# Patient Record
Sex: FEMALE | Race: WHITE | NOT HISPANIC OR LATINO
[De-identification: names, ages, dates, MRNs, and addresses within clinical notes are randomized per-mention and may not be internally consistent; named-entity substitution may affect disease eponyms.]

---

## 2017-07-26 ENCOUNTER — APPOINTMENT (OUTPATIENT)
Dept: FAMILY MEDICINE | Facility: CLINIC | Age: 57
End: 2017-07-26

## 2017-07-26 VITALS
OXYGEN SATURATION: 98 % | RESPIRATION RATE: 15 BRPM | HEART RATE: 100 BPM | TEMPERATURE: 98.4 F | WEIGHT: 139 LBS | HEIGHT: 61 IN | SYSTOLIC BLOOD PRESSURE: 189 MMHG | BODY MASS INDEX: 26.24 KG/M2 | DIASTOLIC BLOOD PRESSURE: 89 MMHG

## 2017-08-02 ENCOUNTER — APPOINTMENT (OUTPATIENT)
Dept: FAMILY MEDICINE | Facility: CLINIC | Age: 57
End: 2017-08-02
Payer: COMMERCIAL

## 2017-08-02 VITALS
SYSTOLIC BLOOD PRESSURE: 157 MMHG | TEMPERATURE: 98.4 F | HEIGHT: 61 IN | RESPIRATION RATE: 15 BRPM | BODY MASS INDEX: 26.24 KG/M2 | DIASTOLIC BLOOD PRESSURE: 98 MMHG | OXYGEN SATURATION: 99 % | WEIGHT: 139 LBS | HEART RATE: 85 BPM

## 2017-08-02 PROCEDURE — 99214 OFFICE O/P EST MOD 30 MIN: CPT

## 2017-08-29 ENCOUNTER — RX RENEWAL (OUTPATIENT)
Age: 57
End: 2017-08-29

## 2017-09-13 ENCOUNTER — RX RENEWAL (OUTPATIENT)
Age: 57
End: 2017-09-13

## 2017-09-18 ENCOUNTER — APPOINTMENT (OUTPATIENT)
Dept: FAMILY MEDICINE | Facility: CLINIC | Age: 57
End: 2017-09-18
Payer: COMMERCIAL

## 2017-09-18 VITALS
RESPIRATION RATE: 15 BRPM | HEART RATE: 90 BPM | OXYGEN SATURATION: 98 % | DIASTOLIC BLOOD PRESSURE: 95 MMHG | TEMPERATURE: 98.4 F | WEIGHT: 139 LBS | SYSTOLIC BLOOD PRESSURE: 155 MMHG | HEIGHT: 61 IN | BODY MASS INDEX: 26.24 KG/M2

## 2017-09-18 PROCEDURE — 99214 OFFICE O/P EST MOD 30 MIN: CPT

## 2017-09-22 ENCOUNTER — RX RENEWAL (OUTPATIENT)
Age: 57
End: 2017-09-22

## 2017-09-23 ENCOUNTER — LABORATORY RESULT (OUTPATIENT)
Age: 57
End: 2017-09-23

## 2017-09-25 LAB
25(OH)D3 SERPL-MCNC: 15.9 NG/ML
ALBUMIN MFR SERPL ELPH: 55.4 %
ALBUMIN SERPL ELPH-MCNC: 4.6 G/DL
ALBUMIN SERPL-MCNC: 4.3 G/DL
ALBUMIN/GLOB SERPL: 1.2 RATIO
ALP BLD-CCNC: 116 U/L
ALPHA1 GLOB MFR SERPL ELPH: 3.4 %
ALPHA1 GLOB SERPL ELPH-MCNC: 0.3 G/DL
ALPHA2 GLOB MFR SERPL ELPH: 10 %
ALPHA2 GLOB SERPL ELPH-MCNC: 0.8 G/DL
ALT SERPL-CCNC: 15 U/L
ANION GAP SERPL CALC-SCNC: 14 MMOL/L
APPEARANCE: CLEAR
AST SERPL-CCNC: 20 U/L
B BURGDOR AB SER-IMP: NEGATIVE
B BURGDOR IGG+IGM SER QL IB: NORMAL
B BURGDOR IGM PATRN SER IB-IMP: NEGATIVE
B BURGDOR18/20KD IGM SER QL IB: NORMAL
B BURGDOR18KD IGG SER QL IB: NORMAL
B BURGDOR23KD IGG SER QL IB: NORMAL
B BURGDOR23KD IGM SER QL IB: NORMAL
B BURGDOR28KD AB SER QL IB: NORMAL
B BURGDOR28KD IGG SER QL IB: NORMAL
B BURGDOR30KD AB SER QL IB: NORMAL
B BURGDOR30KD IGG SER QL IB: NORMAL
B BURGDOR31KD IGG SER QL IB: NORMAL
B BURGDOR31KD IGM SER QL IB: NORMAL
B BURGDOR39KD IGG SER QL IB: NORMAL
B BURGDOR39KD IGM SER QL IB: NORMAL
B BURGDOR41KD IGG SER QL IB: PRESENT
B BURGDOR41KD IGM SER QL IB: NORMAL
B BURGDOR45KD AB SER QL IB: NORMAL
B BURGDOR45KD IGG SER QL IB: NORMAL
B BURGDOR58KD AB SER QL IB: NORMAL
B BURGDOR58KD IGG SER QL IB: NORMAL
B BURGDOR66KD IGG SER QL IB: NORMAL
B BURGDOR66KD IGM SER QL IB: NORMAL
B BURGDOR93KD IGG SER QL IB: NORMAL
B BURGDOR93KD IGM SER QL IB: NORMAL
B-GLOBULIN MFR SERPL ELPH: 9.3 %
B-GLOBULIN SERPL ELPH-MCNC: 0.7 G/DL
BASOPHILS # BLD AUTO: 0.02 K/UL
BASOPHILS NFR BLD AUTO: 0.2 %
BILIRUB SERPL-MCNC: 1 MG/DL
BILIRUBIN URINE: NEGATIVE
BLOOD URINE: NEGATIVE
BUN SERPL-MCNC: 28 MG/DL
CALCIUM SERPL-MCNC: 10.3 MG/DL
CHLORIDE SERPL-SCNC: 103 MMOL/L
CHOLEST SERPL-MCNC: 227 MG/DL
CHOLEST/HDLC SERPL: 4 RATIO
CO2 SERPL-SCNC: 23 MMOL/L
COLOR: YELLOW
CREAT SERPL-MCNC: 0.79 MG/DL
CREAT SPEC-SCNC: 190 MG/DL
EOSINOPHIL # BLD AUTO: 0.31 K/UL
EOSINOPHIL NFR BLD AUTO: 3.4 %
FOLATE SERPL-MCNC: 19.3 NG/ML
GAMMA GLOB FLD ELPH-MCNC: 1.7 G/DL
GAMMA GLOB MFR SERPL ELPH: 21.9 %
GLUCOSE QUALITATIVE U: NORMAL MG/DL
GLUCOSE SERPL-MCNC: 108 MG/DL
HBA1C MFR BLD HPLC: 6.1 %
HCT VFR BLD CALC: 43.6 %
HDLC SERPL-MCNC: 57 MG/DL
HGB BLD-MCNC: 14.5 G/DL
IMM GRANULOCYTES NFR BLD AUTO: 0.1 %
INTERPRETATION SERPL IEP-IMP: NORMAL
KETONES URINE: NEGATIVE
LDLC SERPL CALC-MCNC: 116 MG/DL
LEUKOCYTE ESTERASE URINE: NEGATIVE
LYMPHOCYTES # BLD AUTO: 4.38 K/UL
LYMPHOCYTES NFR BLD AUTO: 47.6 %
M PROTEIN MFR SERPL ELPH: 16.7 %
MAGNESIUM SERPL-MCNC: 2.2 MG/DL
MAN DIFF?: NORMAL
MCHC RBC-ENTMCNC: 29.8 PG
MCHC RBC-ENTMCNC: 33.3 GM/DL
MCV RBC AUTO: 89.5 FL
MICROALBUMIN 24H UR DL<=1MG/L-MCNC: 3.4 MG/DL
MICROALBUMIN/CREAT 24H UR-RTO: 18 MG/G
MONOCLON BAND OBS SERPL: 1.3 G/DL
MONOCYTES # BLD AUTO: 0.67 K/UL
MONOCYTES NFR BLD AUTO: 7.3 %
NEUTROPHILS # BLD AUTO: 3.82 K/UL
NEUTROPHILS NFR BLD AUTO: 41.4 %
NITRITE URINE: NEGATIVE
PH URINE: 5.5
PLATELET # BLD AUTO: 318 K/UL
POTASSIUM SERPL-SCNC: 5 MMOL/L
PROT SERPL-MCNC: 7.8 G/DL
PROTEIN URINE: NEGATIVE MG/DL
RBC # BLD: 4.87 M/UL
RBC # FLD: 14.4 %
SODIUM SERPL-SCNC: 140 MMOL/L
SPECIFIC GRAVITY URINE: 1.02
TRIGL SERPL-MCNC: 272 MG/DL
TSH SERPL-ACNC: 3.78 UIU/ML
UROBILINOGEN URINE: NORMAL MG/DL
VIT B12 SERPL-MCNC: 625 PG/ML
WBC # FLD AUTO: 9.21 K/UL

## 2017-10-03 ENCOUNTER — FORM ENCOUNTER (OUTPATIENT)
Age: 57
End: 2017-10-03

## 2017-10-04 ENCOUNTER — OUTPATIENT (OUTPATIENT)
Dept: OUTPATIENT SERVICES | Facility: HOSPITAL | Age: 57
LOS: 1 days | End: 2017-10-04
Payer: COMMERCIAL

## 2017-10-04 ENCOUNTER — FORM ENCOUNTER (OUTPATIENT)
Age: 57
End: 2017-10-04

## 2017-10-04 ENCOUNTER — APPOINTMENT (OUTPATIENT)
Dept: FAMILY MEDICINE | Facility: CLINIC | Age: 57
End: 2017-10-04
Payer: COMMERCIAL

## 2017-10-04 VITALS
DIASTOLIC BLOOD PRESSURE: 100 MMHG | HEART RATE: 115 BPM | BODY MASS INDEX: 26.43 KG/M2 | WEIGHT: 140 LBS | SYSTOLIC BLOOD PRESSURE: 180 MMHG | HEIGHT: 61 IN

## 2017-10-04 PROCEDURE — 93971 EXTREMITY STUDY: CPT | Mod: 26,LT

## 2017-10-04 PROCEDURE — 99215 OFFICE O/P EST HI 40 MIN: CPT | Mod: 25

## 2017-10-04 PROCEDURE — 93971 EXTREMITY STUDY: CPT

## 2017-10-04 PROCEDURE — 93000 ELECTROCARDIOGRAM COMPLETE: CPT | Mod: 59

## 2017-10-04 RX ORDER — METOPROLOL SUCCINATE 50 MG/1
50 TABLET, EXTENDED RELEASE ORAL
Qty: 30 | Refills: 3 | Status: DISCONTINUED | COMMUNITY
Start: 2017-08-02 | End: 2017-10-04

## 2017-10-04 RX ORDER — ATENOLOL 25 MG/1
25 TABLET ORAL TWICE DAILY
Qty: 180 | Refills: 1 | Status: DISCONTINUED | COMMUNITY
Start: 2017-07-26 | End: 2017-10-04

## 2017-10-05 ENCOUNTER — APPOINTMENT (OUTPATIENT)
Dept: RADIOLOGY | Facility: HOSPITAL | Age: 57
End: 2017-10-05
Payer: COMMERCIAL

## 2017-10-05 ENCOUNTER — OUTPATIENT (OUTPATIENT)
Dept: OUTPATIENT SERVICES | Facility: HOSPITAL | Age: 57
LOS: 1 days | End: 2017-10-05
Payer: COMMERCIAL

## 2017-10-05 PROCEDURE — 73562 X-RAY EXAM OF KNEE 3: CPT | Mod: 26,LT

## 2017-10-05 PROCEDURE — 73552 X-RAY EXAM OF FEMUR 2/>: CPT | Mod: 26,LT

## 2017-10-05 PROCEDURE — 77080 DXA BONE DENSITY AXIAL: CPT | Mod: 26

## 2017-10-05 PROCEDURE — 73590 X-RAY EXAM OF LOWER LEG: CPT | Mod: 26,LT

## 2017-10-05 PROCEDURE — 73590 X-RAY EXAM OF LOWER LEG: CPT

## 2017-10-05 PROCEDURE — 73552 X-RAY EXAM OF FEMUR 2/>: CPT

## 2017-10-05 PROCEDURE — 73562 X-RAY EXAM OF KNEE 3: CPT

## 2017-10-05 PROCEDURE — 77080 DXA BONE DENSITY AXIAL: CPT

## 2017-10-11 ENCOUNTER — APPOINTMENT (OUTPATIENT)
Dept: FAMILY MEDICINE | Facility: CLINIC | Age: 57
End: 2017-10-11

## 2017-10-12 ENCOUNTER — APPOINTMENT (OUTPATIENT)
Dept: FAMILY MEDICINE | Facility: CLINIC | Age: 57
End: 2017-10-12
Payer: COMMERCIAL

## 2017-10-12 VITALS
HEART RATE: 83 BPM | BODY MASS INDEX: 26.43 KG/M2 | SYSTOLIC BLOOD PRESSURE: 131 MMHG | DIASTOLIC BLOOD PRESSURE: 81 MMHG | OXYGEN SATURATION: 93 % | HEIGHT: 61 IN | WEIGHT: 140 LBS

## 2017-10-12 PROCEDURE — 99214 OFFICE O/P EST MOD 30 MIN: CPT

## 2017-10-12 RX ORDER — VALSARTAN 40 MG/1
40 TABLET, COATED ORAL DAILY
Qty: 30 | Refills: 5 | Status: DISCONTINUED | COMMUNITY
Start: 2017-09-18 | End: 2017-10-12

## 2017-10-31 ENCOUNTER — RX RENEWAL (OUTPATIENT)
Age: 57
End: 2017-10-31

## 2017-11-15 ENCOUNTER — APPOINTMENT (OUTPATIENT)
Dept: FAMILY MEDICINE | Facility: CLINIC | Age: 57
End: 2017-11-15
Payer: COMMERCIAL

## 2017-11-15 VITALS
OXYGEN SATURATION: 100 % | DIASTOLIC BLOOD PRESSURE: 80 MMHG | SYSTOLIC BLOOD PRESSURE: 122 MMHG | HEART RATE: 96 BPM | BODY MASS INDEX: 27 KG/M2 | WEIGHT: 143 LBS | HEIGHT: 61 IN

## 2017-11-15 DIAGNOSIS — F41.9 ANXIETY DISORDER, UNSPECIFIED: ICD-10-CM

## 2017-11-15 PROCEDURE — G0008: CPT

## 2017-11-15 PROCEDURE — 99215 OFFICE O/P EST HI 40 MIN: CPT | Mod: 25

## 2017-11-15 PROCEDURE — 90688 IIV4 VACCINE SPLT 0.5 ML IM: CPT

## 2017-11-22 ENCOUNTER — RX RENEWAL (OUTPATIENT)
Age: 57
End: 2017-11-22

## 2017-11-28 ENCOUNTER — RX RENEWAL (OUTPATIENT)
Age: 57
End: 2017-11-28

## 2017-12-12 ENCOUNTER — RX RENEWAL (OUTPATIENT)
Age: 57
End: 2017-12-12

## 2018-05-22 ENCOUNTER — RX RENEWAL (OUTPATIENT)
Age: 58
End: 2018-05-22

## 2018-05-25 ENCOUNTER — RX RENEWAL (OUTPATIENT)
Age: 58
End: 2018-05-25

## 2018-08-21 ENCOUNTER — APPOINTMENT (OUTPATIENT)
Dept: FAMILY MEDICINE | Facility: CLINIC | Age: 58
End: 2018-08-21
Payer: COMMERCIAL

## 2018-08-21 VITALS
HEART RATE: 85 BPM | HEIGHT: 60 IN | OXYGEN SATURATION: 99 % | BODY MASS INDEX: 27.88 KG/M2 | DIASTOLIC BLOOD PRESSURE: 90 MMHG | WEIGHT: 142 LBS | SYSTOLIC BLOOD PRESSURE: 144 MMHG | TEMPERATURE: 98.4 F

## 2018-08-21 DIAGNOSIS — G47.00 INSOMNIA, UNSPECIFIED: ICD-10-CM

## 2018-08-21 PROCEDURE — G0444 DEPRESSION SCREEN ANNUAL: CPT

## 2018-08-21 PROCEDURE — 99396 PREV VISIT EST AGE 40-64: CPT | Mod: 25

## 2018-08-21 PROCEDURE — 93000 ELECTROCARDIOGRAM COMPLETE: CPT | Mod: 59

## 2018-08-21 RX ORDER — AMOXICILLIN AND CLAVULANATE POTASSIUM 875; 125 MG/1; MG/1
875-125 TABLET, COATED ORAL
Qty: 20 | Refills: 0 | Status: DISCONTINUED | COMMUNITY
Start: 2017-11-15 | End: 2018-08-21

## 2018-08-21 NOTE — HISTORY OF PRESENT ILLNESS
[FreeTextEntry1] : Physical. [de-identified] : Here for CPE. c/o gum pain and ears pain for 3 days. no fever, chills, cough, chest pain . no relief with OTC meds. She used oragel. no sick contacts. 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 c/o sleep disturbance .

## 2018-08-21 NOTE — COUNSELING
[Weight management counseling provided] : Weight management [Healthy eating counseling provided] : healthy eating [Activity counseling provided] : activity [Behavioral health counseling provided] : behavioral health  [Keep Food Diary] : Keep food diary [Low Fat Diet] : Low fat diet [Decrease Portions] : Decrease food portions [___ min/wk activity recommended] : [unfilled] min/wk activity recommended [Walking] : Walking [Engage in a relaxing activity] : Engage in a relaxing activity [None] : None [Needs reinforcement, provided] : Patient needs reinforcement on understanding lifestyle changes and  the steps needed to achieve self management goals and reinforcement was provided [ - Annual Depression Screening] : Annual Depression Screening

## 2018-08-21 NOTE — HEALTH RISK ASSESSMENT
[Fair] :  ~his/her~ mood as fair [No falls in past year] : Patient reported no falls in the past year [0] : 2) Feeling down, depressed, or hopeless: Not at all (0) [Discussed at today's visit] : Advance Directives Discussed at today's visit [No changes since last discussed ___] : No changes since last discussed  [unfilled] [HIV test declined] : HIV test declined [Hepatitis C test declined] : Hepatitis C test declined [None] : None [With Family] : lives with family [Unemployed] : unemployed [] :  [Sexually Active] : sexually active [Feels Safe at Home] : Feels safe at home [Fully functional (bathing, dressing, toileting, transferring, walking, feeding)] : Fully functional (bathing, dressing, toileting, transferring, walking, feeding) [Fully functional (using the telephone, shopping, preparing meals, housekeeping, doing laundry, using] : Fully functional and needs no help or supervision to perform IADLs (using the telephone, shopping, preparing meals, housekeeping, doing laundry, using transportation, managing medications and managing finances) [Independent] : managing finances [Smoke Detector] : smoke detector [Carbon Monoxide Detector] : carbon monoxide detector [Seat Belt] :  uses seat belt [Sunscreen] : uses sunscreen [Name: ___] : Health Care Proxy's Name: [unfilled]  [Relationship: ___] : Relationship: [unfilled] [Aggressive treatment] : aggressive treatment [] : No [de-identified] : gyn ( dr. Mina) [[id number]] : 0 [Change in mental status noted] : No change in mental status noted [Language] : denies difficulty with language [Reports changes in hearing] : Reports no changes in hearing [Reports changes in vision] : Reports no changes in vision [Reports changes in dental health] : Reports no changes in dental health [de-identified] : overdue for dental exam

## 2018-08-21 NOTE — REVIEW OF SYSTEMS
[Earache] : earache [Negative] : Heme/Lymph [Hearing Loss] : no hearing loss [Nasal Discharge] : no nasal discharge [Sore Throat] : no sore throat

## 2018-08-21 NOTE — PLAN
[FreeTextEntry1] : preventive screening. lab work. \par f/u Gyn. \par f/u hematology, skin doctor. \par  EKG: NSR 86 bpm .  poor artefact.\par Pt. did not take BP medicine yet. \par melatonin for sleep.

## 2018-08-28 ENCOUNTER — LABORATORY RESULT (OUTPATIENT)
Age: 58
End: 2018-08-28

## 2018-08-28 ENCOUNTER — RX RENEWAL (OUTPATIENT)
Age: 58
End: 2018-08-28

## 2018-08-30 LAB
25(OH)D3 SERPL-MCNC: 25.6 NG/ML
ALBUMIN MFR SERPL ELPH: 58.2 %
ALBUMIN SERPL ELPH-MCNC: 4.7 G/DL
ALBUMIN SERPL-MCNC: 4.5 G/DL
ALBUMIN/GLOB SERPL: 1.4 RATIO
ALP BLD-CCNC: 72 U/L
ALPHA1 GLOB MFR SERPL ELPH: 3.4 %
ALPHA1 GLOB SERPL ELPH-MCNC: 0.3 G/DL
ALPHA2 GLOB MFR SERPL ELPH: 9.1 %
ALPHA2 GLOB SERPL ELPH-MCNC: 0.7 G/DL
ALT SERPL-CCNC: 22 U/L
ANION GAP SERPL CALC-SCNC: 14 MMOL/L
APPEARANCE: CLEAR
AST SERPL-CCNC: 26 U/L
B-GLOBULIN MFR SERPL ELPH: 9.5 %
B-GLOBULIN SERPL ELPH-MCNC: 0.7 G/DL
BACTERIA: ABNORMAL
BASOPHILS # BLD AUTO: 0 K/UL
BASOPHILS NFR BLD AUTO: 0 %
BILIRUB SERPL-MCNC: 0.5 MG/DL
BILIRUBIN URINE: NEGATIVE
BLOOD URINE: NEGATIVE
BUN SERPL-MCNC: 29 MG/DL
CALCIUM SERPL-MCNC: 10 MG/DL
CHLORIDE SERPL-SCNC: 105 MMOL/L
CHOLEST SERPL-MCNC: 206 MG/DL
CHOLEST/HDLC SERPL: 3.7 RATIO
CO2 SERPL-SCNC: 24 MMOL/L
COLOR: YELLOW
CREAT SERPL-MCNC: 0.79 MG/DL
CREAT SPEC-SCNC: 221 MG/DL
CRP SERPL-MCNC: 0.12 MG/DL
EOSINOPHIL # BLD AUTO: 0 K/UL
EOSINOPHIL NFR BLD AUTO: 0 %
ERYTHROCYTE [SEDIMENTATION RATE] IN BLOOD BY WESTERGREN METHOD: 29 MM/HR
GAMMA GLOB FLD ELPH-MCNC: 1.5 G/DL
GAMMA GLOB MFR SERPL ELPH: 19.8 %
GLUCOSE QUALITATIVE U: NEGATIVE MG/DL
GLUCOSE SERPL-MCNC: 112 MG/DL
HBA1C MFR BLD HPLC: 6.4 %
HCT VFR BLD CALC: 42.1 %
HDLC SERPL-MCNC: 55 MG/DL
HGB BLD-MCNC: 13.9 G/DL
HYALINE CASTS: 6 /LPF
INTERPRETATION SERPL IEP-IMP: NORMAL
KETONES URINE: NEGATIVE
LDLC SERPL CALC-MCNC: 120 MG/DL
LEUKOCYTE ESTERASE URINE: NEGATIVE
LYMPHOCYTES # BLD AUTO: 2.38 K/UL
LYMPHOCYTES NFR BLD AUTO: 31.9 %
M PROTEIN MFR SERPL ELPH: 14.2 %
MAN DIFF?: NORMAL
MCHC RBC-ENTMCNC: 30 PG
MCHC RBC-ENTMCNC: 33 GM/DL
MCV RBC AUTO: 90.7 FL
MICROALBUMIN 24H UR DL<=1MG/L-MCNC: <1.2 MG/DL
MICROALBUMIN/CREAT 24H UR-RTO: NORMAL
MICROSCOPIC-UA: NORMAL
MONOCLON BAND OBS SERPL: 1.1 G/DL
MONOCYTES # BLD AUTO: 0.46 K/UL
MONOCYTES NFR BLD AUTO: 6.2 %
NEUTROPHILS # BLD AUTO: 4.61 K/UL
NEUTROPHILS NFR BLD AUTO: 61.9 %
NITRITE URINE: NEGATIVE
PH URINE: 5.5
PLATELET # BLD AUTO: 345 K/UL
POTASSIUM SERPL-SCNC: 4.2 MMOL/L
PROT SERPL-MCNC: 7.7 G/DL
PROTEIN URINE: NEGATIVE MG/DL
RBC # BLD: 4.64 M/UL
RBC # FLD: 13.7 %
RED BLOOD CELLS URINE: 2 /HPF
SODIUM SERPL-SCNC: 143 MMOL/L
SPECIFIC GRAVITY URINE: 1.03
SQUAMOUS EPITHELIAL CELLS: 3 /HPF
TRIGL SERPL-MCNC: 156 MG/DL
TSH SERPL-ACNC: 1.18 UIU/ML
UROBILINOGEN URINE: NEGATIVE MG/DL
WBC # FLD AUTO: 7.45 K/UL
WHITE BLOOD CELLS URINE: 4 /HPF

## 2018-09-18 ENCOUNTER — FORM ENCOUNTER (OUTPATIENT)
Age: 58
End: 2018-09-18

## 2018-09-19 ENCOUNTER — APPOINTMENT (OUTPATIENT)
Dept: MAMMOGRAPHY | Facility: HOSPITAL | Age: 58
End: 2018-09-19
Payer: COMMERCIAL

## 2018-09-19 ENCOUNTER — OUTPATIENT (OUTPATIENT)
Dept: OUTPATIENT SERVICES | Facility: HOSPITAL | Age: 58
LOS: 1 days | End: 2018-09-19
Payer: COMMERCIAL

## 2018-09-19 DIAGNOSIS — Z00.8 ENCOUNTER FOR OTHER GENERAL EXAMINATION: ICD-10-CM

## 2018-09-19 PROCEDURE — 77063 BREAST TOMOSYNTHESIS BI: CPT | Mod: 26

## 2018-09-19 PROCEDURE — 77067 SCR MAMMO BI INCL CAD: CPT

## 2018-09-19 PROCEDURE — 77063 BREAST TOMOSYNTHESIS BI: CPT

## 2018-09-19 PROCEDURE — 77067 SCR MAMMO BI INCL CAD: CPT | Mod: 26

## 2018-11-20 ENCOUNTER — APPOINTMENT (OUTPATIENT)
Dept: FAMILY MEDICINE | Facility: CLINIC | Age: 58
End: 2018-11-20

## 2018-11-26 ENCOUNTER — RX RENEWAL (OUTPATIENT)
Age: 58
End: 2018-11-26

## 2019-05-02 ENCOUNTER — RX CHANGE (OUTPATIENT)
Age: 59
End: 2019-05-02

## 2019-05-13 ENCOUNTER — APPOINTMENT (OUTPATIENT)
Age: 59
End: 2019-05-13
Payer: COMMERCIAL

## 2019-05-13 VITALS
HEIGHT: 60 IN | BODY MASS INDEX: 25.91 KG/M2 | WEIGHT: 132 LBS | HEART RATE: 92 BPM | DIASTOLIC BLOOD PRESSURE: 80 MMHG | OXYGEN SATURATION: 98 % | SYSTOLIC BLOOD PRESSURE: 120 MMHG | TEMPERATURE: 98.2 F

## 2019-05-13 PROCEDURE — 99214 OFFICE O/P EST MOD 30 MIN: CPT

## 2019-05-13 RX ORDER — CHLORHEXIDINE GLUCONATE, 0.12% ORAL RINSE 1.2 MG/ML
0.12 SOLUTION DENTAL
Qty: 473 | Refills: 0 | Status: DISCONTINUED | COMMUNITY
Start: 2017-07-25 | End: 2019-05-13

## 2019-05-13 RX ORDER — IBUPROFEN 600 MG/1
600 TABLET, FILM COATED ORAL
Qty: 30 | Refills: 0 | Status: DISCONTINUED | COMMUNITY
Start: 2017-07-25 | End: 2019-05-13

## 2019-05-13 RX ORDER — AMOXICILLIN AND CLAVULANATE POTASSIUM 500; 125 MG/1; MG/1
500-125 TABLET, FILM COATED ORAL 3 TIMES DAILY
Qty: 21 | Refills: 0 | Status: DISCONTINUED | COMMUNITY
Start: 2018-08-21 | End: 2019-05-13

## 2019-05-13 NOTE — HEALTH RISK ASSESSMENT
[No falls in past year] : Patient reported no falls in the past year [0] : 2) Feeling down, depressed, or hopeless: Not at all (0) [] : No [[id number]] : 0

## 2019-05-13 NOTE — PHYSICAL EXAM
[No Acute Distress] : no acute distress [Well Nourished] : well nourished [Well Developed] : well developed [Well-Appearing] : well-appearing [Normal Sclera/Conjunctiva] : normal sclera/conjunctiva [PERRL] : pupils equal round and reactive to light [EOMI] : extraocular movements intact [Normal Outer Ear/Nose] : the outer ears and nose were normal in appearance [Normal Oropharynx] : the oropharynx was normal [No JVD] : no jugular venous distention [Supple] : supple [No Lymphadenopathy] : no lymphadenopathy [Thyroid Normal, No Nodules] : the thyroid was normal and there were no nodules present [No Respiratory Distress] : no respiratory distress  [Clear to Auscultation] : lungs were clear to auscultation bilaterally [No Accessory Muscle Use] : no accessory muscle use [Normal Rate] : normal rate  [Regular Rhythm] : with a regular rhythm [Normal S1, S2] : normal S1 and S2 [No Murmur] : no murmur heard [No Carotid Bruits] : no carotid bruits [No Abdominal Bruit] : a ~M bruit was not heard ~T in the abdomen [No Varicosities] : no varicosities [Pedal Pulses Present] : the pedal pulses are present [No Edema] : there was no peripheral edema [No Extremity Clubbing/Cyanosis] : no extremity clubbing/cyanosis [No Palpable Aorta] : no palpable aorta [Soft] : abdomen soft [Non Tender] : non-tender [Non-distended] : non-distended [No Masses] : no abdominal mass palpated [No HSM] : no HSM [Normal Bowel Sounds] : normal bowel sounds [Normal Posterior Cervical Nodes] : no posterior cervical lymphadenopathy [No CVA Tenderness] : no CVA  tenderness [Normal Anterior Cervical Nodes] : no anterior cervical lymphadenopathy [No Joint Swelling] : no joint swelling [No Spinal Tenderness] : no spinal tenderness [Grossly Normal Strength/Tone] : grossly normal strength/tone [No Rash] : no rash [Normal Gait] : normal gait [Coordination Grossly Intact] : coordination grossly intact [Deep Tendon Reflexes (DTR)] : deep tendon reflexes were 2+ and symmetric [No Focal Deficits] : no focal deficits [Normal Affect] : the affect was normal [Normal Insight/Judgement] : insight and judgment were intact

## 2019-05-13 NOTE — COUNSELING
[Healthy eating counseling provided] : healthy eating [Activity counseling provided] : activity [Low Salt Diet] : Low salt diet [___ min/wk activity recommended] : [unfilled] min/wk activity recommended [Walking] : Walking [None] : None [Needs reinforcement, provided] : Patient needs reinforcement on understanding lifestyle changes and  the steps needed to achieve self management goals and reinforcement was provided

## 2019-05-13 NOTE — REVIEW OF SYSTEMS
[Headache] : headache [Dizziness] : dizziness [Negative] : Heme/Lymph [Fainting] : no fainting [Unsteady Walking] : no ataxia [Memory Loss] : no memory loss [Confusion] : no confusion

## 2019-05-13 NOTE — HISTORY OF PRESENT ILLNESS
[FreeTextEntry1] : Hypertension and hyperlipidemia.  [de-identified] : HerE FOR F/U hypertension and hyperlipidemia. She is taking her meds regularly and needs refills;. No chest pain ,. SOB, fever, chills.C/o feeling of pressure on top of head, for 3 months. c/o pain in back of head and to the left. She feels pain on turning neck sideways.Occasional dizziness. No hearing loss or ear pain, blurred vision, gait problem, weakness, numbness.

## 2019-05-13 NOTE — PLAN
[FreeTextEntry1] : fasting labs.\par  f/u next week.\par  hematology  referral to Monoclonal gammapathy.\par  CT head.

## 2019-05-14 ENCOUNTER — FORM ENCOUNTER (OUTPATIENT)
Age: 59
End: 2019-05-14

## 2019-05-15 ENCOUNTER — OUTPATIENT (OUTPATIENT)
Dept: OUTPATIENT SERVICES | Facility: HOSPITAL | Age: 59
LOS: 1 days | End: 2019-05-15
Payer: COMMERCIAL

## 2019-05-15 DIAGNOSIS — R51 HEADACHE: ICD-10-CM

## 2019-05-15 PROCEDURE — 70450 CT HEAD/BRAIN W/O DYE: CPT

## 2019-05-15 PROCEDURE — 70450 CT HEAD/BRAIN W/O DYE: CPT | Mod: 26

## 2019-05-23 ENCOUNTER — FORM ENCOUNTER (OUTPATIENT)
Age: 59
End: 2019-05-23

## 2019-05-23 ENCOUNTER — APPOINTMENT (OUTPATIENT)
Dept: FAMILY MEDICINE | Facility: CLINIC | Age: 59
End: 2019-05-23
Payer: COMMERCIAL

## 2019-05-23 VITALS
BODY MASS INDEX: 25.91 KG/M2 | HEART RATE: 84 BPM | SYSTOLIC BLOOD PRESSURE: 155 MMHG | DIASTOLIC BLOOD PRESSURE: 80 MMHG | WEIGHT: 132 LBS | RESPIRATION RATE: 15 BRPM | HEIGHT: 60 IN | TEMPERATURE: 98.5 F

## 2019-05-23 DIAGNOSIS — Z87.898 PERSONAL HISTORY OF OTHER SPECIFIED CONDITIONS: ICD-10-CM

## 2019-05-23 DIAGNOSIS — Z87.42 PERSONAL HISTORY OF OTHER DISEASES OF THE FEMALE GENITAL TRACT: ICD-10-CM

## 2019-05-23 DIAGNOSIS — Z87.39 PERSONAL HISTORY OF OTHER DISEASES OF THE MUSCULOSKELETAL SYSTEM AND CONNECTIVE TISSUE: ICD-10-CM

## 2019-05-23 DIAGNOSIS — K06.8 OTHER SPECIFIED DISORDERS OF GINGIVA AND EDENTULOUS ALVEOLAR RIDGE: ICD-10-CM

## 2019-05-23 DIAGNOSIS — M79.606 PAIN IN LEG, UNSPECIFIED: ICD-10-CM

## 2019-05-23 DIAGNOSIS — H66.92 OTITIS MEDIA, UNSPECIFIED, LEFT EAR: ICD-10-CM

## 2019-05-23 DIAGNOSIS — Z86.19 PERSONAL HISTORY OF OTHER INFECTIOUS AND PARASITIC DISEASES: ICD-10-CM

## 2019-05-23 DIAGNOSIS — Z86.39 PERSONAL HISTORY OF OTHER ENDOCRINE, NUTRITIONAL AND METABOLIC DISEASE: ICD-10-CM

## 2019-05-23 DIAGNOSIS — K06.9 DISORDER OF GINGIVA AND EDENTULOUS ALVEOLAR RIDGE, UNSPECIFIED: ICD-10-CM

## 2019-05-23 DIAGNOSIS — Z86.69 PERSONAL HISTORY OF OTHER DISEASES OF THE NERVOUS SYSTEM AND SENSE ORGANS: ICD-10-CM

## 2019-05-23 PROCEDURE — 99214 OFFICE O/P EST MOD 30 MIN: CPT

## 2019-05-23 NOTE — REVIEW OF SYSTEMS
[Fever] : no fever [Chills] : no chills [Fatigue] : fatigue [Night Sweats] : no night sweats [Easy Bleeding] : no easy bleeding [Easy Bruising] : no easy bruising [Swollen Glands] : swollen glands [Negative] : Heme/Lymph

## 2019-05-23 NOTE — PHYSICAL EXAM

## 2019-05-23 NOTE — HEALTH RISK ASSESSMENT
[No falls in past year] : Patient reported no falls in the past year [0] : 2) Feeling down, depressed, or hopeless: Not at all (0) [] : No [[id number]] : 0

## 2019-05-23 NOTE — HISTORY OF PRESENT ILLNESS
[___ Days ago] : [unfilled] days ago [Episodic] : episodic [Moderate] : moderate [In the Morning] : in the morning [Worsening] : worsening [FreeTextEntry7] : neck pain , left sided [FreeTextEntry2] : left post. cervical tender lump  [FreeTextEntry5] : when laying on same side  [FreeTextEntry8] : Sometimes it radiates behind left ear in temporal region. No fever, cough, chills, dizziness. Denies injury. Patient did not take BP medicine yet.

## 2019-05-23 NOTE — PLAN
[FreeTextEntry1] : NSAIDs prn ,. pt. decline muscle relaxant.\par If require further eval. after US, will get CT neck and refer to  ENT . \par Stay hydrated, Vit. D supplement, avoid sweets.\par

## 2019-05-24 ENCOUNTER — APPOINTMENT (OUTPATIENT)
Dept: ULTRASOUND IMAGING | Facility: HOSPITAL | Age: 59
End: 2019-05-24
Payer: COMMERCIAL

## 2019-05-24 ENCOUNTER — OUTPATIENT (OUTPATIENT)
Dept: OUTPATIENT SERVICES | Facility: HOSPITAL | Age: 59
LOS: 1 days | End: 2019-05-24
Payer: COMMERCIAL

## 2019-05-24 DIAGNOSIS — R59.0 LOCALIZED ENLARGED LYMPH NODES: ICD-10-CM

## 2019-05-24 LAB
25(OH)D3 SERPL-MCNC: 21.4 NG/ML
ALBUMIN MFR SERPL ELPH: 56.2 %
ALBUMIN SERPL ELPH-MCNC: 4.5 G/DL
ALBUMIN SERPL-MCNC: 4.1 G/DL
ALBUMIN/GLOB SERPL: 1.3 RATIO
ALP BLD-CCNC: 69 U/L
ALPHA1 GLOB MFR SERPL ELPH: 3.8 %
ALPHA1 GLOB SERPL ELPH-MCNC: 0.3 G/DL
ALPHA2 GLOB MFR SERPL ELPH: 9.3 %
ALPHA2 GLOB SERPL ELPH-MCNC: 0.7 G/DL
ALT SERPL-CCNC: 21 U/L
ANION GAP SERPL CALC-SCNC: 12 MMOL/L
APPEARANCE: CLEAR
AST SERPL-CCNC: 23 U/L
B-GLOBULIN MFR SERPL ELPH: 10.6 %
B-GLOBULIN SERPL ELPH-MCNC: 0.8 G/DL
BACTERIA: NEGATIVE
BASOPHILS # BLD AUTO: 0.02 K/UL
BASOPHILS NFR BLD AUTO: 0.3 %
BILIRUB SERPL-MCNC: 0.7 MG/DL
BILIRUBIN URINE: NEGATIVE
BLOOD URINE: NEGATIVE
BUN SERPL-MCNC: 32 MG/DL
CALCIUM SERPL-MCNC: 10 MG/DL
CHLORIDE SERPL-SCNC: 104 MMOL/L
CHOLEST SERPL-MCNC: 185 MG/DL
CHOLEST/HDLC SERPL: 3.4 RATIO
CO2 SERPL-SCNC: 25 MMOL/L
COLOR: YELLOW
CREAT SERPL-MCNC: 0.83 MG/DL
CREAT SPEC-SCNC: 169 MG/DL
EOSINOPHIL # BLD AUTO: 0.15 K/UL
EOSINOPHIL NFR BLD AUTO: 2.2 %
ESTIMATED AVERAGE GLUCOSE: 134 MG/DL
GAMMA GLOB FLD ELPH-MCNC: 1.5 G/DL
GAMMA GLOB MFR SERPL ELPH: 20.1 %
GLUCOSE QUALITATIVE U: NEGATIVE
GLUCOSE SERPL-MCNC: 110 MG/DL
HBA1C MFR BLD HPLC: 6.3 %
HCT VFR BLD CALC: 41.2 %
HDLC SERPL-MCNC: 54 MG/DL
HGB BLD-MCNC: 13.6 G/DL
HYALINE CASTS: 1 /LPF
IMM GRANULOCYTES NFR BLD AUTO: 0.3 %
INTERPRETATION SERPL IEP-IMP: NORMAL
KETONES URINE: NEGATIVE
LDLC SERPL CALC-MCNC: 102 MG/DL
LEUKOCYTE ESTERASE URINE: ABNORMAL
LYMPHOCYTES # BLD AUTO: 2.9 K/UL
LYMPHOCYTES NFR BLD AUTO: 42 %
M PROTEIN MFR SERPL ELPH: 13.6 %
MAN DIFF?: NORMAL
MCHC RBC-ENTMCNC: 29.3 PG
MCHC RBC-ENTMCNC: 33 GM/DL
MCV RBC AUTO: 88.8 FL
MICROALBUMIN 24H UR DL<=1MG/L-MCNC: <1.2 MG/DL
MICROALBUMIN/CREAT 24H UR-RTO: NORMAL MG/G
MICROSCOPIC-UA: NORMAL
MONOCLON BAND OBS SERPL: 1 G/DL
MONOCYTES # BLD AUTO: 0.63 K/UL
MONOCYTES NFR BLD AUTO: 9.1 %
NEUTROPHILS # BLD AUTO: 3.18 K/UL
NEUTROPHILS NFR BLD AUTO: 46.1 %
NITRITE URINE: NEGATIVE
PH URINE: 6
PLATELET # BLD AUTO: 331 K/UL
POTASSIUM SERPL-SCNC: 4.2 MMOL/L
PROT SERPL-MCNC: 7.3 G/DL
PROTEIN URINE: NORMAL
RBC # BLD: 4.64 M/UL
RBC # FLD: 13.8 %
RED BLOOD CELLS URINE: 4 /HPF
SODIUM SERPL-SCNC: 141 MMOL/L
SPECIFIC GRAVITY URINE: 1.02
SQUAMOUS EPITHELIAL CELLS: 4 /HPF
TRIGL SERPL-MCNC: 146 MG/DL
TSH SERPL-ACNC: 1.72 UIU/ML
UROBILINOGEN URINE: NORMAL
WBC # FLD AUTO: 6.9 K/UL
WHITE BLOOD CELLS URINE: 5 /HPF

## 2019-05-24 PROCEDURE — 76536 US EXAM OF HEAD AND NECK: CPT | Mod: 26

## 2019-05-24 PROCEDURE — 76536 US EXAM OF HEAD AND NECK: CPT

## 2019-07-18 ENCOUNTER — OUTPATIENT (OUTPATIENT)
Dept: OUTPATIENT SERVICES | Facility: HOSPITAL | Age: 59
LOS: 1 days | Discharge: HOME | End: 2019-07-18

## 2019-07-18 DIAGNOSIS — E88.09 OTHER DISORDERS OF PLASMA-PROTEIN METABOLISM, NOT ELSEWHERE CLASSIFIED: ICD-10-CM

## 2019-08-15 ENCOUNTER — OUTPATIENT (OUTPATIENT)
Dept: OUTPATIENT SERVICES | Facility: HOSPITAL | Age: 59
LOS: 1 days | Discharge: HOME | End: 2019-08-15

## 2019-08-15 DIAGNOSIS — E88.09 OTHER DISORDERS OF PLASMA-PROTEIN METABOLISM, NOT ELSEWHERE CLASSIFIED: ICD-10-CM

## 2019-08-19 ENCOUNTER — APPOINTMENT (OUTPATIENT)
Dept: HEMATOLOGY ONCOLOGY | Facility: CLINIC | Age: 59
End: 2019-08-19
Payer: COMMERCIAL

## 2019-08-19 VITALS
BODY MASS INDEX: 26.53 KG/M2 | WEIGHT: 136.91 LBS | HEIGHT: 60.24 IN | RESPIRATION RATE: 16 BRPM | HEART RATE: 87 BPM | TEMPERATURE: 97.6 F | DIASTOLIC BLOOD PRESSURE: 81 MMHG | OXYGEN SATURATION: 100 % | SYSTOLIC BLOOD PRESSURE: 150 MMHG

## 2019-08-19 PROCEDURE — 99215 OFFICE O/P EST HI 40 MIN: CPT

## 2019-08-19 NOTE — HISTORY OF PRESENT ILLNESS
[de-identified] : 2014-Was under a lot of stress due to an ill father, and stated she had UE/LE M-S aches. Saw rheumatologist who found monoclonal gammopathy on lab work. Referred to hematologist, and saw Dr. 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 Patient then sought second opinion at [facility] [place] and BMB was recommended, again which patient declined. Patient did not pursue heme f/u.\par 2019-Routine PCP f/u showed persistent monoclonal gammopathy on lab work and patient presenting for heme f/u at the [facility] [place]. [de-identified] : This is my initial office visit with patient with h/o monoclonal gammopathy. She states she gets fatigued, but otherwise is generally doing well. No fevers, sweats, abnormal bruising/bleeding, weight loss reported.

## 2019-08-19 NOTE — ASSESSMENT
[FreeTextEntry1] : MGUS-discussed diagnosis, prognosis with potential complications if a plasma cell dyscrasia progresses, and explained there is treatment available. Discussed indication for BM aspirate /biopsy with its potential benefits/side effects in this situation. Patient is agreeable to hematologic surveillance. Should her heme status change/worsen, she may reconsider having the BM procedure done.\par Patient and her  were given the opportunity to ask questions. Their questions have been answered to their satisfaction at this time. Patient expressed her understanding and willingness to comply with recommended f/u.

## 2019-08-19 NOTE — REASON FOR VISIT
[Follow-Up Visit] : a follow-up visit for [Spouse] : spouse [FreeTextEntry2] : monoclonal gammopathy

## 2019-08-19 NOTE — CONSULT LETTER
[Dear  ___] : Dear  [unfilled], [Consult Letter:] : I had the pleasure of evaluating your patient, [unfilled]. [Please see my note below.] : Please see my note below. [Consult Closing:] : Thank you very much for allowing me to participate in the care of this patient.  If you have any questions, please do not hesitate to contact me. [Sincerely,] : Sincerely, [FreeTextEntry3] : Gloria Lee M.D.

## 2019-10-29 ENCOUNTER — EMERGENCY (EMERGENCY)
Facility: HOSPITAL | Age: 59
LOS: 1 days | Discharge: HOME | End: 2019-10-29
Attending: EMERGENCY MEDICINE | Admitting: EMERGENCY MEDICINE
Payer: COMMERCIAL

## 2019-10-29 VITALS
HEART RATE: 113 BPM | RESPIRATION RATE: 18 BRPM | WEIGHT: 139.99 LBS | DIASTOLIC BLOOD PRESSURE: 92 MMHG | OXYGEN SATURATION: 99 % | TEMPERATURE: 98 F | SYSTOLIC BLOOD PRESSURE: 181 MMHG | HEIGHT: 61 IN

## 2019-10-29 VITALS
RESPIRATION RATE: 17 BRPM | TEMPERATURE: 99 F | OXYGEN SATURATION: 95 % | DIASTOLIC BLOOD PRESSURE: 89 MMHG | SYSTOLIC BLOOD PRESSURE: 155 MMHG | HEART RATE: 95 BPM

## 2019-10-29 PROCEDURE — 99283 EMERGENCY DEPT VISIT LOW MDM: CPT

## 2019-10-29 RX ORDER — OXYCODONE AND ACETAMINOPHEN 5; 325 MG/1; MG/1
1 TABLET ORAL
Qty: 8 | Refills: 0
Start: 2019-10-29 | End: 2019-10-30

## 2019-10-29 RX ORDER — IBUPROFEN 200 MG
1 TABLET ORAL
Qty: 20 | Refills: 0
Start: 2019-10-29 | End: 2019-11-02

## 2019-10-29 RX ORDER — CEPHALEXIN 500 MG
500 CAPSULE ORAL ONCE
Refills: 0 | Status: COMPLETED | OUTPATIENT
Start: 2019-10-29 | End: 2019-10-29

## 2019-10-29 RX ORDER — IBUPROFEN 200 MG
600 TABLET ORAL ONCE
Refills: 0 | Status: COMPLETED | OUTPATIENT
Start: 2019-10-29 | End: 2019-10-29

## 2019-10-29 RX ORDER — CEPHALEXIN 500 MG
1 CAPSULE ORAL
Qty: 30 | Refills: 0
Start: 2019-10-29 | End: 2019-11-07

## 2019-10-29 RX ADMIN — Medication 500 MILLIGRAM(S): at 22:27

## 2019-10-29 RX ADMIN — Medication 600 MILLIGRAM(S): at 22:28

## 2019-10-29 NOTE — ED PROVIDER NOTE - ENMT, MLM
Airway patent, Nasal mucosa clear. Mouth with normal mucosa. Throat has no vesicles, no oropharyngeal exudates and uvula is midline. gingivitis of of lower teeth.

## 2019-10-29 NOTE — ED ADULT NURSE NOTE - NSIMPLEMENTINTERV_GEN_ALL_ED
Implemented All Universal Safety Interventions:  [place] to call system. Call bell, personal items and telephone within reach. Instruct patient to call for assistance. Room bathroom lighting operational. Non-slip footwear when patient is off stretcher. Physically safe environment: no spills, clutter or unnecessary equipment. Stretcher in lowest position, wheels locked, appropriate side rails in place.

## 2019-10-29 NOTE — ED PROVIDER NOTE - OBJECTIVE STATEMENT
60 y/o F presents to ED c/o pain in front lower teeth for 2 days, dull, constant , mod to severe. no fever.

## 2019-10-29 NOTE — ED ADULT NURSE NOTE - OBJECTIVE STATEMENT
Pt presents to ED w/ c/o lower front teeth pain for 2 days. Pt took Tylenol PTA, with no relief. Pt gums are inflamed & red. Pt denies nausea, vomiting, bleeding, fevers.

## 2019-10-29 NOTE — ED PROVIDER NOTE - PMH
Cholelithiasis  H/O  History of Tonsillectomy    Migraine    S/P Biopsy Endometrial    Uterine Leiomyoma

## 2019-10-29 NOTE — ED PROVIDER NOTE - PATIENT PORTAL LINK FT
You can access the FollowMyHealth Patient Portal offered by [facility] by registering at the following website: [email or web address]. By joining [facility]’s FollowMyHealth portal, you will also be able to view your health information using other applications (apps) compatible with our system.

## 2019-11-03 DIAGNOSIS — K13.79 OTHER LESIONS OF ORAL MUCOSA: ICD-10-CM

## 2019-11-13 ENCOUNTER — OUTPATIENT (OUTPATIENT)
Dept: OUTPATIENT SERVICES | Facility: HOSPITAL | Age: 59
LOS: 1 days | Discharge: HOME | End: 2019-11-13

## 2019-11-13 DIAGNOSIS — E88.09 OTHER DISORDERS OF PLASMA-PROTEIN METABOLISM, NOT ELSEWHERE CLASSIFIED: ICD-10-CM

## 2019-11-20 ENCOUNTER — APPOINTMENT (OUTPATIENT)
Dept: HEMATOLOGY ONCOLOGY | Facility: CLINIC | Age: 59
End: 2019-11-20
Payer: COMMERCIAL

## 2019-11-20 ENCOUNTER — LABORATORY RESULT (OUTPATIENT)
Age: 59
End: 2019-11-20

## 2019-11-20 ENCOUNTER — RESULT REVIEW (OUTPATIENT)
Age: 59
End: 2019-11-20

## 2019-11-20 VITALS
HEART RATE: 98 BPM | BODY MASS INDEX: 27.6 KG/M2 | RESPIRATION RATE: 15 BRPM | TEMPERATURE: 97.9 F | WEIGHT: 142.42 LBS | DIASTOLIC BLOOD PRESSURE: 86 MMHG | SYSTOLIC BLOOD PRESSURE: 147 MMHG | OXYGEN SATURATION: 96 %

## 2019-11-20 LAB
BASOPHILS # BLD AUTO: 0 K/UL — SIGNIFICANT CHANGE UP (ref 0–0.2)
BASOPHILS NFR BLD AUTO: 0.6 % — SIGNIFICANT CHANGE UP (ref 0–2)
EOSINOPHIL # BLD AUTO: 0.2 K/UL — SIGNIFICANT CHANGE UP (ref 0–0.5)
EOSINOPHIL NFR BLD AUTO: 3.1 % — SIGNIFICANT CHANGE UP (ref 0–6)
HCT VFR BLD CALC: 40.8 % — SIGNIFICANT CHANGE UP (ref 34.5–45)
HGB BLD-MCNC: 13.3 G/DL — SIGNIFICANT CHANGE UP (ref 11.5–15.5)
LYMPHOCYTES # BLD AUTO: 2.8 K/UL — SIGNIFICANT CHANGE UP (ref 1–3.3)
LYMPHOCYTES # BLD AUTO: 38.9 % — SIGNIFICANT CHANGE UP (ref 13–44)
MCHC RBC-ENTMCNC: 28.9 PG — SIGNIFICANT CHANGE UP (ref 27–34)
MCHC RBC-ENTMCNC: 32.5 G/DL — SIGNIFICANT CHANGE UP (ref 32–36)
MCV RBC AUTO: 88.8 FL — SIGNIFICANT CHANGE UP (ref 80–100)
MONOCYTES # BLD AUTO: 0.6 K/UL — SIGNIFICANT CHANGE UP (ref 0–0.9)
MONOCYTES NFR BLD AUTO: 8.5 % — SIGNIFICANT CHANGE UP (ref 2–14)
NEUTROPHILS # BLD AUTO: 3.6 K/UL — SIGNIFICANT CHANGE UP (ref 1.8–7.4)
NEUTROPHILS NFR BLD AUTO: 48.8 % — SIGNIFICANT CHANGE UP (ref 43–77)
PLATELET # BLD AUTO: 299 K/UL — SIGNIFICANT CHANGE UP (ref 150–400)
RBC # BLD: 4.59 M/UL — SIGNIFICANT CHANGE UP (ref 3.8–5.2)
RBC # FLD: 11.8 % — SIGNIFICANT CHANGE UP (ref 10.3–14.5)
WBC # BLD: 7.3 K/UL — SIGNIFICANT CHANGE UP (ref 3.8–10.5)
WBC # FLD AUTO: 7.3 K/UL — SIGNIFICANT CHANGE UP (ref 3.8–10.5)

## 2019-11-20 PROCEDURE — 99213 OFFICE O/P EST LOW 20 MIN: CPT

## 2019-11-20 NOTE — HISTORY OF PRESENT ILLNESS
[de-identified] : 2014-Was under a lot of stress due to an ill father, and stated she had UE/LE M-S aches. Saw rheumatologist who found monoclonal gammopathy on lab work. Referred to hematologist, and saw Dr. 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 Patient then sought second opinion at [facility] [place] [place] and BMB was recommended, again which patient declined. Patient did not pursue heme f/u.\par 2019-Routine PCP f/u showed persistent monoclonal gammopathy on lab work and patient presented for heme f/u at the [facility]. She declines BMB. [de-identified] : Just had lab work today.\par She states she gets fatigued, but otherwise is generally doing well. No fevers, sweats, abnormal bruising/bleeding, weight loss reported.

## 2019-11-20 NOTE — ASSESSMENT
[FreeTextEntry1] : MGUS-explained that some bone marrow processes may evolve over time. Potential complications if plasma cell dyscrasia progresses reviewed. Patient continues to decline bone marrow biopsy though is agreeable to continued hematologic surveillance with lab work. \par Patient was given a prescription to update her breast imaging (not yet done this year).\par Patient and her  were given the opportunity to ask questions. Their questions have been answered to their apparent satisfaction at this time. Patient expressed her understanding and willingness to comply with recommended f/u.

## 2019-12-09 ENCOUNTER — FORM ENCOUNTER (OUTPATIENT)
Age: 59
End: 2019-12-09

## 2019-12-10 ENCOUNTER — APPOINTMENT (OUTPATIENT)
Dept: MAMMOGRAPHY | Facility: HOSPITAL | Age: 59
End: 2019-12-10
Payer: COMMERCIAL

## 2019-12-10 ENCOUNTER — APPOINTMENT (OUTPATIENT)
Dept: ULTRASOUND IMAGING | Facility: HOSPITAL | Age: 59
End: 2019-12-10

## 2019-12-10 ENCOUNTER — OUTPATIENT (OUTPATIENT)
Dept: OUTPATIENT SERVICES | Facility: HOSPITAL | Age: 59
LOS: 1 days | End: 2019-12-10
Payer: COMMERCIAL

## 2019-12-10 DIAGNOSIS — Z12.39 ENCOUNTER FOR OTHER SCREENING FOR MALIGNANT NEOPLASM OF BREAST: ICD-10-CM

## 2019-12-10 PROCEDURE — 77067 SCR MAMMO BI INCL CAD: CPT | Mod: 26

## 2019-12-10 PROCEDURE — 77063 BREAST TOMOSYNTHESIS BI: CPT

## 2019-12-10 PROCEDURE — 76641 ULTRASOUND BREAST COMPLETE: CPT

## 2019-12-10 PROCEDURE — 76641 ULTRASOUND BREAST COMPLETE: CPT | Mod: 26,50

## 2019-12-10 PROCEDURE — 77063 BREAST TOMOSYNTHESIS BI: CPT | Mod: 26

## 2019-12-10 PROCEDURE — 77067 SCR MAMMO BI INCL CAD: CPT

## 2019-12-11 ENCOUNTER — RX RENEWAL (OUTPATIENT)
Age: 59
End: 2019-12-11

## 2020-02-07 ENCOUNTER — RX RENEWAL (OUTPATIENT)
Age: 60
End: 2020-02-07

## 2020-03-23 ENCOUNTER — APPOINTMENT (OUTPATIENT)
Dept: ULTRASOUND IMAGING | Facility: HOSPITAL | Age: 60
End: 2020-03-23

## 2020-05-08 ENCOUNTER — RX RENEWAL (OUTPATIENT)
Age: 60
End: 2020-05-08

## 2020-05-10 ENCOUNTER — TRANSCRIPTION ENCOUNTER (OUTPATIENT)
Age: 60
End: 2020-05-10

## 2020-05-12 ENCOUNTER — LABORATORY RESULT (OUTPATIENT)
Age: 60
End: 2020-05-12

## 2020-05-20 ENCOUNTER — OUTPATIENT (OUTPATIENT)
Dept: OUTPATIENT SERVICES | Facility: HOSPITAL | Age: 60
LOS: 1 days | Discharge: HOME | End: 2020-05-20

## 2020-05-20 ENCOUNTER — APPOINTMENT (OUTPATIENT)
Dept: HEMATOLOGY ONCOLOGY | Facility: CLINIC | Age: 60
End: 2020-05-20
Payer: COMMERCIAL

## 2020-05-20 DIAGNOSIS — E88.09 OTHER DISORDERS OF PLASMA-PROTEIN METABOLISM, NOT ELSEWHERE CLASSIFIED: ICD-10-CM

## 2020-05-20 PROCEDURE — 99213 OFFICE O/P EST LOW 20 MIN: CPT | Mod: 95

## 2020-05-20 NOTE — RESULTS/DATA
[FreeTextEntry1] : 12/2019-no mammographic evidence of malignancy; no discrete solid or cystic lesion on sonographic evaluation of right and left breast

## 2020-05-20 NOTE — ASSESSMENT
[FreeTextEntry1] : MGUS-clinically stable; potential complications if plasma cell dyscrasia progresses reviewed. Patient continues to decline bone marrow biopsy though is agreeable to continued hematologic surveillance with lab work. \par Patient was given the opportunity to ask questions. Her questions have been answered to her apparent satisfaction at this time. Patient expressed her understanding and willingness to comply with recommended f/u.

## 2020-05-20 NOTE — CONSULT LETTER
[Dear  ___] : Dear  [unfilled], [Courtesy Letter:] : I had the pleasure of seeing your patient, [unfilled], in my office today. [Please see my note below.] : Please see my note below. [Consult Closing:] : Thank you very much for allowing me to participate in the care of this patient.  If you have any questions, please do not hesitate to contact me. [Sincerely,] : Sincerely, [FreeTextEntry3] : Gloria Lee M.D.

## 2020-05-20 NOTE — HISTORY OF PRESENT ILLNESS
[Home] : at home, [unfilled] , at the time of the visit. [Verbal consent obtained from patient] : the patient, [unfilled] [Medical Office: ([facility])___] : at the medical office located in  [de-identified] : 2014-Was under a lot of stress due to an ill father, and stated she had UE/LE M-S aches. Saw rheumatologist who found monoclonal gammopathy on lab work. Referred to hematologist, and saw Dr. 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 [place] [facility] [place]MB was recommended, again which patient declined. Patient did not pursue heme f/u.\par 2019-Routine PCP f/u showed persistent monoclonal gammopathy on lab work and patient presented for heme f/u at the [facility] [place]. She declines BMB. [de-identified] : Telehealth visit due to COVID-19 pandemic.\par +Fatigue but gets out to walk, and trying to remain active through COVID situation.\par  No c/o fevers, sweats, abnormal bruising/bleeding. No c/o cough or SOB.

## 2020-05-20 NOTE — PHYSICAL EXAM
[Normal] : affect appropriate [de-identified] : breathing appeared unlabored [de-identified] : coloration appeared normal

## 2020-05-22 ENCOUNTER — APPOINTMENT (OUTPATIENT)
Age: 60
End: 2020-05-22
Payer: COMMERCIAL

## 2020-05-22 VITALS
SYSTOLIC BLOOD PRESSURE: 137 MMHG | HEART RATE: 105 BPM | TEMPERATURE: 98.4 F | RESPIRATION RATE: 17 BRPM | WEIGHT: 144.5 LBS | DIASTOLIC BLOOD PRESSURE: 81 MMHG | BODY MASS INDEX: 28 KG/M2 | OXYGEN SATURATION: 99 %

## 2020-05-22 PROCEDURE — 99214 OFFICE O/P EST MOD 30 MIN: CPT

## 2020-05-22 NOTE — PHYSICAL EXAM
[No Acute Distress] : no acute distress [Well Nourished] : well nourished [Well Developed] : well developed [Well-Appearing] : well-appearing [Normal Sclera/Conjunctiva] : normal sclera/conjunctiva [PERRL] : pupils equal round and reactive to light [EOMI] : extraocular movements intact [Normal Outer Ear/Nose] : the outer ears and nose were normal in appearance [Normal Oropharynx] : the oropharynx was normal [No JVD] : no jugular venous distention [No Lymphadenopathy] : no lymphadenopathy [Thyroid Normal, No Nodules] : the thyroid was normal and there were no nodules present [Supple] : supple [No Accessory Muscle Use] : no accessory muscle use [No Respiratory Distress] : no respiratory distress  [Clear to Auscultation] : lungs were clear to auscultation bilaterally [Regular Rhythm] : with a regular rhythm [Normal Rate] : normal rate  [Normal S1, S2] : normal S1 and S2 [No Murmur] : no murmur heard [No Carotid Bruits] : no carotid bruits [No Abdominal Bruit] : a ~M bruit was not heard ~T in the abdomen [No Edema] : there was no peripheral edema [No Varicosities] : no varicosities [Pedal Pulses Present] : the pedal pulses are present [No Palpable Aorta] : no palpable aorta [No Extremity Clubbing/Cyanosis] : no extremity clubbing/cyanosis [Soft] : abdomen soft [Non-distended] : non-distended [Non Tender] : non-tender [No Masses] : no abdominal mass palpated [No HSM] : no HSM [Normal Anterior Cervical Nodes] : no anterior cervical lymphadenopathy [Normal Posterior Cervical Nodes] : no posterior cervical lymphadenopathy [Normal Bowel Sounds] : normal bowel sounds [No CVA Tenderness] : no CVA  tenderness [No Spinal Tenderness] : no spinal tenderness [No Joint Swelling] : no joint swelling [Grossly Normal Strength/Tone] : grossly normal strength/tone [Coordination Grossly Intact] : coordination grossly intact [No Rash] : no rash [No Focal Deficits] : no focal deficits [Normal Gait] : normal gait [Normal Affect] : the affect was normal [Deep Tendon Reflexes (DTR)] : deep tendon reflexes were 2+ and symmetric [Normal Insight/Judgement] : insight and judgment were intact

## 2020-05-22 NOTE — HISTORY OF PRESENT ILLNESS
[FreeTextEntry1] : f/u HTN,HLD  [de-identified] : Here for f/u HTN, HLD. Patient is taking her meds regularly. denies SOB, cough, fever, chills, dizziness. She tries to walk daily. She is requesting medicine refills. denies side effects. She does not check BP. She has postmenopausal bleeding once in Feb, 2020.She has postponed US vaginal recommended by gyn. because radiology was closed down at start of pandemic. .

## 2020-05-22 NOTE — HEALTH RISK ASSESSMENT
[No] : In the past 12 months have you used drugs other than those required for medical reasons? No [No falls in past year] : Patient reported no falls in the past year [0] : 2) Feeling down, depressed, or hopeless: Not at all (0) [[id number]] : 0

## 2020-06-05 ENCOUNTER — APPOINTMENT (OUTPATIENT)
Age: 60
End: 2020-06-05

## 2020-06-09 ENCOUNTER — APPOINTMENT (OUTPATIENT)
Age: 60
End: 2020-06-09

## 2020-08-14 ENCOUNTER — APPOINTMENT (OUTPATIENT)
Dept: FAMILY MEDICINE | Facility: CLINIC | Age: 60
End: 2020-08-14
Payer: COMMERCIAL

## 2020-08-14 ENCOUNTER — NON-APPOINTMENT (OUTPATIENT)
Age: 60
End: 2020-08-14

## 2020-08-14 VITALS
OXYGEN SATURATION: 98 % | TEMPERATURE: 97.7 F | SYSTOLIC BLOOD PRESSURE: 136 MMHG | BODY MASS INDEX: 26.59 KG/M2 | WEIGHT: 135.44 LBS | DIASTOLIC BLOOD PRESSURE: 84 MMHG | HEART RATE: 99 BPM | RESPIRATION RATE: 14 BRPM | HEIGHT: 60 IN

## 2020-08-14 DIAGNOSIS — E55.9 VITAMIN D DEFICIENCY, UNSPECIFIED: ICD-10-CM

## 2020-08-14 PROCEDURE — 99214 OFFICE O/P EST MOD 30 MIN: CPT | Mod: 25

## 2020-08-14 PROCEDURE — 93000 ELECTROCARDIOGRAM COMPLETE: CPT

## 2020-08-14 NOTE — HISTORY OF PRESENT ILLNESS
[___ Days ago] : [unfilled] days ago [Moderate] : moderate [Episodic] : episodic [At Night] : at night [Stable] : stable [FreeTextEntry3] : 6/10 [FreeTextEntry7] : left arm pain  [FreeTextEntry5] : none [FreeTextEntry2] : pain in left upper back on turning neck posteriorly  [FreeTextEntry8] : C/O LEFT ARM PAIN STARTED FROM  posterior middle back radiating down the left arm since yesterday and now to neck. She woke up this morning with right hand numbness up to middle forearm that lasted 5 minutes. She skipped her meds on Wednesday and this problem started on Thursday. no injury or prior episode. she is requesting mammogram referral.

## 2020-08-14 NOTE — HEALTH RISK ASSESSMENT
[No] : In the past 12 months have you used drugs other than those required for medical reasons? No [No falls in past year] : Patient reported no falls in the past year [0] : 2) Feeling down, depressed, or hopeless: Not at all (0) [] : No [[id number]] : 0 [de-identified] : regular

## 2020-08-14 NOTE — PLAN
[FreeTextEntry1] : check labs. EKG done.\par  if symptoms worsen , go to ER.\par  healthy diet. pain medicine. \par goal BP below 130/80.\par  low sodium limit caffeine, low chol. diet, increase exercise. \par

## 2020-08-14 NOTE — PHYSICAL EXAM
[Well Nourished] : well nourished [Well Developed] : well developed [No Acute Distress] : no acute distress [Normal Sclera/Conjunctiva] : normal sclera/conjunctiva [PERRL] : pupils equal round and reactive to light [Well-Appearing] : well-appearing [Normal Outer Ear/Nose] : the outer ears and nose were normal in appearance [EOMI] : extraocular movements intact [No JVD] : no jugular venous distention [Normal Oropharynx] : the oropharynx was normal [No Lymphadenopathy] : no lymphadenopathy [Supple] : supple [No Respiratory Distress] : no respiratory distress  [Thyroid Normal, No Nodules] : the thyroid was normal and there were no nodules present [No Accessory Muscle Use] : no accessory muscle use [Clear to Auscultation] : lungs were clear to auscultation bilaterally [Regular Rhythm] : with a regular rhythm [No Murmur] : no murmur heard [Normal S1, S2] : normal S1 and S2 [Normal Rate] : normal rate  [No Abdominal Bruit] : a ~M bruit was not heard ~T in the abdomen [No Carotid Bruits] : no carotid bruits [No Varicosities] : no varicosities [Pedal Pulses Present] : the pedal pulses are present [No Edema] : there was no peripheral edema [Soft] : abdomen soft [No Extremity Clubbing/Cyanosis] : no extremity clubbing/cyanosis [Non Tender] : non-tender [No Palpable Aorta] : no palpable aorta [Non-distended] : non-distended [Normal Bowel Sounds] : normal bowel sounds [No HSM] : no HSM [No Masses] : no abdominal mass palpated [No CVA Tenderness] : no CVA  tenderness [Normal Anterior Cervical Nodes] : no anterior cervical lymphadenopathy [Normal Posterior Cervical Nodes] : no posterior cervical lymphadenopathy [No Joint Swelling] : no joint swelling [No Spinal Tenderness] : no spinal tenderness [Grossly Normal Strength/Tone] : grossly normal strength/tone [No Rash] : no rash [No Focal Deficits] : no focal deficits [Coordination Grossly Intact] : coordination grossly intact [Normal Gait] : normal gait [Normal Affect] : the affect was normal [Normal Insight/Judgement] : insight and judgment were intact

## 2020-08-20 LAB
25(OH)D3 SERPL-MCNC: 44 NG/ML
ALBUMIN SERPL ELPH-MCNC: 5 G/DL
ALP BLD-CCNC: 69 U/L
ALT SERPL-CCNC: 19 U/L
ANION GAP SERPL CALC-SCNC: 15 MMOL/L
APPEARANCE: CLEAR
AST SERPL-CCNC: 24 U/L
BACTERIA: NEGATIVE
BASOPHILS # BLD AUTO: 0.02 K/UL
BASOPHILS NFR BLD AUTO: 0.2 %
BILIRUB SERPL-MCNC: 0.4 MG/DL
BILIRUBIN URINE: NEGATIVE
BLOOD URINE: NORMAL
BUN SERPL-MCNC: 44 MG/DL
CALCIUM SERPL-MCNC: 10.2 MG/DL
CHLORIDE SERPL-SCNC: 97 MMOL/L
CHOLEST SERPL-MCNC: 184 MG/DL
CHOLEST/HDLC SERPL: 3.3 RATIO
CO2 SERPL-SCNC: 26 MMOL/L
COLOR: YELLOW
CREAT SERPL-MCNC: 1.01 MG/DL
CREAT SPEC-SCNC: 290 MG/DL
EOSINOPHIL # BLD AUTO: 0.03 K/UL
EOSINOPHIL NFR BLD AUTO: 0.3 %
ESTIMATED AVERAGE GLUCOSE: 131 MG/DL
GLUCOSE QUALITATIVE U: NEGATIVE
GLUCOSE SERPL-MCNC: 99 MG/DL
HBA1C MFR BLD HPLC: 6.2 %
HCT VFR BLD CALC: 44.2 %
HDLC SERPL-MCNC: 56 MG/DL
HGB BLD-MCNC: 14.3 G/DL
HYALINE CASTS: 0 /LPF
IMM GRANULOCYTES NFR BLD AUTO: 0.2 %
KETONES URINE: NORMAL
LDLC SERPL CALC-MCNC: 102 MG/DL
LEUKOCYTE ESTERASE URINE: NEGATIVE
LYMPHOCYTES # BLD AUTO: 2.92 K/UL
LYMPHOCYTES NFR BLD AUTO: 32.2 %
MAN DIFF?: NORMAL
MCHC RBC-ENTMCNC: 29.3 PG
MCHC RBC-ENTMCNC: 32.4 GM/DL
MCV RBC AUTO: 90.6 FL
MICROALBUMIN 24H UR DL<=1MG/L-MCNC: 2.6 MG/DL
MICROALBUMIN/CREAT 24H UR-RTO: 9 MG/G
MICROSCOPIC-UA: NORMAL
MONOCYTES # BLD AUTO: 0.76 K/UL
MONOCYTES NFR BLD AUTO: 8.4 %
NEUTROPHILS # BLD AUTO: 5.33 K/UL
NEUTROPHILS NFR BLD AUTO: 58.7 %
NITRITE URINE: NEGATIVE
PH URINE: 6.5
PLATELET # BLD AUTO: 363 K/UL
POTASSIUM SERPL-SCNC: 4.5 MMOL/L
PROT SERPL-MCNC: 7.9 G/DL
PROTEIN URINE: NORMAL
RBC # BLD: 4.88 M/UL
RBC # FLD: 13.1 %
RED BLOOD CELLS URINE: 4 /HPF
SODIUM SERPL-SCNC: 138 MMOL/L
SPECIFIC GRAVITY URINE: 1.03
SQUAMOUS EPITHELIAL CELLS: 4 /HPF
TRIGL SERPL-MCNC: 133 MG/DL
TSH SERPL-ACNC: 1.92 UIU/ML
URATE SERPL-MCNC: 5.7 MG/DL
UROBILINOGEN URINE: NORMAL
WBC # FLD AUTO: 9.08 K/UL
WHITE BLOOD CELLS URINE: 6 /HPF

## 2020-11-12 ENCOUNTER — OUTPATIENT (OUTPATIENT)
Dept: OUTPATIENT SERVICES | Facility: HOSPITAL | Age: 60
LOS: 1 days | Discharge: HOME | End: 2020-11-12

## 2020-11-12 DIAGNOSIS — E88.09 OTHER DISORDERS OF PLASMA-PROTEIN METABOLISM, NOT ELSEWHERE CLASSIFIED: ICD-10-CM

## 2020-11-18 ENCOUNTER — APPOINTMENT (OUTPATIENT)
Dept: HEMATOLOGY ONCOLOGY | Facility: CLINIC | Age: 60
End: 2020-11-18
Payer: COMMERCIAL

## 2020-11-18 ENCOUNTER — APPOINTMENT (OUTPATIENT)
Dept: RADIOLOGY | Facility: HOSPITAL | Age: 60
End: 2020-11-18
Payer: COMMERCIAL

## 2020-11-18 ENCOUNTER — RESULT REVIEW (OUTPATIENT)
Age: 60
End: 2020-11-18

## 2020-11-18 ENCOUNTER — OUTPATIENT (OUTPATIENT)
Dept: OUTPATIENT SERVICES | Facility: HOSPITAL | Age: 60
LOS: 1 days | End: 2020-11-18
Payer: COMMERCIAL

## 2020-11-18 VITALS
RESPIRATION RATE: 16 BRPM | TEMPERATURE: 97.1 F | DIASTOLIC BLOOD PRESSURE: 85 MMHG | HEART RATE: 89 BPM | SYSTOLIC BLOOD PRESSURE: 138 MMHG | WEIGHT: 140.65 LBS | BODY MASS INDEX: 27.47 KG/M2 | OXYGEN SATURATION: 98 %

## 2020-11-18 DIAGNOSIS — D47.2 MONOCLONAL GAMMOPATHY: ICD-10-CM

## 2020-11-18 PROCEDURE — 71046 X-RAY EXAM CHEST 2 VIEWS: CPT

## 2020-11-18 PROCEDURE — 71046 X-RAY EXAM CHEST 2 VIEWS: CPT | Mod: 26

## 2020-11-18 PROCEDURE — 77074 RADEX OSSEOUS SURVEY LMTD: CPT

## 2020-11-18 PROCEDURE — 77074 RADEX OSSEOUS SURVEY LMTD: CPT | Mod: 26

## 2020-11-18 PROCEDURE — 99214 OFFICE O/P EST MOD 30 MIN: CPT

## 2020-11-18 NOTE — HISTORY OF PRESENT ILLNESS
[de-identified] : 2014-Was under a lot of stress due to an ill father, and stated she had UE/LE M-S aches. Saw rheumatologist who found monoclonal gammopathy on lab work. Referred to hematologist, and saw Dr. 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 Patient then sought second opinion at [facility] [place] and BMB was recommended, again which patient declined. Patient did not pursue heme f/u.\par 2019-Routine PCP f/u showed persistent monoclonal gammopathy on lab work and patient presented for heme f/u at the [facility] [place]. She declines BMB. [de-identified] : Has had LUE pain and left upper back pain-started in August-saw PCP who ordered steroid and pain medication which helped with the pain. However pain has now recurred-up to 10/10 intensity at its worst, increased with palpation. No known antecedent trauma. Has not seen PCP in f/u yet, for this.\par  No c/o fevers, sweats, abnormal bruising/bleeding. No c/o cough or SOB.

## 2020-11-18 NOTE — CONSULT LETTER
[Dear  ___] : Dear  [unfilled], [Courtesy Letter:] : I had the pleasure of seeing your patient, [unfilled], in my office today. [Please see my note below.] : Please see my note below. [Consult Closing:] : Thank you very much for allowing me to participate in the care of this patient.  If you have any questions, please do not hesitate to contact me. [Sincerely,] : Sincerely, [FreeTextEntry3] : Gloria Lee MD

## 2020-11-18 NOTE — ASSESSMENT
[FreeTextEntry1] : MGUS-obtain f/u immunoglobulin studies.  In light of history of gammopathy, and current musculoskeletal pain, have ordered skeletal survey.  Patient advised to follow-up with PCP regarding pain.  If persistent pain may need further evaluation including, for example MRI imaging, and orthopedic consultation.\par Patient was given the opportunity to ask questions.  Her questions have been answered to her apparent satisfaction at this time.

## 2020-11-19 ENCOUNTER — LABORATORY RESULT (OUTPATIENT)
Age: 60
End: 2020-11-19

## 2020-11-19 LAB
BASOPHILS # BLD AUTO: 0.03 K/UL
BASOPHILS NFR BLD AUTO: 0.4 %
CALCIUM SERPL-MCNC: 10.3 MG/DL
CREAT SERPL-MCNC: 0.86 MG/DL
EOSINOPHIL # BLD AUTO: 0.23 K/UL
EOSINOPHIL NFR BLD AUTO: 3.4 %
HCT VFR BLD CALC: 39.6 %
HGB BLD-MCNC: 13.2 G/DL
IMM GRANULOCYTES NFR BLD AUTO: 0.1 %
LDH SERPL-CCNC: 144 U/L
LYMPHOCYTES # BLD AUTO: 3.44 K/UL
LYMPHOCYTES NFR BLD AUTO: 50.4 %
MAN DIFF?: NORMAL
MCHC RBC-ENTMCNC: 29.8 PG
MCHC RBC-ENTMCNC: 33.3 GM/DL
MCV RBC AUTO: 89.4 FL
MONOCYTES # BLD AUTO: 0.58 K/UL
MONOCYTES NFR BLD AUTO: 8.5 %
NEUTROPHILS # BLD AUTO: 2.53 K/UL
NEUTROPHILS NFR BLD AUTO: 37.2 %
PLATELET # BLD AUTO: 342 K/UL
RBC # BLD: 4.43 M/UL
RBC # FLD: 13 %
WBC # FLD AUTO: 6.82 K/UL

## 2020-11-20 LAB
ALBUMIN MFR SERPL ELPH: 55.8 %
ALBUMIN SERPL-MCNC: 4.1 G/DL
ALBUMIN/GLOB SERPL: 1.2 RATIO
ALPHA1 GLOB MFR SERPL ELPH: 3.3 %
ALPHA1 GLOB SERPL ELPH-MCNC: 0.2 G/DL
ALPHA2 GLOB MFR SERPL ELPH: 9.3 %
ALPHA2 GLOB SERPL ELPH-MCNC: 0.7 G/DL
B-GLOBULIN MFR SERPL ELPH: 9.9 %
B-GLOBULIN SERPL ELPH-MCNC: 0.7 G/DL
DEPRECATED KAPPA LC FREE/LAMBDA SER: 0.84 RATIO
DEPRECATED KAPPA LC FREE/LAMBDA SER: 0.84 RATIO
GAMMA GLOB FLD ELPH-MCNC: 1.6 G/DL
GAMMA GLOB MFR SERPL ELPH: 21.7 %
IGA SER QL IEP: 84 MG/DL
IGG SER QL IEP: 1863 MG/DL
IGM SER QL IEP: 42 MG/DL
INTERPRETATION SERPL IEP-IMP: NORMAL
KAPPA LC CSF-MCNC: 1.75 MG/DL
KAPPA LC CSF-MCNC: 1.75 MG/DL
KAPPA LC SERPL-MCNC: 1.47 MG/DL
KAPPA LC SERPL-MCNC: 1.47 MG/DL
M PROTEIN MFR SERPL ELPH: 15.2 %
MONOCLON BAND OBS SERPL: 1.1 G/DL
PROT SERPL-MCNC: 7.4 G/DL
PROT SERPL-MCNC: 7.4 G/DL

## 2020-11-23 LAB — IGA 24H UR QL IFE: NORMAL

## 2020-12-09 ENCOUNTER — APPOINTMENT (OUTPATIENT)
Dept: HEMATOLOGY ONCOLOGY | Facility: CLINIC | Age: 60
End: 2020-12-09
Payer: COMMERCIAL

## 2020-12-09 PROCEDURE — 99442: CPT

## 2020-12-09 NOTE — ASSESSMENT
[FreeTextEntry1] : MGUS-lab and x-ray reports reviewed with patient. Clinically stable at present with regard to MGUS. Continue hematologic surveillance.\par Relative lymphocytosis-intermittent, chronic. ANC currently WNL. Continue to monitor CBC with diff. If persists on next CBC, t/c PB flow cytometry.\par Patient was given the opportunity to ask questions.  Her questions have been answered to her apparent satisfaction at this time.

## 2020-12-09 NOTE — REASON FOR VISIT
[Home] : at home, [unfilled] , at the time of the visit. [Medical Office: ([facility])___] : at the medical office located in  [Verbal consent obtained from patient] : the patient, [unfilled] [Follow-Up Visit] : a follow-up visit for [FreeTextEntry2] : MGUS

## 2020-12-09 NOTE — HISTORY OF PRESENT ILLNESS
[de-identified] : 2014-Was under a lot of stress due to an ill father, and stated she had UE/LE M-S aches. Saw rheumatologist who found monoclonal gammopathy on lab work. Referred to hematologist, and saw Dr. 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 Patient then sought second opinion at [facility] [place] and BMB was recommended, again which patient declined. Patient did not pursue heme f/u.\par 2019-Routine PCP f/u showed persistent monoclonal gammopathy on lab work and patient presented for heme f/u at the [facility] [place]. She declines BMB. [de-identified] : Telephone call visit due to COVID concerns.\par Steroids helped with arm pain-now gone. \par +Fatigue. No c/o fevers, sweats, abnormal bruising/bleeding. No c/o cough or SOB. No LN complaints.

## 2020-12-15 ENCOUNTER — RX RENEWAL (OUTPATIENT)
Age: 60
End: 2020-12-15

## 2021-01-24 ENCOUNTER — TRANSCRIPTION ENCOUNTER (OUTPATIENT)
Age: 61
End: 2021-01-24

## 2021-03-29 LAB
BASOPHILS # BLD AUTO: 0.01 K/UL
BASOPHILS NFR BLD AUTO: 0.2 %
CALCIUM SERPL-MCNC: 10.9 MG/DL
CREAT SERPL-MCNC: 0.89 MG/DL
EOSINOPHIL # BLD AUTO: 0.02 K/UL
EOSINOPHIL NFR BLD AUTO: 0.3 %
HCT VFR BLD CALC: 44.3 %
HGB BLD-MCNC: 14.7 G/DL
IMM GRANULOCYTES NFR BLD AUTO: 0.2 %
LDH SERPL-CCNC: 157 U/L
LYMPHOCYTES # BLD AUTO: 2.64 K/UL
LYMPHOCYTES NFR BLD AUTO: 40.2 %
MAN DIFF?: NORMAL
MCHC RBC-ENTMCNC: 29.9 PG
MCHC RBC-ENTMCNC: 33.2 GM/DL
MCV RBC AUTO: 90 FL
MONOCYTES # BLD AUTO: 0.77 K/UL
MONOCYTES NFR BLD AUTO: 11.7 %
NEUTROPHILS # BLD AUTO: 3.11 K/UL
NEUTROPHILS NFR BLD AUTO: 47.4 %
PLATELET # BLD AUTO: 381 K/UL
RBC # BLD: 4.92 M/UL
RBC # FLD: 13.2 %
WBC # FLD AUTO: 6.56 K/UL

## 2021-03-30 ENCOUNTER — NON-APPOINTMENT (OUTPATIENT)
Age: 61
End: 2021-03-30

## 2021-03-30 LAB
ALBUMIN MFR SERPL ELPH: 57.8 %
ALBUMIN SERPL-MCNC: 4.6 G/DL
ALBUMIN/GLOB SERPL: 1.4 RATIO
ALPHA1 GLOB MFR SERPL ELPH: 3.7 %
ALPHA1 GLOB SERPL ELPH-MCNC: 0.3 G/DL
ALPHA2 GLOB MFR SERPL ELPH: 8.9 %
ALPHA2 GLOB SERPL ELPH-MCNC: 0.7 G/DL
B-GLOBULIN MFR SERPL ELPH: 9.9 %
B-GLOBULIN SERPL ELPH-MCNC: 0.8 G/DL
DEPRECATED KAPPA LC FREE/LAMBDA SER: 0.8 RATIO
DEPRECATED KAPPA LC FREE/LAMBDA SER: 0.8 RATIO
GAMMA GLOB FLD ELPH-MCNC: 1.6 G/DL
GAMMA GLOB MFR SERPL ELPH: 19.7 %
IGA SER QL IEP: 70 MG/DL
IGG SER QL IEP: 1740 MG/DL
IGM SER QL IEP: 47 MG/DL
INTERPRETATION SERPL IEP-IMP: NORMAL
KAPPA LC CSF-MCNC: 1.39 MG/DL
KAPPA LC CSF-MCNC: 1.39 MG/DL
KAPPA LC SERPL-MCNC: 1.11 MG/DL
KAPPA LC SERPL-MCNC: 1.11 MG/DL
M PROTEIN MFR SERPL ELPH: 13.8 %
MONOCLON BAND OBS SERPL: 1.1 G/DL
PROT SERPL-MCNC: 8 G/DL
PROT SERPL-MCNC: 8 G/DL

## 2021-04-03 ENCOUNTER — OUTPATIENT (OUTPATIENT)
Dept: OUTPATIENT SERVICES | Facility: HOSPITAL | Age: 61
LOS: 1 days | Discharge: HOME | End: 2021-04-03

## 2021-04-03 DIAGNOSIS — E88.09 OTHER DISORDERS OF PLASMA-PROTEIN METABOLISM, NOT ELSEWHERE CLASSIFIED: ICD-10-CM

## 2021-04-08 ENCOUNTER — APPOINTMENT (OUTPATIENT)
Dept: HEMATOLOGY ONCOLOGY | Facility: CLINIC | Age: 61
End: 2021-04-08
Payer: COMMERCIAL

## 2021-04-08 VITALS
HEART RATE: 92 BPM | HEIGHT: 60 IN | DIASTOLIC BLOOD PRESSURE: 83 MMHG | OXYGEN SATURATION: 99 % | WEIGHT: 136.66 LBS | BODY MASS INDEX: 26.83 KG/M2 | SYSTOLIC BLOOD PRESSURE: 130 MMHG | TEMPERATURE: 97.2 F | RESPIRATION RATE: 17 BRPM

## 2021-04-08 DIAGNOSIS — Z00.00 ENCOUNTER FOR GENERAL ADULT MEDICAL EXAMINATION W/OUT ABNORMAL FINDINGS: ICD-10-CM

## 2021-04-08 PROCEDURE — 99213 OFFICE O/P EST LOW 20 MIN: CPT

## 2021-04-08 PROCEDURE — 99072 ADDL SUPL MATRL&STAF TM PHE: CPT

## 2021-04-08 NOTE — HISTORY OF PRESENT ILLNESS
[de-identified] : 2014-Was under a lot of stress due to an ill father, and stated she had UE/LE M-S aches. Saw rheumatologist who found monoclonal gammopathy on lab work. Referred to hematologist, and saw Dr. 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 Patient then sought second opinion at [facility] [place] and BMB was recommended, again which patient declined. Patient did not pursue heme f/u.\par 2019-Routine PCP f/u showed persistent monoclonal gammopathy on lab work and patient presented for heme f/u at the [facility] [place]. She has declined BMB. [de-identified] : Stopped calcium and vitamin D supplements 3/30/21.\par Got first Moderna COVID vaccine, with second one scheduled.\par +Fatigue.\par No c/o fevers, sweats, abnormal bruising/bleeding. No c/o cough or SOB. No LN complaints.

## 2021-04-08 NOTE — CONSULT LETTER
[Dear  ___] : Dear  [unfilled], [Courtesy Letter:] : I had the pleasure of seeing your patient, [unfilled], in my office today. [Consult Closing:] : Thank you very much for allowing me to participate in the care of this patient.  If you have any questions, please do not hesitate to contact me. [Sincerely,] : Sincerely, [FreeTextEntry3] : Gloria Lee MD

## 2021-04-08 NOTE — ASSESSMENT
[FreeTextEntry1] : MGUS-lab reports reviewed with patient. Clinically stable at present with regard to MGUS, though need repeat calcium level now that supplements have been held-if persistent hypercalcemia, further eval. warranted. Patient may reconsider BMB pending course. \par \par Relative lymphocytosis-intermittent, chronic. ANC currently WNL. Continue to monitor CBC with diff.\par \par Patient was given the opportunity to ask questions.  Her questions have been answered to her apparent satisfaction at this time.

## 2021-04-17 LAB — CALCIUM SERPL-MCNC: 10.2 MG/DL

## 2021-04-19 ENCOUNTER — NON-APPOINTMENT (OUTPATIENT)
Age: 61
End: 2021-04-19

## 2021-04-20 ENCOUNTER — APPOINTMENT (OUTPATIENT)
Dept: ULTRASOUND IMAGING | Facility: HOSPITAL | Age: 61
End: 2021-04-20
Payer: COMMERCIAL

## 2021-04-20 ENCOUNTER — OUTPATIENT (OUTPATIENT)
Dept: OUTPATIENT SERVICES | Facility: HOSPITAL | Age: 61
LOS: 1 days | End: 2021-04-20
Payer: COMMERCIAL

## 2021-04-20 ENCOUNTER — APPOINTMENT (OUTPATIENT)
Dept: FAMILY MEDICINE | Facility: CLINIC | Age: 61
End: 2021-04-20
Payer: COMMERCIAL

## 2021-04-20 VITALS
HEART RATE: 95 BPM | OXYGEN SATURATION: 98 % | WEIGHT: 135 LBS | HEIGHT: 60 IN | RESPIRATION RATE: 18 BRPM | SYSTOLIC BLOOD PRESSURE: 128 MMHG | DIASTOLIC BLOOD PRESSURE: 84 MMHG | TEMPERATURE: 98 F | BODY MASS INDEX: 26.5 KG/M2

## 2021-04-20 DIAGNOSIS — R59.0 LOCALIZED ENLARGED LYMPH NODES: ICD-10-CM

## 2021-04-20 DIAGNOSIS — R25.2 CRAMP AND SPASM: ICD-10-CM

## 2021-04-20 PROCEDURE — 93971 EXTREMITY STUDY: CPT | Mod: 26,LT

## 2021-04-20 PROCEDURE — 99215 OFFICE O/P EST HI 40 MIN: CPT

## 2021-04-20 PROCEDURE — 99072 ADDL SUPL MATRL&STAF TM PHE: CPT

## 2021-04-20 PROCEDURE — 93971 EXTREMITY STUDY: CPT

## 2021-04-20 RX ORDER — AMOXICILLIN AND CLAVULANATE POTASSIUM 500; 125 MG/1; MG/1
500-125 TABLET, FILM COATED ORAL 3 TIMES DAILY
Qty: 30 | Refills: 0 | Status: DISCONTINUED | COMMUNITY
Start: 2017-08-02 | End: 2021-04-20

## 2021-04-20 NOTE — HISTORY OF PRESENT ILLNESS
[Musculoskeletal Symptoms Legs] : leg [___ Weeks ago] : [unfilled] weeks ago [Episodic] : episodic [Mild] : mild [Stable] : stable [FreeTextEntry5] : none [FreeTextEntry4] : none [FreeTextEntry8] : here for acute visit. She has left leg cramps for a week. No prior episode. Pain is in calf, front and back of thigh and groin.nO RECENT TRAVEL With PROLONGED SITTING .no FH of blood clots. No hernia, urinary symptoms, weakness, numbness, back pain ,abd. pain . She has right hand weakness spontaneously twice last week and she dropped milk. She is right handed. No hand tremors or rigidity. No headache, dizziness, chest pain, SOB, fever. She is  AAOX3.She has constipation and she feels dizzy when she strains. SHE IS DUE FOR COLONOSCOPY. c/o leg tightness and its tender. no change in temp. or color of skin of extremities.\par She saw dr. Leiva recently .Her Calcium level was elevated in 03/2021, patient was told to stop Calcium with D supplement. and repeat Ca level in 04/2021 is wnl.

## 2021-04-20 NOTE — HEALTH RISK ASSESSMENT
[No] : In the past 12 months have you used drugs other than those required for medical reasons? No [No falls in past year] : Patient reported no falls in the past year [0] : 2) Feeling down, depressed, or hopeless: Not at all (0) [] : No [de-identified] : regular [[id number]] : 0

## 2021-04-20 NOTE — REVIEW OF SYSTEMS
[Muscle Pain] : muscle pain [Negative] : Heme/Lymph [Joint Pain] : no joint pain [Joint Stiffness] : no joint stiffness [Joint Swelling] : no joint swelling [Muscle Weakness] : no muscle weakness [Back Pain] : no back pain [FreeTextEntry9] : left leg pain

## 2021-04-20 NOTE — PLAN
[FreeTextEntry1] : HTN STABLE.\par  HLD: check labs,\par  stay hydrated.\par  US Doppler LE.\par  f/u neurology.\par  stop Calcium supplement.\par  weight bearing exercises.

## 2021-04-20 NOTE — PHYSICAL EXAM
[Well Nourished] : well nourished [Well Developed] : well developed [Well-Appearing] : well-appearing [Normal Sclera/Conjunctiva] : normal sclera/conjunctiva [PERRL] : pupils equal round and reactive to light [EOMI] : extraocular movements intact [No JVD] : no jugular venous distention [No Lymphadenopathy] : no lymphadenopathy [Supple] : supple [Thyroid Normal, No Nodules] : the thyroid was normal and there were no nodules present [No Respiratory Distress] : no respiratory distress  [No Accessory Muscle Use] : no accessory muscle use [Clear to Auscultation] : lungs were clear to auscultation bilaterally [Normal Rate] : normal rate  [Regular Rhythm] : with a regular rhythm [Normal S1, S2] : normal S1 and S2 [No Murmur] : no murmur heard [No Edema] : there was no peripheral edema [No Joint Swelling] : no joint swelling [Grossly Normal Strength/Tone] : grossly normal strength/tone [No Rash] : no rash [Coordination Grossly Intact] : coordination grossly intact [No Focal Deficits] : no focal deficits [Normal Gait] : normal gait [Normal Affect] : the affect was normal [Normal Insight/Judgement] : insight and judgment were intact [de-identified] : left thigh tenderness

## 2021-04-22 LAB
CHOLEST SERPL-MCNC: 192 MG/DL
ESTIMATED AVERAGE GLUCOSE: 137 MG/DL
FERRITIN SERPL-MCNC: 181 NG/ML
FOLATE SERPL-MCNC: 13.1 NG/ML
HBA1C MFR BLD HPLC: 6.4 %
HDLC SERPL-MCNC: 59 MG/DL
IRON SATN MFR SERPL: 34 %
IRON SERPL-MCNC: 123 UG/DL
LDLC SERPL CALC-MCNC: 107 MG/DL
MAGNESIUM SERPL-MCNC: 2.1 MG/DL
NONHDLC SERPL-MCNC: 133 MG/DL
T3FREE SERPL-MCNC: 2.26 PG/ML
T4 FREE SERPL-MCNC: 1.4 NG/DL
TIBC SERPL-MCNC: 359 UG/DL
TRIGL SERPL-MCNC: 132 MG/DL
TSH SERPL-ACNC: 1.79 UIU/ML
UIBC SERPL-MCNC: 236 UG/DL
VIT B12 SERPL-MCNC: 565 PG/ML

## 2021-04-29 ENCOUNTER — APPOINTMENT (OUTPATIENT)
Dept: MAMMOGRAPHY | Facility: HOSPITAL | Age: 61
End: 2021-04-29
Payer: COMMERCIAL

## 2021-04-29 ENCOUNTER — RESULT REVIEW (OUTPATIENT)
Age: 61
End: 2021-04-29

## 2021-04-29 ENCOUNTER — OUTPATIENT (OUTPATIENT)
Dept: OUTPATIENT SERVICES | Facility: HOSPITAL | Age: 61
LOS: 1 days | End: 2021-04-29
Payer: COMMERCIAL

## 2021-04-29 ENCOUNTER — APPOINTMENT (OUTPATIENT)
Dept: ULTRASOUND IMAGING | Facility: HOSPITAL | Age: 61
End: 2021-04-29
Payer: COMMERCIAL

## 2021-04-29 DIAGNOSIS — Z00.8 ENCOUNTER FOR OTHER GENERAL EXAMINATION: ICD-10-CM

## 2021-04-29 PROCEDURE — 77067 SCR MAMMO BI INCL CAD: CPT

## 2021-04-29 PROCEDURE — 76641 ULTRASOUND BREAST COMPLETE: CPT

## 2021-04-29 PROCEDURE — 76641 ULTRASOUND BREAST COMPLETE: CPT | Mod: 26,50

## 2021-04-29 PROCEDURE — 77063 BREAST TOMOSYNTHESIS BI: CPT

## 2021-04-29 PROCEDURE — 77067 SCR MAMMO BI INCL CAD: CPT | Mod: 26

## 2021-04-29 PROCEDURE — 77063 BREAST TOMOSYNTHESIS BI: CPT | Mod: 26

## 2021-06-02 ENCOUNTER — RX RENEWAL (OUTPATIENT)
Age: 61
End: 2021-06-02

## 2021-06-02 ENCOUNTER — APPOINTMENT (OUTPATIENT)
Dept: NEUROLOGY | Facility: CLINIC | Age: 61
End: 2021-06-02

## 2021-07-02 ENCOUNTER — LABORATORY RESULT (OUTPATIENT)
Age: 61
End: 2021-07-02

## 2021-07-02 LAB
BASOPHILS # BLD AUTO: 0.03 K/UL
BASOPHILS NFR BLD AUTO: 0.4 %
CALCIUM SERPL-MCNC: 10.4 MG/DL
CREAT SERPL-MCNC: 0.94 MG/DL
DEPRECATED KAPPA LC FREE/LAMBDA SER: 0.84 RATIO
DEPRECATED KAPPA LC FREE/LAMBDA SER: 0.84 RATIO
EOSINOPHIL # BLD AUTO: 0.29 K/UL
EOSINOPHIL NFR BLD AUTO: 3.4 %
HCT VFR BLD CALC: 43.2 %
HGB BLD-MCNC: 14 G/DL
IGA SER QL IEP: 56 MG/DL
IGG SER QL IEP: 1685 MG/DL
IGM SER QL IEP: 43 MG/DL
IMM GRANULOCYTES NFR BLD AUTO: 0.4 %
KAPPA LC CSF-MCNC: 1.63 MG/DL
KAPPA LC CSF-MCNC: 1.63 MG/DL
KAPPA LC SERPL-MCNC: 1.37 MG/DL
KAPPA LC SERPL-MCNC: 1.37 MG/DL
LDH SERPL-CCNC: 163 U/L
LYMPHOCYTES # BLD AUTO: 3.83 K/UL
LYMPHOCYTES NFR BLD AUTO: 45.2 %
MAN DIFF?: NORMAL
MCHC RBC-ENTMCNC: 29.3 PG
MCHC RBC-ENTMCNC: 32.4 GM/DL
MCV RBC AUTO: 90.4 FL
MONOCYTES # BLD AUTO: 0.8 K/UL
MONOCYTES NFR BLD AUTO: 9.4 %
NEUTROPHILS # BLD AUTO: 3.49 K/UL
NEUTROPHILS NFR BLD AUTO: 41.2 %
PLATELET # BLD AUTO: 363 K/UL
RBC # BLD: 4.78 M/UL
RBC # FLD: 13.4 %
WBC # FLD AUTO: 8.47 K/UL

## 2021-07-06 ENCOUNTER — OUTPATIENT (OUTPATIENT)
Dept: OUTPATIENT SERVICES | Facility: HOSPITAL | Age: 61
LOS: 1 days | Discharge: HOME | End: 2021-07-06

## 2021-07-06 DIAGNOSIS — E88.09 OTHER DISORDERS OF PLASMA-PROTEIN METABOLISM, NOT ELSEWHERE CLASSIFIED: ICD-10-CM

## 2021-07-08 ENCOUNTER — APPOINTMENT (OUTPATIENT)
Dept: HEMATOLOGY ONCOLOGY | Facility: CLINIC | Age: 61
End: 2021-07-08
Payer: COMMERCIAL

## 2021-07-08 VITALS
SYSTOLIC BLOOD PRESSURE: 128 MMHG | TEMPERATURE: 97.7 F | WEIGHT: 135.58 LBS | HEART RATE: 101 BPM | OXYGEN SATURATION: 98 % | DIASTOLIC BLOOD PRESSURE: 82 MMHG | RESPIRATION RATE: 17 BRPM | BODY MASS INDEX: 26.27 KG/M2 | HEIGHT: 60.04 IN

## 2021-07-08 PROCEDURE — 99072 ADDL SUPL MATRL&STAF TM PHE: CPT

## 2021-07-08 PROCEDURE — 99213 OFFICE O/P EST LOW 20 MIN: CPT

## 2021-07-08 NOTE — HISTORY OF PRESENT ILLNESS
[de-identified] : 2014-Was under a lot of stress due to an ill father, and stated she had UE/LE M-S aches. Saw rheumatologist who found monoclonal gammopathy on lab work. Referred to hematologist, and saw Dr. 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 Patient then sought second opinion at [facility] [place] and BMB was recommended, again which patient declined. Patient did not pursue heme f/u.\par 2019-Routine PCP f/u showed persistent monoclonal gammopathy on lab work and [place] [facility] [place] has declined B[place]. [de-identified] : Got Moderna COVID vaccines.\par +Fatigue. Watches grandchildren 3 days a week.\par No c/o fevers, sweats, abnormal bruising/bleeding. No c/o cough or SOB. No LN complaints.

## 2021-07-08 NOTE — ASSESSMENT
[FreeTextEntry1] : Lab work, mammogram/breast US results reviewed.\par MGUS-clinically stable at present. Calcium repeated WNL. Continue hematologic surveillance.\par \par Relative lymphocytosis-intermittent, chronic. ANC currently WNL. Continue to monitor CBC with diff., and PB flow cytometry added to next lab work.\par \par Should hematologic scenario change/worsen, patient may reconsider BMB.\par \par Patient was given the opportunity to ask questions.  Her questions have been answered to her apparent satisfaction at this time.

## 2021-07-12 LAB
ALBUMIN MFR SERPL ELPH: 57.9 %
ALBUMIN SERPL-MCNC: 4.6 G/DL
ALBUMIN/GLOB SERPL: 1.4 RATIO
ALPHA1 GLOB MFR SERPL ELPH: 3.7 %
ALPHA1 GLOB SERPL ELPH-MCNC: 0.3 G/DL
ALPHA2 GLOB MFR SERPL ELPH: 9.9 %
ALPHA2 GLOB SERPL ELPH-MCNC: 0.8 G/DL
B-GLOBULIN MFR SERPL ELPH: 9.3 %
B-GLOBULIN SERPL ELPH-MCNC: 0.7 G/DL
GAMMA GLOB FLD ELPH-MCNC: 1.5 G/DL
GAMMA GLOB MFR SERPL ELPH: 19.2 %
INTERPRETATION SERPL IEP-IMP: NORMAL
M PROTEIN MFR SERPL ELPH: 14.9 %
MONOCLON BAND OBS SERPL: 1.2 G/DL
PROT SERPL-MCNC: 7.9 G/DL
PROT SERPL-MCNC: 7.9 G/DL

## 2021-09-30 ENCOUNTER — OUTPATIENT (OUTPATIENT)
Dept: OUTPATIENT SERVICES | Facility: HOSPITAL | Age: 61
LOS: 1 days | Discharge: HOME | End: 2021-09-30

## 2021-09-30 DIAGNOSIS — E88.09 OTHER DISORDERS OF PLASMA-PROTEIN METABOLISM, NOT ELSEWHERE CLASSIFIED: ICD-10-CM

## 2021-10-03 LAB
BASOPHILS # BLD AUTO: 0.02 K/UL
BASOPHILS NFR BLD AUTO: 0.3 %
CALCIUM SERPL-MCNC: 10.1 MG/DL
CREAT SERPL-MCNC: 0.91 MG/DL
DEPRECATED KAPPA LC FREE/LAMBDA SER: 0.59 RATIO
DEPRECATED KAPPA LC FREE/LAMBDA SER: 0.59 RATIO
EOSINOPHIL # BLD AUTO: 0.21 K/UL
EOSINOPHIL NFR BLD AUTO: 2.9 %
HCT VFR BLD CALC: 41.4 %
HGB BLD-MCNC: 13.8 G/DL
IGA SER QL IEP: 55 MG/DL
IGG SER QL IEP: 1748 MG/DL
IGM SER QL IEP: 41 MG/DL
IMM GRANULOCYTES NFR BLD AUTO: 0.1 %
KAPPA LC CSF-MCNC: 1.77 MG/DL
KAPPA LC CSF-MCNC: 1.77 MG/DL
KAPPA LC SERPL-MCNC: 1.04 MG/DL
KAPPA LC SERPL-MCNC: 1.04 MG/DL
LDH SERPL-CCNC: 167 U/L
LYMPHOCYTES # BLD AUTO: 3.33 K/UL
LYMPHOCYTES NFR BLD AUTO: 46.1 %
MAN DIFF?: NORMAL
MCHC RBC-ENTMCNC: 29.6 PG
MCHC RBC-ENTMCNC: 33.3 GM/DL
MCV RBC AUTO: 88.8 FL
MONOCYTES # BLD AUTO: 0.7 K/UL
MONOCYTES NFR BLD AUTO: 9.7 %
NEUTROPHILS # BLD AUTO: 2.96 K/UL
NEUTROPHILS NFR BLD AUTO: 40.9 %
PLATELET # BLD AUTO: 323 K/UL
RBC # BLD: 4.66 M/UL
RBC # FLD: 13.2 %
WBC # FLD AUTO: 7.23 K/UL

## 2021-10-04 LAB
ALBUMIN MFR SERPL ELPH: 57.1 %
ALBUMIN SERPL-MCNC: 4.3 G/DL
ALBUMIN/GLOB SERPL: 1.3 RATIO
ALPHA1 GLOB MFR SERPL ELPH: 3.6 %
ALPHA1 GLOB SERPL ELPH-MCNC: 0.3 G/DL
ALPHA2 GLOB MFR SERPL ELPH: 9 %
ALPHA2 GLOB SERPL ELPH-MCNC: 0.7 G/DL
B-GLOBULIN MFR SERPL ELPH: 10 %
B-GLOBULIN SERPL ELPH-MCNC: 0.8 G/DL
GAMMA GLOB FLD ELPH-MCNC: 1.5 G/DL
GAMMA GLOB MFR SERPL ELPH: 20.3 %
INTERPRETATION SERPL IEP-IMP: NORMAL
M PROTEIN MFR SERPL ELPH: 15.9 %
MONOCLON BAND OBS SERPL: 1.2 G/DL
PROT SERPL-MCNC: 7.6 G/DL
PROT SERPL-MCNC: 7.6 G/DL

## 2021-10-05 ENCOUNTER — APPOINTMENT (OUTPATIENT)
Dept: HEMATOLOGY ONCOLOGY | Facility: CLINIC | Age: 61
End: 2021-10-05
Payer: COMMERCIAL

## 2021-10-05 ENCOUNTER — APPOINTMENT (OUTPATIENT)
Dept: HEMATOLOGY ONCOLOGY | Facility: CLINIC | Age: 61
End: 2021-10-05

## 2021-10-05 VITALS
TEMPERATURE: 97.1 F | WEIGHT: 141.54 LBS | BODY MASS INDEX: 27.43 KG/M2 | HEIGHT: 60.04 IN | RESPIRATION RATE: 16 BRPM | SYSTOLIC BLOOD PRESSURE: 139 MMHG | OXYGEN SATURATION: 99 % | DIASTOLIC BLOOD PRESSURE: 84 MMHG | HEART RATE: 100 BPM

## 2021-10-05 PROCEDURE — 99214 OFFICE O/P EST MOD 30 MIN: CPT

## 2021-10-05 NOTE — HISTORY OF PRESENT ILLNESS
[de-identified] : 2014-Was under a lot of stress due to an ill father, and stated she had UE/LE M-S aches. Saw rheumatologist who found monoclonal gammopathy on lab work. Referred to hematologist, and saw Dr. 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 Patient then sought [place] opinion [place] [facility] [place] and BMB was recommended, again which patient declined. Patient did not pursue heme f/u.\par 2019-Routine PCP f/u showed persistent monoclonal gammopathy on lab work and [place] [facility] [place] has declined BMB. [de-identified] : +Chronic fatigue. Watches grandchildren 3 days a week.\par No c/o fevers, sweats, abnormal bruising/bleeding. No c/o cough or SOB. No LN complaints.\par Plans to update colonoscopy due to constipation-having small BM's at a time.\par \par Got Moderna COVID vaccines.

## 2021-10-05 NOTE — ASSESSMENT
[FreeTextEntry1] : Lab work reviewed with patient.\par MGUS-clinically stable at present. Reviewed potential complications if plasma cell dyscrasia progresses. Continue hematologic surveillance. Note-patient has declined BMB (indication with potential benefits/side effects reviewed with patient again today).\par \par Relative lymphocytosis-intermittent, chronic. ANC currently WNL. Continue to monitor CBC with diff. PB flow cytometry pending.\par \par Should hematologic scenario change/worsen, patient may reconsider BMB (with IR and sedation).\par \par Patient was given the opportunity to ask questions.  Her questions have been answered to her apparent satisfaction at this time.

## 2021-10-08 ENCOUNTER — NON-APPOINTMENT (OUTPATIENT)
Age: 61
End: 2021-10-08

## 2021-10-19 ENCOUNTER — APPOINTMENT (OUTPATIENT)
Dept: OBGYN | Facility: CLINIC | Age: 61
End: 2021-10-19

## 2021-11-23 ENCOUNTER — RX RENEWAL (OUTPATIENT)
Age: 61
End: 2021-11-23

## 2021-12-06 ENCOUNTER — RX RENEWAL (OUTPATIENT)
Age: 61
End: 2021-12-06

## 2022-01-14 ENCOUNTER — APPOINTMENT (OUTPATIENT)
Dept: FAMILY MEDICINE | Facility: CLINIC | Age: 62
End: 2022-01-14

## 2022-03-30 LAB
BASOPHILS # BLD AUTO: 0.02 K/UL
BASOPHILS NFR BLD AUTO: 0.3 %
CALCIUM SERPL-MCNC: 10 MG/DL
CREAT SERPL-MCNC: 0.99 MG/DL
DEPRECATED KAPPA LC FREE/LAMBDA SER: 0.69 RATIO
DEPRECATED KAPPA LC FREE/LAMBDA SER: 0.69 RATIO
EGFR: 64 ML/MIN/1.73M2
EOSINOPHIL # BLD AUTO: 0.14 K/UL
EOSINOPHIL NFR BLD AUTO: 2.2 %
HCT VFR BLD CALC: 39.2 %
HGB BLD-MCNC: 13 G/DL
IGA SER QL IEP: 47 MG/DL
IGG SER QL IEP: 1853 MG/DL
IGM SER QL IEP: 39 MG/DL
IMM GRANULOCYTES NFR BLD AUTO: 0.3 %
KAPPA LC CSF-MCNC: 2.08 MG/DL
KAPPA LC CSF-MCNC: 2.08 MG/DL
KAPPA LC SERPL-MCNC: 1.43 MG/DL
KAPPA LC SERPL-MCNC: 1.43 MG/DL
LYMPHOCYTES # BLD AUTO: 3.15 K/UL
LYMPHOCYTES NFR BLD AUTO: 48.5 %
MAN DIFF?: NORMAL
MCHC RBC-ENTMCNC: 29.4 PG
MCHC RBC-ENTMCNC: 33.2 GM/DL
MCV RBC AUTO: 88.7 FL
MONOCYTES # BLD AUTO: 1.07 K/UL
MONOCYTES NFR BLD AUTO: 16.5 %
NEUTROPHILS # BLD AUTO: 2.09 K/UL
NEUTROPHILS NFR BLD AUTO: 32.2 %
PLATELET # BLD AUTO: 320 K/UL
RBC # BLD: 4.42 M/UL
RBC # FLD: 13.3 %
WBC # FLD AUTO: 6.49 K/UL

## 2022-03-31 ENCOUNTER — OUTPATIENT (OUTPATIENT)
Dept: OUTPATIENT SERVICES | Facility: HOSPITAL | Age: 62
LOS: 1 days | Discharge: HOME | End: 2022-03-31

## 2022-03-31 DIAGNOSIS — E88.09 OTHER DISORDERS OF PLASMA-PROTEIN METABOLISM, NOT ELSEWHERE CLASSIFIED: ICD-10-CM

## 2022-04-04 LAB
ALBUMIN MFR SERPL ELPH: 57.2 %
ALBUMIN SERPL-MCNC: 4.5 G/DL
ALBUMIN/GLOB SERPL: 1.4 RATIO
ALPHA1 GLOB MFR SERPL ELPH: 3.6 %
ALPHA1 GLOB SERPL ELPH-MCNC: 0.3 G/DL
ALPHA2 GLOB MFR SERPL ELPH: 9.1 %
ALPHA2 GLOB SERPL ELPH-MCNC: 0.7 G/DL
B-GLOBULIN MFR SERPL ELPH: 9.5 %
B-GLOBULIN SERPL ELPH-MCNC: 0.7 G/DL
GAMMA GLOB FLD ELPH-MCNC: 1.6 G/DL
GAMMA GLOB MFR SERPL ELPH: 20.6 %
INTERPRETATION SERPL IEP-IMP: NORMAL
M PROTEIN MFR SERPL ELPH: 16.4 %
MONOCLON BAND OBS SERPL: 1.3 G/DL
PROT SERPL-MCNC: 7.8 G/DL
PROT SERPL-MCNC: 7.8 G/DL

## 2022-04-05 ENCOUNTER — APPOINTMENT (OUTPATIENT)
Dept: HEMATOLOGY ONCOLOGY | Facility: CLINIC | Age: 62
End: 2022-04-05
Payer: COMMERCIAL

## 2022-04-05 VITALS
SYSTOLIC BLOOD PRESSURE: 137 MMHG | BODY MASS INDEX: 26.44 KG/M2 | WEIGHT: 135.56 LBS | HEART RATE: 113 BPM | RESPIRATION RATE: 16 BRPM | DIASTOLIC BLOOD PRESSURE: 80 MMHG | OXYGEN SATURATION: 97 % | TEMPERATURE: 97 F

## 2022-04-05 PROCEDURE — 99213 OFFICE O/P EST LOW 20 MIN: CPT

## 2022-04-05 NOTE — ASSESSMENT
[FreeTextEntry1] : Lab work reviewed with patient.\par MGUS-clinically stable at present. Reviewed potential complications if plasma cell dyscrasia progresses. Continue hematologic surveillance. Note-patient has declined BMB, though agreeable to interval f/u lab work.\par \par Relative lymphocytosis-intermittent, chronic. ANC currently WNL. Continue to monitor CBC with diff. \par \par Should hematologic scenario change/worsen, patient may reconsider BMB (with IR and sedation).\par \par Patient was given the opportunity to ask questions.  Her questions have been answered to her apparent satisfaction at this time.

## 2022-04-05 NOTE — HISTORY OF PRESENT ILLNESS
[de-identified] : 2014-Was under a lot of stress due to an ill father, and stated she had UE/LE M-S aches. Saw rheumatologist who found monoclonal gammopathy on lab work. Referred to hematologist, and saw Dr. 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 Patient then sought second opinion [place] [facility] [place] and BMB was recommended, again which patient declined. Patient did not pursue heme f/u.\par 2019-Routine PCP f/u showed persistent monoclonal gammopathy on lab work and patient presented for heme f/u at the [facility] [place]. She has declined BMB.\par 11/2020-Skeletal survey-no suspicious lytic lesions.\par \par 10/2021-PB flow cytometry-Peripheral blood:\par      - The lymphocyte immunophenotypic findings show no diagnostic abnormalities.\par      - The myeloid immunophenotypic findings show normal myeloid granularity, no increase in myeloid immaturity, and normal myeloid antigen maturation patte [de-identified] : +Chronic fatigue. Still watches grandchildren 2 days a week. Expecting another grandchild soon.\par No c/o fevers, sweats, abnormal bruising/bleeding. No c/o cough or SOB. No LN complaints.\par Plans to update colonoscopy-not done yet.\par \par Got Moderna COVID vaccines x 2.

## 2022-04-26 ENCOUNTER — APPOINTMENT (OUTPATIENT)
Dept: FAMILY MEDICINE | Facility: CLINIC | Age: 62
End: 2022-04-26
Payer: COMMERCIAL

## 2022-04-26 PROCEDURE — 99442: CPT

## 2022-04-26 RX ORDER — METHYLPREDNISOLONE 4 MG/1
4 TABLET ORAL
Qty: 1 | Refills: 0 | Status: DISCONTINUED | COMMUNITY
Start: 2020-08-14 | End: 2022-04-26

## 2022-04-26 RX ORDER — TRAMADOL HYDROCHLORIDE 50 MG/1
50 TABLET, COATED ORAL
Qty: 30 | Refills: 0 | Status: DISCONTINUED | COMMUNITY
Start: 2020-08-14 | End: 2022-04-26

## 2022-05-20 ENCOUNTER — RX RENEWAL (OUTPATIENT)
Age: 62
End: 2022-05-20

## 2022-05-25 ENCOUNTER — APPOINTMENT (OUTPATIENT)
Dept: ULTRASOUND IMAGING | Facility: HOSPITAL | Age: 62
End: 2022-05-25
Payer: COMMERCIAL

## 2022-05-25 ENCOUNTER — NON-APPOINTMENT (OUTPATIENT)
Age: 62
End: 2022-05-25

## 2022-05-25 ENCOUNTER — RESULT REVIEW (OUTPATIENT)
Age: 62
End: 2022-05-25

## 2022-05-25 ENCOUNTER — APPOINTMENT (OUTPATIENT)
Dept: MAMMOGRAPHY | Facility: HOSPITAL | Age: 62
End: 2022-05-25
Payer: COMMERCIAL

## 2022-05-25 ENCOUNTER — OUTPATIENT (OUTPATIENT)
Dept: OUTPATIENT SERVICES | Facility: HOSPITAL | Age: 62
LOS: 1 days | End: 2022-05-25
Payer: COMMERCIAL

## 2022-05-25 DIAGNOSIS — Z00.8 ENCOUNTER FOR OTHER GENERAL EXAMINATION: ICD-10-CM

## 2022-05-25 PROCEDURE — 77067 SCR MAMMO BI INCL CAD: CPT | Mod: 26

## 2022-05-25 PROCEDURE — 77063 BREAST TOMOSYNTHESIS BI: CPT

## 2022-05-25 PROCEDURE — 77063 BREAST TOMOSYNTHESIS BI: CPT | Mod: 26

## 2022-05-25 PROCEDURE — 76641 ULTRASOUND BREAST COMPLETE: CPT | Mod: 26,50

## 2022-05-25 PROCEDURE — 77067 SCR MAMMO BI INCL CAD: CPT

## 2022-05-25 PROCEDURE — 76641 ULTRASOUND BREAST COMPLETE: CPT

## 2022-06-01 ENCOUNTER — RX RENEWAL (OUTPATIENT)
Age: 62
End: 2022-06-01

## 2022-06-29 ENCOUNTER — RX RENEWAL (OUTPATIENT)
Age: 62
End: 2022-06-29

## 2022-07-08 ENCOUNTER — APPOINTMENT (OUTPATIENT)
Dept: FAMILY MEDICINE | Facility: CLINIC | Age: 62
End: 2022-07-08

## 2022-07-08 VITALS
OXYGEN SATURATION: 97 % | RESPIRATION RATE: 18 BRPM | DIASTOLIC BLOOD PRESSURE: 86 MMHG | TEMPERATURE: 97.2 F | SYSTOLIC BLOOD PRESSURE: 134 MMHG | BODY MASS INDEX: 26.7 KG/M2 | HEART RATE: 105 BPM | HEIGHT: 60 IN | WEIGHT: 136 LBS

## 2022-07-08 DIAGNOSIS — R73.03 PREDIABETES.: ICD-10-CM

## 2022-07-08 PROCEDURE — 99214 OFFICE O/P EST MOD 30 MIN: CPT

## 2022-07-08 RX ORDER — VALSARTAN AND HYDROCHLOROTHIAZIDE 160; 25 MG/1; MG/1
160-25 TABLET, FILM COATED ORAL
Qty: 90 | Refills: 1 | Status: DISCONTINUED | COMMUNITY
Start: 2017-10-04 | End: 2022-07-08

## 2022-07-08 RX ORDER — ERGOCALCIFEROL 1.25 MG/1
1.25 MG CAPSULE, LIQUID FILLED ORAL
Qty: 4 | Refills: 2 | Status: DISCONTINUED | COMMUNITY
Start: 2017-10-04 | End: 2022-07-08

## 2022-07-08 RX ORDER — BENZONATATE 100 MG/1
100 CAPSULE ORAL 3 TIMES DAILY
Qty: 21 | Refills: 0 | Status: DISCONTINUED | COMMUNITY
Start: 2022-04-26 | End: 2022-07-08

## 2022-07-08 NOTE — HISTORY OF PRESENT ILLNESS
[FreeTextEntry1] : f/u HTN,HLD  [de-identified] : Here for f/u HTN, HLD. Patient is taking her meds regularly. denies SOB, cough, fever, chills, dizziness. She tries to walk daily. She is requesting medicine refills. denies side effects. She had dental infection and needs 2 rounds of antibiotic before and after root canal. At that time, she was very anxious and was having central chest pressure which resolves. She has not gone for screening tests.

## 2022-07-08 NOTE — HEALTH RISK ASSESSMENT
[Never] : Never [No] : In the past 12 months have you used drugs other than those required for medical reasons? No [No falls in past year] : Patient reported no falls in the past year [0] : 2) Feeling down, depressed, or hopeless: Not at all (0) [PHQ-2 Negative - No further assessment needed] : PHQ-2 Negative - No further assessment needed [With Patient/Caregiver] : , with patient/caregiver [Designated Healthcare Proxy] : Designated healthcare proxy [Name: ___] : Health Care Proxy's Name: [unfilled]  [Relationship: ___] : Relationship: [unfilled] [de-identified] : walking [de-identified] : regular [[id number]] : 0 [AdvancecareDate] : 07/22

## 2022-07-08 NOTE — PLAN
[FreeTextEntry1] : DEXA, screening colonoscopy. \par HTN : Home BP monitoring, DASH diet. Continue current meds.\par  HLD: low chol. diet. continue medicine. \par  hematology note reviewed. continue hematological surveillance. labs reviewed. \par  f/u gyn. avoid conc. sweets.

## 2022-09-27 ENCOUNTER — OUTPATIENT (OUTPATIENT)
Dept: OUTPATIENT SERVICES | Facility: HOSPITAL | Age: 62
LOS: 1 days | Discharge: HOME | End: 2022-09-27

## 2022-09-27 DIAGNOSIS — E88.09 OTHER DISORDERS OF PLASMA-PROTEIN METABOLISM, NOT ELSEWHERE CLASSIFIED: ICD-10-CM

## 2022-09-27 LAB
BASOPHILS # BLD AUTO: 0.01 K/UL
BASOPHILS NFR BLD AUTO: 0.1 %
CALCIUM SERPL-MCNC: 10.4 MG/DL
CREAT SERPL-MCNC: 0.89 MG/DL
DEPRECATED KAPPA LC FREE/LAMBDA SER: 0.62 RATIO
DEPRECATED KAPPA LC FREE/LAMBDA SER: 0.62 RATIO
EGFR: 73 ML/MIN/1.73M2
EOSINOPHIL # BLD AUTO: 0.14 K/UL
EOSINOPHIL NFR BLD AUTO: 2 %
HCT VFR BLD CALC: 38.9 %
HGB BLD-MCNC: 13.1 G/DL
IGA SER QL IEP: 39 MG/DL
IGG SER QL IEP: 1778 MG/DL
IGM SER QL IEP: 29 MG/DL
IMM GRANULOCYTES NFR BLD AUTO: 0.1 %
KAPPA LC CSF-MCNC: 1.94 MG/DL
KAPPA LC CSF-MCNC: 1.94 MG/DL
KAPPA LC SERPL-MCNC: 1.2 MG/DL
KAPPA LC SERPL-MCNC: 1.2 MG/DL
LYMPHOCYTES # BLD AUTO: 3.21 K/UL
LYMPHOCYTES NFR BLD AUTO: 46.7 %
MAN DIFF?: NORMAL
MCHC RBC-ENTMCNC: 29.4 PG
MCHC RBC-ENTMCNC: 33.7 GM/DL
MCV RBC AUTO: 87.2 FL
MONOCYTES # BLD AUTO: 0.77 K/UL
MONOCYTES NFR BLD AUTO: 11.2 %
NEUTROPHILS # BLD AUTO: 2.73 K/UL
NEUTROPHILS NFR BLD AUTO: 39.9 %
PLATELET # BLD AUTO: 342 K/UL
RBC # BLD: 4.46 M/UL
RBC # FLD: 13.3 %
WBC # FLD AUTO: 6.87 K/UL

## 2022-10-04 ENCOUNTER — APPOINTMENT (OUTPATIENT)
Dept: HEMATOLOGY ONCOLOGY | Facility: CLINIC | Age: 62
End: 2022-10-04

## 2022-10-04 VITALS
WEIGHT: 134.92 LBS | OXYGEN SATURATION: 99 % | TEMPERATURE: 97 F | RESPIRATION RATE: 16 BRPM | HEIGHT: 60 IN | BODY MASS INDEX: 26.49 KG/M2 | SYSTOLIC BLOOD PRESSURE: 146 MMHG | HEART RATE: 98 BPM | DIASTOLIC BLOOD PRESSURE: 84 MMHG

## 2022-10-04 PROCEDURE — 99213 OFFICE O/P EST LOW 20 MIN: CPT

## 2022-10-04 NOTE — HISTORY OF PRESENT ILLNESS
[de-identified] : 2014-Was under a lot of stress due to an ill father, and stated she had UE/LE M-S aches. Saw rheumatologist who found monoclonal gammopathy on lab work. Referred to hematologist, and saw Dr. 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 Patient then sought second opinion at [facility] [place] and BMB was recommended, again which patient declined. Patient did not pursue heme f/u.\par 2019-Routine PCP f/u showed persistent monoclonal gammopathy on lab work and patient presented for heme f/u at the [facility] [place]. She has declined BMB.\par 11/2020-Skeletal survey-no suspicious lytic lesions.\par \par 10/2021-PB flow cytometry-Peripheral blood:\par      - The lymphocyte immunophenotypic findings show no diagnostic abnormalities.\par      - The myeloid immunophenotypic findings show normal myeloid granularity, no increase in myeloid immaturity, and normal myeloid antigen maturation patte [de-identified] : Watching 2 grandchildren 5 days a week for the time being-a 2 1/2 year old and 3 month old.       \par No c/o fevers, sweats, abnormal bruising/bleeding. No c/o cough or SOB. No LN complaints.\par Plans to update colonoscopy-not done yet.\par \par Has has Moderna COVID vaccines x 2.

## 2022-10-04 NOTE — ASSESSMENT
[FreeTextEntry1] : Lab work, mammogram/breast US results reviewed.\par MGUS-clinically stable at present. Reviewed potential complications if plasma cell dyscrasia progresses. Continue hematologic surveillance. Note-patient has declined BMB, remaining agreeable to interval f/u lab work.\par \par Relative lymphocytosis-intermittent, chronic. ANC currently WNL. Continue to monitor CBC with diff. \par \par Should hematologic scenario change/worsen, patient may reconsider BMB (with IR and sedation).\par \par Patient was given the opportunity to ask questions.  Her questions have been answered to her apparent satisfaction at this time.

## 2022-10-05 LAB
ALBUMIN MFR SERPL ELPH: 57.4 %
ALBUMIN SERPL-MCNC: 4.5 G/DL
ALBUMIN/GLOB SERPL: 1.4 RATIO
ALPHA1 GLOB MFR SERPL ELPH: 3.7 %
ALPHA1 GLOB SERPL ELPH-MCNC: 0.3 G/DL
ALPHA2 GLOB MFR SERPL ELPH: 8.7 %
ALPHA2 GLOB SERPL ELPH-MCNC: 0.7 G/DL
B-GLOBULIN MFR SERPL ELPH: 9.2 %
B-GLOBULIN SERPL ELPH-MCNC: 0.7 G/DL
GAMMA GLOB FLD ELPH-MCNC: 1.6 G/DL
GAMMA GLOB MFR SERPL ELPH: 21 %
INTERPRETATION SERPL IEP-IMP: NORMAL
M PROTEIN MFR SERPL ELPH: 17.4 %
MONOCLON BAND OBS SERPL: 1.4 G/DL
PROT SERPL-MCNC: 7.8 G/DL
PROT SERPL-MCNC: 7.8 G/DL

## 2022-11-22 ENCOUNTER — RX RENEWAL (OUTPATIENT)
Age: 62
End: 2022-11-22

## 2022-11-23 ENCOUNTER — APPOINTMENT (OUTPATIENT)
Dept: FAMILY MEDICINE | Facility: CLINIC | Age: 62
End: 2022-11-23

## 2022-11-23 ENCOUNTER — EMERGENCY (EMERGENCY)
Facility: HOSPITAL | Age: 62
LOS: 1 days | Discharge: HOME | End: 2022-11-23
Attending: INTERNAL MEDICINE | Admitting: INTERNAL MEDICINE
Payer: COMMERCIAL

## 2022-11-23 ENCOUNTER — NON-APPOINTMENT (OUTPATIENT)
Age: 62
End: 2022-11-23

## 2022-11-23 VITALS
SYSTOLIC BLOOD PRESSURE: 136 MMHG | HEIGHT: 61 IN | DIASTOLIC BLOOD PRESSURE: 85 MMHG | TEMPERATURE: 98 F | RESPIRATION RATE: 20 BRPM | WEIGHT: 134.92 LBS | HEART RATE: 129 BPM | OXYGEN SATURATION: 97 %

## 2022-11-23 VITALS
SYSTOLIC BLOOD PRESSURE: 98 MMHG | DIASTOLIC BLOOD PRESSURE: 78 MMHG | HEART RATE: 116 BPM | RESPIRATION RATE: 16 BRPM | OXYGEN SATURATION: 98 %

## 2022-11-23 VITALS
SYSTOLIC BLOOD PRESSURE: 134 MMHG | HEIGHT: 60 IN | RESPIRATION RATE: 16 BRPM | HEART RATE: 136 BPM | BODY MASS INDEX: 26.5 KG/M2 | DIASTOLIC BLOOD PRESSURE: 72 MMHG | OXYGEN SATURATION: 97 % | TEMPERATURE: 97.6 F | WEIGHT: 135 LBS

## 2022-11-23 DIAGNOSIS — J20.9 ACUTE BRONCHITIS, UNSPECIFIED: ICD-10-CM

## 2022-11-23 DIAGNOSIS — R00.0 TACHYCARDIA, UNSPECIFIED: ICD-10-CM

## 2022-11-23 DIAGNOSIS — R94.31 ABNORMAL ELECTROCARDIOGRAM [ECG] [EKG]: ICD-10-CM

## 2022-11-23 LAB
ALBUMIN SERPL ELPH-MCNC: 3.8 G/DL — SIGNIFICANT CHANGE UP (ref 3.3–5)
ALP SERPL-CCNC: 85 U/L — SIGNIFICANT CHANGE UP (ref 40–120)
ALT FLD-CCNC: 24 U/L — SIGNIFICANT CHANGE UP (ref 10–45)
ANION GAP SERPL CALC-SCNC: 11 MMOL/L — SIGNIFICANT CHANGE UP (ref 5–17)
AST SERPL-CCNC: 32 U/L — SIGNIFICANT CHANGE UP (ref 10–40)
BASOPHILS # BLD AUTO: 0.03 K/UL — SIGNIFICANT CHANGE UP (ref 0–0.2)
BASOPHILS NFR BLD AUTO: 0.3 % — SIGNIFICANT CHANGE UP (ref 0–2)
BILIRUB SERPL-MCNC: 0.7 MG/DL — SIGNIFICANT CHANGE UP (ref 0.2–1.2)
BUN SERPL-MCNC: 38 MG/DL — HIGH (ref 7–23)
CALCIUM SERPL-MCNC: 9.8 MG/DL — SIGNIFICANT CHANGE UP (ref 8.4–10.5)
CHLORIDE SERPL-SCNC: 100 MMOL/L — SIGNIFICANT CHANGE UP (ref 96–108)
CO2 SERPL-SCNC: 25 MMOL/L — SIGNIFICANT CHANGE UP (ref 22–31)
CREAT SERPL-MCNC: 1.17 MG/DL — SIGNIFICANT CHANGE UP (ref 0.5–1.3)
D DIMER BLD IA.RAPID-MCNC: 260 NG/ML DDU — HIGH
EGFR: 52 ML/MIN/1.73M2 — LOW
EOSINOPHIL # BLD AUTO: 0.02 K/UL — SIGNIFICANT CHANGE UP (ref 0–0.5)
EOSINOPHIL NFR BLD AUTO: 0.2 % — SIGNIFICANT CHANGE UP (ref 0–6)
GLUCOSE SERPL-MCNC: 126 MG/DL — HIGH (ref 70–99)
HCT VFR BLD CALC: 40.2 % — SIGNIFICANT CHANGE UP (ref 34.5–45)
HGB BLD-MCNC: 13.3 G/DL — SIGNIFICANT CHANGE UP (ref 11.5–15.5)
IMM GRANULOCYTES NFR BLD AUTO: 0.5 % — SIGNIFICANT CHANGE UP (ref 0–0.9)
INR BLD: 1.24 RATIO — HIGH (ref 0.88–1.16)
LYMPHOCYTES # BLD AUTO: 1.89 K/UL — SIGNIFICANT CHANGE UP (ref 1–3.3)
LYMPHOCYTES # BLD AUTO: 17.4 % — SIGNIFICANT CHANGE UP (ref 13–44)
MCHC RBC-ENTMCNC: 29.2 PG — SIGNIFICANT CHANGE UP (ref 27–34)
MCHC RBC-ENTMCNC: 33.1 GM/DL — SIGNIFICANT CHANGE UP (ref 32–36)
MCV RBC AUTO: 88.4 FL — SIGNIFICANT CHANGE UP (ref 80–100)
MONOCYTES # BLD AUTO: 1.82 K/UL — HIGH (ref 0–0.9)
MONOCYTES NFR BLD AUTO: 16.7 % — HIGH (ref 2–14)
NEUTROPHILS # BLD AUTO: 7.08 K/UL — SIGNIFICANT CHANGE UP (ref 1.8–7.4)
NEUTROPHILS NFR BLD AUTO: 64.9 % — SIGNIFICANT CHANGE UP (ref 43–77)
NRBC # BLD: 0 /100 WBCS — SIGNIFICANT CHANGE UP (ref 0–0)
NT-PROBNP SERPL-SCNC: 182 PG/ML — SIGNIFICANT CHANGE UP (ref 0–300)
PLATELET # BLD AUTO: 338 K/UL — SIGNIFICANT CHANGE UP (ref 150–400)
POTASSIUM SERPL-MCNC: 3.8 MMOL/L — SIGNIFICANT CHANGE UP (ref 3.5–5.3)
POTASSIUM SERPL-SCNC: 3.8 MMOL/L — SIGNIFICANT CHANGE UP (ref 3.5–5.3)
PROT SERPL-MCNC: 8.6 G/DL — HIGH (ref 6–8.3)
PROTHROM AB SERPL-ACNC: 14.4 SEC — HIGH (ref 10.5–13.4)
RAPID RVP RESULT: SIGNIFICANT CHANGE UP
RBC # BLD: 4.55 M/UL — SIGNIFICANT CHANGE UP (ref 3.8–5.2)
RBC # FLD: 13.8 % — SIGNIFICANT CHANGE UP (ref 10.3–14.5)
SARS-COV-2 RNA SPEC QL NAA+PROBE: SIGNIFICANT CHANGE UP
SODIUM SERPL-SCNC: 136 MMOL/L — SIGNIFICANT CHANGE UP (ref 135–145)
TROPONIN I, HIGH SENSITIVITY RESULT: 7.3 NG/L — SIGNIFICANT CHANGE UP
TROPONIN I, HIGH SENSITIVITY RESULT: 7.8 NG/L — SIGNIFICANT CHANGE UP
WBC # BLD: 10.89 K/UL — HIGH (ref 3.8–10.5)
WBC # FLD AUTO: 10.89 K/UL — HIGH (ref 3.8–10.5)

## 2022-11-23 PROCEDURE — 99285 EMERGENCY DEPT VISIT HI MDM: CPT

## 2022-11-23 PROCEDURE — 71045 X-RAY EXAM CHEST 1 VIEW: CPT | Mod: 26

## 2022-11-23 PROCEDURE — 93000 ELECTROCARDIOGRAM COMPLETE: CPT

## 2022-11-23 PROCEDURE — 71275 CT ANGIOGRAPHY CHEST: CPT | Mod: 26,MA

## 2022-11-23 PROCEDURE — 99214 OFFICE O/P EST MOD 30 MIN: CPT | Mod: 25

## 2022-11-23 PROCEDURE — 93010 ELECTROCARDIOGRAM REPORT: CPT

## 2022-11-23 RX ORDER — SODIUM CHLORIDE 9 MG/ML
1000 INJECTION INTRAMUSCULAR; INTRAVENOUS; SUBCUTANEOUS
Refills: 0 | Status: COMPLETED | OUTPATIENT
Start: 2022-11-23 | End: 2022-11-23

## 2022-11-23 RX ORDER — HYDROCODONE BITARTRATE AND HOMATROPINE METHYLBROMIDE 5; 1.5 MG/5ML; MG/5ML
5 SOLUTION ORAL
Qty: 100 | Refills: 0
Start: 2022-11-23

## 2022-11-23 RX ORDER — MORPHINE SULFATE 50 MG/1
2 CAPSULE, EXTENDED RELEASE ORAL ONCE
Refills: 0 | Status: DISCONTINUED | OUTPATIENT
Start: 2022-11-23 | End: 2022-11-23

## 2022-11-23 RX ORDER — ALBUTEROL 90 UG/1
2 AEROSOL, METERED ORAL
Qty: 1 | Refills: 0
Start: 2022-11-23 | End: 2022-12-22

## 2022-11-23 RX ORDER — IPRATROPIUM/ALBUTEROL SULFATE 18-103MCG
3 AEROSOL WITH ADAPTER (GRAM) INHALATION
Refills: 0 | Status: COMPLETED | OUTPATIENT
Start: 2022-11-23 | End: 2022-11-23

## 2022-11-23 RX ORDER — ONDANSETRON 8 MG/1
4 TABLET, FILM COATED ORAL ONCE
Refills: 0 | Status: COMPLETED | OUTPATIENT
Start: 2022-11-23 | End: 2022-11-23

## 2022-11-23 RX ORDER — CEFTRIAXONE 500 MG/1
1000 INJECTION, POWDER, FOR SOLUTION INTRAMUSCULAR; INTRAVENOUS ONCE
Refills: 0 | Status: COMPLETED | OUTPATIENT
Start: 2022-11-23 | End: 2022-11-23

## 2022-11-23 RX ORDER — CEFPODOXIME PROXETIL 100 MG
1 TABLET ORAL
Qty: 20 | Refills: 0
Start: 2022-11-23 | End: 2022-12-02

## 2022-11-23 RX ADMIN — CEFTRIAXONE 100 MILLIGRAM(S): 500 INJECTION, POWDER, FOR SOLUTION INTRAMUSCULAR; INTRAVENOUS at 17:03

## 2022-11-23 RX ADMIN — Medication 3 MILLILITER(S): at 13:53

## 2022-11-23 RX ADMIN — Medication 100 MILLIGRAM(S): at 17:03

## 2022-11-23 RX ADMIN — MORPHINE SULFATE 2 MILLIGRAM(S): 50 CAPSULE, EXTENDED RELEASE ORAL at 13:55

## 2022-11-23 RX ADMIN — SODIUM CHLORIDE 1000 MILLILITER(S): 9 INJECTION INTRAMUSCULAR; INTRAVENOUS; SUBCUTANEOUS at 13:54

## 2022-11-23 RX ADMIN — Medication 3 MILLILITER(S): at 14:12

## 2022-11-23 RX ADMIN — ONDANSETRON 4 MILLIGRAM(S): 8 TABLET, FILM COATED ORAL at 17:43

## 2022-11-23 RX ADMIN — MORPHINE SULFATE 2 MILLIGRAM(S): 50 CAPSULE, EXTENDED RELEASE ORAL at 14:13

## 2022-11-23 NOTE — ED ADULT NURSE NOTE - NSICDXPASTMEDICALHX_GEN_ALL_CORE_FT
PAST MEDICAL HISTORY:  Cholelithiasis H/O    History of Tonsillectomy     Migraine     S/P Biopsy Endometrial     Uterine Leiomyoma

## 2022-11-23 NOTE — ED ADULT NURSE NOTE - CADM POA PRESS ULCER
Ongoing SW/CM Assessment/Plan of Care Note     See SW/CM flowsheets for goals and other objective data.    Patient/Family discharge goal (s):  Goal #1: Psychosocial needs assessed  Goal #2: Communication facilitated       PT Recommendation:  Recommendation for Discharge: PT WI: Less intensive rehab       OT Recommendation:  Recommendations for Discharge: OT WI: Less intensive rehab       SLP Recommendation:       Disposition:  Planned Discharge Destination: Rehabilitation/Skilled Care    Progress note:   Per MD, pt ready for DC to [facility].  SW called and left a message for [facility] admissions requesting a call back to coordinate.    Received call back from [facility].  They can accept pt and can send their  to pick him up at 11:30.  Updated RN and MD     No

## 2022-11-23 NOTE — ED PROVIDER NOTE - PATIENT PORTAL LINK FT
You can access the FollowMyHealth Patient Portal offered by [facility] by registering at the following website: [email or web address]. By joining [email or web address] FollowMyHealth portal, you will also be able to view your health information using other applications (apps) compatible with our system.

## 2022-11-23 NOTE — REVIEW OF SYSTEMS
[Shortness Of Breath] : shortness of breath [Wheezing] : wheezing [Cough] : cough [Negative] : Heme/Lymph [Nasal Discharge] : no nasal discharge [Sore Throat] : no sore throat [Postnasal Drip] : no postnasal drip [Chest Pain] : chest pain [FreeTextEntry9] : muscle aches

## 2022-11-23 NOTE — ED ADULT TRIAGE NOTE - CHIEF COMPLAINT QUOTE
Pt sent in by MD Jones for abnormal EKG-new sinus tachycardia.  Pt was at MD for c/o cough.  Pt states she has chest pain with "only with my cough".

## 2022-11-23 NOTE — HISTORY OF PRESENT ILLNESS
[FreeTextEntry8] : 62-year-old female with past medical history of hyperlipidemia, hypertension, osteopenia, and MGUS presents with persistent cough that has been worsening over the past week associated with chest congestion, and at times shortness of breath with coughing spells.\par Denies any fever, chills, sore throat, or runny nose.\par Has taken DayQuil and NyQuil with minimal relief.\par Has attempted to increase fluids\par Granddaughter had RSV 3 weeks ago.\par \par Does complain of pressure-like chest pain with coughing.\par Last night with coughing spells did experience palpitations

## 2022-11-23 NOTE — ED PROVIDER NOTE - NSFOLLOWUPINSTRUCTIONS_ED_ALL_ED_FT
Follow up with your PMD within 1-2 days.  Rest, increase your fluids, advance your activity as tolerated.   Take all of your other medications as previously prescribed.   Worsening, continued or ANY new concerning symptoms return to the emergency department.  albuterol 2 puffs every six h for cough   hycodan for cough   cefopodoxamine twice daily and doxy twice daily

## 2022-11-23 NOTE — ASSESSMENT
[FreeTextEntry1] : Concerning EKG with new findings of right complex QRS, tachycardia, and lateral ST depressions\par \par Handoff given to ED. patient will report right there is with  who will drive her.

## 2022-11-23 NOTE — ED PROVIDER NOTE - OBJECTIVE STATEMENT
62 y f cc cough 1 week seen by pmd today found to have sinus tachycardia sent to ed for evaluation chest pain when she coughs

## 2022-11-23 NOTE — PHYSICAL EXAM
[No Acute Distress] : no acute distress [Well Nourished] : well nourished [Well Developed] : well developed [Well-Appearing] : well-appearing [Normal Sclera/Conjunctiva] : normal sclera/conjunctiva [PERRL] : pupils equal round and reactive to light [EOMI] : extraocular movements intact [Normal Outer Ear/Nose] : the outer ears and nose were normal in appearance [Normal Oropharynx] : the oropharynx was normal [No JVD] : no jugular venous distention [No Lymphadenopathy] : no lymphadenopathy [Supple] : supple [Thyroid Normal, No Nodules] : the thyroid was normal and there were no nodules present [No Respiratory Distress] : no respiratory distress  [No Accessory Muscle Use] : no accessory muscle use [Clear to Auscultation] : lungs were clear to auscultation bilaterally [Normal Rate] : normal rate  [Regular Rhythm] : with a regular rhythm [Normal S1, S2] : normal S1 and S2 [No Murmur] : no murmur heard [No Carotid Bruits] : no carotid bruits [No Abdominal Bruit] : a ~M bruit was not heard ~T in the abdomen [No Varicosities] : no varicosities [Pedal Pulses Present] : the pedal pulses are present [No Edema] : there was no peripheral edema [No Palpable Aorta] : no palpable aorta [No Extremity Clubbing/Cyanosis] : no extremity clubbing/cyanosis [Soft] : abdomen soft [Non Tender] : non-tender [Non-distended] : non-distended [No Masses] : no abdominal mass palpated [No HSM] : no HSM [Normal Bowel Sounds] : normal bowel sounds [Normal Posterior Cervical Nodes] : no posterior cervical lymphadenopathy [Normal Anterior Cervical Nodes] : no anterior cervical lymphadenopathy [No CVA Tenderness] : no CVA  tenderness [No Spinal Tenderness] : no spinal tenderness [No Joint Swelling] : no joint swelling [Grossly Normal Strength/Tone] : grossly normal strength/tone [No Rash] : no rash [Coordination Grossly Intact] : coordination grossly intact [No Focal Deficits] : no focal deficits [Normal Gait] : normal gait [Deep Tendon Reflexes (DTR)] : deep tendon reflexes were 2+ and symmetric [Normal Affect] : the affect was normal [Normal Insight/Judgement] : insight and judgment were intact [de-identified] : tachycardic

## 2022-11-23 NOTE — ED PROVIDER NOTE - CLINICAL SUMMARY MEDICAL DECISION MAKING FREE TEXT BOX
62 y f cc cough 1 week seen by pmd today found to have sinus tachycardia sent to ed for evaluation chest pain when she coughs  cxr david d dimer border line ekg sinus tach   cta chest acute pneumonia no PE  trops twice neg

## 2022-11-23 NOTE — ED PROVIDER NOTE - PHYSICAL EXAMINATION
General:     NAD, well-nourished, well-appearing  Head:     NC/AT, EOMI, oral mucosa moist  Neck:     trachea midline  Lungs:     CTA b/l, no w/r/r  CVS:     S1S2, RRR, tachycardic no m/g/r  Abd:     +BS, s/nt/nd, no organomegaly  Ext:    2+ radial and pedal pulses, no c/c/e  Neuro: AAOx3, no sensory/motor deficits

## 2022-11-23 NOTE — ED ADULT NURSE NOTE - OBJECTIVE STATEMENT
PT a&ox3 ambulatory to ED sent by Dr. Liu c/o cough, fatigue body aches x 5 days. The cough is getting worse and she has chest pain with the cough. Pt noted to be tachycardic to 130s in office which is why she was sent to ED for eval. Pt placed on bedside monitor, sinus tachycardia noted. Pt denies palpitations at this time. Speaking in full sentences, no respiratory distress. No lower extremity edema, no sob right now.

## 2022-11-24 RX ORDER — ONDANSETRON 8 MG/1
1 TABLET, FILM COATED ORAL
Qty: 9 | Refills: 0
Start: 2022-11-24 | End: 2022-11-26

## 2022-11-24 NOTE — ED POST DISCHARGE NOTE - REASON FOR FOLLOW-UP
Pts  Edwin called, states pt is having a lot of GI upset, vomiting and diarrhea with the doxy. I told him she can stop the doxy and only continue cefpodoxime. Will sent RX of zofran to pharmacy Other

## 2022-11-25 ENCOUNTER — INPATIENT (INPATIENT)
Facility: HOSPITAL | Age: 62
LOS: 5 days | Discharge: HOME | DRG: 871 | End: 2022-12-01
Attending: INTERNAL MEDICINE | Admitting: HOSPITALIST
Payer: COMMERCIAL

## 2022-11-25 VITALS
WEIGHT: 134.92 LBS | TEMPERATURE: 99 F | DIASTOLIC BLOOD PRESSURE: 86 MMHG | RESPIRATION RATE: 18 BRPM | OXYGEN SATURATION: 98 % | HEART RATE: 114 BPM | SYSTOLIC BLOOD PRESSURE: 154 MMHG | HEIGHT: 61 IN

## 2022-11-25 DIAGNOSIS — E87.1 HYPO-OSMOLALITY AND HYPONATREMIA: ICD-10-CM

## 2022-11-25 LAB
ALBUMIN SERPL ELPH-MCNC: 3.3 G/DL — SIGNIFICANT CHANGE UP (ref 3.3–5)
ALP SERPL-CCNC: 64 U/L — SIGNIFICANT CHANGE UP (ref 40–120)
ALT FLD-CCNC: 36 U/L — SIGNIFICANT CHANGE UP (ref 10–45)
ANION GAP SERPL CALC-SCNC: 6 MMOL/L — SIGNIFICANT CHANGE UP (ref 5–17)
APPEARANCE UR: CLEAR — SIGNIFICANT CHANGE UP
APTT BLD: 27.8 SEC — SIGNIFICANT CHANGE UP (ref 27.5–35.5)
AST SERPL-CCNC: 49 U/L — HIGH (ref 10–40)
BACTERIA # UR AUTO: NEGATIVE /HPF — SIGNIFICANT CHANGE UP
BASOPHILS # BLD AUTO: 0.01 K/UL — SIGNIFICANT CHANGE UP (ref 0–0.2)
BASOPHILS NFR BLD AUTO: 0.1 % — SIGNIFICANT CHANGE UP (ref 0–2)
BILIRUB SERPL-MCNC: 0.5 MG/DL — SIGNIFICANT CHANGE UP (ref 0.2–1.2)
BILIRUB UR-MCNC: NEGATIVE — SIGNIFICANT CHANGE UP
BUN SERPL-MCNC: 34 MG/DL — HIGH (ref 7–23)
CALCIUM SERPL-MCNC: 9.4 MG/DL — SIGNIFICANT CHANGE UP (ref 8.4–10.5)
CHLORIDE SERPL-SCNC: 91 MMOL/L — LOW (ref 96–108)
CO2 SERPL-SCNC: 28 MMOL/L — SIGNIFICANT CHANGE UP (ref 22–31)
COLOR SPEC: YELLOW — SIGNIFICANT CHANGE UP
CREAT SERPL-MCNC: 1.2 MG/DL — SIGNIFICANT CHANGE UP (ref 0.5–1.3)
DIFF PNL FLD: ABNORMAL
EGFR: 51 ML/MIN/1.73M2 — LOW
EOSINOPHIL # BLD AUTO: 0 K/UL — SIGNIFICANT CHANGE UP (ref 0–0.5)
EOSINOPHIL NFR BLD AUTO: 0 % — SIGNIFICANT CHANGE UP (ref 0–6)
EPI CELLS # UR: SIGNIFICANT CHANGE UP
GLUCOSE SERPL-MCNC: 126 MG/DL — HIGH (ref 70–99)
GLUCOSE UR QL: NEGATIVE — SIGNIFICANT CHANGE UP
HCT VFR BLD CALC: 35.8 % — SIGNIFICANT CHANGE UP (ref 34.5–45)
HGB BLD-MCNC: 12.4 G/DL — SIGNIFICANT CHANGE UP (ref 11.5–15.5)
IMM GRANULOCYTES NFR BLD AUTO: 0.6 % — SIGNIFICANT CHANGE UP (ref 0–0.9)
INR BLD: 1.29 RATIO — HIGH (ref 0.88–1.16)
KETONES UR-MCNC: NEGATIVE — SIGNIFICANT CHANGE UP
LACTATE SERPL-SCNC: 1 MMOL/L — SIGNIFICANT CHANGE UP (ref 0.7–2)
LEUKOCYTE ESTERASE UR-ACNC: NEGATIVE — SIGNIFICANT CHANGE UP
LYMPHOCYTES # BLD AUTO: 0.75 K/UL — LOW (ref 1–3.3)
LYMPHOCYTES # BLD AUTO: 10.4 % — LOW (ref 13–44)
MCHC RBC-ENTMCNC: 29.6 PG — SIGNIFICANT CHANGE UP (ref 27–34)
MCHC RBC-ENTMCNC: 34.6 GM/DL — SIGNIFICANT CHANGE UP (ref 32–36)
MCV RBC AUTO: 85.4 FL — SIGNIFICANT CHANGE UP (ref 80–100)
MONOCYTES # BLD AUTO: 0.77 K/UL — SIGNIFICANT CHANGE UP (ref 0–0.9)
MONOCYTES NFR BLD AUTO: 10.7 % — SIGNIFICANT CHANGE UP (ref 2–14)
NEUTROPHILS # BLD AUTO: 5.64 K/UL — SIGNIFICANT CHANGE UP (ref 1.8–7.4)
NEUTROPHILS NFR BLD AUTO: 78.2 % — HIGH (ref 43–77)
NITRITE UR-MCNC: NEGATIVE — SIGNIFICANT CHANGE UP
NRBC # BLD: 0 /100 WBCS — SIGNIFICANT CHANGE UP (ref 0–0)
PH UR: 6 — SIGNIFICANT CHANGE UP (ref 5–8)
PLATELET # BLD AUTO: 262 K/UL — SIGNIFICANT CHANGE UP (ref 150–400)
POTASSIUM SERPL-MCNC: 3.5 MMOL/L — SIGNIFICANT CHANGE UP (ref 3.5–5.3)
POTASSIUM SERPL-SCNC: 3.5 MMOL/L — SIGNIFICANT CHANGE UP (ref 3.5–5.3)
PROT SERPL-MCNC: 8.3 G/DL — SIGNIFICANT CHANGE UP (ref 6–8.3)
PROT UR-MCNC: NEGATIVE — SIGNIFICANT CHANGE UP
PROTHROM AB SERPL-ACNC: 15 SEC — HIGH (ref 10.5–13.4)
RAPID RVP RESULT: SIGNIFICANT CHANGE UP
RBC # BLD: 4.19 M/UL — SIGNIFICANT CHANGE UP (ref 3.8–5.2)
RBC # FLD: 13 % — SIGNIFICANT CHANGE UP (ref 10.3–14.5)
RBC CASTS # UR COMP ASSIST: SIGNIFICANT CHANGE UP /HPF (ref 0–4)
SARS-COV-2 RNA SPEC QL NAA+PROBE: SIGNIFICANT CHANGE UP
SODIUM SERPL-SCNC: 125 MMOL/L — LOW (ref 135–145)
SP GR SPEC: 1.01 — SIGNIFICANT CHANGE UP (ref 1.01–1.02)
TROPONIN I, HIGH SENSITIVITY RESULT: 7.2 NG/L — SIGNIFICANT CHANGE UP
UROBILINOGEN FLD QL: NEGATIVE — SIGNIFICANT CHANGE UP
WBC # BLD: 7.21 K/UL — SIGNIFICANT CHANGE UP (ref 3.8–10.5)
WBC # FLD AUTO: 7.21 K/UL — SIGNIFICANT CHANGE UP (ref 3.8–10.5)
WBC UR QL: SIGNIFICANT CHANGE UP /HPF (ref 0–5)

## 2022-11-25 PROCEDURE — 85379 FIBRIN DEGRADATION QUANT: CPT

## 2022-11-25 PROCEDURE — 94640 AIRWAY INHALATION TREATMENT: CPT

## 2022-11-25 PROCEDURE — 84484 ASSAY OF TROPONIN QUANT: CPT

## 2022-11-25 PROCEDURE — 99284 EMERGENCY DEPT VISIT MOD MDM: CPT

## 2022-11-25 PROCEDURE — 71045 X-RAY EXAM CHEST 1 VIEW: CPT

## 2022-11-25 PROCEDURE — 96375 TX/PRO/DX INJ NEW DRUG ADDON: CPT

## 2022-11-25 PROCEDURE — 0225U NFCT DS DNA&RNA 21 SARSCOV2: CPT

## 2022-11-25 PROCEDURE — 99223 1ST HOSP IP/OBS HIGH 75: CPT

## 2022-11-25 PROCEDURE — 80053 COMPREHEN METABOLIC PANEL: CPT

## 2022-11-25 PROCEDURE — 93306 TTE W/DOPPLER COMPLETE: CPT

## 2022-11-25 PROCEDURE — 71275 CT ANGIOGRAPHY CHEST: CPT | Mod: MA

## 2022-11-25 PROCEDURE — 99285 EMERGENCY DEPT VISIT HI MDM: CPT

## 2022-11-25 PROCEDURE — 93010 ELECTROCARDIOGRAM REPORT: CPT

## 2022-11-25 PROCEDURE — 83880 ASSAY OF NATRIURETIC PEPTIDE: CPT

## 2022-11-25 PROCEDURE — 96374 THER/PROPH/DIAG INJ IV PUSH: CPT

## 2022-11-25 PROCEDURE — 96361 HYDRATE IV INFUSION ADD-ON: CPT

## 2022-11-25 PROCEDURE — 85610 PROTHROMBIN TIME: CPT

## 2022-11-25 PROCEDURE — 36415 COLL VENOUS BLD VENIPUNCTURE: CPT

## 2022-11-25 PROCEDURE — 93005 ELECTROCARDIOGRAM TRACING: CPT

## 2022-11-25 PROCEDURE — 85025 COMPLETE CBC W/AUTO DIFF WBC: CPT

## 2022-11-25 PROCEDURE — 99285 EMERGENCY DEPT VISIT HI MDM: CPT | Mod: 25

## 2022-11-25 RX ORDER — ACETAMINOPHEN 500 MG
1000 TABLET ORAL ONCE
Refills: 0 | Status: COMPLETED | OUTPATIENT
Start: 2022-11-25 | End: 2022-11-25

## 2022-11-25 RX ORDER — FENOFIBRATE,MICRONIZED 130 MG
48 CAPSULE ORAL DAILY
Refills: 0 | Status: DISCONTINUED | OUTPATIENT
Start: 2022-11-26 | End: 2022-12-01

## 2022-11-25 RX ORDER — ONDANSETRON 8 MG/1
4 TABLET, FILM COATED ORAL EVERY 8 HOURS
Refills: 0 | Status: DISCONTINUED | OUTPATIENT
Start: 2022-11-25 | End: 2022-12-01

## 2022-11-25 RX ORDER — ACETAMINOPHEN 500 MG
650 TABLET ORAL EVERY 6 HOURS
Refills: 0 | Status: DISCONTINUED | OUTPATIENT
Start: 2022-11-25 | End: 2022-12-01

## 2022-11-25 RX ORDER — LANOLIN ALCOHOL/MO/W.PET/CERES
3 CREAM (GRAM) TOPICAL AT BEDTIME
Refills: 0 | Status: DISCONTINUED | OUTPATIENT
Start: 2022-11-25 | End: 2022-12-01

## 2022-11-25 RX ORDER — AZITHROMYCIN 500 MG/1
500 TABLET, FILM COATED ORAL ONCE
Refills: 0 | Status: COMPLETED | OUTPATIENT
Start: 2022-11-25 | End: 2022-11-25

## 2022-11-25 RX ORDER — VALSARTAN 80 MG/1
40 TABLET ORAL DAILY
Refills: 0 | Status: DISCONTINUED | OUTPATIENT
Start: 2022-11-26 | End: 2022-11-26

## 2022-11-25 RX ORDER — METOCLOPRAMIDE HCL 10 MG
10 TABLET ORAL ONCE
Refills: 0 | Status: COMPLETED | OUTPATIENT
Start: 2022-11-25 | End: 2022-11-25

## 2022-11-25 RX ORDER — SODIUM CHLORIDE 9 MG/ML
1900 INJECTION INTRAMUSCULAR; INTRAVENOUS; SUBCUTANEOUS ONCE
Refills: 0 | Status: COMPLETED | OUTPATIENT
Start: 2022-11-25 | End: 2022-11-25

## 2022-11-25 RX ORDER — HEPARIN SODIUM 5000 [USP'U]/ML
5000 INJECTION INTRAVENOUS; SUBCUTANEOUS EVERY 12 HOURS
Refills: 0 | Status: DISCONTINUED | OUTPATIENT
Start: 2022-11-25 | End: 2022-12-01

## 2022-11-25 RX ORDER — IPRATROPIUM/ALBUTEROL SULFATE 18-103MCG
3 AEROSOL WITH ADAPTER (GRAM) INHALATION EVERY 6 HOURS
Refills: 0 | Status: DISCONTINUED | OUTPATIENT
Start: 2022-11-25 | End: 2022-12-01

## 2022-11-25 RX ORDER — CEFTRIAXONE 500 MG/1
1000 INJECTION, POWDER, FOR SOLUTION INTRAMUSCULAR; INTRAVENOUS ONCE
Refills: 0 | Status: COMPLETED | OUTPATIENT
Start: 2022-11-25 | End: 2022-11-25

## 2022-11-25 RX ORDER — CEFTRIAXONE 500 MG/1
1000 INJECTION, POWDER, FOR SOLUTION INTRAMUSCULAR; INTRAVENOUS EVERY 24 HOURS
Refills: 0 | Status: DISCONTINUED | OUTPATIENT
Start: 2022-11-26 | End: 2022-11-26

## 2022-11-25 RX ADMIN — Medication 650 MILLIGRAM(S): at 23:48

## 2022-11-25 RX ADMIN — CEFTRIAXONE 1000 MILLIGRAM(S): 500 INJECTION, POWDER, FOR SOLUTION INTRAMUSCULAR; INTRAVENOUS at 16:41

## 2022-11-25 RX ADMIN — SODIUM CHLORIDE 1900 MILLILITER(S): 9 INJECTION INTRAMUSCULAR; INTRAVENOUS; SUBCUTANEOUS at 17:36

## 2022-11-25 RX ADMIN — AZITHROMYCIN 500 MILLIGRAM(S): 500 TABLET, FILM COATED ORAL at 17:40

## 2022-11-25 RX ADMIN — Medication 100 MILLIGRAM(S): at 22:48

## 2022-11-25 RX ADMIN — CEFTRIAXONE 100 MILLIGRAM(S): 500 INJECTION, POWDER, FOR SOLUTION INTRAMUSCULAR; INTRAVENOUS at 16:11

## 2022-11-25 RX ADMIN — Medication 1000 MILLIGRAM(S): at 16:26

## 2022-11-25 RX ADMIN — ONDANSETRON 4 MILLIGRAM(S): 8 TABLET, FILM COATED ORAL at 18:54

## 2022-11-25 RX ADMIN — SODIUM CHLORIDE 1900 MILLILITER(S): 9 INJECTION INTRAMUSCULAR; INTRAVENOUS; SUBCUTANEOUS at 16:06

## 2022-11-25 RX ADMIN — AZITHROMYCIN 255 MILLIGRAM(S): 500 TABLET, FILM COATED ORAL at 16:40

## 2022-11-25 RX ADMIN — Medication 400 MILLIGRAM(S): at 16:11

## 2022-11-25 RX ADMIN — Medication 650 MILLIGRAM(S): at 22:48

## 2022-11-25 RX ADMIN — Medication 10 MILLIGRAM(S): at 16:06

## 2022-11-25 NOTE — ED PROVIDER NOTE - PHYSICAL EXAMINATION
Gen: Well appearing in NAD.  AAOx3  ENT: oral mucosa dry   Head: atraumatic  Heart: s1/s2, RRR  Lung: CTA b/l,  No tachypnea or hypoxia   Abd: soft, NT/ND, no rebound or guarding  Msk: no pedal edema,  Neuro: patient moving all extremity equally  Skin: Normal for race.

## 2022-11-25 NOTE — CONSULT NOTE ADULT - SUBJECTIVE AND OBJECTIVE BOX
Chief Complaint:   62 year-old female with h/o HTN, HLD s/p recent visit to ER 2 days ago when she was diagnosed with pneumonia and sent home on oral abx.  Could not tolerate oral abx due to vomiting, so she came to ER today.Headache resolved.No current nausea or abd pain. at bedside.Fever earlier. cardio requested for a lbbb    HPI:    PMH:   Uterine Leiomyoma    Cholelithiasis    History of Tonsillectomy    S/P Biopsy Endometrial    Migraine      PSH:   H/O Cholecystectomy      Family History:  FAMILY HISTORY:      Social History:  Smoking:  Alcohol:  Drugs:    Allergies:  latex (Unknown)  salicylates (Anaphylaxis)      Medications:      REVIEW OF SYSTEMS:  CONSTITUTIONAL: No fever, weight loss, or fatigue  EYES: No eye pain, visual disturbances, or discharge  ENMT:  No difficulty hearing, tinnitus, vertigo; No sinus or throat pain  NECK: No pain or stiffness  BREASTS: No pain, masses, or nipple discharge  RESPIRATORY: No cough, wheezing, chills or hemoptysis; No shortness of breath  CARDIOVASCULAR: No chest pain, palpitations, dizziness, or leg swelling  GASTROINTESTINAL: No abdominal or epigastric pain. No nausea, vomiting, or hematemesis; No diarrhea or constipation. No melena or hematochezia.  GENITOURINARY: No dysuria, frequency, hematuria, or incontinence  NEUROLOGICAL: No headaches, memory loss, loss of strength, numbness, or tremors  SKIN: No itching, burning, rashes, or lesions   LYMPH NODES: No enlarged glands  ENDOCRINE: No heat or cold intolerance; No hair loss  MUSCULOSKELETAL: No joint pain or swelling; No muscle, back, or extremity pain  PSYCHIATRIC: No depression, anxiety, mood swings, or difficulty sleeping  HEME/LYMPH: No easy bruising, or bleeding gums  ALLERY AND IMMUNOLOGIC: No hives or eczema    Physical Exam:  T(C): 37 (11-25-22 @ 15:00), Max: 37 (11-25-22 @ 15:00)  HR: 95 (11-25-22 @ 18:37) (90 - 114)  BP: 120/63 (11-25-22 @ 18:37) (108/62 - 154/86)  RR: 17 (11-25-22 @ 18:37) (15 - 18)  SpO2: 95% (11-25-22 @ 18:37) (94% - 98%)  Wt(kg): --    GENERAL: NAD, well-groomed, well-developed  HEAD:  Atraumatic, Normocephalic  EYES: EOMI, conjunctiva and sclera clear  ENT: Moist mucous membranes,  NECK: Supple, No JVD, no bruits  CHEST/LUNG: Clear to percussion bilaterally; No rales, rhonchi, wheezing, or rubs  HEART: Regular rate and rhythm; No murmurs, rubs, or gallops PMI non displaced.  ABDOMEN: Soft, Nontender, Nondistended; Bowel sounds present  EXTREMITIES:  2+ Peripheral Pulses, No clubbing, cyanosis, or edema  SKIN: No rashes or lesions  NERVOUS SYSTEM:  Cranial Nerves II-XII intact     Cardiovascular Diagnostic Testing:  ECG:    < from: 12 Lead ECG (11.25.22 @ 15:48) >  Diagnosis Line Sinus tachycardia  Left bundle branch block  Abnormal ECG  When compared with ECG of 23-NOV-2022 12:32,  Although rate has decreased by 21 beats per minute    Confirmed by MICHAEL NOLAN MD ([id number]) on 11/25/2022 6:04:48 PM    < end of copied text >      ECHO:        Labs:                        12.4   7.21  )-----------( 262      ( 25 Nov 2022 16:00 )             35.8     11-25    125<L>  |  91<L>  |  34<H>  ----------------------------<  126<H>  3.5   |  28  |  1.20    Ca    9.4      25 Nov 2022 16:00    TPro  8.3  /  Alb  3.3  /  TBili  0.5  /  DBili  x   /  AST  49<H>  /  ALT  36  /  AlkPhos  64  11-25    PT/INR - ( 25 Nov 2022 16:00 )   PT: 15.0 sec;   INR: 1.29 ratio         PTT - ( 25 Nov 2022 16:00 )  PTT:27.8 sec      Serum Pro-Brain Natriuretic Peptide: 182 pg/mL (11-23 @ 13:00)    < from: Xray Chest 1 View- PORTABLE-Urgent (11.23.22 @ 14:00) >  IMPRESSION: Negative chest.    --- End of Report ---        SHRUTI GUTHRIE MD; Attending Radiologist  This document has been electronically signed. Nov 24 2022 10:14AM    < end of copied text >    < from: CT Angio Chest PE Protocol w/ IV Cont (11.23.22 @ 15:27) >    IMPRESSION:  Right lower lobe focal pneumonia.    No evidence of acute pulmonary embolism.        --- End of Report ---      DIMPLE JACOME MD; Attending Radiologist  This document has been electronically signed. Nov 23 2022  4:33PM    < end of copied text >        Imaging:    Impression   left bundle branch block is of unclear significance The bundle branch block seems to have evolved over the past several years.  She is without cardiac symptoms such as chest pains or shortness of breath. .invariably this is an abnormal finding. would proceed with echocardiograph in order to assess LV function. patient may require ischemic work-up once stabilized from a medical standpoint Primary risk reduction.       discussed with  at bedside. and dr espinal

## 2022-11-25 NOTE — ED PROVIDER NOTE - OBJECTIVE STATEMENT
62 year-old female with h/o HTN, HLD s/p recent visit to ER 2 days ago when she was diagnosed with pneumonia and sent home on doxycycline and cefpodoxime. Reports HA, n/v/d since taking the Abx.  Reports she is unable to keep anything down including her abx.  Reports fever of 101.9  today. She denies abd pain, CP, SOB or all other complaints.

## 2022-11-25 NOTE — ED ADULT TRIAGE NOTE - CHIEF COMPLAINT QUOTE
Pt seen here 2 days ago diagnosed with pneumonia and started on antibiotics, not feeling better c/o headache/vomiting diarrhea, was told to stop doxycycline and keep taking Cefpodoxime

## 2022-11-25 NOTE — ED ADULT NURSE NOTE - NSFALLRSKHARMRISK_ED_ALL_ED
10/11/2020    PCP: Jono Chi MD    Chief Complaint   Patient presents with   • Office Visit   • Follow-up Hypertension   Patient offered telephone and video visit, preferred in clinic visit, risks discussed    HPI:      Hypertension  Amlodipine 10 mg po qd  Irbesartan-HCTZ 300-12.5 mg   No side effects    obesity  Stable   Eating healthy and exercising    Dyslipidemia  Diet-controlled    Fasting Hyperglycemia  Diet-controlled     Allergic rhinitis   Controlled      Social hx  Works for [employer]     HEALTH MAINTENANCE  RECOMMENDATION TO PATIENT  COLONOSCOPY- 8/9/2016  MAMMOGRAM- 12/24/2019  PAP SMEAR- 4/17/2018  FLU VACCINE- advised to go to local pharmacy  TDAP- 5/3/2019  EYE EXAM YEARLY  DENTAL EXAM TWICE A YEAR    Review of Systems  Constitutional symptoms - No fever, no loss of appetite.   Cardiovascular - Negative for chest pain, palpitations, swelling of legs.   Respiratory - No shortness of breath, cough, wheezing.   Gastrointestinal - No nausea or vomiting or abdominal pain or melena or hematochezia or change in bowel habits.   All systems were reviewed and are negative except as in HPI.    Lab Results  Lab Results   Component Value Date    SODIUM 140 01/28/2020    POTASSIUM 4.0 01/28/2020    CHLORIDE 106 01/28/2020    CO2 28 01/28/2020    BUN 16 01/28/2020    CREATININE 0.72 01/28/2020    CALCIUM 9.7 01/28/2020    ALBUMIN 4.1 01/28/2020    BILIRUBIN 0.6 01/28/2020    ALKPT 125 (H) 01/28/2020    GPT 18 01/28/2020    AST 14 01/28/2020    GLUCOSE 91 01/28/2020     Lab Results   Component Value Date    CHOLESTEROL 222 (H) 01/28/2020    TRIGLYCERIDE 88 01/28/2020    HDL 72 01/28/2020    CALCLDL 132 (H) 01/28/2020     Lab Results   Component Value Date    WBC 6.0 01/28/2020    RBC 4.61 01/28/2020    HGB 13.5 01/28/2020    HCT 42.7 01/28/2020    MCV 92.6 01/28/2020    MCH 29.3 01/28/2020    MCHC 31.6 (L) 01/28/2020     01/28/2020    TLYMPH 56 01/28/2020    PMON 7 01/28/2020    PEOS 1  01/28/2020    PBASO 0 01/28/2020    ANEUT 2.2 01/28/2020    ALYMS 3.3 01/28/2020    STARLA 0.4 01/28/2020    AEOS 0.1 01/28/2020    ABASO 0.0 01/28/2020    NEUT NOT APPLICABLE 04/12/2019    TDIF AUTOMATED DIFFERENTIAL 01/28/2020    IANC NOT APPLICABLE 04/12/2019    NRBCRE 0 01/28/2020    PIMGR 0 01/28/2020    AIMGR 0.0 01/28/2020     Lab Results   Component Value Date    HGBA1C 5.7 (H) 01/28/2020     Lab Results   Component Value Date    TSH 0.786 01/28/2020     Lab Results   Component Value Date    VITD25 61.7 08/29/2017     Lab Results   Component Value Date    URMIC 1.40 01/28/2020    MALBCR 4.8 01/28/2020       Current Medications:   Current Medications    ASPIRIN 81 MG TABLET    Take 81 mg by mouth daily.    LEVOCETIRIZINE (XYZAL) 5 MG TABLET    TAKE 1 TABLET BY MOUTH EVERY EVENING    ZOSTER VACCINE RECOMB ADJUVANTED (SHINGRIX) 50 MCG/0.5ML INJECTION    Inject 0.5 mLs into the muscle 1 time. Repeat dose in 2 to 6 months (unless 1 dose already given), for a total of 2 doses.       Relevant Past Medical History:  Past Medical History:   Diagnosis Date   • Essential (primary) hypertension    • Hyperlipoproteinemia        Social History     Socioeconomic History   • Marital status: Single     Spouse name: Not on file   • Number of children: Not on file   • Years of education: Not on file   • Highest education level: Not on file   Occupational History   • Not on file   Social Needs   • Financial resource strain: Not on file   • Food insecurity     Worry: Not on file     Inability: Not on file   • Transportation needs     Medical: Not on file     Non-medical: Not on file   Tobacco Use   • Smoking status: Never Smoker   • Smokeless tobacco: Never Used   Substance and Sexual Activity   • Alcohol use: Not Currently   • Drug use: Not Currently   • Sexual activity: Not on file   Lifestyle   • Physical activity     Days per week: 0 days     Minutes per session: 0 min   • Stress: Not at all   Relationships   • Social  connections     Talks on phone: Not on file     Gets together: Not on file     Attends Mu-ism service: Not on file     Active member of club or organization: Not on file     Attends meetings of clubs or organizations: Not on file     Relationship status: Not on file   • Intimate partner violence     Fear of current or ex partner: Not on file     Emotionally abused: Not on file     Physically abused: Not on file     Forced sexual activity: Not on file   Other Topics Concern   • Not on file   Social History Narrative   • Not on file       History reviewed. No pertinent surgical history.    ALLERGIES:  No Known Allergies     History reviewed. No pertinent family history.    Examination:   Blood pressure 121/58, pulse 84, temperature 96.4 °F (35.8 °C), resp. rate 15, height 5' 6\" (1.676 m), weight 87.5 kg (192 lb 14.4 oz), SpO2 99 %.  Weight    09/30/20 1159   Weight: 87.5 kg (192 lb 14.4 oz)       Physical Exam  GENERAL APPEARANCE: Well developed, well nourished, alert and cooperative, and appears to be in no acute distress.  HEAD: normocephalic  EYES: PERRL, EOMI.  vision is grossly intact, no icterus, pallor or cyanosis   EARS: External auditory canals and tympanic membranes clear, no local tenderness, hearing grossly intact.   NOSE:  No nasal discharge.   THROAT:  Oral cavity and pharynx normal. No inflammation, swelling, exudate, or lesions.    CARDIAC: Normal S1 and S2. No S3, S4 or murmurs. pulse is regular and good volume.   LUNGS: Clear to auscultation without rales, rhonchi, wheezing or diminished breath sounds.  No accessory muscle usage, no local tenderness  GI/abdomen: soft, non tender, no hepatosplenomegaly, bowel sounds normal,  MUSKULOSKELETAL: no joint swelling or redness.  NECK: Neck supple, non-tender without cervical or supraclavicular lymphadenopathy, or masses  EXTREMITIES: No edema  NEUROLOGICAL  Normal gait.  Strength, sensory and reflexes symmetric and intact throughout bilaterally upper and  lower limbs   SKIN: Skin normal color and temperature, no abnormal moles or lesions.  PSYCHIATRIC: oriented to person, place, and time.     Assessment   Problem List Items Addressed This Visit     Allergic rhinitis    Dyslipidemia - Primary    Relevant Orders    LIPID PANEL WITH REFLEX    Essential (primary) hypertension    Relevant Medications    irbesartan-hydrochlorothiazide (AVALIDE) 300-12.5 MG per tablet    amLODIPine (NORVASC) 10 MG tablet    Obesity    Visit for screening mammogram    Relevant Orders    MAMMO SCREENING BILATERAL W NATY    Fasting hyperglycemia    Relevant Orders    GLYCOHEMOGLOBIN      Other Visit Diagnoses     Colon cancer screening        Relevant Orders    OCCULT BLOOD TEST TUBE          Plan    Essential (primary) Hypertension  Refilled Amlodipine 10 mg po qd and  Irbesartan-HCTZ 300-12.5 mg   No side effects    Obesity, unspecified classification, unspecified obesity type, unspecified whether serious comorbidity present   Stable   Diet and exercise plan discussed     Dyslipidemia  Diet-controlled  Lipid panel withnreflex ordered    Fasting Hyperglycemia  Diet-controlled   Glycohemoglobin ordered    Allergic rhinitis, unspecified seasonality, unspecified trigger  Controlled      Visit for screening mammogram  Mammogram ordered    Colon cancer screening  Occult blood test tube ordered     Return in about 3 months (around 12/30/2020).      Side effects of above medications discussed,   Any referrals issued, advised patient to call his/her insurance to get the list of providers who are in network and covered  all questions answered,   patient agrees with plan,   Advised to call back to get test results.  Risks of delay discussed  Will go to ER if any urgent symptoms    Scribe Attestation: On 9/30/2020, Claudia MOTA scribed the services personally performed by Jono Chi MD.   Provider Attestation: The documentation recorded by the scribe accurately reflects the service NAKUL  personally performed and the decisions made by me, Jono Chi MD.      no

## 2022-11-25 NOTE — H&P ADULT - NSHPPHYSICALEXAM_GEN_ALL_CORE
PHYSICAL EXAM:  Vital Signs Last 24 Hrs  T(C): 37 (11-25-22 @ 15:00), Max: 37 (11-25-22 @ 15:00)  T(F): 98.6 (11-25-22 @ 15:00), Max: 98.6 (11-25-22 @ 15:00)  HR: 92 (11-25-22 @ 17:15) (92 - 114)  BP: 117/65 (11-25-22 @ 17:15) (117/65 - 154/86)  BP(mean): 82 (11-25-22 @ 17:15) (82 - 82)  RR: 15 (11-25-22 @ 17:15) (15 - 18)  SpO2: 96% (11-25-22 @ 17:15) (96% - 98%)  GENERAL: No apparent distress, appears stated age  EYES: Conjunctiva and sclera clear, no discharge  ENMT: Moist mucous membranes, no nasal discharge  CHEST/LUNG: Right base rales  HEART: Tachycardia  ABDOMEN: Soft, Nontender, Nondistended  EXTREMITIES:  No clubbing, cyanosis or edema  SKIN: No rash, no new discoloration

## 2022-11-25 NOTE — ED ADULT NURSE NOTE - NSIMPLEMENTINTERV_GEN_ALL_ED
Implemented All Universal Safety Interventions:  [place] to call system. Call bell, personal items and telephone within reach. Instruct patient to call for assistance. Room bathroom lighting operational. Non-slip footwear when patient is off stretcher. Physically safe environment: no spills, clutter or unnecessary equipment. Stretcher in lowest position, wheels locked, appropriate side rails in place.

## 2022-11-25 NOTE — ED PROVIDER NOTE - CLINICAL SUMMARY MEDICAL DECISION MAKING FREE TEXT BOX
62 year-old female with h/o HTN, HLD s/p recent visit to ER 2 days ago when she was diagnosed with pneumonia and sent home on doxycycline and cefpodoxime. Reports HA, n/v/d since taking the Abx.  Reports she is unable to keep anything down including her abx.  Reports fever of 101.9  today. She denies abd pain, CP, SOB or all other complaints. PE as noted above. labs reviewed . Pt hydrated and given meds. will admit for  IV abx and hyponatremia

## 2022-11-25 NOTE — ED PROVIDER NOTE - CARE PLAN
Principal Discharge DX:	Hyponatremia  Secondary Diagnosis:	Pneumonia  Secondary Diagnosis:	Vomiting   1

## 2022-11-25 NOTE — H&P ADULT - NSHPLABSRESULTS_GEN_ALL_CORE
WBC Count: 7.21 K/uL (11-25 @ 16:00)  RBC Count: 4.19 M/uL (11-25 @ 16:00)  Hemoglobin: 12.4 g/dL (11-25 @ 16:00)  Hematocrit: 35.8 % (11-25 @ 16:00)  Platelet Count - Automated: 262 K/uL (11-25 @ 16:00)      11-25    125<L>  |  91<L>  |  34<H>  ----------------------------<  126<H>  3.5   |  28  |  1.20    Ca    9.4      25 Nov 2022 16:00    TPro  8.3  /  Alb  3.3  /  TBili  0.5  /  DBili  x   /  AST  49<H>  /  ALT  36  /  AlkPhos  64  11-25          CT chest 11/23:   IMPRESSION:  Right lower lobe focal pneumonia.    No evidence of acute pulmonary embolism.      PT/INR - ( 25 Nov 2022 16:00 )   PT: 15.0 sec;   INR: 1.29 ratio         PTT - ( 25 Nov 2022 16:00 )  PTT:27.8 sec        Albumin, Serum: 3.3 g/dL (11-25 @ 16:00)  Bilirubin Total, Serum: 0.5 mg/dL (11-25 @ 16:00)  Aspartate Aminotransferase (AST/SGOT): 49 U/L *H* (11-25 @ 16:00)  Alanine Aminotransferase (ALT/SGPT): 36 U/L (11-25 @ 16:00)  Alkaline Phosphatase, Serum: 64 U/L (11-25 @ 16:00)      Lactate, Blood: 1.0 mmol/L (11-25-22 @ 16:00)      WBC Count: 7.21 K/uL (11-25 @ 16:00)  RBC Count: 4.19 M/uL (11-25 @ 16:00)  Hemoglobin: 12.4 g/dL (11-25 @ 16:00)  Hematocrit: 35.8 % (11-25 @ 16:00)  Platelet Count - Automated: 262 K/uL (11-25 @ 16:00)

## 2022-11-25 NOTE — ED ADULT TRIAGE NOTE - NS ED TRIAGE AVPU SCALE
Alert-The patient is alert, awake and responds to voice. The patient is oriented to time, place, and person. The triage nurse is able to obtain subjective information.
(mos)

## 2022-11-25 NOTE — ED ADULT NURSE NOTE - OBJECTIVE STATEMENT
Pt came in from home for c/o HA with vomiting, nausea and diarrhea x 2 days. pt reports she was seen here 2 days ago diagnosed with pneumonia and started on antibiotics, but not feeling better c/o headache/vomiting diarrhea since taking antibiotics. Reports her PCP told her to stop doxycycline and keep taking Cefpodoxime.

## 2022-11-25 NOTE — H&P ADULT - ASSESSMENT
62 year old female with    1. RLL pneumonia/Fever  -continue cef/azithro  f/up CXR in AM  f/up cultures  Vomiting has resolved.  No evidence of acute abdominal pathology currently.    2. Hyponatremia  -in setting of dehydration/vomiting  s/p IVF bolus in ER  repeat BMP in 6-8 hours   Is/Os    3. HTN: ARB, confirm home dose    4. HLD: fenofibrate, confirm home dose    5. Old LBBB: f/up TTE and cardio recs Dr. Krause.  no current ACS symptoms    6. Heparin SQ for dvt ppx    Dispo: home in 2-3 days

## 2022-11-25 NOTE — H&P ADULT - HISTORY OF PRESENT ILLNESS
62 year-old female with h/o HTN, HLD s/p recent visit to ER 2 days ago when she was diagnosed with pneumonia and sent home on oral abx.  Could not tolerate oral abx due to vomiting, so she came to ER today.  Headache resolved.  No current nausea or abd pain.   at bedside.  Fever earlier.

## 2022-11-26 LAB
ALBUMIN SERPL ELPH-MCNC: 2.8 G/DL — LOW (ref 3.3–5)
ALP SERPL-CCNC: 63 U/L — SIGNIFICANT CHANGE UP (ref 40–120)
ALT FLD-CCNC: 44 U/L — SIGNIFICANT CHANGE UP (ref 10–45)
ANION GAP SERPL CALC-SCNC: 10 MMOL/L — SIGNIFICANT CHANGE UP (ref 5–17)
ANION GAP SERPL CALC-SCNC: 9 MMOL/L — SIGNIFICANT CHANGE UP (ref 5–17)
AST SERPL-CCNC: 60 U/L — HIGH (ref 10–40)
BASOPHILS # BLD AUTO: 0.01 K/UL — SIGNIFICANT CHANGE UP (ref 0–0.2)
BASOPHILS NFR BLD AUTO: 0.2 % — SIGNIFICANT CHANGE UP (ref 0–2)
BILIRUB SERPL-MCNC: 0.4 MG/DL — SIGNIFICANT CHANGE UP (ref 0.2–1.2)
BUN SERPL-MCNC: 24 MG/DL — HIGH (ref 7–23)
BUN SERPL-MCNC: 27 MG/DL — HIGH (ref 7–23)
CALCIUM SERPL-MCNC: 8.8 MG/DL — SIGNIFICANT CHANGE UP (ref 8.4–10.5)
CALCIUM SERPL-MCNC: 8.8 MG/DL — SIGNIFICANT CHANGE UP (ref 8.4–10.5)
CHLORIDE SERPL-SCNC: 95 MMOL/L — LOW (ref 96–108)
CHLORIDE SERPL-SCNC: 96 MMOL/L — SIGNIFICANT CHANGE UP (ref 96–108)
CO2 SERPL-SCNC: 26 MMOL/L — SIGNIFICANT CHANGE UP (ref 22–31)
CO2 SERPL-SCNC: 26 MMOL/L — SIGNIFICANT CHANGE UP (ref 22–31)
CREAT SERPL-MCNC: 1.04 MG/DL — SIGNIFICANT CHANGE UP (ref 0.5–1.3)
CREAT SERPL-MCNC: 1.06 MG/DL — SIGNIFICANT CHANGE UP (ref 0.5–1.3)
CULTURE RESULTS: SIGNIFICANT CHANGE UP
EGFR: 59 ML/MIN/1.73M2 — LOW
EGFR: 60 ML/MIN/1.73M2 — SIGNIFICANT CHANGE UP
EOSINOPHIL # BLD AUTO: 0 K/UL — SIGNIFICANT CHANGE UP (ref 0–0.5)
EOSINOPHIL NFR BLD AUTO: 0 % — SIGNIFICANT CHANGE UP (ref 0–6)
GLUCOSE SERPL-MCNC: 104 MG/DL — HIGH (ref 70–99)
GLUCOSE SERPL-MCNC: 148 MG/DL — HIGH (ref 70–99)
HCG SERPL-ACNC: 6 MIU/ML — SIGNIFICANT CHANGE UP
HCT VFR BLD CALC: 33.7 % — LOW (ref 34.5–45)
HCV AB S/CO SERPL IA: 0.07 S/CO — SIGNIFICANT CHANGE UP (ref 0–0.99)
HCV AB SERPL-IMP: SIGNIFICANT CHANGE UP
HGB BLD-MCNC: 11.6 G/DL — SIGNIFICANT CHANGE UP (ref 11.5–15.5)
IMM GRANULOCYTES NFR BLD AUTO: 0.4 % — SIGNIFICANT CHANGE UP (ref 0–0.9)
LYMPHOCYTES # BLD AUTO: 0.63 K/UL — LOW (ref 1–3.3)
LYMPHOCYTES # BLD AUTO: 12.5 % — LOW (ref 13–44)
MCHC RBC-ENTMCNC: 29.8 PG — SIGNIFICANT CHANGE UP (ref 27–34)
MCHC RBC-ENTMCNC: 34.4 GM/DL — SIGNIFICANT CHANGE UP (ref 32–36)
MCV RBC AUTO: 86.6 FL — SIGNIFICANT CHANGE UP (ref 80–100)
MONOCYTES # BLD AUTO: 0.45 K/UL — SIGNIFICANT CHANGE UP (ref 0–0.9)
MONOCYTES NFR BLD AUTO: 8.9 % — SIGNIFICANT CHANGE UP (ref 2–14)
NEUTROPHILS # BLD AUTO: 3.92 K/UL — SIGNIFICANT CHANGE UP (ref 1.8–7.4)
NEUTROPHILS NFR BLD AUTO: 78 % — HIGH (ref 43–77)
NRBC # BLD: 0 /100 WBCS — SIGNIFICANT CHANGE UP (ref 0–0)
PLATELET # BLD AUTO: 204 K/UL — SIGNIFICANT CHANGE UP (ref 150–400)
POTASSIUM SERPL-MCNC: 3.2 MMOL/L — LOW (ref 3.5–5.3)
POTASSIUM SERPL-MCNC: 3.4 MMOL/L — LOW (ref 3.5–5.3)
POTASSIUM SERPL-SCNC: 3.2 MMOL/L — LOW (ref 3.5–5.3)
POTASSIUM SERPL-SCNC: 3.4 MMOL/L — LOW (ref 3.5–5.3)
PROT SERPL-MCNC: 7.4 G/DL — SIGNIFICANT CHANGE UP (ref 6–8.3)
RBC # BLD: 3.89 M/UL — SIGNIFICANT CHANGE UP (ref 3.8–5.2)
RBC # FLD: 13 % — SIGNIFICANT CHANGE UP (ref 10.3–14.5)
SODIUM SERPL-SCNC: 130 MMOL/L — LOW (ref 135–145)
SODIUM SERPL-SCNC: 132 MMOL/L — LOW (ref 135–145)
SPECIMEN SOURCE: SIGNIFICANT CHANGE UP
WBC # BLD: 5.03 K/UL — SIGNIFICANT CHANGE UP (ref 3.8–10.5)
WBC # FLD AUTO: 5.03 K/UL — SIGNIFICANT CHANGE UP (ref 3.8–10.5)

## 2022-11-26 PROCEDURE — 71045 X-RAY EXAM CHEST 1 VIEW: CPT | Mod: 26

## 2022-11-26 PROCEDURE — 99232 SBSQ HOSP IP/OBS MODERATE 35: CPT

## 2022-11-26 PROCEDURE — 93306 TTE W/DOPPLER COMPLETE: CPT | Mod: 26

## 2022-11-26 RX ORDER — SODIUM CHLORIDE 9 MG/ML
1000 INJECTION INTRAMUSCULAR; INTRAVENOUS; SUBCUTANEOUS
Refills: 0 | Status: DISCONTINUED | OUTPATIENT
Start: 2022-11-26 | End: 2022-11-28

## 2022-11-26 RX ORDER — POTASSIUM CHLORIDE 20 MEQ
30 PACKET (EA) ORAL ONCE
Refills: 0 | Status: COMPLETED | OUTPATIENT
Start: 2022-11-26 | End: 2022-11-26

## 2022-11-26 RX ORDER — INFLUENZA VIRUS VACCINE 15; 15; 15; 15 UG/.5ML; UG/.5ML; UG/.5ML; UG/.5ML
0.5 SUSPENSION INTRAMUSCULAR ONCE
Refills: 0 | Status: DISCONTINUED | OUTPATIENT
Start: 2022-11-26 | End: 2022-12-01

## 2022-11-26 RX ORDER — PIPERACILLIN AND TAZOBACTAM 4; .5 G/20ML; G/20ML
3.38 INJECTION, POWDER, LYOPHILIZED, FOR SOLUTION INTRAVENOUS EVERY 8 HOURS
Refills: 0 | Status: DISCONTINUED | OUTPATIENT
Start: 2022-11-26 | End: 2022-11-30

## 2022-11-26 RX ORDER — SODIUM CHLORIDE 9 MG/ML
1000 INJECTION INTRAMUSCULAR; INTRAVENOUS; SUBCUTANEOUS
Refills: 0 | Status: COMPLETED | OUTPATIENT
Start: 2022-11-26 | End: 2022-11-26

## 2022-11-26 RX ADMIN — Medication 650 MILLIGRAM(S): at 14:35

## 2022-11-26 RX ADMIN — Medication 650 MILLIGRAM(S): at 05:32

## 2022-11-26 RX ADMIN — Medication 30 MILLIEQUIVALENT(S): at 05:28

## 2022-11-26 RX ADMIN — SODIUM CHLORIDE 70 MILLILITER(S): 9 INJECTION INTRAMUSCULAR; INTRAVENOUS; SUBCUTANEOUS at 05:31

## 2022-11-26 RX ADMIN — Medication 100 MILLIGRAM(S): at 14:52

## 2022-11-26 RX ADMIN — Medication 48 MILLIGRAM(S): at 15:10

## 2022-11-26 RX ADMIN — VALSARTAN 40 MILLIGRAM(S): 80 TABLET ORAL at 05:28

## 2022-11-26 RX ADMIN — HEPARIN SODIUM 5000 UNIT(S): 5000 INJECTION INTRAVENOUS; SUBCUTANEOUS at 19:03

## 2022-11-26 RX ADMIN — Medication 650 MILLIGRAM(S): at 15:00

## 2022-11-26 RX ADMIN — Medication 100 MILLIGRAM(S): at 19:02

## 2022-11-26 RX ADMIN — PIPERACILLIN AND TAZOBACTAM 25 GRAM(S): 4; .5 INJECTION, POWDER, LYOPHILIZED, FOR SOLUTION INTRAVENOUS at 21:43

## 2022-11-26 RX ADMIN — Medication 650 MILLIGRAM(S): at 06:32

## 2022-11-26 RX ADMIN — Medication 100 MILLIGRAM(S): at 05:27

## 2022-11-26 RX ADMIN — PIPERACILLIN AND TAZOBACTAM 25 GRAM(S): 4; .5 INJECTION, POWDER, LYOPHILIZED, FOR SOLUTION INTRAVENOUS at 14:53

## 2022-11-26 RX ADMIN — Medication 3 MILLILITER(S): at 21:29

## 2022-11-26 NOTE — PROGRESS NOTE ADULT - SUBJECTIVE AND OBJECTIVE BOX
Follow up for lbbb  SUBJ: PATIENT CURRENTLY ADMITTED FOR PNA. C/O COUHG BUT NO CP . NOT ON A CARDIAC MONITOR. HAS LBBB/ ECHO WITH NL EF/NEGATIVE TROPONIN  PMH  Uterine Leiomyoma    Cholelithiasis    History of Tonsillectomy    S/P Biopsy Endometrial    Migraine        MEDICATIONS  (STANDING):  albuterol/ipratropium for Nebulization 3 milliLiter(s) Nebulizer every 6 hours  benzonatate 100 milliGRAM(s) Oral three times a day  cefTRIAXone   IVPB 1000 milliGRAM(s) IV Intermittent every 24 hours  doxycycline IVPB 100 milliGRAM(s) IV Intermittent every 12 hours  fenofibrate Tablet 48 milliGRAM(s) Oral daily  heparin   Injectable 5000 Unit(s) SubCutaneous every 12 hours  influenza   Vaccine 0.5 milliLiter(s) IntraMuscular once  sodium chloride 0.9%. 1000 milliLiter(s) (100 mL/Hr) IV Continuous <Continuous>  valsartan 40 milliGRAM(s) Oral daily    MEDICATIONS  (PRN):  acetaminophen     Tablet .. 650 milliGRAM(s) Oral every 6 hours PRN Temp greater or equal to 38C (100.4F), Mild Pain (1 - 3)  guaiFENesin Oral Liquid (Sugar-Free) 100 milliGRAM(s) Oral every 6 hours PRN Cough  melatonin 3 milliGRAM(s) Oral at bedtime PRN Insomnia  ondansetron Injectable 4 milliGRAM(s) IV Push every 8 hours PRN Nausea and/or Vomiting        PHYSICAL EXAM:  Vital Signs Last 24 Hrs  T(C): 37 (26 Nov 2022 08:36), Max: 38.7 (25 Nov 2022 22:30)  T(F): 98.6 (26 Nov 2022 08:36), Max: 101.7 (25 Nov 2022 22:30)  HR: 111 (26 Nov 2022 08:36) (90 - 115)  BP: 126/72 (26 Nov 2022 08:36) (108/62 - 154/86)  BP(mean): 91 (26 Nov 2022 05:15) (74 - 91)  RR: 18 (26 Nov 2022 08:36) (15 - 18)  SpO2: 96% (26 Nov 2022 08:36) (94% - 98%)    Parameters below as of 26 Nov 2022 08:36  Patient On (Oxygen Delivery Method): room air        GENERAL: NAD, well-groomed, well-developed  HEAD:  Atraumatic, Normocephalic  EYES: EOMI, PERRLA, conjunctiva and sclera clear  ENT: Moist mucous membranes,  NECK: Supple, No JVD, no bruits  CHEST/LUNG: decreased bs bilaterally  HEART: Regular rate and rhythm; No murmurs, rubs, or gallops PMI non displaced.  ABDOMEN: Soft, Nontender, Nondistended; Bowel sounds present  EXTREMITIES:  2+ Peripheral Pulses, No clubbing, cyanosis, or edema  SKIN: No rashes or lesions            ECG:rsr lbbb    LABS:                        11.6   5.03  )-----------( 204      ( 26 Nov 2022 05:00 )             33.7     11-26    130<L>  |  95<L>  |  24<H>  ----------------------------<  148<H>  3.2<L>   |  26  |  1.04    Ca    8.8      26 Nov 2022 05:00    TPro  7.4  /  Alb  2.8<L>  /  TBili  0.4  /  DBili  x   /  AST  60<H>  /  ALT  44  /  AlkPhos  63  11-26        PT/INR - ( 25 Nov 2022 16:00 )   PT: 15.0 sec;   INR: 1.29 ratio         PTT - ( 25 Nov 2022 16:00 )  PTT:27.8 sec    I&O's Summary    BNP    RADIOLOGY & ADDITIONAL STUDIES:    ECHO:

## 2022-11-26 NOTE — PATIENT PROFILE ADULT - STATED REASON FOR ADMISSION
As per pt, I went to my PCP, I told the Nurse about my cough and give me cough medicine. I did EKG and my heart Rate was in the 130's. I came back because I was having fever associated with Nausea.

## 2022-11-26 NOTE — PROGRESS NOTE ADULT - ASSESSMENT
62 year old female with history of HTN, HLD and recent ED visit for PNA admitted since unable to tolerate PO medss due to multiple episodes of vomiting    Sepsis due to RLL pneumonia  -Fever, tachycardia and CTA (11/23) RLL Pneumonia  -Continue ceftriaxone and doxycycline  -BCx pending. US neg  -Follow up official CXR report  -Tylenol 650mg po q6hrs PRN for fever.  -Robitussin PRN for cough. Teselaienon Jonellee ATC    Diarrhea with electrolyte dysfunction  Hyponatremia  Hypokalemia  -Sending stool culture and urine legionella  -Starting IVF hydration  -Supplementing potassium  -I&Os    Hypertension  -Home med Valsartan/HCTZ 80mg/12.5mg daily  -Will hold for now since having diarrhea  -Monitor vital signs     Hyperlipidemia  -Continue fenofibrate    Old LBBB  -TTE (11/26) LVEF 60 - 65%. Normal global LVSF. Mild septal LVH. Thickening of the anterior and posterior mitral valve leaflets.  -No symptoms  -Cardio was consulted    Heparin SQ for DVT Prophylaxis    62 year old female with history of HTN, HLD and recent ED visit for PNA admitted since unable to tolerate PO medss due to multiple episodes of vomiting    Sepsis due to RLL pneumonia  -Fever, tachycardia and CTA (11/23) RLL Pneumonia  -Was on ceftriaxone and doxycycline but had fever spike. Abx changed to zosyn  -BCx pending. US neg  -Follow up official CXR report  -Tylenol 650mg po q6hrs PRN for fever.  -Robitussin PRN for cough. Becka Crooks ATC    Diarrhea with electrolyte dysfunction  Hyponatremia  Hypokalemia  -Sending stool culture and urine legionella  -Starting IVF hydration  -Supplementing potassium  -I&Os    Hypertension  -Home med Valsartan/HCTZ 80mg/12.5mg daily  -Will hold for now since having diarrhea  -Monitor vital signs     Hyperlipidemia  -Continue fenofibrate    Old LBBB  -TTE (11/26) LVEF 60 - 65%. Normal global LVSF. Mild septal LVH. Thickening of the anterior and posterior mitral valve leaflets.  -No symptoms  -Cardio was consulted    Heparin SQ for DVT Prophylaxis    62 year old female with history of HTN, HLD and recent ED visit for PNA admitted since unable to tolerate PO medss due to multiple episodes of vomiting    Sepsis due to RLL pneumonia  Failed outpatient treatment by not being able to tolerate PO  -Fever, tachycardia and CTA (11/23) RLL Pneumonia  -Was on ceftriaxone and doxycycline but had fever spike. Abx changed to zosyn  -BCx pending. US neg  -Follow up official CXR report  -Tylenol 650mg po q6hrs PRN for fever.  -Robitussin PRN for cough. Tessalon Jonellee ATC    Diarrhea with electrolyte dysfunction  Hyponatremia  Hypokalemia  -Sending stool culture and urine legionella  -Starting IVF hydration  -Supplementing potassium  -I&Os    Hypertension  -Home med Valsartan/HCTZ 80mg/12.5mg daily  -Will hold for now since having diarrhea  -Monitor vital signs     Hyperlipidemia  -Continue fenofibrate    Old LBBB  -TTE (11/26) LVEF 60 - 65%. Normal global LVSF. Mild septal LVH. Thickening of the anterior and posterior mitral valve leaflets.  -No symptoms  -Cardio was consulted    Heparin SQ for DVT Prophylaxis    62 year old female with history of HTN, HLD and recent ED visit for PNA admitted since unable to tolerate PO medss due to multiple episodes of vomiting    Sepsis due to RLL pneumonia  Failed outpatient treatment by not being able to tolerate PO  -Fever, tachycardia and CTA (11/23) RLL Pneumonia  -Was on ceftriaxone and doxycycline but had fever spike. Abx changed to zosyn  -BCx pending. US neg  -Follow up official CXR report  -Tylenol 650mg po q6hrs PRN for fever.  -Robitussin PRN for cough. Tessalon Perle ATC  -If patient continues to spike fevers while on broad spectrum Abx would get CT chest/a/p and ID consult    Diarrhea with electrolyte dysfunction  Hyponatremia  Hypokalemia  -Sending stool culture and urine legionella  -Starting IVF hydration  -Supplementing potassium  -I&Os    Hypertension  -Home med Valsartan/HCTZ 80mg/12.5mg daily  -Will hold for now since having diarrhea  -Monitor vital signs     Hyperlipidemia  -Continue fenofibrate    Old LBBB  -TTE (11/26) LVEF 60 - 65%. Normal global LVSF. Mild septal LVH. Thickening of the anterior and posterior mitral valve leaflets.  -No symptoms  -Cardio was consulted    Heparin SQ for DVT Prophylaxis

## 2022-11-26 NOTE — PATIENT PROFILE ADULT - FALL HARM RISK - UNIVERSAL INTERVENTIONS
Bed in lowest position, wheels locked, appropriate side rails in place/Call bell, personal items and telephone in reach/Instruct patient to call for assistance before getting out of bed or chair/Non-slip footwear when patient is out of bed/[place] to call system/Physically safe environment - no spills, clutter or unnecessary equipment/Purposeful Proactive Rounding/Room/bathroom lighting operational, light cord in reach

## 2022-11-26 NOTE — PROGRESS NOTE ADULT - ASSESSMENT
IMP   PATIENT ADMITTED FOR PNA HAS LBBB WITH NL EF    NO EVIDENCE OF ACS     WHEN SHE RECOVERS FROM PNA, COULD CONSIDER VASODILATOR MYOCARDIAL PERFUSION IMAGING

## 2022-11-26 NOTE — PROGRESS NOTE ADULT - SUBJECTIVE AND OBJECTIVE BOX
CC: Patient is a 62y old  Female who presents with a chief complaint of pneumonia, vomiting (2022 18:28)      Interval History:  Patient seen and examined at bedside.  No overnight events  Reports having loose stool    ROS:  CONSTITUTIONAL: No fever, weight loss, or fatigue  RESPIRATORY: +cough; No wheezing, chills or hemoptysis; No shortness of breath  CARDIOVASCULAR: No chest pain, palpitations, dizziness, or leg swelling  GASTROINTESTINAL: +diarrhea; No abdominal or epigastric pain. No nausea, vomiting, or hematemesis; No constipation. No melena or hematochezia.    ALLERGIES:  latex (Unknown)  salicylates (Anaphylaxis)    MEDICATIONS  (STANDING):  albuterol/ipratropium for Nebulization 3 milliLiter(s) Nebulizer every 6 hours  cefTRIAXone   IVPB 1000 milliGRAM(s) IV Intermittent every 24 hours  doxycycline IVPB 100 milliGRAM(s) IV Intermittent every 12 hours  fenofibrate Tablet 48 milliGRAM(s) Oral daily  heparin   Injectable 5000 Unit(s) SubCutaneous every 12 hours  influenza   Vaccine 0.5 milliLiter(s) IntraMuscular once  valsartan 40 milliGRAM(s) Oral daily    MEDICATIONS  (PRN):  acetaminophen     Tablet .. 650 milliGRAM(s) Oral every 6 hours PRN Temp greater or equal to 38C (100.4F), Mild Pain (1 - 3)  guaiFENesin Oral Liquid (Sugar-Free) 100 milliGRAM(s) Oral every 6 hours PRN Cough  melatonin 3 milliGRAM(s) Oral at bedtime PRN Insomnia  ondansetron Injectable 4 milliGRAM(s) IV Push every 8 hours PRN Nausea and/or Vomiting    Vital Signs Last 24 Hrs  T(F): 98.6 (2022 08:36), Max: 101.7 (2022 22:30)  HR: 111 (2022 08:36) (90 - 115)  BP: 126/72 (2022 08:36) (108/62 - 154/86)  RR: 18 (2022 08:36) (15 - 18)  SpO2: 96% (2022 08:36) (94% - 98%)  I&O's Summary    BMI (kg/m2): 25.5 (22 @ 15:00), 25.5 (22 @ 12:16)    PHYSICAL EXAM:  GENERAL: NAD  HENT:  Atraumatic, Normocephalic; No tonsillar erythema, exudates, or enlargement; Moist mucous membranes  EYES: EOMI, PERRLA, conjunctiva and sclera clear, no lid-lag; moist conjunctivae  NECK: Supple, No JVD, Normal thyroid, FROM of neck  NERVOUS SYSTEM:  CN II - XII intact; Sensation intact; follows commands  CHEST/LUNG: Clear to percussion bilaterally; No rales, rhonchi, wheezing, or rubs; normal respiratory effort, no intercostal retractions  HEART: Regular rate and rhythm; No murmurs, rubs, or gallops; No pitting edema  ABDOMEN: Soft, Nontender, Nondistended; Bowel sounds present; No HSM or masses  MUSCULOSKELETAL/EXTREMITIES:  2+ Peripheral Pulses, No clubbing or digital cyanosis; FROM of extremities  PSYCH: Appropriate affect, Alert & Oriented x 3, Good Memory; Good insight    LABS:                        11.6   5.03  )-----------( 204      ( 2022 05:00 )             33.7       -    130  |  95  |  24  ----------------------------<  148  3.2   |  26  |  1.04    Ca    8.8      2022 05:00    TPro  7.4  /  Alb  2.8  /  TBili  0.4  /  DBili  x   /  AST  60  /  ALT  44  /  AlkPhos  63  11-       PT/INR - ( 2022 16:00 )   PT: 15.0 sec;   INR: 1.29 ratio         PTT - ( 2022 16:00 )  PTT:27.8 sec   Lactate, Blood: 1.0 mmol/L ( @ 16:00)    CARDIAC MARKERS ( 2022 16:00 )  x     / 7.2 ng/L / x     / x     / x      CARDIAC MARKERS ( 2022 15:15 )  x     / 7.8 ng/L / x     / x     / x      CARDIAC MARKERS ( 2022 13:00 )  x     / 7.3 ng/L / x     / x     / x        Urinalysis Basic - ( 2022 20:00 )    Color: Yellow / Appearance: Clear / S.010 / pH: x  Gluc: x / Ketone: Negative  / Bili: Negative / Urobili: Negative   Blood: x / Protein: Negative / Nitrite: Negative   Leuk Esterase: Negative / RBC: 0-4 /HPF / WBC 0-2 /HPF   Sq Epi: x / Non Sq Epi: Neg.-Few / Bacteria: Negative /HPF    Care Discussed with Consultants/Other Providers: Yes   CC: Patient is a 62y old  Female who presents with a chief complaint of pneumonia, vomiting (2022 18:28)      Interval History:  Patient seen and examined at bedside.  No overnight events  Reports having loose stool    ROS:  CONSTITUTIONAL: No fever, weight loss, or fatigue  RESPIRATORY: +cough; No wheezing, chills or hemoptysis; No shortness of breath  GASTROINTESTINAL: +diarrhea; No abdominal or epigastric pain. No nausea, vomiting, or hematemesis; No constipation. No melena or hematochezia.    ALLERGIES:  latex (Unknown)  salicylates (Anaphylaxis)    MEDICATIONS  (STANDING):  albuterol/ipratropium for Nebulization 3 milliLiter(s) Nebulizer every 6 hours  cefTRIAXone   IVPB 1000 milliGRAM(s) IV Intermittent every 24 hours  doxycycline IVPB 100 milliGRAM(s) IV Intermittent every 12 hours  fenofibrate Tablet 48 milliGRAM(s) Oral daily  heparin   Injectable 5000 Unit(s) SubCutaneous every 12 hours  influenza   Vaccine 0.5 milliLiter(s) IntraMuscular once  valsartan 40 milliGRAM(s) Oral daily    MEDICATIONS  (PRN):  acetaminophen     Tablet .. 650 milliGRAM(s) Oral every 6 hours PRN Temp greater or equal to 38C (100.4F), Mild Pain (1 - 3)  guaiFENesin Oral Liquid (Sugar-Free) 100 milliGRAM(s) Oral every 6 hours PRN Cough  melatonin 3 milliGRAM(s) Oral at bedtime PRN Insomnia  ondansetron Injectable 4 milliGRAM(s) IV Push every 8 hours PRN Nausea and/or Vomiting    Vital Signs Last 24 Hrs  T(F): 98.6 (2022 08:36), Max: 101.7 (2022 22:30)  HR: 111 (2022 08:36) (90 - 115)  BP: 126/72 (2022 08:36) (108/62 - 154/86)  RR: 18 (2022 08:36) (15 - 18)  SpO2: 96% (2022 08:36) (94% - 98%)  I&O's Summary    BMI (kg/m2): 25.5 (22 @ 15:00), 25.5 (22 @ 12:16)    PHYSICAL EXAM:  GENERAL: NAD  HENT:  Atraumatic, Normocephalic; No tonsillar erythema, exudates, or enlargement; Moist mucous membranes  EYES: EOMI, PERRLA, conjunctiva and sclera clear, no lid-lag; moist conjunctivae  NECK: Supple, No JVD, Normal thyroid, FROM of neck  NERVOUS SYSTEM:  CN II - XII intact; Sensation intact; follows commands  CHEST/LUNG: Clear to percussion bilaterally; No rales, rhonchi, wheezing, or rubs; normal respiratory effort, no intercostal retractions  HEART: Regular rate and rhythm; No murmurs, rubs, or gallops; No pitting edema  ABDOMEN: Soft, Nontender, Nondistended; Bowel sounds present; No HSM or masses  MUSCULOSKELETAL/EXTREMITIES:  2+ Peripheral Pulses, No clubbing or digital cyanosis; FROM of extremities  PSYCH: Appropriate affect, Alert & Oriented x 3, Good Memory; Good insight    LABS:                        11.6   5.03  )-----------( 204      ( 2022 05:00 )             33.7           130  |  95  |  24  ----------------------------<  148  3.2   |  26  |  1.04    Ca    8.8      2022 05:00    TPro  7.4  /  Alb  2.8  /  TBili  0.4  /  DBili  x   /  AST  60  /  ALT  44  /  AlkPhos  63         PT/INR - ( 2022 16:00 )   PT: 15.0 sec;   INR: 1.29 ratio         PTT - ( 2022 16:00 )  PTT:27.8 sec   Lactate, Blood: 1.0 mmol/L ( @ 16:00)    CARDIAC MARKERS ( 2022 16:00 )  x     / 7.2 ng/L / x     / x     / x      CARDIAC MARKERS ( 2022 15:15 )  x     / 7.8 ng/L / x     / x     / x      CARDIAC MARKERS ( 2022 13:00 )  x     / 7.3 ng/L / x     / x     / x        Urinalysis Basic - ( 2022 20:00 )    Color: Yellow / Appearance: Clear / S.010 / pH: x  Gluc: x / Ketone: Negative  / Bili: Negative / Urobili: Negative   Blood: x / Protein: Negative / Nitrite: Negative   Leuk Esterase: Negative / RBC: 0-4 /HPF / WBC 0-2 /HPF   Sq Epi: x / Non Sq Epi: Neg.-Few / Bacteria: Negative /HPF    Care Discussed with Consultants/Other Providers: Yes

## 2022-11-27 LAB
ALBUMIN SERPL ELPH-MCNC: 2.6 G/DL — LOW (ref 3.3–5)
ALP SERPL-CCNC: 64 U/L — SIGNIFICANT CHANGE UP (ref 40–120)
ALT FLD-CCNC: 40 U/L — SIGNIFICANT CHANGE UP (ref 10–45)
ANION GAP SERPL CALC-SCNC: 12 MMOL/L — SIGNIFICANT CHANGE UP (ref 5–17)
AST SERPL-CCNC: 71 U/L — HIGH (ref 10–40)
BILIRUB SERPL-MCNC: 0.4 MG/DL — SIGNIFICANT CHANGE UP (ref 0.2–1.2)
BUN SERPL-MCNC: 24 MG/DL — HIGH (ref 7–23)
CALCIUM SERPL-MCNC: 8.5 MG/DL — SIGNIFICANT CHANGE UP (ref 8.4–10.5)
CHLORIDE SERPL-SCNC: 98 MMOL/L — SIGNIFICANT CHANGE UP (ref 96–108)
CO2 SERPL-SCNC: 23 MMOL/L — SIGNIFICANT CHANGE UP (ref 22–31)
CREAT SERPL-MCNC: 1.22 MG/DL — SIGNIFICANT CHANGE UP (ref 0.5–1.3)
EGFR: 50 ML/MIN/1.73M2 — LOW
GLUCOSE SERPL-MCNC: 114 MG/DL — HIGH (ref 70–99)
HCT VFR BLD CALC: 34.2 % — LOW (ref 34.5–45)
HGB BLD-MCNC: 11.5 G/DL — SIGNIFICANT CHANGE UP (ref 11.5–15.5)
MAGNESIUM SERPL-MCNC: 1.7 MG/DL — SIGNIFICANT CHANGE UP (ref 1.6–2.6)
MCHC RBC-ENTMCNC: 29.3 PG — SIGNIFICANT CHANGE UP (ref 27–34)
MCHC RBC-ENTMCNC: 33.6 GM/DL — SIGNIFICANT CHANGE UP (ref 32–36)
MCV RBC AUTO: 87 FL — SIGNIFICANT CHANGE UP (ref 80–100)
NRBC # BLD: 0 /100 WBCS — SIGNIFICANT CHANGE UP (ref 0–0)
PLATELET # BLD AUTO: 206 K/UL — SIGNIFICANT CHANGE UP (ref 150–400)
POTASSIUM SERPL-MCNC: 2.9 MMOL/L — CRITICAL LOW (ref 3.5–5.3)
POTASSIUM SERPL-SCNC: 2.9 MMOL/L — CRITICAL LOW (ref 3.5–5.3)
PROT SERPL-MCNC: 6.9 G/DL — SIGNIFICANT CHANGE UP (ref 6–8.3)
RBC # BLD: 3.93 M/UL — SIGNIFICANT CHANGE UP (ref 3.8–5.2)
RBC # FLD: 13.2 % — SIGNIFICANT CHANGE UP (ref 10.3–14.5)
SODIUM SERPL-SCNC: 133 MMOL/L — LOW (ref 135–145)
WBC # BLD: 4.1 K/UL — SIGNIFICANT CHANGE UP (ref 3.8–10.5)
WBC # FLD AUTO: 4.1 K/UL — SIGNIFICANT CHANGE UP (ref 3.8–10.5)

## 2022-11-27 PROCEDURE — 74176 CT ABD & PELVIS W/O CONTRAST: CPT | Mod: 26

## 2022-11-27 PROCEDURE — 99233 SBSQ HOSP IP/OBS HIGH 50: CPT

## 2022-11-27 RX ORDER — VANCOMYCIN HCL 1 G
1000 VIAL (EA) INTRAVENOUS EVERY 12 HOURS
Refills: 0 | Status: DISCONTINUED | OUTPATIENT
Start: 2022-11-28 | End: 2022-11-29

## 2022-11-27 RX ORDER — VANCOMYCIN HCL 1 G
VIAL (EA) INTRAVENOUS
Refills: 0 | Status: DISCONTINUED | OUTPATIENT
Start: 2022-11-27 | End: 2022-11-29

## 2022-11-27 RX ORDER — POTASSIUM CHLORIDE 20 MEQ
40 PACKET (EA) ORAL ONCE
Refills: 0 | Status: COMPLETED | OUTPATIENT
Start: 2022-11-27 | End: 2022-11-27

## 2022-11-27 RX ORDER — DIATRIZOATE MEGLUMINE 180 MG/ML
30 INJECTION, SOLUTION INTRAVESICAL ONCE
Refills: 0 | Status: COMPLETED | OUTPATIENT
Start: 2022-11-27 | End: 2022-11-27

## 2022-11-27 RX ORDER — FAMOTIDINE 10 MG/ML
20 INJECTION INTRAVENOUS ONCE
Refills: 0 | Status: COMPLETED | OUTPATIENT
Start: 2022-11-27 | End: 2022-11-27

## 2022-11-27 RX ORDER — SACCHAROMYCES BOULARDII 250 MG
250 POWDER IN PACKET (EA) ORAL
Refills: 0 | Status: DISCONTINUED | OUTPATIENT
Start: 2022-11-27 | End: 2022-12-01

## 2022-11-27 RX ORDER — PANTOPRAZOLE SODIUM 20 MG/1
40 TABLET, DELAYED RELEASE ORAL ONCE
Refills: 0 | Status: DISCONTINUED | OUTPATIENT
Start: 2022-11-27 | End: 2022-11-27

## 2022-11-27 RX ORDER — VANCOMYCIN HCL 1 G
1000 VIAL (EA) INTRAVENOUS ONCE
Refills: 0 | Status: COMPLETED | OUTPATIENT
Start: 2022-11-27 | End: 2022-11-27

## 2022-11-27 RX ORDER — POTASSIUM CHLORIDE 20 MEQ
10 PACKET (EA) ORAL
Refills: 0 | Status: COMPLETED | OUTPATIENT
Start: 2022-11-27 | End: 2022-11-27

## 2022-11-27 RX ORDER — IOHEXOL 300 MG/ML
30 INJECTION, SOLUTION INTRAVENOUS ONCE
Refills: 0 | Status: DISCONTINUED | OUTPATIENT
Start: 2022-11-27 | End: 2022-11-27

## 2022-11-27 RX ORDER — PANTOPRAZOLE SODIUM 20 MG/1
40 TABLET, DELAYED RELEASE ORAL ONCE
Refills: 0 | Status: COMPLETED | OUTPATIENT
Start: 2022-11-27 | End: 2022-11-27

## 2022-11-27 RX ORDER — PANTOPRAZOLE SODIUM 20 MG/1
40 TABLET, DELAYED RELEASE ORAL
Refills: 0 | Status: DISCONTINUED | OUTPATIENT
Start: 2022-11-28 | End: 2022-12-01

## 2022-11-27 RX ADMIN — Medication 40 MILLIEQUIVALENT(S): at 08:40

## 2022-11-27 RX ADMIN — Medication 40 MILLIEQUIVALENT(S): at 16:23

## 2022-11-27 RX ADMIN — Medication 100 MILLIGRAM(S): at 05:38

## 2022-11-27 RX ADMIN — Medication 650 MILLIGRAM(S): at 16:00

## 2022-11-27 RX ADMIN — ONDANSETRON 4 MILLIGRAM(S): 8 TABLET, FILM COATED ORAL at 12:58

## 2022-11-27 RX ADMIN — Medication 100 MILLIGRAM(S): at 21:36

## 2022-11-27 RX ADMIN — PIPERACILLIN AND TAZOBACTAM 25 GRAM(S): 4; .5 INJECTION, POWDER, LYOPHILIZED, FOR SOLUTION INTRAVENOUS at 21:41

## 2022-11-27 RX ADMIN — Medication 100 MILLIGRAM(S): at 12:17

## 2022-11-27 RX ADMIN — Medication 48 MILLIGRAM(S): at 13:06

## 2022-11-27 RX ADMIN — Medication 650 MILLIGRAM(S): at 15:30

## 2022-11-27 RX ADMIN — Medication 100 MILLIGRAM(S): at 12:18

## 2022-11-27 RX ADMIN — Medication 250 MILLIGRAM(S): at 16:23

## 2022-11-27 RX ADMIN — HEPARIN SODIUM 5000 UNIT(S): 5000 INJECTION INTRAVENOUS; SUBCUTANEOUS at 05:38

## 2022-11-27 RX ADMIN — Medication 650 MILLIGRAM(S): at 05:38

## 2022-11-27 RX ADMIN — PANTOPRAZOLE SODIUM 40 MILLIGRAM(S): 20 TABLET, DELAYED RELEASE ORAL at 16:24

## 2022-11-27 RX ADMIN — DIATRIZOATE MEGLUMINE 30 MILLILITER(S): 180 INJECTION, SOLUTION INTRAVESICAL at 13:46

## 2022-11-27 RX ADMIN — SODIUM CHLORIDE 100 MILLILITER(S): 9 INJECTION INTRAMUSCULAR; INTRAVENOUS; SUBCUTANEOUS at 14:28

## 2022-11-27 RX ADMIN — PIPERACILLIN AND TAZOBACTAM 25 GRAM(S): 4; .5 INJECTION, POWDER, LYOPHILIZED, FOR SOLUTION INTRAVENOUS at 05:38

## 2022-11-27 RX ADMIN — Medication 250 MILLIGRAM(S): at 17:49

## 2022-11-27 RX ADMIN — HEPARIN SODIUM 5000 UNIT(S): 5000 INJECTION INTRAVENOUS; SUBCUTANEOUS at 17:49

## 2022-11-27 RX ADMIN — PIPERACILLIN AND TAZOBACTAM 25 GRAM(S): 4; .5 INJECTION, POWDER, LYOPHILIZED, FOR SOLUTION INTRAVENOUS at 14:28

## 2022-11-27 RX ADMIN — FAMOTIDINE 200 MILLIGRAM(S): 10 INJECTION INTRAVENOUS at 14:28

## 2022-11-27 NOTE — PROGRESS NOTE ADULT - ASSESSMENT
62 year old female with history of HTN, HLD and recent ED visit for PNA admitted since unable to tolerate PO medss due to multiple episodes of vomiting    Sepsis due to RLL pneumonia, Fever, tachycardia  Failed outpatient treatment by not being able to tolerate PO  -CTA (11/23) RLL Pneumonia  -persistent fever, T 101.1 this morning  -continue with Zosyn  -ID consult if fever persists  -BCx and urine negative to date  -Tylenol 650mg po q6hrs PRN for fever.  -Robitussin PRN for cough. Tessalon Jonellee ATC    Watery diarrhea with electrolyte dysfunction  - check CT abd and pelvis  - check stool culture and C diff pcr    Hyponatremia  Hypokalemia  -K 2.9. likely due to GI loss  -Supplementing potassium  -I&Os    Hypertension  -Home med Valsartan/HCTZ 80mg/12.5mg daily  -Will hold for now since having diarrhea  -Monitor vital signs     Hyperlipidemia  -Continue fenofibrate    Old LBBB  -TTE (11/26) LVEF 60 - 65%. Normal global LVSF. Mild septal LVH. Thickening of the anterior and posterior mitral valve leaflets.  -No symptoms  -Cardio was consulted, pt may need ischemic workup once stabilized     Heparin SQ for DVT Prophylaxis    62 year old female with history of HTN, HLD and recent ED visit for PNA admitted since unable to tolerate PO medss due to multiple episodes of vomiting    Sepsis due to RLL pneumonia, Fever, tachycardia  Failed outpatient treatment by not being able to tolerate PO  -CTA (11/23) RLL Pneumonia  -persistent fever, T 101.1 this morning  -continue with Zosyn  -ID consult if fever persists  -BCx and urine negative to date  -Tylenol 650mg po q6hrs PRN for fever.  -Robitussin PRN for cough. Tessalon Perle ATC    Watery diarrhea with electrolyte abnormalities   - check CT abd and pelvis  - check stool culture and C diff pcr    Hyponatremia  Hypokalemia  -K 2.9. likely due to GI loss  -Supplementing potassium  -I&Os    Hypertension  -Home med Valsartan/HCTZ 80mg/12.5mg daily  -Will hold for now since having diarrhea  -Monitor vital signs     Hyperlipidemia  -Continue fenofibrate    Old LBBB  -TTE (11/26) LVEF 60 - 65%. Normal global LVSF. Mild septal LVH. Thickening of the anterior and posterior mitral valve leaflets.  -No symptoms  -Cardio was consulted, pt may need ischemic workup once stabilized     Heparin SQ for DVT Prophylaxis    62 year old female with history of HTN, HLD and recent ED visit for PNA admitted since unable to tolerate PO medss due to multiple episodes of vomiting    Sepsis due to RLL pneumonia, Fever, tachycardia  Failed outpatient treatment by not being able to tolerate PO  -CTA (11/23) RLL Pneumonia  -persistent fever, T 101.1 this morning  -continue with Zosyn. add vancomycin  -ID consult if fever persists  -BCx and urine negative to date  -Tylenol 650mg po q6hrs PRN for fever.  -Robitussin PRN for cough. Tessalon Perle ATC    Watery diarrhea with electrolyte abnormalities   - check CT abd and pelvis  - check stool culture and C diff pcr    Hyponatremia  Hypokalemia  -K 2.9. likely due to GI loss  -Supplementing potassium  -I&Os    Hypertension  -Home med Valsartan/HCTZ 80mg/12.5mg daily  -Will hold for now since having diarrhea  -Monitor vital signs     Hyperlipidemia  -Continue fenofibrate    Old LBBB  -TTE (11/26) LVEF 60 - 65%. Normal global LVSF. Mild septal LVH. Thickening of the anterior and posterior mitral valve leaflets.  -No symptoms  -Cardio was consulted, pt may need ischemic workup once stabilized     Heparin SQ for DVT Prophylaxis

## 2022-11-27 NOTE — PROGRESS NOTE ADULT - SUBJECTIVE AND OBJECTIVE BOX
Patient is a 62y old  Female who presents with a chief complaint of pneumonia, vomiting (2022 12:22)      Subjective and overnight events:  Patient seen and examined at bedside. + persistent cough. + no nausea or vomiting today but reports a lot of dry heaving and pt continues to have watery diarrhea. denies abdominal pain.   Fever 101 this morning. no chills, sob, cp, abd pain, dysuria.    ALLERGIES:  latex (Unknown)  salicylates (Anaphylaxis)    MEDICATIONS  (STANDING):  albuterol/ipratropium for Nebulization 3 milliLiter(s) Nebulizer every 6 hours  benzonatate 100 milliGRAM(s) Oral three times a day  fenofibrate Tablet 48 milliGRAM(s) Oral daily  heparin   Injectable 5000 Unit(s) SubCutaneous every 12 hours  influenza   Vaccine 0.5 milliLiter(s) IntraMuscular once  piperacillin/tazobactam IVPB.. 3.375 Gram(s) IV Intermittent every 8 hours  saccharomyces boulardii 250 milliGRAM(s) Oral two times a day  sodium chloride 0.9%. 1000 milliLiter(s) (100 mL/Hr) IV Continuous <Continuous>    MEDICATIONS  (PRN):  acetaminophen     Tablet .. 650 milliGRAM(s) Oral every 6 hours PRN Temp greater or equal to 38C (100.4F), Mild Pain (1 - 3)  guaiFENesin Oral Liquid (Sugar-Free) 100 milliGRAM(s) Oral every 6 hours PRN Cough  melatonin 3 milliGRAM(s) Oral at bedtime PRN Insomnia  ondansetron Injectable 4 milliGRAM(s) IV Push every 8 hours PRN Nausea and/or Vomiting    Vital Signs Last 24 Hrs  T(F): 98.6 (2022 07:44), Max: 102.3 (2022 05:00)  HR: 102 (2022 06:32) (102 - 120)  BP: 125/65 (2022 05:00) (106/68 - 125/65)  RR: 20 (2022 05:00) (16 - 20)  SpO2: 95% (2022 05:00) (95% - 96%)  I&O's Summary    PHYSICAL EXAM:  General: NAD, A/O x 3  ENT: MMM  Neck: Supple, No JVD  Lungs: Clear to auscultation bilaterally  Cardio: RRR, S1/S2, No murmurs  Abdomen: Soft, Nontender, Nondistended; Bowel sounds present  Extremities: No calf tenderness, No pitting edema    LABS:                        11.5   4.10  )-----------( 206      ( 2022 06:45 )             34.2         133  |  98  |  24  ----------------------------<  114  2.9   |  23  |  1.22    Ca    8.5      2022 06:45  Mg     1.7         TPro  6.9  /  Alb  2.6  /  TBili  0.4  /  DBili  x   /  AST  71  /  ALT  40  /  AlkPhos  64        PT/INR - ( 2022 16:00 )   PT: 15.0 sec;   INR: 1.29 ratio         PTT - ( 2022 16:00 )  PTT:27.8 sec  Lactate, Blood: 1.0 mmol/L ( @ 16:00)    CARDIAC MARKERS ( 2022 16:00 )  x     / 7.2 ng/L / x     / x     / x              Urinalysis Basic - ( 2022 20:00 )    Color: Yellow / Appearance: Clear / S.010 / pH: x  Gluc: x / Ketone: Negative  / Bili: Negative / Urobili: Negative   Blood: x / Protein: Negative / Nitrite: Negative   Leuk Esterase: Negative / RBC: 0-4 /HPF / WBC 0-2 /HPF   Sq Epi: x / Non Sq Epi: Neg.-Few / Bacteria: Negative /HPF        Culture - Urine (collected 2022 20:00)  Source: Clean Catch Clean Catch (Midstream)  Final Report (2022 22:19):    <10,000 CFU/mL Normal Urogenital [place]    Culture - Blood (collected 2022 16:05)  Source: .Blood Blood-Peripheral  Preliminary Report (2022 23:02):    No growth to date.    Culture - Blood (collected 2022 16:00)  Source: .Blood Blood-Peripheral  Preliminary Report (2022 23:02):    No growth to date.          RADIOLOGY & ADDITIONAL TESTS:    Care Discussed with Consultants/Other Providers:

## 2022-11-28 LAB
ALBUMIN SERPL ELPH-MCNC: 2.4 G/DL — LOW (ref 3.3–5)
ALP SERPL-CCNC: 58 U/L — SIGNIFICANT CHANGE UP (ref 40–120)
ALT FLD-CCNC: 55 U/L — HIGH (ref 10–45)
ANION GAP SERPL CALC-SCNC: 9 MMOL/L — SIGNIFICANT CHANGE UP (ref 5–17)
AST SERPL-CCNC: 90 U/L — HIGH (ref 10–40)
BILIRUB SERPL-MCNC: 0.4 MG/DL — SIGNIFICANT CHANGE UP (ref 0.2–1.2)
BUN SERPL-MCNC: 18 MG/DL — SIGNIFICANT CHANGE UP (ref 7–23)
C DIFF BY PCR RESULT: SIGNIFICANT CHANGE UP
CALCIUM SERPL-MCNC: 8.4 MG/DL — SIGNIFICANT CHANGE UP (ref 8.4–10.5)
CHLORIDE SERPL-SCNC: 96 MMOL/L — SIGNIFICANT CHANGE UP (ref 96–108)
CO2 SERPL-SCNC: 22 MMOL/L — SIGNIFICANT CHANGE UP (ref 22–31)
CREAT SERPL-MCNC: 1.09 MG/DL — SIGNIFICANT CHANGE UP (ref 0.5–1.3)
CULTURE RESULTS: SIGNIFICANT CHANGE UP
EGFR: 57 ML/MIN/1.73M2 — LOW
GLUCOSE SERPL-MCNC: 95 MG/DL — SIGNIFICANT CHANGE UP (ref 70–99)
HCT VFR BLD CALC: 31.6 % — LOW (ref 34.5–45)
HGB BLD-MCNC: 10.7 G/DL — LOW (ref 11.5–15.5)
LEGIONELLA AG UR QL: NEGATIVE — SIGNIFICANT CHANGE UP
MAGNESIUM SERPL-MCNC: 1.5 MG/DL — LOW (ref 1.6–2.6)
MCHC RBC-ENTMCNC: 30 PG — SIGNIFICANT CHANGE UP (ref 27–34)
MCHC RBC-ENTMCNC: 33.9 GM/DL — SIGNIFICANT CHANGE UP (ref 32–36)
MCV RBC AUTO: 88.5 FL — SIGNIFICANT CHANGE UP (ref 80–100)
NRBC # BLD: 0 /100 WBCS — SIGNIFICANT CHANGE UP (ref 0–0)
PLATELET # BLD AUTO: 183 K/UL — SIGNIFICANT CHANGE UP (ref 150–400)
POTASSIUM SERPL-MCNC: 3.7 MMOL/L — SIGNIFICANT CHANGE UP (ref 3.5–5.3)
POTASSIUM SERPL-SCNC: 3.7 MMOL/L — SIGNIFICANT CHANGE UP (ref 3.5–5.3)
PROT SERPL-MCNC: 6.7 G/DL — SIGNIFICANT CHANGE UP (ref 6–8.3)
RBC # BLD: 3.57 M/UL — LOW (ref 3.8–5.2)
RBC # FLD: 13.5 % — SIGNIFICANT CHANGE UP (ref 10.3–14.5)
SODIUM SERPL-SCNC: 127 MMOL/L — LOW (ref 135–145)
SPECIMEN SOURCE: SIGNIFICANT CHANGE UP
WBC # BLD: 3.41 K/UL — LOW (ref 3.8–10.5)
WBC # FLD AUTO: 3.41 K/UL — LOW (ref 3.8–10.5)

## 2022-11-28 PROCEDURE — 99233 SBSQ HOSP IP/OBS HIGH 50: CPT

## 2022-11-28 RX ORDER — SODIUM CHLORIDE 9 MG/ML
1000 INJECTION, SOLUTION INTRAVENOUS
Refills: 0 | Status: DISCONTINUED | OUTPATIENT
Start: 2022-11-28 | End: 2022-11-29

## 2022-11-28 RX ORDER — MAGNESIUM OXIDE 400 MG ORAL TABLET 241.3 MG
400 TABLET ORAL
Refills: 0 | Status: COMPLETED | OUTPATIENT
Start: 2022-11-28 | End: 2022-11-28

## 2022-11-28 RX ADMIN — Medication 48 MILLIGRAM(S): at 12:04

## 2022-11-28 RX ADMIN — SODIUM CHLORIDE 75 MILLILITER(S): 9 INJECTION, SOLUTION INTRAVENOUS at 13:58

## 2022-11-28 RX ADMIN — Medication 250 MILLIGRAM(S): at 06:06

## 2022-11-28 RX ADMIN — Medication 100 MILLIGRAM(S): at 13:58

## 2022-11-28 RX ADMIN — Medication 100 MILLIGRAM(S): at 22:04

## 2022-11-28 RX ADMIN — Medication 250 MILLIGRAM(S): at 06:41

## 2022-11-28 RX ADMIN — Medication 100 MILLIGRAM(S): at 06:05

## 2022-11-28 RX ADMIN — Medication 100 MILLIGRAM(S): at 17:01

## 2022-11-28 RX ADMIN — Medication 3 MILLIGRAM(S): at 22:03

## 2022-11-28 RX ADMIN — PIPERACILLIN AND TAZOBACTAM 25 GRAM(S): 4; .5 INJECTION, POWDER, LYOPHILIZED, FOR SOLUTION INTRAVENOUS at 22:03

## 2022-11-28 RX ADMIN — HEPARIN SODIUM 5000 UNIT(S): 5000 INJECTION INTRAVENOUS; SUBCUTANEOUS at 17:43

## 2022-11-28 RX ADMIN — Medication 100 MILLIGRAM(S): at 03:40

## 2022-11-28 RX ADMIN — Medication 3 MILLILITER(S): at 09:30

## 2022-11-28 RX ADMIN — HEPARIN SODIUM 5000 UNIT(S): 5000 INJECTION INTRAVENOUS; SUBCUTANEOUS at 06:05

## 2022-11-28 RX ADMIN — SODIUM CHLORIDE 100 MILLILITER(S): 9 INJECTION INTRAMUSCULAR; INTRAVENOUS; SUBCUTANEOUS at 03:40

## 2022-11-28 RX ADMIN — PIPERACILLIN AND TAZOBACTAM 25 GRAM(S): 4; .5 INJECTION, POWDER, LYOPHILIZED, FOR SOLUTION INTRAVENOUS at 06:06

## 2022-11-28 RX ADMIN — Medication 650 MILLIGRAM(S): at 07:27

## 2022-11-28 RX ADMIN — Medication 650 MILLIGRAM(S): at 16:42

## 2022-11-28 RX ADMIN — PANTOPRAZOLE SODIUM 40 MILLIGRAM(S): 20 TABLET, DELAYED RELEASE ORAL at 06:41

## 2022-11-28 RX ADMIN — MAGNESIUM OXIDE 400 MG ORAL TABLET 400 MILLIGRAM(S): 241.3 TABLET ORAL at 10:20

## 2022-11-28 RX ADMIN — Medication 250 MILLIGRAM(S): at 17:43

## 2022-11-28 RX ADMIN — Medication 650 MILLIGRAM(S): at 06:18

## 2022-11-28 RX ADMIN — MAGNESIUM OXIDE 400 MG ORAL TABLET 400 MILLIGRAM(S): 241.3 TABLET ORAL at 17:01

## 2022-11-28 RX ADMIN — PIPERACILLIN AND TAZOBACTAM 25 GRAM(S): 4; .5 INJECTION, POWDER, LYOPHILIZED, FOR SOLUTION INTRAVENOUS at 14:04

## 2022-11-28 RX ADMIN — Medication 650 MILLIGRAM(S): at 16:12

## 2022-11-28 NOTE — PROGRESS NOTE ADULT - ASSESSMENT
63 y/o F with PMH HTN, HLD and recent ED visit for PNA on doxycycline and cefpodoxime c/o worsening malaise, poor PO and new, persistent diarrhea admitted for     Sepsis due to RLL pneumonia, Fever, tachycardia  Failed outpatient treatment by not being able to tolerate PO  -CTA (11/23) RLL Pneumonia  -persistent fever, T 101.1 this morning  -continue with Zosyn. add vancomycin  -ID consult if fever persists  -BCx and urine negative to date  -Tylenol 650mg po q6hrs PRN for fever.  -Robitussin PRN for cough. Tessalon Perle ATC    Watery diarrhea with electrolyte abnormalities   - check CT abd and pelvis  - check stool culture and C diff pcr    Hyponatremia  Hypokalemia  -K 2.9. likely due to GI loss  -Supplementing potassium  -I&Os    Hypertension  -Home med Valsartan/HCTZ 80mg/12.5mg daily  -Will hold for now since having diarrhea  -Monitor vital signs     Hyperlipidemia  -Continue fenofibrate    Old LBBB  -TTE (11/26) LVEF 60 - 65%. Normal global LVSF. Mild septal LVH. Thickening of the anterior and posterior mitral valve leaflets.  -No symptoms  -Cardio was consulted, pt may need ischemic workup once stabilized     Heparin SQ for DVT Prophylaxis      61 y/o F with PMH HTN, HLD and recent ED visit for PNA on doxycycline and cefpodoxime c/o worsening malaise, poor PO and new, persistent diarrhea admitted for sepsis due to RLL pneumonia, r/o c.diff.    Sepsis due to RLL pneumonia  Failed outpatient treatment by not being able to tolerate PO  -CTA (11/23) c/w RLL Pneumonia  -Tmax 101.5 11/28 am  -Supplemental O2 with nasal canula to maintain SpO2 > 92%  -Continue with Zosyn (11/26- ), Vancomycin (11/27-)  -ID consult if fever persists  -Blood cx 11/25 NGTD. Urine cx 11/25 NGTD. Stool cx 11/26 final cx pending   -Legionella pending  -Tylenol 650mg po q6hrs PRN for fever  -Robitussin PRN for cough. Tessalon Perle ATC    Watery diarrhea with electrolyte abnormalities - r/o c diff  -CTAP without acute abd pathology   -Stool culture and C diff pcr pending    Hyponatremia  Hypokalemia  Hypomagnesemia  -Replete and monitor    Hypertension  -Home med Valsartan/HCTZ 80mg/12.5mg qd held for hypotension, diarrhea    Hyperlipidemia  -Continue fenofibrate    Mild hepatic steatosis   Elevated LFTs  -IVF, monitor    Old LBBB  -TTE (11/26) LVEF 60 - 65%. Normal global LVSF. Mild septal LVH. Thickening of the anterior and posterior mitral valve leaflets  -Cardio consulted, no evidence of ACS. May need ischemic workup once stabilized     DVT ppx - HSQ   updated at bedside 61 y/o F with PMH HTN, HLD and recent ED visit for PNA on doxycycline and cefpodoxime c/o worsening malaise, poor PO and new, persistent diarrhea admitted for sepsis due to RLL pneumonia, r/o c.diff.    Sepsis due to RLL pneumonia  Failed outpatient treatment by not being able to tolerate PO  -CTA (11/23) c/w RLL Pneumonia  -Tmax 101.5 11/28 am  -Supplemental O2 with nasal canula to maintain SpO2 > 92%  -Continue with Zosyn (11/26- ), Vancomycin (11/27-)  -ID consult if fever persists  -Blood cx 11/25 NGTD. Urine cx 11/25 NGTD. Stool cx 11/26 final cx pending   -Legionella pending  -Tylenol 650mg po q6hrs PRN for fever  -Robitussin PRN for cough. Tessalon Perle ATC  -Albuterol q6h     Watery diarrhea with electrolyte abnormalities - r/o c diff  -CTAP without acute abd pathology   -Stool culture and C diff pcr pending  -Probiotics    Hyponatremia  Hypokalemia  Hypomagnesemia  -Replete and monitor    Hypertension  -Home med Valsartan/HCTZ 80mg/12.5mg qd held for hypotension, diarrhea    Hyperlipidemia  -Continue fenofibrate    Mild hepatic steatosis   Elevated LFTs  -IVF, monitor    Old LBBB  -TTE (11/26) LVEF 60 - 65%. Normal global LVSF. Mild septal LVH. Thickening of the anterior and posterior mitral valve leaflets  -Cardio consulted, no evidence of ACS. May need ischemic workup once stabilized     DVT ppx - HSQ   updated at bedside

## 2022-11-28 NOTE — DIETITIAN INITIAL EVALUATION ADULT - ORAL INTAKE PTA/DIET HISTORY
Patient reports consuming 3 meals/day but states appetite has declined within the last week prior to admission due to not feeling well.

## 2022-11-28 NOTE — PHARMACOTHERAPY INTERVENTION NOTE - COMMENTS
Met with patient and reviewed current medications. Pt reports Duonebs make her nauseous and she does not need them. Discussed with Dr. Hunter - changed from standing to prn. All questions/concerns addressed.

## 2022-11-28 NOTE — DIETITIAN INITIAL EVALUATION ADULT - PERTINENT LABORATORY DATA
11-28    127<L>  |  96  |  18  ----------------------------<  95  3.7   |  22  |  1.09    Ca    8.4      28 Nov 2022 06:10  Mg     1.5     11-28    TPro  6.7  /  Alb  2.4<L>  /  TBili  0.4  /  DBili  x   /  AST  90<H>  /  ALT  55<H>  /  AlkPhos  58  11-28

## 2022-11-28 NOTE — DIETITIAN INITIAL EVALUATION ADULT - PERTINENT MEDS FT
MEDICATIONS  (STANDING):  albuterol/ipratropium for Nebulization 3 milliLiter(s) Nebulizer every 6 hours  benzonatate 100 milliGRAM(s) Oral three times a day  fenofibrate Tablet 48 milliGRAM(s) Oral daily  heparin   Injectable 5000 Unit(s) SubCutaneous every 12 hours  influenza   Vaccine 0.5 milliLiter(s) IntraMuscular once  magnesium oxide 400 milliGRAM(s) Oral two times a day with meals  pantoprazole    Tablet 40 milliGRAM(s) Oral before breakfast  piperacillin/tazobactam IVPB.. 3.375 Gram(s) IV Intermittent every 8 hours  saccharomyces boulardii 250 milliGRAM(s) Oral two times a day  sodium chloride 0.9%. 1000 milliLiter(s) (100 mL/Hr) IV Continuous <Continuous>  vancomycin  IVPB      vancomycin  IVPB 1000 milliGRAM(s) IV Intermittent every 12 hours    MEDICATIONS  (PRN):  acetaminophen     Tablet .. 650 milliGRAM(s) Oral every 6 hours PRN Temp greater or equal to 38C (100.4F), Mild Pain (1 - 3)  guaiFENesin Oral Liquid (Sugar-Free) 100 milliGRAM(s) Oral every 6 hours PRN Cough  melatonin 3 milliGRAM(s) Oral at bedtime PRN Insomnia  ondansetron Injectable 4 milliGRAM(s) IV Push every 8 hours PRN Nausea and/or Vomiting

## 2022-11-28 NOTE — PROGRESS NOTE ADULT - SUBJECTIVE AND OBJECTIVE BOX
Patient is a 62y old  Female who presents with a chief complaint of pneumonia, vomiting (2022 14:32)      Patient seen and examined at bedside. continues with malaise, fatigue, poor PO, and diarrhea. +non productive cough. denies headache, fever, chills, cp, sob, n/v, abd pain.    ALLERGIES:  latex (Unknown)  salicylates (Anaphylaxis)    MEDICATIONS  (STANDING):  albuterol/ipratropium for Nebulization 3 milliLiter(s) Nebulizer every 6 hours  benzonatate 100 milliGRAM(s) Oral three times a day  fenofibrate Tablet 48 milliGRAM(s) Oral daily  heparin   Injectable 5000 Unit(s) SubCutaneous every 12 hours  influenza   Vaccine 0.5 milliLiter(s) IntraMuscular once  magnesium oxide 400 milliGRAM(s) Oral two times a day with meals  pantoprazole    Tablet 40 milliGRAM(s) Oral before breakfast  piperacillin/tazobactam IVPB.. 3.375 Gram(s) IV Intermittent every 8 hours  saccharomyces boulardii 250 milliGRAM(s) Oral two times a day  sodium chloride 0.9%. 1000 milliLiter(s) (100 mL/Hr) IV Continuous <Continuous>  vancomycin  IVPB      vancomycin  IVPB 1000 milliGRAM(s) IV Intermittent every 12 hours    MEDICATIONS  (PRN):  acetaminophen     Tablet .. 650 milliGRAM(s) Oral every 6 hours PRN Temp greater or equal to 38C (100.4F), Mild Pain (1 - 3)  guaiFENesin Oral Liquid (Sugar-Free) 100 milliGRAM(s) Oral every 6 hours PRN Cough  melatonin 3 milliGRAM(s) Oral at bedtime PRN Insomnia  ondansetron Injectable 4 milliGRAM(s) IV Push every 8 hours PRN Nausea and/or Vomiting    Vital Signs Last 24 Hrs  T(F): 97.8 (2022 08:24), Max: 102 (2022 15:54)  HR: 101 (2022 08:24) (93 - 113)  BP: 117/71 (2022 08:24) (101/60 - 121/68)  RR: 16 (2022 08:24) (16 - 18)  SpO2: 93% (2022 08:24) (93% - 96%)  I&O's Summary    2022 07:01  -  2022 07:00  --------------------------------------------------------  IN: 0 mL / OUT: 4 mL / NET: -4 mL    2022 07:01  -  2022 11:01  --------------------------------------------------------  IN: 200 mL / OUT: 0 mL / NET: 200 mL      BMI (kg/m2): 25.5 (22 @ 15:00), 25.5 (22 @ 12:16)  PHYSICAL EXAM:  General: NAD, A/O x 3  ENT: MM dry, no scleral icterus  Neck: Supple, No JVD  Lungs: Clear to auscultation bilaterally, no wheezes, rales, rhonchi  Cardio: RRR, S1/S2  Abdomen: Soft, Nontender, Nondistended; Bowel sounds present  Extremities: No calf tenderness, No pitting edema    LABS:                        10.7   3.41  )-----------( 183      ( 2022 06:10 )             31.6           127  |  96  |  18  ----------------------------<  95  3.7   |  22  |  1.09    Ca    8.4      2022 06:10  Mg     1.5         TPro  6.7  /  Alb  2.4  /  TBili  0.4  /  DBili  x   /  AST  90  /  ALT  55  /  AlkPhos  58         PT/INR - ( 2022 16:00 )   PT: 15.0 sec;   INR: 1.29 ratio         PTT - ( 2022 16:00 )  PTT:27.8 sec   Lactate, Blood: 1.0 mmol/L ( @ 16:00)    CARDIAC MARKERS ( 2022 16:00 )  x     / 7.2 ng/L / x     / x     / x                            Urinalysis Basic - ( 2022 20:00 )    Color: Yellow / Appearance: Clear / S.010 / pH: x  Gluc: x / Ketone: Negative  / Bili: Negative / Urobili: Negative   Blood: x / Protein: Negative / Nitrite: Negative   Leuk Esterase: Negative / RBC: 0-4 /HPF / WBC 0-2 /HPF   Sq Epi: x / Non Sq Epi: Neg.-Few / Bacteria: Negative /HPF        Culture - Stool (collected 2022 14:30)  Source: .Stool Feces  Preliminary Report (2022 19:16):    No enteric pathogens to date: Final culture pending    No enteric gram negative rods isolated    Culture - Urine (collected 2022 20:00)  Source: Clean Catch Clean Catch (Midstream)  Final Report (2022 22:19):    <10,000 CFU/mL Normal Urogenital Nuzhat    Culture - Blood (collected 2022 16:05)  Source: .Blood Blood-Peripheral  Preliminary Report (2022 23:02):    No growth to date.    Culture - Blood (collected 2022 16:00)  Source: .Blood Blood-Peripheral  Preliminary Report (2022 23:02):    No growth to date.    RADIOLOGY & ADDITIONAL TESTS: reviewed     Patient is a 62y old  Female who presents with a chief complaint of pneumonia, vomiting (2022 14:32)      Patient seen and examined at bedside. continues with malaise, fatigue, poor PO, and diarrhea. +non productive cough. denies headache, fever, chills, cp, sob, n/v, abd pain.    ALLERGIES:  latex (Unknown)  salicylates (Anaphylaxis)    MEDICATIONS  (STANDING):  albuterol/ipratropium for Nebulization 3 milliLiter(s) Nebulizer every 6 hours  benzonatate 100 milliGRAM(s) Oral three times a day  fenofibrate Tablet 48 milliGRAM(s) Oral daily  heparin   Injectable 5000 Unit(s) SubCutaneous every 12 hours  influenza   Vaccine 0.5 milliLiter(s) IntraMuscular once  magnesium oxide 400 milliGRAM(s) Oral two times a day with meals  pantoprazole    Tablet 40 milliGRAM(s) Oral before breakfast  piperacillin/tazobactam IVPB.. 3.375 Gram(s) IV Intermittent every 8 hours  saccharomyces boulardii 250 milliGRAM(s) Oral two times a day  sodium chloride 0.9%. 1000 milliLiter(s) (100 mL/Hr) IV Continuous <Continuous>  vancomycin  IVPB      vancomycin  IVPB 1000 milliGRAM(s) IV Intermittent every 12 hours    MEDICATIONS  (PRN):  acetaminophen     Tablet .. 650 milliGRAM(s) Oral every 6 hours PRN Temp greater or equal to 38C (100.4F), Mild Pain (1 - 3)  guaiFENesin Oral Liquid (Sugar-Free) 100 milliGRAM(s) Oral every 6 hours PRN Cough  melatonin 3 milliGRAM(s) Oral at bedtime PRN Insomnia  ondansetron Injectable 4 milliGRAM(s) IV Push every 8 hours PRN Nausea and/or Vomiting    Vital Signs Last 24 Hrs  T(F): 97.8 (2022 08:24), Max: 102 (2022 15:54)  HR: 101 (2022 08:24) (93 - 113)  BP: 117/71 (2022 08:24) (101/60 - 121/68)  RR: 16 (2022 08:24) (16 - 18)  SpO2: 93% (2022 08:24) (93% - 96%)  I&O's Summary    2022 07:01  -  2022 07:00  --------------------------------------------------------  IN: 0 mL / OUT: 4 mL / NET: -4 mL    2022 07:01  -  2022 11:01  --------------------------------------------------------  IN: 200 mL / OUT: 0 mL / NET: 200 mL      BMI (kg/m2): 25.5 (22 @ 15:00), 25.5 (22 @ 12:16)  PHYSICAL EXAM:  General: NAD, A/O x 3  ENT: MM dry, no scleral icterus  Neck: Supple, No JVD  Lungs: Clear to auscultation bilaterally, no wheezes, rales, rhonchi  Cardio: RRR, S1/S2  Abdomen: Soft, Nontender, Nondistended; Bowel sounds present  Extremities: No calf tenderness, No pitting edema    LABS:                        10.7   3.41  )-----------( 183      ( 2022 06:10 )             31.6           127  |  96  |  18  ----------------------------<  95  3.7   |  22  |  1.09    Ca    8.4      2022 06:10  Mg     1.5         TPro  6.7  /  Alb  2.4  /  TBili  0.4  /  DBili  x   /  AST  90  /  ALT  55  /  AlkPhos  58         PT/INR - ( 2022 16:00 )   PT: 15.0 sec;   INR: 1.29 ratio         PTT - ( 2022 16:00 )  PTT:27.8 sec   Lactate, Blood: 1.0 mmol/L ( @ 16:00)    CARDIAC MARKERS ( 2022 16:00 )  x     / 7.2 ng/L / x     / x     / x                            Urinalysis Basic - ( 2022 20:00 )    Color: Yellow / Appearance: Clear / S.010 / pH: x  Gluc: x / Ketone: Negative  / Bili: Negative / Urobili: Negative   Blood: x / Protein: Negative / Nitrite: Negative   Leuk Esterase: Negative / RBC: 0-4 /HPF / WBC 0-2 /HPF   Sq Epi: x / Non Sq Epi: Neg.-Few / Bacteria: Negative /HPF        Culture - Stool (collected 2022 14:30)  Source: .Stool Feces  Preliminary Report (2022 19:16):    No enteric pathogens to date: Final culture pending    No enteric gram negative rods isolated    Culture - Urine (collected 2022 20:00)  Source: Clean Catch Clean Catch (Midstream)  Final Report (2022 22:19):    <10,000 CFU/mL Normal Urogenital Nuzhat    Culture - Blood (collected 2022 16:05)  Source: .Blood Blood-Peripheral  Preliminary Report (2022 23:02):    No growth to date.    Culture - Blood (collected 2022 16:00)  Source: .Blood Blood-Peripheral  Preliminary Report (2022 23:02):    No growth to date.    RADIOLOGY & ADDITIONAL TESTS: reviewed  < from: CT Abdomen and Pelvis w/ Oral Cont (22 @ 10:45) >  FINDINGS:  LOWER CHEST: Focal consolidation in the right lower lobe posteriorly   adjacent to the pleura with adjacent groundglass opacity.    LIVER: Mild hepatic steatosis is suspected.  BILE DUCTS: Normal caliber.  GALLBLADDER: Cystectomy clips.  SPLEEN: Within normal limits.  PANCREAS: Within normal limits.  ADRENALS: Within normal limits.  KIDNEYS/URETERS: Within normal limits.    BLADDER: Within normal limits.  REPRODUCTIVE ORGANS: Slightly unremarkable.    BOWEL: No bowel obstruction. Appendix within normal limits.  PERITONEUM: No ascites.  VESSELS: Mild vascular calcifications.  RETROPERITONEUM/LYMPH NODES: No lymphadenopathy.  ABDOMINAL WALL: Within normal limits.  BONES: Nonspecific sclerotic density in the posterior medial aspect of   the left eighth rib. Few small sclerotic densities in the pelvis which   are nonspecific.    IMPRESSION:  Focal peripheral consolidation in the right lower lobe posteriorly with   adjacent groundglass opacity. This may be related to infection. Due to   the location, pulmonary infarction may also be within the differential   diagnosis in the correct clinical situation.        --- End of Report ---          < end of copied text >  < from: Xray Chest 1 View- PORTABLE-Routine (Xray Chest 1 View- PORTABLE-Routine in AM.) (22 @ 17:00) >  FINDINGS: Normal cardiac silhouette and normal pulmonary vasculature with   no lobar consolidation, effusion, pneumothorax or acute osseous finding.    Patchy consolidation in the right lower lobe posteriorly characterized on   CT.    Angiography coils in the liver noted.    IMPRESSION:  Right lower lobe pneumonia posteriorly, better characterized on CT    --- End of Report ---    < end of copied text >

## 2022-11-28 NOTE — DIETITIAN INITIAL EVALUATION ADULT - ADD RECOMMEND
1. Recommend Banatrol TID to aid with diarrhea   2. Will provide homemade smoothie (provides vanilla greek yogurt, banana, and strawberries) daily to replete electrolytes, provide additional probiotics and optimize nutritional status.  3. Monitor daily PO intakes, labs, and BM regularity 1. Recommend Banatrol TID to help relieve diarrhea   2. Will provide homemade smoothie (provides vanilla greek yogurt, banana, and strawberries) daily to replete electrolytes, provide additional probiotics and optimize nutritional status.  3. Monitor daily PO intakes, labs, and BM regularity

## 2022-11-28 NOTE — DIETITIAN INITIAL EVALUATION ADULT - OTHER INFO
62 year old female with history of HTN, HLD and recent ED visit for PNA admitted since unable to tolerate PO medss due to multiple episodes of vomiting. Patient reports poor PO intakes at this time due to not feeling well. Patient complains of having frequent diarrhea; altered nutrition related lab values (electrolytes) likely related to diarrhea/vomiting. Currently R/O C. Diff. Patient receptive to receive banatrol TID to help aid with loose stools. Obtained food preferences; patient agreed to receive a homemade smoothie (provides vanilla greek yogurt, banana, and strawberries) daily to replete electrolytes, provide additional probiotics and optimize nutritional status.

## 2022-11-29 ENCOUNTER — NON-APPOINTMENT (OUTPATIENT)
Age: 62
End: 2022-11-29

## 2022-11-29 LAB
ALBUMIN SERPL ELPH-MCNC: 2.3 G/DL — LOW (ref 3.3–5)
ALP SERPL-CCNC: 67 U/L — SIGNIFICANT CHANGE UP (ref 40–120)
ALT FLD-CCNC: 50 U/L — HIGH (ref 10–45)
ANION GAP SERPL CALC-SCNC: 11 MMOL/L — SIGNIFICANT CHANGE UP (ref 5–17)
ANION GAP SERPL CALC-SCNC: 9 MMOL/L — SIGNIFICANT CHANGE UP (ref 5–17)
AST SERPL-CCNC: 78 U/L — HIGH (ref 10–40)
BILIRUB SERPL-MCNC: 0.5 MG/DL — SIGNIFICANT CHANGE UP (ref 0.2–1.2)
BUN SERPL-MCNC: 24 MG/DL — HIGH (ref 7–23)
BUN SERPL-MCNC: 25 MG/DL — HIGH (ref 7–23)
CALCIUM SERPL-MCNC: 8.5 MG/DL — SIGNIFICANT CHANGE UP (ref 8.4–10.5)
CALCIUM SERPL-MCNC: 8.9 MG/DL — SIGNIFICANT CHANGE UP (ref 8.4–10.5)
CHLORIDE SERPL-SCNC: 101 MMOL/L — SIGNIFICANT CHANGE UP (ref 96–108)
CHLORIDE SERPL-SCNC: 99 MMOL/L — SIGNIFICANT CHANGE UP (ref 96–108)
CO2 SERPL-SCNC: 21 MMOL/L — LOW (ref 22–31)
CO2 SERPL-SCNC: 25 MMOL/L — SIGNIFICANT CHANGE UP (ref 22–31)
CREAT SERPL-MCNC: 2.19 MG/DL — HIGH (ref 0.5–1.3)
CREAT SERPL-MCNC: 2.26 MG/DL — HIGH (ref 0.5–1.3)
D DIMER BLD IA.RAPID-MCNC: 631 NG/ML DDU — HIGH
EGFR: 24 ML/MIN/1.73M2 — LOW
EGFR: 25 ML/MIN/1.73M2 — LOW
GLUCOSE SERPL-MCNC: 126 MG/DL — HIGH (ref 70–99)
GLUCOSE SERPL-MCNC: 164 MG/DL — HIGH (ref 70–99)
HCT VFR BLD CALC: 30.1 % — LOW (ref 34.5–45)
HGB BLD-MCNC: 10.3 G/DL — LOW (ref 11.5–15.5)
MAGNESIUM SERPL-MCNC: 1.8 MG/DL — SIGNIFICANT CHANGE UP (ref 1.6–2.6)
MCHC RBC-ENTMCNC: 29.4 PG — SIGNIFICANT CHANGE UP (ref 27–34)
MCHC RBC-ENTMCNC: 34.2 GM/DL — SIGNIFICANT CHANGE UP (ref 32–36)
MCV RBC AUTO: 86 FL — SIGNIFICANT CHANGE UP (ref 80–100)
NRBC # BLD: 0 /100 WBCS — SIGNIFICANT CHANGE UP (ref 0–0)
PLATELET # BLD AUTO: 164 K/UL — SIGNIFICANT CHANGE UP (ref 150–400)
POTASSIUM SERPL-MCNC: 3.5 MMOL/L — SIGNIFICANT CHANGE UP (ref 3.5–5.3)
POTASSIUM SERPL-MCNC: 3.6 MMOL/L — SIGNIFICANT CHANGE UP (ref 3.5–5.3)
POTASSIUM SERPL-SCNC: 3.5 MMOL/L — SIGNIFICANT CHANGE UP (ref 3.5–5.3)
POTASSIUM SERPL-SCNC: 3.6 MMOL/L — SIGNIFICANT CHANGE UP (ref 3.5–5.3)
PROCALCITONIN SERPL-MCNC: 0.92 NG/ML — HIGH
PROT SERPL-MCNC: 6.5 G/DL — SIGNIFICANT CHANGE UP (ref 6–8.3)
RBC # BLD: 3.5 M/UL — LOW (ref 3.8–5.2)
RBC # FLD: 13.5 % — SIGNIFICANT CHANGE UP (ref 10.3–14.5)
SODIUM SERPL-SCNC: 131 MMOL/L — LOW (ref 135–145)
SODIUM SERPL-SCNC: 135 MMOL/L — SIGNIFICANT CHANGE UP (ref 135–145)
VANCOMYCIN TROUGH SERPL-MCNC: 22.2 UG/ML — HIGH (ref 10–20)
WBC # BLD: 3.68 K/UL — LOW (ref 3.8–10.5)
WBC # FLD AUTO: 3.68 K/UL — LOW (ref 3.8–10.5)

## 2022-11-29 PROCEDURE — 76775 US EXAM ABDO BACK WALL LIM: CPT | Mod: 26

## 2022-11-29 PROCEDURE — 93970 EXTREMITY STUDY: CPT | Mod: 26

## 2022-11-29 PROCEDURE — 99233 SBSQ HOSP IP/OBS HIGH 50: CPT

## 2022-11-29 RX ORDER — SODIUM CHLORIDE 9 MG/ML
500 INJECTION, SOLUTION INTRAVENOUS
Refills: 0 | Status: DISCONTINUED | OUTPATIENT
Start: 2022-11-29 | End: 2022-12-01

## 2022-11-29 RX ORDER — VANCOMYCIN HCL 1 G
1000 VIAL (EA) INTRAVENOUS ONCE
Refills: 0 | Status: DISCONTINUED | OUTPATIENT
Start: 2022-11-29 | End: 2022-11-29

## 2022-11-29 RX ADMIN — ONDANSETRON 4 MILLIGRAM(S): 8 TABLET, FILM COATED ORAL at 12:17

## 2022-11-29 RX ADMIN — PIPERACILLIN AND TAZOBACTAM 25 GRAM(S): 4; .5 INJECTION, POWDER, LYOPHILIZED, FOR SOLUTION INTRAVENOUS at 16:37

## 2022-11-29 RX ADMIN — SODIUM CHLORIDE 100 MILLILITER(S): 9 INJECTION, SOLUTION INTRAVENOUS at 16:37

## 2022-11-29 RX ADMIN — Medication 250 MILLIGRAM(S): at 18:40

## 2022-11-29 RX ADMIN — Medication 100 MILLIGRAM(S): at 22:13

## 2022-11-29 RX ADMIN — PIPERACILLIN AND TAZOBACTAM 25 GRAM(S): 4; .5 INJECTION, POWDER, LYOPHILIZED, FOR SOLUTION INTRAVENOUS at 22:12

## 2022-11-29 RX ADMIN — Medication 100 MILLIGRAM(S): at 05:08

## 2022-11-29 RX ADMIN — PANTOPRAZOLE SODIUM 40 MILLIGRAM(S): 20 TABLET, DELAYED RELEASE ORAL at 05:08

## 2022-11-29 RX ADMIN — HEPARIN SODIUM 5000 UNIT(S): 5000 INJECTION INTRAVENOUS; SUBCUTANEOUS at 05:07

## 2022-11-29 RX ADMIN — Medication 250 MILLIGRAM(S): at 05:08

## 2022-11-29 RX ADMIN — Medication 100 MILLIGRAM(S): at 05:09

## 2022-11-29 RX ADMIN — Medication 250 MILLIGRAM(S): at 05:07

## 2022-11-29 RX ADMIN — Medication 48 MILLIGRAM(S): at 12:17

## 2022-11-29 RX ADMIN — Medication 100 MILLIGRAM(S): at 18:40

## 2022-11-29 RX ADMIN — HEPARIN SODIUM 5000 UNIT(S): 5000 INJECTION INTRAVENOUS; SUBCUTANEOUS at 18:40

## 2022-11-29 RX ADMIN — PIPERACILLIN AND TAZOBACTAM 25 GRAM(S): 4; .5 INJECTION, POWDER, LYOPHILIZED, FOR SOLUTION INTRAVENOUS at 05:07

## 2022-11-29 NOTE — PHARMACOTHERAPY INTERVENTION NOTE - COMMENTS
Pt has MAMADOU and supratherapeutic vanco level. Recommended repeating vanco level tomorrow prior to restarting vanco.

## 2022-11-29 NOTE — PROGRESS NOTE ADULT - SUBJECTIVE AND OBJECTIVE BOX
Patient is a 62y old  Female who presents with a chief complaint of pneumonia, vomiting (29 Nov 2022 13:19)    BRIEF HOSPITAL COURSE:   63 y/o F with PMH HTN, HLD and recent ED visit for PNA on doxycycline and cefpodoxime c/o worsening malaise, poor PO and new, persistent diarrhea admitted for sepsis due to RLL pneumonia, MAMADOU    Events last 24 hours:   -Renal US performed this afternoon which ruled out Hydronephrosis. LE Dopplers b/l negative as well.  -Started on gentle mIVF.   -Low-grade temp earlier. Satting well on RA. Hemodynamics stable.     PAST MEDICAL & SURGICAL HISTORY:  Uterine Leiomyoma  Cholelithiasis  H/O  History of Tonsillectomy  S/P Biopsy Endometrial  Migraine  H/O Cholecystectomy    Review of Systems:  CONSTITUTIONAL: No fever, chills, or fatigue  EYES: No eye pain, visual disturbances, or discharge  ENMT:  No difficulty hearing, tinnitus, vertigo; No sinus or throat pain  NECK: No pain or stiffness  RESPIRATORY: No cough, wheezing, chills or hemoptysis; No shortness of breath  CARDIOVASCULAR: No chest pain, palpitations, dizziness, or leg swelling  GASTROINTESTINAL: No abdominal or epigastric pain. No nausea, vomiting, or hematemesis; No diarrhea or constipation. No melena or hematochezia.  GENITOURINARY: No dysuria, frequency, hematuria, or incontinence  NEUROLOGICAL: No headaches, memory loss, loss of strength, numbness, or tremors  SKIN: No itching, burning, rashes, or lesions   MUSCULOSKELETAL: No joint pain or swelling; No muscle, back, or extremity pain  PSYCHIATRIC: No depression, anxiety, mood swings, or difficulty sleeping    Medications:  piperacillin/tazobactam IVPB.. 3.375 Gram(s) IV Intermittent every 8 hours  albuterol/ipratropium for Nebulization 3 milliLiter(s) Nebulizer every 6 hours PRN  benzonatate 100 milliGRAM(s) Oral three times a day  guaiFENesin Oral Liquid (Sugar-Free) 100 milliGRAM(s) Oral every 6 hours PRN  acetaminophen     Tablet .. 650 milliGRAM(s) Oral every 6 hours PRN  melatonin 3 milliGRAM(s) Oral at bedtime PRN  ondansetron Injectable 4 milliGRAM(s) IV Push every 8 hours PRN  heparin   Injectable 5000 Unit(s) SubCutaneous every 12 hours  pantoprazole    Tablet 40 milliGRAM(s) Oral before breakfast  fenofibrate Tablet 48 milliGRAM(s) Oral daily  lactated ringers. 500 milliLiter(s) IV Continuous <Continuous>  influenza   Vaccine 0.5 milliLiter(s) IntraMuscular once  saccharomyces boulardii 250 milliGRAM(s) Oral two imes aday    ICU Vital Signs Last 24 Hrs  T(C): 36.8 (29 Nov 2022 16:53), Max: 36.8 (29 Nov 2022 16:53)  T(F): 98.2 (29 Nov 2022 16:53), Max: 98.2 (29 Nov 2022 16:53)  HR: 94 (29 Nov 2022 16:53) (85 - 94)  BP: 135/75 (29 Nov 2022 16:53) (123/76 - 135/75)  BP(mean): --  ABP: --  ABP(mean): --  RR: 18 (29 Nov 2022 16:53) (16 - 20)  SpO2: 97% (29 Nov 2022 16:53) (95% - 97%)  O2 Parameters below as of 29 Nov 2022 16:53  Patient On (Oxygen Delivery Method): room air    I&O's Detail  28 Nov 2022 07:01  -  29 Nov 2022 07:00  --------------------------------------------------------  IN:    IV PiggyBack: 100 mL    Lactated Ringers: 500 mL    Oral Fluid: 720 mL    sodium chloride 0.9%: 600 mL  Total IN: 1920 mL  OUT:  Total OUT: 0 mL  Total NET: 1920 mL    29 Nov 2022 07:01  -  29 Nov 2022 20:11  --------------------------------------------------------  IN:    Lactated Ringers: 600 mL    Lactated Ringers: 450 mL  Total IN: 1050 mL  OUT:  Total OUT: 0 mL  Total NET: 1050 mL    LABS:                      10.3   3.68  )-----------( 164      ( 29 Nov 2022 06:23 )             30.1     11-29  135  |  101  |  24<H>  ----------------------------<  126<H>  3.6   |  25  |  2.26<H>  Ca    8.9      29 Nov 2022 14:51  Mg     1.8     11-29  TPro  6.5  /  Alb  2.3<L>  /  TBili  0.5  /  DBili  x   /  AST  78<H>  /  ALT  50<H>  /  AlkPhos  67  11-29    CAPILLARY BLOOD GLUCOSE  CULTURES:  C Diff by PCR Result: NotDetec (11-28-22 @ 10:10)  Culture Results:   No enteric pathogens isolated.  (Stool culture examined for Salmonella,  Shigella, Campylobacter, Aeromonas, Plesiomonas,  Vibrio, E.coli O157 and Yersinia)  Few Yeast like cells (11-26-22 @ 14:30)  Culture Results:   <10,000 CFU/mL Normal Urogenital Nuzhat (11-25-22 @ 20:00)  Culture Results:   No growth to date. (11-25-22 @ 16:05)  Rapid RVP Result: NotDetec (11-25-22 @ 16:00)  Culture Results:   No growth to date. (11-25-22 @ 16:00)  Rapid RVP Result: NotDetec (11-23-22 @ 17:47)    Physical Examination:  General: Alert, oriented, interactive, nonfocal.  HEENT: PERRL.  NECK: Supple.   PULM: Clear to auscultation bilaterally.  CVS: s1/s2.  ABD: Soft, nondistended, nontender, normoactive bowel sounds.  EXT: No edema, nontender.  SKIN: Warm.    RADIOLOGY:   < from: US Duplex Venous Lower Ext Complete, Bilateral (11.29.22 @ 15:58) >  ACC: [id number] EXAM:  US DPLX LWR EXT VEINS COMPL BI                        PROCEDURE DATE:  11/29/2022    IMPRESSION:  No evidence of deep venous thrombosis in either lower extremity.  There is slow Rouleaux flow bilaterally.    < from: US Renal (11.29.22 @ 15:54) >  ACC: [id number] EXAM:  US KIDNEY(S)                        PROCEDURE DATE:  11/29/2022    IMPRESSION:  Normal renal ultrasound. More specifically ,no evidence of hydronephrosis      63 y/o F with PMH HTN, HLD and recent ED visit for PNA on doxycycline and cefpodoxime c/o worsening malaise, poor PO and new, persistent diarrhea admitted for sepsis due to RLL pneumonia, MAMADOU    Plan:  Melatonin for adequate sleep/wake cycle.  Maintain goal MAP >65. Keep K~4 and Mg>2 for optimal arrhythmia suppression.  Satting well on RA, goal SpO2 >88%. Incentive spirometry. Albuterol PRN.   DASH/TLC diet. Protonix QD. Fenofibrate QD.   Renal function currently w/ MAMADOU. Trend lytes/Scr daily with BMP, Strict I's and O's, goal UOP 0.5 cc/kg/hr, renal dose meds and avoid nephrotoxins. Started on gentle mIVF, LR 100cc/h. Nephrology consult.   Aggressive glycemic control to limit FS glucose to <180mg/dl. B WNL.  BloodCx, UrineCx NGTD. Empiric Zosyn. ABX use and/or discontinuation based on discussion with ID in conjunction with clinical features, culture data, and judicious procalcitonin monitoring.  Pharmacologic DVT Prophylaxis in addition to SCD's w/ SQH.  PT/OT evaluation.   GOC: Full Code.    TIME SPENT: 38 minutes   Evaluating/treating patient, reviewing data/labs/imaging, discussing case with multidisciplinary team, discussing plan/goals of care with patient/family. Non-inclusive of procedure time.

## 2022-11-29 NOTE — PROGRESS NOTE ADULT - SUBJECTIVE AND OBJECTIVE BOX
Patient is a 62y old  Female who presents with a chief complaint of pneumonia, vomiting (28 Nov 2022 11:01)      Patient seen and examined at bedside. diarrhea improving though still febrile. denies headache, fever, chills, cp, sob, n/v, abd pain.      ALLERGIES:  latex (Unknown)  salicylates (Anaphylaxis)    MEDICATIONS  (STANDING):  benzonatate 100 milliGRAM(s) Oral three times a day  fenofibrate Tablet 48 milliGRAM(s) Oral daily  heparin   Injectable 5000 Unit(s) SubCutaneous every 12 hours  influenza   Vaccine 0.5 milliLiter(s) IntraMuscular once  lactated ringers. 1000 milliLiter(s) (75 mL/Hr) IV Continuous <Continuous>  pantoprazole    Tablet 40 milliGRAM(s) Oral before breakfast  piperacillin/tazobactam IVPB.. 3.375 Gram(s) IV Intermittent every 8 hours  saccharomyces boulardii 250 milliGRAM(s) Oral two times a day  vancomycin  IVPB      vancomycin  IVPB 1000 milliGRAM(s) IV Intermittent every 12 hours    MEDICATIONS  (PRN):  acetaminophen     Tablet .. 650 milliGRAM(s) Oral every 6 hours PRN Temp greater or equal to 38C (100.4F), Mild Pain (1 - 3)  albuterol/ipratropium for Nebulization 3 milliLiter(s) Nebulizer every 6 hours PRN Shortness of Breath and/or Wheezing  guaiFENesin Oral Liquid (Sugar-Free) 100 milliGRAM(s) Oral every 6 hours PRN Cough  melatonin 3 milliGRAM(s) Oral at bedtime PRN Insomnia  ondansetron Injectable 4 milliGRAM(s) IV Push every 8 hours PRN Nausea and/or Vomiting    Vital Signs Last 24 Hrs  T(F): 98.1 (29 Nov 2022 05:24), Max: 100.4 (28 Nov 2022 16:06)  HR: 88 (29 Nov 2022 05:24) (85 - 120)  BP: 123/76 (29 Nov 2022 05:24) (123/76 - 137/73)  RR: 20 (29 Nov 2022 05:24) (16 - 20)  SpO2: 95% (29 Nov 2022 05:24) (93% - 95%)  I&O's Summary    28 Nov 2022 07:01  -  29 Nov 2022 07:00  --------------------------------------------------------  IN: 1920 mL / OUT: 0 mL / NET: 1920 mL      BMI (kg/m2): 25.5 (11-25-22 @ 15:00)  PHYSICAL EXAM:  General: NAD, A/O x 3  ENT: MM dry, no scleral icterus  Neck: Supple, No JVD  Lungs: Clear to auscultation bilaterally, no wheezes, rales, rhonchi  Cardio: RRR, S1/S2  Abdomen: Soft, Nontender, Nondistended; Bowel sounds present  Extremities: No calf tenderness, No pitting edema    LABS:                        10.3   3.68  )-----------( 164      ( 29 Nov 2022 06:23 )             30.1       11-29    131  |  99  |  25  ----------------------------<  164  3.5   |  21  |  2.19    Ca    8.5      29 Nov 2022 06:23  Mg     1.5     11-28    TPro  6.5  /  Alb  2.3  /  TBili  0.5  /  DBili  x   /  AST  78  /  ALT  50  /  AlkPhos  67  11-29                                      Culture - Stool (collected 26 Nov 2022 14:30)  Source: .Stool Feces  Final Report (28 Nov 2022 15:39):    No enteric pathogens isolated.    (Stool culture examined for Salmonella,    Shigella, Campylobacter, Aeromonas, Plesiomonas,    Vibrio, E.coli O157 and Yersinia)    Few Yeast like cells    Culture - Urine (collected 25 Nov 2022 20:00)  Source: Clean Catch Clean Catch (Midstream)  Final Report (26 Nov 2022 22:19):    <10,000 CFU/mL Normal Urogenital Nuzhat    Culture - Blood (collected 25 Nov 2022 16:05)  Source: .Blood Blood-Peripheral  Preliminary Report (26 Nov 2022 23:02):    No growth to date.    Culture - Blood (collected 25 Nov 2022 16:00)  Source: .Blood Blood-Peripheral  Preliminary Report (26 Nov 2022 23:02):    No growth to date.          RADIOLOGY & ADDITIONAL TESTS: reviewed

## 2022-11-29 NOTE — PROGRESS NOTE ADULT - ASSESSMENT
61 y/o F with PMH HTN, HLD and recent ED visit for PNA on doxycycline and cefpodoxime c/o worsening malaise, poor PO and new, persistent diarrhea admitted for sepsis due to RLL pneumonia, r/o c.diff.    Sepsis due to RLL pneumonia - criteria met on admission, now improving  Failed outpatient treatment by not being able to tolerate PO  -CTA (11/23) c/w RLL Pneumonia  -Tmax 100.4F overnight, downtrending  -Supplemental O2 with nasal canula to maintain SpO2 > 92%  -Continue with Zosyn (11/26- ), Vancomycin (11/27-) - will adjust due to MAMADOU  -ID consult if fever persists/worsens  -Blood cx 11/25 NGTD. Urine cx 11/25 NGTD. Stool cx 11/26 NGTD. Legionella negative  -Tylenol 650mg po q6hrs PRN for fever  -Robitussin PRN for cough. Tessalon Perle ATC  -Albuterol q6h     Diarrhea - c.diff ruled out  -CTAP without acute abd pathology   -Stool culture and C diff pcr negative  -Probiotics    Hyponatremia  Hypokalemia  Hypomagnesemia  -Replete and monitor    Hypertension  -Home med Valsartan/HCTZ 80mg/12.5mg qd held for hypotension, diarrhea    Hyperlipidemia  -Continue fenofibrate    Mild hepatic steatosis   Elevated LFTs  -IVF, monitor    Old LBBB  -TTE (11/26) LVEF 60 - 65%. Normal global LVSF. Mild septal LVH. Thickening of the anterior and posterior mitral valve leaflets  -Cardio consulted, no evidence of ACS. May need ischemic workup once stabilized     DVT ppx - HSQ 61 y/o F with PMH HTN, HLD and recent ED visit for PNA on doxycycline and cefpodoxime c/o worsening malaise, poor PO and new, persistent diarrhea admitted for sepsis due to RLL pneumonia, r/o c.diff.    Sepsis due to RLL pneumonia - criteria met on admission, now improving  Failed outpatient treatment by not being able to tolerate PO  -CTA (11/23) c/w RLL Pneumonia  -Tmax 100.4F overnight, downtrending  -Supplemental O2 with nasal canula to maintain SpO2 > 92%  -Continue with Zosyn (11/26- ), Vancomycin (11/27-) - change to q24 for kayleen. monitor trough, renal function  -ID consult if fever persists/worsens  -Blood cx 11/25 NGTD. Urine cx 11/25 NGTD. Stool cx 11/26 NGTD. Legionella negative  -Tylenol 650mg po q6hrs PRN for fever  -Robitussin PRN for cough. Tessalon Perle ATC  -Albuterol q6h     Diarrhea - c.diff ruled out  -CTAP without acute abd pathology   -Stool culture and C diff pcr negative  -Probiotics    Hyponatremia  Hypokalemia  Hypomagnesemia  -Replete and monitor    Hypertension  -Home med Valsartan/HCTZ 80mg/12.5mg qd held for hypotension, diarrhea    Hyperlipidemia  -Continue fenofibrate    Mild hepatic steatosis   Elevated LFTs  -IVF, monitor    Old LBBB  -TTE (11/26) LVEF 60 - 65%. Normal global LVSF. Mild septal LVH. Thickening of the anterior and posterior mitral valve leaflets  -Cardio consulted, no evidence of ACS. May need ischemic workup once stabilized     Elevated ddimer  -Check LE dopplers r/o dvt  -CTPa neg 11/23     DVT ppx - HSQ

## 2022-11-29 NOTE — CDI QUERY NOTE - NSCDIOTHERTXTBX_GEN_ALL_CORE_HH
Presents with a chief complaint of pneumonia, vomiting.    ER note - SEPSIS – was this patient treated for sepsis? No.    Notes from 11/26/2022 - Sepsis due to RLL pneumonia.    Please clarify    - Sepsis was evolving or present on admission(POA)  - Sepsis was not present on admission(POA)  - Other(specify)

## 2022-11-29 NOTE — CDI QUERY NOTE - NSCDIOTHERTXTBX2_GEN_ALL_CORE_FT
ER note - Chief Complaint: headache and vomiting/diarrhea.    CT result - FINDINGS:  LOWER CHEST: Focal consolidation in the right lower lobe posteriorly   adjacent to the pleura with adjacent groundglass opacity.    On IV Zosyn (11/26- ), Vancomycin (11/27-)    Please clarify the type of Pneumonia    - Aspiration Pneumonia  - Gram negative Pneumonia-  - Other(Specify      Thank you.

## 2022-11-30 ENCOUNTER — NON-APPOINTMENT (OUTPATIENT)
Age: 62
End: 2022-11-30

## 2022-11-30 LAB
ALBUMIN SERPL ELPH-MCNC: 2.5 G/DL — LOW (ref 3.3–5)
ALP SERPL-CCNC: 89 U/L — SIGNIFICANT CHANGE UP (ref 40–120)
ALT FLD-CCNC: 94 U/L — HIGH (ref 10–45)
ANION GAP SERPL CALC-SCNC: 8 MMOL/L — SIGNIFICANT CHANGE UP (ref 5–17)
AST SERPL-CCNC: 124 U/L — HIGH (ref 10–40)
BILIRUB SERPL-MCNC: 0.5 MG/DL — SIGNIFICANT CHANGE UP (ref 0.2–1.2)
BUN SERPL-MCNC: 24 MG/DL — HIGH (ref 7–23)
CALCIUM SERPL-MCNC: 8.9 MG/DL — SIGNIFICANT CHANGE UP (ref 8.4–10.5)
CHLORIDE SERPL-SCNC: 102 MMOL/L — SIGNIFICANT CHANGE UP (ref 96–108)
CO2 SERPL-SCNC: 27 MMOL/L — SIGNIFICANT CHANGE UP (ref 22–31)
CREAT SERPL-MCNC: 2.18 MG/DL — HIGH (ref 0.5–1.3)
CULTURE RESULTS: SIGNIFICANT CHANGE UP
CULTURE RESULTS: SIGNIFICANT CHANGE UP
EGFR: 25 ML/MIN/1.73M2 — LOW
GLUCOSE SERPL-MCNC: 115 MG/DL — HIGH (ref 70–99)
HCT VFR BLD CALC: 28.9 % — LOW (ref 34.5–45)
HGB BLD-MCNC: 10 G/DL — LOW (ref 11.5–15.5)
MCHC RBC-ENTMCNC: 29.7 PG — SIGNIFICANT CHANGE UP (ref 27–34)
MCHC RBC-ENTMCNC: 34.6 GM/DL — SIGNIFICANT CHANGE UP (ref 32–36)
MCV RBC AUTO: 85.8 FL — SIGNIFICANT CHANGE UP (ref 80–100)
MRSA PCR RESULT.: SIGNIFICANT CHANGE UP
NRBC # BLD: 0 /100 WBCS — SIGNIFICANT CHANGE UP (ref 0–0)
PLATELET # BLD AUTO: 220 K/UL — SIGNIFICANT CHANGE UP (ref 150–400)
POTASSIUM SERPL-MCNC: 3.8 MMOL/L — SIGNIFICANT CHANGE UP (ref 3.5–5.3)
POTASSIUM SERPL-SCNC: 3.8 MMOL/L — SIGNIFICANT CHANGE UP (ref 3.5–5.3)
PROT SERPL-MCNC: 6.9 G/DL — SIGNIFICANT CHANGE UP (ref 6–8.3)
RBC # BLD: 3.37 M/UL — LOW (ref 3.8–5.2)
RBC # FLD: 13.9 % — SIGNIFICANT CHANGE UP (ref 10.3–14.5)
S AUREUS DNA NOSE QL NAA+PROBE: SIGNIFICANT CHANGE UP
SODIUM SERPL-SCNC: 137 MMOL/L — SIGNIFICANT CHANGE UP (ref 135–145)
SPECIMEN SOURCE: SIGNIFICANT CHANGE UP
SPECIMEN SOURCE: SIGNIFICANT CHANGE UP
VANCOMYCIN TROUGH SERPL-MCNC: 18.3 UG/ML — SIGNIFICANT CHANGE UP (ref 10–20)
WBC # BLD: 4.37 K/UL — SIGNIFICANT CHANGE UP (ref 3.8–10.5)
WBC # FLD AUTO: 4.37 K/UL — SIGNIFICANT CHANGE UP (ref 3.8–10.5)

## 2022-11-30 PROCEDURE — 99233 SBSQ HOSP IP/OBS HIGH 50: CPT

## 2022-11-30 RX ORDER — PIPERACILLIN AND TAZOBACTAM 4; .5 G/20ML; G/20ML
3.38 INJECTION, POWDER, LYOPHILIZED, FOR SOLUTION INTRAVENOUS EVERY 12 HOURS
Refills: 0 | Status: DISCONTINUED | OUTPATIENT
Start: 2022-11-30 | End: 2022-12-01

## 2022-11-30 RX ADMIN — Medication 100 MILLIGRAM(S): at 02:05

## 2022-11-30 RX ADMIN — Medication 100 MILLIGRAM(S): at 21:22

## 2022-11-30 RX ADMIN — Medication 250 MILLIGRAM(S): at 17:07

## 2022-11-30 RX ADMIN — SODIUM CHLORIDE 100 MILLILITER(S): 9 INJECTION, SOLUTION INTRAVENOUS at 21:22

## 2022-11-30 RX ADMIN — PIPERACILLIN AND TAZOBACTAM 25 GRAM(S): 4; .5 INJECTION, POWDER, LYOPHILIZED, FOR SOLUTION INTRAVENOUS at 06:07

## 2022-11-30 RX ADMIN — Medication 100 MILLIGRAM(S): at 13:46

## 2022-11-30 RX ADMIN — Medication 250 MILLIGRAM(S): at 06:07

## 2022-11-30 RX ADMIN — HEPARIN SODIUM 5000 UNIT(S): 5000 INJECTION INTRAVENOUS; SUBCUTANEOUS at 06:07

## 2022-11-30 RX ADMIN — PANTOPRAZOLE SODIUM 40 MILLIGRAM(S): 20 TABLET, DELAYED RELEASE ORAL at 06:07

## 2022-11-30 RX ADMIN — HEPARIN SODIUM 5000 UNIT(S): 5000 INJECTION INTRAVENOUS; SUBCUTANEOUS at 17:07

## 2022-11-30 RX ADMIN — Medication 100 MILLIGRAM(S): at 11:50

## 2022-11-30 RX ADMIN — Medication 48 MILLIGRAM(S): at 11:52

## 2022-11-30 RX ADMIN — PIPERACILLIN AND TAZOBACTAM 25 GRAM(S): 4; .5 INJECTION, POWDER, LYOPHILIZED, FOR SOLUTION INTRAVENOUS at 17:07

## 2022-11-30 RX ADMIN — Medication 100 MILLIGRAM(S): at 19:35

## 2022-11-30 NOTE — PROGRESS NOTE ADULT - SUBJECTIVE AND OBJECTIVE BOX
Patient is a 62y old  Female who presents with a chief complaint of pneumonia, vomiting (30 Nov 2022 10:31)    BRIEF HOSPITAL COURSE:   63 y/o F with PMH HTN, HLD and recent ED visit for PNA on doxycycline and cefpodoxime c/o worsening malaise, poor PO and new, persistent diarrhea admitted for sepsis due to RLL pneumonia, MAMADOU.    Events last 24 hours:   -Renal indices still elevated, slightly improving w/ mIVF.  -Afebrile, hemodynamics stable.     PAST MEDICAL & SURGICAL HISTORY:  Uterine Leiomyoma  Cholelithiasis  H/O  History of Tonsillectomy  S/P Biopsy Endometrial  Migraine  H/O Cholecystectomy    Review of Systems:  CONSTITUTIONAL: No fever, chills, or fatigue  EYES: No eye pain, visual disturbances, or discharge  ENMT:  No difficulty hearing, tinnitus, vertigo; No sinus or throat pain  NECK: No pain or stiffness  RESPIRATORY: No cough, wheezing, chills or hemoptysis; No shortness of breath  CARDIOVASCULAR: No chest pain, palpitations, dizziness, or leg swelling  GASTROINTESTINAL: No abdominal or epigastric pain. No nausea, vomiting, or hematemesis; No diarrhea or constipation. No melena or hematochezia.  GENITOURINARY: No dysuria, frequency, hematuria, or incontinence  NEUROLOGICAL: No headaches, memory loss, loss of strength, numbness, or tremors  SKIN: No itching, burning, rashes, or lesions   MUSCULOSKELETAL: No joint pain or swelling; No muscle, back, or extremity pain  PSYCHIATRIC: No depression, anxiety, mood swings, or difficulty sleeping    Medications:  piperacillin/tazobactam IVPB.. 3.375 Gram(s) IV Intermittent every 12 hours  albuterol/ipratropium for Nebulization 3 milliLiter(s) Nebulizer every 6 hours PRN  benzonatate 100 milliGRAM(s) Oral three times a day  guaiFENesin Oral Liquid (Sugar-Free) 100 milliGRAM(s) Oral every 6 hours PRN  acetaminophen     Tablet .. 650 milliGRAM(s) Oral every 6 hours PRN  melatonin 3 milliGRAM(s) Oral at bedtime PRN  ondansetron Injectable 4 milliGRAM(s) IV Push every 8 hours PRN  heparin   Injectable 5000 Unit(s) SubCutaneous every 12 hours  pantoprazole    Tablet 40 milliGRAM(s) Oral before breakfast  fenofibrate Tablet 48 milliGRAM(s) Oral daily  lactated ringers. 500 milliLiter(s) IV Continuous <Continuous>  influenza   Vaccine 0.5 milliLiter(s) IntraMuscular once  saccharomyces boulardii 250 milliGRAM(s) Oral two times a day    ICU Vital Signs Last 24 Hrs  T(C): 36.9 (30 Nov 2022 19:38), Max: 36.9 (30 Nov 2022 19:38)  T(F): 98.4 (30 Nov 2022 19:38), Max: 98.4 (30 Nov 2022 19:38)  HR: 86 (30 Nov 2022 19:38) (82 - 89)  BP: 121/76 (30 Nov 2022 19:38) (110/65 - 126/63)  BP(mean): --  ABP: --  ABP(mean): --  RR: 16 (30 Nov 2022 19:38) (16 - 18)  SpO2: 97% (30 Nov 2022 19:38) (94% - 97%)  O2 Parameters below as of 30 Nov 2022 19:38  Patient On (Oxygen Delivery Method): room air    I&O's Detail  29 Nov 2022 07:01  -  30 Nov 2022 07:00  --------------------------------------------------------  IN:    Lactated Ringers: 600 mL    Lactated Ringers: 450 mL    Oral Fluid: 720 mL  Total IN: 1770 mL  OUT:  Total OUT: 0 mL  Total NET: 1770 mL    30 Nov 2022 07:01  -  30 Nov 2022 19:59  --------------------------------------------------------  IN:    Lactated Ringers: 1000 mL    Oral Fluid: 480 mL  Total IN: 1480 mL  OUT:  Total OUT: 0 mL  Total NET: 1480 mL    LABS:                      10.0   4.37  )-----------( 220      ( 30 Nov 2022 06:45 )             28.9     11-30  137  |  102  |  24<H>  ----------------------------<  115<H>  3.8   |  27  |  2.18<H>  Ca    8.9      30 Nov 2022 06:45  Mg     1.8     11-29  TPro  6.9  /  Alb  2.5<L>  /  TBili  0.5  /  DBili  x   /  AST  124<H>  /  ALT  94<H>  /  AlkPhos  89  11-30    CAPILLARY BLOOD GLUCOSE    CULTURES:  C Diff by PCR Result: NotDetec (11-28-22 @ 10:10)  Culture Results:   No enteric pathogens isolated.  (Stool culture examined for Salmonella,  Shigella, Campylobacter, Aeromonas, Plesiomonas,  Vibrio, E.coli O157 and Yersinia)  Few Yeast like cells (11-26-22 @ 14:30)  Culture Results:   <10,000 CFU/mL Normal Urogenital Nuzhat (11-25-22 @ 20:00)  Culture Results:   No growth to date. (11-25-22 @ 16:05)  Rapid RVP Result: NotDetec (11-25-22 @ 16:00)  Culture Results:   No growth to date. (11-25-22 @ 16:00)    Physical Examination:  General: Alert, oriented, interactive, nonfocal.  HEENT: PERRL.  NECK: Supple.   PULM: Clear to auscultation bilaterally.  CVS: s1/s2.  ABD: Soft, nondistended, nontender, normoactive bowel sounds.  EXT: No edema, nontender.  SKIN: Warm.      RADIOLOGY:   < from: US Duplex Venous Lower Ext Complete, Bilateral (11.29.22 @ 15:58) >  ACC: [id number] EXAM:  US DPLX LWR EXT VEINS COMPL BI                        PROCEDURE DATE:  11/29/2022    IMPRESSION:  No evidence of deep venous thrombosis in either lower extremity.  There is slow Rouleaux flow bilaterally.    < from: US Renal (11.29.22 @ 15:54) >  ACC: [id number] EXAM:  US KIDNEY(S)                        PROCEDURE DATE:  11/29/2022    IMPRESSION:  Normal renal ultrasound. More specifically ,no evidence of hydronephrosis      63 y/o F with PMH HTN, HLD and recent ED visit for PNA on doxycycline and cefpodoxime c/o worsening malaise, poor PO and new, persistent diarrhea admitted for sepsis due to RLL pneumonia, MAMADOU    Plan:  Melatonin for adequate sleep/wake cycle.  Maintain goal MAP >65. Keep K~4 and Mg>2 for optimal arrhythmia suppression.  Satting well on RA, goal SpO2 >88%. Incentive spirometry. Albuterol PRN. Anti-tussives PRN.   DASH/TLC diet. Protonix QD. Fenofibrate QD. Zofran PRN. Diarrhea improved, CDif negative.   Renal function currently w/ MAMADOU, slightly improving w/ mIVF. Trend lytes/Scr daily with BMP, Strict I's and O's, goal UOP 0.5 cc/kg/hr, renal dose meds and avoid nephrotoxins. LR 100cc/h. Nephrology consult.   Aggressive glycemic control to limit FS glucose to <180mg/dl. B WNL.  BloodCx, UrineCx NGTD. Empiric Zosyn. ABX use and/or discontinuation based on discussion with ID in conjunction with clinical features, culture data, and judicious procalcitonin monitoring.  Pharmacologic DVT Prophylaxis in addition to SCD's w/ SQH.  PT/OT evaluation.   GOC: Full Code.    TIME SPENT: 35 minutes   Evaluating/treating patient, reviewing data/labs/imaging, discussing case with multidisciplinary team, discussing plan/goals of care with patient/family. Non-inclusive of procedure time.

## 2022-11-30 NOTE — PROGRESS NOTE ADULT - ASSESSMENT
63 y/o F with PMH HTN, HLD and recent ED visit for PNA on doxycycline and cefpodoxime c/o worsening malaise, poor PO and new, persistent diarrhea admitted for sepsis due to RLL pneumonia, r/o c.diff.    Sepsis due to RLL pneumonia - criteria met on admission, now improving  Failed outpatient treatment by not being able to tolerate PO  -CTA (11/23) c/w RLL Pneumonia  -Afebrile  -Comfortable on room air  -Continue with Zosyn (11/26- )  -Vancomycin (11/27-11/29). held for kayleen. vanc trough reviewed. will send MRSA swab to assess need for vanco  -Blood cx 11/25 NGTD. Urine cx 11/25 NGTD. Stool cx 11/26 NGTD. Legionella negative  -Tylenol 650mg po q6hrs PRN for fever  -Robitussin PRN for cough. Tessalon Perle ATC  -Albuterol q6h   -Cont IVF    Diarrhea - c.diff ruled out  -CTAP without acute abd pathology   -Stool culture and C diff pcr negative  -Probiotics    Hyponatremia - improved  Hypokalemia - improved  Hypomagnesemia  -Replete and monitor    Hypertension  -Home med Valsartan/HCTZ 80mg/12.5mg qd held for hypotension, diarrhea    Hyperlipidemia  -Continue fenofibrate    Mild hepatic steatosis   Elevated LFTs  -IVF, monitor    Old LBBB  -TTE (11/26) LVEF 60 - 65%. Normal global LVSF. Mild septal LVH. Thickening of the anterior and posterior mitral valve leaflets  -Cardio consulted, no evidence of ACS. May need ischemic workup once stabilized     Elevated ddimer  -LE dopplers neg for dvt. renal sono unremarkable  -CTPa neg 11/23     DVT ppx - HSQ  Dispo - anticipate transition to PO meds 12/1 for discharge home, pending afebrile, and improvement in renal function

## 2022-11-30 NOTE — PROGRESS NOTE ADULT - SUBJECTIVE AND OBJECTIVE BOX
Patient is a 62y old  Female who presents with a chief complaint of pneumonia, vomiting (29 Nov 2022 20:11)      Patient seen and examined at bedside. overall improving. diarrhea improved. denies headache, fever, chills, cp, sob, n/v, abd pain.      ALLERGIES:  latex (Unknown)  salicylates (Anaphylaxis)    MEDICATIONS  (STANDING):  benzonatate 100 milliGRAM(s) Oral three times a day  fenofibrate Tablet 48 milliGRAM(s) Oral daily  heparin   Injectable 5000 Unit(s) SubCutaneous every 12 hours  influenza   Vaccine 0.5 milliLiter(s) IntraMuscular once  lactated ringers. 500 milliLiter(s) (100 mL/Hr) IV Continuous <Continuous>  pantoprazole    Tablet 40 milliGRAM(s) Oral before breakfast  piperacillin/tazobactam IVPB.. 3.375 Gram(s) IV Intermittent every 8 hours  saccharomyces boulardii 250 milliGRAM(s) Oral two times a day    MEDICATIONS  (PRN):  acetaminophen     Tablet .. 650 milliGRAM(s) Oral every 6 hours PRN Temp greater or equal to 38C (100.4F), Mild Pain (1 - 3)  albuterol/ipratropium for Nebulization 3 milliLiter(s) Nebulizer every 6 hours PRN Shortness of Breath and/or Wheezing  guaiFENesin Oral Liquid (Sugar-Free) 100 milliGRAM(s) Oral every 6 hours PRN Cough  melatonin 3 milliGRAM(s) Oral at bedtime PRN Insomnia  ondansetron Injectable 4 milliGRAM(s) IV Push every 8 hours PRN Nausea and/or Vomiting    Vital Signs Last 24 Hrs  T(F): 98.2 (30 Nov 2022 06:32), Max: 98.2 (29 Nov 2022 16:53)  HR: 89 (30 Nov 2022 06:32) (82 - 94)  BP: 110/68 (30 Nov 2022 06:32) (110/65 - 135/75)  RR: 18 (30 Nov 2022 06:32) (16 - 18)  SpO2: 97% (30 Nov 2022 06:32) (94% - 97%)  I&O's Summary    29 Nov 2022 07:01  -  30 Nov 2022 07:00  --------------------------------------------------------  IN: 1770 mL / OUT: 0 mL / NET: 1770 mL      BMI (kg/m2): 25.5 (11-25-22 @ 15:00)  PHYSICAL EXAM:  General: NAD, A/O x 3  ENT: MM dry, no scleral icterus  Neck: Supple, No JVD  Lungs: Clear to auscultation bilaterally, no wheezes, rales, rhonchi  Cardio: RRR, S1/S2  Abdomen: Soft, Nontender, Nondistended; Bowel sounds present  Extremities: No calf tenderness, No pitting edema    LABS:                        10.0   4.37  )-----------( 220      ( 30 Nov 2022 06:45 )             28.9       11-30    137  |  102  |  24  ----------------------------<  115  3.8   |  27  |  2.18    Ca    8.9      30 Nov 2022 06:45  Mg     1.8     11-29    TPro  6.9  /  Alb  2.5  /  TBili  0.5  /  DBili  x   /  AST  124  /  ALT  94  /  AlkPhos  89  11-30                                      Culture - Stool (collected 26 Nov 2022 14:30)  Source: .Stool Feces  Final Report (28 Nov 2022 15:39):    No enteric pathogens isolated.    (Stool culture examined for Salmonella,    Shigella, Campylobacter, Aeromonas, Plesiomonas,    Vibrio, E.coli O157 and Yersinia)    Few Yeast like cells    Culture - Urine (collected 25 Nov 2022 20:00)  Source: Clean Catch Clean Catch (Midstream)  Final Report (26 Nov 2022 22:19):    <10,000 CFU/mL Normal Urogenital Nuzhat    Culture - Blood (collected 25 Nov 2022 16:05)  Source: .Blood Blood-Peripheral  Preliminary Report (26 Nov 2022 23:02):    No growth to date.    Culture - Blood (collected 25 Nov 2022 16:00)  Source: .Blood Blood-Peripheral  Preliminary Report (26 Nov 2022 23:02):    No growth to date.          RADIOLOGY & ADDITIONAL TESTS: reviewed

## 2022-12-01 ENCOUNTER — TRANSCRIPTION ENCOUNTER (OUTPATIENT)
Age: 62
End: 2022-12-01

## 2022-12-01 VITALS
TEMPERATURE: 98 F | HEART RATE: 92 BPM | DIASTOLIC BLOOD PRESSURE: 61 MMHG | OXYGEN SATURATION: 93 % | RESPIRATION RATE: 16 BRPM | SYSTOLIC BLOOD PRESSURE: 125 MMHG

## 2022-12-01 LAB
ALBUMIN SERPL ELPH-MCNC: 2.5 G/DL — LOW (ref 3.3–5)
ALP SERPL-CCNC: 115 U/L — SIGNIFICANT CHANGE UP (ref 40–120)
ALT FLD-CCNC: 124 U/L — HIGH (ref 10–45)
ANION GAP SERPL CALC-SCNC: 8 MMOL/L — SIGNIFICANT CHANGE UP (ref 5–17)
AST SERPL-CCNC: 162 U/L — HIGH (ref 10–40)
BILIRUB SERPL-MCNC: 0.5 MG/DL — SIGNIFICANT CHANGE UP (ref 0.2–1.2)
BUN SERPL-MCNC: 21 MG/DL — SIGNIFICANT CHANGE UP (ref 7–23)
CALCIUM SERPL-MCNC: 8.7 MG/DL — SIGNIFICANT CHANGE UP (ref 8.4–10.5)
CHLORIDE SERPL-SCNC: 101 MMOL/L — SIGNIFICANT CHANGE UP (ref 96–108)
CO2 SERPL-SCNC: 26 MMOL/L — SIGNIFICANT CHANGE UP (ref 22–31)
CREAT SERPL-MCNC: 1.84 MG/DL — HIGH (ref 0.5–1.3)
EGFR: 30 ML/MIN/1.73M2 — LOW
GLUCOSE SERPL-MCNC: 104 MG/DL — HIGH (ref 70–99)
HCT VFR BLD CALC: 28.9 % — LOW (ref 34.5–45)
HGB BLD-MCNC: 9.7 G/DL — LOW (ref 11.5–15.5)
MCHC RBC-ENTMCNC: 29 PG — SIGNIFICANT CHANGE UP (ref 27–34)
MCHC RBC-ENTMCNC: 33.6 GM/DL — SIGNIFICANT CHANGE UP (ref 32–36)
MCV RBC AUTO: 86.3 FL — SIGNIFICANT CHANGE UP (ref 80–100)
NRBC # BLD: 0 /100 WBCS — SIGNIFICANT CHANGE UP (ref 0–0)
PLATELET # BLD AUTO: 248 K/UL — SIGNIFICANT CHANGE UP (ref 150–400)
POTASSIUM SERPL-MCNC: 3.7 MMOL/L — SIGNIFICANT CHANGE UP (ref 3.5–5.3)
POTASSIUM SERPL-SCNC: 3.7 MMOL/L — SIGNIFICANT CHANGE UP (ref 3.5–5.3)
PROT SERPL-MCNC: 7 G/DL — SIGNIFICANT CHANGE UP (ref 6–8.3)
RBC # BLD: 3.35 M/UL — LOW (ref 3.8–5.2)
RBC # FLD: 13.9 % — SIGNIFICANT CHANGE UP (ref 10.3–14.5)
SODIUM SERPL-SCNC: 135 MMOL/L — SIGNIFICANT CHANGE UP (ref 135–145)
WBC # BLD: 4.18 K/UL — SIGNIFICANT CHANGE UP (ref 3.8–10.5)
WBC # FLD AUTO: 4.18 K/UL — SIGNIFICANT CHANGE UP (ref 3.8–10.5)

## 2022-12-01 PROCEDURE — 81001 URINALYSIS AUTO W/SCOPE: CPT

## 2022-12-01 PROCEDURE — 93005 ELECTROCARDIOGRAM TRACING: CPT

## 2022-12-01 PROCEDURE — 87640 STAPH A DNA AMP PROBE: CPT

## 2022-12-01 PROCEDURE — 93970 EXTREMITY STUDY: CPT

## 2022-12-01 PROCEDURE — 96375 TX/PRO/DX INJ NEW DRUG ADDON: CPT

## 2022-12-01 PROCEDURE — 84145 PROCALCITONIN (PCT): CPT

## 2022-12-01 PROCEDURE — 85730 THROMBOPLASTIN TIME PARTIAL: CPT

## 2022-12-01 PROCEDURE — 84484 ASSAY OF TROPONIN QUANT: CPT

## 2022-12-01 PROCEDURE — 87086 URINE CULTURE/COLONY COUNT: CPT

## 2022-12-01 PROCEDURE — 87641 MR-STAPH DNA AMP PROBE: CPT

## 2022-12-01 PROCEDURE — 87493 C DIFF AMPLIFIED PROBE: CPT

## 2022-12-01 PROCEDURE — 85025 COMPLETE CBC W/AUTO DIFF WBC: CPT

## 2022-12-01 PROCEDURE — 87040 BLOOD CULTURE FOR BACTERIA: CPT

## 2022-12-01 PROCEDURE — 85610 PROTHROMBIN TIME: CPT

## 2022-12-01 PROCEDURE — 74176 CT ABD & PELVIS W/O CONTRAST: CPT

## 2022-12-01 PROCEDURE — 99285 EMERGENCY DEPT VISIT HI MDM: CPT | Mod: 25

## 2022-12-01 PROCEDURE — 83605 ASSAY OF LACTIC ACID: CPT

## 2022-12-01 PROCEDURE — 76775 US EXAM ABDO BACK WALL LIM: CPT

## 2022-12-01 PROCEDURE — 86803 HEPATITIS C AB TEST: CPT

## 2022-12-01 PROCEDURE — 36415 COLL VENOUS BLD VENIPUNCTURE: CPT

## 2022-12-01 PROCEDURE — 80053 COMPREHEN METABOLIC PANEL: CPT

## 2022-12-01 PROCEDURE — 87449 NOS EACH ORGANISM AG IA: CPT

## 2022-12-01 PROCEDURE — 87046 STOOL CULTR AEROBIC BACT EA: CPT

## 2022-12-01 PROCEDURE — 94640 AIRWAY INHALATION TREATMENT: CPT

## 2022-12-01 PROCEDURE — 83735 ASSAY OF MAGNESIUM: CPT

## 2022-12-01 PROCEDURE — 96365 THER/PROPH/DIAG IV INF INIT: CPT

## 2022-12-01 PROCEDURE — 85379 FIBRIN DEGRADATION QUANT: CPT

## 2022-12-01 PROCEDURE — 80202 ASSAY OF VANCOMYCIN: CPT

## 2022-12-01 PROCEDURE — 85027 COMPLETE CBC AUTOMATED: CPT

## 2022-12-01 PROCEDURE — 87045 FECES CULTURE AEROBIC BACT: CPT

## 2022-12-01 PROCEDURE — 99239 HOSP IP/OBS DSCHRG MGMT >30: CPT

## 2022-12-01 PROCEDURE — 71045 X-RAY EXAM CHEST 1 VIEW: CPT

## 2022-12-01 PROCEDURE — 84702 CHORIONIC GONADOTROPIN TEST: CPT

## 2022-12-01 PROCEDURE — 0225U NFCT DS DNA&RNA 21 SARSCOV2: CPT

## 2022-12-01 PROCEDURE — 80048 BASIC METABOLIC PNL TOTAL CA: CPT

## 2022-12-01 RX ORDER — CEFUROXIME AXETIL 250 MG
1 TABLET ORAL
Qty: 4 | Refills: 0
Start: 2022-12-01 | End: 2022-12-02

## 2022-12-01 RX ORDER — SACCHAROMYCES BOULARDII 250 MG
1 POWDER IN PACKET (EA) ORAL
Qty: 4 | Refills: 0
Start: 2022-12-01 | End: 2022-12-02

## 2022-12-01 RX ORDER — ACETAMINOPHEN 500 MG
2 TABLET ORAL
Qty: 0 | Refills: 0 | DISCHARGE
Start: 2022-12-01

## 2022-12-01 RX ADMIN — HEPARIN SODIUM 5000 UNIT(S): 5000 INJECTION INTRAVENOUS; SUBCUTANEOUS at 05:42

## 2022-12-01 RX ADMIN — Medication 100 MILLIGRAM(S): at 05:42

## 2022-12-01 RX ADMIN — Medication 250 MILLIGRAM(S): at 05:42

## 2022-12-01 RX ADMIN — Medication 48 MILLIGRAM(S): at 13:13

## 2022-12-01 RX ADMIN — Medication 100 MILLIGRAM(S): at 13:12

## 2022-12-01 RX ADMIN — PIPERACILLIN AND TAZOBACTAM 25 GRAM(S): 4; .5 INJECTION, POWDER, LYOPHILIZED, FOR SOLUTION INTRAVENOUS at 05:43

## 2022-12-01 RX ADMIN — PANTOPRAZOLE SODIUM 40 MILLIGRAM(S): 20 TABLET, DELAYED RELEASE ORAL at 05:42

## 2022-12-01 NOTE — DISCHARGE NOTE PROVIDER - HOSPITAL COURSE
63 y/o F with PMH HTN, HLD and recent ED visit for PNA on doxycycline and cefpodoxime c/o worsening malaise, poor PO and new, persistent diarrhea admitted for sepsis due to RLL pneumonia    Sepsis due to RLL pneumonia - criteria met on admission, now improving  Failed outpatient treatment by not being able to tolerate PO  -CTA (11/23) c/w RLL Pneumonia  -sepsis resolved.  -s/p 5 days of zosyn   -vancomycin held due to MAMADOU  -renal function improved, nausea and diarrhea resolved, pt is tolerating PO diet and is stable for discharge home    antibiotics: two more days of ceftin 250mg BID till 12/3    PMD Dr. Jones notified

## 2022-12-01 NOTE — DISCHARGE NOTE NURSING/CASE MANAGEMENT/SOCIAL WORK - NSDCPEFALRISK_GEN_ALL_CORE
For information on Fall & Injury Prevention, visit: [email or web address] OR  [email or web address] OR  https://www.cdc.gov/steadi/patient.html

## 2022-12-01 NOTE — DISCHARGE NOTE PROVIDER - CARE PROVIDER_API CALL
Ana Maria Jones)  Family Medicine  [address] [place] [address], [address]  [place] [address]  Phone: [phone]  Fax: [phone]  Follow Up Time:

## 2022-12-01 NOTE — DISCHARGE NOTE NURSING/CASE MANAGEMENT/SOCIAL WORK - NSFLUVACAGEDISCH_IMM_ALL_CORE
[facility] PREOPERATIVE PATIENT INSTRUCTIONS   ?  PARKING:   [employer] [facility]. [facility]: Parking Garage D on [address] and [facility]. Pedestrian Walkway bridge is located on the 2nd floor of [employer] [address].   .   GENERAL INSTRUCTIONS:  • One family members/friend who is over the age of 18 may accompany you. A responsible person MUST accompany you home and stay with you the first 24 hours after surgery. Your surgery will be cancelled if these arrangements have not been made.   • If you become ill prior to surgery (I.e. fever, vomiting), please notify your surgeon immediately.   • Children under 12 years old MUST have a parent with them at all times.   • On the day of surgery: Do not wear contact lenses, make up, nail polish or jewelry. Leave all valuables at home except what you will need or are instructed to bring.   • For your convenience, [facility] pharmacy is available to fill medications. Please bring a form of payments accepted at [facility]' locations.   BRING WITH YOU ON DAY OF SURGERY:  1. Insurance Card and referral (if required), 's license or photo ID  2. All your medications in their original pharmacy bottles  3. Advance directive (living will, Healthcare Power of )  4. All information on your pacemaker or AICD, if appropriate  5. Any other forms, x-rays or films the doctor may have given you  6. Any medical devices (I.e. crutches, braces, sling)  7. Loose fitting clothing, slippers, walking shoes if applicable  8. For comfort measures, you may bring headphones/music device/magazines that can be given to family when you go into the operating room  9. Favorite toy of blanket for children. Formula for feeding after surgery or favorite sippy cup.     [facility] patients please check in with  Desk right off the 2nd floor Pedestrian walkway entrance.   ?  Acceptable clear liquids such as water, pre-surgical ensure/propel, non-colored clear gatorade, apple juice without  fiber, pedialyte, 7-up/Sprite, black coffee or tea without milk products or lemon - can be given 1 hour prior to arrival.  ?  Do NOT eat or consume dairy after midnight. This includes candy or gum.  ?  Breast milk is allowed up to 2 hours prior to arrival     Formula/Cow's milk is allowed up to 4 hours prior to arrival. DO NOT ADD anything such as cereal to these or surgery can be cancelled or delayed.    Please call the location of surgery with questions: [facility] (up to 6 PM) [phone]; Hospital (up to 7 PM) [phone].   After hours for both locations: [phone]  ?  This information has been reviewed with the patient on 05/21/21 9:20 AM.      Adult

## 2022-12-01 NOTE — DISCHARGE NOTE PROVIDER - NSDCFUADDAPPT_GEN_ALL_CORE_FT
We made you a hospital followup appointment with Dr. Liu on 12/8/2022 at 10:15am, office #[phone].

## 2022-12-01 NOTE — PROGRESS NOTE ADULT - SUBJECTIVE AND OBJECTIVE BOX
Patient is a 62y old  Female who presents with a chief complaint of pneumonia, vomiting (01 Dec 2022 14:25)      Subjective and overnight events:  Patient seen and examined at bedside. no complaints. feeling much better. nausae/vomiting/diarrhea resolved. tolerating po diet. no SOB, CP, fever, chills.    ALLERGIES:  latex (Unknown)  salicylates (Anaphylaxis)    MEDICATIONS  (STANDING):  benzonatate 100 milliGRAM(s) Oral three times a day  fenofibrate Tablet 48 milliGRAM(s) Oral daily  heparin   Injectable 5000 Unit(s) SubCutaneous every 12 hours  influenza   Vaccine 0.5 milliLiter(s) IntraMuscular once  lactated ringers. 500 milliLiter(s) (100 mL/Hr) IV Continuous <Continuous>  pantoprazole    Tablet 40 milliGRAM(s) Oral before breakfast  piperacillin/tazobactam IVPB.. 3.375 Gram(s) IV Intermittent every 12 hours  saccharomyces boulardii 250 milliGRAM(s) Oral two times a day    MEDICATIONS  (PRN):  acetaminophen     Tablet .. 650 milliGRAM(s) Oral every 6 hours PRN Temp greater or equal to 38C (100.4F), Mild Pain (1 - 3)  albuterol/ipratropium for Nebulization 3 milliLiter(s) Nebulizer every 6 hours PRN Shortness of Breath and/or Wheezing  guaiFENesin Oral Liquid (Sugar-Free) 100 milliGRAM(s) Oral every 6 hours PRN Cough  melatonin 3 milliGRAM(s) Oral at bedtime PRN Insomnia  ondansetron Injectable 4 milliGRAM(s) IV Push every 8 hours PRN Nausea and/or Vomiting    Vital Signs Last 24 Hrs  T(F): 97.8 (01 Dec 2022 05:29), Max: 98.4 (30 Nov 2022 19:38)  HR: 92 (01 Dec 2022 05:29) (84 - 92)  BP: 125/61 (01 Dec 2022 05:29) (121/76 - 126/63)  RR: 16 (01 Dec 2022 05:29) (16 - 16)  SpO2: 93% (01 Dec 2022 05:29) (93% - 97%)  I&O's Summary    30 Nov 2022 07:01  -  01 Dec 2022 07:00  --------------------------------------------------------  IN: 2780 mL / OUT: 0 mL / NET: 2780 mL    01 Dec 2022 07:01  -  01 Dec 2022 15:10  --------------------------------------------------------  IN: 240 mL / OUT: 0 mL / NET: 240 mL      PHYSICAL EXAM:  General: NAD, A/O x 3  ENT: MMM  Neck: Supple, No JVD  Lungs: Clear to auscultation bilaterally  Cardio: RRR, S1/S2, No murmurs  Abdomen: Soft, Nontender, Nondistended; Bowel sounds present  Extremities: No calf tenderness, No pitting edema    LABS:                        9.7    4.18  )-----------( 248      ( 01 Dec 2022 07:12 )             28.9     12-01    135  |  101  |  21  ----------------------------<  104  3.7   |  26  |  1.84    Ca    8.7      01 Dec 2022 07:12  Mg     1.8     11-29    TPro  7.0  /  Alb  2.5  /  TBili  0.5  /  DBili  x   /  AST  162  /  ALT  124  /  AlkPhos  115  12-01              Culture - Stool (collected 26 Nov 2022 14:30)  Source: .Stool Feces  Final Report (28 Nov 2022 15:39):    No enteric pathogens isolated.    (Stool culture examined for Salmonella,    Shigella, Campylobacter, Aeromonas, Plesiomonas,    Vibrio, E.coli O157 and Yersinia)    Few Yeast like cells    Culture - Urine (collected 25 Nov 2022 20:00)  Source: Clean Catch Clean Catch (Midstream)  Final Report (26 Nov 2022 22:19):    <10,000 CFU/mL Normal Urogenital Nuzhat    Culture - Blood (collected 25 Nov 2022 16:05)  Source: .Blood Blood-Peripheral  Final Report (30 Nov 2022 23:01):    No Growth Final    Culture - Blood (collected 25 Nov 2022 16:00)  Source: .Blood Blood-Peripheral  Final Report (30 Nov 2022 23:01):    No Growth Final          RADIOLOGY & ADDITIONAL TESTS:    Care Discussed with Consultants/Other Providers:

## 2022-12-01 NOTE — PROGRESS NOTE ADULT - ASSESSMENT
63 y/o F with PMH HTN, HLD and recent ED visit for PNA on doxycycline and cefpodoxime c/o worsening malaise, poor PO and new, persistent diarrhea admitted for sepsis due to RLL pneumonia, r/o c.diff.    Sepsis due to RLL pneumonia - criteria met on admission, now improving  Failed outpatient treatment by not being able to tolerate PO  -CTA (11/23) c/w RLL Pneumonia  -Afebrile  -Comfortable on room air  -Continue with Zosyn (11/26- )  -Vancomycin (11/27-11/29). held for mamadou. vanc trough reviewed. will send MRSA swab to assess need for vanco  -switch IV abx to ceftin 250mg BID for two more days  -Blood cx 11/25 NGTD. Urine cx 11/25 NGTD. Stool cx 11/26 NGTD. Legionella negative  -Tylenol 650mg po q6hrs PRN for fever  -Robitussin PRN for cough. [employer] ATC  -Albuterol q6h   -Cont IVF    Diarrhea - c.diff ruled out  -diarrhea resolved   -CTAP without acute abd pathology   -Stool culture and C diff pcr negative  -Probiotics    MAMADOU on CKD3 likely pre-renal  -baseline Cr 1.17  -improving    Hyponatremia   Hypokalemia   Hypomagnesemia  -resolved  -Replete and monitor    Hypertension  -Home med Valsartan/HCTZ 80mg/12.5mg qd held for hypotension, diarrhea    Hyperlipidemia  -Continue fenofibrate    Elevated LFTs  -suspect due to hepatic steatosis   -IVF, monitor    Old LBBB  -TTE (11/26) LVEF 60 - 65%. Normal global LVSF. Mild septal LVH. Thickening of the anterior and posterior mitral valve leaflets  -Cardio consulted, no evidence of ACS. May need ischemic workup once stabilized     Elevated ddimer  -LE dopplers neg for dvt. renal sono unremarkable  -CTPa neg 11/23     DVT ppx - HSQ  Dispo -home today

## 2022-12-01 NOTE — DISCHARGE NOTE NURSING/CASE MANAGEMENT/SOCIAL WORK - NSDCFUADDAPPT_GEN_ALL_CORE_FT
We made you a hospital followup appointment with Dr. Liu on 12/8/2022 at 10:15am, office #[phone].

## 2022-12-01 NOTE — PROGRESS NOTE ADULT - PROVIDER SPECIALTY LIST ADULT
Hospitalist
Internal Medicine
Hospitalist
Internal Medicine
Cardiology

## 2022-12-01 NOTE — CHART NOTE - NSCHARTNOTEFT_GEN_A_CORE
Nutrition Follow Up Note  Hospital Course   (Per Electronic Medical Record)    Source:  Patient [X]  Medical Record [X]      Diet:   DASH-TLC Diet w/ Thin Liquids (IDDSI Level 0)  Tolerates Diet Consistency Well  No Chewing/Swallowing Difficulties  No Recent Nausea, Vomiting, Diarrhea or Constipation (as Per Patient)  Consumes 50-75% of Meals (as Per Documentation) - States Fair Intake (Per Patient)   on Banatrol 1 Packet TID (Provides 120kcal & 6grams of Fiber)  Patient Takes Nutrition Supplement   Education Provided on Need for Supplementation     Enteral/Parenteral Nutrition: Not Applicable    Current Weight:   Obtain New Weight  Obtain Weights Weekly    Pertinent Medications: MEDICATIONS  (STANDING):  benzonatate 100 milliGRAM(s) Oral three times a day  fenofibrate Tablet 48 milliGRAM(s) Oral daily  heparin   Injectable 5000 Unit(s) SubCutaneous every 12 hours  influenza   Vaccine 0.5 milliLiter(s) IntraMuscular once  lactated ringers. 500 milliLiter(s) (100 mL/Hr) IV Continuous <Continuous>  pantoprazole    Tablet 40 milliGRAM(s) Oral before breakfast  piperacillin/tazobactam IVPB.. 3.375 Gram(s) IV Intermittent every 12 hours  saccharomyces boulardii 250 milliGRAM(s) Oral two times a day    MEDICATIONS  (PRN):  acetaminophen     Tablet .. 650 milliGRAM(s) Oral every 6 hours PRN Temp greater or equal to 38C (100.4F), Mild Pain (1 - 3)  albuterol/ipratropium for Nebulization 3 milliLiter(s) Nebulizer every 6 hours PRN Shortness of Breath and/or Wheezing  guaiFENesin Oral Liquid (Sugar-Free) 100 milliGRAM(s) Oral every 6 hours PRN Cough  melatonin 3 milliGRAM(s) Oral at bedtime PRN Insomnia  ondansetron Injectable 4 milliGRAM(s) IV Push every 8 hours PRN Nausea and/or Vomiting    Pertinent Labs:  12-01 Na135 mmol/L Glu 104 mg/dL<H> K+ 3.7 mmol/L Cr  1.84 mg/dL<H> BUN 21 mg/dL 12-01 Alb 2.5 g/dL<L>    Skin: No Pressure Ulcers     Edema: None Noted (as Per Documentation)     Last Bowel Movement: on 11/29    Estimated Needs:   [X] No Change Since Previous Assessment    Previous Nutrition Diagnosis:   [X] Altered GI Function     Nutrition Diagnosis is [X] Ongoing -  Improving - No Diarrhea or Constipation (as Per Patient)   Education Provided on Need for Supplementation     New Nutrition Diagnosis: [X] Not Applicable    Interventions:   1. Education Provided on Need for Supplementation   2. Recommend Continue Nutrition Plan of Care     Monitoring & Evaluation:   [X] Weights   [X] PO Intake   [X] Skin Integrity   [X] Follow Up (Per Protocol)  [X] Tolerance to Diet Prescription   [X] Other: Labs     Registered Dietitian/Nutritionist Remains Available.  Aaron Bledsoe RDN    Phone# [phone]

## 2022-12-01 NOTE — DISCHARGE NOTE PROVIDER - NSDCMRMEDTOKEN_GEN_ALL_CORE_FT
acetaminophen 325 mg oral tablet: 2 tab(s) orally every 6 hours, As needed, Temp greater or equal to 38C (100.4F), Mild Pain (1 - 3)  albuterol 90 mcg/inh inhalation aerosol: 2 puff(s) inhaled every 6 hours   benzonatate 100 mg oral capsule: 1 cap(s) orally 3 times a day, As Needed for cough   cefuroxime 250 mg oral tablet: 1 tab(s) orally 2 times a day   fenofibrate 48 mg oral tablet: 1 tab(s) orally once a day  ondansetron 4 mg oral tablet, disintegratin tab(s) orally every 8 hours, As Needed for nausea   saccharomyces boulardii lyo 250 mg oral capsule: 1 cap(s) orally 2 times a day

## 2022-12-01 NOTE — PROGRESS NOTE ADULT - REASON FOR ADMISSION
pneumonia, vomiting

## 2022-12-01 NOTE — DISCHARGE NOTE PROVIDER - NSDCFUSCHEDAPPT_GEN_ALL_CORE_FT
Ana Maria Jones  [facility]  [address]  Scheduled Appointment: 12/08/2022

## 2022-12-01 NOTE — DISCHARGE NOTE PROVIDER - CARE PROVIDERS DIRECT ADDRESSES
[email or web address]

## 2022-12-01 NOTE — DISCHARGE NOTE PROVIDER - NSDCCPCAREPLAN_GEN_ALL_CORE_FT
PRINCIPAL DISCHARGE DIAGNOSIS  Diagnosis: Pneumonia  Assessment and Plan of Treatment: Patient was treated with IV antibiotics. Fever resolved and symptoms improved. Patient is stable for discharge.      SECONDARY DISCHARGE DIAGNOSES  Diagnosis: Pneumonia  Assessment and Plan of Treatment:     Diagnosis: Vomiting  Assessment and Plan of Treatment:

## 2022-12-01 NOTE — DISCHARGE NOTE NURSING/CASE MANAGEMENT/SOCIAL WORK - PATIENT PORTAL LINK FT
You can access the FollowMyHealth Patient Portal offered by [facility] by registering at the following website: [email or web address]. By joining [employer]’s FollowMyHealth portal, you will also be able to view your health information using other applications (apps) compatible with our system.

## 2022-12-06 ENCOUNTER — NON-APPOINTMENT (OUTPATIENT)
Age: 62
End: 2022-12-06

## 2022-12-08 ENCOUNTER — APPOINTMENT (OUTPATIENT)
Dept: FAMILY MEDICINE | Facility: CLINIC | Age: 62
End: 2022-12-08

## 2022-12-08 VITALS
BODY MASS INDEX: 26.17 KG/M2 | OXYGEN SATURATION: 98 % | SYSTOLIC BLOOD PRESSURE: 160 MMHG | TEMPERATURE: 97.1 F | DIASTOLIC BLOOD PRESSURE: 86 MMHG | RESPIRATION RATE: 18 BRPM | HEART RATE: 110 BPM | WEIGHT: 134 LBS

## 2022-12-08 DIAGNOSIS — D64.9 ANEMIA, UNSPECIFIED: ICD-10-CM

## 2022-12-08 PROCEDURE — 99496 TRANSJ CARE MGMT HIGH F2F 7D: CPT | Mod: 25

## 2022-12-08 PROCEDURE — 36415 COLL VENOUS BLD VENIPUNCTURE: CPT

## 2022-12-14 NOTE — PLAN
[FreeTextEntry1] : Chest CT in beginning of January 1st week.patient will call for order at beginning of year. \par Cardiology referral.\par labs obtained.\par  B12 Po supplement.\par stay well hydrated.\par  healthy diet, hydration, supportive care. \par  \par

## 2022-12-14 NOTE — HEALTH RISK ASSESSMENT
[With Patient/Caregiver] : , with patient/caregiver [Never] : Never [No] : In the past 12 months have you used drugs other than those required for medical reasons? No [No falls in past year] : Patient reported no falls in the past year [0] : 2) Feeling down, depressed, or hopeless: Not at all (0) [PHQ-2 Negative - No further assessment needed] : PHQ-2 Negative - No further assessment needed [Patient reported mammogram was normal] : Patient reported mammogram was normal [Patient reported bone density results were abnormal] : Patient reported bone density results were abnormal [HIV test declined] : HIV test declined [None] : None [With Family] : lives with family [Unemployed] : unemployed [] :  [Sexually Active] : sexually active [Feels Safe at Home] : Feels safe at home [Fully functional (bathing, dressing, toileting, transferring, walking, feeding)] : Fully functional (bathing, dressing, toileting, transferring, walking, feeding) [Fully functional (using the telephone, shopping, preparing meals, housekeeping, doing laundry, using] : Fully functional and needs no help or supervision to perform IADLs (using the telephone, shopping, preparing meals, housekeeping, doing laundry, using transportation, managing medications and managing finances) [Independent] : managing finances [Reports normal functional visual acuity (ie: able to read med bottle)] : Reports normal functional visual acuity [de-identified] : sedentary [de-identified] : regular [[id number]] : 0 [Change in mental status noted] : No change in mental status noted [Language] : denies difficulty with language [Behavior] : denies difficulty with behavior [Learning/Retaining New Information] : denies difficulty learning/retaining new information [Handling Complex Tasks] : denies difficulty handling complex tasks [Reasoning] : denies difficulty with reasoning [Spatial Ability and Orientation] : denies difficulty with spatial ability and orientation [Reports changes in hearing] : Reports no changes in hearing [Reports changes in vision] : Reports no changes in vision [Reports changes in dental health] : Reports no changes in dental health [Smoke Detector] : no smoke detector [Carbon Monoxide Detector] : no carbon monoxide detector [Seat Belt] : does not use seat belt [Sunscreen] : does not use sunscreen [MammogramDate] : 05/22 [PapSmearComments] : many years ago  [BoneDensityDate] : 10/17 [BoneDensityComments] : osteopenia [ColonoscopyComments] : pt. will schedule later [AdvancecareDate] : 12/22

## 2022-12-14 NOTE — PHYSICAL EXAM
[No Acute Distress] : no acute distress [Well Developed] : well developed [Ill-Appearing] : ill-appearing [Normal Sclera/Conjunctiva] : normal sclera/conjunctiva [PERRL] : pupils equal round and reactive to light [EOMI] : extraocular movements intact [Normal Outer Ear/Nose] : the outer ears and nose were normal in appearance [Normal Oropharynx] : the oropharynx was normal [No JVD] : no jugular venous distention [No Lymphadenopathy] : no lymphadenopathy [Supple] : supple [Thyroid Normal, No Nodules] : the thyroid was normal and there were no nodules present [No Respiratory Distress] : no respiratory distress  [No Accessory Muscle Use] : no accessory muscle use [Normal Rate] : normal rate  [Regular Rhythm] : with a regular rhythm [Normal S1, S2] : normal S1 and S2 [No Murmur] : no murmur heard [No Carotid Bruits] : no carotid bruits [No Abdominal Bruit] : a ~M bruit was not heard ~T in the abdomen [No Varicosities] : no varicosities [Pedal Pulses Present] : the pedal pulses are present [No Edema] : there was no peripheral edema [No Palpable Aorta] : no palpable aorta [No Extremity Clubbing/Cyanosis] : no extremity clubbing/cyanosis [Soft] : abdomen soft [Non Tender] : non-tender [Non-distended] : non-distended [No Masses] : no abdominal mass palpated [No HSM] : no HSM [Normal Bowel Sounds] : normal bowel sounds [Normal Posterior Cervical Nodes] : no posterior cervical lymphadenopathy [Normal Anterior Cervical Nodes] : no anterior cervical lymphadenopathy [No CVA Tenderness] : no CVA  tenderness [No Spinal Tenderness] : no spinal tenderness [No Joint Swelling] : no joint swelling [Grossly Normal Strength/Tone] : grossly normal strength/tone [No Rash] : no rash [Coordination Grossly Intact] : coordination grossly intact [No Focal Deficits] : no focal deficits [Normal Gait] : normal gait [Deep Tendon Reflexes (DTR)] : deep tendon reflexes were 2+ and symmetric [Normal Affect] : the affect was normal [Normal Insight/Judgement] : insight and judgment were intact [de-identified] : dull breath sound at base [04429 - High Complexity requires an extensive number of possible diagnoses and/or the management options, extensive complexity of the medical data (tests, etc.) to be reviewed, and a high risk of significant complications, morbidity, and/or mortality as w] : High Complexity

## 2022-12-14 NOTE — REVIEW OF SYSTEMS
[Fatigue] : fatigue [Chest Pain] : chest pain [Palpitations] : palpitations [Cough] : cough [Dyspnea on Exertion] : dyspnea on exertion [Negative] : Heme/Lymph [Leg Claudication] : no leg claudication [Lower Ext Edema] : no lower extremity edema [Orthopnea] : no orthopnea [Paroxysmal Nocturnal Dyspnea] : no paroxysmal nocturnal dyspnea [Shortness Of Breath] : no shortness of breath [Wheezing] : no wheezing

## 2022-12-14 NOTE — HISTORY OF PRESENT ILLNESS
[Discharge Summary] : discharge summary [Pertinent Labs] : pertinent labs [Radiology Findings] : radiology findings [Discharge Med List] : discharge medication list [Med Reconciliation] : medication reconciliation has been completed [Patient Contacted By: ____] : and contacted by [unfilled] [Post-hospitalization from ___ Hospital] : Post-hospitalization from [unfilled] Hospital [Admitted on: ___] : The patient was admitted on [unfilled] [Discharged on ___] : discharged on [unfilled] [FreeTextEntry2] : 62 year old female seen in office for evaluation after hospital admission for pneumonia. She feels weak and still has cough and some sob on exertion. She is AAOX3,NAD. Her grandchild had RSV several weeks before she got sick.Patient is consuming PO diet. Here with .

## 2022-12-15 LAB
ALBUMIN SERPL ELPH-MCNC: 4.4 G/DL
ALP BLD-CCNC: 89 U/L
ALT SERPL-CCNC: 29 U/L
ANION GAP SERPL CALC-SCNC: 12 MMOL/L
AST SERPL-CCNC: 20 U/L
BASOPHILS # BLD AUTO: 0.08 K/UL
BASOPHILS NFR BLD AUTO: 1 %
BILIRUB SERPL-MCNC: 0.4 MG/DL
BUN SERPL-MCNC: 38 MG/DL
CALCIUM SERPL-MCNC: 10.2 MG/DL
CHLORIDE SERPL-SCNC: 103 MMOL/L
CHOLEST SERPL-MCNC: 167 MG/DL
CO2 SERPL-SCNC: 22 MMOL/L
CREAT SERPL-MCNC: 1.18 MG/DL
EGFR: 52 ML/MIN/1.73M2
EOSINOPHIL # BLD AUTO: 0.14 K/UL
EOSINOPHIL NFR BLD AUTO: 1.8 %
ESTIMATED AVERAGE GLUCOSE: 140 MG/DL
FERRITIN SERPL-MCNC: 460 NG/ML
FOLATE SERPL-MCNC: 8.8 NG/ML
GLUCOSE SERPL-MCNC: 125 MG/DL
HBA1C MFR BLD HPLC: 6.5 %
HCT VFR BLD CALC: 35.5 %
HDLC SERPL-MCNC: 50 MG/DL
HGB BLD-MCNC: 11.6 G/DL
IMM GRANULOCYTES NFR BLD AUTO: 0.5 %
IRON SATN MFR SERPL: 26 %
IRON SERPL-MCNC: 99 UG/DL
LDLC SERPL CALC-MCNC: 78 MG/DL
LYMPHOCYTES # BLD AUTO: 3.23 K/UL
LYMPHOCYTES NFR BLD AUTO: 41 %
MAN DIFF?: NORMAL
MCHC RBC-ENTMCNC: 29.1 PG
MCHC RBC-ENTMCNC: 32.7 GM/DL
MCV RBC AUTO: 89.2 FL
MONOCYTES # BLD AUTO: 1.12 K/UL
MONOCYTES NFR BLD AUTO: 14.2 %
NEUTROPHILS # BLD AUTO: 3.27 K/UL
NEUTROPHILS NFR BLD AUTO: 41.5 %
NONHDLC SERPL-MCNC: 118 MG/DL
PLATELET # BLD AUTO: 712 K/UL
POTASSIUM SERPL-SCNC: 4.1 MMOL/L
PROT SERPL-MCNC: 8.1 G/DL
RBC # BLD: 3.98 M/UL
RBC # FLD: 14.6 %
SODIUM SERPL-SCNC: 136 MMOL/L
TIBC SERPL-MCNC: 380 UG/DL
TRIGL SERPL-MCNC: 201 MG/DL
UIBC SERPL-MCNC: 281 UG/DL
VIT B12 SERPL-MCNC: 751 PG/ML
WBC # FLD AUTO: 7.88 K/UL

## 2022-12-20 ENCOUNTER — NON-APPOINTMENT (OUTPATIENT)
Age: 62
End: 2022-12-20

## 2022-12-21 ENCOUNTER — NON-APPOINTMENT (OUTPATIENT)
Age: 62
End: 2022-12-21

## 2022-12-21 ENCOUNTER — APPOINTMENT (OUTPATIENT)
Dept: CARDIOLOGY | Facility: CLINIC | Age: 62
End: 2022-12-21
Payer: COMMERCIAL

## 2022-12-21 VITALS
DIASTOLIC BLOOD PRESSURE: 90 MMHG | SYSTOLIC BLOOD PRESSURE: 151 MMHG | WEIGHT: 132 LBS | BODY MASS INDEX: 25.91 KG/M2 | RESPIRATION RATE: 17 BRPM | OXYGEN SATURATION: 99 % | TEMPERATURE: 97.2 F | HEIGHT: 60 IN | HEART RATE: 107 BPM

## 2022-12-21 PROCEDURE — 99214 OFFICE O/P EST MOD 30 MIN: CPT | Mod: 25

## 2022-12-21 PROCEDURE — 93000 ELECTROCARDIOGRAM COMPLETE: CPT

## 2022-12-23 ENCOUNTER — APPOINTMENT (OUTPATIENT)
Dept: FAMILY MEDICINE | Facility: CLINIC | Age: 62
End: 2022-12-23

## 2022-12-23 VITALS
RESPIRATION RATE: 16 BRPM | BODY MASS INDEX: 25.72 KG/M2 | SYSTOLIC BLOOD PRESSURE: 132 MMHG | HEART RATE: 101 BPM | HEIGHT: 60 IN | WEIGHT: 131 LBS | OXYGEN SATURATION: 97 % | TEMPERATURE: 96.5 F | DIASTOLIC BLOOD PRESSURE: 80 MMHG

## 2022-12-23 DIAGNOSIS — M54.50 LOW BACK PAIN, UNSPECIFIED: ICD-10-CM

## 2022-12-23 PROCEDURE — 99213 OFFICE O/P EST LOW 20 MIN: CPT

## 2022-12-23 NOTE — HISTORY OF PRESENT ILLNESS
[FreeTextEntry8] : Constant sharp localized Right lower back pain that is worse with movement. Has been worsening over the past 3 days. \par Has taken Tylenol with mild relief. \par Patient concerned it may be stone \par Not worse with urinate. \par Denies hematuria. \par NO ty=recent trauma reported \par \par

## 2022-12-23 NOTE — PHYSICAL EXAM
[No Acute Distress] : no acute distress [Well Nourished] : well nourished [Well Developed] : well developed [Well-Appearing] : well-appearing [Normal Sclera/Conjunctiva] : normal sclera/conjunctiva [PERRL] : pupils equal round and reactive to light [EOMI] : extraocular movements intact [Normal Outer Ear/Nose] : the outer ears and nose were normal in appearance [Normal Oropharynx] : the oropharynx was normal [No JVD] : no jugular venous distention [No Lymphadenopathy] : no lymphadenopathy [Supple] : supple [Thyroid Normal, No Nodules] : the thyroid was normal and there were no nodules present [No Respiratory Distress] : no respiratory distress  [No Accessory Muscle Use] : no accessory muscle use [Clear to Auscultation] : lungs were clear to auscultation bilaterally [Normal Rate] : normal rate  [Regular Rhythm] : with a regular rhythm [Normal S1, S2] : normal S1 and S2 [No Murmur] : no murmur heard [No Carotid Bruits] : no carotid bruits [No Abdominal Bruit] : a ~M bruit was not heard ~T in the abdomen [No Varicosities] : no varicosities [Pedal Pulses Present] : the pedal pulses are present [No Edema] : there was no peripheral edema [No Palpable Aorta] : no palpable aorta [No Extremity Clubbing/Cyanosis] : no extremity clubbing/cyanosis [Soft] : abdomen soft [Non Tender] : non-tender [Non-distended] : non-distended [No Masses] : no abdominal mass palpated [No HSM] : no HSM [Normal Bowel Sounds] : normal bowel sounds [Normal Posterior Cervical Nodes] : no posterior cervical lymphadenopathy [Normal Anterior Cervical Nodes] : no anterior cervical lymphadenopathy [No CVA Tenderness] : no CVA  tenderness [No Spinal Tenderness] : no spinal tenderness [No Joint Swelling] : no joint swelling [Grossly Normal Strength/Tone] : grossly normal strength/tone [No Rash] : no rash [Coordination Grossly Intact] : coordination grossly intact [No Focal Deficits] : no focal deficits [Normal Gait] : normal gait [Deep Tendon Reflexes (DTR)] : deep tendon reflexes were 2+ and symmetric [Normal Affect] : the affect was normal [Normal Insight/Judgement] : insight and judgment were intact [de-identified] : Negative straight leg raise.  Mild grimacing with movements [de-identified] : No spinal tenderness noted

## 2022-12-26 NOTE — REASON FOR VISIT
[CV Risk Factors and Non-Cardiac Disease] : CV risk factors and non-cardiac disease [Arrhythmia/ECG Abnorrmalities] : arrhythmia/ECG abnormalities [Coronary Artery Disease] : coronary artery disease [FreeTextEntry3] : dr Liu [FreeTextEntry1] : I saw Justyna in emergency room. She was being treated for a presumed pneumonia. At that time a left branch block was diagnosed. She was treated for pneumonia and released from the hospital and she seen today in cardiac follow-up note. Labs were notable for  hemoglobin A1c of 6.5 estimated sugar 140 CT Angio chest no evidence for pulmonary,  heart size was normal, DVT study was negative, 'hb 11.6 was borderline anemic. She  comes today for clarification of her overall cardiovascular risk. She was advised to undergo a complete cardiac evaluation. She denies chest pains shortness of breath or loss of consciousnes.

## 2022-12-26 NOTE — ASSESSMENT
[FreeTextEntry1] : A left bundle branch block is invariably an abnormal finding and warrants further evaluations with ischemic workup especially in the  setting of increased risk.I have asked  Justyna to undergo detailed cardiac testing in order to evaluate her overall cardiovascular risk. An assessment of both structural and functional heart disease was recommended to the patient. n this regard, an echocardiogram and a stress test were advised to the patient.\par I await the upcoming noninvasive cardiac testing in order to assess her overall cardiovascular risk.\par We discussed the pros and cons of plain treadmill stress testing nuclear stress testing and angiography including a sensitivity analysis.Justyna would like to avoid further radiation dosing and a stress echo is planned. We discussed current ACC guidelines and the calculated 10 year risk is approximately   10% which is borderline elevated. More than half of the face to face encounter of 60 minutes   was spent in counseling the patient with respect to  cardiovascular risk\par \par Quality measures \par Tobacco intervention not indicated\par Statin for prevention of cardiovascular disease probably indicated\par Hypertension treated\par Aspirin for ischemic vascular disease not indicated\par Tobacco screening cessation and intervention not indicated\par \par Medical necessity\par This is a high encounter based upon two or more chronic illnesses with mild exacerbation requiring further management and evaluation.   .\par \par Coordinate with her  at the bedside.\par \par EKG is indicated for evaluation of\par \par this patient has a high risk for major adverese cardiac events. \par risks benefits alternatives were discussed with the patient.\par all questions were answered to their satisfaction. 6

## 2022-12-29 ENCOUNTER — APPOINTMENT (OUTPATIENT)
Dept: ULTRASOUND IMAGING | Facility: CLINIC | Age: 62
End: 2022-12-29
Payer: COMMERCIAL

## 2022-12-29 ENCOUNTER — OUTPATIENT (OUTPATIENT)
Dept: OUTPATIENT SERVICES | Facility: HOSPITAL | Age: 62
LOS: 1 days | End: 2022-12-29
Payer: COMMERCIAL

## 2022-12-29 DIAGNOSIS — Z00.8 ENCOUNTER FOR OTHER GENERAL EXAMINATION: ICD-10-CM

## 2022-12-29 DIAGNOSIS — M54.50 LOW BACK PAIN, UNSPECIFIED: ICD-10-CM

## 2022-12-29 PROCEDURE — 76775 US EXAM ABDO BACK WALL LIM: CPT | Mod: 26

## 2022-12-29 PROCEDURE — 76775 US EXAM ABDO BACK WALL LIM: CPT

## 2022-12-30 LAB
APPEARANCE: CLEAR
BACTERIA: NEGATIVE
BILIRUBIN URINE: NEGATIVE
BLOOD URINE: NEGATIVE
COLOR: NORMAL
GLUCOSE QUALITATIVE U: NEGATIVE
HYALINE CASTS: 32 /LPF
KETONES URINE: NEGATIVE
LEUKOCYTE ESTERASE URINE: NEGATIVE
MICROSCOPIC-UA: NORMAL
NITRITE URINE: NEGATIVE
PH URINE: 6
PROTEIN URINE: NORMAL
RED BLOOD CELLS URINE: 3 /HPF
SPECIFIC GRAVITY URINE: 1.01
SQUAMOUS EPITHELIAL CELLS: 2 /HPF
UROBILINOGEN URINE: NORMAL
WHITE BLOOD CELLS URINE: 4 /HPF

## 2023-01-10 ENCOUNTER — APPOINTMENT (OUTPATIENT)
Dept: CARDIOLOGY | Facility: CLINIC | Age: 63
End: 2023-01-10

## 2023-01-18 ENCOUNTER — RX RENEWAL (OUTPATIENT)
Age: 63
End: 2023-01-18

## 2023-02-08 ENCOUNTER — NON-APPOINTMENT (OUTPATIENT)
Age: 63
End: 2023-02-08

## 2023-02-08 DIAGNOSIS — Z92.89 PERSONAL HISTORY OF OTHER MEDICAL TREATMENT: ICD-10-CM

## 2023-02-08 DIAGNOSIS — R92.2 INCONCLUSIVE MAMMOGRAM: ICD-10-CM

## 2023-02-08 DIAGNOSIS — Z84.1 FAMILY HISTORY OF DISORDERS OF KIDNEY AND URETER: ICD-10-CM

## 2023-02-08 DIAGNOSIS — Z78.9 OTHER SPECIFIED HEALTH STATUS: ICD-10-CM

## 2023-02-08 DIAGNOSIS — Z98.890 OTHER SPECIFIED POSTPROCEDURAL STATES: ICD-10-CM

## 2023-03-28 ENCOUNTER — LABORATORY RESULT (OUTPATIENT)
Age: 63
End: 2023-03-28

## 2023-03-28 LAB
BASOPHILS # BLD AUTO: 0.02 K/UL
BASOPHILS NFR BLD AUTO: 0.3 %
EOSINOPHIL # BLD AUTO: 0.12 K/UL
EOSINOPHIL NFR BLD AUTO: 1.9 %
HCT VFR BLD CALC: 37 %
HGB BLD-MCNC: 12.2 G/DL
IMM GRANULOCYTES NFR BLD AUTO: 0.2 %
LYMPHOCYTES # BLD AUTO: 3.66 K/UL
LYMPHOCYTES NFR BLD AUTO: 57.8 %
MAN DIFF?: NORMAL
MCHC RBC-ENTMCNC: 29.6 PG
MCHC RBC-ENTMCNC: 33 GM/DL
MCV RBC AUTO: 89.8 FL
MONOCYTES # BLD AUTO: 0.68 K/UL
MONOCYTES NFR BLD AUTO: 10.7 %
NEUTROPHILS # BLD AUTO: 1.84 K/UL
NEUTROPHILS NFR BLD AUTO: 29.1 %
PLATELET # BLD AUTO: 352 K/UL
RBC # BLD: 4.12 M/UL
RBC # FLD: 13.4 %
WBC # FLD AUTO: 6.33 K/UL

## 2023-03-29 ENCOUNTER — OUTPATIENT (OUTPATIENT)
Dept: OUTPATIENT SERVICES | Facility: HOSPITAL | Age: 63
LOS: 1 days | Discharge: HOME | End: 2023-03-29

## 2023-03-29 DIAGNOSIS — E88.09 OTHER DISORDERS OF PLASMA-PROTEIN METABOLISM, NOT ELSEWHERE CLASSIFIED: ICD-10-CM

## 2023-03-29 LAB
CALCIUM SERPL-MCNC: 10.4 MG/DL
CREAT SERPL-MCNC: 0.99 MG/DL
DEPRECATED KAPPA LC FREE/LAMBDA SER: 0.57 RATIO
DEPRECATED KAPPA LC FREE/LAMBDA SER: 0.57 RATIO
EGFR: 64 ML/MIN/1.73M2
IGA SER QL IEP: 41 MG/DL
IGG SER QL IEP: 2116 MG/DL
IGM SER QL IEP: 28 MG/DL
KAPPA LC CSF-MCNC: 2.69 MG/DL
KAPPA LC CSF-MCNC: 2.69 MG/DL
KAPPA LC SERPL-MCNC: 1.54 MG/DL
KAPPA LC SERPL-MCNC: 1.54 MG/DL

## 2023-04-02 ENCOUNTER — NON-APPOINTMENT (OUTPATIENT)
Age: 63
End: 2023-04-02

## 2023-04-02 LAB
ALBUMIN MFR SERPL ELPH: 56.7 %
ALBUMIN SERPL-MCNC: 4.3 G/DL
ALBUMIN/GLOB SERPL: 1.3 RATIO
ALPHA1 GLOB MFR SERPL ELPH: 3.5 %
ALPHA1 GLOB SERPL ELPH-MCNC: 0.3 G/DL
ALPHA2 GLOB MFR SERPL ELPH: 8.1 %
ALPHA2 GLOB SERPL ELPH-MCNC: 0.6 G/DL
B-GLOBULIN MFR SERPL ELPH: 8.7 %
B-GLOBULIN SERPL ELPH-MCNC: 0.7 G/DL
GAMMA GLOB FLD ELPH-MCNC: 1.7 G/DL
GAMMA GLOB MFR SERPL ELPH: 23 %
INTERPRETATION SERPL IEP-IMP: NORMAL
M PROTEIN MFR SERPL ELPH: 18.6 %
MONOCLON BAND OBS SERPL: 1.4 G/DL
PROT SERPL-MCNC: 7.6 G/DL
PROT SERPL-MCNC: 7.6 G/DL

## 2023-04-03 ENCOUNTER — APPOINTMENT (OUTPATIENT)
Dept: HEMATOLOGY ONCOLOGY | Facility: CLINIC | Age: 63
End: 2023-04-03
Payer: COMMERCIAL

## 2023-04-03 VITALS
DIASTOLIC BLOOD PRESSURE: 80 MMHG | BODY MASS INDEX: 26.19 KG/M2 | HEART RATE: 100 BPM | OXYGEN SATURATION: 98 % | WEIGHT: 133.38 LBS | SYSTOLIC BLOOD PRESSURE: 128 MMHG | HEIGHT: 60 IN | TEMPERATURE: 98 F | RESPIRATION RATE: 16 BRPM

## 2023-04-03 PROCEDURE — 99213 OFFICE O/P EST LOW 20 MIN: CPT

## 2023-04-03 NOTE — HISTORY OF PRESENT ILLNESS
[de-identified] : 2014-Was under a lot of stress due to an ill father, and stated she had UE/LE M-S aches. Saw rheumatologist who found monoclonal gammopathy on lab work. Referred to hematologist, and saw Dr. 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 [place] then [place] [facility] [place] recommended, again which patient declined. Patient did not pursue heme f/u.\par 2019-Routine PCP f/u showed persistent monoclonal gammopathy on lab work and patient presented for heme f/u at the [facility]. She has declined BMB.\par 11/2020-Skeletal survey-no suspicious lytic lesions.\par \par 10/2021-PB flow cytometry-Peripheral blood:\par      - The lymphocyte immunophenotypic findings show no diagnostic abnormalities.\par      - The myeloid immunophenotypic findings show normal myeloid granularity, no increase in myeloid immaturity, and normal myeloid antigen maturation pattern. [de-identified] : Pneumonia symptoms resolved from November 2022-no f/u CXR done yet.\par Watching grandchild 2 days a week now-has been a little easier for her.       \par No c/o fevers, sweats, abnormal bruising/bleeding. No c/o cough or SOB. No LN complaints.\par Plans to update colonoscopy-not done yet.\par \par

## 2023-04-03 NOTE — ASSESSMENT
[FreeTextEntry1] : Lab work reviewed.\par #1) 2014-Was under a lot of stress due to an ill father, and stated she had UE/LE M-S aches. Saw rheumatologist who found monoclonal gammopathy on lab work. Referred to hematologist, and saw Dr. 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 Patient then sought [place] opinion [place] [facility] and BMB was recommended, again which patient declined. Patient did not pursue heme f/u.\par \par 2019-Routine PCP f/u showed persistent monoclonal gammopathy on lab work and patient presented [facility]. She has declined BMB.\par 11/2020-Skeletal survey-no suspicious lytic lesions.\par Clinically stable at present. Reviewed potential complications if plasma cell dyscrasia progresses, explaining that some BM disorders may evolve over time. Hematologic surveillance. \par \par #2) Relative lymphocytosis-intermittent, chronic. 10/2021-PB flow cytometry-Peripheral blood:\par      - The lymphocyte immunophenotypic findings show no diagnostic abnormalities.\par      - The myeloid immunophenotypic findings show normal myeloid granularity, no increase in myeloid immaturity, and normal myeloid antigen maturation pattern.\par ANC currently WNL. Continue to monitor CBC with diff. \par \par #3) -5/25/2022-Mammogram/breast US-benign findings. Patient given script for next breast imaging.\par \par Should hematologic scenario change/worsen, patient may reconsider BMB (if with IR and sedation).\par \par Patient was given the opportunity to ask questions.  Her questions have been answered to her apparent satisfaction at this time.

## 2023-04-26 NOTE — ED PROVIDER NOTE - NS ED MD DISPO DIVISION
Orthopaedic Hand Surgery Note    Name: Braden Moreira  Age: 27 y.o. YOB: 1991  Gender: male  MRN: [id number]    Post Operative Visit: WRIST OPEN REDUCTION INTERNAL FIXATION on the left - Left    HPI: Patient is status post WRIST OPEN REDUCTION INTERNAL FIXATION on the left - Left on 4/13/2023. Doing well. Pleased with current progress. Denies fevers or chills. Patient is 2 weeks from surgery. Physical Examination:  Wound healing well. No erythema or drainage. Sensation is intact in all fingers. Motor exam reveals no deficits. limited finger range of motion. Limited wrist motion due to pain. Imaging:     Wrist  XR: AP, Lateral, Oblique and wrist facet view     Clinical Indication:  1. Other closed intra-articular fracture of distal end of left radius, initial encounter         Report: AP, lateral, oblique and wrist facet x-ray on the left demonstrates distal radius fracture status post plate and screw osteosynthesis in acceptable alignment, no hardware complication is noted    Impression: Distal radius fracture status post osteosynthesis as described above     KEEGAN Ulloa - CNP     Assessment:   1. Other closed intra-articular fracture of distal end of left radius, initial encounter         Status post WRIST OPEN REDUCTION INTERNAL FIXATION on the left - Left on 4/13/2023    Plan:  We discussed the treatment and therapy plan. Patient will start occupational therapy to avoid stiffness. We will continue protective splinting at all times until 6 weeks post-op and after 6 weeks only in unprotected environments. Patient will start occupational therapy to avoid stiffness. Therapy will focus on motion, edema control and scar management. Therapy will progress into strengthening phase six weeks after surgery pending my examination at that time. Patient will avoid lifting, pushing or pulling more than 2 lbs until next assessment and XRs in 4 weeks.       KEEGAN Ulloa - [place]

## 2023-05-16 ENCOUNTER — RX RENEWAL (OUTPATIENT)
Age: 63
End: 2023-05-16

## 2023-06-13 ENCOUNTER — APPOINTMENT (OUTPATIENT)
Dept: MAMMOGRAPHY | Facility: HOSPITAL | Age: 63
End: 2023-06-13
Payer: COMMERCIAL

## 2023-06-13 ENCOUNTER — APPOINTMENT (OUTPATIENT)
Dept: ULTRASOUND IMAGING | Facility: HOSPITAL | Age: 63
End: 2023-06-13
Payer: COMMERCIAL

## 2023-06-13 ENCOUNTER — APPOINTMENT (OUTPATIENT)
Dept: RADIOLOGY | Facility: HOSPITAL | Age: 63
End: 2023-06-13
Payer: COMMERCIAL

## 2023-06-13 ENCOUNTER — RESULT REVIEW (OUTPATIENT)
Age: 63
End: 2023-06-13

## 2023-06-13 ENCOUNTER — OUTPATIENT (OUTPATIENT)
Dept: OUTPATIENT SERVICES | Facility: HOSPITAL | Age: 63
LOS: 1 days | End: 2023-06-13
Payer: COMMERCIAL

## 2023-06-13 DIAGNOSIS — D47.2 MONOCLONAL GAMMOPATHY: ICD-10-CM

## 2023-06-13 DIAGNOSIS — D72.820 LYMPHOCYTOSIS (SYMPTOMATIC): ICD-10-CM

## 2023-06-13 DIAGNOSIS — Z92.89 PERSONAL HISTORY OF OTHER MEDICAL TREATMENT: ICD-10-CM

## 2023-06-13 PROCEDURE — 76641 ULTRASOUND BREAST COMPLETE: CPT | Mod: 26,50

## 2023-06-13 PROCEDURE — 77067 SCR MAMMO BI INCL CAD: CPT | Mod: 26

## 2023-06-13 PROCEDURE — 71046 X-RAY EXAM CHEST 2 VIEWS: CPT | Mod: 26

## 2023-06-13 PROCEDURE — 77063 BREAST TOMOSYNTHESIS BI: CPT | Mod: 26

## 2023-06-13 PROCEDURE — 71046 X-RAY EXAM CHEST 2 VIEWS: CPT

## 2023-06-13 PROCEDURE — 76641 ULTRASOUND BREAST COMPLETE: CPT

## 2023-06-13 PROCEDURE — 77063 BREAST TOMOSYNTHESIS BI: CPT

## 2023-06-13 PROCEDURE — 77067 SCR MAMMO BI INCL CAD: CPT

## 2023-07-24 ENCOUNTER — RX RENEWAL (OUTPATIENT)
Age: 63
End: 2023-07-24

## 2023-09-25 ENCOUNTER — OUTPATIENT (OUTPATIENT)
Dept: OUTPATIENT SERVICES | Facility: HOSPITAL | Age: 63
LOS: 1 days | Discharge: HOME | End: 2023-09-25

## 2023-09-25 DIAGNOSIS — E88.09 OTHER DISORDERS OF PLASMA-PROTEIN METABOLISM, NOT ELSEWHERE CLASSIFIED: ICD-10-CM

## 2023-09-26 LAB
CALCIUM SERPL-MCNC: 10.2 MG/DL
CREAT SERPL-MCNC: 0.95 MG/DL
DEPRECATED KAPPA LC FREE/LAMBDA SER: 0.56 RATIO
DEPRECATED KAPPA LC FREE/LAMBDA SER: 0.56 RATIO
EGFR: 67 ML/MIN/1.73M2
IGA SER QL IEP: 41 MG/DL
IGG SER QL IEP: 2818 MG/DL
IGM SER QL IEP: 30 MG/DL
KAPPA LC CSF-MCNC: 2.89 MG/DL
KAPPA LC CSF-MCNC: 2.89 MG/DL
KAPPA LC SERPL-MCNC: 1.62 MG/DL
KAPPA LC SERPL-MCNC: 1.62 MG/DL

## 2023-09-27 LAB
ALBUMIN MFR SERPL ELPH: 52.9 %
ALBUMIN SERPL-MCNC: 4.5 G/DL
ALBUMIN/GLOB SERPL: 1.1 RATIO
ALPHA1 GLOB MFR SERPL ELPH: 3.2 %
ALPHA1 GLOB SERPL ELPH-MCNC: 0.3 G/DL
ALPHA2 GLOB MFR SERPL ELPH: 7.8 %
ALPHA2 GLOB SERPL ELPH-MCNC: 0.7 G/DL
B-GLOBULIN MFR SERPL ELPH: 9 %
B-GLOBULIN SERPL ELPH-MCNC: 0.8 G/DL
GAMMA GLOB FLD ELPH-MCNC: 2.3 G/DL
GAMMA GLOB MFR SERPL ELPH: 27.1 %
INTERPRETATION SERPL IEP-IMP: NORMAL
M PROTEIN MFR SERPL ELPH: 23.9 %
MONOCLON BAND OBS SERPL: 2 G/DL
PROT SERPL-MCNC: 8.5 G/DL
PROT SERPL-MCNC: 8.5 G/DL

## 2023-10-03 ENCOUNTER — APPOINTMENT (OUTPATIENT)
Dept: HEMATOLOGY ONCOLOGY | Facility: CLINIC | Age: 63
End: 2023-10-03
Payer: COMMERCIAL

## 2023-10-03 VITALS
WEIGHT: 135.56 LBS | RESPIRATION RATE: 61 BRPM | SYSTOLIC BLOOD PRESSURE: 128 MMHG | DIASTOLIC BLOOD PRESSURE: 75 MMHG | TEMPERATURE: 96.6 F | BODY MASS INDEX: 26.48 KG/M2 | HEART RATE: 95 BPM | OXYGEN SATURATION: 100 %

## 2023-10-03 PROCEDURE — 99213 OFFICE O/P EST LOW 20 MIN: CPT

## 2023-11-16 ENCOUNTER — RX RENEWAL (OUTPATIENT)
Age: 63
End: 2023-11-16

## 2023-12-15 ENCOUNTER — OUTPATIENT (OUTPATIENT)
Dept: OUTPATIENT SERVICES | Facility: HOSPITAL | Age: 63
LOS: 1 days | Discharge: HOME | End: 2023-12-15

## 2023-12-15 DIAGNOSIS — E88.09 OTHER DISORDERS OF PLASMA-PROTEIN METABOLISM, NOT ELSEWHERE CLASSIFIED: ICD-10-CM

## 2023-12-27 LAB
BASOPHILS # BLD AUTO: 0.01 K/UL
BASOPHILS NFR BLD AUTO: 0.2 %
CALCIUM SERPL-MCNC: 10 MG/DL
CREAT SERPL-MCNC: 0.9 MG/DL
DEPRECATED KAPPA LC FREE/LAMBDA SER: 0.43 RATIO
EGFR: 71 ML/MIN/1.73M2
EOSINOPHIL # BLD AUTO: 0.13 K/UL
EOSINOPHIL NFR BLD AUTO: 2.1 %
HCT VFR BLD CALC: 37.9 %
HGB BLD-MCNC: 12.6 G/DL
IGA SER QL IEP: 33 MG/DL
IGG SER QL IEP: 2989 MG/DL
IGM SER QL IEP: 25 MG/DL
IMM GRANULOCYTES NFR BLD AUTO: 0.3 %
INR PPP: 0.99 RATIO
KAPPA LC CSF-MCNC: 2.88 MG/DL
KAPPA LC SERPL-MCNC: 1.24 MG/DL
LYMPHOCYTES # BLD AUTO: 3.42 K/UL
LYMPHOCYTES NFR BLD AUTO: 55.1 %
MAN DIFF?: NORMAL
MCHC RBC-ENTMCNC: 30.1 PG
MCHC RBC-ENTMCNC: 33.2 GM/DL
MCV RBC AUTO: 90.5 FL
MONOCYTES # BLD AUTO: 0.72 K/UL
MONOCYTES NFR BLD AUTO: 11.6 %
NEUTROPHILS # BLD AUTO: 1.91 K/UL
NEUTROPHILS NFR BLD AUTO: 30.7 %
PLATELET # BLD AUTO: 278 K/UL
PT BLD: 11.2 SEC
RBC # BLD: 4.19 M/UL
RBC # FLD: 13.7 %
WBC # FLD AUTO: 6.21 K/UL

## 2024-01-01 LAB
ALBUMIN MFR SERPL ELPH: 52.2 %
ALBUMIN SERPL-MCNC: 4.8 G/DL
ALBUMIN/GLOB SERPL: 1.1 RATIO
ALPHA1 GLOB MFR SERPL ELPH: 3.3 %
ALPHA1 GLOB SERPL ELPH-MCNC: 0.3 G/DL
ALPHA2 GLOB MFR SERPL ELPH: 7.8 %
ALPHA2 GLOB SERPL ELPH-MCNC: 0.7 G/DL
B-GLOBULIN MFR SERPL ELPH: 8.2 %
B-GLOBULIN SERPL ELPH-MCNC: 0.7 G/DL
GAMMA GLOB FLD ELPH-MCNC: 2.6 G/DL
GAMMA GLOB MFR SERPL ELPH: 28.5 %
INTERPRETATION SERPL IEP-IMP: NORMAL
M PROTEIN MFR SERPL ELPH: 25.7 %
MONOCLON BAND OBS SERPL: 2.3 G/DL
PROT SERPL-MCNC: 9.1 G/DL
PROT SERPL-MCNC: 9.1 G/DL

## 2024-01-03 ENCOUNTER — APPOINTMENT (OUTPATIENT)
Dept: HEMATOLOGY ONCOLOGY | Facility: CLINIC | Age: 64
End: 2024-01-03
Payer: COMMERCIAL

## 2024-01-03 VITALS
BODY MASS INDEX: 26.62 KG/M2 | TEMPERATURE: 98 F | WEIGHT: 135.58 LBS | OXYGEN SATURATION: 100 % | HEIGHT: 60 IN | HEART RATE: 104 BPM | SYSTOLIC BLOOD PRESSURE: 135 MMHG | DIASTOLIC BLOOD PRESSURE: 84 MMHG | RESPIRATION RATE: 16 BRPM

## 2024-01-03 PROCEDURE — 99214 OFFICE O/P EST MOD 30 MIN: CPT

## 2024-01-03 NOTE — HISTORY OF PRESENT ILLNESS
[de-identified] : 2014-Was under a lot of stress due to an ill father, and stated she had UE/LE M-S aches. Saw rheumatologist who found monoclonal gammopathy on lab work. Referred to hematologist, and saw Dr. 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 Patient then sought second opinion at [facility] [place] and BMB was recommended, again which patient declined. Patient did not pursue heme f/u. 2019-Routine PCP f/u showed persistent monoclonal gammopathy on lab work and [place] [facility] [place]. 11/2020-Skeletal survey-no suspicious lytic lesions.  10/2021-PB flow cytometry-Peripheral blood:      - The lymphocyte immunophenotypic findings show no diagnostic abnormalities.      - The myeloid immunophenotypic findings show normal myeloid granularity, no increase in myeloid immaturity, and normal myeloid antigen maturation pattern. [de-identified] : Fatigued. Watches grandchildren TIW-one grandson and one granddaughter. Feels like "cap on head."     No c/o fevers, sweats, abnormal bruising/bleeding. No c/o cough or SOB. No LN complaints. Still plans to update colonoscopy-not done yet.

## 2024-01-03 NOTE — ASSESSMENT
[FreeTextEntry1] : Lab work reviewed. #1) 2014-Was under a lot of stress due to an ill father, and stated she had UE/LE M-S aches. Saw rheumatologist who found monoclonal gammopathy on lab work. Referred to hematologist, and saw Dr. 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 Patient then sought second opinion at [facility] [place] and BMB was recommended, again which patient declined. Patient did not pursue heme f/u.  2019-Routine PCP f/u showed persistent monoclonal gammopathy on lab work and patient presented [place] heme f/[place] the [facility] [place] She declined BMB to date. 11/2020-Skeletal survey-no suspicious lytic lesions. --rising M-spike discussed, along with my concern for suspected progression of plasma cell disorder-again recommended bone marrow evaluation-patient has wished to defer BM evaluation until now-she now consents to have procedure if done with IR guidance.  Potential benefits/side effects of bone marrow aspiration/biopsy reviewed.  Reviewed potential complications if plasma cell dyscrasia progresses (and treatment available if needed), explaining that some BM disorders may evolve over time. --also to consider PET/CT scan, pending bone marrow results.  #2) Relative lymphocytosis-intermittent, chronic. 10/2021-PB flow cytometry-Peripheral blood:  - The lymphocyte immunophenotypic findings show no diagnostic abnormalities.  - The myeloid immunophenotypic findings show normal myeloid granularity, no increase in myeloid immaturity, and normal myeloid antigen maturation pattern. ANC currently WNL. Continue to monitor CBC with diff.  #3) -6/13/2023-Mammogram/breast US-benign keyupryx-YC-UJDS 2. Encouraged to have screening colonoscopy-patient has stated she is planning to do.  Patient was given the opportunity to ask questions. Her questions have been answered to her apparent satisfaction at this time.  -->schedule BM aspiration/BMB-send for flow cytometry, cytogenetics, myeloma FISH panel, NGS; RTO post BMB to discuss BM results.

## 2024-01-07 LAB
ALBUMIN SERPL ELPH-MCNC: 4.4 G/DL
ALP BLD-CCNC: 72 U/L
ALT SERPL-CCNC: 16 U/L
ANION GAP SERPL CALC-SCNC: 12 MMOL/L
AST SERPL-CCNC: 18 U/L
BILIRUB SERPL-MCNC: 0.5 MG/DL
BUN SERPL-MCNC: 39 MG/DL
CALCIUM SERPL-MCNC: 10.2 MG/DL
CHLORIDE SERPL-SCNC: 103 MMOL/L
CO2 SERPL-SCNC: 24 MMOL/L
CREAT SERPL-MCNC: 0.96 MG/DL
EGFR: 66 ML/MIN/1.73M2
GLUCOSE SERPL-MCNC: 126 MG/DL
POTASSIUM SERPL-SCNC: 3.8 MMOL/L
PROT SERPL-MCNC: 9 G/DL
SODIUM SERPL-SCNC: 139 MMOL/L

## 2024-01-11 ENCOUNTER — APPOINTMENT (OUTPATIENT)
Dept: CT IMAGING | Facility: HOSPITAL | Age: 64
End: 2024-01-11

## 2024-01-11 ENCOUNTER — OUTPATIENT (OUTPATIENT)
Dept: OUTPATIENT SERVICES | Facility: HOSPITAL | Age: 64
LOS: 1 days | End: 2024-01-11
Payer: COMMERCIAL

## 2024-01-11 ENCOUNTER — RESULT REVIEW (OUTPATIENT)
Age: 64
End: 2024-01-11

## 2024-01-11 VITALS — DIASTOLIC BLOOD PRESSURE: 84 MMHG | OXYGEN SATURATION: 94 % | SYSTOLIC BLOOD PRESSURE: 126 MMHG | HEART RATE: 85 BPM

## 2024-01-11 VITALS
DIASTOLIC BLOOD PRESSURE: 77 MMHG | OXYGEN SATURATION: 95 % | HEART RATE: 103 BPM | RESPIRATION RATE: 16 BRPM | SYSTOLIC BLOOD PRESSURE: 122 MMHG | TEMPERATURE: 98 F

## 2024-01-11 DIAGNOSIS — Z00.00 ENCOUNTER FOR GENERAL ADULT MEDICAL EXAMINATION WITHOUT ABNORMAL FINDINGS: ICD-10-CM

## 2024-01-11 PROCEDURE — 38221 DX BONE MARROW BIOPSIES: CPT

## 2024-01-11 PROCEDURE — 88313 SPECIAL STAINS GROUP 2: CPT

## 2024-01-11 PROCEDURE — 85097 BONE MARROW INTERPRETATION: CPT

## 2024-01-11 PROCEDURE — 88271 CYTOGENETICS DNA PROBE: CPT

## 2024-01-11 PROCEDURE — 88237 TISSUE CULTURE BONE MARROW: CPT

## 2024-01-11 PROCEDURE — 88285 CHROMOSOME COUNT ADDITIONAL: CPT

## 2024-01-11 PROCEDURE — 87205 SMEAR GRAM STAIN: CPT

## 2024-01-11 PROCEDURE — 88184 FLOWCYTOMETRY/ TC 1 MARKER: CPT

## 2024-01-11 PROCEDURE — 77012 CT SCAN FOR NEEDLE BIOPSY: CPT

## 2024-01-11 PROCEDURE — 88275 CYTOGENETICS 100-300: CPT

## 2024-01-11 PROCEDURE — 88185 FLOWCYTOMETRY/TC ADD-ON: CPT

## 2024-01-11 PROCEDURE — 88313 SPECIAL STAINS GROUP 2: CPT | Mod: 26

## 2024-01-11 PROCEDURE — 88305 TISSUE EXAM BY PATHOLOGIST: CPT

## 2024-01-11 PROCEDURE — 88291 CYTO/MOLECULAR REPORT: CPT

## 2024-01-11 PROCEDURE — 88305 TISSUE EXAM BY PATHOLOGIST: CPT | Mod: 26

## 2024-01-11 PROCEDURE — 88280 CHROMOSOME KARYOTYPE STUDY: CPT

## 2024-01-11 PROCEDURE — 88264 CHROMOSOME ANALYSIS 20-25: CPT

## 2024-01-11 PROCEDURE — 38221 DX BONE MARROW BIOPSIES: CPT | Mod: RT

## 2024-01-11 PROCEDURE — 88189 FLOWCYTOMETRY/READ 16 & >: CPT

## 2024-01-11 PROCEDURE — 77012 CT SCAN FOR NEEDLE BIOPSY: CPT | Mod: 26

## 2024-01-11 RX ORDER — SODIUM CHLORIDE 9 MG/ML
1000 INJECTION, SOLUTION INTRAVENOUS
Refills: 0 | Status: DISCONTINUED | OUTPATIENT
Start: 2024-01-11 | End: 2024-01-25

## 2024-01-11 NOTE — PRE-ANESTHESIA EVALUATION ADULT - NSRADCARDRESULTSFT_GEN_ALL_CORE
< from: TTE Echo Complete w/o Contrast w/ Doppler (11.26.22 @ 08:34) >    Summary:   1.Left ventricular ejection fraction, by visual estimation, is 60 to   65%.   2. Normal global left ventricular systolic function.   3. There is mild septal left ventricular hypertrophy.   4. Thickening of the anterior and posterior mitral valve leaflets.    [id number] Phill Portillo , Electronically signed on   11/26/2022 at 10:41:09 AM              < end of copied text > < from: TTE Echo Complete w/o Contrast w/ Doppler (11.26.22 @ 08:34) >    Summary:   1.Left ventricular ejection fraction, by visual estimation, is 60 to   65%.   2. Normal global left ventricular systolic function.   3. There is mild septal left ventricular hypertrophy.   4. Thickening of the anterior and posterior mitral valve leaflets.    [id number] Phill Portillo , Electronically signed on   11/26/2022 at 10:41:09 AM              < end of copied text >

## 2024-01-16 ENCOUNTER — NON-APPOINTMENT (OUTPATIENT)
Age: 64
End: 2024-01-16

## 2024-01-16 LAB
HEMATOPATHOLOGY REPORT: SIGNIFICANT CHANGE UP
TM INTERPRETATION: SIGNIFICANT CHANGE UP

## 2024-01-17 LAB — CHROM ANALY INTERPHASE BLD FISH-IMP: SIGNIFICANT CHANGE UP

## 2024-01-23 ENCOUNTER — RX RENEWAL (OUTPATIENT)
Age: 64
End: 2024-01-23

## 2024-01-23 LAB — CHROM ANALY OVERALL INTERP SPEC-IMP: SIGNIFICANT CHANGE UP

## 2024-01-24 ENCOUNTER — APPOINTMENT (OUTPATIENT)
Dept: HEMATOLOGY ONCOLOGY | Facility: CLINIC | Age: 64
End: 2024-01-24

## 2024-01-31 ENCOUNTER — RESULT REVIEW (OUTPATIENT)
Age: 64
End: 2024-01-31

## 2024-01-31 ENCOUNTER — OUTPATIENT (OUTPATIENT)
Dept: OUTPATIENT SERVICES | Facility: HOSPITAL | Age: 64
LOS: 1 days | End: 2024-01-31
Payer: COMMERCIAL

## 2024-01-31 ENCOUNTER — APPOINTMENT (OUTPATIENT)
Dept: HEMATOLOGY ONCOLOGY | Facility: CLINIC | Age: 64
End: 2024-01-31
Payer: COMMERCIAL

## 2024-01-31 VITALS
SYSTOLIC BLOOD PRESSURE: 154 MMHG | OXYGEN SATURATION: 97 % | DIASTOLIC BLOOD PRESSURE: 81 MMHG | BODY MASS INDEX: 26.14 KG/M2 | HEART RATE: 93 BPM | WEIGHT: 134.92 LBS | HEIGHT: 60.35 IN | RESPIRATION RATE: 15 BRPM

## 2024-01-31 VITALS
TEMPERATURE: 98 F | SYSTOLIC BLOOD PRESSURE: 154 MMHG | RESPIRATION RATE: 15 BRPM | DIASTOLIC BLOOD PRESSURE: 81 MMHG | OXYGEN SATURATION: 97 % | HEIGHT: 60.35 IN | HEART RATE: 93 BPM | WEIGHT: 134.92 LBS | BODY MASS INDEX: 26.14 KG/M2

## 2024-01-31 DIAGNOSIS — Z12.39 ENCOUNTER FOR OTHER SCREENING FOR MALIGNANT NEOPLASM OF BREAST: ICD-10-CM

## 2024-01-31 DIAGNOSIS — J06.9 ACUTE UPPER RESPIRATORY INFECTION, UNSPECIFIED: ICD-10-CM

## 2024-01-31 DIAGNOSIS — Z12.11 ENCOUNTER FOR SCREENING FOR MALIGNANT NEOPLASM OF COLON: ICD-10-CM

## 2024-01-31 DIAGNOSIS — U07.1 COVID-19: ICD-10-CM

## 2024-01-31 DIAGNOSIS — R77.8 OTHER SPECIFIED ABNORMALITIES OF PLASMA PROTEINS: ICD-10-CM

## 2024-01-31 DIAGNOSIS — Z86.79 PERSONAL HISTORY OF OTHER DISEASES OF THE CIRCULATORY SYSTEM: ICD-10-CM

## 2024-01-31 DIAGNOSIS — Z85.79 PERSONAL HISTORY OF OTHER MALIGNANT NEOPLASMS OF LYMPHOID, HEMATOPOIETIC AND RELATED TISSUES: ICD-10-CM

## 2024-01-31 DIAGNOSIS — Z12.4 ENCOUNTER FOR SCREENING FOR MALIGNANT NEOPLASM OF CERVIX: ICD-10-CM

## 2024-01-31 DIAGNOSIS — Z13.820 ENCOUNTER FOR SCREENING FOR OSTEOPOROSIS: ICD-10-CM

## 2024-01-31 DIAGNOSIS — D89.2 HYPERGAMMAGLOBULINEMIA, UNSPECIFIED: ICD-10-CM

## 2024-01-31 DIAGNOSIS — Z87.898 PERSONAL HISTORY OF OTHER SPECIFIED CONDITIONS: ICD-10-CM

## 2024-01-31 DIAGNOSIS — Z87.01 PERSONAL HISTORY OF PNEUMONIA (RECURRENT): ICD-10-CM

## 2024-01-31 DIAGNOSIS — M54.2 CERVICALGIA: ICD-10-CM

## 2024-01-31 DIAGNOSIS — M54.9 DORSALGIA, UNSPECIFIED: ICD-10-CM

## 2024-01-31 DIAGNOSIS — D47.2 MONOCLONAL GAMMOPATHY: ICD-10-CM

## 2024-01-31 DIAGNOSIS — Z86.03 PERSONAL HISTORY OF NEOPLASM OF UNCERTAIN BEHAVIOR: ICD-10-CM

## 2024-01-31 DIAGNOSIS — Z01.419 ENCOUNTER FOR GYNECOLOGICAL EXAMINATION (GENERAL) (ROUTINE) W/OUT ABNORMAL FINDINGS: ICD-10-CM

## 2024-01-31 LAB
25(OH)D3 SERPL-MCNC: 23 NG/ML
ALBUMIN SERPL ELPH-MCNC: 4.6 G/DL
ALP BLD-CCNC: 73 U/L
ALT SERPL-CCNC: 14 U/L
ANION GAP SERPL CALC-SCNC: 13 MMOL/L
AST SERPL-CCNC: 21 U/L
B2 MICROGLOB SERPL-MCNC: 3.5 MG/L
BASOPHILS # BLD AUTO: 0.02 K/UL — SIGNIFICANT CHANGE UP (ref 0–0.2)
BASOPHILS NFR BLD AUTO: 0.3 % — SIGNIFICANT CHANGE UP (ref 0–2)
BILIRUB SERPL-MCNC: 0.6 MG/DL
BUN SERPL-MCNC: 52 MG/DL
CALCIUM SERPL-MCNC: 10.5 MG/DL
CHLORIDE SERPL-SCNC: 100 MMOL/L
CO2 SERPL-SCNC: 26 MMOL/L
CREAT SERPL-MCNC: 1.02 MG/DL
EGFR: 61 ML/MIN/1.73M2
EOSINOPHIL # BLD AUTO: 0.06 K/UL — SIGNIFICANT CHANGE UP (ref 0–0.5)
EOSINOPHIL NFR BLD AUTO: 1 % — SIGNIFICANT CHANGE UP (ref 0–6)
FERRITIN SERPL-MCNC: 215 NG/ML
GLUCOSE SERPL-MCNC: 130 MG/DL
HCT VFR BLD CALC: 39.3 % — SIGNIFICANT CHANGE UP (ref 34.5–45)
HGB BLD-MCNC: 13.4 G/DL — SIGNIFICANT CHANGE UP (ref 11.5–15.5)
IMM GRANULOCYTES NFR BLD AUTO: 0.3 % — SIGNIFICANT CHANGE UP (ref 0–0.9)
IRON SATN MFR SERPL: 20 %
IRON SERPL-MCNC: 74 UG/DL
LDH SERPL-CCNC: 156 U/L
LYMPHOCYTES # BLD AUTO: 3.47 K/UL — HIGH (ref 1–3.3)
LYMPHOCYTES # BLD AUTO: 57.1 % — HIGH (ref 13–44)
MCHC RBC-ENTMCNC: 30 PG — SIGNIFICANT CHANGE UP (ref 27–34)
MCHC RBC-ENTMCNC: 34.1 G/DL — SIGNIFICANT CHANGE UP (ref 32–36)
MCV RBC AUTO: 88.1 FL — SIGNIFICANT CHANGE UP (ref 80–100)
MONOCYTES # BLD AUTO: 0.58 K/UL — SIGNIFICANT CHANGE UP (ref 0–0.9)
MONOCYTES NFR BLD AUTO: 9.5 % — SIGNIFICANT CHANGE UP (ref 2–14)
NEUTROPHILS # BLD AUTO: 1.93 K/UL — SIGNIFICANT CHANGE UP (ref 1.8–7.4)
NEUTROPHILS NFR BLD AUTO: 31.8 % — LOW (ref 43–77)
NRBC # BLD: 0 /100 WBCS — SIGNIFICANT CHANGE UP (ref 0–0)
PLATELET # BLD AUTO: 303 K/UL — SIGNIFICANT CHANGE UP (ref 150–400)
POTASSIUM SERPL-SCNC: 4.1 MMOL/L
PROT SERPL-MCNC: 8.9 G/DL
RBC # BLD: 4.46 M/UL — SIGNIFICANT CHANGE UP (ref 3.8–5.2)
RBC # FLD: 13.1 % — SIGNIFICANT CHANGE UP (ref 10.3–14.5)
SODIUM SERPL-SCNC: 139 MMOL/L
TIBC SERPL-MCNC: 376 UG/DL
TSH SERPL-ACNC: 2.61 UIU/ML
UIBC SERPL-MCNC: 302 UG/DL
VIT B12 SERPL-MCNC: 571 PG/ML
WBC # BLD: 6.08 K/UL — SIGNIFICANT CHANGE UP (ref 3.8–10.5)
WBC # FLD AUTO: 6.08 K/UL — SIGNIFICANT CHANGE UP (ref 3.8–10.5)

## 2024-01-31 PROCEDURE — 86901 BLOOD TYPING SEROLOGIC RH(D): CPT

## 2024-01-31 PROCEDURE — 99212 OFFICE O/P EST SF 10 MIN: CPT

## 2024-01-31 PROCEDURE — 86900 BLOOD TYPING SEROLOGIC ABO: CPT

## 2024-01-31 PROCEDURE — 86850 RBC ANTIBODY SCREEN: CPT

## 2024-01-31 RX ORDER — IBUPROFEN 600 MG/1
600 TABLET, FILM COATED ORAL 3 TIMES DAILY
Qty: 1 | Refills: 0 | Status: DISCONTINUED | COMMUNITY
Start: 2023-01-03 | End: 2024-01-31

## 2024-01-31 NOTE — PHYSICAL EXAM
[de-identified] : FROM; arthritis of fingers [Normal] : normal spine exam without palpable tenderness, no kyphosis or scoliosis [de-identified] : breathing appeared unlabored

## 2024-01-31 NOTE — ASSESSMENT
[FreeTextEntry1] : Lab work , BMB path report reviewed. #1) 2014-Was under a lot of stress due to an ill father, and stated she had UE/LE M-S aches. Saw rheumatologist who found monoclonal gammopathy on lab work. Referred to hematologist, and saw Dr. 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 Patient then sought second opinion at [facility] [place] and BMB was recommended, again which patient declined. Patient did not pursue heme f/u.  2019-Routine PCP f/u showed persistent monoclonal gammopathy on lab work and patient presented for heme f/u at the [facility] [place]. She declined BMB. 11/2020-Skeletal survey-no suspicious lytic lesions. --rising M-spike was discussed-patient then agreed to BM eval. 1/11/2024- Specimen(s) Submitted 1  Bone marrow biopsy MH 2  Bone marrow aspirate for Flow Final Diagnosis 1, 2. Bone marrow biopsy and bone marrow aspirate - Plasma cell neoplasm (40% of cells). - Trilineage hematopoiesis with maturation is present. Reviewed myeloma diagnosis with patient.  #2) Relative lymphocytosis-intermittent, chronic. 10/2021-PB flow cytometry-Peripheral blood:  - The lymphocyte immunophenotypic findings show no diagnostic abnormalities.  - The myeloid immunophenotypic findings show normal myeloid granularity, no increase in myeloid immaturity, and normal myeloid antigen maturation pattern.  #3) -6/13/2023-Mammogram/breast US-benign [id number]-[id number] 2-patient to f/u with PCP for continued monitoring. Have encouraged to have screening colonoscopy.  Patient was given the opportunity to ask questions. Her questions have been answered to her apparent satisfaction at this time.  --Going forward, patient to continue her hematology care with Dr. Patel (transition care to her). Her questions answered to her apparent satisfaction at this time. She was appreciative of care.

## 2024-01-31 NOTE — ASSESSMENT
[FreeTextEntry1] : THAIS DALTON is a 63 year woman with PMH of HTN and HLD, who presents for Hematology evaluation of IgG lambda monoclonal gammopathy.   # IgG lambda monoclonal gammopathy: She was first noted to have IgG lambda monoclonal gammopathy in 2014 and was recommended to undergo a bone marrow biopsy, which she declined. She re-established care at [facility] with Dr. Leiva in 2019 for persistent monoclonal gammopathy. She was noted to have rising paraprotein markers in 9/2023: M-spike 2.0, IgG 2818, K/L 0.56. No anemia, renal dysfunction, or hypercalcemia. IR-guided bone marrow biopsy on 1/11/24 showed 40% plasma cells. FISH showed 3 copies of FGFR3 (5.5%) and 3 copies of CCND1 (5.5%). Normal karyotype. We will need to obtain skeletal imaging to determine if she meets criteria for multiple myeloma. If normal, she would be considered high-risk smoldering multiple myeloma based on the 2-20-20 rule (M-spike > 2, clonal plasma cells > 20%), in which case her risk of progression to myeloma over the next 2 year is approximately 50%.  - CBC and CMP - LDH and B2-microglobulin - Serum RALEIGH, SPEP, quantitative immunoglobulins, free light chain assay - UPEP and RALEIGH - PET/CT to evaluate for lytic lesions - Patient should return to clinic in 3 months  # Fatigue: - Ferritin and iron studies - B12 - TSH

## 2024-01-31 NOTE — HISTORY OF PRESENT ILLNESS
[de-identified] : THAIS DALTON is a 63 year woman with PMH of HTN and HLD, who presents for Hematology evaluation of IgG lambda monoclonal gammopathy.   She initially established care with Hematology in 2014 for IgG lambda monoclonal gammopathy (M-spike 1.8, IgG 1930, K/L 0.44). She went to both [facility] (Dr. Ramírez) and [place]; both recommended a bone marrow biopsy, which she declined. Routine labs in 2019 showed persistent monoclonal gammopathy in 2019, and she re-established care at [facility] with Dr. Leiva. Her labs showed M-spike 1.1, IgG 1872, K/L 1.14. UPEP showed a weak IgG lambda band. No anemia, renal dysfunction, or hypercalcemia. Skeletal survey in 11/2020 did not show any lytic lesions.   She continued to follow up every 6 months and was noted to have rising paraprotein markers in 9/2023: M-spike 2.0, IgG 2818, K/L 0.56. No anemia, renal dysfunction, or hypercalcemia. IR-guided bone marrow biopsy on 1/11/24 showed 40% plasma cells. FISH showed 3 copies of FGFR3 (5.5%) and 3 copies of CCND1 (5.5%).   Family History: - Father: bladder cancer - Paternal aunt and mother: renal cancer   Social History: - Lives with her  - Homemaker - Denies tobacco, alcohol, or drug use.

## 2024-01-31 NOTE — HISTORY OF PRESENT ILLNESS
[de-identified] : 2014-Was under a lot of stress due to an ill father, and stated she had UE/LE M-S aches. Saw rheumatologist who found monoclonal gammopathy on lab work. Referred to hematologist, and saw Dr. 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 Patient then sought second opinion at [facility] [place] and BMB was recommended, again which patient declined. Patient did not pursue heme f/u. 2019-Routine PCP f/u showed persistent monoclonal gammopathy on lab work and patient presented for heme f/u at the [facility] [place]. She has declined BMB. 11/2020-Skeletal survey-no suspicious lytic lesions.  10/2021-PB flow cytometry-Peripheral blood:      - The lymphocyte immunophenotypic findings show no diagnostic abnormalities.      - The myeloid immunophenotypic findings show normal myeloid granularity, no increase in myeloid immaturity, and normal myeloid antigen maturation pattern.  Rising M-spike-patient then agreed to BM eval. 1/11/2024- Specimen(s) Submitted 1  Bone marrow biopsy MH 2  Bone marrow aspirate for Flow Final Diagnosis 1, 2. Bone marrow biopsy and bone marrow aspirate - Plasma cell neoplasm (40% of cells). - Trilineage hematopoiesis with maturation is present. [de-identified] : Has seen Dr. Patel for MM-planning further testing, PET scan.   No c/o fevers, sweats, abnormal bruising/bleeding. No c/o cough or SOB. No LN complaints.

## 2024-02-05 LAB
ALBUMIN MFR SERPL ELPH: 51.8 %
ALBUMIN SERPL-MCNC: 4.6 G/DL
ALBUMIN/GLOB SERPL: 1.1 RATIO
ALPHA1 GLOB MFR SERPL ELPH: 3.4 %
ALPHA1 GLOB SERPL ELPH-MCNC: 0.3 G/DL
ALPHA2 GLOB MFR SERPL ELPH: 8.4 %
ALPHA2 GLOB SERPL ELPH-MCNC: 0.7 G/DL
B-GLOBULIN MFR SERPL ELPH: 8.7 %
B-GLOBULIN SERPL ELPH-MCNC: 0.8 G/DL
DEPRECATED KAPPA LC FREE/LAMBDA SER: 0.42 RATIO
GAMMA GLOB FLD ELPH-MCNC: 2.5 G/DL
GAMMA GLOB MFR SERPL ELPH: 27.7 %
IGA SER QL IEP: 27 MG/DL
IGG SER QL IEP: 3032 MG/DL
IGM SER QL IEP: 21 MG/DL
INTERPRETATION SERPL IEP-IMP: NORMAL
KAPPA LC CSF-MCNC: 3.06 MG/DL
KAPPA LC SERPL-MCNC: 1.3 MG/DL
M PROTEIN MFR SERPL ELPH: 25.1 %
M PROTEIN SPEC IFE-MCNC: NORMAL
MONOCLON BAND OBS SERPL: 2.2 G/DL
PROT SERPL-MCNC: 8.9 G/DL
PROT SERPL-MCNC: 8.9 G/DL

## 2024-02-08 LAB
ALBUPE: 26.3 %
ALPHA1UPE: 31.4 %
ALPHA2UPE: 14.9 %
BETAUPE: 16.4 %
GAMMAUPE: 11 %
IGA 24H UR QL IFE: NORMAL
KAPPA LC 24H UR QL: NORMAL
PROT PATTERN 24H UR ELPH-IMP: NORMAL
PROT UR-MCNC: 9 MG/DL
PROT UR-MCNC: 9 MG/DL

## 2024-02-29 ENCOUNTER — APPOINTMENT (OUTPATIENT)
Dept: NUCLEAR MEDICINE | Facility: CLINIC | Age: 64
End: 2024-02-29
Payer: COMMERCIAL

## 2024-02-29 ENCOUNTER — OUTPATIENT (OUTPATIENT)
Dept: OUTPATIENT SERVICES | Facility: HOSPITAL | Age: 64
LOS: 1 days | End: 2024-02-29
Payer: COMMERCIAL

## 2024-02-29 DIAGNOSIS — D47.2 MONOCLONAL GAMMOPATHY: ICD-10-CM

## 2024-02-29 PROCEDURE — 78816 PET IMAGE W/CT FULL BODY: CPT | Mod: 26,PI

## 2024-02-29 PROCEDURE — 78816 PET IMAGE W/CT FULL BODY: CPT

## 2024-02-29 PROCEDURE — A9552: CPT

## 2024-03-01 ENCOUNTER — OUTPATIENT (OUTPATIENT)
Dept: OUTPATIENT SERVICES | Facility: HOSPITAL | Age: 64
LOS: 1 days | Discharge: HOME | End: 2024-03-01

## 2024-03-01 DIAGNOSIS — E88.09 OTHER DISORDERS OF PLASMA-PROTEIN METABOLISM, NOT ELSEWHERE CLASSIFIED: ICD-10-CM

## 2024-03-01 DIAGNOSIS — Z86.03 PERSONAL HISTORY OF NEOPLASM OF UNCERTAIN BEHAVIOR: ICD-10-CM

## 2024-03-04 ENCOUNTER — RESULT REVIEW (OUTPATIENT)
Age: 64
End: 2024-03-04

## 2024-03-04 ENCOUNTER — APPOINTMENT (OUTPATIENT)
Dept: HEMATOLOGY ONCOLOGY | Facility: CLINIC | Age: 64
End: 2024-03-04
Payer: COMMERCIAL

## 2024-03-04 ENCOUNTER — OUTPATIENT (OUTPATIENT)
Dept: OUTPATIENT SERVICES | Facility: HOSPITAL | Age: 64
LOS: 1 days | End: 2024-03-04
Payer: COMMERCIAL

## 2024-03-04 VITALS
WEIGHT: 136 LBS | SYSTOLIC BLOOD PRESSURE: 163 MMHG | HEART RATE: 97 BPM | RESPIRATION RATE: 16 BRPM | BODY MASS INDEX: 26.25 KG/M2 | DIASTOLIC BLOOD PRESSURE: 83 MMHG | TEMPERATURE: 97 F | OXYGEN SATURATION: 99 %

## 2024-03-04 DIAGNOSIS — D64.9 ANEMIA, UNSPECIFIED: ICD-10-CM

## 2024-03-04 DIAGNOSIS — C90.00 MULTIPLE MYELOMA NOT HAVING ACHIEVED REMISSION: ICD-10-CM

## 2024-03-04 LAB
BASOPHILS # BLD AUTO: 0.01 K/UL — SIGNIFICANT CHANGE UP (ref 0–0.2)
BASOPHILS NFR BLD AUTO: 0.1 % — SIGNIFICANT CHANGE UP (ref 0–2)
EOSINOPHIL # BLD AUTO: 0.1 K/UL — SIGNIFICANT CHANGE UP (ref 0–0.5)
EOSINOPHIL NFR BLD AUTO: 1.4 % — SIGNIFICANT CHANGE UP (ref 0–6)
HCT VFR BLD CALC: 36.3 % — SIGNIFICANT CHANGE UP (ref 34.5–45)
HGB BLD-MCNC: 12.4 G/DL — SIGNIFICANT CHANGE UP (ref 11.5–15.5)
IMM GRANULOCYTES NFR BLD AUTO: 0.3 % — SIGNIFICANT CHANGE UP (ref 0–0.9)
LYMPHOCYTES # BLD AUTO: 3.35 K/UL — HIGH (ref 1–3.3)
LYMPHOCYTES # BLD AUTO: 47.1 % — HIGH (ref 13–44)
MCHC RBC-ENTMCNC: 30.3 PG — SIGNIFICANT CHANGE UP (ref 27–34)
MCHC RBC-ENTMCNC: 34.2 G/DL — SIGNIFICANT CHANGE UP (ref 32–36)
MCV RBC AUTO: 88.8 FL — SIGNIFICANT CHANGE UP (ref 80–100)
MONOCYTES # BLD AUTO: 0.69 K/UL — SIGNIFICANT CHANGE UP (ref 0–0.9)
MONOCYTES NFR BLD AUTO: 9.7 % — SIGNIFICANT CHANGE UP (ref 2–14)
NEUTROPHILS # BLD AUTO: 2.94 K/UL — SIGNIFICANT CHANGE UP (ref 1.8–7.4)
NEUTROPHILS NFR BLD AUTO: 41.4 % — LOW (ref 43–77)
NRBC # BLD: 0 /100 WBCS — SIGNIFICANT CHANGE UP (ref 0–0)
PLATELET # BLD AUTO: 326 K/UL — SIGNIFICANT CHANGE UP (ref 150–400)
RBC # BLD: 4.09 M/UL — SIGNIFICANT CHANGE UP (ref 3.8–5.2)
RBC # FLD: 13.5 % — SIGNIFICANT CHANGE UP (ref 10.3–14.5)
WBC # BLD: 7.11 K/UL — SIGNIFICANT CHANGE UP (ref 3.8–10.5)
WBC # FLD AUTO: 7.11 K/UL — SIGNIFICANT CHANGE UP (ref 3.8–10.5)

## 2024-03-04 PROCEDURE — G2211 COMPLEX E/M VISIT ADD ON: CPT

## 2024-03-04 PROCEDURE — 99215 OFFICE O/P EST HI 40 MIN: CPT

## 2024-03-04 RX ORDER — APIXABAN 2.5 MG/1
2.5 TABLET, FILM COATED ORAL
Qty: 60 | Refills: 11 | Status: ACTIVE | COMMUNITY
Start: 2024-03-04 | End: 1900-01-01

## 2024-03-05 DIAGNOSIS — M85.80 OTHER SPECIFIED DISORDERS OF BONE DENSITY AND STRUCTURE, UNSPECIFIED SITE: ICD-10-CM

## 2024-03-05 LAB
25(OH)D3 SERPL-MCNC: 29.1 NG/ML
ALBUMIN MFR SERPL ELPH: 51.3 %
ALBUMIN SERPL ELPH-MCNC: 4.6 G/DL
ALBUMIN SERPL-MCNC: 4.5 G/DL
ALBUMIN/GLOB SERPL: 1.1 RATIO
ALP BLD-CCNC: 74 U/L
ALPHA1 GLOB MFR SERPL ELPH: 3.3 %
ALPHA1 GLOB SERPL ELPH-MCNC: 0.3 G/DL
ALPHA2 GLOB MFR SERPL ELPH: 7.7 %
ALPHA2 GLOB SERPL ELPH-MCNC: 0.7 G/DL
ALT SERPL-CCNC: 13 U/L
ANION GAP SERPL CALC-SCNC: 12 MMOL/L
AST SERPL-CCNC: 21 U/L
B-GLOBULIN MFR SERPL ELPH: 8.6 %
B-GLOBULIN SERPL ELPH-MCNC: 0.7 G/DL
B2 MICROGLOB SERPL-MCNC: 3.3 MG/L
BILIRUB SERPL-MCNC: 0.4 MG/DL
BUN SERPL-MCNC: 48 MG/DL
CALCIUM SERPL-MCNC: 10.5 MG/DL
CHLORIDE SERPL-SCNC: 101 MMOL/L
CO2 SERPL-SCNC: 23 MMOL/L
CREAT SERPL-MCNC: 1 MG/DL
DEPRECATED KAPPA LC FREE/LAMBDA SER: 0.32 RATIO
EGFR: 63 ML/MIN/1.73M2
GAMMA GLOB FLD ELPH-MCNC: 2.5 G/DL
GAMMA GLOB MFR SERPL ELPH: 29.1 %
GLUCOSE SERPL-MCNC: 95 MG/DL
HBV CORE IGG+IGM SER QL: NONREACTIVE
HBV SURFACE AB SER QL: NONREACTIVE
HBV SURFACE AG SER QL: NONREACTIVE
IGA SER QL IEP: 21 MG/DL
IGG SER QL IEP: 3214 MG/DL
IGM SER QL IEP: 17 MG/DL
INTERPRETATION SERPL IEP-IMP: NORMAL
KAPPA LC CSF-MCNC: 3.28 MG/DL
KAPPA LC SERPL-MCNC: 1.05 MG/DL
LDH SERPL-CCNC: 152 U/L
M PROTEIN MFR SERPL ELPH: 25.6 %
M PROTEIN SPEC IFE-MCNC: NORMAL
MONOCLON BAND OBS SERPL: 2.2 G/DL
POTASSIUM SERPL-SCNC: 4.3 MMOL/L
PROT SERPL-MCNC: 8.7 G/DL
SODIUM SERPL-SCNC: 136 MMOL/L

## 2024-03-05 RX ORDER — CHOLECALCIFEROL (VITAMIN D3) 25 MCG
25 MCG CAPSULE ORAL
Qty: 30 | Refills: 3 | Status: ACTIVE | COMMUNITY
Start: 2024-02-01

## 2024-03-05 NOTE — HISTORY OF PRESENT ILLNESS
[de-identified] : THAIS DALTON is a 63 year woman with PMH of HTN and HLD, who presents for Hematology follow up of IgG lambda multiple myeloma.  Diagnosis: - She initially established care with Hematology in 2014 for IgG lambda monoclonal gammopathy (M-spike 1.8, IgG 1930, K/L 0.44). She went to both [facility] (Dr. Ramírez) and [place]; both recommended a bone marrow biopsy, which she declined. Routine labs in 2019 showed persistent monoclonal gammopathy in 2019, and she re-established care at [facility] with Dr. Leiva. Her labs showed M-spike 1.1, IgG 1872, K/L 1.14. UPEP showed a weak IgG lambda band. No anemia, renal dysfunction, or hypercalcemia. Skeletal survey in 11/2020 did not show any lytic lesions.  - She continued to follow up every 6 months and was noted to have rising paraprotein markers in 9/2023: M-spike 2.0, IgG 2818, K/L 0.56. No anemia, renal dysfunction, or hypercalcemia. IR-guided bone marrow biopsy on 1/11/24 showed 40% plasma cells. FISH showed 3 copies of FGFR3 (5.5%) and 3 copies of CCND1 (5.5%).  Interval History: - Since our last visit, she had a PET/CT on 2/29/24 that showed focal hypermetabolic skeletal lucencies within L1 (2.3 x 1.4 cm, SUV 7.4) and the RIGHT iliac bone (SUV 5.7). - She returns today to discuss treatment of multiple myeloma.   Family History: - Father: bladder cancer - Paternal aunt and mother: renal cancer   Social History: - Lives with her  - Homemaker - Denies tobacco, alcohol, or drug use.

## 2024-03-05 NOTE — ASSESSMENT
[FreeTextEntry1] : THAIS DALTON is a 63 year woman with PMH of HTN and HLD, who presents for Hematology follow up of IgG lambda multiple myeloma.   1. IgG lambda multiple myeloma:  - Diagnosis: She was first noted to have IgG lambda monoclonal gammopathy in 2014 and was recommended to undergo a bone marrow biopsy, which she declined. She re-established care at [facility] with Dr. Leiva in 2019 for persistent monoclonal gammopathy. She was noted to have rising paraprotein markers in 9/2023: M-spike 2.0, IgG 2818, K/L 0.56. No anemia, renal dysfunction, or hypercalcemia. IR-guided bone marrow biopsy on 1/11/24 showed 40% plasma cells. FISH showed 3 copies of FGFR3 (5.5%) and 3 copies of CCND1 (5.5%). Normal karyotype. Lytic lesions noted on PET/CT from 2/29/24. - Staging: R-ISS I (low risk) - Treatment plan: Will plan to treat with Rosalind-VRd per the phase III PERSEUS trial, with Velcade dosing modified to weekly to minimize toxicity. Will plan for 4-6 cycles of induction therapy, followed by auto-SCT. Daratumumab SQ weekly x 8 (C1-2), then biweekly x 8 (C3-6), then monthly (C7+) Velcade SQ 1.3 mg/m2 D1, 8, 15 Revlimid PO 25 mg D1-21 Pre-medications: dexamethasone 20 mg, Tylenol 650 mg, Benadryl 25 mg (50 mg - C1D1 only), Singulair 10 mg (C1D1 only) C1D1 scheduled for 3/14/24 - Monitor MM labs (SPEP, RALEIGH, free light chains, immunoglobulins) monthly - Monitor CBC and CMP with treatment   2. Bones: PET/CT on 2/29/24 showed focal hypermetabolic skeletal lucencies within L1 (2.3 x 1.4 cm, SUV 7.4) and the RIGHT iliac bone (SUV 5.7). - Vitamin D: Check level today and replete if low - Anti-resorptive therapy: Pending dental evaluation   3. Kidney: - Creatinine: 1.02 (1/31/24) - UPEP/RALEIGH: positive for IgG lambda band (1/31/24) - Avoid nephrotoxic agents   4. Hematology:  - CBC today: 7.11 > 12.4 < 326 - Monitor CBC with treatment   5. Peripheral neuropathy: She reports mild neuropathy at baseline - Continue to monitor with Velcade treatment   6. VTE: No history of VTE. - Start Eliquis 2.5 mg BID for prophylaxis with Revlimid treatment (allergic to aspirin)   7. Infections: She has immunoparesis (low IgA and IgM). No recent or recurrent infections. - IgG level: 3032 (1/31/24) - Start acyclovir 400 mg BID for antiviral prophylaxis   8. Amyloidosis: Not suspected. Bone marrow negative for Congo red staining.

## 2024-03-14 ENCOUNTER — RESULT REVIEW (OUTPATIENT)
Age: 64
End: 2024-03-14

## 2024-03-14 ENCOUNTER — NON-APPOINTMENT (OUTPATIENT)
Age: 64
End: 2024-03-14

## 2024-03-14 ENCOUNTER — APPOINTMENT (OUTPATIENT)
Dept: INFUSION THERAPY | Facility: HOSPITAL | Age: 64
End: 2024-03-14

## 2024-03-14 ENCOUNTER — APPOINTMENT (OUTPATIENT)
Dept: HEMATOLOGY ONCOLOGY | Facility: CLINIC | Age: 64
End: 2024-03-14
Payer: COMMERCIAL

## 2024-03-14 LAB
ALBUMIN SERPL ELPH-MCNC: 4.4 G/DL — SIGNIFICANT CHANGE UP (ref 3.3–5)
ALP SERPL-CCNC: 65 U/L — SIGNIFICANT CHANGE UP (ref 40–120)
ALT FLD-CCNC: 23 U/L — SIGNIFICANT CHANGE UP (ref 10–45)
ANION GAP SERPL CALC-SCNC: 13 MMOL/L — SIGNIFICANT CHANGE UP (ref 5–17)
AST SERPL-CCNC: 36 U/L — SIGNIFICANT CHANGE UP (ref 10–40)
BASOPHILS # BLD AUTO: 0.01 K/UL — SIGNIFICANT CHANGE UP (ref 0–0.2)
BASOPHILS NFR BLD AUTO: 0.2 % — SIGNIFICANT CHANGE UP (ref 0–2)
BILIRUB SERPL-MCNC: 0.6 MG/DL — SIGNIFICANT CHANGE UP (ref 0.2–1.2)
BUN SERPL-MCNC: 43 MG/DL — HIGH (ref 7–23)
CALCIUM SERPL-MCNC: 10.2 MG/DL — SIGNIFICANT CHANGE UP (ref 8.4–10.5)
CHLORIDE SERPL-SCNC: 98 MMOL/L — SIGNIFICANT CHANGE UP (ref 96–108)
CO2 SERPL-SCNC: 23 MMOL/L — SIGNIFICANT CHANGE UP (ref 22–31)
CREAT SERPL-MCNC: 0.94 MG/DL — SIGNIFICANT CHANGE UP (ref 0.5–1.3)
EGFR: 68 ML/MIN/1.73M2 — SIGNIFICANT CHANGE UP
EOSINOPHIL # BLD AUTO: 0.15 K/UL — SIGNIFICANT CHANGE UP (ref 0–0.5)
EOSINOPHIL NFR BLD AUTO: 2.3 % — SIGNIFICANT CHANGE UP (ref 0–6)
GLUCOSE SERPL-MCNC: 198 MG/DL — HIGH (ref 70–99)
HCT VFR BLD CALC: 34.3 % — LOW (ref 34.5–45)
HGB BLD-MCNC: 12 G/DL — SIGNIFICANT CHANGE UP (ref 11.5–15.5)
IMM GRANULOCYTES NFR BLD AUTO: 0.6 % — SIGNIFICANT CHANGE UP (ref 0–0.9)
LYMPHOCYTES # BLD AUTO: 3.39 K/UL — HIGH (ref 1–3.3)
LYMPHOCYTES # BLD AUTO: 52.7 % — HIGH (ref 13–44)
MCHC RBC-ENTMCNC: 30.9 PG — SIGNIFICANT CHANGE UP (ref 27–34)
MCHC RBC-ENTMCNC: 35 G/DL — SIGNIFICANT CHANGE UP (ref 32–36)
MCV RBC AUTO: 88.4 FL — SIGNIFICANT CHANGE UP (ref 80–100)
MONOCYTES # BLD AUTO: 0.79 K/UL — SIGNIFICANT CHANGE UP (ref 0–0.9)
MONOCYTES NFR BLD AUTO: 12.3 % — SIGNIFICANT CHANGE UP (ref 2–14)
NEUTROPHILS # BLD AUTO: 2.05 K/UL — SIGNIFICANT CHANGE UP (ref 1.8–7.4)
NEUTROPHILS NFR BLD AUTO: 31.9 % — LOW (ref 43–77)
NRBC # BLD: 0 /100 WBCS — SIGNIFICANT CHANGE UP (ref 0–0)
PLATELET # BLD AUTO: 269 K/UL — SIGNIFICANT CHANGE UP (ref 150–400)
POTASSIUM SERPL-MCNC: 3.6 MMOL/L — SIGNIFICANT CHANGE UP (ref 3.5–5.3)
POTASSIUM SERPL-SCNC: 3.6 MMOL/L — SIGNIFICANT CHANGE UP (ref 3.5–5.3)
PROT SERPL-MCNC: 8.7 G/DL — HIGH (ref 6–8.3)
RBC # BLD: 3.88 M/UL — SIGNIFICANT CHANGE UP (ref 3.8–5.2)
RBC # FLD: 13.5 % — SIGNIFICANT CHANGE UP (ref 10.3–14.5)
SODIUM SERPL-SCNC: 134 MMOL/L — LOW (ref 135–145)
WBC # BLD: 6.43 K/UL — SIGNIFICANT CHANGE UP (ref 3.8–10.5)
WBC # FLD AUTO: 6.43 K/UL — SIGNIFICANT CHANGE UP (ref 3.8–10.5)

## 2024-03-14 PROCEDURE — 99214 OFFICE O/P EST MOD 30 MIN: CPT

## 2024-03-14 PROCEDURE — 86900 BLOOD TYPING SEROLOGIC ABO: CPT

## 2024-03-14 PROCEDURE — 86850 RBC ANTIBODY SCREEN: CPT

## 2024-03-14 PROCEDURE — 86901 BLOOD TYPING SEROLOGIC RH(D): CPT

## 2024-03-14 RX ORDER — LENALIDOMIDE 25 MG/1
25 CAPSULE ORAL
Qty: 21 | Refills: 0 | Status: DISCONTINUED | COMMUNITY
Start: 2024-03-05 | End: 2024-03-14

## 2024-03-14 NOTE — ASSESSMENT
[FreeTextEntry1] : THAIS DALTON is a 63 year woman with PMH of HTN and HLD, who presents for Hematology follow up of IgG lambda multiple myeloma.   1. IgG lambda multiple myeloma:  - Diagnosis: She was first noted to have IgG lambda monoclonal gammopathy in 2014 and was recommended to undergo a bone marrow biopsy, which she declined. She re-established care at [facility] with Dr. Leiva in 2019 for persistent monoclonal gammopathy. She was noted to have rising paraprotein markers in 9/2023: M-spike 2.0, IgG 2818, K/L 0.56. No anemia, renal dysfunction, or hypercalcemia. IR-guided bone marrow biopsy on 1/11/24 showed 40% plasma cells. FISH showed 3 copies of FGFR3 (5.5%) and 3 copies of CCND1 (5.5%). Normal karyotype. Lytic lesions noted on PET/CT from 2/29/24. - Staging: R-ISS I (low risk) - Treatment plan: Will plan to treat with Rosalind-VRd per the phase III PERSEUS trial, with Velcade dosing modified to weekly to minimize toxicity. Will plan for 4-6 cycles of induction therapy, followed by auto-SCT. Daratumumab SQ weekly x 8 (C1-2), then biweekly x 8 (C3-6), then monthly (C7+) Velcade SQ 1.3 mg/m2 D1, 8, 15 Revlimid PO 25 mg D1-21 Pre-medications: dexamethasone 20 mg, Tylenol 650 mg, Benadryl 25 mg (50 mg - C1D1 only), Singulair 10 mg (C1D1 only) Today is C1D1 - Monitor MM labs (SPEP, RALEIGH, free light chains, immunoglobulins) monthly - Monitor CBC and CMP with treatment   2. Bones: PET/CT on 2/29/24 showed focal hypermetabolic skeletal lucencies within L1 (2.3 x 1.4 cm, SUV 7.4) and the RIGHT iliac bone (SUV 5.7). - Vitamin D: 29.1 (3/4/24)  - Continue Vitamin D3 supplementation - Anti-resorptive therapy: Pending dental evaluation   3. Kidney: - Creatinine: 1.00 (3/4/24) - UPEP/RALEIGH: positive for IgG lambda band (1/31/24) - Avoid nephrotoxic agents   4. Hematology:  - CBC today: 6.43 > 12.0 < 269 - Monitor CBC with treatment   5. Peripheral neuropathy: She reports mild neuropathy at baseline - Continue to monitor with Velcade treatment   6. VTE: No history of VTE. - Continue Eliquis 2.5 mg BID for prophylaxis with Revlimid treatment (allergic to aspirin)   7. Infections: She has immunoparesis (low IgA and IgM). No recent or recurrent infections. - IgG level: 3214 (3/4/24) - Start acyclovir 400 mg BID for antiviral prophylaxis   8. Amyloidosis: Not suspected. Bone marrow negative for Congo red staining.

## 2024-03-14 NOTE — HISTORY OF PRESENT ILLNESS
[de-identified] : THAIS DALTON is a 63 year woman with PMH of HTN and HLD, who presents for Hematology follow up of IgG lambda multiple myeloma.  Diagnosis: - She initially established care with Hematology in 2014 for IgG lambda monoclonal gammopathy (M-spike 1.8, IgG 1930, K/L 0.44). She went to both [facility] (Dr. Ramírez) and [place]; both recommended a bone marrow biopsy, which she declined. Routine labs in 2019 showed persistent monoclonal gammopathy in 2019, and she re-established care at [facility] with Dr. Leiva. Her labs showed M-spike 1.1, IgG 1872, K/L 1.14. UPEP showed a weak IgG lambda band. No anemia, renal dysfunction, or hypercalcemia. Skeletal survey in 11/2020 did not show any lytic lesions.  - She continued to follow up every 6 months and was noted to have rising paraprotein markers in 9/2023: M-spike 2.0, IgG 2818, K/L 0.56. No anemia, renal dysfunction, or hypercalcemia. IR-guided bone marrow biopsy on 1/11/24 showed 40% plasma cells. FISH showed 3 copies of FGFR3 (5.5%) and 3 copies of CCND1 (5.5%). -PET/CT on 2/29/24 showed hypermetabolic skeletal lucencies within L1 (2.3 x 1.4 cm, SUV 7.4) and the RIGHT iliac bone (SUV 5.7).  Interval History: - She presents today for C1D1 of Rosalind-VRd. She feels anxious about starting chemotherapy today, but states she otherwise feels well.    Family History: - Father: bladder cancer - Paternal aunt and mother: renal cancer   Social History: - Lives with her  - Homemaker - Denies tobacco, alcohol, or drug use.

## 2024-03-15 DIAGNOSIS — C90.00 MULTIPLE MYELOMA NOT HAVING ACHIEVED REMISSION: ICD-10-CM

## 2024-03-15 DIAGNOSIS — R11.2 NAUSEA WITH VOMITING, UNSPECIFIED: ICD-10-CM

## 2024-03-15 DIAGNOSIS — Z51.11 ENCOUNTER FOR ANTINEOPLASTIC CHEMOTHERAPY: ICD-10-CM

## 2024-03-15 LAB
IGA FLD-MCNC: 24 MG/DL — LOW (ref 84–499)
IGG FLD-MCNC: 2759 MG/DL — HIGH (ref 610–1660)
IGM SERPL-MCNC: 15 MG/DL — LOW (ref 35–242)
KAPPA LC SER QL IFE: 0.99 MG/DL — SIGNIFICANT CHANGE UP (ref 0.33–1.94)
KAPPA/LAMBDA FREE LIGHT CHAIN RATIO, SERUM: 0.32 RATIO — SIGNIFICANT CHANGE UP (ref 0.26–1.65)
LAMBDA LC SER QL IFE: 3.11 MG/DL — HIGH (ref 0.57–2.63)

## 2024-03-18 LAB
% ALBUMIN: 52.4 % — SIGNIFICANT CHANGE UP
% ALPHA 1: 3.1 % — SIGNIFICANT CHANGE UP
% ALPHA 2: 8.2 % — SIGNIFICANT CHANGE UP
% BETA: 8 % — SIGNIFICANT CHANGE UP
% GAMMA: 28.3 % — SIGNIFICANT CHANGE UP
% M SPIKE: 24.4 % — SIGNIFICANT CHANGE UP
ALBUMIN SERPL ELPH-MCNC: 4.6 G/DL — SIGNIFICANT CHANGE UP (ref 3.6–5.5)
ALBUMIN/GLOB SERPL ELPH: 1.1 RATIO — SIGNIFICANT CHANGE UP
ALPHA1 GLOB SERPL ELPH-MCNC: 0.3 G/DL — SIGNIFICANT CHANGE UP (ref 0.1–0.4)
ALPHA2 GLOB SERPL ELPH-MCNC: 0.7 G/DL — SIGNIFICANT CHANGE UP (ref 0.5–1)
B-GLOBULIN SERPL ELPH-MCNC: 0.7 G/DL — SIGNIFICANT CHANGE UP (ref 0.5–1)
GAMMA GLOBULIN: 2.5 G/DL — HIGH (ref 0.6–1.6)
INTERPRETATION SERPL IFE-IMP: SIGNIFICANT CHANGE UP
M-SPIKE: 2.1 G/DL — HIGH (ref 0–0)
PROT PATTERN SERPL ELPH-IMP: SIGNIFICANT CHANGE UP
PROT SERPL-MCNC: 8.7 G/DL — HIGH (ref 6–8.3)

## 2024-03-21 ENCOUNTER — RESULT REVIEW (OUTPATIENT)
Age: 64
End: 2024-03-21

## 2024-03-21 ENCOUNTER — APPOINTMENT (OUTPATIENT)
Dept: HEMATOLOGY ONCOLOGY | Facility: CLINIC | Age: 64
End: 2024-03-21
Payer: COMMERCIAL

## 2024-03-21 ENCOUNTER — APPOINTMENT (OUTPATIENT)
Dept: HEMATOLOGY ONCOLOGY | Facility: CLINIC | Age: 64
End: 2024-03-21

## 2024-03-21 ENCOUNTER — APPOINTMENT (OUTPATIENT)
Dept: INFUSION THERAPY | Facility: HOSPITAL | Age: 64
End: 2024-03-21

## 2024-03-21 LAB
ALBUMIN SERPL ELPH-MCNC: 4.3 G/DL
ALP BLD-CCNC: 71 U/L
ALT SERPL-CCNC: 21 U/L
ANION GAP SERPL CALC-SCNC: 11 MMOL/L
AST SERPL-CCNC: 23 U/L
BASOPHILS # BLD AUTO: 0.01 K/UL — SIGNIFICANT CHANGE UP (ref 0–0.2)
BASOPHILS NFR BLD AUTO: 0.1 % — SIGNIFICANT CHANGE UP (ref 0–2)
BILIRUB SERPL-MCNC: 0.4 MG/DL
BUN SERPL-MCNC: 34 MG/DL
CALCIUM SERPL-MCNC: 9.6 MG/DL
CHLORIDE SERPL-SCNC: 98 MMOL/L
CO2 SERPL-SCNC: 25 MMOL/L
CREAT SERPL-MCNC: 0.87 MG/DL
EGFR: 74 ML/MIN/1.73M2
EOSINOPHIL # BLD AUTO: 0.28 K/UL — SIGNIFICANT CHANGE UP (ref 0–0.5)
EOSINOPHIL NFR BLD AUTO: 3.9 % — SIGNIFICANT CHANGE UP (ref 0–6)
GLUCOSE SERPL-MCNC: 98 MG/DL
HCT VFR BLD CALC: 35.4 % — SIGNIFICANT CHANGE UP (ref 34.5–45)
HGB BLD-MCNC: 12 G/DL — SIGNIFICANT CHANGE UP (ref 11.5–15.5)
IMM GRANULOCYTES NFR BLD AUTO: 0.3 % — SIGNIFICANT CHANGE UP (ref 0–0.9)
LYMPHOCYTES # BLD AUTO: 2.26 K/UL — SIGNIFICANT CHANGE UP (ref 1–3.3)
LYMPHOCYTES # BLD AUTO: 31.3 % — SIGNIFICANT CHANGE UP (ref 13–44)
MCHC RBC-ENTMCNC: 30.8 PG — SIGNIFICANT CHANGE UP (ref 27–34)
MCHC RBC-ENTMCNC: 33.9 G/DL — SIGNIFICANT CHANGE UP (ref 32–36)
MCV RBC AUTO: 90.8 FL — SIGNIFICANT CHANGE UP (ref 80–100)
MONOCYTES # BLD AUTO: 0.68 K/UL — SIGNIFICANT CHANGE UP (ref 0–0.9)
MONOCYTES NFR BLD AUTO: 9.4 % — SIGNIFICANT CHANGE UP (ref 2–14)
NEUTROPHILS # BLD AUTO: 3.97 K/UL — SIGNIFICANT CHANGE UP (ref 1.8–7.4)
NEUTROPHILS NFR BLD AUTO: 55 % — SIGNIFICANT CHANGE UP (ref 43–77)
NRBC # BLD: 0 /100 WBCS — SIGNIFICANT CHANGE UP (ref 0–0)
PLATELET # BLD AUTO: 244 K/UL — SIGNIFICANT CHANGE UP (ref 150–400)
POTASSIUM SERPL-SCNC: 3.8 MMOL/L
PROT SERPL-MCNC: 7.6 G/DL
RBC # BLD: 3.9 M/UL — SIGNIFICANT CHANGE UP (ref 3.8–5.2)
RBC # FLD: 13.7 % — SIGNIFICANT CHANGE UP (ref 10.3–14.5)
SODIUM SERPL-SCNC: 133 MMOL/L
WBC # BLD: 7.22 K/UL — SIGNIFICANT CHANGE UP (ref 3.8–10.5)
WBC # FLD AUTO: 7.22 K/UL — SIGNIFICANT CHANGE UP (ref 3.8–10.5)

## 2024-03-21 PROCEDURE — 99214 OFFICE O/P EST MOD 30 MIN: CPT

## 2024-03-21 RX ORDER — HYDROCORTISONE 10 MG/G
1 OINTMENT TOPICAL TWICE DAILY
Qty: 1 | Refills: 0 | Status: ACTIVE | COMMUNITY
Start: 2024-03-21 | End: 1900-01-01

## 2024-03-21 NOTE — REVIEW OF SYSTEMS
[Anxiety] : anxiety [Fatigue] : fatigue [Negative] : Allergic/Immunologic [de-identified] : rash on abdomen [de-identified] : intermittent headaches

## 2024-03-21 NOTE — ASSESSMENT
[FreeTextEntry1] : THAIS DALTON is a 63 year woman with PMH of HTN and HLD, who presents for Hematology follow up of IgG lambda multiple myeloma.   1. IgG lambda multiple myeloma:  - Diagnosis: She was first noted to have IgG lambda monoclonal gammopathy in 2014 and was recommended to undergo a bone marrow biopsy, which she declined. She re-established care at [facility] with Dr. Leiva in 2019 for persistent monoclonal gammopathy. She was noted to have rising paraprotein markers in 9/2023: M-spike 2.0, IgG 2818, K/L 0.56. No anemia, renal dysfunction, or hypercalcemia. IR-guided bone marrow biopsy on 1/11/24 showed 40% plasma cells. FISH showed 3 copies of FGFR3 (5.5%) and 3 copies of CCND1 (5.5%). Normal karyotype. Lytic lesions noted on PET/CT from 2/29/24. - Staging: R-ISS I (low risk) - Treatment plan: Will plan to treat with Rosalind-VRd per the phase III PERSEUS trial, with Velcade dosing modified to weekly to minimize toxicity. Will plan for 4-6 cycles of induction therapy, followed by auto-SCT. Daratumumab SQ weekly x 8 (C1-2), then biweekly x 8 (C3-6), then monthly (C7+) Velcade SQ 1.3 mg/m2 D1, 8, 15 Revlimid PO 25 mg D1-21 Pre-medications: dexamethasone 20 mg, Tylenol 650 mg, Benadryl 25 mg (50 mg - C1D1 only), Singulair 10 mg (C1D1 only) C1D1, 3/14/24. Today is C1D8 - Monitor MM labs (SPEP, RALEIGH, free light chains, immunoglobulins) monthly - Monitor CBC and CMP with treatment   2. Bones: PET/CT on 2/29/24 showed focal hypermetabolic skeletal lucencies within L1 (2.3 x 1.4 cm, SUV 7.4) and the RIGHT iliac bone (SUV 5.7). - Vitamin D: 29.1 (3/4/24)  - Continue Vitamin D3 supplementation - Anti-resorptive therapy: Pending dental evaluation   3. Kidney: - Creatinine: 0.87 (3/21/24) - UPEP/RALEIGH: positive for IgG lambda band (1/31/24) - Avoid nephrotoxic agents   4. Hematology:  - CBC today: 7.22 > 12.0 < 244 - Monitor CBC with treatment   5. Peripheral neuropathy: She reports mild neuropathy at baseline - Continue to monitor with Velcade treatment   6. VTE: No history of VTE. - Continue Eliquis 2.5 mg BID for prophylaxis with Revlimid treatment (allergic to aspirin)   7. Infections: She has immunoparesis (low IgA and IgM). No recent or recurrent infections. - IgG level: 2759 (3/14/24) - Start acyclovir 400 mg BID for antiviral prophylaxis   8. Amyloidosis: Not suspected. Bone marrow negative for Congo red staining.  9. Urticaria: LLQ urticaria that developed 3/19/24. Most likely secondary to Velcade injection.  - Start Hydrocortisone 1% topical cream BID

## 2024-03-21 NOTE — PHYSICAL EXAM
[Restricted in physically strenuous activity but ambulatory and able to carry out work of a light or sedentary nature] : Status 1- Restricted in physically strenuous activity but ambulatory and able to carry out work of a light or sedentary nature, e.g., light house work, office work [Normal] : affect appropriate [de-identified] : urticaria LLQ- erythema

## 2024-03-21 NOTE — HISTORY OF PRESENT ILLNESS
[de-identified] : THAIS DALTON is a 63 year woman with PMH of HTN and HLD, who presents for Hematology follow up of IgG lambda multiple myeloma.  Diagnosis: - She initially established care with Hematology in 2014 for IgG lambda monoclonal gammopathy (M-spike 1.8, IgG 1930, K/L 0.44). She went to both [facility] (Dr. Ramírez) and [place]; both recommended a bone marrow biopsy, which she declined. Routine labs in 2019 showed persistent monoclonal gammopathy in 2019, and she re-established care at [facility] with Dr. Leiva. Her labs showed M-spike 1.1, IgG 1872, K/L 1.14. UPEP showed a weak IgG lambda band. No anemia, renal dysfunction, or hypercalcemia. Skeletal survey in 11/2020 did not show any lytic lesions.  - She continued to follow up every 6 months and was noted to have rising paraprotein markers in 9/2023: M-spike 2.0, IgG 2818, K/L 0.56. No anemia, renal dysfunction, or hypercalcemia. IR-guided bone marrow biopsy on 1/11/24 showed 40% plasma cells. FISH showed 3 copies of FGFR3 (5.5%) and 3 copies of CCND1 (5.5%). -PET/CT on 2/29/24 showed hypermetabolic skeletal lucencies within L1 (2.3 x 1.4 cm, SUV 7.4) and the RIGHT iliac bone (SUV 5.7).  Interval History: - She presents today for C1D8 of Rosalind-VRd. She feels anxious, states over the weekend she had an episode of numbness in her left arm that resulted in her becoming lightheaded, progressing to b/l leg numbness and weakness with the sensation of feeling like she was going to pass out, in which she had to sit down for. She states after a few minutes she felt better. She endorses another similar episode one day later where she became short of breath and starting to shake and become nervous that resolved after a few minutes. In addition to intermittent occipital headaches/ pressure.  -Patient also states two days (3/10/24) ago she noticed a rash in her LLQ, stating that it is itchy but does not hurt. She does endorses that is where the Velcade was injected too.    Family History: - Father: bladder cancer - Paternal aunt and mother: renal cancer   Social History: - Lives with her  - Homemaker - Denies tobacco, alcohol, or drug use.

## 2024-03-28 ENCOUNTER — APPOINTMENT (OUTPATIENT)
Dept: INFUSION THERAPY | Facility: HOSPITAL | Age: 64
End: 2024-03-28

## 2024-03-28 ENCOUNTER — RESULT REVIEW (OUTPATIENT)
Age: 64
End: 2024-03-28

## 2024-03-28 ENCOUNTER — APPOINTMENT (OUTPATIENT)
Dept: HEMATOLOGY ONCOLOGY | Facility: CLINIC | Age: 64
End: 2024-03-28
Payer: COMMERCIAL

## 2024-03-28 DIAGNOSIS — R11.0 NAUSEA: ICD-10-CM

## 2024-03-28 LAB
BASOPHILS # BLD AUTO: 0 K/UL — SIGNIFICANT CHANGE UP (ref 0–0.2)
BASOPHILS NFR BLD AUTO: 0 % — SIGNIFICANT CHANGE UP (ref 0–2)
EOSINOPHIL # BLD AUTO: 0.19 K/UL — SIGNIFICANT CHANGE UP (ref 0–0.5)
EOSINOPHIL NFR BLD AUTO: 3 % — SIGNIFICANT CHANGE UP (ref 0–6)
HCT VFR BLD CALC: 34 % — LOW (ref 34.5–45)
HGB BLD-MCNC: 11.4 G/DL — LOW (ref 11.5–15.5)
LYMPHOCYTES # BLD AUTO: 1.8 K/UL — SIGNIFICANT CHANGE UP (ref 1–3.3)
LYMPHOCYTES # BLD AUTO: 28 % — SIGNIFICANT CHANGE UP (ref 13–44)
MCHC RBC-ENTMCNC: 30.5 PG — SIGNIFICANT CHANGE UP (ref 27–34)
MCHC RBC-ENTMCNC: 33.5 G/DL — SIGNIFICANT CHANGE UP (ref 32–36)
MCV RBC AUTO: 90.9 FL — SIGNIFICANT CHANGE UP (ref 80–100)
MONOCYTES # BLD AUTO: 1.16 K/UL — HIGH (ref 0–0.9)
MONOCYTES NFR BLD AUTO: 18 % — HIGH (ref 2–14)
NEUTROPHILS # BLD AUTO: 3.27 K/UL — SIGNIFICANT CHANGE UP (ref 1.8–7.4)
NEUTROPHILS NFR BLD AUTO: 51 % — SIGNIFICANT CHANGE UP (ref 43–77)
NRBC # BLD: 0 /100 WBCS — SIGNIFICANT CHANGE UP (ref 0–0)
NRBC # BLD: SIGNIFICANT CHANGE UP /100 WBCS (ref 0–0)
PLAT MORPH BLD: NORMAL — SIGNIFICANT CHANGE UP
PLATELET # BLD AUTO: 222 K/UL — SIGNIFICANT CHANGE UP (ref 150–400)
RBC # BLD: 3.74 M/UL — LOW (ref 3.8–5.2)
RBC # FLD: 13.8 % — SIGNIFICANT CHANGE UP (ref 10.3–14.5)
RBC BLD AUTO: SIGNIFICANT CHANGE UP
WBC # BLD: 6.42 K/UL — SIGNIFICANT CHANGE UP (ref 3.8–10.5)
WBC # FLD AUTO: 6.42 K/UL — SIGNIFICANT CHANGE UP (ref 3.8–10.5)

## 2024-03-28 PROCEDURE — 99215 OFFICE O/P EST HI 40 MIN: CPT

## 2024-03-28 PROCEDURE — G2211 COMPLEX E/M VISIT ADD ON: CPT

## 2024-03-28 RX ORDER — ONDANSETRON 8 MG/1
8 TABLET, ORALLY DISINTEGRATING ORAL
Qty: 30 | Refills: 1 | Status: ACTIVE | COMMUNITY
Start: 2024-03-28 | End: 1900-01-01

## 2024-03-28 NOTE — REASON FOR VISIT
[Follow-Up Visit] : a follow-up visit for [Family Member] : family member [FreeTextEntry2] : multiple myeloma

## 2024-03-28 NOTE — HISTORY OF PRESENT ILLNESS
[de-identified] : THAIS DALTON is a 63 year woman with PMH of HTN and HLD, who presents for Hematology follow up of IgG lambda multiple myeloma.  Diagnosis: - She initially established care with Hematology in 2014 for IgG lambda monoclonal gammopathy (M-spike 1.8, IgG 1930, K/L 0.44). She went to both [facility] (Dr. Ramírez) and [place]; both recommended a bone marrow biopsy, which she declined. Routine labs in 2019 showed persistent monoclonal gammopathy, and she re-established care at [facility] with Dr. Leiva. Her labs showed M-spike 1.1, IgG 1872, K/L 1.14. UPEP showed a weak IgG lambda band. No anemia, renal dysfunction, or hypercalcemia. Skeletal survey in 11/2020 did not show any lytic lesions.  - She continued to follow up every 6 months and was noted to have rising paraprotein markers in 9/2023: M-spike 2.0, IgG 2818, K/L 0.56. No anemia, renal dysfunction, or hypercalcemia. IR-guided bone marrow biopsy on 1/11/24 showed 40% plasma cells. FISH showed 3 copies of FGFR3 (5.5%) and 3 copies of CCND1 (5.5%). PET/CT on 2/29/24 showed focal hypermetabolic skeletal lucencies within L1 (2.3 x 1.4 cm, SUV 7.4) and the RIGHT iliac bone (SUV 5.7), consistent with a new MM diagnosis.  Treatment History: - She started quadruplet therapy (Rosalind-VRd) on 3/14/24.   Interval History: - She returns today for C1D15 of Rosalind-VRd. She is overall tolerating the therapy well aside from intermittent lower abdominal pain that pre-dated her treatment.    Family History: - Father: bladder cancer - Paternal aunt and mother: renal cancer   Social History: - Lives with her  - Homemaker - Denies tobacco, alcohol, or drug use.

## 2024-03-28 NOTE — ASSESSMENT
[FreeTextEntry1] : THAIS DALTON is a 63 year woman with PMH of HTN and HLD, who presents for Hematology follow up of IgG lambda multiple myeloma.   1. IgG lambda multiple myeloma:  - Diagnosis: She was first noted to have IgG lambda monoclonal gammopathy in 2014 and was recommended to undergo a bone marrow biopsy, which she declined. She re-established care at [facility] with Dr. Leiva in 2019 for persistent monoclonal gammopathy. She was noted to have rising paraprotein markers in 9/2023: M-spike 2.0, IgG 2818, K/L 0.56. No anemia, renal dysfunction, or hypercalcemia. IR-guided bone marrow biopsy on 1/11/24 showed 40% plasma cells. FISH showed 3 copies of FGFR3 (5.5%) and 3 copies of CCND1 (5.5%). Normal karyotype. Lytic lesions noted on PET/CT from 2/29/24. - Staging: R-ISS I (low risk) - Treatment plan: Rosalind-VRd per the phase III PERSEUS trial, with Velcade dosing modified to weekly to minimize toxicity. Will plan for 4-6 cycles of induction therapy, followed by auto-SCT. Daratumumab SQ weekly x 8 (C1-2), then biweekly x 8 (C3-6), then monthly (C7+) Velcade SQ 1.3 mg/m2 D1, 8, 15 Revlimid PO 25 mg D1-21 Pre-medications: dexamethasone 20 mg, Tylenol 650 mg, Benadryl 25 mg C1D1 3/14/24. Today is C1D15. - Monitor MM labs (SPEP, RALEIGH, free light chains, immunoglobulins) monthly - Monitor CBC and CMP with treatment   2. Bones: PET/CT on 2/29/24 showed focal hypermetabolic skeletal lucencies within L1 (2.3 x 1.4 cm, SUV 7.4) and the RIGHT iliac bone (SUV 5.7). - Vitamin D: 29.1 (3/14/24). Continue 1000 units daily.  - Anti-resorptive therapy: Pending dental evaluation   3. Kidney:  - Creatinine: 0.87 (3/21/24) - UPEP/RALEIGH: positive for IgG lambda band (1/31/24) - Avoid nephrotoxic agents   4. Hematology: She has mild cytopenias secondary to therapy.  - CBC today: 6.42 > 11.4 < 222 - Monitor CBC with treatment   5. Peripheral neuropathy: She reports mild neuropathy at baseline. - Continue to monitor with Velcade treatment   6. VTE: No history of VTE. - Continue Eliquis 2.5 mg BID for prophylaxis with Revlimid treatment (allergic to aspirin)   7. Infections: She has immunoparesis (low IgA and IgM). No recent or recurrent infections. - IgG level: 2759 (3/14/24) - Continue acyclovir 400 mg BID for antiviral prophylaxis   8. Amyloidosis: Not suspected. Bone marrow negative for Congo red staining.

## 2024-03-29 ENCOUNTER — NON-APPOINTMENT (OUTPATIENT)
Age: 64
End: 2024-03-29

## 2024-03-29 LAB
ALBUMIN SERPL ELPH-MCNC: 3.9 G/DL
ALP BLD-CCNC: 81 U/L
ALT SERPL-CCNC: 53 U/L
ANION GAP SERPL CALC-SCNC: 13 MMOL/L
AST SERPL-CCNC: 39 U/L
BILIRUB SERPL-MCNC: 0.4 MG/DL
BUN SERPL-MCNC: 33 MG/DL
CALCIUM SERPL-MCNC: 8.7 MG/DL
CHLORIDE SERPL-SCNC: 96 MMOL/L
CO2 SERPL-SCNC: 24 MMOL/L
CREAT SERPL-MCNC: 0.86 MG/DL
EGFR: 75 ML/MIN/1.73M2
GLUCOSE SERPL-MCNC: 114 MG/DL
POTASSIUM SERPL-SCNC: 3.7 MMOL/L
PROT SERPL-MCNC: 6.4 G/DL
SODIUM SERPL-SCNC: 133 MMOL/L

## 2024-04-02 LAB
ALBUMIN MFR SERPL ELPH: 57.6 %
ALBUMIN SERPL-MCNC: 3.7 G/DL
ALBUMIN/GLOB SERPL: 1.4 RATIO
ALPHA1 GLOB MFR SERPL ELPH: 4.2 %
ALPHA1 GLOB SERPL ELPH-MCNC: 0.3 G/DL
ALPHA2 GLOB MFR SERPL ELPH: 9.6 %
ALPHA2 GLOB SERPL ELPH-MCNC: 0.6 G/DL
B-GLOBULIN MFR SERPL ELPH: 10.5 %
B-GLOBULIN SERPL ELPH-MCNC: 0.7 G/DL
DEPRECATED KAPPA LC FREE/LAMBDA SER: 0.95 RATIO
GAMMA GLOB FLD ELPH-MCNC: 1.2 G/DL
GAMMA GLOB MFR SERPL ELPH: 18.1 %
IGA SER QL IEP: 25 MG/DL
IGG SER QL IEP: 1312 MG/DL
IGM SER QL IEP: 15 MG/DL
INTERPRETATION SERPL IEP-IMP: NORMAL
KAPPA LC CSF-MCNC: 1.52 MG/DL
KAPPA LC SERPL-MCNC: 1.45 MG/DL
M PROTEIN MFR SERPL ELPH: NORMAL
M PROTEIN SPEC IFE-MCNC: NORMAL
MONOCLON BAND OBS SERPL: NORMAL
PROT SERPL-MCNC: 6.4 G/DL
PROT SERPL-MCNC: 6.4 G/DL

## 2024-04-03 ENCOUNTER — APPOINTMENT (OUTPATIENT)
Dept: HEMATOLOGY ONCOLOGY | Facility: CLINIC | Age: 64
End: 2024-04-03

## 2024-04-04 ENCOUNTER — RESULT REVIEW (OUTPATIENT)
Age: 64
End: 2024-04-04

## 2024-04-04 ENCOUNTER — APPOINTMENT (OUTPATIENT)
Dept: INFUSION THERAPY | Facility: HOSPITAL | Age: 64
End: 2024-04-04

## 2024-04-04 ENCOUNTER — APPOINTMENT (OUTPATIENT)
Dept: HEMATOLOGY ONCOLOGY | Facility: CLINIC | Age: 64
End: 2024-04-04

## 2024-04-04 LAB
ALBUMIN SERPL ELPH-MCNC: 4.2 G/DL
ALP BLD-CCNC: 96 U/L
ALT SERPL-CCNC: 41 U/L
ANION GAP SERPL CALC-SCNC: 13 MMOL/L
AST SERPL-CCNC: 29 U/L
BASOPHILS # BLD AUTO: 0.02 K/UL — SIGNIFICANT CHANGE UP (ref 0–0.2)
BASOPHILS NFR BLD AUTO: 0.4 % — SIGNIFICANT CHANGE UP (ref 0–2)
BILIRUB SERPL-MCNC: 0.6 MG/DL
BUN SERPL-MCNC: 28 MG/DL
CALCIUM SERPL-MCNC: 9 MG/DL
CHLORIDE SERPL-SCNC: 99 MMOL/L
CO2 SERPL-SCNC: 24 MMOL/L
CREAT SERPL-MCNC: 0.83 MG/DL
EGFR: 79 ML/MIN/1.73M2
EOSINOPHIL # BLD AUTO: 0.16 K/UL — SIGNIFICANT CHANGE UP (ref 0–0.5)
EOSINOPHIL NFR BLD AUTO: 3.5 % — SIGNIFICANT CHANGE UP (ref 0–6)
GLUCOSE SERPL-MCNC: 106 MG/DL
HCT VFR BLD CALC: 33.9 % — LOW (ref 34.5–45)
HGB BLD-MCNC: 11.5 G/DL — SIGNIFICANT CHANGE UP (ref 11.5–15.5)
IMM GRANULOCYTES NFR BLD AUTO: 0.4 % — SIGNIFICANT CHANGE UP (ref 0–0.9)
LYMPHOCYTES # BLD AUTO: 1.39 K/UL — SIGNIFICANT CHANGE UP (ref 1–3.3)
LYMPHOCYTES # BLD AUTO: 30.8 % — SIGNIFICANT CHANGE UP (ref 13–44)
MCHC RBC-ENTMCNC: 30.6 PG — SIGNIFICANT CHANGE UP (ref 27–34)
MCHC RBC-ENTMCNC: 33.9 G/DL — SIGNIFICANT CHANGE UP (ref 32–36)
MCV RBC AUTO: 90.2 FL — SIGNIFICANT CHANGE UP (ref 80–100)
MONOCYTES # BLD AUTO: 0.73 K/UL — SIGNIFICANT CHANGE UP (ref 0–0.9)
MONOCYTES NFR BLD AUTO: 16.2 % — HIGH (ref 2–14)
NEUTROPHILS # BLD AUTO: 2.19 K/UL — SIGNIFICANT CHANGE UP (ref 1.8–7.4)
NEUTROPHILS NFR BLD AUTO: 48.7 % — SIGNIFICANT CHANGE UP (ref 43–77)
NRBC # BLD: 0 /100 WBCS — SIGNIFICANT CHANGE UP (ref 0–0)
PLATELET # BLD AUTO: 209 K/UL — SIGNIFICANT CHANGE UP (ref 150–400)
POTASSIUM SERPL-SCNC: 3.6 MMOL/L
PROT SERPL-MCNC: 6.6 G/DL
RBC # BLD: 3.76 M/UL — LOW (ref 3.8–5.2)
RBC # FLD: 13.8 % — SIGNIFICANT CHANGE UP (ref 10.3–14.5)
SODIUM SERPL-SCNC: 136 MMOL/L
WBC # BLD: 4.51 K/UL — SIGNIFICANT CHANGE UP (ref 3.8–10.5)
WBC # FLD AUTO: 4.51 K/UL — SIGNIFICANT CHANGE UP (ref 3.8–10.5)

## 2024-04-05 ENCOUNTER — NON-APPOINTMENT (OUTPATIENT)
Age: 64
End: 2024-04-05

## 2024-04-08 RX ORDER — ACYCLOVIR 400 MG/1
400 TABLET ORAL TWICE DAILY
Qty: 60 | Refills: 5 | Status: DISCONTINUED | COMMUNITY
Start: 2024-03-04 | End: 2024-04-08

## 2024-04-11 ENCOUNTER — APPOINTMENT (OUTPATIENT)
Dept: HEMATOLOGY ONCOLOGY | Facility: CLINIC | Age: 64
End: 2024-04-11
Payer: COMMERCIAL

## 2024-04-11 ENCOUNTER — RESULT REVIEW (OUTPATIENT)
Age: 64
End: 2024-04-11

## 2024-04-11 ENCOUNTER — APPOINTMENT (OUTPATIENT)
Dept: INFUSION THERAPY | Facility: HOSPITAL | Age: 64
End: 2024-04-11

## 2024-04-11 LAB
BASOPHILS # BLD AUTO: 0.05 K/UL — SIGNIFICANT CHANGE UP (ref 0–0.2)
BASOPHILS NFR BLD AUTO: 0.9 % — SIGNIFICANT CHANGE UP (ref 0–2)
EOSINOPHIL # BLD AUTO: 0.07 K/UL — SIGNIFICANT CHANGE UP (ref 0–0.5)
EOSINOPHIL NFR BLD AUTO: 1.3 % — SIGNIFICANT CHANGE UP (ref 0–6)
HCT VFR BLD CALC: 33.9 % — LOW (ref 34.5–45)
HGB BLD-MCNC: 11.5 G/DL — SIGNIFICANT CHANGE UP (ref 11.5–15.5)
IMM GRANULOCYTES NFR BLD AUTO: 0.4 % — SIGNIFICANT CHANGE UP (ref 0–0.9)
LYMPHOCYTES # BLD AUTO: 2.13 K/UL — SIGNIFICANT CHANGE UP (ref 1–3.3)
LYMPHOCYTES # BLD AUTO: 40.1 % — SIGNIFICANT CHANGE UP (ref 13–44)
MCHC RBC-ENTMCNC: 31 PG — SIGNIFICANT CHANGE UP (ref 27–34)
MCHC RBC-ENTMCNC: 33.9 G/DL — SIGNIFICANT CHANGE UP (ref 32–36)
MCV RBC AUTO: 91.4 FL — SIGNIFICANT CHANGE UP (ref 80–100)
MONOCYTES # BLD AUTO: 0.99 K/UL — HIGH (ref 0–0.9)
MONOCYTES NFR BLD AUTO: 18.6 % — HIGH (ref 2–14)
NEUTROPHILS # BLD AUTO: 2.05 K/UL — SIGNIFICANT CHANGE UP (ref 1.8–7.4)
NEUTROPHILS NFR BLD AUTO: 38.7 % — LOW (ref 43–77)
NRBC # BLD: 0 /100 WBCS — SIGNIFICANT CHANGE UP (ref 0–0)
PLATELET # BLD AUTO: 423 K/UL — HIGH (ref 150–400)
RBC # BLD: 3.71 M/UL — LOW (ref 3.8–5.2)
RBC # FLD: 14.3 % — SIGNIFICANT CHANGE UP (ref 10.3–14.5)
WBC # BLD: 5.31 K/UL — SIGNIFICANT CHANGE UP (ref 3.8–10.5)
WBC # FLD AUTO: 5.31 K/UL — SIGNIFICANT CHANGE UP (ref 3.8–10.5)

## 2024-04-11 PROCEDURE — G2211 COMPLEX E/M VISIT ADD ON: CPT

## 2024-04-11 PROCEDURE — 99215 OFFICE O/P EST HI 40 MIN: CPT

## 2024-04-11 RX ORDER — ACYCLOVIR 200 MG/5ML
200 SUSPENSION ORAL TWICE DAILY
Qty: 600 | Refills: 2 | Status: ACTIVE | COMMUNITY
Start: 2024-04-08 | End: 1900-01-01

## 2024-04-11 NOTE — HISTORY OF PRESENT ILLNESS
[de-identified] : THAIS DALTON is a 63 year woman with PMH of HTN and HLD, who presents for Hematology follow up of IgG lambda multiple myeloma.  Diagnosis: - She initially established care with Hematology in 2014 for IgG lambda monoclonal gammopathy (M-spike 1.8, IgG 1930, K/L 0.44). She went to both [facility] (Dr. Ramírez) and [place]; both recommended a bone marrow biopsy, which she declined. Routine labs in 2019 showed persistent monoclonal gammopathy, and she re-established care at [facility] with Dr. Leiva. Her labs showed M-spike 1.1, IgG 1872, K/L 1.14. UPEP showed a weak IgG lambda band. No anemia, renal dysfunction, or hypercalcemia. Skeletal survey in 11/2020 did not show any lytic lesions.  - She continued to follow up every 6 months and was noted to have rising paraprotein markers in 9/2023: M-spike 2.0, IgG 2818, K/L 0.56. No anemia, renal dysfunction, or hypercalcemia. IR-guided bone marrow biopsy on 1/11/24 showed 40% plasma cells. FISH showed 3 copies of FGFR3 (5.5%) and 3 copies of CCND1 (5.5%). PET/CT on 2/29/24 showed focal hypermetabolic skeletal lucencies within L1 (2.3 x 1.4 cm, SUV 7.4) and the RIGHT iliac bone (SUV 5.7), consistent with a new MM diagnosis.  Treatment History: - She started quadruplet therapy (Rosalind-VRd) on 3/14/24.   Interval History: - She returns today for C2D1 of Rosalind-VRd. Her MM markers from C1D15 (3/28/24) are improving compared to C1D1: IgG lambda M-spike 0.9 vs. 2.1, IgG 1312 vs. 2759, K/L 0.95 vs. 0.32.  - She reports fatigue and tingling in her fingers in the morning. She is also having some blurry vision and headaches and will be seeing Ophtho this weekend.   Family History: - Father: bladder cancer - Paternal aunt and mother: renal cancer   Social History: - Lives with her  - Homemaker - Denies tobacco, alcohol, or drug use. [de-identified] : 4/11/24: She's been feeling tired, more than before in the past 2 weeks. Denies fever, night sweats.She also reports tingling and numbness in her hands and feet, mostly in the mornings which improves throughout the day. She also has weakness in her legs. She also has pain in her back.

## 2024-04-11 NOTE — ASSESSMENT
[FreeTextEntry1] : THAIS DALTON is a 63 year woman with PMH of HTN and HLD, who presents for Hematology follow up of IgG lambda multiple myeloma.   1. IgG lambda multiple myeloma:  - Diagnosis: She was first noted to have IgG lambda monoclonal gammopathy in 2014 and was recommended to undergo a bone marrow biopsy, which she declined. She re-established care at [facility] with Dr. Leiva in 2019 for persistent monoclonal gammopathy. She was noted to have rising paraprotein markers in 9/2023: M-spike 2.0, IgG 2818, K/L 0.56. No anemia, renal dysfunction, or hypercalcemia. IR-guided bone marrow biopsy on 1/11/24 showed 40% plasma cells. FISH showed 3 copies of FGFR3 (5.5%) and 3 copies of CCND1 (5.5%). Normal karyotype. Lytic lesions noted on PET/CT from 2/29/24. - Staging: R-ISS I (low risk) - Treatment plan: Rosalind-VRd per the phase III PERSEUS trial, with Velcade dosing modified to weekly to minimize toxicity. Will plan for 4-6 cycles of induction therapy, followed by auto-SCT. Daratumumab SQ weekly x 8 (C1-2), then biweekly x 8 (C3-6), then monthly (C7+) Velcade SQ 1.3 mg/m2 D1, 8, 15 Revlimid PO 25 mg D1-21 Pre-medications: dexamethasone 20 mg, Tylenol 650 mg, Benadryl 25 mg C1D1 3/14/24. Today is C2D1.  - Monitor MM labs (SPEP, RALEIGH, free light chains, immunoglobulins) monthly - Monitor CBC and CMP with treatment   2. Bones: PET/CT on 2/29/24 showed focal hypermetabolic skeletal lucencies within L1 (2.3 x 1.4 cm, SUV 7.4) and the RIGHT iliac bone (SUV 5.7). - Vitamin D: 29.1 (3/14/24). Continue 1000 units daily.  - Anti-resorptive therapy: Pending dental evaluation   3. Kidney:  - Creatinine: 0.83 (4/4/24) - UPEP/RALEIGH: positive for IgG lambda band (1/31/24) - Avoid nephrotoxic agents   4. Hematology: She has mild thrombocytosis that is likely reactive. - CBC today: 5.31 > 11.5 < 423 - Monitor CBC with treatment - Check iron studies, B12, and TSH given complaints of fatigue   5. Peripheral neuropathy: She reports mild neuropathy at baseline. - Continue to monitor with Velcade treatment   6. VTE: No history of VTE. - Continue Eliquis 2.5 mg BID for prophylaxis with Revlimid treatment (allergic to aspirin)   7. Infections: She has immunoparesis (low IgA and IgM). No recent or recurrent infections. - IgG level: 1312 (3/28/24) - Continue acyclovir 400 mg BID for antiviral prophylaxis   8. Amyloidosis: Not suspected. Bone marrow negative for Congo red staining.

## 2024-04-12 LAB
ALBUMIN SERPL ELPH-MCNC: 4.4 G/DL
ALP BLD-CCNC: 105 U/L
ALT SERPL-CCNC: 22 U/L
ANION GAP SERPL CALC-SCNC: 10 MMOL/L
AST SERPL-CCNC: 19 U/L
BILIRUB SERPL-MCNC: 0.4 MG/DL
BUN SERPL-MCNC: 33 MG/DL
CALCIUM SERPL-MCNC: 9.8 MG/DL
CHLORIDE SERPL-SCNC: 101 MMOL/L
CO2 SERPL-SCNC: 24 MMOL/L
CREAT SERPL-MCNC: 0.9 MG/DL
EGFR: 71 ML/MIN/1.73M2
GLUCOSE SERPL-MCNC: 94 MG/DL
POTASSIUM SERPL-SCNC: 4.5 MMOL/L
PROT SERPL-MCNC: 6.6 G/DL
SODIUM SERPL-SCNC: 136 MMOL/L

## 2024-04-14 LAB
ALBUMIN MFR SERPL ELPH: 60.1 %
ALBUMIN SERPL-MCNC: 4 G/DL
ALBUMIN/GLOB SERPL: 1.5 RATIO
ALPHA1 GLOB MFR SERPL ELPH: 4.2 %
ALPHA1 GLOB SERPL ELPH-MCNC: 0.3 G/DL
ALPHA2 GLOB MFR SERPL ELPH: 10.9 %
ALPHA2 GLOB SERPL ELPH-MCNC: 0.7 G/DL
B-GLOBULIN MFR SERPL ELPH: 11.8 %
B-GLOBULIN SERPL ELPH-MCNC: 0.8 G/DL
DEPRECATED KAPPA LC FREE/LAMBDA SER: 0.84 RATIO
FERRITIN SERPL-MCNC: 178 NG/ML
GAMMA GLOB FLD ELPH-MCNC: 0.9 G/DL
GAMMA GLOB MFR SERPL ELPH: 13 %
IGA SER QL IEP: 33 MG/DL
IGG SER QL IEP: 873 MG/DL
IGM SER QL IEP: 26 MG/DL
INTERPRETATION SERPL IEP-IMP: NORMAL
IRON SATN MFR SERPL: 19 %
IRON SERPL-MCNC: 68 UG/DL
KAPPA LC CSF-MCNC: 1.39 MG/DL
KAPPA LC SERPL-MCNC: 1.17 MG/DL
M PROTEIN MFR SERPL ELPH: NORMAL
M PROTEIN SPEC IFE-MCNC: NORMAL
MONOCLON BAND OBS SERPL: NORMAL
PROT SERPL-MCNC: 6.6 G/DL
PROT SERPL-MCNC: 6.6 G/DL
TIBC SERPL-MCNC: 361 UG/DL
TSH SERPL-ACNC: 2.01 UIU/ML
UIBC SERPL-MCNC: 293 UG/DL
VIT B12 SERPL-MCNC: 590 PG/ML

## 2024-04-17 DIAGNOSIS — M79.602 PAIN IN LEFT ARM: ICD-10-CM

## 2024-04-17 DIAGNOSIS — Z85.828 PERSONAL HISTORY OF OTHER MALIGNANT NEOPLASM OF SKIN: ICD-10-CM

## 2024-04-18 ENCOUNTER — RESULT REVIEW (OUTPATIENT)
Age: 64
End: 2024-04-18

## 2024-04-18 ENCOUNTER — APPOINTMENT (OUTPATIENT)
Dept: HEMATOLOGY ONCOLOGY | Facility: CLINIC | Age: 64
End: 2024-04-18

## 2024-04-18 ENCOUNTER — APPOINTMENT (OUTPATIENT)
Dept: INFUSION THERAPY | Facility: HOSPITAL | Age: 64
End: 2024-04-18

## 2024-04-18 LAB
BASOPHILS # BLD AUTO: 0.06 K/UL — SIGNIFICANT CHANGE UP (ref 0–0.2)
BASOPHILS NFR BLD AUTO: 1.2 % — SIGNIFICANT CHANGE UP (ref 0–2)
EOSINOPHIL # BLD AUTO: 0.11 K/UL — SIGNIFICANT CHANGE UP (ref 0–0.5)
EOSINOPHIL NFR BLD AUTO: 2.3 % — SIGNIFICANT CHANGE UP (ref 0–6)
HCT VFR BLD CALC: 35.6 % — SIGNIFICANT CHANGE UP (ref 34.5–45)
HGB BLD-MCNC: 11.9 G/DL — SIGNIFICANT CHANGE UP (ref 11.5–15.5)
IMM GRANULOCYTES NFR BLD AUTO: 0.6 % — SIGNIFICANT CHANGE UP (ref 0–0.9)
LYMPHOCYTES # BLD AUTO: 1.33 K/UL — SIGNIFICANT CHANGE UP (ref 1–3.3)
LYMPHOCYTES # BLD AUTO: 27.5 % — SIGNIFICANT CHANGE UP (ref 13–44)
MCHC RBC-ENTMCNC: 31 PG — SIGNIFICANT CHANGE UP (ref 27–34)
MCHC RBC-ENTMCNC: 33.4 G/DL — SIGNIFICANT CHANGE UP (ref 32–36)
MCV RBC AUTO: 92.7 FL — SIGNIFICANT CHANGE UP (ref 80–100)
MONOCYTES # BLD AUTO: 0.42 K/UL — SIGNIFICANT CHANGE UP (ref 0–0.9)
MONOCYTES NFR BLD AUTO: 8.7 % — SIGNIFICANT CHANGE UP (ref 2–14)
NEUTROPHILS # BLD AUTO: 2.88 K/UL — SIGNIFICANT CHANGE UP (ref 1.8–7.4)
NEUTROPHILS NFR BLD AUTO: 59.7 % — SIGNIFICANT CHANGE UP (ref 43–77)
NRBC # BLD: 0 /100 WBCS — SIGNIFICANT CHANGE UP (ref 0–0)
PLATELET # BLD AUTO: 279 K/UL — SIGNIFICANT CHANGE UP (ref 150–400)
RBC # BLD: 3.84 M/UL — SIGNIFICANT CHANGE UP (ref 3.8–5.2)
RBC # FLD: 14.6 % — HIGH (ref 10.3–14.5)
WBC # BLD: 4.83 K/UL — SIGNIFICANT CHANGE UP (ref 3.8–10.5)
WBC # FLD AUTO: 4.83 K/UL — SIGNIFICANT CHANGE UP (ref 3.8–10.5)

## 2024-04-19 LAB
ALBUMIN SERPL ELPH-MCNC: 4.2 G/DL
ALP BLD-CCNC: 111 U/L
ALT SERPL-CCNC: 37 U/L
ANION GAP SERPL CALC-SCNC: 17 MMOL/L
AST SERPL-CCNC: 31 U/L
BILIRUB SERPL-MCNC: 0.3 MG/DL
BUN SERPL-MCNC: 32 MG/DL
CALCIUM SERPL-MCNC: 9.6 MG/DL
CHLORIDE SERPL-SCNC: 98 MMOL/L
CO2 SERPL-SCNC: 21 MMOL/L
CREAT SERPL-MCNC: 0.88 MG/DL
EGFR: 73 ML/MIN/1.73M2
GLUCOSE SERPL-MCNC: 153 MG/DL
POTASSIUM SERPL-SCNC: 4 MMOL/L
PROT SERPL-MCNC: 6.6 G/DL
SODIUM SERPL-SCNC: 135 MMOL/L

## 2024-04-25 ENCOUNTER — APPOINTMENT (OUTPATIENT)
Dept: HEMATOLOGY ONCOLOGY | Facility: CLINIC | Age: 64
End: 2024-04-25
Payer: COMMERCIAL

## 2024-04-25 ENCOUNTER — RESULT REVIEW (OUTPATIENT)
Age: 64
End: 2024-04-25

## 2024-04-25 ENCOUNTER — APPOINTMENT (OUTPATIENT)
Dept: INFUSION THERAPY | Facility: HOSPITAL | Age: 64
End: 2024-04-25

## 2024-04-25 DIAGNOSIS — R06.02 SHORTNESS OF BREATH: ICD-10-CM

## 2024-04-25 DIAGNOSIS — D72.820 LYMPHOCYTOSIS (SYMPTOMATIC): ICD-10-CM

## 2024-04-25 DIAGNOSIS — R53.81 OTHER MALAISE: ICD-10-CM

## 2024-04-25 DIAGNOSIS — M54.50 LOW BACK PAIN, UNSPECIFIED: ICD-10-CM

## 2024-04-25 DIAGNOSIS — Z87.898 PERSONAL HISTORY OF OTHER SPECIFIED CONDITIONS: ICD-10-CM

## 2024-04-25 LAB
ALBUMIN SERPL ELPH-MCNC: 4.2 G/DL
ALP BLD-CCNC: 107 U/L
ALT SERPL-CCNC: 40 U/L
ANION GAP SERPL CALC-SCNC: 13 MMOL/L
AST SERPL-CCNC: 39 U/L
BASOPHILS # BLD AUTO: 0.04 K/UL — SIGNIFICANT CHANGE UP (ref 0–0.2)
BASOPHILS NFR BLD AUTO: 0.7 % — SIGNIFICANT CHANGE UP (ref 0–2)
BILIRUB SERPL-MCNC: 0.5 MG/DL
BUN SERPL-MCNC: 38 MG/DL
CALCIUM SERPL-MCNC: 9 MG/DL
CHLORIDE SERPL-SCNC: 97 MMOL/L
CO2 SERPL-SCNC: 24 MMOL/L
CREAT SERPL-MCNC: 0.82 MG/DL
EGFR: 80 ML/MIN/1.73M2
EOSINOPHIL # BLD AUTO: 0.17 K/UL — SIGNIFICANT CHANGE UP (ref 0–0.5)
EOSINOPHIL NFR BLD AUTO: 2.8 % — SIGNIFICANT CHANGE UP (ref 0–6)
GLUCOSE SERPL-MCNC: 124 MG/DL
HCT VFR BLD CALC: 34.7 % — SIGNIFICANT CHANGE UP (ref 34.5–45)
HGB BLD-MCNC: 11.7 G/DL — SIGNIFICANT CHANGE UP (ref 11.5–15.5)
IMM GRANULOCYTES NFR BLD AUTO: 0.8 % — SIGNIFICANT CHANGE UP (ref 0–0.9)
LYMPHOCYTES # BLD AUTO: 1.64 K/UL — SIGNIFICANT CHANGE UP (ref 1–3.3)
LYMPHOCYTES # BLD AUTO: 27.2 % — SIGNIFICANT CHANGE UP (ref 13–44)
MCHC RBC-ENTMCNC: 30.6 PG — SIGNIFICANT CHANGE UP (ref 27–34)
MCHC RBC-ENTMCNC: 33.7 G/DL — SIGNIFICANT CHANGE UP (ref 32–36)
MCV RBC AUTO: 90.8 FL — SIGNIFICANT CHANGE UP (ref 80–100)
MONOCYTES # BLD AUTO: 1.06 K/UL — HIGH (ref 0–0.9)
MONOCYTES NFR BLD AUTO: 17.5 % — HIGH (ref 2–14)
NEUTROPHILS # BLD AUTO: 3.08 K/UL — SIGNIFICANT CHANGE UP (ref 1.8–7.4)
NEUTROPHILS NFR BLD AUTO: 51 % — SIGNIFICANT CHANGE UP (ref 43–77)
NRBC # BLD: 0 /100 WBCS — SIGNIFICANT CHANGE UP (ref 0–0)
PLATELET # BLD AUTO: 216 K/UL — SIGNIFICANT CHANGE UP (ref 150–400)
POTASSIUM SERPL-SCNC: 4 MMOL/L
PROT SERPL-MCNC: 6.1 G/DL
RBC # BLD: 3.82 M/UL — SIGNIFICANT CHANGE UP (ref 3.8–5.2)
RBC # FLD: 14.6 % — HIGH (ref 10.3–14.5)
SODIUM SERPL-SCNC: 134 MMOL/L
WBC # BLD: 6.04 K/UL — SIGNIFICANT CHANGE UP (ref 3.8–10.5)
WBC # FLD AUTO: 6.04 K/UL — SIGNIFICANT CHANGE UP (ref 3.8–10.5)

## 2024-04-25 PROCEDURE — 99215 OFFICE O/P EST HI 40 MIN: CPT

## 2024-04-25 PROCEDURE — G2211 COMPLEX E/M VISIT ADD ON: CPT

## 2024-04-25 NOTE — ASSESSMENT
[FreeTextEntry1] : THAIS DALTON is a 63 year woman with PMH of HTN and HLD, who presents for Hematology follow up of IgG lambda multiple myeloma.   1. IgG lambda multiple myeloma:  - Diagnosis: She was first noted to have IgG lambda monoclonal gammopathy in 2014 and was recommended to undergo a bone marrow biopsy, which she declined. She re-established care at [facility] with Dr. Leiva in 2019 for persistent monoclonal gammopathy. She was noted to have rising paraprotein markers in 9/2023: M-spike 2.0, IgG 2818, K/L 0.56. No anemia, renal dysfunction, or hypercalcemia. IR-guided bone marrow biopsy on 1/11/24 showed 40% plasma cells. FISH showed 3 copies of FGFR3 (5.5%) and 3 copies of CCND1 (5.5%). Normal karyotype. Lytic lesions noted on PET/CT from 2/29/24. - Staging: R-ISS I (low risk) - Treatment plan: Rosalind-VRd per the phase III PERSEUS trial, with Velcade dosing modified to weekly to minimize toxicity. Will plan for 4-6 cycles of induction therapy, followed by auto-SCT. Daratumumab SQ weekly x 8 (C1-2), then biweekly x 8 (C3-6), then monthly (C7+) Velcade SQ 1.3 mg/m2 D1, 8, 15 Revlimid PO 25 mg D1-21 Pre-medications: dexamethasone 20 mg, Tylenol 650 mg, Benadryl 25 mg C1D1 3/14/24, C2D1 4/11/24. Today is C2D15.  - Monitor MM labs (SPEP, RALEIGH, free light chains, immunoglobulins) monthly - Monitor CBC and CMP with treatment   2. Bones: PET/CT on 2/29/24 showed focal hypermetabolic skeletal lucencies within L1 (2.3 x 1.4 cm, SUV 7.4) and the RIGHT iliac bone (SUV 5.7). - Vitamin D: 29.1 (3/14/24). Continue 1000 units daily.  - Anti-resorptive therapy: Pending dental evaluation   3. Kidney:  - Creatinine: 0.88 (4/18/24) - UPEP/RALEIGH: positive for IgG lambda band (1/31/24) - Avoid nephrotoxic agents   4. Hematology:  - CBC today: 6.04 > 11.7 < 216 - Monitor CBC with treatment   5. Peripheral neuropathy: She reports mild neuropathy at baseline. - Continue to monitor with Velcade treatment   6. VTE: No history of VTE. - Continue Eliquis 2.5 mg BID for prophylaxis with Revlimid treatment (allergic to aspirin)   7. Infections: She has immunoparesis (low IgA and IgM). No recent or recurrent infections. - IgG level: 873 (4/11/24) - Continue acyclovir 400 mg BID for antiviral prophylaxis   8. Amyloidosis: Not suspected. Bone marrow negative for Congo red staining.

## 2024-04-25 NOTE — HISTORY OF PRESENT ILLNESS
[de-identified] : THAIS DALTON is a 63 year woman with PMH of HTN and HLD, who presents for Hematology follow up of IgG lambda multiple myeloma.  Diagnosis: - She initially established care with Hematology in 2014 for IgG lambda monoclonal gammopathy (M-spike 1.8, IgG 1930, K/L 0.44). She went to both [facility] (Dr. Ramírez) and [place]; both recommended a bone marrow biopsy, which she declined. Routine labs in 2019 showed persistent monoclonal gammopathy, and she re-established care at [facility] with Dr. Leiva. Her labs showed M-spike 1.1, IgG 1872, K/L 1.14. UPEP showed a weak IgG lambda band. No anemia, renal dysfunction, or hypercalcemia. Skeletal survey in 11/2020 did not show any lytic lesions.  - She continued to follow up every 6 months and was noted to have rising paraprotein markers in 9/2023: M-spike 2.0, IgG 2818, K/L 0.56. No anemia, renal dysfunction, or hypercalcemia. IR-guided bone marrow biopsy on 1/11/24 showed 40% plasma cells. FISH showed 3 copies of FGFR3 (5.5%) and 3 copies of CCND1 (5.5%). PET/CT on 2/29/24 showed focal hypermetabolic skeletal lucencies within L1 (2.3 x 1.4 cm, SUV 7.4) and the RIGHT iliac bone (SUV 5.7), consistent with a new MM diagnosis.  Treatment History: - She started quadruplet therapy (Rosalind-VRd) on 3/14/24.   Interval History: - She returns today for C2D15 of Rosalind-VRd. Her MM markers from C2D1 (4/11/24) are improving compared to C1D1: M-spike 0.5 vs. 2.1, IgG 873 vs. 2759, K/L 0.84 vs. 0.32.  - She has been feeling fatigued, which she also reported at her last visit. Labs from 4/12/24 showed normal iron, B12, and TSH.   Family History: - Father: bladder cancer - Paternal aunt and mother: renal cancer   Social History: - Lives with her  - Homemaker - Denies tobacco, alcohol, or drug use.

## 2024-04-26 ENCOUNTER — OUTPATIENT (OUTPATIENT)
Dept: OUTPATIENT SERVICES | Facility: HOSPITAL | Age: 64
LOS: 1 days | Discharge: HOME | End: 2024-04-26

## 2024-04-26 ENCOUNTER — RX RENEWAL (OUTPATIENT)
Age: 64
End: 2024-04-26

## 2024-04-26 DIAGNOSIS — E88.09 OTHER DISORDERS OF PLASMA-PROTEIN METABOLISM, NOT ELSEWHERE CLASSIFIED: ICD-10-CM

## 2024-05-01 RX ORDER — LENALIDOMIDE 25 MG/1
25 CAPSULE ORAL
Qty: 21 | Refills: 0 | Status: DISCONTINUED | COMMUNITY
Start: 2024-03-14 | End: 2024-05-01

## 2024-05-02 ENCOUNTER — RESULT REVIEW (OUTPATIENT)
Age: 64
End: 2024-05-02

## 2024-05-02 ENCOUNTER — APPOINTMENT (OUTPATIENT)
Dept: HEMATOLOGY ONCOLOGY | Facility: CLINIC | Age: 64
End: 2024-05-02

## 2024-05-02 ENCOUNTER — APPOINTMENT (OUTPATIENT)
Dept: INFUSION THERAPY | Facility: HOSPITAL | Age: 64
End: 2024-05-02

## 2024-05-02 LAB
BASOPHILS # BLD AUTO: 0.05 K/UL — SIGNIFICANT CHANGE UP (ref 0–0.2)
BASOPHILS NFR BLD AUTO: 0.9 % — SIGNIFICANT CHANGE UP (ref 0–2)
EOSINOPHIL # BLD AUTO: 0.37 K/UL — SIGNIFICANT CHANGE UP (ref 0–0.5)
EOSINOPHIL NFR BLD AUTO: 7 % — HIGH (ref 0–6)
HCT VFR BLD CALC: 32.5 % — LOW (ref 34.5–45)
HGB BLD-MCNC: 11.4 G/DL — LOW (ref 11.5–15.5)
IMM GRANULOCYTES NFR BLD AUTO: 0.9 % — SIGNIFICANT CHANGE UP (ref 0–0.9)
LYMPHOCYTES # BLD AUTO: 1.64 K/UL — SIGNIFICANT CHANGE UP (ref 1–3.3)
LYMPHOCYTES # BLD AUTO: 30.8 % — SIGNIFICANT CHANGE UP (ref 13–44)
MCHC RBC-ENTMCNC: 30.6 PG — SIGNIFICANT CHANGE UP (ref 27–34)
MCHC RBC-ENTMCNC: 35.1 G/DL — SIGNIFICANT CHANGE UP (ref 32–36)
MCV RBC AUTO: 87.4 FL — SIGNIFICANT CHANGE UP (ref 80–100)
MONOCYTES # BLD AUTO: 0.89 K/UL — SIGNIFICANT CHANGE UP (ref 0–0.9)
MONOCYTES NFR BLD AUTO: 16.7 % — HIGH (ref 2–14)
NEUTROPHILS # BLD AUTO: 2.32 K/UL — SIGNIFICANT CHANGE UP (ref 1.8–7.4)
NEUTROPHILS NFR BLD AUTO: 43.7 % — SIGNIFICANT CHANGE UP (ref 43–77)
NRBC # BLD: 0 /100 WBCS — SIGNIFICANT CHANGE UP (ref 0–0)
PLATELET # BLD AUTO: 199 K/UL — SIGNIFICANT CHANGE UP (ref 150–400)
RBC # BLD: 3.72 M/UL — LOW (ref 3.8–5.2)
RBC # FLD: 14.5 % — SIGNIFICANT CHANGE UP (ref 10.3–14.5)
WBC # BLD: 5.32 K/UL — SIGNIFICANT CHANGE UP (ref 3.8–10.5)
WBC # FLD AUTO: 5.32 K/UL — SIGNIFICANT CHANGE UP (ref 3.8–10.5)

## 2024-05-03 DIAGNOSIS — C90.00 MULTIPLE MYELOMA NOT HAVING ACHIEVED REMISSION: ICD-10-CM

## 2024-05-03 DIAGNOSIS — R11.2 NAUSEA WITH VOMITING, UNSPECIFIED: ICD-10-CM

## 2024-05-03 DIAGNOSIS — Z51.11 ENCOUNTER FOR ANTINEOPLASTIC CHEMOTHERAPY: ICD-10-CM

## 2024-05-05 ENCOUNTER — NON-APPOINTMENT (OUTPATIENT)
Age: 64
End: 2024-05-05

## 2024-05-09 ENCOUNTER — RESULT REVIEW (OUTPATIENT)
Age: 64
End: 2024-05-09

## 2024-05-09 ENCOUNTER — LABORATORY RESULT (OUTPATIENT)
Age: 64
End: 2024-05-09

## 2024-05-09 ENCOUNTER — APPOINTMENT (OUTPATIENT)
Dept: INFUSION THERAPY | Facility: HOSPITAL | Age: 64
End: 2024-05-09

## 2024-05-09 ENCOUNTER — APPOINTMENT (OUTPATIENT)
Dept: HEMATOLOGY ONCOLOGY | Facility: CLINIC | Age: 64
End: 2024-05-09
Payer: COMMERCIAL

## 2024-05-09 ENCOUNTER — APPOINTMENT (OUTPATIENT)
Dept: HEMATOLOGY ONCOLOGY | Facility: CLINIC | Age: 64
End: 2024-05-09

## 2024-05-09 LAB
BASOPHILS # BLD AUTO: 0.07 K/UL — SIGNIFICANT CHANGE UP (ref 0–0.2)
BASOPHILS NFR BLD AUTO: 1 % — SIGNIFICANT CHANGE UP (ref 0–2)
EOSINOPHIL # BLD AUTO: 0.13 K/UL — SIGNIFICANT CHANGE UP (ref 0–0.5)
EOSINOPHIL NFR BLD AUTO: 1.9 % — SIGNIFICANT CHANGE UP (ref 0–6)
HCT VFR BLD CALC: 32.4 % — LOW (ref 34.5–45)
HGB BLD-MCNC: 10.9 G/DL — LOW (ref 11.5–15.5)
IMM GRANULOCYTES NFR BLD AUTO: 0.3 % — SIGNIFICANT CHANGE UP (ref 0–0.9)
LYMPHOCYTES # BLD AUTO: 2.97 K/UL — SIGNIFICANT CHANGE UP (ref 1–3.3)
LYMPHOCYTES # BLD AUTO: 44.5 % — HIGH (ref 13–44)
MCHC RBC-ENTMCNC: 31.1 PG — SIGNIFICANT CHANGE UP (ref 27–34)
MCHC RBC-ENTMCNC: 33.6 G/DL — SIGNIFICANT CHANGE UP (ref 32–36)
MCV RBC AUTO: 92.6 FL — SIGNIFICANT CHANGE UP (ref 80–100)
MONOCYTES # BLD AUTO: 0.94 K/UL — HIGH (ref 0–0.9)
MONOCYTES NFR BLD AUTO: 14.1 % — HIGH (ref 2–14)
NEUTROPHILS # BLD AUTO: 2.54 K/UL — SIGNIFICANT CHANGE UP (ref 1.8–7.4)
NEUTROPHILS NFR BLD AUTO: 38.2 % — LOW (ref 43–77)
NRBC # BLD: 0 /100 WBCS — SIGNIFICANT CHANGE UP (ref 0–0)
PLATELET # BLD AUTO: 359 K/UL — SIGNIFICANT CHANGE UP (ref 150–400)
RBC # BLD: 3.5 M/UL — LOW (ref 3.8–5.2)
RBC # FLD: 14.6 % — HIGH (ref 10.3–14.5)
WBC # BLD: 6.67 K/UL — SIGNIFICANT CHANGE UP (ref 3.8–10.5)
WBC # FLD AUTO: 6.67 K/UL — SIGNIFICANT CHANGE UP (ref 3.8–10.5)

## 2024-05-09 PROCEDURE — G2211 COMPLEX E/M VISIT ADD ON: CPT | Mod: NC,1L

## 2024-05-09 PROCEDURE — 99215 OFFICE O/P EST HI 40 MIN: CPT

## 2024-05-09 NOTE — HISTORY OF PRESENT ILLNESS
[de-identified] : THAIS DALTON is a 63 year woman with PMH of HTN and HLD, who presents for Hematology follow up of IgG lambda multiple myeloma.  Diagnosis: - She initially established care with Hematology in 2014 for IgG lambda monoclonal gammopathy (M-spike 1.8, IgG 1930, K/L 0.44). She went to both [facility] (Dr. Ramírez) and [place]; both recommended a bone marrow biopsy, which she declined. Routine labs in 2019 showed persistent monoclonal gammopathy, and she re-established care at [facility] with Dr. Leiva. Her labs showed M-spike 1.1, IgG 1872, K/L 1.14. UPEP showed a weak IgG lambda band. No anemia, renal dysfunction, or hypercalcemia. Skeletal survey in 11/2020 did not show any lytic lesions.  - She continued to follow up every 6 months and was noted to have rising paraprotein markers in 9/2023: M-spike 2.0, IgG 2818, K/L 0.56. No anemia, renal dysfunction, or hypercalcemia. IR-guided bone marrow biopsy on 1/11/24 showed 40% plasma cells. FISH showed 3 copies of FGFR3 (5.5%) and 3 copies of CCND1 (5.5%). PET/CT on 2/29/24 showed focal hypermetabolic skeletal lucencies within L1 (2.3 x 1.4 cm, SUV 7.4) and the RIGHT iliac bone (SUV 5.7), consistent with a new MM diagnosis.  Treatment History: - She started quadruplet therapy (Rosalind-VRd) on 3/14/24.   Interval History: - She returns today for C3D1 of Rosalind-VRd. Her MM markers from C2D1 (4/11/24) are improving compared to C1D1: M-spike 0.5 vs. 2.1, IgG 873 vs. 2759, K/L 0.84 vs. 0.32.  - She has been feeling fatigued, which she also reported at her last visit. Labs from 4/12/24 showed normal iron, B12, and TSH. - She developed an episode of left foot/leg numbness, followed by pain, that resolved.    Family History: - Father: bladder cancer - Paternal aunt and mother: renal cancer   Social History: - Lives with her  - Homemaker - Denies tobacco, alcohol, or drug use.

## 2024-05-09 NOTE — ASSESSMENT
[FreeTextEntry1] : THAIS DALTON is a 63 year woman with PMH of HTN and HLD, who presents for Hematology follow up of IgG lambda multiple myeloma.   1. IgG lambda multiple myeloma:  - Diagnosis: She was first noted to have IgG lambda monoclonal gammopathy in 2014 and was recommended to undergo a bone marrow biopsy, which she declined. She re-established care at [facility] with Dr. Leiva in 2019 for persistent monoclonal gammopathy. She was noted to have rising paraprotein markers in 9/2023: M-spike 2.0, IgG 2818, K/L 0.56. No anemia, renal dysfunction, or hypercalcemia. IR-guided bone marrow biopsy on 1/11/24 showed 40% plasma cells. FISH showed 3 copies of FGFR3 (5.5%) and 3 copies of CCND1 (5.5%). Normal karyotype. Lytic lesions noted on PET/CT from 2/29/24. - Staging: R-ISS I (low risk) - Treatment plan: Rosalind-VRd per the phase III PERSEUS trial, with Velcade dosing modified to weekly to minimize toxicity. Will plan for 4-6 cycles of induction therapy, followed by auto-SCT. Daratumumab SQ weekly x 8 (C1-2), then biweekly x 8 (C3-6), then monthly (C7+) Velcade SQ 1.3 mg/m2 D1, 8, 15 Revlimid PO 25 mg D1-21 Pre-medications: dexamethasone 20 mg, Tylenol 650 mg, Benadryl 25 mg C1D1 3/14/24, C2D1 4/11/24. Today is C3D1. - Will place referral for BMT evaluation  - Monitor MM labs (SPEP, RALEIGH, free light chains, immunoglobulins) monthly - Monitor CBC and CMP with treatment   2. Bones: PET/CT on 2/29/24 showed focal hypermetabolic skeletal lucencies within L1 (2.3 x 1.4 cm, SUV 7.4) and the RIGHT iliac bone (SUV 5.7). - Vitamin D: 29.1 (3/14/24). Continue 1000 units daily.  - Anti-resorptive therapy: Pending dental evaluation   3. Kidney:  - Creatinine: 0.82 (4/25/24) - UPEP/RALEIGH: positive for IgG lambda band (1/31/24) - Avoid nephrotoxic agents   4. Hematology: She has mild anemia likely from therapy. - CBC today: 6.67 > 10.9 < 359 - Monitor CBC with treatment   5. Peripheral neuropathy: She reports mild neuropathy at baseline. - Continue to monitor with Velcade treatment   6. VTE: No history of VTE. - Continue Eliquis 2.5 mg BID for prophylaxis with Revlimid treatment (allergic to aspirin)   7. Infections: She has immunoparesis (low IgA and IgM). No recent or recurrent infections. - IgG level: 873 (4/11/24) - Continue acyclovir 400 mg BID for antiviral prophylaxis   8. Amyloidosis: Not suspected. Bone marrow negative for Congo red staining.

## 2024-05-13 ENCOUNTER — RX RENEWAL (OUTPATIENT)
Age: 64
End: 2024-05-13

## 2024-05-14 ENCOUNTER — RX RENEWAL (OUTPATIENT)
Age: 64
End: 2024-05-14

## 2024-05-14 LAB
ALBUMIN MFR SERPL ELPH: 62 %
ALBUMIN SERPL-MCNC: 3.7 G/DL
ALBUMIN/GLOB SERPL: 1.7 RATIO
ALPHA1 GLOB MFR SERPL ELPH: 4.8 %
ALPHA1 GLOB SERPL ELPH-MCNC: 0.3 G/DL
ALPHA2 GLOB MFR SERPL ELPH: 11.4 %
ALPHA2 GLOB SERPL ELPH-MCNC: 0.7 G/DL
B-GLOBULIN MFR SERPL ELPH: 11.6 %
B-GLOBULIN SERPL ELPH-MCNC: 0.7 G/DL
DEPRECATED KAPPA LC FREE/LAMBDA SER: 1.04 RATIO
GAMMA GLOB FLD ELPH-MCNC: 0.6 G/DL
GAMMA GLOB MFR SERPL ELPH: 10.2 %
IGA SER QL IEP: 34 MG/DL
IGG SER QL IEP: 648 MG/DL
IGM SER QL IEP: 23 MG/DL
INTERPRETATION SERPL IEP-IMP: NORMAL
KAPPA LC CSF-MCNC: 1.23 MG/DL
KAPPA LC SERPL-MCNC: 1.28 MG/DL
M PROTEIN MFR SERPL ELPH: 6.2 %
M PROTEIN SPEC IFE-MCNC: NORMAL
MONOCLON BAND OBS SERPL: 0.4 G/DL
PROT SERPL-MCNC: 5.9 G/DL
PROT SERPL-MCNC: 5.9 G/DL

## 2024-05-15 DIAGNOSIS — R25.2 CRAMP AND SPASM: ICD-10-CM

## 2024-05-16 ENCOUNTER — APPOINTMENT (OUTPATIENT)
Dept: HEMATOLOGY ONCOLOGY | Facility: CLINIC | Age: 64
End: 2024-05-16

## 2024-05-16 ENCOUNTER — RESULT REVIEW (OUTPATIENT)
Age: 64
End: 2024-05-16

## 2024-05-16 ENCOUNTER — APPOINTMENT (OUTPATIENT)
Dept: INFUSION THERAPY | Facility: HOSPITAL | Age: 64
End: 2024-05-16

## 2024-05-16 LAB
ALBUMIN SERPL ELPH-MCNC: 4.3 G/DL
ALP BLD-CCNC: 92 U/L
ALT SERPL-CCNC: 20 U/L
ANION GAP SERPL CALC-SCNC: 12 MMOL/L
AST SERPL-CCNC: 20 U/L
BASOPHILS # BLD AUTO: 0.06 K/UL — SIGNIFICANT CHANGE UP (ref 0–0.2)
BASOPHILS NFR BLD AUTO: 1.2 % — SIGNIFICANT CHANGE UP (ref 0–2)
BILIRUB SERPL-MCNC: 0.5 MG/DL
BUN SERPL-MCNC: 25 MG/DL
CALCIUM SERPL-MCNC: 9.3 MG/DL
CHLORIDE SERPL-SCNC: 99 MMOL/L
CO2 SERPL-SCNC: 26 MMOL/L
CREAT SERPL-MCNC: 0.81 MG/DL
EGFR: 81 ML/MIN/1.73M2
EOSINOPHIL # BLD AUTO: 0.23 K/UL — SIGNIFICANT CHANGE UP (ref 0–0.5)
EOSINOPHIL NFR BLD AUTO: 4.4 % — SIGNIFICANT CHANGE UP (ref 0–6)
GLUCOSE SERPL-MCNC: 100 MG/DL
HCT VFR BLD CALC: 34.2 % — LOW (ref 34.5–45)
HGB BLD-MCNC: 11.6 G/DL — SIGNIFICANT CHANGE UP (ref 11.5–15.5)
IMM GRANULOCYTES NFR BLD AUTO: 1 % — HIGH (ref 0–0.9)
LYMPHOCYTES # BLD AUTO: 2.17 K/UL — SIGNIFICANT CHANGE UP (ref 1–3.3)
LYMPHOCYTES # BLD AUTO: 41.8 % — SIGNIFICANT CHANGE UP (ref 13–44)
MCHC RBC-ENTMCNC: 31.3 PG — SIGNIFICANT CHANGE UP (ref 27–34)
MCHC RBC-ENTMCNC: 33.9 G/DL — SIGNIFICANT CHANGE UP (ref 32–36)
MCV RBC AUTO: 92.2 FL — SIGNIFICANT CHANGE UP (ref 80–100)
MONOCYTES # BLD AUTO: 0.42 K/UL — SIGNIFICANT CHANGE UP (ref 0–0.9)
MONOCYTES NFR BLD AUTO: 8.1 % — SIGNIFICANT CHANGE UP (ref 2–14)
NEUTROPHILS # BLD AUTO: 2.26 K/UL — SIGNIFICANT CHANGE UP (ref 1.8–7.4)
NEUTROPHILS NFR BLD AUTO: 43.5 % — SIGNIFICANT CHANGE UP (ref 43–77)
NRBC # BLD: 0 /100 WBCS — SIGNIFICANT CHANGE UP (ref 0–0)
PLATELET # BLD AUTO: 248 K/UL — SIGNIFICANT CHANGE UP (ref 150–400)
POTASSIUM SERPL-SCNC: 3.7 MMOL/L
PROT SERPL-MCNC: 6.3 G/DL
RBC # BLD: 3.71 M/UL — LOW (ref 3.8–5.2)
RBC # FLD: 15 % — HIGH (ref 10.3–14.5)
SODIUM SERPL-SCNC: 138 MMOL/L
WBC # BLD: 5.19 K/UL — SIGNIFICANT CHANGE UP (ref 3.8–10.5)
WBC # FLD AUTO: 5.19 K/UL — SIGNIFICANT CHANGE UP (ref 3.8–10.5)

## 2024-05-21 ENCOUNTER — APPOINTMENT (OUTPATIENT)
Dept: HEMATOLOGY ONCOLOGY | Facility: CLINIC | Age: 64
End: 2024-05-21
Payer: COMMERCIAL

## 2024-05-21 VITALS
WEIGHT: 139.97 LBS | HEIGHT: 59.57 IN | TEMPERATURE: 98.3 F | BODY MASS INDEX: 27.85 KG/M2 | HEART RATE: 88 BPM | OXYGEN SATURATION: 98 % | SYSTOLIC BLOOD PRESSURE: 144 MMHG | DIASTOLIC BLOOD PRESSURE: 78 MMHG | RESPIRATION RATE: 16 BRPM

## 2024-05-21 PROCEDURE — 99204 OFFICE O/P NEW MOD 45 MIN: CPT

## 2024-05-21 NOTE — ASSESSMENT
[FreeTextEntry1] : 64 yo female with newly diagnosed multiple myeloma presenting for consult of Auto-HSCT.   Discussed extensively with patient prognosis of her illness and the goal of Auto-HSCT to be providing PFS & OS benefit which would decrease her exposure to chemotherapy over time. Discussed that 5 year overall survival for patients undergoing auto transplant with VGPR in multiple myeloma is 79% vs 68% in patients with MI or less. Discussed that auto-HSCT is SOC for newly diagnosed Multiple myeloma as it provides deeper remission and longer duration of remission but is not curative.   Discussed Auto-HSCT procedure including apheresis (collection of patients cells) using G-CSF and plexiform followed by hospital admission typically of 14 days until engraftment. Discussed high dose chemotherapy with melphalan and auto stem cell rescue.   Discussed possible adverse events like mucositis, alopecia, infections.  Discussed lifestyle modifications necessary for decreasing risk of infections and adverse events.   Discussed post transplant maintenance treatment with Daratumumab versus Revlimid. This is to be discussed with Dr Patel.     I saw the patient myself.  I performed the history and PE.  I reviewed and confirmed the above note.  The patient has a well documented MM, IgG lambda with 2 bone lesions by CT PET scan, one in L1 and one in the R iliac bone. Her beta 2 MG is 3.3 but has no other adverse features.  She has been tolerating therapy well. She already achieved MI following 2 cycles of therapy. She is currently completing her 3rd cycle. She has been tolerating therapy well.  I agree with the indication of autologous HSCT. I suggest completing 4 cycles of treatment followed by restaging work-up including BM studies.  I discussed with patient the expected outcomes following autologous HSCT and alternative therapies.  I answered her questions.  I spent a total of 70 minutes reviewing the records and seeing and counseling the patient.  TIFFANIE Mc MD

## 2024-05-21 NOTE — HISTORY OF PRESENT ILLNESS
[de-identified] : 64 yo female presenting for initial consult for Auto-HSCT in setting of IgG lambda Multiple Myeloma.  PmHx: HTN,  Initially diagnosed in 2014 with IgG Lambda monoclonal gammopathy. She was followed with serial labs with rising paraprotein markers 9/2023 (M-spike 2.0, IgG 2818, K/L 0.56. No anemia, renal dysfunction, or hypercalcemia. ). IR-guided biopsy  1/11/24 showed 40% plasma cells with FISH positive for 3 copies of FGFR3 (5.5%) and 3 copies of CCND1 (5.5%). PET CT on 2/29/24 showed focal hypermetabolic skeletal lucencies within L1 and the right iliac bone consistent with a new diagnosis of Multiple Myeloma.  Of note, her urine was positive for IgG Lambda.   She was started on Rosalind-VRD on 3/14/24. Paraprotein markers at that time: M/spike 2.1, IgG 2759, K/L 0.32.  She is currently C3 D12 with minimal complications including mild anemia and immunoparesis. She is currently in [facility] per serum markers on 5/14: M-spike 0.4 (with one IgG kappa band and IgG lambda band), IgG 648, K/L 1.04.   Patient reports to be doing well today and denies any new complaints.   Meds:  Acyclovir  Elliquis ( 2.5mg BID for Revlimid prophylaxis)  Vit D  Valsartan-HCTZ Fenofibrate

## 2024-05-23 ENCOUNTER — APPOINTMENT (OUTPATIENT)
Dept: INFUSION THERAPY | Facility: HOSPITAL | Age: 64
End: 2024-05-23

## 2024-05-23 ENCOUNTER — RESULT REVIEW (OUTPATIENT)
Age: 64
End: 2024-05-23

## 2024-05-23 ENCOUNTER — APPOINTMENT (OUTPATIENT)
Dept: HEMATOLOGY ONCOLOGY | Facility: CLINIC | Age: 64
End: 2024-05-23
Payer: COMMERCIAL

## 2024-05-23 ENCOUNTER — APPOINTMENT (OUTPATIENT)
Dept: HEMATOLOGY ONCOLOGY | Facility: CLINIC | Age: 64
End: 2024-05-23

## 2024-05-23 LAB
BASOPHILS # BLD AUTO: 0.06 K/UL — SIGNIFICANT CHANGE UP (ref 0–0.2)
BASOPHILS NFR BLD AUTO: 0.9 % — SIGNIFICANT CHANGE UP (ref 0–2)
EOSINOPHIL # BLD AUTO: 0.33 K/UL — SIGNIFICANT CHANGE UP (ref 0–0.5)
EOSINOPHIL NFR BLD AUTO: 4.8 % — SIGNIFICANT CHANGE UP (ref 0–6)
HCT VFR BLD CALC: 33.1 % — LOW (ref 34.5–45)
HGB BLD-MCNC: 11.2 G/DL — LOW (ref 11.5–15.5)
IMM GRANULOCYTES NFR BLD AUTO: 0.4 % — SIGNIFICANT CHANGE UP (ref 0–0.9)
LYMPHOCYTES # BLD AUTO: 2.12 K/UL — SIGNIFICANT CHANGE UP (ref 1–3.3)
LYMPHOCYTES # BLD AUTO: 31 % — SIGNIFICANT CHANGE UP (ref 13–44)
MCHC RBC-ENTMCNC: 30.9 PG — SIGNIFICANT CHANGE UP (ref 27–34)
MCHC RBC-ENTMCNC: 33.8 G/DL — SIGNIFICANT CHANGE UP (ref 32–36)
MCV RBC AUTO: 91.4 FL — SIGNIFICANT CHANGE UP (ref 80–100)
MONOCYTES # BLD AUTO: 1.04 K/UL — HIGH (ref 0–0.9)
MONOCYTES NFR BLD AUTO: 15.2 % — HIGH (ref 2–14)
NEUTROPHILS # BLD AUTO: 3.26 K/UL — SIGNIFICANT CHANGE UP (ref 1.8–7.4)
NEUTROPHILS NFR BLD AUTO: 47.7 % — SIGNIFICANT CHANGE UP (ref 43–77)
NRBC # BLD: 0 /100 WBCS — SIGNIFICANT CHANGE UP (ref 0–0)
PLATELET # BLD AUTO: 207 K/UL — SIGNIFICANT CHANGE UP (ref 150–400)
RBC # BLD: 3.62 M/UL — LOW (ref 3.8–5.2)
RBC # FLD: 15.1 % — HIGH (ref 10.3–14.5)
WBC # BLD: 6.84 K/UL — SIGNIFICANT CHANGE UP (ref 3.8–10.5)
WBC # FLD AUTO: 6.84 K/UL — SIGNIFICANT CHANGE UP (ref 3.8–10.5)

## 2024-05-23 PROCEDURE — 99214 OFFICE O/P EST MOD 30 MIN: CPT

## 2024-05-23 NOTE — ASSESSMENT
[FreeTextEntry1] : THAIS DALTON is a 63 year woman with PMH of HTN and HLD, who presents for Hematology follow up of IgG lambda multiple myeloma.   1. IgG lambda multiple myeloma:  - Diagnosis: She was first noted to have IgG lambda monoclonal gammopathy in 2014 and was recommended to undergo a bone marrow biopsy, which she declined. She re-established care at [facility] with Dr. Leiva in 2019 for persistent monoclonal gammopathy. She was noted to have rising paraprotein markers in 9/2023: M-spike 2.0, IgG 2818, K/L 0.56. No anemia, renal dysfunction, or hypercalcemia. IR-guided bone marrow biopsy on 1/11/24 showed 40% plasma cells. FISH showed 3 copies of FGFR3 (5.5%) and 3 copies of CCND1 (5.5%). Normal karyotype. Lytic lesions noted on PET/CT from 2/29/24. - Staging: R-ISS I (low risk) - Treatment plan: Rosalind-VRd per the phase III PERSEUS trial, with Velcade dosing modified to weekly to minimize toxicity. Will plan for 4-6 cycles of induction therapy, followed by auto-SCT. Daratumumab SQ weekly x 8 (C1-2), then biweekly x 8 (C3-6), then monthly (C7+) Velcade SQ 1.3 mg/m2 D1, 8, 15 Revlimid PO 25 mg D1-21 Pre-medications: dexamethasone 20 mg, Tylenol 650 mg, Benadryl 25 mg C1D1 3/14/24, C2D1 4/11/24, C3D1 5/9/24. Today is C3D15. - Will place referral for BMT evaluation  - Monitor MM labs (SPEP, RALEIGH, free light chains, immunoglobulins) monthly - Monitor CBC and CMP with treatment - Had stemcell transplant consultation on 5/21/24 with Dr. Smith, plan for Auto-SCT after completion of Cycle 4. Will plan for restaging with PET/CT and bone marrow biopsy after that (C4D22 6/27/24)   2. Bones: PET/CT on 2/29/24 showed focal hypermetabolic skeletal lucencies within L1 (2.3 x 1.4 cm, SUV 7.4) and the RIGHT iliac bone (SUV 5.7). - Vitamin D: 29.1 (3/14/24). Continue 1000 units daily.  - Anti-resorptive therapy: Pending dental evaluation   3. Kidney:  - Creatinine: 0.81 (5/16/24) - UPEP/RALEIGH: positive for IgG lambda band (1/31/24) - Avoid nephrotoxic agents   4. Hematology: She has mild anemia likely from therapy. - CBC today: 6.84 > 11.2 < 207 - Monitor CBC with treatment   5. Peripheral neuropathy: She reports mild neuropathy at baseline. - Continue to monitor with Velcade treatment   6. VTE: No history of VTE. - Continue Eliquis 2.5 mg BID for prophylaxis with Revlimid treatment (allergic to aspirin)   7. Infections: She has immunoparesis (low IgA and IgM). No recent or recurrent infections. - IgG level: 648 (5/9/24) - Continue acyclovir 400 mg BID for antiviral prophylaxis   8. Amyloidosis: Not suspected. Bone marrow negative for Congo red staining.

## 2024-05-23 NOTE — HISTORY OF PRESENT ILLNESS
[de-identified] : THAIS DALTON is a 63 year woman with PMH of HTN and HLD, who presents for Hematology follow up of IgG lambda multiple myeloma.  Diagnosis: - She initially established care with Hematology in 2014 for IgG lambda monoclonal gammopathy (M-spike 1.8, IgG 1930, K/L 0.44). She went to both [facility] (Dr. Ramírez) and [place]; both recommended a bone marrow biopsy, which she declined. Routine labs in 2019 showed persistent monoclonal gammopathy, and she re-established care at [facility] with Dr. Leiva. Her labs showed M-spike 1.1, IgG 1872, K/L 1.14. UPEP showed a weak IgG lambda band. No anemia, renal dysfunction, or hypercalcemia. Skeletal survey in 11/2020 did not show any lytic lesions.  - She continued to follow up every 6 months and was noted to have rising paraprotein markers in 9/2023: M-spike 2.0, IgG 2818, K/L 0.56. No anemia, renal dysfunction, or hypercalcemia. IR-guided bone marrow biopsy on 1/11/24 showed 40% plasma cells. FISH showed 3 copies of FGFR3 (5.5%) and 3 copies of CCND1 (5.5%). PET/CT on 2/29/24 showed focal hypermetabolic skeletal lucencies within L1 (2.3 x 1.4 cm, SUV 7.4) and the RIGHT iliac bone (SUV 5.7), consistent with a new MM diagnosis.  Treatment History: - She started quadruplet therapy (Rosalind-VRd) on 3/14/24.   Interval History: - She returns today for C3D15 of Rosalind-VRd. Her MM markers from C3D1 (5/9/24) are improving compared to C1D1: M-spike 0.4 vs. 2.1, IgG 648 vs. 2759, K/L 1.04 vs. 0.32.  - She endorses fatigue and b/l leg pain that is relieved with Tylenol in addition to intermittent diarrhea for which she is taking Imodium. Additionally she is complaining of b/l "clogged" ears L>R and her hearing sounds muffled. She endorses feeling anxious about her stem cell transplant.  Family History: - Father: bladder cancer - Paternal aunt and mother: renal cancer   Social History: - Lives with her  - Homemaker - Denies tobacco, alcohol, or drug use. none

## 2024-05-24 LAB
ALBUMIN SERPL ELPH-MCNC: 4.2 G/DL
ALP BLD-CCNC: 92 U/L
ALT SERPL-CCNC: 48 U/L
ANION GAP SERPL CALC-SCNC: 12 MMOL/L
AST SERPL-CCNC: 34 U/L
BILIRUB SERPL-MCNC: 0.4 MG/DL
BUN SERPL-MCNC: 37 MG/DL
CALCIUM SERPL-MCNC: 9.4 MG/DL
CHLORIDE SERPL-SCNC: 100 MMOL/L
CO2 SERPL-SCNC: 24 MMOL/L
CREAT SERPL-MCNC: 0.92 MG/DL
EGFR: 70 ML/MIN/1.73M2
GLUCOSE SERPL-MCNC: 145 MG/DL
POTASSIUM SERPL-SCNC: 3.9 MMOL/L
PROT SERPL-MCNC: 6.1 G/DL
SODIUM SERPL-SCNC: 136 MMOL/L

## 2024-05-29 ENCOUNTER — RX RENEWAL (OUTPATIENT)
Age: 64
End: 2024-05-29

## 2024-05-30 ENCOUNTER — APPOINTMENT (OUTPATIENT)
Dept: ULTRASOUND IMAGING | Facility: HOSPITAL | Age: 64
End: 2024-05-30
Payer: COMMERCIAL

## 2024-05-30 ENCOUNTER — OUTPATIENT (OUTPATIENT)
Dept: OUTPATIENT SERVICES | Facility: HOSPITAL | Age: 64
LOS: 1 days | End: 2024-05-30
Payer: COMMERCIAL

## 2024-05-30 DIAGNOSIS — R25.2 CRAMP AND SPASM: ICD-10-CM

## 2024-05-30 PROCEDURE — 93971 EXTREMITY STUDY: CPT

## 2024-05-30 PROCEDURE — 93971 EXTREMITY STUDY: CPT | Mod: 26,LT

## 2024-06-03 ENCOUNTER — NON-APPOINTMENT (OUTPATIENT)
Age: 64
End: 2024-06-03

## 2024-06-06 ENCOUNTER — LABORATORY RESULT (OUTPATIENT)
Age: 64
End: 2024-06-06

## 2024-06-06 ENCOUNTER — APPOINTMENT (OUTPATIENT)
Dept: HEMATOLOGY ONCOLOGY | Facility: CLINIC | Age: 64
End: 2024-06-06
Payer: COMMERCIAL

## 2024-06-06 ENCOUNTER — RESULT REVIEW (OUTPATIENT)
Age: 64
End: 2024-06-06

## 2024-06-06 ENCOUNTER — OUTPATIENT (OUTPATIENT)
Dept: OUTPATIENT SERVICES | Facility: HOSPITAL | Age: 64
LOS: 1 days | End: 2024-06-06
Payer: COMMERCIAL

## 2024-06-06 ENCOUNTER — APPOINTMENT (OUTPATIENT)
Dept: INFUSION THERAPY | Facility: HOSPITAL | Age: 64
End: 2024-06-06

## 2024-06-06 ENCOUNTER — APPOINTMENT (OUTPATIENT)
Dept: HEMATOLOGY ONCOLOGY | Facility: CLINIC | Age: 64
End: 2024-06-06

## 2024-06-06 DIAGNOSIS — Z01.818 ENCOUNTER FOR OTHER PREPROCEDURAL EXAMINATION: ICD-10-CM

## 2024-06-06 LAB
BASOPHILS # BLD AUTO: 0.06 K/UL — SIGNIFICANT CHANGE UP (ref 0–0.2)
BASOPHILS NFR BLD AUTO: 1.2 % — SIGNIFICANT CHANGE UP (ref 0–2)
EOSINOPHIL # BLD AUTO: 0.05 K/UL — SIGNIFICANT CHANGE UP (ref 0–0.5)
EOSINOPHIL NFR BLD AUTO: 1 % — SIGNIFICANT CHANGE UP (ref 0–6)
HCT VFR BLD CALC: 32.9 % — LOW (ref 34.5–45)
HGB BLD-MCNC: 11.2 G/DL — LOW (ref 11.5–15.5)
IMM GRANULOCYTES NFR BLD AUTO: 0.2 % — SIGNIFICANT CHANGE UP (ref 0–0.9)
LYMPHOCYTES # BLD AUTO: 2.56 K/UL — SIGNIFICANT CHANGE UP (ref 1–3.3)
LYMPHOCYTES # BLD AUTO: 53 % — HIGH (ref 13–44)
MCHC RBC-ENTMCNC: 30.9 PG — SIGNIFICANT CHANGE UP (ref 27–34)
MCHC RBC-ENTMCNC: 34 G/DL — SIGNIFICANT CHANGE UP (ref 32–36)
MCV RBC AUTO: 90.6 FL — SIGNIFICANT CHANGE UP (ref 80–100)
MONOCYTES # BLD AUTO: 0.64 K/UL — SIGNIFICANT CHANGE UP (ref 0–0.9)
MONOCYTES NFR BLD AUTO: 13.3 % — SIGNIFICANT CHANGE UP (ref 2–14)
NEUTROPHILS # BLD AUTO: 1.51 K/UL — LOW (ref 1.8–7.4)
NEUTROPHILS NFR BLD AUTO: 31.3 % — LOW (ref 43–77)
NRBC # BLD: 0 /100 WBCS — SIGNIFICANT CHANGE UP (ref 0–0)
PLATELET # BLD AUTO: 368 K/UL — SIGNIFICANT CHANGE UP (ref 150–400)
RBC # BLD: 3.63 M/UL — LOW (ref 3.8–5.2)
RBC # FLD: 14.6 % — HIGH (ref 10.3–14.5)
WBC # BLD: 4.83 K/UL — SIGNIFICANT CHANGE UP (ref 3.8–10.5)
WBC # FLD AUTO: 4.83 K/UL — SIGNIFICANT CHANGE UP (ref 3.8–10.5)

## 2024-06-06 PROCEDURE — 86900 BLOOD TYPING SEROLOGIC ABO: CPT

## 2024-06-06 PROCEDURE — 86905 BLOOD TYPING RBC ANTIGENS: CPT

## 2024-06-06 PROCEDURE — 86922 COMPATIBILITY TEST ANTIGLOB: CPT

## 2024-06-06 PROCEDURE — G2211 COMPLEX E/M VISIT ADD ON: CPT | Mod: NC,1L

## 2024-06-06 PROCEDURE — 86901 BLOOD TYPING SEROLOGIC RH(D): CPT

## 2024-06-06 PROCEDURE — 86880 COOMBS TEST DIRECT: CPT

## 2024-06-06 PROCEDURE — 86850 RBC ANTIBODY SCREEN: CPT

## 2024-06-06 PROCEDURE — 99215 OFFICE O/P EST HI 40 MIN: CPT

## 2024-06-06 PROCEDURE — 86870 RBC ANTIBODY IDENTIFICATION: CPT

## 2024-06-06 PROCEDURE — 86970 RBC PRETX INCUBATJ W/CHEMICL: CPT

## 2024-06-06 RX ORDER — FENOFIBRATE,MICRONIZED 130 MG
1 CAPSULE ORAL
Qty: 0 | Refills: 0 | DISCHARGE

## 2024-06-06 NOTE — HISTORY OF PRESENT ILLNESS
[de-identified] : THAIS DALTON is a 63 year woman with PMH of HTN and HLD, who presents for Hematology follow up of IgG lambda multiple myeloma.  Diagnosis: - She initially established care with Hematology in 2014 for IgG lambda monoclonal gammopathy (M-spike 1.8, IgG 1930, K/L 0.44). She went to both [facility] (Dr. Ramírez) and [place]; both recommended a bone marrow biopsy, which she declined. Routine labs in 2019 showed persistent monoclonal gammopathy, and she re-established care at [facility] with Dr. Leiav. Her labs showed M-spike 1.1, IgG 1872, K/L 1.14. UPEP showed a weak IgG lambda band. No anemia, renal dysfunction, or hypercalcemia. Skeletal survey in 11/2020 did not show any lytic lesions.  - She continued to follow up every 6 months and was noted to have rising paraprotein markers in 9/2023: M-spike 2.0, IgG 2818, K/L 0.56. No anemia, renal dysfunction, or hypercalcemia. IR-guided bone marrow biopsy on 1/11/24 showed 40% plasma cells. FISH showed 3 copies of FGFR3 (5.5%) and 3 copies of CCND1 (5.5%). PET/CT on 2/29/24 showed focal hypermetabolic skeletal lucencies within L1 (2.3 x 1.4 cm, SUV 7.4) and the RIGHT iliac bone (SUV 5.7), consistent with a new MM diagnosis.  Treatment History: - She started quadruplet therapy (Rosalind-VRd) on 3/14/24.   Interval History: - She returns today for C4D1 of Rosalind-VRd. Her MM markers from C3D1 (6/6/24) are improving compared to C1D1: M-spike 0.4 vs. 2.1, IgG 648 vs. 2759, K/L 1.04 vs. 0.32.  - She has been feeling fatigued, which she also reported at her last visit. Labs from 4/12/24 showed normal iron, B12, and TSH.  Family History: - Father: bladder cancer - Paternal aunt and mother: renal cancer   Social History: - Lives with her  - Homemaker - Denies tobacco, alcohol, or drug use.

## 2024-06-06 NOTE — ASSESSMENT
[FreeTextEntry1] : THAIS DALTON is a 63 year woman with PMH of HTN and HLD, who presents for Hematology follow up of IgG lambda multiple myeloma.   1. IgG lambda multiple myeloma:  - Diagnosis: She was first noted to have IgG lambda monoclonal gammopathy in 2014 and was recommended to undergo a bone marrow biopsy, which she declined. She re-established care at [facility] with Dr. Leiva in 2019 for persistent monoclonal gammopathy. She was noted to have rising paraprotein markers in 9/2023: M-spike 2.0, IgG 2818, K/L 0.56. No anemia, renal dysfunction, or hypercalcemia. IR-guided bone marrow biopsy on 1/11/24 showed 40% plasma cells. FISH showed 3 copies of FGFR3 (5.5%) and 3 copies of CCND1 (5.5%). Normal karyotype. Lytic lesions noted on PET/CT from 2/29/24. - Staging: R-ISS I (low risk) - Treatment plan: Rosalind-VRd per the phase III PERSEUS trial, with Velcade dosing modified to weekly to minimize toxicity. Will plan for 4 cycles of induction therapy, followed by auto-SCT. Daratumumab SQ weekly x 8 (C1-2), then biweekly x 8 (C3-6), then monthly (C7+) Velcade SQ 1.3 mg/m2 D1, 8, 15 Revlimid PO 25 mg D1-21 Pre-medications: dexamethasone 20 mg, Tylenol 650 mg, Benadryl 25 mg C1D1 3/14/24, C2D1 4/11/24, C3D1 5/9/24. Today is C4D1. - Follow up with BMT. She is scheduled for pre-transplant testing and is planned for stem cell collection after C4. - Repeat bone marrow biopsy for restaging after C4. - Monitor MM labs (SPEP, RALEIGH, free light chains, immunoglobulins) monthly - Monitor CBC and CMP with treatment   2. Bones: PET/CT on 2/29/24 showed focal hypermetabolic skeletal lucencies within L1 (2.3 x 1.4 cm, SUV 7.4) and the RIGHT iliac bone (SUV 5.7). - Vitamin D: 29.1 (3/14/24). Continue 1000 units daily.  - Anti-resorptive therapy: Pending dental evaluation   3. Kidney:  - Creatinine: 0.92 (5/23/24) - UPEP/RALEIGH: positive for IgG lambda band (1/31/24) - Avoid nephrotoxic agents   4. Hematology: She has mild anemia likely from therapy. - CBC today: 4.83 > 11.2 < 368 - Monitor CBC with treatment   5. Peripheral neuropathy: She reports mild neuropathy at baseline. - Continue to monitor with Velcade treatment   6. VTE: No history of VTE. - Continue Eliquis 2.5 mg BID for prophylaxis with Revlimid treatment (allergic to aspirin)   7. Infections: She has immunoparesis (low IgA and IgM). No recent or recurrent infections. - IgG level: 648 (5/9/24) - Continue acyclovir 400 mg BID for antiviral prophylaxis   8. Amyloidosis: Not suspected. Bone marrow negative for Congo red staining.

## 2024-06-07 LAB
HAV IGM SER QL: NONREACTIVE
HBV SURFACE AB SER QL: NONREACTIVE
HGB A MFR BLD: 97.4 %
HGB A2 MFR BLD: 2.6 %
HGB FRACT BLD-IMP: NORMAL

## 2024-06-07 PROCEDURE — 86077 PHYS BLOOD BANK SERV XMATCH: CPT

## 2024-06-08 LAB
M TB IFN-G BLD-IMP: NEGATIVE
QUANTIFERON TB PLUS MITOGEN MINUS NIL: 7.49 IU/ML
QUANTIFERON TB PLUS NIL: 0.03 IU/ML
QUANTIFERON TB PLUS TB1 MINUS NIL: 0.01 IU/ML
QUANTIFERON TB PLUS TB2 MINUS NIL: 0 IU/ML

## 2024-06-10 LAB — STRONGYLOIDES AB SER IA-ACNC: NEGATIVE

## 2024-06-11 ENCOUNTER — RX RENEWAL (OUTPATIENT)
Age: 64
End: 2024-06-11

## 2024-06-12 ENCOUNTER — APPOINTMENT (OUTPATIENT)
Dept: CV DIAGNOSITCS | Facility: HOSPITAL | Age: 64
End: 2024-06-12

## 2024-06-12 ENCOUNTER — OUTPATIENT (OUTPATIENT)
Dept: OUTPATIENT SERVICES | Facility: HOSPITAL | Age: 64
LOS: 1 days | End: 2024-06-12
Payer: COMMERCIAL

## 2024-06-12 ENCOUNTER — RESULT REVIEW (OUTPATIENT)
Age: 64
End: 2024-06-12

## 2024-06-12 ENCOUNTER — APPOINTMENT (OUTPATIENT)
Dept: PULMONOLOGY | Facility: CLINIC | Age: 64
End: 2024-06-12

## 2024-06-12 DIAGNOSIS — C90.00 MULTIPLE MYELOMA NOT HAVING ACHIEVED REMISSION: ICD-10-CM

## 2024-06-12 DIAGNOSIS — Z01.818 ENCOUNTER FOR OTHER PREPROCEDURAL EXAMINATION: ICD-10-CM

## 2024-06-12 PROCEDURE — 93306 TTE W/DOPPLER COMPLETE: CPT

## 2024-06-12 PROCEDURE — 94726 PLETHYSMOGRAPHY LUNG VOLUMES: CPT

## 2024-06-12 PROCEDURE — 93306 TTE W/DOPPLER COMPLETE: CPT | Mod: 26

## 2024-06-12 PROCEDURE — 76377 3D RENDER W/INTRP POSTPROCES: CPT | Mod: 26

## 2024-06-12 PROCEDURE — 93356 MYOCRD STRAIN IMG SPCKL TRCK: CPT

## 2024-06-12 PROCEDURE — 94729 DIFFUSING CAPACITY: CPT

## 2024-06-12 PROCEDURE — 94010 BREATHING CAPACITY TEST: CPT

## 2024-06-12 PROCEDURE — 76377 3D RENDER W/INTRP POSTPROCES: CPT

## 2024-06-12 PROCEDURE — 71046 X-RAY EXAM CHEST 2 VIEWS: CPT | Mod: 26

## 2024-06-12 PROCEDURE — 71046 X-RAY EXAM CHEST 2 VIEWS: CPT

## 2024-06-13 ENCOUNTER — RESULT REVIEW (OUTPATIENT)
Age: 64
End: 2024-06-13

## 2024-06-13 ENCOUNTER — APPOINTMENT (OUTPATIENT)
Dept: INFUSION THERAPY | Facility: HOSPITAL | Age: 64
End: 2024-06-13

## 2024-06-13 ENCOUNTER — APPOINTMENT (OUTPATIENT)
Dept: HEMATOLOGY ONCOLOGY | Facility: CLINIC | Age: 64
End: 2024-06-13

## 2024-06-13 LAB
BASOPHILS # BLD AUTO: 0.08 K/UL — SIGNIFICANT CHANGE UP (ref 0–0.2)
BASOPHILS NFR BLD AUTO: 1.4 % — SIGNIFICANT CHANGE UP (ref 0–2)
EOSINOPHIL # BLD AUTO: 0.22 K/UL — SIGNIFICANT CHANGE UP (ref 0–0.5)
EOSINOPHIL NFR BLD AUTO: 3.8 % — SIGNIFICANT CHANGE UP (ref 0–6)
HCT VFR BLD CALC: 31.4 % — LOW (ref 34.5–45)
HGB BLD-MCNC: 11.2 G/DL — LOW (ref 11.5–15.5)
IMM GRANULOCYTES NFR BLD AUTO: 2.3 % — HIGH (ref 0–0.9)
LYMPHOCYTES # BLD AUTO: 2.3 K/UL — SIGNIFICANT CHANGE UP (ref 1–3.3)
LYMPHOCYTES # BLD AUTO: 39.9 % — SIGNIFICANT CHANGE UP (ref 13–44)
MCHC RBC-ENTMCNC: 31.9 PG — SIGNIFICANT CHANGE UP (ref 27–34)
MCHC RBC-ENTMCNC: 35.7 G/DL — SIGNIFICANT CHANGE UP (ref 32–36)
MCV RBC AUTO: 89.5 FL — SIGNIFICANT CHANGE UP (ref 80–100)
MONOCYTES # BLD AUTO: 0.59 K/UL — SIGNIFICANT CHANGE UP (ref 0–0.9)
MONOCYTES NFR BLD AUTO: 10.2 % — SIGNIFICANT CHANGE UP (ref 2–14)
NEUTROPHILS # BLD AUTO: 2.45 K/UL — SIGNIFICANT CHANGE UP (ref 1.8–7.4)
NEUTROPHILS NFR BLD AUTO: 42.4 % — LOW (ref 43–77)
NRBC # BLD: 0 /100 WBCS — SIGNIFICANT CHANGE UP (ref 0–0)
PLATELET # BLD AUTO: 236 K/UL — SIGNIFICANT CHANGE UP (ref 150–400)
RBC # BLD: 3.51 M/UL — LOW (ref 3.8–5.2)
RBC # FLD: 15.2 % — HIGH (ref 10.3–14.5)
WBC # BLD: 5.77 K/UL — SIGNIFICANT CHANGE UP (ref 3.8–10.5)
WBC # FLD AUTO: 5.77 K/UL — SIGNIFICANT CHANGE UP (ref 3.8–10.5)

## 2024-06-17 ENCOUNTER — APPOINTMENT (OUTPATIENT)
Dept: FAMILY MEDICINE | Facility: CLINIC | Age: 64
End: 2024-06-17
Payer: COMMERCIAL

## 2024-06-17 VITALS
BODY MASS INDEX: 27.25 KG/M2 | DIASTOLIC BLOOD PRESSURE: 70 MMHG | HEIGHT: 59.57 IN | TEMPERATURE: 97.6 F | RESPIRATION RATE: 16 BRPM | OXYGEN SATURATION: 99 % | SYSTOLIC BLOOD PRESSURE: 133 MMHG | WEIGHT: 137 LBS | HEART RATE: 88 BPM

## 2024-06-17 DIAGNOSIS — E78.5 HYPERLIPIDEMIA, UNSPECIFIED: ICD-10-CM

## 2024-06-17 DIAGNOSIS — R53.83 OTHER FATIGUE: ICD-10-CM

## 2024-06-17 DIAGNOSIS — I10 ESSENTIAL (PRIMARY) HYPERTENSION: ICD-10-CM

## 2024-06-17 PROCEDURE — 99214 OFFICE O/P EST MOD 30 MIN: CPT

## 2024-06-17 RX ORDER — VALSARTAN AND HYDROCHLOROTHIAZIDE 80; 12.5 MG/1; MG/1
80-12.5 TABLET, FILM COATED ORAL
Qty: 90 | Refills: 3 | Status: ACTIVE | COMMUNITY
Start: 2020-12-15 | End: 1900-01-01

## 2024-06-17 RX ORDER — FENOFIBRATE 48 MG/1
48 TABLET ORAL
Qty: 90 | Refills: 3 | Status: ACTIVE | COMMUNITY
Start: 2017-10-04 | End: 1900-01-01

## 2024-06-17 NOTE — HEALTH RISK ASSESSMENT
[No] : In the past 12 months have you used drugs other than those required for medical reasons? No [No falls in past year] : Patient reported no falls in the past year [0] : 2) Feeling down, depressed, or hopeless: Not at all (0) [PHQ-2 Negative - No further assessment needed] : PHQ-2 Negative - No further assessment needed [de-identified] : regular [[id number]] : 0 [With Patient/Caregiver] : , with patient/caregiver [Designated Healthcare Proxy] : Designated healthcare proxy [Name: ___] : Health Care Proxy's Name: [unfilled]  [Relationship: ___] : Relationship: [unfilled] [AdvancecareDate] : 06/24 [Never] : Never

## 2024-06-17 NOTE — HISTORY OF PRESENT ILLNESS
[FreeTextEntry1] : see HPI [de-identified] : Here to address HTN. She needs medicine refills. She eats healthy diet. She is undergoing treatment for Multiple myeloma. Bone pain is better with treatment. She feels tired during the day. She is AAOX3,NAD.

## 2024-06-17 NOTE — ASSESSMENT
[FreeTextEntry1] : labs per oncology.  HTN stable , medication refilled.  HLD: low fat diet. Med refilled.

## 2024-06-20 ENCOUNTER — APPOINTMENT (OUTPATIENT)
Dept: INFUSION THERAPY | Facility: HOSPITAL | Age: 64
End: 2024-06-20

## 2024-06-20 ENCOUNTER — RESULT REVIEW (OUTPATIENT)
Age: 64
End: 2024-06-20

## 2024-06-20 ENCOUNTER — APPOINTMENT (OUTPATIENT)
Dept: HEMATOLOGY ONCOLOGY | Facility: CLINIC | Age: 64
End: 2024-06-20
Payer: COMMERCIAL

## 2024-06-20 ENCOUNTER — APPOINTMENT (OUTPATIENT)
Dept: HEMATOLOGY ONCOLOGY | Facility: CLINIC | Age: 64
End: 2024-06-20

## 2024-06-20 LAB
BASOPHILS # BLD AUTO: 0.05 K/UL — SIGNIFICANT CHANGE UP (ref 0–0.2)
BASOPHILS NFR BLD AUTO: 0.7 % — SIGNIFICANT CHANGE UP (ref 0–2)
EOSINOPHIL # BLD AUTO: 0.19 K/UL — SIGNIFICANT CHANGE UP (ref 0–0.5)
EOSINOPHIL NFR BLD AUTO: 2.8 % — SIGNIFICANT CHANGE UP (ref 0–6)
HCT VFR BLD CALC: 29 % — LOW (ref 34.5–45)
HGB BLD-MCNC: 10.2 G/DL — LOW (ref 11.5–15.5)
IMM GRANULOCYTES NFR BLD AUTO: 1 % — HIGH (ref 0–0.9)
LYMPHOCYTES # BLD AUTO: 2.29 K/UL — SIGNIFICANT CHANGE UP (ref 1–3.3)
LYMPHOCYTES # BLD AUTO: 33.2 % — SIGNIFICANT CHANGE UP (ref 13–44)
MCHC RBC-ENTMCNC: 31.3 PG — SIGNIFICANT CHANGE UP (ref 27–34)
MCHC RBC-ENTMCNC: 35.2 G/DL — SIGNIFICANT CHANGE UP (ref 32–36)
MCV RBC AUTO: 89 FL — SIGNIFICANT CHANGE UP (ref 80–100)
MONOCYTES # BLD AUTO: 1.2 K/UL — HIGH (ref 0–0.9)
MONOCYTES NFR BLD AUTO: 17.4 % — HIGH (ref 2–14)
NEUTROPHILS # BLD AUTO: 3.09 K/UL — SIGNIFICANT CHANGE UP (ref 1.8–7.4)
NEUTROPHILS NFR BLD AUTO: 44.9 % — SIGNIFICANT CHANGE UP (ref 43–77)
NRBC # BLD: 0 /100 WBCS — SIGNIFICANT CHANGE UP (ref 0–0)
PLATELET # BLD AUTO: 254 K/UL — SIGNIFICANT CHANGE UP (ref 150–400)
RBC # BLD: 3.26 M/UL — LOW (ref 3.8–5.2)
RBC # FLD: 14.8 % — HIGH (ref 10.3–14.5)
WBC # BLD: 6.89 K/UL — SIGNIFICANT CHANGE UP (ref 3.8–10.5)
WBC # FLD AUTO: 6.89 K/UL — SIGNIFICANT CHANGE UP (ref 3.8–10.5)

## 2024-06-20 PROCEDURE — G2211 COMPLEX E/M VISIT ADD ON: CPT | Mod: NC,1L

## 2024-06-20 PROCEDURE — 99215 OFFICE O/P EST HI 40 MIN: CPT

## 2024-06-20 NOTE — ASSESSMENT
[FreeTextEntry1] : THAIS DALTON is a 63 year old woman with PMH of HTN and HLD, who presents for Hematology follow up of IgG lambda multiple myeloma.   1. IgG lambda multiple myeloma:  - Diagnosis: She was first noted to have IgG lambda monoclonal gammopathy in 2014 and was recommended to undergo a bone marrow biopsy, which she declined. She re-established care at [facility] with Dr. Leiva in 2019 for persistent monoclonal gammopathy. She was noted to have rising paraprotein markers in 9/2023: M-spike 2.0, IgG 2818, K/L 0.56. No anemia, renal dysfunction, or hypercalcemia. IR-guided bone marrow biopsy on 1/11/24 showed 40% plasma cells. FISH showed 3 copies of FGFR3 (5.5%) and 3 copies of CCND1 (5.5%). Normal karyotype. Lytic lesions noted on PET/CT from 2/29/24. - Staging: R-ISS I (low risk) - Treatment plan: Rosalind-VRd per the phase III PERSEUS trial, with Velcade dosing modified to weekly to minimize toxicity. Will plan for 4 cycles of induction therapy, followed by auto-SCT. Daratumumab SQ weekly x 8 (C1-2), then biweekly x 8 (C3-6), then monthly (C7+) Velcade SQ 1.3 mg/m2 D1, 8, 15 Revlimid PO 25 mg D1-21 Pre-medications: dexamethasone 20 mg, Tylenol 650 mg, Benadryl 25 mg C1D1 3/14/24, C2D1 4/11/24, C3D1 5/9/24, C4D1 6/6/24. Today is C4D15. - Follow up with BMT on 6/25/24. She is planned for stem cell collection after C4. - Repeat bone marrow biopsy for restaging after C4. - Monitor MM labs (SPEP, RALEIGH, free light chains, immunoglobulins) monthly - Monitor CBC and CMP with treatment   2. Bones: PET/CT on 2/29/24 showed focal hypermetabolic skeletal lucencies within L1 (2.3 x 1.4 cm, SUV 7.4) and the RIGHT iliac bone (SUV 5.7). - Vitamin D: 29.1 (3/14/24). Continue 1000 units daily.  - Anti-resorptive therapy: She recently obtained dental clearance. Will start Zometa post-transplant.    3. Kidney:  - Creatinine: 0.89 (6/6/24) - UPEP/RALEIGH: positive for IgG lambda band (1/31/24); Bence Rubio protein negative (6/6/24) - Avoid nephrotoxic agents   4. Hematology: She has mild anemia likely from therapy. - CBC today: 6.89 > 10.2 < 254 - Monitor CBC with treatment   5. Peripheral neuropathy: She reports mild neuropathy at baseline. - Continue to monitor with Velcade treatment   6. VTE: No history of VTE. - Continue Eliquis 2.5 mg BID for prophylaxis with Revlimid treatment (allergic to aspirin)   7. Infections: She has immunoparesis (low IgA and IgM). No recent or recurrent infections. - IgG level: 645 (6/6/24) - Continue acyclovir 400 mg BID for antiviral prophylaxis   8. Amyloidosis: Not suspected. Bone marrow negative for Congo red staining.

## 2024-06-20 NOTE — HISTORY OF PRESENT ILLNESS
[de-identified] : 6/20/24: She has been having heart burn for a week. She already tried peptobismol, and tums but didn't help. She will try pepcid next. If not improved, will consider PPI. She still feels fatigue, tired but no change in mood /sleep. Weight hasn't changed much. Appetite is good. Today is her last treatment. She has PET scan approved and will get it 2 weeks after this treatment. She is also planned for repeat BM bx. She is seeing Dr. Ricks next week. She has been taking acyclovir and eliquis (for dvt ppx). However, since she's stopping revlimid, will stop eliquis 1 week after stopping revlimid. [de-identified] : THAIS DALTON is a 63 year woman with PMH of HTN and HLD, who presents for Hematology follow up of IgG lambda multiple myeloma.  Diagnosis: - She initially established care with Hematology in 2014 for IgG lambda monoclonal gammopathy (M-spike 1.8, IgG 1930, K/L 0.44). She went to both [facility] (Dr. Ramírez) and [place]; both recommended a bone marrow biopsy, which she declined. Routine labs in 2019 showed persistent monoclonal gammopathy, and she re-established care at [facility] with Dr. Leiva. Her labs showed M-spike 1.1, IgG 1872, K/L 1.14. UPEP showed a weak IgG lambda band. No anemia, renal dysfunction, or hypercalcemia. Skeletal survey in 11/2020 did not show any lytic lesions.  - She continued to follow up every 6 months and was noted to have rising paraprotein markers in 9/2023: M-spike 2.0, IgG 2818, K/L 0.56. No anemia, renal dysfunction, or hypercalcemia. IR-guided bone marrow biopsy on 1/11/24 showed 40% plasma cells. FISH showed 3 copies of FGFR3 (5.5%) and 3 copies of CCND1 (5.5%). PET/CT on 2/29/24 showed focal hypermetabolic skeletal lucencies within L1 (2.3 x 1.4 cm, SUV 7.4) and the RIGHT iliac bone (SUV 5.7), consistent with a new MM diagnosis.  Treatment History: - She started quadruplet therapy (Rosalind-VRd) on 3/14/24.   Interval History: - She returns today for C4D15 of Rosalind-VRd. Her MM markers from C4D1 (6/6/24) are improving compared to C1D1: M-spike 0.2 vs. 2.1, IgG 645 vs. 2759, K/L 1.28 vs. 0.32.  - She reports heartburn for 1 week. She tried Pepto Bismol without success and will try Pepcid next. She still feels fatigue but no change in mood/sleep. Weight hasn't changed much. Appetite is good. - She will see Dr. Carney (BMT) next week, with plans to stop treatment and obtain repeat PET/CT and bone marrow biopsy for restaging.  Family History: - Father: bladder cancer - Paternal aunt and mother: renal cancer   Social History: - Lives with her  - Homemaker - Denies tobacco, alcohol, or drug use.

## 2024-06-25 ENCOUNTER — APPOINTMENT (OUTPATIENT)
Dept: HEMATOLOGY ONCOLOGY | Facility: CLINIC | Age: 64
End: 2024-06-25
Payer: COMMERCIAL

## 2024-06-25 VITALS
OXYGEN SATURATION: 98 % | RESPIRATION RATE: 15 BRPM | HEART RATE: 107 BPM | DIASTOLIC BLOOD PRESSURE: 76 MMHG | TEMPERATURE: 98.2 F | BODY MASS INDEX: 27.6 KG/M2 | WEIGHT: 139.33 LBS | SYSTOLIC BLOOD PRESSURE: 147 MMHG

## 2024-06-25 DIAGNOSIS — Z01.818 ENCOUNTER FOR OTHER PREPROCEDURAL EXAMINATION: ICD-10-CM

## 2024-06-25 DIAGNOSIS — C90.00 MULTIPLE MYELOMA NOT HAVING ACHIEVED REMISSION: ICD-10-CM

## 2024-06-25 PROCEDURE — 99214 OFFICE O/P EST MOD 30 MIN: CPT

## 2024-06-26 ENCOUNTER — RX RENEWAL (OUTPATIENT)
Age: 64
End: 2024-06-26

## 2024-06-26 PROBLEM — C90.00 MULTIPLE MYELOMA NOT HAVING ACHIEVED REMISSION: Status: ACTIVE | Noted: 2024-03-01

## 2024-06-26 PROBLEM — Z01.818 ENCOUNTER FOR PRE-TRANSPLANT EVALUATION FOR STEM CELL TRANSPLANT: Status: ACTIVE | Noted: 2024-05-22

## 2024-06-26 RX ORDER — LENALIDOMIDE 25 MG/1
25 CAPSULE ORAL
Qty: 21 | Refills: 0 | Status: ACTIVE | COMMUNITY
Start: 2024-05-01 | End: 1900-01-01

## 2024-07-03 ENCOUNTER — APPOINTMENT (OUTPATIENT)
Dept: INFUSION THERAPY | Facility: HOSPITAL | Age: 64
End: 2024-07-03

## 2024-07-03 ENCOUNTER — APPOINTMENT (OUTPATIENT)
Dept: HEMATOLOGY ONCOLOGY | Facility: CLINIC | Age: 64
End: 2024-07-03

## 2024-07-11 ENCOUNTER — APPOINTMENT (OUTPATIENT)
Dept: INFUSION THERAPY | Facility: HOSPITAL | Age: 64
End: 2024-07-11

## 2024-07-11 ENCOUNTER — APPOINTMENT (OUTPATIENT)
Dept: HEMATOLOGY ONCOLOGY | Facility: CLINIC | Age: 64
End: 2024-07-11

## 2024-07-12 ENCOUNTER — APPOINTMENT (OUTPATIENT)
Dept: NUCLEAR MEDICINE | Facility: IMAGING CENTER | Age: 64
End: 2024-07-12
Payer: COMMERCIAL

## 2024-07-12 ENCOUNTER — OUTPATIENT (OUTPATIENT)
Dept: OUTPATIENT SERVICES | Facility: HOSPITAL | Age: 64
LOS: 1 days | End: 2024-07-12
Payer: COMMERCIAL

## 2024-07-12 DIAGNOSIS — Z00.8 ENCOUNTER FOR OTHER GENERAL EXAMINATION: ICD-10-CM

## 2024-07-12 DIAGNOSIS — C90.00 MULTIPLE MYELOMA NOT HAVING ACHIEVED REMISSION: ICD-10-CM

## 2024-07-12 PROCEDURE — 78816 PET IMAGE W/CT FULL BODY: CPT | Mod: 26,PS

## 2024-07-12 PROCEDURE — 78816 PET IMAGE W/CT FULL BODY: CPT

## 2024-07-12 PROCEDURE — A9552: CPT

## 2024-07-16 ENCOUNTER — OUTPATIENT (OUTPATIENT)
Dept: OUTPATIENT SERVICES | Facility: HOSPITAL | Age: 64
LOS: 1 days | Discharge: HOME | End: 2024-07-16
Payer: COMMERCIAL

## 2024-07-16 DIAGNOSIS — E88.09 OTHER DISORDERS OF PLASMA-PROTEIN METABOLISM, NOT ELSEWHERE CLASSIFIED: ICD-10-CM

## 2024-07-16 RX ORDER — FILGRASTIM-SNDZ 300 UG/.5ML
300 INJECTION, SOLUTION INTRAVENOUS; SUBCUTANEOUS
Qty: 10 | Refills: 0 | Status: DISCONTINUED | COMMUNITY
Start: 2024-07-16 | End: 2024-07-16

## 2024-07-17 ENCOUNTER — LABORATORY RESULT (OUTPATIENT)
Age: 64
End: 2024-07-17

## 2024-07-17 ENCOUNTER — APPOINTMENT (OUTPATIENT)
Dept: HEMATOLOGY ONCOLOGY | Facility: CLINIC | Age: 64
End: 2024-07-17

## 2024-07-17 ENCOUNTER — RESULT REVIEW (OUTPATIENT)
Age: 64
End: 2024-07-17

## 2024-07-17 ENCOUNTER — OUTPATIENT (OUTPATIENT)
Dept: OUTPATIENT SERVICES | Facility: HOSPITAL | Age: 64
LOS: 1 days | End: 2024-07-17

## 2024-07-17 DIAGNOSIS — C90.00 MULTIPLE MYELOMA NOT HAVING ACHIEVED REMISSION: ICD-10-CM

## 2024-07-17 LAB
BASOPHILS # BLD AUTO: 0.02 K/UL — SIGNIFICANT CHANGE UP (ref 0–0.2)
BASOPHILS NFR BLD AUTO: 0.3 % — SIGNIFICANT CHANGE UP (ref 0–2)
EOSINOPHIL # BLD AUTO: 0.06 K/UL — SIGNIFICANT CHANGE UP (ref 0–0.5)
EOSINOPHIL NFR BLD AUTO: 0.8 % — SIGNIFICANT CHANGE UP (ref 0–6)
HCT VFR BLD CALC: 35.7 % — SIGNIFICANT CHANGE UP (ref 34.5–45)
HGB BLD-MCNC: 12.2 G/DL — SIGNIFICANT CHANGE UP (ref 11.5–15.5)
IMM GRANULOCYTES NFR BLD AUTO: 0.4 % — SIGNIFICANT CHANGE UP (ref 0–0.9)
LYMPHOCYTES # BLD AUTO: 3.53 K/UL — HIGH (ref 1–3.3)
LYMPHOCYTES # BLD AUTO: 45.5 % — HIGH (ref 13–44)
MCHC RBC-ENTMCNC: 30.7 PG — SIGNIFICANT CHANGE UP (ref 27–34)
MCHC RBC-ENTMCNC: 34.2 G/DL — SIGNIFICANT CHANGE UP (ref 32–36)
MCV RBC AUTO: 89.9 FL — SIGNIFICANT CHANGE UP (ref 80–100)
MONOCYTES # BLD AUTO: 0.68 K/UL — SIGNIFICANT CHANGE UP (ref 0–0.9)
MONOCYTES NFR BLD AUTO: 8.8 % — SIGNIFICANT CHANGE UP (ref 2–14)
NEUTROPHILS # BLD AUTO: 3.44 K/UL — SIGNIFICANT CHANGE UP (ref 1.8–7.4)
NEUTROPHILS NFR BLD AUTO: 44.2 % — SIGNIFICANT CHANGE UP (ref 43–77)
NRBC # BLD: 0 /100 WBCS — SIGNIFICANT CHANGE UP (ref 0–0)
NRBC BLD-RTO: 0 /100 WBCS — SIGNIFICANT CHANGE UP (ref 0–0)
PLATELET # BLD AUTO: 276 K/UL — SIGNIFICANT CHANGE UP (ref 150–400)
RBC # BLD: 3.97 M/UL — SIGNIFICANT CHANGE UP (ref 3.8–5.2)
RBC # FLD: 14.6 % — HIGH (ref 10.3–14.5)
WBC # BLD: 7.76 K/UL — SIGNIFICANT CHANGE UP (ref 3.8–10.5)
WBC # FLD AUTO: 7.76 K/UL — SIGNIFICANT CHANGE UP (ref 3.8–10.5)

## 2024-07-17 PROCEDURE — 93010 ELECTROCARDIOGRAM REPORT: CPT

## 2024-07-17 RX ORDER — FILGRASTIM-AAFI 300 UG/.5ML
300 INJECTION, SOLUTION SUBCUTANEOUS
Qty: 10 | Refills: 0 | Status: ACTIVE | COMMUNITY
Start: 2024-07-16 | End: 1900-01-01

## 2024-07-18 ENCOUNTER — APPOINTMENT (OUTPATIENT)
Dept: INFUSION THERAPY | Facility: HOSPITAL | Age: 64
End: 2024-07-18

## 2024-07-18 ENCOUNTER — APPOINTMENT (OUTPATIENT)
Dept: HEMATOLOGY ONCOLOGY | Facility: CLINIC | Age: 64
End: 2024-07-18

## 2024-07-19 ENCOUNTER — OUTPATIENT (OUTPATIENT)
Dept: OUTPATIENT SERVICES | Facility: HOSPITAL | Age: 64
LOS: 1 days | End: 2024-07-19
Payer: COMMERCIAL

## 2024-07-19 ENCOUNTER — APPOINTMENT (OUTPATIENT)
Dept: CT IMAGING | Facility: HOSPITAL | Age: 64
End: 2024-07-19

## 2024-07-19 ENCOUNTER — RESULT REVIEW (OUTPATIENT)
Age: 64
End: 2024-07-19

## 2024-07-19 DIAGNOSIS — C90.00 MULTIPLE MYELOMA NOT HAVING ACHIEVED REMISSION: ICD-10-CM

## 2024-07-19 DIAGNOSIS — Z00.00 ENCOUNTER FOR GENERAL ADULT MEDICAL EXAMINATION WITHOUT ABNORMAL FINDINGS: ICD-10-CM

## 2024-07-19 PROCEDURE — 88341 IMHCHEM/IMCYTCHM EA ADD ANTB: CPT

## 2024-07-19 PROCEDURE — 88365 INSITU HYBRIDIZATION (FISH): CPT

## 2024-07-19 PROCEDURE — 77012 CT SCAN FOR NEEDLE BIOPSY: CPT | Mod: 26

## 2024-07-19 PROCEDURE — 88305 TISSUE EXAM BY PATHOLOGIST: CPT | Mod: 26

## 2024-07-19 PROCEDURE — 88271 CYTOGENETICS DNA PROBE: CPT

## 2024-07-19 PROCEDURE — 88275 CYTOGENETICS 100-300: CPT

## 2024-07-19 PROCEDURE — 88184 FLOWCYTOMETRY/ TC 1 MARKER: CPT

## 2024-07-19 PROCEDURE — 88264 CHROMOSOME ANALYSIS 20-25: CPT

## 2024-07-19 PROCEDURE — 88313 SPECIAL STAINS GROUP 2: CPT

## 2024-07-19 PROCEDURE — 88342 IMHCHEM/IMCYTCHM 1ST ANTB: CPT | Mod: 26

## 2024-07-19 PROCEDURE — 88291 CYTO/MOLECULAR REPORT: CPT

## 2024-07-19 PROCEDURE — 38221 DX BONE MARROW BIOPSIES: CPT | Mod: RT

## 2024-07-19 PROCEDURE — 77012 CT SCAN FOR NEEDLE BIOPSY: CPT

## 2024-07-19 PROCEDURE — 88237 TISSUE CULTURE BONE MARROW: CPT

## 2024-07-19 PROCEDURE — 38221 DX BONE MARROW BIOPSIES: CPT

## 2024-07-19 PROCEDURE — 88305 TISSUE EXAM BY PATHOLOGIST: CPT

## 2024-07-19 PROCEDURE — 88341 IMHCHEM/IMCYTCHM EA ADD ANTB: CPT | Mod: 26

## 2024-07-19 PROCEDURE — 87205 SMEAR GRAM STAIN: CPT

## 2024-07-19 PROCEDURE — 88313 SPECIAL STAINS GROUP 2: CPT | Mod: 26

## 2024-07-19 PROCEDURE — 88185 FLOWCYTOMETRY/TC ADD-ON: CPT

## 2024-07-19 PROCEDURE — 88364 INSITU HYBRIDIZATION (FISH): CPT

## 2024-07-19 PROCEDURE — 88365 INSITU HYBRIDIZATION (FISH): CPT | Mod: 26

## 2024-07-19 PROCEDURE — 88342 IMHCHEM/IMCYTCHM 1ST ANTB: CPT

## 2024-07-19 PROCEDURE — 88364 INSITU HYBRIDIZATION (FISH): CPT | Mod: 26

## 2024-07-19 PROCEDURE — 88280 CHROMOSOME KARYOTYPE STUDY: CPT

## 2024-07-22 LAB — G6PD SER-CCNC: 16.8 U/G HGB

## 2024-07-25 LAB
CHROM ANALY INTERPHASE BLD FISH-IMP: SIGNIFICANT CHANGE UP
HEMATOPATHOLOGY REPORT: SIGNIFICANT CHANGE UP
TM INTERPRETATION: SIGNIFICANT CHANGE UP

## 2024-07-26 ENCOUNTER — RESULT REVIEW (OUTPATIENT)
Age: 64
End: 2024-07-26

## 2024-07-26 ENCOUNTER — APPOINTMENT (OUTPATIENT)
Dept: HEMATOLOGY ONCOLOGY | Facility: CLINIC | Age: 64
End: 2024-07-26
Payer: COMMERCIAL

## 2024-07-26 ENCOUNTER — APPOINTMENT (OUTPATIENT)
Dept: HEMATOLOGY ONCOLOGY | Facility: CLINIC | Age: 64
End: 2024-07-26

## 2024-07-26 VITALS
SYSTOLIC BLOOD PRESSURE: 131 MMHG | BODY MASS INDEX: 27.52 KG/M2 | TEMPERATURE: 97.7 F | DIASTOLIC BLOOD PRESSURE: 78 MMHG | OXYGEN SATURATION: 99 % | RESPIRATION RATE: 13 BRPM | HEART RATE: 78 BPM | WEIGHT: 138.89 LBS

## 2024-07-26 DIAGNOSIS — Z01.818 ENCOUNTER FOR OTHER PREPROCEDURAL EXAMINATION: ICD-10-CM

## 2024-07-26 DIAGNOSIS — C90.00 MULTIPLE MYELOMA NOT HAVING ACHIEVED REMISSION: ICD-10-CM

## 2024-07-26 LAB
ALBUMIN SERPL ELPH-MCNC: 4.6 G/DL
ALP BLD-CCNC: 93 U/L
ALT SERPL-CCNC: 13 U/L
ANION GAP SERPL CALC-SCNC: 13 MMOL/L
APTT BLD: 29.1 SEC
AST SERPL-CCNC: 21 U/L
BILIRUB SERPL-MCNC: 0.4 MG/DL
BUN SERPL-MCNC: 36 MG/DL
CALCIUM SERPL-MCNC: 10.1 MG/DL
CHLORIDE SERPL-SCNC: 101 MMOL/L
CO2 SERPL-SCNC: 24 MMOL/L
CREAT SERPL-MCNC: 0.98 MG/DL
EGFR: 64 ML/MIN/1.73M2
GLUCOSE SERPL-MCNC: 126 MG/DL
INR PPP: 0.96 RATIO
LDH SERPL-CCNC: 155 U/L
MAGNESIUM SERPL-MCNC: 2 MG/DL
PHOSPHATE SERPL-MCNC: 3.4 MG/DL
POTASSIUM SERPL-SCNC: 3.7 MMOL/L
PROT SERPL-MCNC: 6.7 G/DL
PT BLD: 11 SEC
SODIUM SERPL-SCNC: 137 MMOL/L

## 2024-07-26 PROCEDURE — 99213 OFFICE O/P EST LOW 20 MIN: CPT

## 2024-07-26 NOTE — REVIEW OF SYSTEMS
[Fatigue] : fatigue [Negative] : Integumentary [Fever] : no fever [Abdominal Pain] : no abdominal pain [Vomiting] : no vomiting [Constipation] : no constipation [FreeTextEntry7] : diarrhea

## 2024-07-26 NOTE — ASSESSMENT
[FreeTextEntry1] : 65 yo female with newly diagnosed multiple myeloma presenting for a follow up visit to discuss Auto-HSCT.   Discussed extensively with patient prognosis of her illness and the goal of Auto-HSCT to be providing PFS & OS benefit which would decrease her exposure to chemotherapy over time. Discussed that 5 year overall survival for patients undergoing auto transplant with VGPR in multiple myeloma is 79% vs 68% in patients with MI or less. Discussed that auto-HSCT is SOC for newly diagnosed Multiple myeloma as it provides deeper remission and longer duration of remission but is not curative.   Discussed Auto-HSCT procedure including apheresis (collection of patients cells) using G-CSF and plexiform followed by hospital admission typically of 14 days until engraftment. Discussed high dose chemotherapy with melphalan and auto stem cell rescue.   Discussed possible adverse events like mucositis, alopecia, infections.  Discussed lifestyle modifications necessary for decreasing risk of infections and adverse events.   Discussed post transplant maintenance treatment with Daratumumab versus Revlimid. This is to be discussed with Dr Patel.  Plan to repeat bone marrow biopsy after cycle 4 and move forward with autologous stem cell transplant.    I saw the patient myself.  I performed the history and PE.  I reviewed and confirmed the above note.  The patient has now completed 4 cycles of therapy. Her most recent studies are consistent with VGPR. I discussed with patient again the expected outcomes following autologous HSCT and alternative therapies.  I answered her questions.  The plan is to repeat her BM studies and proceed with her pre-collection work-up.  I spent a total of 30 minutes reviewing the records and seeing and counseling the patient.  TIFFANIE Mc MD  7/26/24: Patient presents today for growth factor teaching in preparation for stem cell mobilization.  CBC stable. Blood transfusion consent obtained today. Justyna will receive nivestym 600 mcg SQ daily 7/28/24- 8/1/24.  Shiley catheter will be placed on 7/31/24 and mozobil is scheduled on 7/31/24. Stem cell collection will begin on 8/1/24. Mozobil is scheduled on 8/1/24.  Explained to patient how to self administer nivestym injections. Potentional side effects explained.  Recommended tylenol prn and claritin daily 7/28/24-8/1/24. Avoid advil, aleve and aspirin. Questions and concerns addressed. Reassurance provided. Follow up on 7/31/24 for shiley catheter placement mozobil.

## 2024-07-26 NOTE — REASON FOR VISIT
[Follow-Up Visit] : a follow-up visit for [Spouse] : spouse [FreeTextEntry2] : IgG lambda Multiple Myeloma

## 2024-07-27 LAB
ALBUMIN MFR SERPL ELPH: 62.3 %
ALBUMIN SERPL-MCNC: 4 G/DL
ALBUMIN/GLOB SERPL: 1.7 RATIO
ALPHA1 GLOB MFR SERPL ELPH: 4.6 %
ALPHA1 GLOB SERPL ELPH-MCNC: 0.3 G/DL
ALPHA2 GLOB MFR SERPL ELPH: 12 %
ALPHA2 GLOB SERPL ELPH-MCNC: 0.8 G/DL
B-GLOBULIN MFR SERPL ELPH: 12.1 %
B-GLOBULIN SERPL ELPH-MCNC: 0.8 G/DL
GAMMA GLOB FLD ELPH-MCNC: 0.6 G/DL
GAMMA GLOB MFR SERPL ELPH: 9 %
INTERPRETATION SERPL IEP-IMP: NORMAL
M PROTEIN MFR SERPL ELPH: 1.9 %
M PROTEIN SPEC IFE-MCNC: NORMAL
MONOCLON BAND OBS SERPL: 0.1 G/DL
PROT SERPL-MCNC: 6.4 G/DL
PROT SERPL-MCNC: 6.4 G/DL

## 2024-07-31 ENCOUNTER — NON-APPOINTMENT (OUTPATIENT)
Age: 64
End: 2024-07-31

## 2024-07-31 ENCOUNTER — OUTPATIENT (OUTPATIENT)
Dept: OUTPATIENT SERVICES | Facility: HOSPITAL | Age: 64
LOS: 1 days | End: 2024-07-31
Payer: COMMERCIAL

## 2024-07-31 ENCOUNTER — APPOINTMENT (OUTPATIENT)
Dept: INFUSION THERAPY | Facility: HOSPITAL | Age: 64
End: 2024-07-31

## 2024-07-31 ENCOUNTER — TRANSCRIPTION ENCOUNTER (OUTPATIENT)
Age: 64
End: 2024-07-31

## 2024-07-31 VITALS
DIASTOLIC BLOOD PRESSURE: 57 MMHG | HEIGHT: 61 IN | SYSTOLIC BLOOD PRESSURE: 125 MMHG | OXYGEN SATURATION: 100 % | RESPIRATION RATE: 18 BRPM | TEMPERATURE: 98 F | WEIGHT: 136.91 LBS | HEART RATE: 109 BPM

## 2024-07-31 VITALS
SYSTOLIC BLOOD PRESSURE: 102 MMHG | DIASTOLIC BLOOD PRESSURE: 57 MMHG | HEART RATE: 67 BPM | OXYGEN SATURATION: 97 % | RESPIRATION RATE: 15 BRPM

## 2024-07-31 DIAGNOSIS — Z49.01 ENCOUNTER FOR FITTING AND ADJUSTMENT OF EXTRACORPOREAL DIALYSIS CATHETER: ICD-10-CM

## 2024-07-31 DIAGNOSIS — Z94.84 STEM CELLS TRANSPLANT STATUS: ICD-10-CM

## 2024-07-31 DIAGNOSIS — C90.00 MULTIPLE MYELOMA NOT HAVING ACHIEVED REMISSION: ICD-10-CM

## 2024-07-31 PROCEDURE — C1750: CPT

## 2024-07-31 PROCEDURE — C1894: CPT

## 2024-07-31 PROCEDURE — 77001 FLUOROGUIDE FOR VEIN DEVICE: CPT | Mod: 26

## 2024-07-31 PROCEDURE — 36558 INSERT TUNNELED CV CATH: CPT

## 2024-07-31 PROCEDURE — C1769: CPT

## 2024-07-31 PROCEDURE — 36558 INSERT TUNNELED CV CATH: CPT | Mod: RT

## 2024-07-31 PROCEDURE — 76937 US GUIDE VASCULAR ACCESS: CPT | Mod: 26

## 2024-07-31 PROCEDURE — 76937 US GUIDE VASCULAR ACCESS: CPT

## 2024-07-31 PROCEDURE — 77001 FLUOROGUIDE FOR VEIN DEVICE: CPT

## 2024-07-31 NOTE — H&P ADULT - HISTORY OF PRESENT ILLNESS
Interventional Radiology    HPI: 63 yo female with multiple myeloma presenting for a 14Fr double lumen tunneled catheter for Auto-HSCT. On Eliquis, last taken 7/4.    Review of Systems:   Constitutional: No fever, weight loss, or fatigue    Allergies: salicylates (Anaphylaxis)  latex (Unknown)    Medications (Abx/Cardiac/Anticoagulation/Blood Products)      Data:  Physical Exam  General: No acute distress, nontoxic, A&Ox3  Chest: Non labored breathing    RADIOLOGY & ADDITIONAL TESTS:    Imaging Reviewed    Plan: 63 yo female with multiple myeloma presenting for a 14Fr double lumen tunneled catheter for Auto-HSCT.     -Risks/Benefits/alternatives explained with the patient and  and witnessed informed consent obtained.

## 2024-07-31 NOTE — ASU DISCHARGE PLAN (ADULT/PEDIATRIC) - ASU DC SPECIAL INSTRUCTIONSFT
Discharge Instructions  - You have had a double lumen catheter implanted in your chest.   - The catheter is ready for use.  - You may shower in 48 hours. No soaking or swimming for 2 weeks or until the site is completely healed.  - Keep the area covered and dry for the next 7 days. It may be removed by a chemotherapy nurse as needed for treatment.  - Do not perform any heavy lifting or put tension on the area for the next week or until the site is healed.  - You may resume your normal diet.  - You may resume your normal medications however you should wait 48 hours before restarting aspirin, plavix, or blood thinners.  - It is normal to experience some pain over the site for the next few days. You may take apply ice to the area (20 minutes on, 20 minutes off) and take Tylenol for that pain. Do not take more frequently than every 6 hours and do not exceed more than 3000mg of Tylenol in a 24 hour period.    - You were given conscious sedation which may make you drowsy, therefore you need someone to stay with you until the morning following the procedure.  - Do not drive, engage in heavy lifting or strenuous activity, or drink any alcoholic beverages for the next 24 hours.   - You may resume normal activity in 24 hours.    Notify your primary physician and/or Interventional Radiology IMMEDIATELY if you experience any of the following       - Fever of 101F or 38C       - Chills or Rigors/ Shakes       - Swelling and/or Redness in the area around the port       - Worsening Pain       - Blood soaked bandages or worsening bleeding       - Lightheadedness and/or dizziness upon standing       - Chest Pain/ Tightness       - Shortness of Breath       - Difficulty walking    If you have a problem that you believe requires IMMEDIATE attention, please go to your NEAREST Emergency Room. If you believe your problem can safely wait until you speak to a physician, please call Interventional Radiology for any concerns.    During Normal Weekday Business Hours- You can contact the Interventional Radiology department during normal business hours via telephone.  During Evenings and Weekends- If you need to contact Interventional Radiology during off hours, do so by calling the hospital and requesting to be connected to the Interventional Radiologist on call.

## 2024-07-31 NOTE — ASU PATIENT PROFILE, ADULT - FALL HARM RISK - UNIVERSAL INTERVENTIONS
Bed in lowest position, wheels locked, appropriate side rails in place/Call bell, personal items and telephone in reach/Instruct patient to call for assistance before getting out of bed or chair/Non-slip footwear when patient is out of bed/[place] to call system/Physically safe environment - no spills, clutter or unnecessary equipment/Purposeful Proactive Rounding/Room/bathroom lighting operational, light cord in reach

## 2024-07-31 NOTE — ASU DISCHARGE PLAN (ADULT/PEDIATRIC) - NS MD DC FALL RISK RISK
For information on Fall & Injury Prevention, visit: [email or web address] OR  [email or web address] OR  https://www.cdc.gov/steadi/patient.html

## 2024-07-31 NOTE — PROCEDURE NOTE - PROCEDURE FINDINGS AND DETAILS
- 14Fr right double lumen tunneled catheter  - Tip at the cavoatrial junction  - Each lumen flushed with 2.5cc 100u/mL heparin  - Catheter okay to use

## 2024-07-31 NOTE — ASU DISCHARGE PLAN (ADULT/PEDIATRIC) - NURSING INSTRUCTIONS
Please feel free to contact us at [phone] if any problems arise. After 6PM, Monday through Friday, on weekends and on holidays, please call [phone] and ask for the radiology resident on call to be paged.

## 2024-08-01 ENCOUNTER — APPOINTMENT (OUTPATIENT)
Dept: INFUSION THERAPY | Facility: HOSPITAL | Age: 64
End: 2024-08-01

## 2024-08-01 ENCOUNTER — OUTPATIENT (OUTPATIENT)
Dept: OUTPATIENT SERVICES | Facility: HOSPITAL | Age: 64
LOS: 1 days | End: 2024-08-01
Payer: COMMERCIAL

## 2024-08-01 DIAGNOSIS — C90.00 MULTIPLE MYELOMA NOT HAVING ACHIEVED REMISSION: ICD-10-CM

## 2024-08-01 DIAGNOSIS — Z51.89 ENCOUNTER FOR OTHER SPECIFIED AFTERCARE: ICD-10-CM

## 2024-08-01 LAB
CA-I BLD-SCNC: 1.31 MMOL/L — SIGNIFICANT CHANGE UP (ref 1.15–1.33)
CD34 CELLS # FLD: 75 CELLS/UL — SIGNIFICANT CHANGE UP
CD34 VIABILITY: 94 % — SIGNIFICANT CHANGE UP
EOSINOPHIL NFR BLD AUTO: 2 — SIGNIFICANT CHANGE UP
HCT VFR BLD CALC: 34.7 % — SIGNIFICANT CHANGE UP (ref 34.5–45)
HGB BLD-MCNC: 11.6 G/DL — SIGNIFICANT CHANGE UP (ref 11.5–15.5)
LYMPHOCYTES # BLD AUTO: 22 % — SIGNIFICANT CHANGE UP (ref 13–44)
MCHC RBC-ENTMCNC: 30.8 PG — SIGNIFICANT CHANGE UP (ref 27–34)
MCHC RBC-ENTMCNC: 33.4 GM/DL — SIGNIFICANT CHANGE UP (ref 32–36)
MCV RBC AUTO: 92 FL — SIGNIFICANT CHANGE UP (ref 80–100)
METAMYELOCYTES # FLD: 3 % — HIGH (ref 0–0)
MONOCYTES NFR BLD AUTO: 14 % — SIGNIFICANT CHANGE UP (ref 2–14)
NEUTROPHILS NFR BLD AUTO: 52 % — SIGNIFICANT CHANGE UP (ref 43–77)
NEUTS BAND # BLD: 7 % — SIGNIFICANT CHANGE UP (ref 0–8)
NRBC # BLD: 0 /100 WBCS — SIGNIFICANT CHANGE UP (ref 0–0)
PLAT MORPH BLD: NORMAL — SIGNIFICANT CHANGE UP
PLATELET # BLD AUTO: 240 K/UL — SIGNIFICANT CHANGE UP (ref 150–400)
RBC # BLD: 3.77 M/UL — LOW (ref 3.8–5.2)
RBC # FLD: 15.1 % — HIGH (ref 10.3–14.5)
RBC BLD AUTO: SIGNIFICANT CHANGE UP
WBC # BLD: 77.45 K/UL — CRITICAL HIGH (ref 3.8–10.5)
WBC # FLD AUTO: 77.45 K/UL — CRITICAL HIGH (ref 3.8–10.5)

## 2024-08-01 PROCEDURE — 82330 ASSAY OF CALCIUM: CPT

## 2024-08-01 PROCEDURE — 38206 HARVEST AUTO STEM CELLS: CPT

## 2024-08-01 PROCEDURE — P9047: CPT

## 2024-08-01 PROCEDURE — 86367 STEM CELLS TOTAL COUNT: CPT

## 2024-08-01 PROCEDURE — 86970 RBC PRETX INCUBATJ W/CHEMICL: CPT

## 2024-08-01 PROCEDURE — 86901 BLOOD TYPING SEROLOGIC RH(D): CPT

## 2024-08-01 PROCEDURE — 85027 COMPLETE CBC AUTOMATED: CPT

## 2024-08-01 PROCEDURE — 86900 BLOOD TYPING SEROLOGIC ABO: CPT

## 2024-08-01 PROCEDURE — 85007 BL SMEAR W/DIFF WBC COUNT: CPT

## 2024-08-01 PROCEDURE — 86850 RBC ANTIBODY SCREEN: CPT

## 2024-08-02 LAB — CHROM ANALY OVERALL INTERP SPEC-IMP: SIGNIFICANT CHANGE UP

## 2024-08-08 ENCOUNTER — NON-APPOINTMENT (OUTPATIENT)
Age: 64
End: 2024-08-08

## 2024-08-08 ENCOUNTER — APPOINTMENT (OUTPATIENT)
Dept: INFUSION THERAPY | Facility: HOSPITAL | Age: 64
End: 2024-08-08

## 2024-08-09 ENCOUNTER — OUTPATIENT (OUTPATIENT)
Dept: OUTPATIENT SERVICES | Facility: HOSPITAL | Age: 64
LOS: 1 days | End: 2024-08-09

## 2024-08-09 ENCOUNTER — TRANSCRIPTION ENCOUNTER (OUTPATIENT)
Age: 64
End: 2024-08-09

## 2024-08-09 ENCOUNTER — NON-APPOINTMENT (OUTPATIENT)
Age: 64
End: 2024-08-09

## 2024-08-09 VITALS
HEIGHT: 60 IN | HEART RATE: 11 BPM | SYSTOLIC BLOOD PRESSURE: 147 MMHG | DIASTOLIC BLOOD PRESSURE: 62 MMHG | RESPIRATION RATE: 18 BRPM | OXYGEN SATURATION: 99 % | WEIGHT: 134.92 LBS

## 2024-08-09 DIAGNOSIS — C90.00 MULTIPLE MYELOMA NOT HAVING ACHIEVED REMISSION: ICD-10-CM

## 2024-08-09 PROCEDURE — 36589 REMOVAL TUNNELED CV CATH: CPT | Mod: 78

## 2024-08-09 PROCEDURE — 36589 REMOVAL TUNNELED CV CATH: CPT

## 2024-08-09 RX ORDER — FILGRASTIM 300 UG/ML
600 INJECTION, SOLUTION INTRAVENOUS; SUBCUTANEOUS
Refills: 0 | DISCHARGE

## 2024-08-09 RX ORDER — LOSARTAN POTASSIUM 50 MG/1
1 TABLET, FILM COATED ORAL
Refills: 0 | DISCHARGE

## 2024-08-09 RX ORDER — VALSARTAN 40 MG/1
1 TABLET, FILM COATED ORAL
Refills: 0 | DISCHARGE

## 2024-08-09 RX ORDER — ACYCLOVIR 800 MG
1 TABLET ORAL
Refills: 0 | DISCHARGE

## 2024-08-09 NOTE — PRE PROCEDURE NOTE - PRE PROCEDURE EVALUATION
Interventional Radiology    HPI: 64 year old Female with Multiple myeloma with a 14fr double lumen catheter placed in IR on 7/31/24. Patient now complaining of chest wall pain and soreness at insertion site. Patient here for Tunneled HD catheter evaluation and removal.    Allergies: salicylates (Anaphylaxis)  latex (Unknown)    Medications (Abx/Cardiac/Anticoagulation/Blood Products)      Data:    T(C): --  HR: --  BP: --  RR: --  SpO2: --    Exam  General: No acute distress  Chest: Non labored breathing  Abdomen: Non-distended  Extremities: No swelling, warm    Plan: 64y Female presents for Tunneled HD catheter removal  -Risks/Benefits/alternatives explained with the patient and/or healthcare proxy and witnessed informed consent obtained.

## 2024-08-09 NOTE — ASU DISCHARGE PLAN (ADULT/PEDIATRIC) - MODE OF TRANSPORTATION
The patient has been examined and the H&P has been reviewed:    I concur with the findings and no changes have occurred since H&P was written.    Surgery risks, benefits and alternative options discussed and understood by patient/family.          There are no hospital problems to display for this patient.     Ambulatory

## 2024-08-09 NOTE — ASU PATIENT PROFILE, ADULT - ACCEPTABLE
Subjective:     Chief Complaint   Patient presents with   • Follow-Up       Jessica Espinoza is a 60 y.o. female here today for evaluation and management of:    Pneumonia of left upper lobe due to infectious organism  Patient was seen on 2/15/17 at [facility]. She had a chest x-ray that showed a left upper lobe consolidation consistent with pneumonia. She was started on erythromycin. Patient reports that her symptoms have improved although she is still coughing and feeling quite fatigued. She still has some rhinorrhea. She denies any shortness of breath.    Pulmonary nodule  CT in July showed:  1.  Noncalcified pulmonary nodule in the right lower lobe posterior to an area of eventration in the right hemidiaphragm measuring 9.8 mm.    2.  Noncalcified nodule in the right middle lobe measuring 5.1 mm.  She has not had the repeat CT scan yet.    Chronic kidney disease, stage III (moderate)  Patient has persistent chronic kidney disease. She is seeing the nephrologist. She is avoiding NSAIDs.         Allergies   Allergen Reactions   • Nkda [No Known Drug Allergy]        Current medicines (including changes today)  Current Outpatient Prescriptions   Medication Sig Dispense Refill   • amoxicillin-clavulanate (AUGMENTIN) 875-125 MG Tab Take 1 Tab by mouth 2 times a day. 20 Tab 0   • donepezil (ARICEPT) 5 MG Tab Take 1 Tab by mouth every evening. 1 tab daily x 3 weeks then 2 tabs daily 3060 Tab 3   • alprazolam (XANAX XR) 1 MG XR tablet Take 1 mg by mouth every morning.     • quetiapine (SEROQUEL XR) 300 MG XR tablet Take 300 mg by mouth every evening.     • venlafaxine (EFFEXOR XR) 150 MG extended-release capsule Take 150 mg by mouth every day.     • gabapentin (NEURONTIN) 100 MG Cap Take 100 mg by mouth 3 times a day.     • baclofen (LIORESAL) 10 MG Tab Take 10 mg by mouth 3 times a day.     • zolpidem (AMBIEN CR) 6.25 MG CR tablet Take 6.25 mg by mouth at bedtime as needed for Sleep.     • azithromycin  (ZITHROMAX) 250 MG Tab Take 1 pack as directed 1 Quantity Sufficient 0   • hydrocodone/acetaminophen (NORCO)  MG Tab Take 2 Tabs by mouth 4 times a day.     • Buprenorphine 7.5 MCG/HR PATCH WEEKLY Apply 1 Patch to skin as directed every day.       No current facility-administered medications for this visit.       She  has a past medical history of Ectopic pregnancy; Hernia of unspecified site of abdominal cavity without mention of obstruction or gangrene; Heart murmur; Arrhythmia; Pneumonia (AUGUST 2007); Pain; Psychiatric disorder; benign breast biopsy; Psoriatic arthritis mutilans (CMS-HCC); Anxiety; Depression; and Alcohol abuse. She also has no past medical history of Breast cancer (CMS-HCC).    Patient Active Problem List    Diagnosis Date Noted   • Pneumonia of left upper lobe due to infectious organism 02/27/2017   • Memory loss 10/04/2016   • Altered level of consciousness 10/04/2016   • URTI (infection of the upper respiratory tract) 08/31/2016   • Anemia 08/31/2016   • Hypercalcemia 06/28/2016   • Chronic kidney disease, stage III (moderate) 06/28/2016   • Hyperglycemia 06/28/2016   • Vitamin D deficiency 06/28/2016   • Hyperlipidemia 06/28/2016   • Polypharmacy 06/28/2016   • Pulmonary nodule 06/28/2016   • Psoriatic arthritis (CMS-HCC) 05/16/2016   • Encounter for screening mammogram for breast cancer 05/16/2016   • Screening for thyroid disorder 05/16/2016   • Chronic narcotic use 05/16/2016   • Chronic prescription benzodiazepine use 05/16/2016   • Generalized anxiety disorder 05/16/2016   • Depression 05/16/2016   • Family history of breast cancer in mother 05/16/2016   • Weakness 08/26/2012   • Intractable nausea and vomiting 08/26/2012       ROS   No fever or chills.  No nausea or vomiting.  No chest pain or palpitations.  No cough or SOB.  No pain with urination or hematuria.  No black or bloody stools.       Objective:     Blood pressure 124/82, pulse 124, temperature 36.8 °C (98.3 °F),  "height 1.626 m (5' 4.02\"), weight 73.483 kg (162 lb), SpO2 93 %, not currently breastfeeding. Body mass index is 27.79 kg/(m^2).   Physical Exam:  Well developed, well nourished.  Alert, oriented in no acute distress.  Eye contact is good, speech goal directed, affect calm  Eyes: conjunctiva non-injected, sclera non-icteric.  Ears: Pinna normal. TM pearly gray.   Nose: Nares are patent. Erythematous mucosa with yellow discharge  Mouth: Oral mucous membranes pink and moist with no lesions. Slight yellow postnasal drainage   Neck Supple.  No adenopathy or masses in the neck or supraclavicular regions. Rhonchi in left lower lobe  Lungs: clear to auscultation bilaterally with good excursion. No wheezes or rhonchi  CV: regular rate and rhythm. No murmur  Abdomen: soft, nontender, no masses or organomegaly.  No rebound or gaurding  Ext: no edema, color normal, vascularity normal, temperature normal          Assessment and Plan:   The following treatment plan was discussed    1. Pneumonia of left upper lobe due to infectious organism  Augmentin twice a day for 10 days. Increase fluids and rest. CT of the chest 2 weeks after she clears.  - amoxicillin-clavulanate (AUGMENTIN) 875-125 MG Tab; Take 1 Tab by mouth 2 times a day.  Dispense: 20 Tab; Refill: 0  - CT-CHEST (THORAX) W/O; Future    2. Pulmonary nodule  CT of the chest to follow-up nodule  - CT-CHEST (THORAX) W/O; Future    3. Chronic kidney disease, stage III (moderate)  Patient cannot have  secondary to renal dysfunction.  - CT-CHEST (THORAX) W/O; Future    Any change or worsening of signs or symptoms, patient encouraged to follow-up or report to the emergency room for further evaluation. Patient understands and agrees.    Followup: Return in about 2 weeks (around 3/8/2017).             " 2

## 2024-08-09 NOTE — ASU DISCHARGE PLAN (ADULT/PEDIATRIC) - NURSING INSTRUCTIONS
Please feel free to contact us at [phone] if any problems arise. After 6PM, Monday through Friday, on weekends and on holidays, please call [phone] and ask for the radiology resident on call to be paged.

## 2024-08-09 NOTE — PROCEDURE NOTE - PROCEDURE FINDINGS AND DETAILS
Initial fluoroscopic image demonstrates as expected catheter with tip in SVC.    The procedure and process of tunneled catheter removal was explained to the patient, and the patient agreed to proceed with the removal.     The patient was placed in supine positioning. The dressing was removed and the catheter was prepped in usual fashion with chlorhexidine. The sutures were removed.     1% lidocaine was administered at the catheter site, and along the tunnel.     The right internal jugular tunneled central venous catheter was removed in trendelenburg positioning without difficulties or complications. Firm pressure was held at site for 5-10 minutes. Hemostasis was achieved.     A clean dressing was applied. The catheter site remained clean, dry and intact throughout the removal. There was no evidence of active bleeding or hematoma.

## 2024-08-09 NOTE — ASU PATIENT PROFILE, ADULT - NSICDXPASTMEDICALHX_GEN_ALL_CORE_FT
PAST MEDICAL HISTORY:  Cholelithiasis H/O    History of Tonsillectomy     Migraine     Multiple myeloma     S/P Biopsy Endometrial     Uterine Leiomyoma      PAST MEDICAL HISTORY:  Cholelithiasis H/O    High cholesterol     History of Tonsillectomy     Hypertension     Migraine     Multiple myeloma     S/P Biopsy Endometrial     Uterine Leiomyoma

## 2024-08-09 NOTE — ASU PREOP CHECKLIST - ASSESSMENT, HISTORY & PHYSICAL COMPLETED AND ON MEDICAL RECORD
Norco      Last Written Prescription Date:  09/15/21  Last Fill Quantity: 60,   # refills: 0  Last Office Visit: 09/24/21  Future Office visit:         Spiriva      Last Written Prescription Date:  09/15/21  Last Fill Quantity: 4g,   # refills: 0  Last Office Visit: 09/24/21  Future Office visit:       Symbicort      Last Written Prescription Date:  09/15/21  Last Fill Quantity: 10.2g,   # refills: 0  Last Office Visit: 09/24/21  Future Office visit:              done

## 2024-08-09 NOTE — ASU DISCHARGE PLAN (ADULT/PEDIATRIC) - ASU DC SPECIAL INSTRUCTIONSFT
Central venous catheter Removal    Discharge Instructions  - You have had a central venous catheter removed.  - You may shower in 48 hours. No soaking or swimming until the site is completely healed.  - Keep the area covered and dry for the next 48 hours.  - Do not perform any heavy lifting for the next few days or until the site is healed.  - You may resume your normal diet.  - It is normal to experience some pain over the site for the next few days. You may take apply ice to the area (20 minutes on, 20 minutes off) and take Tylenol for that pain. Do not take more frequently than every 6 hours and do not exceed more than 3000mg of Tylenol in a 24 hour period.    Notify your primary physician and/or Interventional Radiology IMMEDIATELY if you experience any of the following       - Fever of 100.4F or 38C       - Chills or Rigors/ Shakes       - Swelling and/or Redness in the area around the catheter removal site       - Worsening Pain       - Blood soaked bandages or worsening bleeding       - Lightheadedness and/or dizziness upon standing       - Chest Pain/ Tightness       - Shortness of Breath       - Difficulty walking    If you have a problem that you believe requires IMMEDIATE attention, please go to your NEAREST Emergency Room. If you believe your problem can safely wait until you speak to a physician, please call Interventional Radiology for any concerns.  Please feel free to contact us at [phone] if any problems arise. After 6PM, Monday through Friday, on weekends and on holidays, please call [phone] and ask for the radiology resident on call to be paged.

## 2024-08-14 ENCOUNTER — RESULT REVIEW (OUTPATIENT)
Age: 64
End: 2024-08-14

## 2024-08-14 ENCOUNTER — APPOINTMENT (OUTPATIENT)
Dept: HEMATOLOGY ONCOLOGY | Facility: CLINIC | Age: 64
End: 2024-08-14
Payer: COMMERCIAL

## 2024-08-14 ENCOUNTER — LABORATORY RESULT (OUTPATIENT)
Age: 64
End: 2024-08-14

## 2024-08-14 ENCOUNTER — APPOINTMENT (OUTPATIENT)
Dept: INFUSION THERAPY | Facility: HOSPITAL | Age: 64
End: 2024-08-14

## 2024-08-14 VITALS
RESPIRATION RATE: 16 BRPM | BODY MASS INDEX: 27.24 KG/M2 | TEMPERATURE: 97.2 F | SYSTOLIC BLOOD PRESSURE: 139 MMHG | OXYGEN SATURATION: 99 % | HEART RATE: 82 BPM | HEIGHT: 59.57 IN | DIASTOLIC BLOOD PRESSURE: 80 MMHG | WEIGHT: 136.91 LBS

## 2024-08-14 DIAGNOSIS — Z01.818 ENCOUNTER FOR OTHER PREPROCEDURAL EXAMINATION: ICD-10-CM

## 2024-08-14 DIAGNOSIS — C90.00 MULTIPLE MYELOMA NOT HAVING ACHIEVED REMISSION: ICD-10-CM

## 2024-08-14 LAB
ALBUMIN SERPL ELPH-MCNC: 4.6 G/DL — SIGNIFICANT CHANGE UP (ref 3.3–5)
ALP SERPL-CCNC: 106 U/L — SIGNIFICANT CHANGE UP (ref 40–120)
ALT FLD-CCNC: 13 U/L — SIGNIFICANT CHANGE UP (ref 10–45)
ANION GAP SERPL CALC-SCNC: 13 MMOL/L — SIGNIFICANT CHANGE UP (ref 5–17)
AST SERPL-CCNC: 21 U/L — SIGNIFICANT CHANGE UP (ref 10–40)
BILIRUB SERPL-MCNC: 0.6 MG/DL — SIGNIFICANT CHANGE UP (ref 0.2–1.2)
BUN SERPL-MCNC: 37 MG/DL — HIGH (ref 7–23)
CALCIUM SERPL-MCNC: 10.7 MG/DL — HIGH (ref 8.4–10.5)
CHLORIDE SERPL-SCNC: 98 MMOL/L — SIGNIFICANT CHANGE UP (ref 96–108)
CO2 SERPL-SCNC: 24 MMOL/L — SIGNIFICANT CHANGE UP (ref 22–31)
CREAT SERPL-MCNC: 0.98 MG/DL — SIGNIFICANT CHANGE UP (ref 0.5–1.3)
EGFR: 64 ML/MIN/1.73M2 — SIGNIFICANT CHANGE UP
EGFR: 64 ML/MIN/1.73M2 — SIGNIFICANT CHANGE UP
GLUCOSE SERPL-MCNC: 110 MG/DL — HIGH (ref 70–99)
LDH SERPL L TO P-CCNC: 160 U/L — SIGNIFICANT CHANGE UP (ref 50–242)
MAGNESIUM SERPL-MCNC: 2 MG/DL — SIGNIFICANT CHANGE UP (ref 1.6–2.6)
PHOSPHATE SERPL-MCNC: 2.8 MG/DL — SIGNIFICANT CHANGE UP (ref 2.5–4.5)
POTASSIUM SERPL-MCNC: 4 MMOL/L — SIGNIFICANT CHANGE UP (ref 3.5–5.3)
POTASSIUM SERPL-SCNC: 4 MMOL/L — SIGNIFICANT CHANGE UP (ref 3.5–5.3)
PROT SERPL-MCNC: 6.8 G/DL — SIGNIFICANT CHANGE UP (ref 6–8.3)
SODIUM SERPL-SCNC: 136 MMOL/L — SIGNIFICANT CHANGE UP (ref 135–145)

## 2024-08-14 PROCEDURE — 99214 OFFICE O/P EST MOD 30 MIN: CPT

## 2024-08-14 RX ORDER — LORAZEPAM 0.5 MG/1
0.5 TABLET ORAL
Qty: 1 | Refills: 0 | Status: ACTIVE | COMMUNITY
Start: 2024-08-14 | End: 1900-01-01

## 2024-08-15 NOTE — PHYSICAL EXAM
[Restricted in physically strenuous activity but ambulatory and able to carry out work of a light or sedentary nature] : Status 1- Restricted in physically strenuous activity but ambulatory and able to carry out work of a light or sedentary nature, e.g., light house work, office work [Normal] : affect appropriate [de-identified] : ecchymosis to left chest

## 2024-08-15 NOTE — PHYSICAL EXAM
[Restricted in physically strenuous activity but ambulatory and able to carry out work of a light or sedentary nature] : Status 1- Restricted in physically strenuous activity but ambulatory and able to carry out work of a light or sedentary nature, e.g., light house work, office work [Normal] : affect appropriate [de-identified] : ecchymosis to left chest

## 2024-08-15 NOTE — ASSESSMENT
[FreeTextEntry1] : 62 yo female with newly diagnosed multiple myeloma presenting for a follow up visit to discuss admission for Auto-HSCT 8/22/24.  Discussed extensively with patient prognosis of her illness and the goal of Auto-HSCT to be providing PFS & OS benefit which would decrease her exposure to chemotherapy over time. Discussed that 5 year overall survival for patients undergoing auto transplant with VGPR in multiple myeloma is 79% vs 68% in patients with OH or less. Discussed that auto-HSCT is SOC for newly diagnosed Multiple myeloma as it provides deeper remission and longer duration of remission but is not curative.   Discussed Auto-HSCT procedure including apheresis (collection of patients cells) using G-CSF and plexiform followed by hospital admission typically of 14 days until engraftment. Discussed high dose chemotherapy with melphalan and auto stem cell rescue.   Discussed possible adverse events like mucositis, alopecia, infections.  Discussed lifestyle modifications necessary for decreasing risk of infections and adverse events.   Discussed post transplant maintenance treatment with Daratumumab versus Revlimid. This is to be discussed with Dr Patel.   I saw the patient myself.  I performed the history and PE.  I reviewed and confirmed the above note.  The patient is doing well. She has a bruise at her previous catheter site.  Today, we spoke about her upcoming transplant. I answered her questions. I will review her work-up and finalize her clearance to proceed. I will generate a preadmit note.  I will present her to the multidiciplinary meeting next Monday.  I spent a total of 30 minutes reviewing the records and seeing and counseling the patient.  TIFFANIE Mc MD

## 2024-08-15 NOTE — ASSESSMENT
[FreeTextEntry1] : 64 yo female with newly diagnosed multiple myeloma presenting for a follow up visit to discuss admission for Auto-HSCT 8/22/24.  Discussed extensively with patient prognosis of her illness and the goal of Auto-HSCT to be providing PFS & OS benefit which would decrease her exposure to chemotherapy over time. Discussed that 5 year overall survival for patients undergoing auto transplant with VGPR in multiple myeloma is 79% vs 68% in patients with CT or less. Discussed that auto-HSCT is SOC for newly diagnosed Multiple myeloma as it provides deeper remission and longer duration of remission but is not curative.   Discussed Auto-HSCT procedure including apheresis (collection of patients cells) using G-CSF and plexiform followed by hospital admission typically of 14 days until engraftment. Discussed high dose chemotherapy with melphalan and auto stem cell rescue.   Discussed possible adverse events like mucositis, alopecia, infections.  Discussed lifestyle modifications necessary for decreasing risk of infections and adverse events.   Discussed post transplant maintenance treatment with Daratumumab versus Revlimid. This is to be discussed with Dr Patel.   I saw the patient myself.  I performed the history and PE.  I reviewed and confirmed the above note.  The patient is doing well. She has a bruise at her previous catheter site.  Today, we spoke about her upcoming transplant. I answered her questions. I will review her work-up and finalize her clearance to proceed. I will generate a preadmit note.  I will present her to the multidiciplinary meeting next Monday.  I spent a total of 30 minutes reviewing the records and seeing and counseling the patient.  TIFFANIE Mc MD

## 2024-08-15 NOTE — HISTORY OF PRESENT ILLNESS
[de-identified] : 8/14/24: Patient denies rashes, fevers, mouth sores, no lower extremity edema, no SOB, chest pain. She endorses discomfort where her catheter was removed on the left chest.  [de-identified] :   Patient is starting conditioning in preparation for allogeneic HSC transplant for treatment of MM   The planned conditioning regimen is: Cynthia 200mg/m2   The indication per ASTCT guidelines is: S   In terms of disease staging, the patient has/has not received treatment and achieved disease status at transplant VGPR   The Karnofsky performance status is: 90% Comorbid conditions: A-HTN    The patient has been counseled with regard to fertility issues: N/A With regard to contraception and uterine bleeding prevention during post-transplant period, the issues has been addressed: N/A   I have signed the checklist. I have verified that the allogeneic source of HSCT has been secured and cleared. The rationale to proceed has been discussed with the patient. The expected morbidity and mortality were discussed. The expected outcomes have been discussed. The patient understands the needs for compliance. The informed consent has been obtained. The insurance preauthorization has been obtained. [facility] database consent was obtained.

## 2024-08-15 NOTE — HISTORY OF PRESENT ILLNESS
[de-identified] : 8/14/24: Patient denies rashes, fevers, mouth sores, no lower extremity edema, no SOB, chest pain. She endorses discomfort where her catheter was removed on the left chest.  [de-identified] :   Patient is starting conditioning in preparation for allogeneic HSC transplant for treatment of MM   The planned conditioning regimen is: Cynthia 200mg/m2   The indication per ASTCT guidelines is: S   In terms of disease staging, the patient has/has not received treatment and achieved disease status at transplant VGPR   The Karnofsky performance status is: 90% Comorbid conditions: A-HTN    The patient has been counseled with regard to fertility issues: N/A With regard to contraception and uterine bleeding prevention during post-transplant period, the issues has been addressed: N/A   I have signed the checklist. I have verified that the allogeneic source of HSCT has been secured and cleared. The rationale to proceed has been discussed with the patient. The expected morbidity and mortality were discussed. The expected outcomes have been discussed. The patient understands the needs for compliance. The informed consent has been obtained. The insurance preauthorization has been obtained. [facility] database consent was obtained.

## 2024-08-15 NOTE — HISTORY OF PRESENT ILLNESS
[de-identified] : 8/14/24: Patient denies rashes, fevers, mouth sores, no lower extremity edema, no SOB, chest pain. She endorses discomfort where her catheter was removed on the left chest.  [de-identified] :   Patient is starting conditioning in preparation for allogeneic HSC transplant for treatment of MM   The planned conditioning regimen is: Cynthia 200mg/m2   The indication per ASTCT guidelines is: S   In terms of disease staging, the patient has/has not received treatment and achieved disease status at transplant VGPR   The Karnofsky performance status is: 90% Comorbid conditions: A-HTN    The patient has been counseled with regard to fertility issues: N/A With regard to contraception and uterine bleeding prevention during post-transplant period, the issues has been addressed: N/A   I have signed the checklist. I have verified that the allogeneic source of HSCT has been secured and cleared. The rationale to proceed has been discussed with the patient. The expected morbidity and mortality were discussed. The expected outcomes have been discussed. The patient understands the needs for compliance. The informed consent has been obtained. The insurance preauthorization has been obtained. [facility] database consent was obtained.

## 2024-08-15 NOTE — ASSESSMENT
[FreeTextEntry1] : 62 yo female with newly diagnosed multiple myeloma presenting for a follow up visit to discuss admission for Auto-HSCT 8/22/24.  Discussed extensively with patient prognosis of her illness and the goal of Auto-HSCT to be providing PFS & OS benefit which would decrease her exposure to chemotherapy over time. Discussed that 5 year overall survival for patients undergoing auto transplant with VGPR in multiple myeloma is 79% vs 68% in patients with UT or less. Discussed that auto-HSCT is SOC for newly diagnosed Multiple myeloma as it provides deeper remission and longer duration of remission but is not curative.   Discussed Auto-HSCT procedure including apheresis (collection of patients cells) using G-CSF and plexiform followed by hospital admission typically of 14 days until engraftment. Discussed high dose chemotherapy with melphalan and auto stem cell rescue.   Discussed possible adverse events like mucositis, alopecia, infections.  Discussed lifestyle modifications necessary for decreasing risk of infections and adverse events.   Discussed post transplant maintenance treatment with Daratumumab versus Revlimid. This is to be discussed with Dr Patel.   I saw the patient myself.  I performed the history and PE.  I reviewed and confirmed the above note.  The patient is doing well. She has a bruise at her previous catheter site.  Today, we spoke about her upcoming transplant. I answered her questions. I will review her work-up and finalize her clearance to proceed. I will generate a preadmit note.  I will present her to the multidiciplinary meeting next Monday.  I spent a total of 30 minutes reviewing the records and seeing and counseling the patient.  TIFFANIE Mc MD

## 2024-08-15 NOTE — PHYSICAL EXAM
[Restricted in physically strenuous activity but ambulatory and able to carry out work of a light or sedentary nature] : Status 1- Restricted in physically strenuous activity but ambulatory and able to carry out work of a light or sedentary nature, e.g., light house work, office work [Normal] : affect appropriate [de-identified] : ecchymosis to left chest

## 2024-08-20 ENCOUNTER — APPOINTMENT (OUTPATIENT)
Dept: HEMATOLOGY ONCOLOGY | Facility: CLINIC | Age: 64
End: 2024-08-20

## 2024-08-20 ENCOUNTER — APPOINTMENT (OUTPATIENT)
Dept: INFUSION THERAPY | Facility: HOSPITAL | Age: 64
End: 2024-08-20

## 2024-08-21 ENCOUNTER — RX RENEWAL (OUTPATIENT)
Age: 64
End: 2024-08-21

## 2024-08-22 ENCOUNTER — INPATIENT (INPATIENT)
Facility: HOSPITAL | Age: 64
LOS: 21 days | Discharge: HOME | DRG: 16 | End: 2024-09-13
Attending: INTERNAL MEDICINE | Admitting: INTERNAL MEDICINE
Payer: COMMERCIAL

## 2024-08-22 VITALS
SYSTOLIC BLOOD PRESSURE: 150 MMHG | OXYGEN SATURATION: 99 % | HEART RATE: 90 BPM | HEIGHT: 59.84 IN | WEIGHT: 137.13 LBS | RESPIRATION RATE: 18 BRPM | DIASTOLIC BLOOD PRESSURE: 70 MMHG | TEMPERATURE: 97 F

## 2024-08-22 DIAGNOSIS — Z29.9 ENCOUNTER FOR PROPHYLACTIC MEASURES, UNSPECIFIED: ICD-10-CM

## 2024-08-22 DIAGNOSIS — C90.00 MULTIPLE MYELOMA NOT HAVING ACHIEVED REMISSION: ICD-10-CM

## 2024-08-22 DIAGNOSIS — Z94.84 STEM CELLS TRANSPLANT STATUS: ICD-10-CM

## 2024-08-22 LAB
ALBUMIN SERPL ELPH-MCNC: 4.4 G/DL — SIGNIFICANT CHANGE UP (ref 3.3–5)
ALP SERPL-CCNC: 90 U/L — SIGNIFICANT CHANGE UP (ref 40–120)
ALT FLD-CCNC: 11 U/L — SIGNIFICANT CHANGE UP (ref 10–45)
ANION GAP SERPL CALC-SCNC: 12 MMOL/L — SIGNIFICANT CHANGE UP (ref 5–17)
APPEARANCE UR: CLEAR — SIGNIFICANT CHANGE UP
APTT BLD: 31.2 SEC — SIGNIFICANT CHANGE UP (ref 24.5–35.6)
AST SERPL-CCNC: 15 U/L — SIGNIFICANT CHANGE UP (ref 10–40)
BASOPHILS # BLD AUTO: 0 K/UL — SIGNIFICANT CHANGE UP (ref 0–0.2)
BASOPHILS NFR BLD AUTO: 0 % — SIGNIFICANT CHANGE UP (ref 0–2)
BILIRUB DIRECT SERPL-MCNC: <0.1 MG/DL — SIGNIFICANT CHANGE UP (ref 0–0.3)
BILIRUB INDIRECT FLD-MCNC: >0.2 MG/DL — SIGNIFICANT CHANGE UP (ref 0.2–1)
BILIRUB SERPL-MCNC: 0.3 MG/DL — SIGNIFICANT CHANGE UP (ref 0.2–1.2)
BILIRUB UR-MCNC: NEGATIVE — SIGNIFICANT CHANGE UP
BUN SERPL-MCNC: 44 MG/DL — HIGH (ref 7–23)
CALCIUM SERPL-MCNC: 9.7 MG/DL — SIGNIFICANT CHANGE UP (ref 8.4–10.5)
CHLORIDE SERPL-SCNC: 99 MMOL/L — SIGNIFICANT CHANGE UP (ref 96–108)
CO2 SERPL-SCNC: 26 MMOL/L — SIGNIFICANT CHANGE UP (ref 22–31)
COLOR SPEC: YELLOW — SIGNIFICANT CHANGE UP
CREAT SERPL-MCNC: 0.99 MG/DL — SIGNIFICANT CHANGE UP (ref 0.5–1.3)
DIFF PNL FLD: NEGATIVE — SIGNIFICANT CHANGE UP
EGFR: 63 ML/MIN/1.73M2 — SIGNIFICANT CHANGE UP
EOSINOPHIL # BLD AUTO: 0.08 K/UL — SIGNIFICANT CHANGE UP (ref 0–0.5)
EOSINOPHIL NFR BLD AUTO: 2 % — SIGNIFICANT CHANGE UP (ref 0–6)
GLUCOSE SERPL-MCNC: 125 MG/DL — HIGH (ref 70–99)
GLUCOSE UR QL: NEGATIVE MG/DL — SIGNIFICANT CHANGE UP
HCT VFR BLD CALC: 32.5 % — LOW (ref 34.5–45)
HGB BLD-MCNC: 10.7 G/DL — LOW (ref 11.5–15.5)
IMM GRANULOCYTES NFR BLD AUTO: 0.5 % — SIGNIFICANT CHANGE UP (ref 0–0.9)
INR BLD: 1.08 RATIO — SIGNIFICANT CHANGE UP (ref 0.85–1.18)
KETONES UR-MCNC: NEGATIVE MG/DL — SIGNIFICANT CHANGE UP
LDH SERPL L TO P-CCNC: 135 U/L — SIGNIFICANT CHANGE UP (ref 50–242)
LEUKOCYTE ESTERASE UR-ACNC: NEGATIVE — SIGNIFICANT CHANGE UP
LYMPHOCYTES # BLD AUTO: 1.57 K/UL — SIGNIFICANT CHANGE UP (ref 1–3.3)
LYMPHOCYTES # BLD AUTO: 38.7 % — SIGNIFICANT CHANGE UP (ref 13–44)
MAGNESIUM SERPL-MCNC: 1.9 MG/DL — SIGNIFICANT CHANGE UP (ref 1.6–2.6)
MCHC RBC-ENTMCNC: 30.5 PG — SIGNIFICANT CHANGE UP (ref 27–34)
MCHC RBC-ENTMCNC: 32.9 GM/DL — SIGNIFICANT CHANGE UP (ref 32–36)
MCV RBC AUTO: 92.6 FL — SIGNIFICANT CHANGE UP (ref 80–100)
MONOCYTES # BLD AUTO: 0.53 K/UL — SIGNIFICANT CHANGE UP (ref 0–0.9)
MONOCYTES NFR BLD AUTO: 13.1 % — SIGNIFICANT CHANGE UP (ref 2–14)
NEUTROPHILS # BLD AUTO: 1.86 K/UL — SIGNIFICANT CHANGE UP (ref 1.8–7.4)
NEUTROPHILS NFR BLD AUTO: 45.7 % — SIGNIFICANT CHANGE UP (ref 43–77)
NITRITE UR-MCNC: NEGATIVE — SIGNIFICANT CHANGE UP
NRBC # BLD: 0 /100 WBCS — SIGNIFICANT CHANGE UP (ref 0–0)
PH UR: 5.5 — SIGNIFICANT CHANGE UP (ref 5–8)
PHOSPHATE SERPL-MCNC: 2.8 MG/DL — SIGNIFICANT CHANGE UP (ref 2.5–4.5)
PLATELET # BLD AUTO: 249 K/UL — SIGNIFICANT CHANGE UP (ref 150–400)
POTASSIUM SERPL-MCNC: 3.3 MMOL/L — LOW (ref 3.5–5.3)
POTASSIUM SERPL-SCNC: 3.3 MMOL/L — LOW (ref 3.5–5.3)
PROT SERPL-MCNC: 6.7 G/DL — SIGNIFICANT CHANGE UP (ref 6–8.3)
PROT UR-MCNC: NEGATIVE MG/DL — SIGNIFICANT CHANGE UP
PROTHROM AB SERPL-ACNC: 11.3 SEC — SIGNIFICANT CHANGE UP (ref 9.5–13)
RBC # BLD: 3.51 M/UL — LOW (ref 3.8–5.2)
RBC # BLD: 3.51 M/UL — LOW (ref 3.8–5.2)
RBC # FLD: 14.5 % — SIGNIFICANT CHANGE UP (ref 10.3–14.5)
RETICS #: 44.9 K/UL — SIGNIFICANT CHANGE UP (ref 25–125)
RETICS/RBC NFR: 1.3 % — SIGNIFICANT CHANGE UP (ref 0.5–2.5)
SODIUM SERPL-SCNC: 137 MMOL/L — SIGNIFICANT CHANGE UP (ref 135–145)
SP GR SPEC: 1.01 — SIGNIFICANT CHANGE UP (ref 1–1.03)
UROBILINOGEN FLD QL: 0.2 MG/DL — SIGNIFICANT CHANGE UP (ref 0.2–1)
WBC # BLD: 4.06 K/UL — SIGNIFICANT CHANGE UP (ref 3.8–10.5)
WBC # FLD AUTO: 4.06 K/UL — SIGNIFICANT CHANGE UP (ref 3.8–10.5)

## 2024-08-22 PROCEDURE — 99223 1ST HOSP IP/OBS HIGH 75: CPT

## 2024-08-22 PROCEDURE — 77001 FLUOROGUIDE FOR VEIN DEVICE: CPT | Mod: 26

## 2024-08-22 PROCEDURE — 36556 INSERT NON-TUNNEL CV CATH: CPT

## 2024-08-22 PROCEDURE — 76937 US GUIDE VASCULAR ACCESS: CPT | Mod: 26

## 2024-08-22 PROCEDURE — 93010 ELECTROCARDIOGRAM REPORT: CPT

## 2024-08-22 RX ORDER — ONDANSETRON 2 MG/ML
16 INJECTION, SOLUTION INTRAMUSCULAR; INTRAVENOUS ONCE
Refills: 0 | Status: COMPLETED | OUTPATIENT
Start: 2024-08-22 | End: 2024-08-22

## 2024-08-22 RX ORDER — VALSARTAN 40 MG/1
80 TABLET ORAL DAILY
Refills: 0 | Status: DISCONTINUED | OUTPATIENT
Start: 2024-08-22 | End: 2024-09-03

## 2024-08-22 RX ORDER — FOSAPREPITANT DIMEGLUMINE 150 MG/5ML
150 INJECTION, POWDER, LYOPHILIZED, FOR SOLUTION INTRAVENOUS ONCE
Refills: 0 | Status: COMPLETED | OUTPATIENT
Start: 2024-08-22 | End: 2024-08-22

## 2024-08-22 RX ORDER — SODIUM BICARBONATE 84 MG/ML
10 INJECTION, SOLUTION INTRAVENOUS
Refills: 0 | Status: DISCONTINUED | OUTPATIENT
Start: 2024-08-22 | End: 2024-09-13

## 2024-08-22 RX ORDER — SODIUM CHLORIDE 9 MG/ML
1000 INJECTION INTRAMUSCULAR; INTRAVENOUS; SUBCUTANEOUS
Refills: 0 | Status: DISCONTINUED | OUTPATIENT
Start: 2024-08-23 | End: 2024-08-29

## 2024-08-22 RX ORDER — ACETAMINOPHEN 325 MG/1
650 TABLET ORAL EVERY 6 HOURS
Refills: 0 | Status: DISCONTINUED | OUTPATIENT
Start: 2024-08-22 | End: 2024-09-13

## 2024-08-22 RX ORDER — DEXAMETHASONE 0.75 MG
10 TABLET ORAL ONCE
Refills: 0 | Status: COMPLETED | OUTPATIENT
Start: 2024-08-22 | End: 2024-08-22

## 2024-08-22 RX ORDER — TIZANIDINE 4 MG/1
2 TABLET ORAL ONCE
Refills: 0 | Status: COMPLETED | OUTPATIENT
Start: 2024-08-22 | End: 2024-08-22

## 2024-08-22 RX ORDER — SODIUM CHLORIDE 9 MG/ML
500 INJECTION INTRAMUSCULAR; INTRAVENOUS; SUBCUTANEOUS
Refills: 0 | Status: COMPLETED | OUTPATIENT
Start: 2024-08-22 | End: 2024-08-22

## 2024-08-22 RX ORDER — PANTOPRAZOLE SODIUM 40 MG
40 TABLET, DELAYED RELEASE (ENTERIC COATED) ORAL
Refills: 0 | Status: DISCONTINUED | OUTPATIENT
Start: 2024-08-22 | End: 2024-09-13

## 2024-08-22 RX ORDER — SODIUM CHLORIDE 9 MG/ML
10 INJECTION INTRAMUSCULAR; INTRAVENOUS; SUBCUTANEOUS
Refills: 0 | Status: DISCONTINUED | OUTPATIENT
Start: 2024-08-22 | End: 2024-09-13

## 2024-08-22 RX ORDER — MELPHALAN 50 MG/10ML
147 INJECTION, POWDER, LYOPHILIZED, FOR SOLUTION INTRAVENOUS ONCE
Refills: 0 | Status: COMPLETED | OUTPATIENT
Start: 2024-08-22 | End: 2024-08-22

## 2024-08-22 RX ORDER — CHLORHEXIDINE GLUCONATE 40 MG/ML
1 SOLUTION TOPICAL
Refills: 0 | Status: DISCONTINUED | OUTPATIENT
Start: 2024-08-22 | End: 2024-09-13

## 2024-08-22 RX ORDER — PHYTONADIONE (VIT K1) 1 MG/0.5ML
5 AMPUL (ML) INJECTION
Refills: 0 | Status: DISCONTINUED | OUTPATIENT
Start: 2024-08-22 | End: 2024-09-13

## 2024-08-22 RX ORDER — POTASSIUM CHLORIDE 10 MEQ
40 TABLET, EXT RELEASE, PARTICLES/CRYSTALS ORAL ONCE
Refills: 0 | Status: COMPLETED | OUTPATIENT
Start: 2024-08-22 | End: 2024-08-22

## 2024-08-22 RX ORDER — DIPHENHYDRAMINE HCL 50 MG
25 CAPSULE ORAL ONCE
Refills: 0 | Status: COMPLETED | OUTPATIENT
Start: 2024-08-23 | End: 2024-08-23

## 2024-08-22 RX ORDER — ACETAMINOPHEN 325 MG/1
650 TABLET ORAL ONCE
Refills: 0 | Status: COMPLETED | OUTPATIENT
Start: 2024-08-23 | End: 2024-08-23

## 2024-08-22 RX ORDER — SALIVA SUBSTITUTE COMB NO.10
5 POWDER IN PACKET (EA) MUCOUS MEMBRANE
Refills: 0 | Status: DISCONTINUED | OUTPATIENT
Start: 2024-08-22 | End: 2024-09-13

## 2024-08-22 RX ADMIN — MELPHALAN 147 MILLIGRAM(S): 50 INJECTION, POWDER, LYOPHILIZED, FOR SOLUTION INTRAVENOUS at 14:32

## 2024-08-22 RX ADMIN — Medication 5 MILLIGRAM(S): at 11:55

## 2024-08-22 RX ADMIN — ACETAMINOPHEN 650 MILLIGRAM(S): 325 TABLET ORAL at 16:19

## 2024-08-22 RX ADMIN — TIZANIDINE 2 MILLIGRAM(S): 4 TABLET ORAL at 18:05

## 2024-08-22 RX ADMIN — Medication 5 MILLILITER(S): at 16:06

## 2024-08-22 RX ADMIN — Medication 800 MILLIGRAM(S): at 18:05

## 2024-08-22 RX ADMIN — ACETAMINOPHEN 650 MILLIGRAM(S): 325 TABLET ORAL at 10:41

## 2024-08-22 RX ADMIN — SODIUM BICARBONATE 10 MILLILITER(S): 84 INJECTION, SOLUTION INTRAVENOUS at 20:59

## 2024-08-22 RX ADMIN — SODIUM CHLORIDE 250 MILLILITER(S): 9 INJECTION INTRAMUSCULAR; INTRAVENOUS; SUBCUTANEOUS at 16:01

## 2024-08-22 RX ADMIN — SODIUM BICARBONATE 10 MILLILITER(S): 84 INJECTION, SOLUTION INTRAVENOUS at 16:06

## 2024-08-22 RX ADMIN — SODIUM CHLORIDE 250 MILLILITER(S): 9 INJECTION INTRAMUSCULAR; INTRAVENOUS; SUBCUTANEOUS at 12:00

## 2024-08-22 RX ADMIN — MELPHALAN 147 MILLIGRAM(S): 50 INJECTION, POWDER, LYOPHILIZED, FOR SOLUTION INTRAVENOUS at 15:06

## 2024-08-22 RX ADMIN — Medication 40 MILLIEQUIVALENT(S): at 16:40

## 2024-08-22 RX ADMIN — ACETAMINOPHEN 650 MILLIGRAM(S): 325 TABLET ORAL at 15:34

## 2024-08-22 RX ADMIN — ACETAMINOPHEN 650 MILLIGRAM(S): 325 TABLET ORAL at 11:30

## 2024-08-22 RX ADMIN — Medication 102 MILLIGRAM(S): at 13:59

## 2024-08-22 RX ADMIN — SODIUM BICARBONATE 10 MILLILITER(S): 84 INJECTION, SOLUTION INTRAVENOUS at 11:16

## 2024-08-22 RX ADMIN — ONDANSETRON 116 MILLIGRAM(S): 2 INJECTION, SOLUTION INTRAMUSCULAR; INTRAVENOUS at 13:29

## 2024-08-22 RX ADMIN — Medication 5 MILLILITER(S): at 11:16

## 2024-08-22 RX ADMIN — FOSAPREPITANT DIMEGLUMINE 300 MILLIGRAM(S): 150 INJECTION, POWDER, LYOPHILIZED, FOR SOLUTION INTRAVENOUS at 13:59

## 2024-08-22 RX ADMIN — Medication 5 MILLILITER(S): at 20:59

## 2024-08-22 RX ADMIN — Medication 40 MILLIGRAM(S): at 10:41

## 2024-08-22 NOTE — DIETITIAN INITIAL EVALUATION ADULT - PERTINENT MEDS FT
MEDICATIONS  (STANDING):  acyclovir   Oral Tab/Cap 800 milliGRAM(s) Oral every 12 hours  Biotene Dry Mouth Oral Rinse 5 milliLiter(s) Swish and Spit five times a day  chlorhexidine 4% Liquid 1 Application(s) Topical <User Schedule>  pantoprazole    Tablet 40 milliGRAM(s) Oral before breakfast  phytonadione   Solution 5 milliGRAM(s) Oral <User Schedule>  sodium bicarbonate Mouth Rinse 10 milliLiter(s) Swish and Spit five times a day  sodium chloride 0.9%. 500 milliLiter(s) (250 mL/Hr) IV Continuous <Continuous>  valsartan 80 milliGRAM(s) Oral daily    MEDICATIONS  (PRN):  acetaminophen     Tablet .. 650 milliGRAM(s) Oral every 6 hours PRN Temp greater or equal to 38C (100.4F), Mild Pain (1 - 3)  prochlorperazine   Injectable 10 milliGRAM(s) IntraMuscular every 6 hours PRN nausea  sodium chloride 0.9% lock flush 10 milliLiter(s) IV Push every 1 hour PRN Pre/post blood products, medications, blood draw, and to maintain line patency

## 2024-08-22 NOTE — DIETITIAN INITIAL EVALUATION ADULT - REASON INDICATOR FOR ASSESSMENT
seen for BMT admission. Information obtained from pt, RN, electronic medical record. Chart reviewed, events noted.

## 2024-08-22 NOTE — PATIENT PROFILE ADULT - STATED REASON FOR ADMISSION
As per pt, I went to my PCP, I told the Nurse about my cough and give me cough medicine. I did EKG and my heart Rate was in the 130's. I came back because I was having fever associated with Nausea.  Autologous stem cell transplant

## 2024-08-22 NOTE — PHARMACOTHERAPY INTERVENTION NOTE - COMMENTS
64-year-old female with PMH of HTN and IgG lambda multiple myeloma treated with Rosalind-RVD x4 cycles and admitted for autologous HSCT with melphalan 200 mg/m2 conditioning. Today is day -1. Performed a medication reconciliation below and assisted with writing paper chemotherapy orders.    Patient reports taking following medication at home:  - Acyclovir 400 mg PO BID (will be increasing dose to 800 mg PO BID)  - Fenofibrate 48 mg PO QD (will be holding during admission) 64-year-old female with PMH of HTN and IgG lambda multiple myeloma treated with Rosalind-RVD x4 cycles and admitted for autologous HSCT with melphalan 200 mg/m2 conditioning. Today is day -1. Performed a medication reconciliation below and assisted with writing paper chemotherapy orders.    Patient reports taking following medication at home:  - Acyclovir 400 mg PO BID (will be increasing dose to 800 mg PO BID)  - Fenofibrate 48 mg PO QD (will be holding during admission)  - Valsartan-HCTZ 80/12.5 mg PO QD (please hold HCTZ component and include hold parameters on BPs)  - Vitamin D3 2000 IU PO QD (will be holding)  - Famotidine 10 mg PO QPM (will be replacing with pantoprazole for AYALA ppx)  - Ondansetron 8 mg PO Q8H PRN (uses ~2 doses/day)    Patient reports allergy to:   - ASA/salicylates about ~25 years ago --> took and had lip swelling and has to go to ER  - Latex --> has break out/contact dermatitis whenever she uses latex gloves (will request to order Nivestym for GCSF support since zarxio has latex in stopper)    Pilar Isabel, PharmD, BCOP  Stem Cell Transplant Clinical Pharmacy Specialist  Available via [employer] Teams

## 2024-08-22 NOTE — DIETITIAN INITIAL EVALUATION ADULT - ENERGY INTAKE
Adequate (%) Pt reports good PO intake ~75% for lunch today, declines protein supplements, prefer to get protein-fortified smoothie (no diana protein-fortified smoothie). Pt made aware of menu ordering procedures in house with menu provided, encourage pt to order as needed to encourage PO intake while in house.

## 2024-08-22 NOTE — DIETITIAN INITIAL EVALUATION ADULT - EDUCATION DIETARY MODIFICATIONS
Emphasized the importance of adequate kcal and protein intake; recommend to optimize nutritional intake in case of decreased appetite; recommended small frequent meals by ordering nutrient-dense snacks and leaving non-perishable food away from tray for later consumption during the day or between meals; to start with protein; reviewed foods with protein and menu order procedures in hospital. Discussed food safety and transplant education. Emphasized safe food handling, disposing of food after 24 hours, avoiding raw and fresh berries and using only individually wrapped dressings and condiments. Pt made aware RD remains available to answer further questions./(1) partially meets; needs review/practice/verbalization

## 2024-08-22 NOTE — DIETITIAN INITIAL EVALUATION ADULT - PERTINENT LABORATORY DATA
08-22    137  |  99  |  44<H>  ----------------------------<  125<H>  3.3<L>   |  26  |  0.99    Ca    9.7      22 Aug 2024 11:15  Phos  2.8     08-22  Mg     1.9     08-22    TPro  6.7  /  Alb  4.4  /  TBili  0.3  /  DBili  <0.1  /  AST  15  /  ALT  11  /  AlkPhos  90  08-22   Principal Discharge DX:	Hematoma   1

## 2024-08-22 NOTE — DIETITIAN INITIAL EVALUATION ADULT - ORAL INTAKE PTA/DIET HISTORY
Pt reports pretty good appetite prior to admission, usually eat light breakfast, normal meal for lunch and dinner. Pt was not following therapeutic diet; eating well-balanced meals. Confirms food allergies- diana (rash). Denies chewing or swallowing issues. Denies GI distress. Denies micronutrient supplement use, denies protein supplement use.

## 2024-08-22 NOTE — PRE PROCEDURE NOTE - PRE PROCEDURE EVALUATION
Interventional Radiology    HPI: 64y female with multiple myeloma with previous 14Fr double lumen tunneled catheter placed by IR 7/31/24 for Auto-HSCT. The catheter was removed on 8/9 due to complaints of severe chest wall pain and soreness at insertion site (catheter was without evidence of infection and tip noted to be at SVC). Patient now presents to IR for placement of non-tunneled central venous catheter for stem cell transplant.    Review of Systems:   Constitutional: No fever  Respiratory: No shortness of breath  Cardiovascular: No chest pain  Gastrointestinal: No abdominal or epigastric pain. No nausea, vomiting, or hematemesis; No diarrhea or constipation.    Allergies: aspirin (Swelling)  latex (Unknown)  salicylates (Anaphylaxis)    Medications (Abx/Cardiac/Anticoagulation/Blood Products)    Data:  T(C): --  HR: --  BP: --  RR: --  SpO2: --    RADIOLOGY & ADDITIONAL TESTS:    Imaging Reviewed    H & P Note Reviewed from: 7/31/24    Plan: 64y Female presents for Non-tunneled central venous catheter placement for stem cell transplant  -Risks/Benefits/alternatives explained with the patient and/or healthcare proxy and witnessed informed consent obtained.    Interventional Radiology    HPI: 64y female with multiple myeloma with previous 14Fr double lumen tunneled catheter placed by IR 7/31/24 for Auto-HSCT. The catheter was removed on 8/9 due to complaints of severe chest wall pain and soreness at insertion site (catheter was without evidence of infection and tip noted to be at SVC). Patient now presents to IR for placement of non-tunneled central venous catheter for stem cell transplant.    Review of Systems:   Constitutional: No fever  Respiratory: No shortness of breath  Cardiovascular: No chest pain  Gastrointestinal: No abdominal or epigastric pain. No nausea, vomiting, or hematemesis; No diarrhea or constipation.    Allergies: aspirin (Swelling)  latex (Unknown)  salicylates (Anaphylaxis)    Medications (Abx/Cardiac/Anticoagulation/Blood Products)    Data:  152  62.2  T(C): 36.2  HR: 90  BP: 150/70  RR: 18  SpO2: 99%      RADIOLOGY & ADDITIONAL TESTS:    Imaging Reviewed    H & P Note Reviewed from: 7/31/24    Plan: 64y Female presents for Non-tunneled central venous catheter placement for stem cell transplant  -Risks/Benefits/alternatives explained with the patient and/or healthcare proxy and witnessed informed consent obtained.

## 2024-08-22 NOTE — H&P ADULT - NS ATTEND AMEND GEN_ALL_CORE FT
63 year old female with IgG lambda MGUS progressed to multiple myeloma 9/23, treated with 4 cycles of Rosalind-RVD admitted for an autologous pbsct with high dose melphalan prep regimen.    1. Admit to BMTU on day -1    2. TLC placement in IR    3. Melphalan hydration: start NS at 250ml / hr for 2 hours before and 2 hours post melphalan infusion    4. On day -1;  melphalan 200mg/m2 IV once administered over 30 minutes; initiate cryotherapy (one tbsp of ice in the mouth constantly) 15 minutes before, during and following the melphalan infusion    5. Stem cell infusion on day 0    6. Start zarxio 5mcg/kg/day daily on day + 5.

## 2024-08-22 NOTE — DIETITIAN INITIAL EVALUATION ADULT - OTHER INFO
-- multiple myeloma admitted for an autologous pbsct ,day -1, on Decadron   -- Ordered for Mouth Care: Biotene, NaHCO3  -- GI: Protonix, Zofran, Compazine  -- Noted hypokalemia today

## 2024-08-22 NOTE — DIETITIAN INITIAL EVALUATION ADULT - OTHER CALCULATIONS
Fluid needs deferred to team. Energy and protein needs based on dosing weight of 62.2kg in consideration of Increased nutrient needs.

## 2024-08-22 NOTE — DIETITIAN INITIAL EVALUATION ADULT - PHYSCIAL ASSESSMENT
Drug Dosing Height (cm): 152/60" but pt reported Ht 61"    Dosing Weight (kg): 62.2 (22 Aug 2024 08:23)    Weight history:   Per pt: ~136lb, declines major weight changes despite medical condition and chemo   Previous RD notes: 61.2kg (11/28/22)   [place] HIE: unable to load [place] [facility]IE at present      ** No significant weight changes per pt and compared to weight history. RD will continue to monitor wt trends as available/able.     IBW: 105lb, 130% IBW

## 2024-08-22 NOTE — PATIENT PROFILE ADULT - FALL HARM RISK - HARM RISK INTERVENTIONS
Communicate Risk of Fall with Harm to all staff/Monitor for mental status changes/Monitor gait and stability/Reinforce activity limits and safety measures with patient and family/Reorient to person, place and time as needed/Tailored Fall Risk Interventions/Bed in lowest position, wheels locked, appropriate side rails in place/Call bell, personal items and telephone in reach/Instruct patient to call for assistance before getting out of bed or chair/Non-slip footwear when patient is out of bed/[place] to call system/Physically safe environment - no spills, clutter or unnecessary equipment/Purposeful Proactive Rounding/Room/bathroom lighting operational, light cord in reach

## 2024-08-22 NOTE — H&P ADULT - HISTORY OF PRESENT ILLNESS
This is a 63 year old female with a medical history of HTN, HLD and multiple myeloma admitted for an autologous pbsct with high dose melphalan prep regimen (200mg/ m2). 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 She was followed with serial labs until 9/23 when her M spike was 2.0, and IgG level 2818. An IR guided biopsy 1/11/24 showed 40% plasma cells. A PET CT 2/29/24 showed focal hypermetabolic skeletal lucencies within L1 and the right iliac bone, consistent with a new diagnosis of multiple myeloma. She was started on Rosalind-RVD 3/14/24. She received 4 cycles with minimal complications. She has no complaints on admission.

## 2024-08-22 NOTE — PATIENT PROFILE ADULT - FUNCTIONAL ASSESSMENT - BASIC MOBILITY 6.
4-calculated by average/Not able to assess (calculate score using [facility] averaging method)

## 2024-08-22 NOTE — DIETITIAN INITIAL EVALUATION ADULT - NSFNSPHYEXAMSKINFT_GEN_A_CORE
Order should read every other day. New script sent Carrie Herrera RN on 8/1/2022 at 4:22 PM     no pressure injury per nursing flowsheet

## 2024-08-22 NOTE — H&P ADULT - ASSESSMENT
63 year old female with IGgG lambda MGUS progressed to multiple myeloma 9/23, treated with 4 cycles of Rosalind-RVD admitted for an autologous pbsct with high dose melphalan prep regimen.  63 year old female with IgG lambda MGUS progressed to multiple myeloma 9/23, treated with 4 cycles of Rosalind-RVD admitted for an autologous pbsct with high dose melphalan prep regimen.

## 2024-08-22 NOTE — PROCEDURE NOTE - ADDITIONAL PROCEDURE DETAILS
Successful placement of 7fr non-tunneled central venous catheter over a guidewire under fluoroscopic guidance. Tip in SVC, ok to use. Full report to follow.

## 2024-08-22 NOTE — H&P ADULT - PROBLEM SELECTOR PLAN 2
1. Antiviral prophylaxis with acyclovir 800 po BID to start on admission    2. Fluconazole to start when ANC < 500 and continue through engraftment    3. Levaquin to start when ANC < 1000   4. Bactrim SS for PCP prophylaxis post engraftment    5. CMV PCR weekly post engraftment through day + 100

## 2024-08-22 NOTE — DIETITIAN INITIAL EVALUATION ADULT - ORAL NUTRITION SUPPLEMENTS
pt decline protein supplements at present  RD will provide protein-fortified smoothie of day 1x on (Tuesday, Thursday and Friday, 300 gracie 18 gm protein)

## 2024-08-22 NOTE — DIETITIAN INITIAL EVALUATION ADULT - ADD RECOMMEND
-- Monitor PO intake, GI tolerance, skin integrity, labs, weight, and bowel movement regularity.   -- Encourage use of daily menus. Honor dietary preferences as expressed as able.

## 2024-08-22 NOTE — H&P ADULT - NSICDXPASTMEDICALHX_GEN_ALL_CORE_FT
PAST MEDICAL HISTORY:  Cholelithiasis H/O    High cholesterol     History of Tonsillectomy     Hypertension     Migraine     Multiple myeloma     S/P Biopsy Endometrial     Uterine Leiomyoma

## 2024-08-22 NOTE — H&P ADULT - PROBLEM SELECTOR PLAN 1
1. Admit to BMTU on day -1    2. TLC placement in IR    3. Melphalan hydration: start NS at 250ml / hr for 2 hours before and 2 hours post melphalan infusion    4. On day -1;  melphalan 200mg/m2 IV once administered over 30 minutes; initiate cryotherapy (one tbsp of ice in the mouth constantly) 15 minutes before, during and following the melphalan infusion    5. Stem cell infusion on day 0    6. Start zarxio 5mcg/kg/day daily on day + 5.

## 2024-08-23 LAB
ALBUMIN SERPL ELPH-MCNC: 4.5 G/DL — SIGNIFICANT CHANGE UP (ref 3.3–5)
ALP SERPL-CCNC: 88 U/L — SIGNIFICANT CHANGE UP (ref 40–120)
ALT FLD-CCNC: 13 U/L — SIGNIFICANT CHANGE UP (ref 10–45)
ANION GAP SERPL CALC-SCNC: 12 MMOL/L — SIGNIFICANT CHANGE UP (ref 5–17)
AST SERPL-CCNC: 16 U/L — SIGNIFICANT CHANGE UP (ref 10–40)
BASOPHILS # BLD AUTO: 0 K/UL — SIGNIFICANT CHANGE UP (ref 0–0.2)
BASOPHILS NFR BLD AUTO: 0 % — SIGNIFICANT CHANGE UP (ref 0–2)
BILIRUB DIRECT SERPL-MCNC: <0.1 MG/DL — SIGNIFICANT CHANGE UP (ref 0–0.3)
BILIRUB INDIRECT FLD-MCNC: >0.7 MG/DL — SIGNIFICANT CHANGE UP (ref 0.2–1)
BILIRUB SERPL-MCNC: 0.8 MG/DL — SIGNIFICANT CHANGE UP (ref 0.2–1.2)
BLD GP AB SCN SERPL QL: POSITIVE — SIGNIFICANT CHANGE UP
BUN SERPL-MCNC: 39 MG/DL — HIGH (ref 7–23)
CALCIUM SERPL-MCNC: 9.7 MG/DL — SIGNIFICANT CHANGE UP (ref 8.4–10.5)
CD34 CELLS # FLD: 378 CELLS/UL — SIGNIFICANT CHANGE UP
CD34 VIABILITY: 81 % — SIGNIFICANT CHANGE UP
CHLORIDE SERPL-SCNC: 102 MMOL/L — SIGNIFICANT CHANGE UP (ref 96–108)
CO2 SERPL-SCNC: 23 MMOL/L — SIGNIFICANT CHANGE UP (ref 22–31)
CREAT SERPL-MCNC: 1.02 MG/DL — SIGNIFICANT CHANGE UP (ref 0.5–1.3)
EBV DNA SERPL NAA+PROBE-ACNC: SIGNIFICANT CHANGE UP IU/ML
EBVPCR LOG: SIGNIFICANT CHANGE UP LOG10IU/ML
EGFR: 61 ML/MIN/1.73M2 — SIGNIFICANT CHANGE UP
EOSINOPHIL # BLD AUTO: 0 K/UL — SIGNIFICANT CHANGE UP (ref 0–0.5)
EOSINOPHIL NFR BLD AUTO: 0 % — SIGNIFICANT CHANGE UP (ref 0–6)
GLUCOSE SERPL-MCNC: 170 MG/DL — HIGH (ref 70–99)
HCT VFR BLD CALC: 31.5 % — LOW (ref 34.5–45)
HGB BLD-MCNC: 10.7 G/DL — LOW (ref 11.5–15.5)
IMM GRANULOCYTES NFR BLD AUTO: 0.5 % — SIGNIFICANT CHANGE UP (ref 0–0.9)
LDH SERPL L TO P-CCNC: 145 U/L — SIGNIFICANT CHANGE UP (ref 50–242)
LYMPHOCYTES # BLD AUTO: 0.58 K/UL — LOW (ref 1–3.3)
LYMPHOCYTES # BLD AUTO: 9.4 % — LOW (ref 13–44)
MAGNESIUM SERPL-MCNC: 1.7 MG/DL — SIGNIFICANT CHANGE UP (ref 1.6–2.6)
MCHC RBC-ENTMCNC: 30.9 PG — SIGNIFICANT CHANGE UP (ref 27–34)
MCHC RBC-ENTMCNC: 34 GM/DL — SIGNIFICANT CHANGE UP (ref 32–36)
MCV RBC AUTO: 91 FL — SIGNIFICANT CHANGE UP (ref 80–100)
MONOCYTES # BLD AUTO: 0.09 K/UL — SIGNIFICANT CHANGE UP (ref 0–0.9)
MONOCYTES NFR BLD AUTO: 1.5 % — LOW (ref 2–14)
NEUTROPHILS # BLD AUTO: 5.44 K/UL — SIGNIFICANT CHANGE UP (ref 1.8–7.4)
NEUTROPHILS NFR BLD AUTO: 88.6 % — HIGH (ref 43–77)
NRBC # BLD: 0 /100 WBCS — SIGNIFICANT CHANGE UP (ref 0–0)
PHOSPHATE SERPL-MCNC: 2.8 MG/DL — SIGNIFICANT CHANGE UP (ref 2.5–4.5)
PLATELET # BLD AUTO: 235 K/UL — SIGNIFICANT CHANGE UP (ref 150–400)
POTASSIUM SERPL-MCNC: 3.9 MMOL/L — SIGNIFICANT CHANGE UP (ref 3.5–5.3)
POTASSIUM SERPL-SCNC: 3.9 MMOL/L — SIGNIFICANT CHANGE UP (ref 3.5–5.3)
PROT SERPL-MCNC: 6.7 G/DL — SIGNIFICANT CHANGE UP (ref 6–8.3)
RBC # BLD: 3.46 M/UL — LOW (ref 3.8–5.2)
RBC # FLD: 14 % — SIGNIFICANT CHANGE UP (ref 10.3–14.5)
RH IG SCN BLD-IMP: POSITIVE — SIGNIFICANT CHANGE UP
SODIUM SERPL-SCNC: 137 MMOL/L — SIGNIFICANT CHANGE UP (ref 135–145)
WBC # BLD: 6.14 K/UL — SIGNIFICANT CHANGE UP (ref 3.8–10.5)
WBC # FLD AUTO: 6.14 K/UL — SIGNIFICANT CHANGE UP (ref 3.8–10.5)

## 2024-08-23 PROCEDURE — 99233 SBSQ HOSP IP/OBS HIGH 50: CPT

## 2024-08-23 RX ORDER — TIZANIDINE 4 MG/1
4 TABLET ORAL EVERY 4 HOURS
Refills: 0 | Status: DISCONTINUED | OUTPATIENT
Start: 2024-08-23 | End: 2024-09-13

## 2024-08-23 RX ORDER — ONDANSETRON 2 MG/ML
8 INJECTION, SOLUTION INTRAMUSCULAR; INTRAVENOUS EVERY 8 HOURS
Refills: 0 | Status: DISCONTINUED | OUTPATIENT
Start: 2024-08-23 | End: 2024-08-24

## 2024-08-23 RX ADMIN — SODIUM BICARBONATE 10 MILLILITER(S): 84 INJECTION, SOLUTION INTRAVENOUS at 00:37

## 2024-08-23 RX ADMIN — Medication 10 MILLIGRAM(S): at 05:42

## 2024-08-23 RX ADMIN — ONDANSETRON 8 MILLIGRAM(S): 2 INJECTION, SOLUTION INTRAMUSCULAR; INTRAVENOUS at 10:30

## 2024-08-23 RX ADMIN — VALSARTAN 80 MILLIGRAM(S): 40 TABLET ORAL at 06:05

## 2024-08-23 RX ADMIN — SODIUM BICARBONATE 10 MILLILITER(S): 84 INJECTION, SOLUTION INTRAVENOUS at 20:01

## 2024-08-23 RX ADMIN — Medication 800 MILLIGRAM(S): at 06:05

## 2024-08-23 RX ADMIN — ACETAMINOPHEN 650 MILLIGRAM(S): 325 TABLET ORAL at 13:12

## 2024-08-23 RX ADMIN — Medication 10 MILLIGRAM(S): at 15:10

## 2024-08-23 RX ADMIN — CHLORHEXIDINE GLUCONATE 1 APPLICATION(S): 40 SOLUTION TOPICAL at 10:54

## 2024-08-23 RX ADMIN — ACETAMINOPHEN 650 MILLIGRAM(S): 325 TABLET ORAL at 01:36

## 2024-08-23 RX ADMIN — Medication 40 MILLIGRAM(S): at 06:05

## 2024-08-23 RX ADMIN — Medication 800 MILLIGRAM(S): at 18:38

## 2024-08-23 RX ADMIN — Medication 5 MILLILITER(S): at 08:15

## 2024-08-23 RX ADMIN — SODIUM BICARBONATE 10 MILLILITER(S): 84 INJECTION, SOLUTION INTRAVENOUS at 08:15

## 2024-08-23 RX ADMIN — Medication 25 MILLIGRAM(S): at 13:12

## 2024-08-23 RX ADMIN — Medication 5 MILLILITER(S): at 13:11

## 2024-08-23 RX ADMIN — Medication 5 MILLILITER(S): at 00:37

## 2024-08-23 RX ADMIN — Medication 5 MILLILITER(S): at 20:01

## 2024-08-23 RX ADMIN — ACETAMINOPHEN 650 MILLIGRAM(S): 325 TABLET ORAL at 00:36

## 2024-08-23 RX ADMIN — SODIUM CHLORIDE 150 MILLILITER(S): 9 INJECTION INTRAMUSCULAR; INTRAVENOUS; SUBCUTANEOUS at 12:00

## 2024-08-23 RX ADMIN — SODIUM BICARBONATE 10 MILLILITER(S): 84 INJECTION, SOLUTION INTRAVENOUS at 13:12

## 2024-08-23 RX ADMIN — ONDANSETRON 8 MILLIGRAM(S): 2 INJECTION, SOLUTION INTRAMUSCULAR; INTRAVENOUS at 21:23

## 2024-08-23 NOTE — PROGRESS NOTE ADULT - NS ATTEND AMEND GEN_ALL_CORE FT
64 year old female with a history of multiple myeloma admitted for auto-HSCT Day 0  today     Disease: IgG lambda multiple myeloma  Type of transplant: Autologous  Conditioning prep: Melphalan (200 mg/m2)   ABO / CMV: O POS / POS    Complications:   - Day 0: nausea/vomitting, abdominal pain     Patient was discussed, seen and examined in IDR rounds.   Patient with abdominal pain, nausea improved with Zofran   Has frequent bowel movements, but this is chronic with use of immodium at home.   Labs reviewed, no transfusions today   PE: normal oral mucosa, site of line without erythema, + BS in all 4 quadrants,   Infusion of stem cell product today.   Provide supportive care, addition of zofran for supportive care.

## 2024-08-23 NOTE — PROGRESS NOTE ADULT - SUBJECTIVE AND OBJECTIVE BOX
[facility] Transplant Team                                                      Critical / Counseling Time Provided: 30 minutes                                                                                                                                                        Chief Complaint: Autologous pbsct with high dose melphalan prep regimen for treatment of IgG lambda multiple myeloma    Disease: IgG lambda multiple myeloma  Type of transplant: Autologous  Conditioning prep: Melphalan (200 mg/m2)   ABO / CMV: O POS / POS     S: Patient seen and examined with [facility] Transplant Team:   + diarrhea (states acyclovir gives her diarrhea)  + anxiety     O: Vitals:   Vital Signs Last 24 Hrs  T(C): 36.2 (23 Aug 2024 06:03), Max: 37 (22 Aug 2024 18:13)  T(F): 97.2 (23 Aug 2024 06:03), Max: 98.6 (22 Aug 2024 18:13)  HR: 99 (23 Aug 2024 06:03) (84 - 101)  BP: 112/61 (23 Aug 2024 06:03) (112/61 - 150/70)  BP(mean): --  RR: 18 (23 Aug 2024 06:03) (18 - 18)  SpO2: 95% (23 Aug 2024 06:03) (95% - 99%)    Parameters below as of 23 Aug 2024 06:03  Patient On (Oxygen Delivery Method): room air        Admit weight: 62.2kg   Daily Height in cm: 152 (22 Aug 2024 08:23)        Intake / Output:   08-22 @ 07:01  -  08-23 @ 07:00  --------------------------------------------------------  IN: 3438 mL / OUT: 3220 mL / NET: 218 mL          PE:   Oropharynx: no erythema or ulcerations   Oral Mucositis:              -                                          Grade: n/a   CVS: S1, S2 RRR   Lungs: CTA throughout bilaterally   Abdomen: + BS x 4, soft, NT, ND  Extremities: no edema   Gastric Mucositis:       -                                           Grade: n/a   Intestinal Mucositis:     -                                         Grade: n/a   Skin: no rash   TLC: CDI   Neuro: A&Ox3   Pain: 0    Labs:         Meds:   Antimicrobials:   acyclovir   Oral Tab/Cap 800 milliGRAM(s) Oral every 12 hours      Heme / Onc:       GI:  pantoprazole    Tablet 40 milliGRAM(s) Oral before breakfast  sodium bicarbonate Mouth Rinse 10 milliLiter(s) Swish and Spit five times a day      Cardiovascular:   valsartan 80 milliGRAM(s) Oral daily      Immunologic:       Other medications:   acetaminophen     Tablet .. 650 milliGRAM(s) Oral once  Biotene Dry Mouth Oral Rinse 5 milliLiter(s) Swish and Spit five times a day  chlorhexidine 4% Liquid 1 Application(s) Topical <User Schedule>  diphenhydrAMINE 25 milliGRAM(s) Oral once  phytonadione   Solution 5 milliGRAM(s) Oral <User Schedule>  sodium chloride 0.9%. 1000 milliLiter(s) IV Continuous <Continuous>      PRN:   acetaminophen     Tablet .. 650 milliGRAM(s) Oral every 6 hours PRN  prochlorperazine   Injectable 10 milliGRAM(s) IntraMuscular every 6 hours PRN  sodium chloride 0.9% lock flush 10 milliLiter(s) IV Push every 1 hour PRN      A/P:  64 year old female with a history of multiple myeloma   Pre :  Autologous PBSCT day 0  8/23- HPC transplant today, continue hydration for 24 hours post infusion of cells. Daily weights, strict I&O, prn diuresis. Start levaquin / fluconazole prophylaxis when neutropenic.     1. Infectious Disease:   acyclovir   Oral Tab/Cap 800 milliGRAM(s) Oral every 12 hours    2. GI Prophylaxis:   pantoprazole    Tablet 40 milliGRAM(s) Oral before breakfast    3. Mouthcare - NS / NaHCO3 rinses, Biotene; Skin care     4. Transfuse & replete electrolytes prn     5. IV hydration, daily weights, strict I&O, prn diuresis     6. PO intake as tolerated, nutrition follow up as needed    7. Antiemetics, anti-diarrhea medications:   prochlorperazine   Injectable 10 milliGRAM(s) IntraMuscular every 6 hours PRN    8. OOB as tolerated, physical therapy consult if needed     9. Monitor coags / fibrinogen 2x week, vitamin K as needed   phytonadione   Solution 5 milliGRAM(s) Oral <User Schedule>    10. Monitor closely for clinical changes, monitor for fevers     11. Emotional support provided, plan of care discussed and questions addressed     12. Patient education done regarding plan of care, restrictions and discharge planning     13. Continue regular social work input     I have written the above note for Dr. Ambrocio who performed service with me in the room.   Jessy CULVER ([phone])    I have seen and examined patient with NP, I agree with above note as scribed.

## 2024-08-23 NOTE — CHART NOTE - NSCHARTNOTEFT_GEN_A_CORE
After pre-medication, Ms. Baker received 310ml of autologous, pooled, thawed, washed, mobilized, plasma reduced, HPC apheresis over approximately 1 hour. Cell counts as follows:   Total MNCs / TNCs (x10^8/kg) = 6.80  CD34+ cells (x10^6/kg) = 2.95  Cell Viability (%) = 81%   She tolerated the infusion well with no adverse reactions noted. Jessy Ferrell NP-C [phone]

## 2024-08-23 NOTE — PHARMACOTHERAPY INTERVENTION NOTE - COMMENTS
64-year-old female with PMH of HTN and IgG lambda multiple myeloma treated with Rosalind-RVD x4 cycles and admitted for autologous HSCT with melphalan 200 mg/m2 conditioning. Today is day 0. Course has been complicated by nausea.    Patient complaining of nausea and reports minimal relief with prochlorperazine 10 mg IM q6h. Please also add ondansetron 8 mg IN/PO q8h (patient reports using 2 doses/day at home) and make 1st line for nausea. Please change prochlorperazine to 10 mg intravenously every 6 hours as need second line for nausea.    Pilar Isabel, PharmD, BCOP  Stem Cell Transplant Clinical Pharmacy Specialist  Available via [employer] Teams

## 2024-08-24 LAB
ALBUMIN SERPL ELPH-MCNC: 3.6 G/DL — SIGNIFICANT CHANGE UP (ref 3.3–5)
ALP SERPL-CCNC: 70 U/L — SIGNIFICANT CHANGE UP (ref 40–120)
ALT FLD-CCNC: 10 U/L — SIGNIFICANT CHANGE UP (ref 10–45)
ANION GAP SERPL CALC-SCNC: 10 MMOL/L — SIGNIFICANT CHANGE UP (ref 5–17)
AST SERPL-CCNC: 20 U/L — SIGNIFICANT CHANGE UP (ref 10–40)
BASOPHILS # BLD AUTO: 0 K/UL — SIGNIFICANT CHANGE UP (ref 0–0.2)
BASOPHILS NFR BLD AUTO: 0 % — SIGNIFICANT CHANGE UP (ref 0–2)
BILIRUB SERPL-MCNC: 0.6 MG/DL — SIGNIFICANT CHANGE UP (ref 0.2–1.2)
BUN SERPL-MCNC: 33 MG/DL — HIGH (ref 7–23)
CALCIUM SERPL-MCNC: 8.5 MG/DL — SIGNIFICANT CHANGE UP (ref 8.4–10.5)
CHLORIDE SERPL-SCNC: 110 MMOL/L — HIGH (ref 96–108)
CO2 SERPL-SCNC: 21 MMOL/L — LOW (ref 22–31)
CREAT SERPL-MCNC: 0.93 MG/DL — SIGNIFICANT CHANGE UP (ref 0.5–1.3)
EGFR: 68 ML/MIN/1.73M2 — SIGNIFICANT CHANGE UP
EOSINOPHIL # BLD AUTO: 0 K/UL — SIGNIFICANT CHANGE UP (ref 0–0.5)
EOSINOPHIL NFR BLD AUTO: 0 % — SIGNIFICANT CHANGE UP (ref 0–6)
GLUCOSE SERPL-MCNC: 132 MG/DL — HIGH (ref 70–99)
HCT VFR BLD CALC: 28 % — LOW (ref 34.5–45)
HGB BLD-MCNC: 9.3 G/DL — LOW (ref 11.5–15.5)
IMM GRANULOCYTES NFR BLD AUTO: 0.6 % — SIGNIFICANT CHANGE UP (ref 0–0.9)
LDH SERPL L TO P-CCNC: 436 U/L — HIGH (ref 50–242)
LYMPHOCYTES # BLD AUTO: 0.24 K/UL — LOW (ref 1–3.3)
LYMPHOCYTES # BLD AUTO: 3.5 % — LOW (ref 13–44)
MAGNESIUM SERPL-MCNC: 1.9 MG/DL — SIGNIFICANT CHANGE UP (ref 1.6–2.6)
MCHC RBC-ENTMCNC: 31.1 PG — SIGNIFICANT CHANGE UP (ref 27–34)
MCHC RBC-ENTMCNC: 33.2 GM/DL — SIGNIFICANT CHANGE UP (ref 32–36)
MCV RBC AUTO: 93.6 FL — SIGNIFICANT CHANGE UP (ref 80–100)
MONOCYTES # BLD AUTO: 0.29 K/UL — SIGNIFICANT CHANGE UP (ref 0–0.9)
MONOCYTES NFR BLD AUTO: 4.2 % — SIGNIFICANT CHANGE UP (ref 2–14)
NEUTROPHILS # BLD AUTO: 6.34 K/UL — SIGNIFICANT CHANGE UP (ref 1.8–7.4)
NEUTROPHILS NFR BLD AUTO: 91.7 % — HIGH (ref 43–77)
NRBC # BLD: 0 /100 WBCS — SIGNIFICANT CHANGE UP (ref 0–0)
PHOSPHATE SERPL-MCNC: 2.6 MG/DL — SIGNIFICANT CHANGE UP (ref 2.5–4.5)
PLATELET # BLD AUTO: 176 K/UL — SIGNIFICANT CHANGE UP (ref 150–400)
POTASSIUM SERPL-MCNC: 3.6 MMOL/L — SIGNIFICANT CHANGE UP (ref 3.5–5.3)
POTASSIUM SERPL-SCNC: 3.6 MMOL/L — SIGNIFICANT CHANGE UP (ref 3.5–5.3)
PROT SERPL-MCNC: 5.4 G/DL — LOW (ref 6–8.3)
RBC # BLD: 2.99 M/UL — LOW (ref 3.8–5.2)
RBC # FLD: 14.5 % — SIGNIFICANT CHANGE UP (ref 10.3–14.5)
SODIUM SERPL-SCNC: 141 MMOL/L — SIGNIFICANT CHANGE UP (ref 135–145)
WBC # BLD: 6.91 K/UL — SIGNIFICANT CHANGE UP (ref 3.8–10.5)
WBC # FLD AUTO: 6.91 K/UL — SIGNIFICANT CHANGE UP (ref 3.8–10.5)

## 2024-08-24 PROCEDURE — 74018 RADEX ABDOMEN 1 VIEW: CPT | Mod: 26

## 2024-08-24 PROCEDURE — 99233 SBSQ HOSP IP/OBS HIGH 50: CPT

## 2024-08-24 RX ORDER — ACETAMINOPHEN 325 MG/1
1000 TABLET ORAL ONCE
Refills: 0 | Status: COMPLETED | OUTPATIENT
Start: 2024-08-24 | End: 2024-08-24

## 2024-08-24 RX ORDER — ONDANSETRON 2 MG/ML
8 INJECTION, SOLUTION INTRAMUSCULAR; INTRAVENOUS EVERY 8 HOURS
Refills: 0 | Status: DISCONTINUED | OUTPATIENT
Start: 2024-08-24 | End: 2024-08-25

## 2024-08-24 RX ADMIN — ONDANSETRON 8 MILLIGRAM(S): 2 INJECTION, SOLUTION INTRAMUSCULAR; INTRAVENOUS at 21:11

## 2024-08-24 RX ADMIN — ACETAMINOPHEN 1000 MILLIGRAM(S): 325 TABLET ORAL at 19:45

## 2024-08-24 RX ADMIN — Medication 5 MILLILITER(S): at 23:45

## 2024-08-24 RX ADMIN — ACETAMINOPHEN 400 MILLIGRAM(S): 325 TABLET ORAL at 19:25

## 2024-08-24 RX ADMIN — Medication 5 MILLILITER(S): at 21:10

## 2024-08-24 RX ADMIN — Medication 5 MILLILITER(S): at 12:39

## 2024-08-24 RX ADMIN — SODIUM BICARBONATE 10 MILLILITER(S): 84 INJECTION, SOLUTION INTRAVENOUS at 17:48

## 2024-08-24 RX ADMIN — VALSARTAN 80 MILLIGRAM(S): 40 TABLET ORAL at 05:57

## 2024-08-24 RX ADMIN — ONDANSETRON 8 MILLIGRAM(S): 2 INJECTION, SOLUTION INTRAMUSCULAR; INTRAVENOUS at 13:19

## 2024-08-24 RX ADMIN — CHLORHEXIDINE GLUCONATE 1 APPLICATION(S): 40 SOLUTION TOPICAL at 08:44

## 2024-08-24 RX ADMIN — SODIUM BICARBONATE 10 MILLILITER(S): 84 INJECTION, SOLUTION INTRAVENOUS at 23:45

## 2024-08-24 RX ADMIN — SODIUM BICARBONATE 10 MILLILITER(S): 84 INJECTION, SOLUTION INTRAVENOUS at 12:39

## 2024-08-24 RX ADMIN — ACETAMINOPHEN 650 MILLIGRAM(S): 325 TABLET ORAL at 08:00

## 2024-08-24 RX ADMIN — Medication 5 MILLILITER(S): at 00:01

## 2024-08-24 RX ADMIN — SODIUM BICARBONATE 10 MILLILITER(S): 84 INJECTION, SOLUTION INTRAVENOUS at 00:02

## 2024-08-24 RX ADMIN — ACETAMINOPHEN 650 MILLIGRAM(S): 325 TABLET ORAL at 08:45

## 2024-08-24 RX ADMIN — SODIUM BICARBONATE 10 MILLILITER(S): 84 INJECTION, SOLUTION INTRAVENOUS at 08:44

## 2024-08-24 RX ADMIN — Medication 800 MILLIGRAM(S): at 17:48

## 2024-08-24 RX ADMIN — Medication 10 MILLIGRAM(S): at 10:20

## 2024-08-24 RX ADMIN — Medication 80 MILLIGRAM(S): at 18:10

## 2024-08-24 RX ADMIN — Medication 800 MILLIGRAM(S): at 05:57

## 2024-08-24 RX ADMIN — Medication 5 MILLILITER(S): at 08:43

## 2024-08-24 RX ADMIN — ONDANSETRON 8 MILLIGRAM(S): 2 INJECTION, SOLUTION INTRAMUSCULAR; INTRAVENOUS at 05:56

## 2024-08-24 RX ADMIN — Medication 40 MILLIGRAM(S): at 06:00

## 2024-08-24 RX ADMIN — Medication 5 MILLILITER(S): at 17:47

## 2024-08-24 RX ADMIN — SODIUM BICARBONATE 10 MILLILITER(S): 84 INJECTION, SOLUTION INTRAVENOUS at 21:10

## 2024-08-24 NOTE — PROGRESS NOTE ADULT - SUBJECTIVE AND OBJECTIVE BOX
[facility] Transplant Team                                                      Critical / Counseling Time Provided: 30 minutes                                                                                                                                                        Chief Complaint: Autologous pbsct with high dose melphalan prep regimen for treatment of IgG lambda multiple myeloma    Disease: IgG lambda multiple myeloma  Type of transplant: Autologous  Conditioning prep: Melphalan (200 mg/m2)   ABO / CMV: O POS / POS     S: Patient seen and examined with [facility] Transplant Team:   + diarrhea (states acyclovir gives her diarrhea)  + anxiety     O: Vitals:   Vital Signs Last 24 Hrs  T(C): 36.2 (23 Aug 2024 06:03), Max: 37 (22 Aug 2024 18:13)  T(F): 97.2 (23 Aug 2024 06:03), Max: 98.6 (22 Aug 2024 18:13)  HR: 99 (23 Aug 2024 06:03) (84 - 101)  BP: 112/61 (23 Aug 2024 06:03) (112/61 - 150/70)  BP(mean): --  RR: 18 (23 Aug 2024 06:03) (18 - 18)  SpO2: 95% (23 Aug 2024 06:03) (95% - 99%)    Parameters below as of 23 Aug 2024 06:03  Patient On (Oxygen Delivery Method): room air        Admit weight: 62.2kg   Daily Height in cm: 152 (22 Aug 2024 08:23)        Intake / Output:   08-22 @ 07:01  -  08-23 @ 07:00  --------------------------------------------------------  IN: 3438 mL / OUT: 3220 mL / NET: 218 mL          PE:   Oropharynx: no erythema or ulcerations   Oral Mucositis:              -                                          Grade: n/a   CVS: S1, S2 RRR   Lungs: CTA throughout bilaterally   Abdomen: + BS x 4, soft, NT, ND  Extremities: no edema   Gastric Mucositis:       -                                           Grade: n/a   Intestinal Mucositis:     -                                         Grade: n/a   Skin: no rash   TLC: CDI   Neuro: A&Ox3   Pain: 0    Labs:         Meds:   Antimicrobials:   acyclovir   Oral Tab/Cap 800 milliGRAM(s) Oral every 12 hours      Heme / Onc:       GI:  pantoprazole    Tablet 40 milliGRAM(s) Oral before breakfast  sodium bicarbonate Mouth Rinse 10 milliLiter(s) Swish and Spit five times a day      Cardiovascular:   valsartan 80 milliGRAM(s) Oral daily      Immunologic:       Other medications:   acetaminophen     Tablet .. 650 milliGRAM(s) Oral once  Biotene Dry Mouth Oral Rinse 5 milliLiter(s) Swish and Spit five times a day  chlorhexidine 4% Liquid 1 Application(s) Topical <User Schedule>  diphenhydrAMINE 25 milliGRAM(s) Oral once  phytonadione   Solution 5 milliGRAM(s) Oral <User Schedule>  sodium chloride 0.9%. 1000 milliLiter(s) IV Continuous <Continuous>      PRN:   acetaminophen     Tablet .. 650 milliGRAM(s) Oral every 6 hours PRN  prochlorperazine   Injectable 10 milliGRAM(s) IntraMuscular every 6 hours PRN  sodium chloride 0.9% lock flush 10 milliLiter(s) IV Push every 1 hour PRN      A/P:  64 year old female with a history of multiple myeloma   Pre :  Autologous PBSCT day 0  8/23- [facility] transplant today, continue hydration for 24 hours post infusion of cells. Daily weights, strict I&O, prn diuresis. Start levaquin / fluconazole prophylaxis when neutropenic.     1. Infectious Disease:   acyclovir   Oral Tab/Cap 800 milliGRAM(s) Oral every 12 hours    2. GI Prophylaxis:   pantoprazole    Tablet 40 milliGRAM(s) Oral before breakfast    3. Mouthcare - NS / NaHCO3 rinses, Biotene; Skin care     4. Transfuse & replete electrolytes prn     5. IV hydration, daily weights, strict I&O, prn diuresis     6. PO intake as tolerated, nutrition follow up as needed    7. Antiemetics, anti-diarrhea medications:   prochlorperazine   Injectable 10 milliGRAM(s) IntraMuscular every 6 hours PRN    8. OOB as tolerated, physical therapy consult if needed     9. Monitor coags / fibrinogen 2x week, vitamin K as needed   phytonadione   Solution 5 milliGRAM(s) Oral <User Schedule>    10. Monitor closely for clinical changes, monitor for fevers     11. Emotional support provided, plan of care discussed and questions addressed     12. Patient education done regarding plan of care, restrictions and discharge planning     13. Continue regular social work input     I have written the above note for Dr. Ambrocio who performed service with me in the room.   Jessy CULVER ([phone])    I have seen and examined patient with NP, I agree with above note as scribed.                    [facility] Transplant Team                                                      Critical / Counseling Time Provided: 30 minutes                                                                                                                                                        Chief Complaint: Autologous pbsct with high dose melphalan prep regimen for treatment of IgG lambda multiple myeloma    Disease: IgG lambda multiple myeloma  Type of transplant: Autologous  Conditioning prep: Melphalan (200 mg/m2)   ABO / CMV: O POS / POS     S: Patient seen and examined with [facility] Transplant Team:   + diarrhea (states acyclovir gives her diarrhea)  + anxiety     O: Vitals:   Vital Signs Last 24 Hrs  T(C): 36.2 (23 Aug 2024 06:03), Max: 37 (22 Aug 2024 18:13)  T(F): 97.2 (23 Aug 2024 06:03), Max: 98.6 (22 Aug 2024 18:13)  HR: 99 (23 Aug 2024 06:03) (84 - 101)  BP: 112/61 (23 Aug 2024 06:03) (112/61 - 150/70)  BP(mean): --  RR: 18 (23 Aug 2024 06:03) (18 - 18)  SpO2: 95% (23 Aug 2024 06:03) (95% - 99%)    Parameters below as of 23 Aug 2024 06:03  Patient On (Oxygen Delivery Method): room air        Admit weight: 62.2kg   Daily Height in cm: 152 (22 Aug 2024 08:23)        Intake / Output:   08-22 @ 07:01  -  08-23 @ 07:00  --------------------------------------------------------  IN: 3438 mL / OUT: 3220 mL / NET: 218 mL          PE:   Oropharynx: no erythema or ulcerations   Oral Mucositis:              -                                          Grade: n/a   CVS: S1, S2 RRR   Lungs: CTA throughout bilaterally   Abdomen: + BS x 4, soft, NT, ND  Extremities: no edema   Gastric Mucositis:       -                                           Grade: n/a   Intestinal Mucositis:     -                                         Grade: n/a   Skin: no rash   TLC: CDI   Neuro: A&Ox3   Pain: 0    Labs:         Meds:   Antimicrobials:   acyclovir   Oral Tab/Cap 800 milliGRAM(s) Oral every 12 hours      Heme / Onc:       GI:  pantoprazole    Tablet 40 milliGRAM(s) Oral before breakfast  sodium bicarbonate Mouth Rinse 10 milliLiter(s) Swish and Spit five times a day      Cardiovascular:   valsartan 80 milliGRAM(s) Oral daily      Immunologic:       Other medications:   acetaminophen     Tablet .. 650 milliGRAM(s) Oral once  Biotene Dry Mouth Oral Rinse 5 milliLiter(s) Swish and Spit five times a day  chlorhexidine 4% Liquid 1 Application(s) Topical <User Schedule>  diphenhydrAMINE 25 milliGRAM(s) Oral once  phytonadione   Solution 5 milliGRAM(s) Oral <User Schedule>  sodium chloride 0.9%. 1000 milliLiter(s) IV Continuous <Continuous>      PRN:   acetaminophen     Tablet .. 650 milliGRAM(s) Oral every 6 hours PRN  prochlorperazine   Injectable 10 milliGRAM(s) IntraMuscular every 6 hours PRN  sodium chloride 0.9% lock flush 10 milliLiter(s) IV Push every 1 hour PRN      A/P:  64 year old female with a history of multiple myeloma   Pre :  Autologous PBSCT day +1  8/23- HPC transplant today, continue hydration for 24 hours post infusion of cells. Daily weights, strict I&O, prn diuresis. Start levaquin / fluconazole prophylaxis when neutropenic.     1. Infectious Disease:   acyclovir   Oral Tab/Cap 800 milliGRAM(s) Oral every 12 hours    2. GI Prophylaxis:   pantoprazole    Tablet 40 milliGRAM(s) Oral before breakfast    3. Mouthcare - NS / NaHCO3 rinses, Biotene; Skin care     4. Transfuse & replete electrolytes prn     5. IV hydration, daily weights, strict I&O, prn diuresis     6. PO intake as tolerated, nutrition follow up as needed    7. Antiemetics, anti-diarrhea medications:   prochlorperazine   Injectable 10 milliGRAM(s) IntraMuscular every 6 hours PRN    8. OOB as tolerated, physical therapy consult if needed     9. Monitor coags / fibrinogen 2x week, vitamin K as needed   phytonadione   Solution 5 milliGRAM(s) Oral <User Schedule>    10. Monitor closely for clinical changes, monitor for fevers     11. Emotional support provided, plan of care discussed and questions addressed     12. Patient education done regarding plan of care, restrictions and discharge planning     13. Continue regular social work input     I have written the above note for Dr. Ambrocio who performed service with me in the room.   Jessy CULVER ([phone])    I have seen and examined patient with NP, I agree with above note as scribed.                    [facility] Transplant Team                                                      Critical / Counseling Time Provided: 30 minutes                                                                                                                                                        Chief Complaint: Autologous pbsct with high dose melphalan prep regimen for treatment of IgG lambda multiple myeloma    Disease: IgG lambda multiple myeloma  Type of transplant: Autologous  Conditioning prep: Melphalan (200 mg/m2)   ABO / CMV: O POS / POS     S: Patient seen and examined with [facility] Transplant Team:   + diarrhea (states acyclovir gives her diarrhea)  + anxiety     O: Vitals:   Vital Signs Last 24 Hrs  T(C): 36.2 (23 Aug 2024 06:03), Max: 37 (22 Aug 2024 18:13)  T(F): 97.2 (23 Aug 2024 06:03), Max: 98.6 (22 Aug 2024 18:13)  HR: 99 (23 Aug 2024 06:03) (84 - 101)  BP: 112/61 (23 Aug 2024 06:03) (112/61 - 150/70)  BP(mean): --  RR: 18 (23 Aug 2024 06:03) (18 - 18)  SpO2: 95% (23 Aug 2024 06:03) (95% - 99%)    Parameters below as of 23 Aug 2024 06:03  Patient On (Oxygen Delivery Method): room air        Admit weight: 62.2kg   Daily Height in cm: 152 (22 Aug 2024 08:23)        Intake / Output:   08-22 @ 07:01  -  08-23 @ 07:00  --------------------------------------------------------  IN: 3438 mL / OUT: 3220 mL / NET: 218 mL          PE:   Oropharynx: no erythema or ulcerations   Oral Mucositis:              -                                          Grade: n/a   CVS: S1, S2 RRR   Lungs: CTA throughout bilaterally   Abdomen: + BS x 4, soft, NT, ND  Extremities: no edema   Gastric Mucositis:       -                                           Grade: n/a   Intestinal Mucositis:     -                                         Grade: n/a   Skin: no rash   TLC: CDI   Neuro: A&Ox3   Pain: 0    Labs:         Meds:   Antimicrobials:   acyclovir   Oral Tab/Cap 800 milliGRAM(s) Oral every 12 hours      GI:  pantoprazole    Tablet 40 milliGRAM(s) Oral before breakfast  sodium bicarbonate Mouth Rinse 10 milliLiter(s) Swish and Spit five times a day      Cardiovascular:   valsartan 80 milliGRAM(s) Oral daily      Other medications:   acetaminophen     Tablet .. 650 milliGRAM(s) Oral once  Biotene Dry Mouth Oral Rinse 5 milliLiter(s) Swish and Spit five times a day  chlorhexidine 4% Liquid 1 Application(s) Topical <User Schedule>  diphenhydrAMINE 25 milliGRAM(s) Oral once  phytonadione   Solution 5 milliGRAM(s) Oral <User Schedule>  sodium chloride 0.9%. 1000 milliLiter(s) IV Continuous <Continuous>      PRN:   acetaminophen     Tablet .. 650 milliGRAM(s) Oral every 6 hours PRN  prochlorperazine   Injectable 10 milliGRAM(s) IntraMuscular every 6 hours PRN  sodium chloride 0.9% lock flush 10 milliLiter(s) IV Push every 1 hour PRN      A/P:  64 year old female with a history of multiple myeloma   Pre :  Autologous PBSCT day +1  8/23- HPC transplant today, continue hydration for 24 hours post infusion of cells. Daily weights, strict I&O, prn diuresis. Start levaquin / fluconazole prophylaxis when neutropenic.     1. Infectious Disease:   acyclovir   Oral Tab/Cap 800 milliGRAM(s) Oral every 12 hours    2. GI Prophylaxis:   pantoprazole    Tablet 40 milliGRAM(s) Oral before breakfast    3. Mouthcare - NS / NaHCO3 rinses, Biotene; Skin care     4. Transfuse & replete electrolytes prn     5. IV hydration, daily weights, strict I&O, prn diuresis     6. PO intake as tolerated, nutrition follow up as needed    7. Antiemetics, anti-diarrhea medications:   prochlorperazine   Injectable 10 milliGRAM(s) IntraMuscular every 6 hours PRN    8. OOB as tolerated, physical therapy consult if needed     9. Monitor coags / fibrinogen 2x week, vitamin K as needed   phytonadione   Solution 5 milliGRAM(s) Oral <User Schedule>    10. Monitor closely for clinical changes, monitor for fevers     11. Emotional support provided, plan of care discussed and questions addressed     12. Patient education done regarding plan of care, restrictions and discharge planning     13. Continue regular social work input     I have written the above note for Dr. Ambrocio who performed service with me in the room.   Sarita Mendoza NP  ([phone])    I have seen and examined patient with NP, I agree with above note as scribed.                    [facility] Transplant Team                                                      Critical / Counseling Time Provided: 30 minutes                                                                                                                                                        Chief Complaint: Autologous pbsct with high dose melphalan prep regimen for treatment of IgG lambda multiple myeloma    Disease: IgG lambda multiple myeloma  Type of transplant: Autologous  Conditioning prep: Melphalan (200 mg/m2)   ABO / CMV: O POS / POS     S: Patient seen and examined with [facility] Transplant Team:     + Soft stool yesterday per RN  +abd pain   + anxiety     O: Vitals:   Vital Signs Last 24 Hrs  T(C): 36.2 (23 Aug 2024 06:03), Max: 37 (22 Aug 2024 18:13)  T(F): 97.2 (23 Aug 2024 06:03), Max: 98.6 (22 Aug 2024 18:13)  HR: 99 (23 Aug 2024 06:03) (84 - 101)  BP: 112/61 (23 Aug 2024 06:03) (112/61 - 150/70)  BP(mean): --  RR: 18 (23 Aug 2024 06:03) (18 - 18)  SpO2: 95% (23 Aug 2024 06:03) (95% - 99%)    Parameters below as of 23 Aug 2024 06:03  Patient On (Oxygen Delivery Method): room air      Admit weight: 62.2kg   Daily Height in cm: 152 (22 Aug 2024 08:23)    64.1 today      Intake / Output:   08-22 @ 07:01  -  08-23 @ 07:00  --------------------------------------------------------  IN: 3438 mL / OUT: 3220 mL / NET: 218 mL          PE:   Oropharynx: no erythema or ulcerations   Oral Mucositis:              -                                          Grade: n/a   CVS: S1, S2 RRR   Lungs: CTA throughout bilaterally   Abdomen: + BS x 4, soft, NT, ND  Extremities: no edema   Gastric Mucositis:       -                                           Grade: n/a   Intestinal Mucositis:     -                                         Grade: n/a   Skin: no rash   TLC: CDI   Neuro: A&Ox3   Pain: not graded     LABS:                            9.3    6.91  )-----------( 176      ( 24 Aug 2024 06:59 )             28.0         Mean Cell Volume : 93.6 fl  Mean Cell Hemoglobin : 31.1 pg  Mean Cell Hemoglobin Concentration : 33.2 gm/dL  Auto Neutrophil # : 6.34 K/uL  Auto Lymphocyte # : 0.24 K/uL  Auto Monocyte # : 0.29 K/uL  Auto Eosinophil # : 0.00 K/uL  Auto Basophil # : 0.00 K/uL  Auto Neutrophil % : 91.7 %  Auto Lymphocyte % : 3.5 %  Auto Monocyte % : 4.2 %  Auto Eosinophil % : 0.0 %  Auto Basophil % : 0.0 %      08-24    141  |  110<H>  |  33<H>  ----------------------------<  132<H>  3.6   |  21<L>  |  0.93    Ca    8.5      24 Aug 2024 06:59  Phos  2.6     08-24  Mg     1.9     08-24    TPro  5.4<L>  /  Alb  3.6  /  TBili  0.6  /  DBili  x   /  AST  20  /  ALT  10  /  AlkPhos  70  08-24        PT/INR - ( 22 Aug 2024 11:15 )   PT: 11.3 sec;   INR: 1.08 ratio         PTT - ( 22 Aug 2024 11:15 )  PTT:31.2 sec      Uric Acid --          Meds:   Antimicrobials:   acyclovir   Oral Tab/Cap 800 milliGRAM(s) Oral every 12 hours      GI:  pantoprazole    Tablet 40 milliGRAM(s) Oral before breakfast  sodium bicarbonate Mouth Rinse 10 milliLiter(s) Swish and Spit five times a day      Cardiovascular:   valsartan 80 milliGRAM(s) Oral daily      Other medications:   acetaminophen     Tablet .. 650 milliGRAM(s) Oral once  Biotene Dry Mouth Oral Rinse 5 milliLiter(s) Swish and Spit five times a day  chlorhexidine 4% Liquid 1 Application(s) Topical <User Schedule>  diphenhydrAMINE 25 milliGRAM(s) Oral once  phytonadione   Solution 5 milliGRAM(s) Oral <User Schedule>  sodium chloride 0.9%. 1000 milliLiter(s) IV Continuous <Continuous>      PRN:   acetaminophen     Tablet .. 650 milliGRAM(s) Oral every 6 hours PRN  prochlorperazine   Injectable 10 milliGRAM(s) IntraMuscular every 6 hours PRN  sodium chloride 0.9% lock flush 10 milliLiter(s) IV Push every 1 hour PRN      A/P:  64 year old female with a history of multiple myeloma   Pre :  Autologous PBSCT day +1  8/23- HPC transplant today, continue hydration for 24 hours post infusion of cells. Daily weights, strict I&O, prn diuresis. Start levaquin / fluconazole prophylaxis when neutropenic.     1. Infectious Disease:   acyclovir   Oral Tab/Cap 800 milliGRAM(s) Oral every 12 hours    2. GI Prophylaxis:   pantoprazole    Tablet 40 milliGRAM(s) Oral before breakfast    3. Mouthcare - NS / NaHCO3 rinses, Biotene; Skin care     4. Transfuse & replete electrolytes prn     5. IV hydration, daily weights, strict I&O, prn diuresis     6. PO intake as tolerated, nutrition follow up as needed    7. Antiemetics, anti-diarrhea medications:   prochlorperazine   Injectable 10 milliGRAM(s) IntraMuscular every 6 hours PRN    8. OOB as tolerated, physical therapy consult if needed     9. Monitor coags / fibrinogen 2x week, vitamin K as needed   phytonadione   Solution 5 milliGRAM(s) Oral <User Schedule>    10. Monitor closely for clinical changes, monitor for fevers     11. Emotional support provided, plan of care discussed and questions addressed     12. Patient education done regarding plan of care, restrictions and discharge planning     13. Continue regular social work input     I have written the above note for Dr. Ambrocio who performed service with me in the room.   Sarita Mendoza NP  ([phone])    I have seen and examined patient with NP, I agree with above note as scribed.                    [facility]                                                      Critical / Counseling Time Provided: 30 minutes                                                                                                                                                        Chief Complaint: Autologous pbsct with high dose melphalan prep regimen for treatment of IgG lambda multiple myeloma    Disease: IgG lambda multiple myeloma  Type of transplant: Autologous  Conditioning prep: Melphalan (200 mg/m2)   ABO / CMV: O POS / POS     S: Patient seen and examined with [facility] Transplant Team:     +HA, improved after Tylenol   + Soft stool yesterday per RN  +intermittent nausea, took Zofran  + intermittent generalized abd pain   + anxiety     O: Vitals:   Vital Signs Last 24 Hrs  T(C): 36.2 (23 Aug 2024 06:03), Max: 37 (22 Aug 2024 18:13)  T(F): 97.2 (23 Aug 2024 06:03), Max: 98.6 (22 Aug 2024 18:13)  HR: 99 (23 Aug 2024 06:03) (84 - 101)  BP: 112/61 (23 Aug 2024 06:03) (112/61 - 150/70)  BP(mean): --  RR: 18 (23 Aug 2024 06:03) (18 - 18)  SpO2: 95% (23 Aug 2024 06:03) (95% - 99%)    Parameters below as of 23 Aug 2024 06:03  Patient On (Oxygen Delivery Method): room air      Admit weight: 62.2kg   Daily Height in cm: 152 (22 Aug 2024 08:23)    64.1 today      Intake / Output:   08-22 @ 07:01  -  08-23 @ 07:00  --------------------------------------------------------  IN: 3438 mL / OUT: 3220 mL / NET: 218 mL        PE:   Oropharynx: no erythema or ulcerations   Oral Mucositis:              -                                          Grade: n/a   CVS: S1, S2 RRR   Lungs: CTA throughout bilaterally   Abdomen: + BS x 4, soft, NT, ND  Extremities: no edema   Gastric Mucositis:       -                                           Grade: n/a   Intestinal Mucositis:     -                                         Grade: n/a   Skin: no rash   TLC: CDI   Neuro: A&Ox3   Pain: not graded     LABS:                            9.3    6.91  )-----------( 176      ( 24 Aug 2024 06:59 )             28.0         Mean Cell Volume : 93.6 fl  Mean Cell Hemoglobin : 31.1 pg  Mean Cell Hemoglobin Concentration : 33.2 gm/dL  Auto Neutrophil # : 6.34 K/uL  Auto Lymphocyte # : 0.24 K/uL  Auto Monocyte # : 0.29 K/uL  Auto Eosinophil # : 0.00 K/uL  Auto Basophil # : 0.00 K/uL  Auto Neutrophil % : 91.7 %  Auto Lymphocyte % : 3.5 %  Auto Monocyte % : 4.2 %  Auto Eosinophil % : 0.0 %  Auto Basophil % : 0.0 %      08-24    141  |  110<H>  |  33<H>  ----------------------------<  132<H>  3.6   |  21<L>  |  0.93    Ca    8.5      24 Aug 2024 06:59  Phos  2.6     08-24  Mg     1.9     08-24    TPro  5.4<L>  /  Alb  3.6  /  TBili  0.6  /  DBili  x   /  AST  20  /  ALT  10  /  AlkPhos  70  08-24        PT/INR - ( 22 Aug 2024 11:15 )   PT: 11.3 sec;   INR: 1.08 ratio         PTT - ( 22 Aug 2024 11:15 )  PTT:31.2 sec      Uric Acid --    Radiology finding:     < from: IR Procedure (08.22.24 @ 09:47) >  Impression: Successful placement of a right internal jugular vein   triple-lumen catheter using ultrasound and fluoroscopic guidance as above.        Meds:   Antimicrobials:   acyclovir   Oral Tab/Cap 800 milliGRAM(s) Oral every 12 hours      GI:  pantoprazole    Tablet 40 milliGRAM(s) Oral before breakfast  sodium bicarbonate Mouth Rinse 10 milliLiter(s) Swish and Spit five times a day      Cardiovascular:   valsartan 80 milliGRAM(s) Oral daily      Other medications:   acetaminophen     Tablet .. 650 milliGRAM(s) Oral once  Biotene Dry Mouth Oral Rinse 5 milliLiter(s) Swish and Spit five times a day  chlorhexidine 4% Liquid 1 Application(s) Topical <User Schedule>  diphenhydrAMINE 25 milliGRAM(s) Oral once  phytonadione   Solution 5 milliGRAM(s) Oral <User Schedule>  sodium chloride 0.9%. 1000 milliLiter(s) IV Continuous <Continuous>      PRN:   acetaminophen     Tablet .. 650 milliGRAM(s) Oral every 6 hours PRN  prochlorperazine   Injectable 10 milliGRAM(s) IntraMuscular every 6 hours PRN  sodium chloride 0.9% lock flush 10 milliLiter(s) IV Push every 1 hour PRN      A/P:  64 year old female with a history of multiple myeloma   Pre :  Autologous PBSCT day +1  8/23- HPC transplant today, continue hydration for 24 hours post infusion of cells. Daily weights, strict I&O, prn diuresis. Start levaquin / fluconazole prophylaxis when neutropenic.   8/24 c/o intermittent abd pain, AXR done, reading from radiologist requested. weight gain from admission, monitor closely, Lasix PRN     1. Infectious Disease:   not neutropenic afebrile   acyclovir   Oral Tab/Cap 800 milliGRAM(s) Oral every 12 hours    2. GI Prophylaxis:   pantoprazole    Tablet 40 milliGRAM(s) Oral before breakfast    3. Mouthcare - NS / NaHCO3 rinses, Biotene; Skin care     4. Transfuse & replete electrolytes prn     5. IV hydration, daily weights, strict I&O, prn diuresis     6. PO intake as tolerated, nutrition follow up as needed    7. Antiemetics, anti-diarrhea medications:   prochlorperazine   Injectable 10 milliGRAM(s) IntraMuscular every 6 hours PRN    8. OOB as tolerated, physical therapy consult if needed     9. Monitor coags / fibrinogen 2x week, vitamin K as needed   phytonadione   Solution 5 milliGRAM(s) Oral <User Schedule>    10. Monitor closely for clinical changes, monitor for fevers     11. Emotional support provided, plan of care discussed and questions addressed     12. Patient education done regarding plan of care, restrictions and discharge planning     13. Continue regular social work input     I have written the above note for Dr. Ambrocio who performed service with me in the room.   Sarita Mendoza NP  ([phone])    I have seen and examined patient with NP, I agree with above note as scribed.

## 2024-08-25 LAB
ALBUMIN SERPL ELPH-MCNC: 3.7 G/DL — SIGNIFICANT CHANGE UP (ref 3.3–5)
ALP SERPL-CCNC: 73 U/L — SIGNIFICANT CHANGE UP (ref 40–120)
ALT FLD-CCNC: 10 U/L — SIGNIFICANT CHANGE UP (ref 10–45)
ANION GAP SERPL CALC-SCNC: 8 MMOL/L — SIGNIFICANT CHANGE UP (ref 5–17)
AST SERPL-CCNC: 20 U/L — SIGNIFICANT CHANGE UP (ref 10–40)
BASOPHILS # BLD AUTO: 0 K/UL — SIGNIFICANT CHANGE UP (ref 0–0.2)
BASOPHILS NFR BLD AUTO: 0 % — SIGNIFICANT CHANGE UP (ref 0–2)
BILIRUB SERPL-MCNC: 0.7 MG/DL — SIGNIFICANT CHANGE UP (ref 0.2–1.2)
BUN SERPL-MCNC: 20 MG/DL — SIGNIFICANT CHANGE UP (ref 7–23)
CALCIUM SERPL-MCNC: 9.2 MG/DL — SIGNIFICANT CHANGE UP (ref 8.4–10.5)
CHLORIDE SERPL-SCNC: 106 MMOL/L — SIGNIFICANT CHANGE UP (ref 96–108)
CO2 SERPL-SCNC: 24 MMOL/L — SIGNIFICANT CHANGE UP (ref 22–31)
CREAT SERPL-MCNC: 0.81 MG/DL — SIGNIFICANT CHANGE UP (ref 0.5–1.3)
EGFR: 81 ML/MIN/1.73M2 — SIGNIFICANT CHANGE UP
EOSINOPHIL # BLD AUTO: 0 K/UL — SIGNIFICANT CHANGE UP (ref 0–0.5)
EOSINOPHIL NFR BLD AUTO: 0 % — SIGNIFICANT CHANGE UP (ref 0–6)
GLUCOSE SERPL-MCNC: 128 MG/DL — HIGH (ref 70–99)
HCT VFR BLD CALC: 29.1 % — LOW (ref 34.5–45)
HGB BLD-MCNC: 9.8 G/DL — LOW (ref 11.5–15.5)
IMM GRANULOCYTES NFR BLD AUTO: 0.8 % — SIGNIFICANT CHANGE UP (ref 0–0.9)
LDH SERPL L TO P-CCNC: 240 U/L — SIGNIFICANT CHANGE UP (ref 50–242)
LYMPHOCYTES # BLD AUTO: 0.18 K/UL — LOW (ref 1–3.3)
LYMPHOCYTES # BLD AUTO: 4.6 % — LOW (ref 13–44)
MAGNESIUM SERPL-MCNC: 1.8 MG/DL — SIGNIFICANT CHANGE UP (ref 1.6–2.6)
MCHC RBC-ENTMCNC: 31 PG — SIGNIFICANT CHANGE UP (ref 27–34)
MCHC RBC-ENTMCNC: 33.7 GM/DL — SIGNIFICANT CHANGE UP (ref 32–36)
MCV RBC AUTO: 92.1 FL — SIGNIFICANT CHANGE UP (ref 80–100)
MONOCYTES # BLD AUTO: 0.06 K/UL — SIGNIFICANT CHANGE UP (ref 0–0.9)
MONOCYTES NFR BLD AUTO: 1.5 % — LOW (ref 2–14)
NEUTROPHILS # BLD AUTO: 3.64 K/UL — SIGNIFICANT CHANGE UP (ref 1.8–7.4)
NEUTROPHILS NFR BLD AUTO: 93.1 % — HIGH (ref 43–77)
NRBC # BLD: 0 /100 WBCS — SIGNIFICANT CHANGE UP (ref 0–0)
PHOSPHATE SERPL-MCNC: 2.7 MG/DL — SIGNIFICANT CHANGE UP (ref 2.5–4.5)
PLATELET # BLD AUTO: 155 K/UL — SIGNIFICANT CHANGE UP (ref 150–400)
POTASSIUM SERPL-MCNC: 3.2 MMOL/L — LOW (ref 3.5–5.3)
POTASSIUM SERPL-SCNC: 3.2 MMOL/L — LOW (ref 3.5–5.3)
PROT SERPL-MCNC: 5.7 G/DL — LOW (ref 6–8.3)
RBC # BLD: 3.16 M/UL — LOW (ref 3.8–5.2)
RBC # FLD: 14.2 % — SIGNIFICANT CHANGE UP (ref 10.3–14.5)
SODIUM SERPL-SCNC: 138 MMOL/L — SIGNIFICANT CHANGE UP (ref 135–145)
WBC # BLD: 3.91 K/UL — SIGNIFICANT CHANGE UP (ref 3.8–10.5)
WBC # FLD AUTO: 3.91 K/UL — SIGNIFICANT CHANGE UP (ref 3.8–10.5)

## 2024-08-25 PROCEDURE — 99233 SBSQ HOSP IP/OBS HIGH 50: CPT

## 2024-08-25 RX ORDER — METOCLOPRAMIDE HCL 5 MG
10 TABLET ORAL EVERY 6 HOURS
Refills: 0 | Status: DISCONTINUED | OUTPATIENT
Start: 2024-08-25 | End: 2024-08-27

## 2024-08-25 RX ORDER — LORAZEPAM 4 MG/ML
0.5 INJECTION INTRAMUSCULAR; INTRAVENOUS ONCE
Refills: 0 | Status: DISCONTINUED | OUTPATIENT
Start: 2024-08-25 | End: 2024-08-25

## 2024-08-25 RX ORDER — ONDANSETRON 2 MG/ML
8 INJECTION, SOLUTION INTRAMUSCULAR; INTRAVENOUS EVERY 8 HOURS
Refills: 0 | Status: DISCONTINUED | OUTPATIENT
Start: 2024-08-25 | End: 2024-09-08

## 2024-08-25 RX ORDER — POTASSIUM CHLORIDE 10 MEQ
20 TABLET, EXT RELEASE, PARTICLES/CRYSTALS ORAL
Refills: 0 | Status: COMPLETED | OUTPATIENT
Start: 2024-08-25 | End: 2024-08-25

## 2024-08-25 RX ADMIN — SODIUM BICARBONATE 10 MILLILITER(S): 84 INJECTION, SOLUTION INTRAVENOUS at 16:25

## 2024-08-25 RX ADMIN — Medication 10 MILLIGRAM(S): at 03:06

## 2024-08-25 RX ADMIN — LORAZEPAM 0.5 MILLIGRAM(S): 4 INJECTION INTRAMUSCULAR; INTRAVENOUS at 13:55

## 2024-08-25 RX ADMIN — Medication 800 MILLIGRAM(S): at 17:45

## 2024-08-25 RX ADMIN — VALSARTAN 80 MILLIGRAM(S): 40 TABLET ORAL at 06:18

## 2024-08-25 RX ADMIN — ACETAMINOPHEN 650 MILLIGRAM(S): 325 TABLET ORAL at 11:15

## 2024-08-25 RX ADMIN — Medication 40 MILLIGRAM(S): at 06:18

## 2024-08-25 RX ADMIN — Medication 50 MILLIEQUIVALENT(S): at 16:25

## 2024-08-25 RX ADMIN — TIZANIDINE 4 MILLIGRAM(S): 4 TABLET ORAL at 09:58

## 2024-08-25 RX ADMIN — Medication 5 MILLILITER(S): at 16:26

## 2024-08-25 RX ADMIN — ONDANSETRON 8 MILLIGRAM(S): 2 INJECTION, SOLUTION INTRAMUSCULAR; INTRAVENOUS at 06:18

## 2024-08-25 RX ADMIN — Medication 5 MILLILITER(S): at 08:25

## 2024-08-25 RX ADMIN — Medication 50 MILLIEQUIVALENT(S): at 10:38

## 2024-08-25 RX ADMIN — Medication 50 MILLIEQUIVALENT(S): at 13:09

## 2024-08-25 RX ADMIN — Medication 5 MILLILITER(S): at 13:08

## 2024-08-25 RX ADMIN — SODIUM BICARBONATE 10 MILLILITER(S): 84 INJECTION, SOLUTION INTRAVENOUS at 13:08

## 2024-08-25 RX ADMIN — ACETAMINOPHEN 650 MILLIGRAM(S): 325 TABLET ORAL at 10:38

## 2024-08-25 RX ADMIN — LORAZEPAM 0.5 MILLIGRAM(S): 4 INJECTION INTRAMUSCULAR; INTRAVENOUS at 21:11

## 2024-08-25 RX ADMIN — Medication 10 MILLIGRAM(S): at 09:09

## 2024-08-25 RX ADMIN — CHLORHEXIDINE GLUCONATE 1 APPLICATION(S): 40 SOLUTION TOPICAL at 08:24

## 2024-08-25 RX ADMIN — Medication 5 MILLILITER(S): at 19:55

## 2024-08-25 RX ADMIN — SODIUM BICARBONATE 10 MILLILITER(S): 84 INJECTION, SOLUTION INTRAVENOUS at 08:25

## 2024-08-25 RX ADMIN — Medication 800 MILLIGRAM(S): at 06:18

## 2024-08-25 RX ADMIN — SODIUM BICARBONATE 10 MILLILITER(S): 84 INJECTION, SOLUTION INTRAVENOUS at 19:55

## 2024-08-25 NOTE — PROGRESS NOTE ADULT - NS ATTEND AMEND GEN_ALL_CORE FT
64 year old female with a history of multiple myeloma admitted for auto-HSCT Day +1  today     Disease: IgG lambda multiple myeloma  Type of transplant: Autologous  Conditioning prep: Melphalan (200 mg/m2)   ABO / CMV: O POS / POS    Complications:   - Day 0: nausea/vomitting, abdominal pain     Patient was discussed, seen and examined in IDR rounds.   Patient with continued abdominal pain, nausea improved with Zofran   Has frequent bowel movements, but this is chronic with use of immodium at home.   Labs reviewed, no transfusions today   PE: normal oral mucosa, site of line without erythema, + BS in all 4 quadrants,   Will order abdominal xray   Provide supportive care, addition of zofran for supportive care. 64 year old female with a history of multiple myeloma admitted for auto-HSCT Day +2  today     Disease: IgG lambda multiple myeloma  Type of transplant: Autologous  Conditioning prep: Melphalan (200 mg/m2)   ABO / CMV: O POS / POS    Complications:   - Day 0: nausea/vomitting, abdominal pain   -Day 1: B/L lower extremities tremors --> compazine D/c, replaced by reglan     Patient was discussed, seen and examined in IDR rounds.   No longer reporting any abdominal pain.   Patient's nausea improved; Zofran now PRN; compazine changed to reglan. Monitor frequency of bowel movements.   Has frequent bowel movements, but this is chronic with use of immodium at home.   Labs reviewed, no transfusions today   PE: normal oral mucosa, site of line without erythema, + BS in all 4 quadrants,   Continue supportive care.

## 2024-08-25 NOTE — PROGRESS NOTE ADULT - SUBJECTIVE AND OBJECTIVE BOX
[facility] Transplant Team                                                      Critical / Counseling Time Provided: 30 minutes                                                                                                                                                        Chief Complaint: Autologous pbsct with high dose melphalan prep regimen for treatment of IgG lambda multiple myeloma    Disease: IgG lambda multiple myeloma  Type of transplant: Autologous  Conditioning prep: Melphalan (200 mg/m2)   ABO / CMV: O POS / POS     S: Patient seen and examined with [facility] Transplant Team:     +HA, improved after Tylenol   + Soft stool yesterday per RN  +intermittent nausea, took Zofran  + intermittent generalized abd pain   + anxiety       O: Vitals:   Vital Signs Last 24 Hrs  T(C): 37.1 (25 Aug 2024 05:50), Max: 37.3 (24 Aug 2024 21:43)  T(F): 98.8 (25 Aug 2024 05:50), Max: 99.1 (24 Aug 2024 21:43)  HR: 104 (25 Aug 2024 05:50) (104 - 111)  BP: 155/77 (25 Aug 2024 05:50) (147/77 - 155/77)  BP(mean): --  RR: 18 (25 Aug 2024 05:50) (18 - 18)  SpO2: 97% (25 Aug 2024 05:50) (97% - 98%)    Parameters below as of 25 Aug 2024 05:50  Patient On (Oxygen Delivery Method): room air        Admit weight:   Daily     Daily Weight in k.1 (24 Aug 2024 07:30)    Intake / Output:    @ 07:01  -   @ 07:00  --------------------------------------------------------  IN: 2791 mL / OUT: 4200 mL / NET: -1409 mL      PE:   Oropharynx: no erythema or ulcerations   Oral Mucositis:              -                                          Grade: n/a   CVS: S1, S2 RRR   Lungs: CTA throughout bilaterally   Abdomen: + BS x 4, soft, NT, ND  Extremities: no edema   Gastric Mucositis:       -                                           Grade: n/a   Intestinal Mucositis:     -                                         Grade: n/a   Skin: no rash   TLC: CDI   Neuro: A&Ox3   Pain: not graded               Labs:   CBC Full  -  ( 25 Aug 2024 06:39 )  WBC Count : 3.91 K/uL  Hemoglobin : 9.8 g/dL  Hematocrit : 29.1 %  Platelet Count - Automated : 155 K/uL  Mean Cell Volume : 92.1 fl  Mean Cell Hemoglobin : 31.0 pg  Mean Cell Hemoglobin Concentration : 33.7 gm/dL  Auto Neutrophil # : 3.64 K/uL  Auto Lymphocyte # : 0.18 K/uL  Auto Monocyte # : 0.06 K/uL  Auto Eosinophil # : 0.00 K/uL  Auto Basophil # : 0.00 K/uL  Auto Neutrophil % : 93.1 %  Auto Lymphocyte % : 4.6 %  Auto Monocyte % : 1.5 %  Auto Eosinophil % : 0.0 %  Auto Basophil % : 0.0 %                          9.8    3.91  )-----------( 155      ( 25 Aug 2024 06:39 )             29.1     08-24    141  |  110<H>  |  33<H>  ----------------------------<  132<H>  3.6   |  21<L>  |  0.93    Ca    8.5      24 Aug 2024 06:59  Phos  2.6     08-24  Mg     1.9     08-24    TPro  5.4<L>  /  Alb  3.6  /  TBili  0.6  /  DBili  x   /  AST  20  /  ALT  10  /  AlkPhos  70  08-24      LIVER FUNCTIONS - ( 24 Aug 2024 06:59 )  Alb: 3.6 g/dL / Pro: 5.4 g/dL / ALK PHOS: 70 U/L / ALT: 10 U/L / AST: 20 U/L / GGT: x                       Cultures:         Radiology:       < from: IR Procedure (24 @ 09:47) >  Impression: Successful placement of a right internal jugular vein   triple-lumen catheter using ultrasound and fluoroscopic guidance as above.            Meds:   Antimicrobials:   acyclovir   Oral Tab/Cap 800 milliGRAM(s) Oral every 12 hours      Heme / Onc:       GI:  loperamide 4 milliGRAM(s) Oral every 4 hours PRN  pantoprazole    Tablet 40 milliGRAM(s) Oral before breakfast  simethicone 80 milliGRAM(s) Chew four times a day PRN  sodium bicarbonate Mouth Rinse 10 milliLiter(s) Swish and Spit five times a day      Cardiovascular:   valsartan 80 milliGRAM(s) Oral daily      Immunologic:       Other medications:   Biotene Dry Mouth Oral Rinse 5 milliLiter(s) Swish and Spit five times a day  chlorhexidine 4% Liquid 1 Application(s) Topical <User Schedule>  ondansetron Injectable 8 milliGRAM(s) IV Push every 8 hours  phytonadione   Solution 5 milliGRAM(s) Oral <User Schedule>  sodium chloride 0.9%. 1000 milliLiter(s) IV Continuous <Continuous>      PRN:   acetaminophen     Tablet .. 650 milliGRAM(s) Oral every 6 hours PRN  loperamide 4 milliGRAM(s) Oral every 4 hours PRN  prochlorperazine   Injectable 10 milliGRAM(s) IV Push every 6 hours PRN  simethicone 80 milliGRAM(s) Chew four times a day PRN  sodium chloride 0.9% lock flush 10 milliLiter(s) IV Push every 1 hour PRN          A/P:  64 year old female with a history of multiple myeloma   Pre :  Autologous PBSCT day +2  - HPC transplant today, continue hydration for 24 hours post infusion of cells. Daily weights, strict I&O, prn diuresis. Start levaquin / fluconazole prophylaxis when neutropenic.    c/o intermittent abd pain, AXR done, reading from radiologist requested. weight gain from admission, monitor closely, Lasix PRN     1. Infectious Disease:   not neutropenic afebrile   acyclovir   Oral Tab/Cap 800 milliGRAM(s) Oral every 12 hours    2. GI Prophylaxis:   pantoprazole    Tablet 40 milliGRAM(s) Oral before breakfast    3. Mouthcare - NS / NaHCO3 rinses, Biotene; Skin care     4. Transfuse & replete electrolytes prn     5. IV hydration, daily weights, strict I&O, prn diuresis     6. PO intake as tolerated, nutrition follow up as needed    7. Antiemetics, anti-diarrhea medications:   prochlorperazine   Injectable 10 milliGRAM(s) IntraMuscular every 6 hours PRN    8. OOB as tolerated, physical therapy consult if needed     9. Monitor coags / fibrinogen 2x week, vitamin K as needed   phytonadione   Solution 5 milliGRAM(s) Oral <User Schedule>    10. Monitor closely for clinical changes, monitor for fevers     11. Emotional support provided, plan of care discussed and questions addressed     12. Patient education done regarding plan of care, restrictions and discharge planning     13. Continue regular social work input     I have written the above note for Dr. Ambrocio who performed service with me in the room.   Sarita Mendoza NP  ([phone])    I have seen and examined patient with NP, I agree with above note as scribed.                            [facility] Transplant Team                                                      Critical / Counseling Time Provided: 30 minutes                                                                                                                                                        Chief Complaint: Autologous pbsct with high dose melphalan prep regimen for treatment of IgG lambda multiple myeloma    Disease: IgG lambda multiple myeloma  Type of transplant: Autologous  Conditioning prep: Melphalan (200 mg/m2)   ABO / CMV: O POS / POS     S: Patient seen and examined with [facility] Transplant Team:     +HA, improved after Tylenol   + Soft and watery  stool yesterday per RN  +intermittent nausea, improved on  Zofran ATC   + intermittent generalized abd pain, resolved today   + anxiety, leg shaking yesterday       O: Vitals:   Vital Signs Last 24 Hrs  T(C): 37.1 (25 Aug 2024 05:50), Max: 37.3 (24 Aug 2024 21:43)  T(F): 98.8 (25 Aug 2024 05:50), Max: 99.1 (24 Aug 2024 21:43)  HR: 104 (25 Aug 2024 05:50) (104 - 111)  BP: 155/77 (25 Aug 2024 05:50) (147/77 - 155/77)  BP(mean): --  RR: 18 (25 Aug 2024 05:50) (18 - 18)  SpO2: 97% (25 Aug 2024 05:50) (97% - 98%)    Parameters below as of 25 Aug 2024 05:50  Patient On (Oxygen Delivery Method): room air        Admit weight:   Daily     Daily Weight in k.1 (24 Aug 2024 07:30)    Intake / Output:    @ 07:01  -  08-25 @ 07:00  --------------------------------------------------------  IN: 2791 mL / OUT: 4200 mL / NET: -1409 mL      PE:   Oropharynx: no erythema or ulcerations   Oral Mucositis:              -                                          Grade: n/a   CVS: S1, S2 RRR   Lungs: CTA throughout bilaterally   Abdomen: + BS x 4, soft, NT, ND  Extremities: no edema   Gastric Mucositis:       -                                           Grade: n/a   Intestinal Mucositis:     -                                         Grade: n/a   Skin: no rash   TLC: CDI   Neuro: A&Ox3   Pain: not graded               Labs:   CBC Full  -  ( 25 Aug 2024 06:39 )  WBC Count : 3.91 K/uL  Hemoglobin : 9.8 g/dL  Hematocrit : 29.1 %  Platelet Count - Automated : 155 K/uL  Mean Cell Volume : 92.1 fl  Mean Cell Hemoglobin : 31.0 pg  Mean Cell Hemoglobin Concentration : 33.7 gm/dL  Auto Neutrophil # : 3.64 K/uL  Auto Lymphocyte # : 0.18 K/uL  Auto Monocyte # : 0.06 K/uL  Auto Eosinophil # : 0.00 K/uL  Auto Basophil # : 0.00 K/uL  Auto Neutrophil % : 93.1 %  Auto Lymphocyte % : 4.6 %  Auto Monocyte % : 1.5 %  Auto Eosinophil % : 0.0 %  Auto Basophil % : 0.0 %                          9.8    3.91  )-----------( 155      ( 25 Aug 2024 06:39 )             29.1     08-24    141  |  110<H>  |  33<H>  ----------------------------<  132<H>  3.6   |  21<L>  |  0.93    Ca    8.5      24 Aug 2024 06:59  Phos  2.6     08-24  Mg     1.9     08-24    TPro  5.4<L>  /  Alb  3.6  /  TBili  0.6  /  DBili  x   /  AST  20  /  ALT  10  /  AlkPhos  70  08-24      LIVER FUNCTIONS - ( 24 Aug 2024 06:59 )  Alb: 3.6 g/dL / Pro: 5.4 g/dL / ALK PHOS: 70 U/L / ALT: 10 U/L / AST: 20 U/L / GGT: x                       Cultures:         Radiology:       < from: IR Procedure (24 @ 09:47) >  Impression: Successful placement of a right internal jugular vein   triple-lumen catheter using ultrasound and fluoroscopic guidance as above.            Meds:   Antimicrobials:   acyclovir   Oral Tab/Cap 800 milliGRAM(s) Oral every 12 hours      Heme / Onc:       GI:  loperamide 4 milliGRAM(s) Oral every 4 hours PRN  pantoprazole    Tablet 40 milliGRAM(s) Oral before breakfast  simethicone 80 milliGRAM(s) Chew four times a day PRN  sodium bicarbonate Mouth Rinse 10 milliLiter(s) Swish and Spit five times a day      Cardiovascular:   valsartan 80 milliGRAM(s) Oral daily      Immunologic:       Other medications:   Biotene Dry Mouth Oral Rinse 5 milliLiter(s) Swish and Spit five times a day  chlorhexidine 4% Liquid 1 Application(s) Topical <User Schedule>  ondansetron Injectable 8 milliGRAM(s) IV Push every 8 hours  phytonadione   Solution 5 milliGRAM(s) Oral <User Schedule>  sodium chloride 0.9%. 1000 milliLiter(s) IV Continuous <Continuous>      PRN:   acetaminophen     Tablet .. 650 milliGRAM(s) Oral every 6 hours PRN  loperamide 4 milliGRAM(s) Oral every 4 hours PRN  prochlorperazine   Injectable 10 milliGRAM(s) IV Push every 6 hours PRN  simethicone 80 milliGRAM(s) Chew four times a day PRN  sodium chloride 0.9% lock flush 10 milliLiter(s) IV Push every 1 hour PRN          A/P:  64 year old female with a history of multiple myeloma   Pre :  Autologous PBSCT day +2  - HPC transplant today, continue hydration for 24 hours post infusion of cells. Daily weights, strict I&O, prn diuresis. Start levaquin / fluconazole prophylaxis when neutropenic.    c/o intermittent abd pain, AXR done, reading from radiologist requested. weight gain from admission, monitor closely, Lasix PRN     1. Infectious Disease:   not neutropenic afebrile   acyclovir   Oral Tab/Cap 800 milliGRAM(s) Oral every 12 hours    2. GI Prophylaxis:   pantoprazole    Tablet 40 milliGRAM(s) Oral before breakfast    3. Mouthcare - NS / NaHCO3 rinses, Biotene; Skin care     4. Transfuse & replete electrolytes prn     5. IV hydration, daily weights, strict I&O, prn diuresis     6. PO intake as tolerated, nutrition follow up as needed    7. Antiemetics, anti-diarrhea medications:   prochlorperazine   Injectable 10 milliGRAM(s) IntraMuscular every 6 hours PRN    8. OOB as tolerated, physical therapy consult if needed     9. Monitor coags / fibrinogen 2x week, vitamin K as needed   phytonadione   Solution 5 milliGRAM(s) Oral <User Schedule>    10. Monitor closely for clinical changes, monitor for fevers     11. Emotional support provided, plan of care discussed and questions addressed     12. Patient education done regarding plan of care, restrictions and discharge planning     13. Continue regular social work input     I have written the above note for Dr. Ambrocio who performed service with me in the room.   Sarita Mendoza NP  ([phone])    I have seen and examined patient with NP, I agree with above note as scribed.                            [facility] Transplant Team                                                      Critical / Counseling Time Provided: 30 minutes                                                                                                                                                        Chief Complaint: Autologous pbsct with high dose melphalan prep regimen for treatment of IgG lambda multiple myeloma    Disease: IgG lambda multiple myeloma  Type of transplant: Autologous  Conditioning prep: Melphalan (200 mg/m2)   ABO / CMV: O POS / POS     S: Patient seen and examined with [facility] Transplant Team:     +HA, improved after Tylenol   + Soft and watery  stool yesterday per RN  +intermittent nausea,   + intermittent generalized abd pain, resolved today   + anxiety, leg shaking yesterday       O: Vitals:   Vital Signs Last 24 Hrs  T(C): 37.1 (25 Aug 2024 05:50), Max: 37.3 (24 Aug 2024 21:43)  T(F): 98.8 (25 Aug 2024 05:50), Max: 99.1 (24 Aug 2024 21:43)  HR: 104 (25 Aug 2024 05:50) (104 - 111)  BP: 155/77 (25 Aug 2024 05:50) (147/77 - 155/77)  BP(mean): --  RR: 18 (25 Aug 2024 05:50) (18 - 18)  SpO2: 97% (25 Aug 2024 05:50) (97% - 98%)    Parameters below as of 25 Aug 2024 05:50  Patient On (Oxygen Delivery Method): room air        Admit weight:   Daily     Daily Weight in k.1 (24 Aug 2024 07:30)    Intake / Output:    @ 07:01  -  - @ 07:00  --------------------------------------------------------  IN: 2791 mL / OUT: 4200 mL / NET: -1409 mL      PE:   Oropharynx: no erythema or ulcerations   Oral Mucositis:              -                                          Grade: n/a   CVS: S1, S2 RRR   Lungs: CTA throughout bilaterally   Abdomen: + BS x 4, soft, NT, ND  Extremities: no edema   Gastric Mucositis:       -                                           Grade: n/a   Intestinal Mucositis:     -                                         Grade: n/a   Skin: no rash   TLC: CDI   Neuro: A&Ox3   Pain: not graded               Labs:   CBC Full  -  ( 25 Aug 2024 06:39 )  WBC Count : 3.91 K/uL  Hemoglobin : 9.8 g/dL  Hematocrit : 29.1 %  Platelet Count - Automated : 155 K/uL  Mean Cell Volume : 92.1 fl  Mean Cell Hemoglobin : 31.0 pg  Mean Cell Hemoglobin Concentration : 33.7 gm/dL  Auto Neutrophil # : 3.64 K/uL  Auto Lymphocyte # : 0.18 K/uL  Auto Monocyte # : 0.06 K/uL  Auto Eosinophil # : 0.00 K/uL  Auto Basophil # : 0.00 K/uL  Auto Neutrophil % : 93.1 %  Auto Lymphocyte % : 4.6 %  Auto Monocyte % : 1.5 %  Auto Eosinophil % : 0.0 %  Auto Basophil % : 0.0 %                          9.8    3.91  )-----------( 155      ( 25 Aug 2024 06:39 )             29.1     08-24    141  |  110<H>  |  33<H>  ----------------------------<  132<H>  3.6   |  21<L>  |  0.93    Ca    8.5      24 Aug 2024 06:59  Phos  2.6     08-24  Mg     1.9     08-24    TPro  5.4<L>  /  Alb  3.6  /  TBili  0.6  /  DBili  x   /  AST  20  /  ALT  10  /  AlkPhos  70  08-24      LIVER FUNCTIONS - ( 24 Aug 2024 06:59 )  Alb: 3.6 g/dL / Pro: 5.4 g/dL / ALK PHOS: 70 U/L / ALT: 10 U/L / AST: 20 U/L / GGT: x                       Cultures:         Radiology:       < from: IR Procedure (24 @ 09:47) >  Impression: Successful placement of a right internal jugular vein   triple-lumen catheter using ultrasound and fluoroscopic guidance as above.            Meds:   Antimicrobials:   acyclovir   Oral Tab/Cap 800 milliGRAM(s) Oral every 12 hours      Heme / Onc:       GI:  loperamide 4 milliGRAM(s) Oral every 4 hours PRN  pantoprazole    Tablet 40 milliGRAM(s) Oral before breakfast  simethicone 80 milliGRAM(s) Chew four times a day PRN  sodium bicarbonate Mouth Rinse 10 milliLiter(s) Swish and Spit five times a day      Cardiovascular:   valsartan 80 milliGRAM(s) Oral daily      Immunologic:       Other medications:   Biotene Dry Mouth Oral Rinse 5 milliLiter(s) Swish and Spit five times a day  chlorhexidine 4% Liquid 1 Application(s) Topical <User Schedule>  ondansetron Injectable 8 milliGRAM(s) IV Push every 8 hours  phytonadione   Solution 5 milliGRAM(s) Oral <User Schedule>  sodium chloride 0.9%. 1000 milliLiter(s) IV Continuous <Continuous>      PRN:   acetaminophen     Tablet .. 650 milliGRAM(s) Oral every 6 hours PRN  loperamide 4 milliGRAM(s) Oral every 4 hours PRN  prochlorperazine   Injectable 10 milliGRAM(s) IV Push every 6 hours PRN  simethicone 80 milliGRAM(s) Chew four times a day PRN  sodium chloride 0.9% lock flush 10 milliLiter(s) IV Push every 1 hour PRN          A/P:  64 year old female with a history of multiple myeloma   Pre :  Autologous PBSCT day +2  - HPC transplant today, continue hydration for 24 hours post infusion of cells. Daily weights, strict I&O, prn diuresis. Start levaquin / fluconazole prophylaxis when neutropenic.    c/o intermittent abd pain, AXR done, reading from radiologist requested. weight gain from admission, monitor closely, Lasix PRN     1. Infectious Disease:   not neutropenic afebrile   acyclovir   Oral Tab/Cap 800 milliGRAM(s) Oral every 12 hours    2. GI Prophylaxis:   pantoprazole    Tablet 40 milliGRAM(s) Oral before breakfast    3. Mouthcare - NS / NaHCO3 rinses, Biotene; Skin care     4. Transfuse & replete electrolytes prn     5. IV hydration, daily weights, strict I&O, prn diuresis     6. PO intake as tolerated, nutrition follow up as needed    7. Antiemetics, anti-diarrhea medications:   prochlorperazine   Injectable 10 milliGRAM(s) IntraMuscular every 6 hours PRN    8. OOB as tolerated, physical therapy consult if needed     9. Monitor coags / fibrinogen 2x week, vitamin K as needed   phytonadione   Solution 5 milliGRAM(s) Oral <User Schedule>    10. Monitor closely for clinical changes, monitor for fevers     11. Emotional support provided, plan of care discussed and questions addressed     12. Patient education done regarding plan of care, restrictions and discharge planning     13. Continue regular social work input     I have written the above note for Dr. Ambrocio who performed service with me in the room.   Sarita Mendoza NP  ([phone])    I have seen and examined patient with NP, I agree with above note as scribed.                            [facility] Transplant Team                                                      Critical / Counseling Time Provided: 30 minutes                                                                                                                                                        Chief Complaint: Autologous pbsct with high dose melphalan prep regimen for treatment of IgG lambda multiple myeloma    Disease: IgG lambda multiple myeloma  Type of transplant: Autologous  Conditioning prep: Melphalan (200 mg/m2)   ABO / CMV: O POS / POS     S: Patient seen and examined with [facility] Transplant Team:     +HA, improved after Tylenol   + Soft and watery  stool yesterday per RN  +intermittent nausea,   + intermittent generalized abd pain, resolved today   + anxiety, leg shaking yesterday       O: Vitals:   Vital Signs Last 24 Hrs  T(C): 37.1 (25 Aug 2024 05:50), Max: 37.3 (24 Aug 2024 21:43)  T(F): 98.8 (25 Aug 2024 05:50), Max: 99.1 (24 Aug 2024 21:43)  HR: 104 (25 Aug 2024 05:50) (104 - 111)  BP: 155/77 (25 Aug 2024 05:50) (147/77 - 155/77)  BP(mean): --  RR: 18 (25 Aug 2024 05:50) (18 - 18)  SpO2: 97% (25 Aug 2024 05:50) (97% - 98%)    Parameters below as of 25 Aug 2024 05:50  Patient On (Oxygen Delivery Method): room air        Admit weight: 62.2 kg  Daily   62.3 today   Daily Weight in k.1 (24 Aug 2024 07:30)    Intake / Output:    @ 07:01  -   @ 07:00  --------------------------------------------------------  IN: 2791 mL / OUT: 4200 mL / NET: -1409 mL      PE:   Oropharynx: no erythema or ulcerations   Oral Mucositis:              -                                          Grade: n/a   CVS: S1, S2 RRR   Lungs: CTA throughout bilaterally   Abdomen: + BS x 4, soft, NT, ND  Extremities: no edema   Gastric Mucositis:       -                                           Grade: n/a   Intestinal Mucositis:     -                                         Grade: n/a   Skin: no rash   TLC: CDI   Neuro: A&Ox3   Pain: not graded               Labs:   CBC Full  -  ( 25 Aug 2024 06:39 )  WBC Count : 3.91 K/uL  Hemoglobin : 9.8 g/dL  Hematocrit : 29.1 %  Platelet Count - Automated : 155 K/uL  Mean Cell Volume : 92.1 fl  Mean Cell Hemoglobin : 31.0 pg  Mean Cell Hemoglobin Concentration : 33.7 gm/dL  Auto Neutrophil # : 3.64 K/uL  Auto Lymphocyte # : 0.18 K/uL  Auto Monocyte # : 0.06 K/uL  Auto Eosinophil # : 0.00 K/uL  Auto Basophil # : 0.00 K/uL  Auto Neutrophil % : 93.1 %  Auto Lymphocyte % : 4.6 %  Auto Monocyte % : 1.5 %  Auto Eosinophil % : 0.0 %  Auto Basophil % : 0.0 %                          9.8    3.91  )-----------( 155      ( 25 Aug 2024 06:39 )             29.1     08-24    141  |  110<H>  |  33<H>  ----------------------------<  132<H>  3.6   |  21<L>  |  0.93    Ca    8.5      24 Aug 2024 06:59  Phos  2.6     08-24  Mg     1.9     08-24    TPro  5.4<L>  /  Alb  3.6  /  TBili  0.6  /  DBili  x   /  AST  20  /  ALT  10  /  AlkPhos  70  08-24      LIVER FUNCTIONS - ( 24 Aug 2024 06:59 )  Alb: 3.6 g/dL / Pro: 5.4 g/dL / ALK PHOS: 70 U/L / ALT: 10 U/L / AST: 20 U/L / GGT: x               Radiology:       < from: IR Procedure (24 @ 09:47) >  Impression: Successful placement of a right internal jugular vein   triple-lumen catheter using ultrasound and fluoroscopic guidance as above.        Meds:   Antimicrobials:   acyclovir   Oral Tab/Cap 800 milliGRAM(s) Oral every 12 hours        GI:  loperamide 4 milliGRAM(s) Oral every 4 hours PRN  pantoprazole    Tablet 40 milliGRAM(s) Oral before breakfast  simethicone 80 milliGRAM(s) Chew four times a day PRN  sodium bicarbonate Mouth Rinse 10 milliLiter(s) Swish and Spit five times a day      Cardiovascular:   valsartan 80 milliGRAM(s) Oral daily      Other medications:   Biotene Dry Mouth Oral Rinse 5 milliLiter(s) Swish and Spit five times a day  chlorhexidine 4% Liquid 1 Application(s) Topical <User Schedule>  ondansetron Injectable 8 milliGRAM(s) IV Push every 8 hours  phytonadione   Solution 5 milliGRAM(s) Oral <User Schedule>  sodium chloride 0.9%. 1000 milliLiter(s) IV Continuous <Continuous>      PRN:   acetaminophen     Tablet .. 650 milliGRAM(s) Oral every 6 hours PRN  loperamide 4 milliGRAM(s) Oral every 4 hours PRN  prochlorperazine   Injectable 10 milliGRAM(s) IV Push every 6 hours PRN  simethicone 80 milliGRAM(s) Chew four times a day PRN  sodium chloride 0.9% lock flush 10 milliLiter(s) IV Push every 1 hour PRN          A/P:  64 year old female with a history of multiple myeloma   Pre :  Autologous PBSCT day +2  - HPC transplant today, continue hydration for 24 hours post infusion of cells. Daily weights, strict I&O, prn diuresis. Start levaquin / fluconazole prophylaxis when neutropenic.    c/o intermittent abd pain, AXR done, reading from radiologist requested. weight gain from admission, monitor closely, Lasix PRN    Zofran to PRN, switched Compazine-->reglan PRN     1. Infectious Disease:   not neutropenic afebrile   acyclovir   Oral Tab/Cap 800 milliGRAM(s) Oral every 12 hours    2. GI Prophylaxis:   pantoprazole    Tablet 40 milliGRAM(s) Oral before breakfast    3. Mouthcare - NS / NaHCO3 rinses, Biotene; Skin care     4. Transfuse & replete electrolytes prn   Hypokalemia: KCL 20 meq IVPB x3    5. IV hydration, daily weights, strict I&O, prn diuresis     6. PO intake as tolerated, nutrition follow up as needed    7. Antiemetics, anti-diarrhea medications:   Zofran to PRN, switched Compazine-->reglan PRN       8. OOB as tolerated, physical therapy consult if needed     9. Monitor coags / fibrinogen 2x week, vitamin K as needed   phytonadione   Solution 5 milliGRAM(s) Oral <User Schedule>    10. Monitor closely for clinical changes, monitor for fevers     11. Emotional support provided, plan of care discussed and questions addressed     12. Patient education done regarding plan of care, restrictions and discharge planning     13. Continue regular social work input     I have written the above note for Dr. Ambrocio who performed service with me in the room.   Sarita Mendoza NP  ([phone])    I have seen and examined patient with NP, I agree with above note as scribed.

## 2024-08-26 LAB
ALBUMIN SERPL ELPH-MCNC: 3.7 G/DL — SIGNIFICANT CHANGE UP (ref 3.3–5)
ALP SERPL-CCNC: 75 U/L — SIGNIFICANT CHANGE UP (ref 40–120)
ALT FLD-CCNC: 11 U/L — SIGNIFICANT CHANGE UP (ref 10–45)
ANION GAP SERPL CALC-SCNC: 15 MMOL/L — SIGNIFICANT CHANGE UP (ref 5–17)
APTT BLD: 24.6 SEC — SIGNIFICANT CHANGE UP (ref 24.5–35.6)
AST SERPL-CCNC: 20 U/L — SIGNIFICANT CHANGE UP (ref 10–40)
BASOPHILS # BLD AUTO: 0.02 K/UL — SIGNIFICANT CHANGE UP (ref 0–0.2)
BASOPHILS NFR BLD AUTO: 0.9 % — SIGNIFICANT CHANGE UP (ref 0–2)
BILIRUB SERPL-MCNC: 0.8 MG/DL — SIGNIFICANT CHANGE UP (ref 0.2–1.2)
BLD GP AB SCN SERPL QL: POSITIVE — SIGNIFICANT CHANGE UP
BUN SERPL-MCNC: 21 MG/DL — SIGNIFICANT CHANGE UP (ref 7–23)
CALCIUM SERPL-MCNC: 9.3 MG/DL — SIGNIFICANT CHANGE UP (ref 8.4–10.5)
CHLORIDE SERPL-SCNC: 103 MMOL/L — SIGNIFICANT CHANGE UP (ref 96–108)
CO2 SERPL-SCNC: 22 MMOL/L — SIGNIFICANT CHANGE UP (ref 22–31)
CREAT SERPL-MCNC: 0.79 MG/DL — SIGNIFICANT CHANGE UP (ref 0.5–1.3)
EGFR: 83 ML/MIN/1.73M2 — SIGNIFICANT CHANGE UP
EOSINOPHIL # BLD AUTO: 0 K/UL — SIGNIFICANT CHANGE UP (ref 0–0.5)
EOSINOPHIL NFR BLD AUTO: 0 % — SIGNIFICANT CHANGE UP (ref 0–6)
G6PD RBC-CCNC: 15.4 U/G HGB — SIGNIFICANT CHANGE UP (ref 7–20.5)
GLUCOSE SERPL-MCNC: 113 MG/DL — HIGH (ref 70–99)
HCT VFR BLD CALC: 29.4 % — LOW (ref 34.5–45)
HGB BLD-MCNC: 10.1 G/DL — LOW (ref 11.5–15.5)
INR BLD: 0.97 RATIO — SIGNIFICANT CHANGE UP (ref 0.85–1.18)
LDH SERPL L TO P-CCNC: 198 U/L — SIGNIFICANT CHANGE UP (ref 50–242)
LYMPHOCYTES # BLD AUTO: 0.12 K/UL — LOW (ref 1–3.3)
LYMPHOCYTES # BLD AUTO: 4.4 % — LOW (ref 13–44)
MAGNESIUM SERPL-MCNC: 1.8 MG/DL — SIGNIFICANT CHANGE UP (ref 1.6–2.6)
MANUAL SMEAR VERIFICATION: SIGNIFICANT CHANGE UP
MCHC RBC-ENTMCNC: 31.8 PG — SIGNIFICANT CHANGE UP (ref 27–34)
MCHC RBC-ENTMCNC: 34.4 GM/DL — SIGNIFICANT CHANGE UP (ref 32–36)
MCV RBC AUTO: 92.5 FL — SIGNIFICANT CHANGE UP (ref 80–100)
MONOCYTES # BLD AUTO: 0 K/UL — SIGNIFICANT CHANGE UP (ref 0–0.9)
MONOCYTES NFR BLD AUTO: 0 % — LOW (ref 2–14)
NEUTROPHILS # BLD AUTO: 2.56 K/UL — SIGNIFICANT CHANGE UP (ref 1.8–7.4)
NEUTROPHILS NFR BLD AUTO: 94.7 % — HIGH (ref 43–77)
NRBC # BLD: 1 /100 WBCS — HIGH (ref 0–0)
PHOSPHATE SERPL-MCNC: 2.6 MG/DL — SIGNIFICANT CHANGE UP (ref 2.5–4.5)
PLAT MORPH BLD: NORMAL — SIGNIFICANT CHANGE UP
PLATELET # BLD AUTO: 148 K/UL — LOW (ref 150–400)
POTASSIUM SERPL-MCNC: 3.6 MMOL/L — SIGNIFICANT CHANGE UP (ref 3.5–5.3)
POTASSIUM SERPL-SCNC: 3.6 MMOL/L — SIGNIFICANT CHANGE UP (ref 3.5–5.3)
PROT SERPL-MCNC: 5.8 G/DL — LOW (ref 6–8.3)
PROTHROM AB SERPL-ACNC: 10.7 SEC — SIGNIFICANT CHANGE UP (ref 9.5–13)
RBC # BLD: 3.18 M/UL — LOW (ref 3.8–5.2)
RBC # FLD: 14.1 % — SIGNIFICANT CHANGE UP (ref 10.3–14.5)
RBC BLD AUTO: SIGNIFICANT CHANGE UP
RH IG SCN BLD-IMP: POSITIVE — SIGNIFICANT CHANGE UP
SODIUM SERPL-SCNC: 140 MMOL/L — SIGNIFICANT CHANGE UP (ref 135–145)
WBC # BLD: 2.7 K/UL — LOW (ref 3.8–10.5)
WBC # FLD AUTO: 2.7 K/UL — LOW (ref 3.8–10.5)

## 2024-08-26 PROCEDURE — 99233 SBSQ HOSP IP/OBS HIGH 50: CPT

## 2024-08-26 PROCEDURE — 93010 ELECTROCARDIOGRAM REPORT: CPT

## 2024-08-26 RX ORDER — LORAZEPAM 4 MG/ML
0.5 INJECTION INTRAMUSCULAR; INTRAVENOUS EVERY 8 HOURS
Refills: 0 | Status: DISCONTINUED | OUTPATIENT
Start: 2024-08-26 | End: 2024-09-02

## 2024-08-26 RX ORDER — ZINC OXIDE 100 MG/G
1 OINTMENT TOPICAL
Refills: 0 | Status: DISCONTINUED | OUTPATIENT
Start: 2024-08-26 | End: 2024-09-12

## 2024-08-26 RX ORDER — FILGRASTIM 300 UG/.5ML
300 INJECTION, SOLUTION INTRAVENOUS; SUBCUTANEOUS DAILY
Refills: 0 | Status: COMPLETED | OUTPATIENT
Start: 2024-08-28 | End: 2024-09-05

## 2024-08-26 RX ORDER — NYSTATIN 100000/G
1 CREAM (GRAM) TOPICAL
Refills: 0 | Status: DISCONTINUED | OUTPATIENT
Start: 2024-08-26 | End: 2024-09-12

## 2024-08-26 RX ADMIN — Medication 800 MILLIGRAM(S): at 05:51

## 2024-08-26 RX ADMIN — SODIUM BICARBONATE 10 MILLILITER(S): 84 INJECTION, SOLUTION INTRAVENOUS at 08:59

## 2024-08-26 RX ADMIN — Medication 10 MILLIGRAM(S): at 18:22

## 2024-08-26 RX ADMIN — LORAZEPAM 0.5 MILLIGRAM(S): 4 INJECTION INTRAMUSCULAR; INTRAVENOUS at 19:33

## 2024-08-26 RX ADMIN — Medication 800 MILLIGRAM(S): at 17:33

## 2024-08-26 RX ADMIN — SODIUM BICARBONATE 10 MILLILITER(S): 84 INJECTION, SOLUTION INTRAVENOUS at 19:33

## 2024-08-26 RX ADMIN — TIZANIDINE 4 MILLIGRAM(S): 4 TABLET ORAL at 17:33

## 2024-08-26 RX ADMIN — ONDANSETRON 8 MILLIGRAM(S): 2 INJECTION, SOLUTION INTRAMUSCULAR; INTRAVENOUS at 14:11

## 2024-08-26 RX ADMIN — SODIUM BICARBONATE 10 MILLILITER(S): 84 INJECTION, SOLUTION INTRAVENOUS at 00:59

## 2024-08-26 RX ADMIN — Medication 40 MILLIGRAM(S): at 05:50

## 2024-08-26 RX ADMIN — Medication 1 APPLICATION(S): at 18:12

## 2024-08-26 RX ADMIN — CHLORHEXIDINE GLUCONATE 1 APPLICATION(S): 40 SOLUTION TOPICAL at 08:01

## 2024-08-26 RX ADMIN — Medication 5 MILLILITER(S): at 09:00

## 2024-08-26 RX ADMIN — Medication 5 MILLILITER(S): at 16:32

## 2024-08-26 RX ADMIN — Medication 10 MILLIGRAM(S): at 09:00

## 2024-08-26 RX ADMIN — VALSARTAN 80 MILLIGRAM(S): 40 TABLET ORAL at 05:51

## 2024-08-26 RX ADMIN — Medication 5 MILLILITER(S): at 12:25

## 2024-08-26 RX ADMIN — Medication 5 MILLIGRAM(S): at 13:26

## 2024-08-26 RX ADMIN — Medication 5 MILLILITER(S): at 19:33

## 2024-08-26 RX ADMIN — SODIUM BICARBONATE 10 MILLILITER(S): 84 INJECTION, SOLUTION INTRAVENOUS at 16:32

## 2024-08-26 RX ADMIN — Medication 5 MILLILITER(S): at 00:59

## 2024-08-26 RX ADMIN — SODIUM BICARBONATE 10 MILLILITER(S): 84 INJECTION, SOLUTION INTRAVENOUS at 12:25

## 2024-08-26 NOTE — PROGRESS NOTE ADULT - NS ATTEND AMEND GEN_ALL_CORE FT
64 year old female with a history of multiple myeloma admitted for auto-HSCT Day +3  today     Disease: IgG lambda multiple myeloma  Type of transplant: Autologous  Conditioning prep: Melphalan (200 mg/m2)   ABO / CMV: O POS / POS    Complications:   - Day 0: nausea/vomitting, abdominal pain   -Day 1: B/L lower extremities tremors --> compazine D/c, replaced by reglan     Patient was discussed, seen and examined in IDR rounds.   No longer reporting any abdominal pain.   Patient's nausea improved; Zofran now PRN; compazine changed to reglan. Monitor frequency of bowel movements.   Has frequent bowel movements, but this is chronic with use of immodium at home.   Labs reviewed, no transfusions today   PE: normal oral mucosa, site of line without erythema, + BS in all 4 quadrants,   Continue supportive care. 64 year old female with a history of multiple myeloma admitted for auto-HSCT Day +3  today     Disease: IgG lambda multiple myeloma  Type of transplant: Autologous  Conditioning prep: Melphalan (200 mg/m2)   ABO / CMV: O POS / POS    Complications:   - Day 0: nausea/vomitting, abdominal pain   -Day 1: B/L lower extremities tremors --> compazine D/C, replaced by reglan     Patient was discussed, seen and examined in IDR rounds.   Patient's nausea improved; Zofran now PRN; compazine changed to reglan. Monitor frequency of bowel movements.   Has frequent bowel movements, but this is chronic with use of Imodium at home. Today, episode of diarrhea with abd cramps  Labs reviewed, supplements lytes prn  PE: normal oral mucosa, site of line without erythema, + BS in all 4 quadrants,   Continue Acyclovir prophylaxis  Continue supportive care.

## 2024-08-26 NOTE — PROGRESS NOTE ADULT - SUBJECTIVE AND OBJECTIVE BOX
[facility] Transplant Team                                                      Critical / Counseling Time Provided: 30 minutes                                                                                                                                                        Chief Complaint: Autologous pbsct with high dose melphalan prep regimen for treatment of IgG lambda multiple myeloma    Disease: IgG lambda multiple myeloma  Type of transplant: Autologous  Conditioning prep: Melphalan (200 mg/m2)   ABO / CMV: O POS / POS     S: Patient seen and examined with [facility] Transplant Team:       O: Vitals:   Vital Signs Last 24 Hrs  T(C): 37.3 (26 Aug 2024 05:25), Max: 37.6 (25 Aug 2024 13:30)  T(F): 99.1 (26 Aug 2024 05:25), Max: 99.7 (25 Aug 2024 13:30)  HR: 104 (26 Aug 2024 05:25) (104 - 110)  BP: 136/77 (26 Aug 2024 05:25) (136/77 - 155/80)  BP(mean): --  RR: 18 (26 Aug 2024 05:25) (18 - 18)  SpO2: 97% (26 Aug 2024 05:25) (97% - 97%)    Parameters below as of 26 Aug 2024 05:25  Patient On (Oxygen Delivery Method): room air      Admit weight: 62.2kg   Daily Weight in k.3 (26 Aug 2024 07:44)    Intake / Output:    @ 07:01  -   @ 07:00  --------------------------------------------------------  IN: 972 mL / OUT: 2100 mL / NET: -1128 mL      PE:   Oropharynx: no erythema or ulcerations   Oral Mucositis:              -                                          Grade: n/a   CVS: S1, S2 RRR   Lungs: CTA throughout bilaterally   Abdomen: + BS x 4, soft, NT, ND  Extremities: no edema   Gastric Mucositis:       -                                           Grade: n/a   Intestinal Mucositis:     -                                         Grade: n/a   Skin: no rash   TLC: CDI   Neuro: A&Ox3   Pain: not graded     Labs:        Meds:   Antimicrobials:   acyclovir   Oral Tab/Cap 800 milliGRAM(s) Oral every 12 hours      Heme / Onc:       GI:  loperamide 4 milliGRAM(s) Oral every 4 hours PRN  pantoprazole    Tablet 40 milliGRAM(s) Oral before breakfast  simethicone 80 milliGRAM(s) Chew four times a day PRN  sodium bicarbonate Mouth Rinse 10 milliLiter(s) Swish and Spit five times a day      Cardiovascular:   valsartan 80 milliGRAM(s) Oral daily      Immunologic:       Other medications:   Biotene Dry Mouth Oral Rinse 5 milliLiter(s) Swish and Spit five times a day  chlorhexidine 4% Liquid 1 Application(s) Topical <User Schedule>  phytonadione   Solution 5 milliGRAM(s) Oral <User Schedule>  sodium chloride 0.9%. 1000 milliLiter(s) IV Continuous <Continuous>      PRN:   acetaminophen     Tablet .. 650 milliGRAM(s) Oral every 6 hours PRN  loperamide 4 milliGRAM(s) Oral every 4 hours PRN  metoclopramide Injectable 10 milliGRAM(s) IV Push every 6 hours PRN  ondansetron Injectable 8 milliGRAM(s) IV Push every 8 hours PRN  simethicone 80 milliGRAM(s) Chew four times a day PRN  sodium chloride 0.9% lock flush 10 milliLiter(s) IV Push every 1 hour PRN    A/P:  64 year old female with a history of multiple myeloma   Pre :  Autologous PBSCT day +3  - HPC transplant today, continue hydration for 24 hours post infusion of cells. Daily weights, strict I&O, prn diuresis. Start levaquin / fluconazole prophylaxis when neutropenic.    c/o intermittent abd pain, AXR done, reading from radiologist requested. weight gain from admission, monitor closely, Lasix PRN    Zofran to PRN, switched Compazine-->reglan PRN     1. Infectious Disease:   acyclovir   Oral Tab/Cap 800 milliGRAM(s) Oral every 12 hours    2. GI Prophylaxis:  Protonix    3. Mouthcare - NS / NaHCO3 rinses, Biotene; Skin care     4. Transfuse & replete electrolytes prn     5. IV hydration, daily weights, strict I&O, prn diuresis     6. PO intake as tolerated, nutrition follow up as needed    7. Antiemetics, anti-diarrhea medications:   loperamide 4 milliGRAM(s) Oral every 4 hours PRN  metoclopramide Injectable 10 milliGRAM(s) IV Push every 6 hours PRN  ondansetron Injectable 8 milliGRAM(s) IV Push every 8 hours PRN    8. OOB as tolerated, physical therapy consult if needed     9. Monitor coags / fibrinogen 2x week, vitamin K as needed   phytonadione   Solution 5 milliGRAM(s) Oral <User Schedule>    10. Monitor closely for clinical changes, monitor for fevers     11. Emotional support provided, plan of care discussed and questions addressed     12. Patient education done regarding plan of care, restrictions and discharge planning     13. Continue regular social work input     I have written the above note for Dr. Martinez who performed service with me in the room.   Jessy Ferrell NP-C ([phone])    I have seen and examined patient with NP, I agree with above note as scribed.                    [facility] Transplant Team                                                      Critical / Counseling Time Provided: 30 minutes                                                                                                                                                        Chief Complaint: Autologous pbsct with high dose melphalan prep regimen for treatment of IgG lambda multiple myeloma    Disease: IgG lambda multiple myeloma  Type of transplant: Autologous  Conditioning prep: Melphalan (200 mg/m2)   ABO / CMV: O POS / POS     S: Patient seen and examined with [facility] Transplant Team:   + fatigue  + diarrhea - refusing imodium  + poor appetite  + anxiety  + abdominal cramping       O: Vitals:   Vital Signs Last 24 Hrs  T(C): 37.3 (26 Aug 2024 05:25), Max: 37.6 (25 Aug 2024 13:30)  T(F): 99.1 (26 Aug 2024 05:25), Max: 99.7 (25 Aug 2024 13:30)  HR: 104 (26 Aug 2024 05:25) (104 - 110)  BP: 136/77 (26 Aug 2024 05:25) (136/77 - 155/80)  BP(mean): --  RR: 18 (26 Aug 2024 05:25) (18 - 18)  SpO2: 97% (26 Aug 2024 05:25) (97% - 97%)    Parameters below as of 26 Aug 2024 05:25  Patient On (Oxygen Delivery Method): room air      Admit weight: 62.2kg   Daily Weight in k.3 (26 Aug 2024 07:44)    Intake / Output:    @ 07:01  -   @ 07:00  --------------------------------------------------------  IN: 972 mL / OUT: 2100 mL / NET: -1128 mL      PE:   Oropharynx: no erythema or ulcerations   Oral Mucositis:              -                                          Grade: n/a   CVS: S1, S2 RRR   Lungs: CTA throughout bilaterally   Abdomen: + BS x 4, soft, NT, ND  Extremities: no edema   Gastric Mucositis:       -                                           Grade: n/a   Intestinal Mucositis:     -                                         Grade: n/a   Skin: no rash   TLC: CDI   Neuro: A&Ox3   Pain: not graded     Labs:                         10.1   2.70  )-----------( 148      ( 26 Aug 2024 07:27 )             29.4     08-26    140  |  103  |  21  ----------------------------<  113<H>  3.6   |  22  |  0.79    Ca    9.3      26 Aug 2024 07:32  Phos  2.6       Mg     1.8         TPro  5.8<L>  /  Alb  3.7  /  TBili  0.8  /  DBili  x   /  AST  20  /  ALT  11  /  AlkPhos  75      Radiology:  ACC: [id number] EXAM:  XR ABDOMEN PORTABLE URGENT 1V   ORDERED BY: GREGORY ALLISON   PROCEDURE DATE:  2024    IMPRESSION:  Nonobstructive bowel gas pattern.    Meds:   Antimicrobials:   acyclovir   Oral Tab/Cap 800 milliGRAM(s) Oral every 12 hours      Heme / Onc:       GI:  loperamide 4 milliGRAM(s) Oral every 4 hours PRN  pantoprazole    Tablet 40 milliGRAM(s) Oral before breakfast  simethicone 80 milliGRAM(s) Chew four times a day PRN  sodium bicarbonate Mouth Rinse 10 milliLiter(s) Swish and Spit five times a day      Cardiovascular:   valsartan 80 milliGRAM(s) Oral daily      Immunologic:       Other medications:   Biotene Dry Mouth Oral Rinse 5 milliLiter(s) Swish and Spit five times a day  chlorhexidine 4% Liquid 1 Application(s) Topical <User Schedule>  phytonadione   Solution 5 milliGRAM(s) Oral <User Schedule>  sodium chloride 0.9%. 1000 milliLiter(s) IV Continuous <Continuous>      PRN:   acetaminophen     Tablet .. 650 milliGRAM(s) Oral every 6 hours PRN  loperamide 4 milliGRAM(s) Oral every 4 hours PRN  metoclopramide Injectable 10 milliGRAM(s) IV Push every 6 hours PRN  ondansetron Injectable 8 milliGRAM(s) IV Push every 8 hours PRN  simethicone 80 milliGRAM(s) Chew four times a day PRN  sodium chloride 0.9% lock flush 10 milliLiter(s) IV Push every 1 hour PRN    A/P:  64 year old female with a history of multiple myeloma   Pre :  Autologous PBSCT day +3  - HPC transplant today, continue hydration for 24 hours post infusion of cells. Daily weights, strict I&O, prn diuresis. Start levaquin / fluconazole prophylaxis when neutropenic.    c/o intermittent abd pain, AXR done, reading from radiologist requested. weight gain from admission, monitor closely, Lasix PRN    Zofran to PRN, switched Compazine-->reglan PRN     1. Infectious Disease:   acyclovir   Oral Tab/Cap 800 milliGRAM(s) Oral every 12 hours    2. GI Prophylaxis:  Protonix    3. Mouthcare - NS / NaHCO3 rinses, Biotene; Skin care     4. Transfuse & replete electrolytes prn     5. IV hydration, daily weights, strict I&O, prn diuresis     6. PO intake as tolerated, nutrition follow up as needed    7. Antiemetics, anti-diarrhea medications:   loperamide 4 milliGRAM(s) Oral every 4 hours PRN  metoclopramide Injectable 10 milliGRAM(s) IV Push every 6 hours PRN  ondansetron Injectable 8 milliGRAM(s) IV Push every 8 hours PRN    8. OOB as tolerated, physical therapy consult if needed     9. Monitor coags / fibrinogen 2x week, vitamin K as needed   phytonadione   Solution 5 milliGRAM(s) Oral <User Schedule>    10. Monitor closely for clinical changes, monitor for fevers     11. Emotional support provided, plan of care discussed and questions addressed     12. Patient education done regarding plan of care, restrictions and discharge planning     13. Continue regular social work input     I have written the above note for Dr. Martinez who performed service with me in the room.   Jessy Ferrell NP-C ([phone])    I have seen and examined patient with NP, I agree with above note as scribed.                    [facility] Transplant Team                                                      Critical / Counseling Time Provided: 30 minutes                                                                                                                                                        Chief Complaint: Autologous pbsct with high dose melphalan prep regimen for treatment of IgG lambda multiple myeloma    Disease: IgG lambda multiple myeloma  Type of transplant: Autologous  Conditioning prep: Melphalan (200 mg/m2)   ABO / CMV: O POS / POS     S: Patient seen and examined with [facility] Transplant Team:   + fatigue  + diarrhea - refusing imodium  + poor appetite  + anxiety  + abdominal cramping       O: Vitals:   Vital Signs Last 24 Hrs  T(C): 37.3 (26 Aug 2024 05:25), Max: 37.6 (25 Aug 2024 13:30)  T(F): 99.1 (26 Aug 2024 05:25), Max: 99.7 (25 Aug 2024 13:30)  HR: 104 (26 Aug 2024 05:25) (104 - 110)  BP: 136/77 (26 Aug 2024 05:25) (136/77 - 155/80)  BP(mean): --  RR: 18 (26 Aug 2024 05:25) (18 - 18)  SpO2: 97% (26 Aug 2024 05:25) (97% - 97%)    Parameters below as of 26 Aug 2024 05:25  Patient On (Oxygen Delivery Method): room air      Admit weight: 62.2kg   Daily Weight in k.3 (26 Aug 2024 07:44)    Intake / Output:    @ 07:01  -   @ 07:00  --------------------------------------------------------  IN: 972 mL / OUT: 2100 mL / NET: -1128 mL      PE:   Oropharynx: no erythema or ulcerations   Oral Mucositis:              -                                          Grade: n/a   CVS: S1, S2 RRR   Lungs: CTA throughout bilaterally   Abdomen: + BS x 4, soft, NT, ND  Extremities: no edema   Gastric Mucositis:       -                                           Grade: n/a   Intestinal Mucositis:     -                                         Grade: n/a   Skin: no rash   TLC: CDI   Neuro: A&Ox3   Pain: not graded     Labs:                         10.1   2.70  )-----------( 148      ( 26 Aug 2024 07:27 )             29.4     08-26    140  |  103  |  21  ----------------------------<  113<H>  3.6   |  22  |  0.79    Ca    9.3      26 Aug 2024 07:32  Phos  2.6       Mg     1.8         TPro  5.8<L>  /  Alb  3.7  /  TBili  0.8  /  DBili  x   /  AST  20  /  ALT  11  /  AlkPhos  75      Radiology:  ACC: [id number] EXAM:  XR ABDOMEN PORTABLE URGENT 1V   ORDERED BY: GREGORY ALLISON   PROCEDURE DATE:  2024    IMPRESSION:  Nonobstructive bowel gas pattern.    Meds:   Antimicrobials:   acyclovir   Oral Tab/Cap 800 milliGRAM(s) Oral every 12 hours      Heme / Onc:       GI:  loperamide 4 milliGRAM(s) Oral every 4 hours PRN  pantoprazole    Tablet 40 milliGRAM(s) Oral before breakfast  simethicone 80 milliGRAM(s) Chew four times a day PRN  sodium bicarbonate Mouth Rinse 10 milliLiter(s) Swish and Spit five times a day      Cardiovascular:   valsartan 80 milliGRAM(s) Oral daily      Immunologic:       Other medications:   Biotene Dry Mouth Oral Rinse 5 milliLiter(s) Swish and Spit five times a day  chlorhexidine 4% Liquid 1 Application(s) Topical <User Schedule>  phytonadione   Solution 5 milliGRAM(s) Oral <User Schedule>  sodium chloride 0.9%. 1000 milliLiter(s) IV Continuous <Continuous>      PRN:   acetaminophen     Tablet .. 650 milliGRAM(s) Oral every 6 hours PRN  loperamide 4 milliGRAM(s) Oral every 4 hours PRN  metoclopramide Injectable 10 milliGRAM(s) IV Push every 6 hours PRN  ondansetron Injectable 8 milliGRAM(s) IV Push every 8 hours PRN  simethicone 80 milliGRAM(s) Chew four times a day PRN  sodium chloride 0.9% lock flush 10 milliLiter(s) IV Push every 1 hour PRN    A/P:  64 year old female with a history of multiple myeloma   Post:  Autologous PBSCT day +3  - HPC transplant today, continue hydration for 24 hours post infusion of cells. Daily weights, strict I&O, prn diuresis. Start levaquin / fluconazole prophylaxis when neutropenic.    c/o intermittent abd pain, AXR done, reading from radiologist requested. weight gain from admission, monitor closely, Lasix PRN    Zofran to PRN, switched Compazine-->reglan PRN     1. Infectious Disease:   acyclovir   Oral Tab/Cap 800 milliGRAM(s) Oral every 12 hours    2. GI Prophylaxis:  Protonix    3. Mouthcare - NS / NaHCO3 rinses, Biotene; Skin care     4. Transfuse & replete electrolytes prn     5. IV hydration, daily weights, strict I&O, prn diuresis     6. PO intake as tolerated, nutrition follow up as needed    7. Antiemetics, anti-diarrhea medications:   loperamide 4 milliGRAM(s) Oral every 4 hours PRN  metoclopramide Injectable 10 milliGRAM(s) IV Push every 6 hours PRN  ondansetron Injectable 8 milliGRAM(s) IV Push every 8 hours PRN    8. OOB as tolerated, physical therapy consult if needed     9. Monitor coags / fibrinogen 2x week, vitamin K as needed   phytonadione   Solution 5 milliGRAM(s) Oral <User Schedule>    10. Monitor closely for clinical changes, monitor for fevers     11. Emotional support provided, plan of care discussed and questions addressed     12. Patient education done regarding plan of care, restrictions and discharge planning     13. Continue regular social work input     I have written the above note for Dr. Martinez who performed service with me in the room.   Jessy Ferrell NP-C ([phone])    I have seen and examined patient with NP, I agree with above note as scribed.

## 2024-08-26 NOTE — PHARMACOTHERAPY INTERVENTION NOTE - COMMENTS
64-year-old female with PMH of HTN and IgG lambda multiple myeloma treated with Rosalind-RVD x4 cycles and admitted for autologous HSCT with melphalan 200 mg/m2 conditioning. Today is day +3. Course has been complicated by nausea, abdominal discomfort and anxiety.    Patient complaining of nausea and reports leg tremors with PRN prochlorperazine. Prochlorperazine was switched to metoclopramide (1 dose/24 hours) with good relief. Ondansetron was switched from ATC to PRN (no doses/24 hours in spite of use ~2x/day at home). Patient reports that nausea is not anticipatory and not caused by anxiety, in spite of having good relief from lorazepam. On the following for nausea: metoclopramide 10 mg IV every 6 hours PRN (first line for N/V), ondansetron 8 mg IV every 8 hours PRN (second line for N/V), lorazepam 1 mg IV every 8 hours (first line for anxiety, third line for N/V). QTc on 8/26 was 429 msec (goal <500 msec). Would recommend monitoring patient for worsening diarrhea and EPS symptoms, which can be exacerbated by metoclopramide. If patient's nausea is not resolved/improved, please consider starting olanzapine 5 mg PO QHS and discontinuing metoclopramide (due to increased risk of EPS).     Once patient becomes neutropenic (ANC <1000), please start levofloxacin 500 mg PO QD, vanco 125 mg PO BID and fluconazole 400 mg PO QD. Patient will be on medications until engraftment (ANC >500). Will continue to monitor renal/hepatic function. CrCl = 53 mL/min (based on ABW and SCr of 0.8).    In case patient becomes febrile (100.4F sustained over an hour or one temp of 101F and above), please order pip/tazo 4.5 g every 8 hours and vancomycin 1250 mg every 24 hours. Please draw vanco trough prior to 4th dose - expected AUC is 479 mg*hr/L and trough ~10.6 mcg/L. If blood cultures have no gram positive growth x48 hours, please d/c vanco.    Pilar Isabel, PharmD, BCOP  Stem Cell Transplant Clinical Pharmacy Specialist  Available via [employer] Teams 64-year-old female with PMH of HTN and IgG lambda multiple myeloma treated with Rosalind-RVD x4 cycles and admitted for autologous HSCT with melphalan 200 mg/m2 conditioning. Today is day +3. Course has been complicated by nausea, abdominal discomfort and anxiety.    Patient complaining of nausea and reports leg tremors with PRN prochlorperazine. Prochlorperazine was switched to metoclopramide (1 dose/24 hours) with good relief. Ondansetron was switched from ATC to PRN (no doses/24 hours in spite of use ~2x/day at home). Patient reports that nausea is not anticipatory and not caused by anxiety, in spite of having good relief from lorazepam. On the following for nausea: metoclopramide 10 mg IV every 6 hours PRN (first line for N/V), ondansetron 8 mg IV every 8 hours PRN (second line for N/V), lorazepam 1 mg IV every 8 hours (first line for anxiety, third line for N/V). QTc on 8/26 was 429 msec (goal <500 msec). Would recommend monitoring patient for worsening diarrhea and EPS symptoms, which can be exacerbated by metoclopramide. If patient's nausea is not resolved/improved, please consider starting olanzapine 5 mg PO QHS and discontinuing metoclopramide (due to increased risk of EPS).     History of latex allergy (contact dermatitis) on chart and confirmed by patient. Will replace filgrastim-sndz (zarxio) with filgrastim-aaffi (Nievstym) due to latex stopper in zarxio.    Once patient becomes neutropenic (ANC <1000), please start levofloxacin 500 mg PO QD, vanco 125 mg PO BID and fluconazole 400 mg PO QD. Patient will be on medications until engraftment (ANC >500). Will continue to monitor renal/hepatic function. CrCl = 53 mL/min (based on ABW and SCr of 0.8).    In case patient becomes febrile (100.4F sustained over an hour or one temp of 101F and above), please order pip/tazo 4.5 g every 8 hours and vancomycin 1250 mg every 24 hours. Please draw vanco trough prior to 4th dose - expected AUC is 479 mg*hr/L and trough ~10.6 mcg/L. If blood cultures have no gram positive growth x48 hours, please d/c shaina.    Pilar Isabel, PharmD, [facility]  Stem Cell Transplant Clinical Pharmacy Specialist  Available via [employer] Teams

## 2024-08-27 LAB
ALBUMIN SERPL ELPH-MCNC: 3.7 G/DL — SIGNIFICANT CHANGE UP (ref 3.3–5)
ALP SERPL-CCNC: 76 U/L — SIGNIFICANT CHANGE UP (ref 40–120)
ALT FLD-CCNC: 12 U/L — SIGNIFICANT CHANGE UP (ref 10–45)
ANION GAP SERPL CALC-SCNC: 12 MMOL/L — SIGNIFICANT CHANGE UP (ref 5–17)
AST SERPL-CCNC: 17 U/L — SIGNIFICANT CHANGE UP (ref 10–40)
BASOPHILS # BLD AUTO: 0 K/UL — SIGNIFICANT CHANGE UP (ref 0–0.2)
BASOPHILS NFR BLD AUTO: 0 % — SIGNIFICANT CHANGE UP (ref 0–2)
BILIRUB SERPL-MCNC: 0.7 MG/DL — SIGNIFICANT CHANGE UP (ref 0.2–1.2)
BUN SERPL-MCNC: 21 MG/DL — SIGNIFICANT CHANGE UP (ref 7–23)
CALCIUM SERPL-MCNC: 9.3 MG/DL — SIGNIFICANT CHANGE UP (ref 8.4–10.5)
CHLORIDE SERPL-SCNC: 103 MMOL/L — SIGNIFICANT CHANGE UP (ref 96–108)
CO2 SERPL-SCNC: 24 MMOL/L — SIGNIFICANT CHANGE UP (ref 22–31)
CREAT SERPL-MCNC: 0.77 MG/DL — SIGNIFICANT CHANGE UP (ref 0.5–1.3)
EGFR: 86 ML/MIN/1.73M2 — SIGNIFICANT CHANGE UP
EOSINOPHIL # BLD AUTO: 0 K/UL — SIGNIFICANT CHANGE UP (ref 0–0.5)
EOSINOPHIL NFR BLD AUTO: 0 % — SIGNIFICANT CHANGE UP (ref 0–6)
GLUCOSE SERPL-MCNC: 112 MG/DL — HIGH (ref 70–99)
HADV DNA FLD NAA+PROBE-LOG#: SIGNIFICANT CHANGE UP COPIES/ML
HCT VFR BLD CALC: 28.6 % — LOW (ref 34.5–45)
HGB BLD-MCNC: 9.9 G/DL — LOW (ref 11.5–15.5)
LDH SERPL L TO P-CCNC: 183 U/L — SIGNIFICANT CHANGE UP (ref 50–242)
LYMPHOCYTES # BLD AUTO: 0.05 K/UL — LOW (ref 1–3.3)
LYMPHOCYTES # BLD AUTO: 2.6 % — LOW (ref 13–44)
MAGNESIUM SERPL-MCNC: 1.9 MG/DL — SIGNIFICANT CHANGE UP (ref 1.6–2.6)
MANUAL SMEAR VERIFICATION: SIGNIFICANT CHANGE UP
MCHC RBC-ENTMCNC: 31.2 PG — SIGNIFICANT CHANGE UP (ref 27–34)
MCHC RBC-ENTMCNC: 34.6 GM/DL — SIGNIFICANT CHANGE UP (ref 32–36)
MCV RBC AUTO: 90.2 FL — SIGNIFICANT CHANGE UP (ref 80–100)
MONOCYTES # BLD AUTO: 0 K/UL — SIGNIFICANT CHANGE UP (ref 0–0.9)
MONOCYTES NFR BLD AUTO: 0 % — LOW (ref 2–14)
NEUTROPHILS # BLD AUTO: 1.93 K/UL — SIGNIFICANT CHANGE UP (ref 1.8–7.4)
NEUTROPHILS NFR BLD AUTO: 97.4 % — HIGH (ref 43–77)
PHOSPHATE SERPL-MCNC: 2.9 MG/DL — SIGNIFICANT CHANGE UP (ref 2.5–4.5)
PLAT MORPH BLD: NORMAL — SIGNIFICANT CHANGE UP
PLATELET # BLD AUTO: 134 K/UL — LOW (ref 150–400)
POTASSIUM SERPL-MCNC: 3.4 MMOL/L — LOW (ref 3.5–5.3)
POTASSIUM SERPL-SCNC: 3.4 MMOL/L — LOW (ref 3.5–5.3)
PROT SERPL-MCNC: 5.9 G/DL — LOW (ref 6–8.3)
RBC # BLD: 3.17 M/UL — LOW (ref 3.8–5.2)
RBC # FLD: 13.8 % — SIGNIFICANT CHANGE UP (ref 10.3–14.5)
RBC BLD AUTO: SIGNIFICANT CHANGE UP
SODIUM SERPL-SCNC: 139 MMOL/L — SIGNIFICANT CHANGE UP (ref 135–145)
WBC # BLD: 1.98 K/UL — LOW (ref 3.8–10.5)
WBC # FLD AUTO: 1.98 K/UL — LOW (ref 3.8–10.5)

## 2024-08-27 PROCEDURE — 99233 SBSQ HOSP IP/OBS HIGH 50: CPT

## 2024-08-27 RX ORDER — OLANZAPINE 7.5 MG/1
5 TABLET ORAL ONCE
Refills: 0 | Status: COMPLETED | OUTPATIENT
Start: 2024-08-27 | End: 2024-08-27

## 2024-08-27 RX ORDER — POTASSIUM CHLORIDE 10 MEQ
20 TABLET, EXT RELEASE, PARTICLES/CRYSTALS ORAL
Refills: 0 | Status: COMPLETED | OUTPATIENT
Start: 2024-08-27 | End: 2024-08-27

## 2024-08-27 RX ORDER — OLANZAPINE 7.5 MG/1
5 TABLET ORAL AT BEDTIME
Refills: 0 | Status: DISCONTINUED | OUTPATIENT
Start: 2024-08-27 | End: 2024-09-05

## 2024-08-27 RX ADMIN — SODIUM BICARBONATE 10 MILLILITER(S): 84 INJECTION, SOLUTION INTRAVENOUS at 15:18

## 2024-08-27 RX ADMIN — Medication 5 MILLILITER(S): at 11:38

## 2024-08-27 RX ADMIN — Medication 5 MILLILITER(S): at 00:45

## 2024-08-27 RX ADMIN — Medication 10 MILLIGRAM(S): at 05:48

## 2024-08-27 RX ADMIN — LORAZEPAM 0.5 MILLIGRAM(S): 4 INJECTION INTRAMUSCULAR; INTRAVENOUS at 07:39

## 2024-08-27 RX ADMIN — VALSARTAN 80 MILLIGRAM(S): 40 TABLET ORAL at 05:48

## 2024-08-27 RX ADMIN — Medication 1 APPLICATION(S): at 05:49

## 2024-08-27 RX ADMIN — SODIUM BICARBONATE 10 MILLILITER(S): 84 INJECTION, SOLUTION INTRAVENOUS at 20:58

## 2024-08-27 RX ADMIN — ONDANSETRON 8 MILLIGRAM(S): 2 INJECTION, SOLUTION INTRAMUSCULAR; INTRAVENOUS at 06:21

## 2024-08-27 RX ADMIN — OLANZAPINE 5 MILLIGRAM(S): 7.5 TABLET ORAL at 11:33

## 2024-08-27 RX ADMIN — ZINC OXIDE 1 APPLICATION(S): 100 OINTMENT TOPICAL at 17:23

## 2024-08-27 RX ADMIN — ZINC OXIDE 1 APPLICATION(S): 100 OINTMENT TOPICAL at 05:49

## 2024-08-27 RX ADMIN — Medication 50 MILLIEQUIVALENT(S): at 14:17

## 2024-08-27 RX ADMIN — SODIUM BICARBONATE 10 MILLILITER(S): 84 INJECTION, SOLUTION INTRAVENOUS at 11:38

## 2024-08-27 RX ADMIN — Medication 1 APPLICATION(S): at 17:23

## 2024-08-27 RX ADMIN — Medication 40 MILLIGRAM(S): at 06:14

## 2024-08-27 RX ADMIN — OLANZAPINE 5 MILLIGRAM(S): 7.5 TABLET ORAL at 21:01

## 2024-08-27 RX ADMIN — Medication 800 MILLIGRAM(S): at 05:48

## 2024-08-27 RX ADMIN — TIZANIDINE 4 MILLIGRAM(S): 4 TABLET ORAL at 21:01

## 2024-08-27 RX ADMIN — SODIUM BICARBONATE 10 MILLILITER(S): 84 INJECTION, SOLUTION INTRAVENOUS at 00:45

## 2024-08-27 RX ADMIN — CHLORHEXIDINE GLUCONATE 1 APPLICATION(S): 40 SOLUTION TOPICAL at 10:12

## 2024-08-27 RX ADMIN — SODIUM BICARBONATE 10 MILLILITER(S): 84 INJECTION, SOLUTION INTRAVENOUS at 10:12

## 2024-08-27 RX ADMIN — Medication 5 MILLILITER(S): at 15:17

## 2024-08-27 RX ADMIN — Medication 50 MILLIEQUIVALENT(S): at 11:34

## 2024-08-27 RX ADMIN — TIZANIDINE 4 MILLIGRAM(S): 4 TABLET ORAL at 11:37

## 2024-08-27 RX ADMIN — Medication 5 MILLILITER(S): at 20:57

## 2024-08-27 RX ADMIN — Medication 5 MILLILITER(S): at 10:12

## 2024-08-27 RX ADMIN — LORAZEPAM 0.5 MILLIGRAM(S): 4 INJECTION INTRAMUSCULAR; INTRAVENOUS at 22:07

## 2024-08-27 RX ADMIN — Medication 50 MILLIEQUIVALENT(S): at 12:38

## 2024-08-27 RX ADMIN — Medication 800 MILLIGRAM(S): at 17:20

## 2024-08-27 NOTE — PHARMACOTHERAPY INTERVENTION NOTE - COMMENTS
64-year-old female with PMH of HTN and IgG lambda multiple myeloma treated with Rosalind-RVD x4 cycles and admitted for autologous HSCT with melphalan 200 mg/m2 conditioning. Today is day +4. Course has been complicated by nausea, abdominal discomfort and anxiety.    Patient complaining of nausea and reports leg tremors with PRN prochlorperazine. Prochlorperazine was switched to metoclopramide (2 dose/24 hours) with good relief. Ondansetron was switched from ATC to PRN (2 doses/24 hours). Patient reports that nausea is not anticipatory and not caused by anxiety, in spite of having good relief from lorazepam. On the following for nausea: metoclopramide 10 mg IV every 6 hours PRN (first line for N/V), ondansetron 8 mg IV every 8 hours PRN (second line for N/V), lorazepam 1 mg IV every 8 hours (first line for anxiety, third line for N/V). QTc on 8/26 was 429 msec (goal <500 msec). Patient's nausea is not resolved/improved, please consider giving olanzapine 5 mg x1 and then starting olanzapine 5 mg PO QHS and discontinuing metoclopramide (due to increased risk of EPS).     History of latex allergy (contact dermatitis) on chart and confirmed by patient. Will replace filgrastim-sndz (zarxio) with filgrastim-aaffi (Nievstym) due to latex stopper in zarxio.    Once patient becomes neutropenic (ANC <1000), please start levofloxacin 500 mg PO QD, vanco 125 mg PO BID and fluconazole 400 mg PO QD. Patient will be on medications until engraftment (ANC >500). Will continue to monitor renal/hepatic function. CrCl = 53 mL/min (based on ABW and SCr of 0.8).    In case patient becomes febrile (100.4F sustained over an hour or one temp of 101F and above), please order pip/tazo 4.5 g every 8 hours and vancomycin 1250 mg every 24 hours. Please draw vanco trough prior to 4th dose - expected AUC is 479 mg*hr/L and trough ~10.6 mcg/L. If blood cultures have no gram positive growth x48 hours, please d/c vanco.    Pilar Isabel, PharmD, BCOP  Stem Cell Transplant Clinical Pharmacy Specialist  Available via [employer] Teams

## 2024-08-27 NOTE — PROGRESS NOTE ADULT - SUBJECTIVE AND OBJECTIVE BOX
[facility] Transplant Team                                                      Critical / Counseling Time Provided: 30 minutes                                                                                                                                                        Chief Complaint: Autologous pbsct with high dose melphalan prep regimen for treatment of IgG lambda multiple myeloma    Disease: IgG lambda multiple myeloma  Type of transplant: Autologous  Conditioning prep: Melphalan (200 mg/m2)   ABO / CMV: O POS / POS     S: Patient seen and examined with [facility] Transplant Team:     O: Vitals:   Vital Signs Last 24 Hrs  T(C): 37 (27 Aug 2024 05:40), Max: 37.2 (26 Aug 2024 22:15)  T(F): 98.6 (27 Aug 2024 05:40), Max: 99 (26 Aug 2024 22:15)  HR: 110 (27 Aug 2024 05:40) (105 - 112)  BP: 153/81 (27 Aug 2024 05:40) (122/71 - 153/81)  BP(mean): --  RR: 18 (27 Aug 2024 05:40) (18 - 18)  SpO2: 97% (27 Aug 2024 05:40) (97% - 97%)    Parameters below as of 27 Aug 2024 05:40  Patient On (Oxygen Delivery Method): room air        Admit weight: 62.2kg   Daily Weight in k.3 (26 Aug 2024 07:44)    Intake / Output:    @ 07:01  -   @ 07:00  --------------------------------------------------------  IN: 775 mL / OUT: 1850 mL / NET: -1075 mL      PE:   Oropharynx: no erythema or ulcerations   Oral Mucositis:              -                                          Grade: n/a   CVS: S1, S2 RRR   Lungs: CTA throughout bilaterally   Abdomen: + BS x 4, soft, NT, ND  Extremities: no edema   Gastric Mucositis:       -                                           Grade: n/a   Intestinal Mucositis:     -                                         Grade: n/a   Skin: no rash   TLC: CDI   Neuro: A&Ox3   Pain: denies pain at present     Labs:   CBC Full  -  ( 27 Aug 2024 07:01 )  WBC Count : 1.98 K/uL  Hemoglobin : 9.9 g/dL  Hematocrit : 28.6 %  Platelet Count - Automated : 134 K/uL  Mean Cell Volume : 90.2 fl  Mean Cell Hemoglobin : 31.2 pg  Mean Cell Hemoglobin Concentration : 34.6 gm/dL  Auto Neutrophil # : x  Auto Lymphocyte # : x  Auto Monocyte # : x  Auto Eosinophil # : x  Auto Basophil # : x  Auto Neutrophil % : x  Auto Lymphocyte % : x  Auto Monocyte % : x  Auto Eosinophil % : x  Auto Basophil % : x                          9.9    1.98  )-----------( 134      ( 27 Aug 2024 07:01 )             28.6       Meds:   Antimicrobials:   acyclovir   Oral Tab/Cap 800 milliGRAM(s) Oral every 12 hours      Heme / Onc:       GI:  loperamide 4 milliGRAM(s) Oral every 4 hours PRN  pantoprazole    Tablet 40 milliGRAM(s) Oral before breakfast  simethicone 80 milliGRAM(s) Chew four times a day PRN  sodium bicarbonate Mouth Rinse 10 milliLiter(s) Swish and Spit five times a day      Cardiovascular:   valsartan 80 milliGRAM(s) Oral daily      Immunologic:       Other medications:   Biotene Dry Mouth Oral Rinse 5 milliLiter(s) Swish and Spit five times a day  chlorhexidine 4% Liquid 1 Application(s) Topical <User Schedule>  nystatin Powder 1 Application(s) Topical two times a day  phytonadione   Solution 5 milliGRAM(s) Oral <User Schedule>  sodium chloride 0.9%. 1000 milliLiter(s) IV Continuous <Continuous>  zinc oxide 40% Paste 1 Application(s) Topical two times a day      PRN:   acetaminophen     Tablet .. 650 milliGRAM(s) Oral every 6 hours PRN  loperamide 4 milliGRAM(s) Oral every 4 hours PRN  LORazepam   Injectable 0.5 milliGRAM(s) IV Push every 8 hours PRN  metoclopramide Injectable 10 milliGRAM(s) IV Push every 6 hours PRN  ondansetron Injectable 8 milliGRAM(s) IV Push every 8 hours PRN  simethicone 80 milliGRAM(s) Chew four times a day PRN  sodium chloride 0.9% lock flush 10 milliLiter(s) IV Push every 1 hour PRN    A/P:  64 year old female with a history of multiple myeloma   Post:  Autologous PBSCT day + 4  - HPC transplant today, continue hydration for 24 hours post infusion of cells. Daily weights, strict I&O, prn diuresis. Start levaquin / fluconazole prophylaxis when neutropenic.    c/o intermittent abd pain, AXR done, reading from radiologist requested. weight gain from admission, monitor closely, Lasix PRN    Zofran to PRN, switched Compazine-->reglan PRN     1. Infectious Disease:   acyclovir   Oral Tab/Cap 800 milliGRAM(s) Oral every 12 hours    2. GI Prophylaxis:    pantoprazole    Tablet 40 milliGRAM(s) Oral before breakfast    3. Mouthcare - NS / NaHCO3 rinses, Biotene; Skin care     4. Transfuse & replete electrolytes prn     5. IV hydration, daily weights, strict I&O, prn diuresis     6. PO intake as tolerated, nutrition follow up as needed    7. Antiemetics, anti-diarrhea medications:   loperamide 4 milliGRAM(s) Oral every 4 hours PRN  metoclopramide Injectable 10 milliGRAM(s) IV Push every 6 hours PRN  ondansetron Injectable 8 milliGRAM(s) IV Push every 8 hours PRN    8. OOB as tolerated, physical therapy consult if needed     9. Monitor coags / fibrinogen 2x week, vitamin K as needed   phytonadione   Solution 5 milliGRAM(s) Oral <User Schedule>    10. Monitor closely for clinical changes, monitor for fevers     11. Emotional support provided, plan of care discussed and questions addressed     12. Patient education done regarding plan of care, restrictions and discharge planning     13. Continue regular social work input     I have written the above note for Dr. Martinez who performed service with me in the room.   Jessy Ferrell NP-C ([phone])    I have seen and examined patient with NP, I agree with above note as scribed.                      [facility] Transplant Team                                                      Critical / Counseling Time Provided: 30 minutes                                                                                                                                                        Chief Complaint: Autologous pbsct with high dose melphalan prep regimen for treatment of IgG lambda multiple myeloma    Disease: IgG lambda multiple myeloma  Type of transplant: Autologous  Conditioning prep: Melphalan (200 mg/m2)   ABO / CMV: O POS / POS     S: Patient seen and examined with [facility] Transplant Team:     O: Vitals:   Vital Signs Last 24 Hrs  T(C): 37 (27 Aug 2024 05:40), Max: 37.2 (26 Aug 2024 22:15)  T(F): 98.6 (27 Aug 2024 05:40), Max: 99 (26 Aug 2024 22:15)  HR: 110 (27 Aug 2024 05:40) (105 - 112)  BP: 153/81 (27 Aug 2024 05:40) (122/71 - 153/81)  BP(mean): --  RR: 18 (27 Aug 2024 05:40) (18 - 18)  SpO2: 97% (27 Aug 2024 05:40) (97% - 97%)    Parameters below as of 27 Aug 2024 05:40  Patient On (Oxygen Delivery Method): room air        Admit weight: 62.2kg   Daily Weight in k.3 (26 Aug 2024 07:44)    Intake / Output:    @ 07:01  -   @ 07:00  --------------------------------------------------------  IN: 775 mL / OUT: 1850 mL / NET: -1075 mL      PE:   Oropharynx: no erythema or ulcerations   Oral Mucositis:              -                                          Grade: n/a   CVS: S1, S2 RRR   Lungs: CTA throughout bilaterally   Abdomen: + BS x 4, soft, NT, ND  Extremities: no edema   Gastric Mucositis:       -                                           Grade: n/a   Intestinal Mucositis:     -                                         Grade: n/a   Skin: no rash   TLC: CDI   Neuro: A&Ox3   Pain: denies pain at present     Labs:   CBC Full  -  ( 27 Aug 2024 07:01 )  WBC Count : 1.98 K/uL  Hemoglobin : 9.9 g/dL  Hematocrit : 28.6 %  Platelet Count - Automated : 134 K/uL  Mean Cell Volume : 90.2 fl  Mean Cell Hemoglobin : 31.2 pg  Mean Cell Hemoglobin Concentration : 34.6 gm/dL  Auto Neutrophil # : x  Auto Lymphocyte # : x  Auto Monocyte # : x  Auto Eosinophil # : x  Auto Basophil # : x  Auto Neutrophil % : x  Auto Lymphocyte % : x  Auto Monocyte % : x  Auto Eosinophil % : x  Auto Basophil % : x                          9.9    1.98  )-----------( 134      ( 27 Aug 2024 07:01 )             28.6         139  |  103  |  21  ----------------------------<  112<H>  3.4<L>   |  24  |  0.77    Ca    9.3      27 Aug 2024 07:01  Phos  2.9       Mg     1.9         TPro  5.9<L>  /  Alb  3.7  /  TBili  0.7  /  DBili  x   /  AST  17  /  ALT  12  /  AlkPhos  76          Meds:   Antimicrobials:   acyclovir   Oral Tab/Cap 800 milliGRAM(s) Oral every 12 hours      Heme / Onc:       GI:  loperamide 4 milliGRAM(s) Oral every 4 hours PRN  pantoprazole    Tablet 40 milliGRAM(s) Oral before breakfast  simethicone 80 milliGRAM(s) Chew four times a day PRN  sodium bicarbonate Mouth Rinse 10 milliLiter(s) Swish and Spit five times a day      Cardiovascular:   valsartan 80 milliGRAM(s) Oral daily      Immunologic:       Other medications:   Biotene Dry Mouth Oral Rinse 5 milliLiter(s) Swish and Spit five times a day  chlorhexidine 4% Liquid 1 Application(s) Topical <User Schedule>  nystatin Powder 1 Application(s) Topical two times a day  phytonadione   Solution 5 milliGRAM(s) Oral <User Schedule>  sodium chloride 0.9%. 1000 milliLiter(s) IV Continuous <Continuous>  zinc oxide 40% Paste 1 Application(s) Topical two times a day      PRN:   acetaminophen     Tablet .. 650 milliGRAM(s) Oral every 6 hours PRN  loperamide 4 milliGRAM(s) Oral every 4 hours PRN  LORazepam   Injectable 0.5 milliGRAM(s) IV Push every 8 hours PRN  metoclopramide Injectable 10 milliGRAM(s) IV Push every 6 hours PRN  ondansetron Injectable 8 milliGRAM(s) IV Push every 8 hours PRN  simethicone 80 milliGRAM(s) Chew four times a day PRN  sodium chloride 0.9% lock flush 10 milliLiter(s) IV Push every 1 hour PRN    A/P:  64 year old female with a history of multiple myeloma   Post:  Autologous PBSCT day + 4  - HPC transplant today, continue hydration for 24 hours post infusion of cells. Daily weights, strict I&O, prn diuresis. Start levaquin / fluconazole prophylaxis when neutropenic.    c/o intermittent abd pain, AXR done, reading from radiologist requested. weight gain from admission, monitor closely, Lasix PRN    Zofran to PRN, switched Compazine-->reglan PRN     1. Infectious Disease:   acyclovir   Oral Tab/Cap 800 milliGRAM(s) Oral every 12 hours    2. GI Prophylaxis:    pantoprazole    Tablet 40 milliGRAM(s) Oral before breakfast    3. Mouthcare - NS / NaHCO3 rinses, Biotene; Skin care     4. Transfuse & replete electrolytes prn   KCl 20mEq IV q 2 hours x 3 doses     5. IV hydration, daily weights, strict I&O, prn diuresis     6. PO intake as tolerated, nutrition follow up as needed    7. Antiemetics, anti-diarrhea medications:   loperamide 4 milliGRAM(s) Oral every 4 hours PRN  metoclopramide Injectable 10 milliGRAM(s) IV Push every 6 hours PRN  ondansetron Injectable 8 milliGRAM(s) IV Push every 8 hours PRN    8. OOB as tolerated, physical therapy consult if needed     9. Monitor coags / fibrinogen 2x week, vitamin K as needed   phytonadione   Solution 5 milliGRAM(s) Oral <User Schedule>    10. Monitor closely for clinical changes, monitor for fevers     11. Emotional support provided, plan of care discussed and questions addressed     12. Patient education done regarding plan of care, restrictions and discharge planning     13. Continue regular social work input     I have written the above note for Dr. Martinez who performed service with me in the room.   Jessy Ferrell NP-C ([phone])    I have seen and examined patient with NP, I agree with above note as scribed.                      [facility] Transplant Team                                                      Critical / Counseling Time Provided: 30 minutes                                                                                                                                                        Chief Complaint: Autologous pbsct with high dose melphalan prep regimen for treatment of IgG lambda multiple myeloma    Disease: IgG lambda multiple myeloma  Type of transplant: Autologous  Conditioning prep: Melphalan (200 mg/m2)   ABO / CMV: O POS / POS     S: Patient seen and examined with [facility] Transplant Team:   + fatigue  + poor appetite  + nausea / vomiting  + loose stool     O: Vitals:   Vital Signs Last 24 Hrs  T(C): 37 (27 Aug 2024 05:40), Max: 37.2 (26 Aug 2024 22:15)  T(F): 98.6 (27 Aug 2024 05:40), Max: 99 (26 Aug 2024 22:15)  HR: 110 (27 Aug 2024 05:40) (105 - 112)  BP: 153/81 (27 Aug 2024 05:40) (122/71 - 153/81)  BP(mean): --  RR: 18 (27 Aug 2024 05:40) (18 - 18)  SpO2: 97% (27 Aug 2024 05:40) (97% - 97%)    Parameters below as of 27 Aug 2024 05:40  Patient On (Oxygen Delivery Method): room air        Admit weight: 62.2kg   Daily Weight in k.3 (26 Aug 2024 07:44)    Intake / Output:    @ 07:01  -   @ 07:00  --------------------------------------------------------  IN: 775 mL / OUT: 1850 mL / NET: -1075 mL      PE:   Oropharynx: no erythema or ulcerations   Oral Mucositis:              -                                          Grade: n/a   CVS: S1, S2 RRR   Lungs: CTA throughout bilaterally   Abdomen: + BS x 4, soft, NT, ND  Extremities: no edema   Gastric Mucositis:       +                                          ndGndrndanddndend:nd nd2nd Intestinal Mucositis:     -                                         Grade: n/a   Skin: no rash   TLC: CDI   Neuro: A&Ox3   Pain: denies pain at present     Labs:   CBC Full  -  ( 27 Aug 2024 07:01 )  WBC Count : 1.98 K/uL  Hemoglobin : 9.9 g/dL  Hematocrit : 28.6 %  Platelet Count - Automated : 134 K/uL  Mean Cell Volume : 90.2 fl  Mean Cell Hemoglobin : 31.2 pg  Mean Cell Hemoglobin Concentration : 34.6 gm/dL  Auto Neutrophil # : x  Auto Lymphocyte # : x  Auto Monocyte # : x  Auto Eosinophil # : x  Auto Basophil # : x  Auto Neutrophil % : x  Auto Lymphocyte % : x  Auto Monocyte % : x  Auto Eosinophil % : x  Auto Basophil % : x                          9.9    1.98  )-----------( 134      ( 27 Aug 2024 07:01 )             28.6         139  |  103  |  21  ----------------------------<  112<H>  3.4<L>   |  24  |  0.77    Ca    9.3      27 Aug 2024 07:01  Phos  2.9       Mg     1.9         TPro  5.9<L>  /  Alb  3.7  /  TBili  0.7  /  DBili  x   /  AST  17  /  ALT  12  /  AlkPhos  76          Meds:   Antimicrobials:   acyclovir   Oral Tab/Cap 800 milliGRAM(s) Oral every 12 hours      Heme / Onc:       GI:  loperamide 4 milliGRAM(s) Oral every 4 hours PRN  pantoprazole    Tablet 40 milliGRAM(s) Oral before breakfast  simethicone 80 milliGRAM(s) Chew four times a day PRN  sodium bicarbonate Mouth Rinse 10 milliLiter(s) Swish and Spit five times a day      Cardiovascular:   valsartan 80 milliGRAM(s) Oral daily      Immunologic:       Other medications:   Biotene Dry Mouth Oral Rinse 5 milliLiter(s) Swish and Spit five times a day  chlorhexidine 4% Liquid 1 Application(s) Topical <User Schedule>  nystatin Powder 1 Application(s) Topical two times a day  phytonadione   Solution 5 milliGRAM(s) Oral <User Schedule>  sodium chloride 0.9%. 1000 milliLiter(s) IV Continuous <Continuous>  zinc oxide 40% Paste 1 Application(s) Topical two times a day      PRN:   acetaminophen     Tablet .. 650 milliGRAM(s) Oral every 6 hours PRN  loperamide 4 milliGRAM(s) Oral every 4 hours PRN  LORazepam   Injectable 0.5 milliGRAM(s) IV Push every 8 hours PRN  metoclopramide Injectable 10 milliGRAM(s) IV Push every 6 hours PRN  ondansetron Injectable 8 milliGRAM(s) IV Push every 8 hours PRN  simethicone 80 milliGRAM(s) Chew four times a day PRN  sodium chloride 0.9% lock flush 10 milliLiter(s) IV Push every 1 hour PRN    A/P:  64 year old female with a history of multiple myeloma   Post:  Autologous PBSCT day + 4  - HPC transplant today, continue hydration for 24 hours post infusion of cells. Daily weights, strict I&O, prn diuresis. Start levaquin / fluconazole prophylaxis when neutropenic.    c/o intermittent abd pain, AXR done, reading from radiologist requested. weight gain from admission, monitor closely, Lasix PRN    Zofran to PRN, switched Compazine-->reglan PRN  - refractory nausea / vomiting, will do trial of olanzapine. d/c reglan. Grade 1 chemotherapy induced GI mucositis. Continue supportive measures.     1. Infectious Disease:   acyclovir   Oral Tab/Cap 800 milliGRAM(s) Oral every 12 hours    2. GI Prophylaxis:    pantoprazole    Tablet 40 milliGRAM(s) Oral before breakfast    3. Mouthcare - NS / NaHCO3 rinses, Biotene; Skin care     4. Transfuse & replete electrolytes prn   KCl 20mEq IV q 2 hours x 3 doses     5. IV hydration, daily weights, strict I&O, prn diuresis     6. PO intake as tolerated, nutrition follow up as needed    7. Antiemetics, anti-diarrhea medications:   loperamide 4 milliGRAM(s) Oral every 4 hours PRN  metoclopramide Injectable 10 milliGRAM(s) IV Push every 6 hours PRN  ondansetron Injectable 8 milliGRAM(s) IV Push every 8 hours PRN    8. OOB as tolerated, physical therapy consult if needed     9. Monitor coags / fibrinogen 2x week, vitamin K as needed   phytonadione   Solution 5 milliGRAM(s) Oral <User Schedule>    10. Monitor closely for clinical changes, monitor for fevers     11. Emotional support provided, plan of care discussed and questions addressed     12. Patient education done regarding plan of care, restrictions and discharge planning     13. Continue regular social work input     I have written the above note for Dr. Martinez who performed service with me in the room.   Jessy Ferrell NP-C ([phone])    I have seen and examined patient with NP, I agree with above note as scribed.

## 2024-08-27 NOTE — PROGRESS NOTE ADULT - NS ATTEND AMEND GEN_ALL_CORE FT
64 year old female with a history of multiple myeloma admitted for auto-HSCT Day + 4 today     Disease: IgG lambda multiple myeloma  Type of transplant: Autologous  Conditioning prep: Melphalan (200 mg/m2)   ABO / CMV: O POS / POS    Complications:   - Day 0: nausea/vomitting, abdominal pain   -Day 1: B/L lower extremities tremors --> compazine D/C, replaced by reglan     Patient was discussed, seen and examined in IDR rounds.   Patient's nausea improved; Zofran now PRN; compazine changed to reglan. Monitor frequency of bowel movements.   Has frequent bowel movements, but this is chronic with use of Imodium at home. Today, episode of diarrhea with abd cramps  Labs reviewed, supplements lytes prn  PE: normal oral mucosa, site of line without erythema, + BS in all 4 quadrants,   Continue Acyclovir prophylaxis  Continue supportive care. 64 year old female with a history of multiple myeloma admitted for auto-HSCT Day + 4 today     Disease: IgG lambda multiple myeloma  Type of transplant: Autologous  Conditioning prep: Melphalan (200 mg/m2)   ABO / CMV: O POS / POS    Complications:   - Day 0: nausea/vomitting, abdominal pain   -Day 1: B/L lower extremities tremors --> compazine D/C, replaced by reglan     Patient was discussed, seen and examined in IDR rounds.   Patient's nausea improved; Zofran now PRN; compazine changed to reglan. Monitor frequency of bowel movements.   Has frequent bowel movements, but this is chronic with use of Imodium at home. Today, episode of loose stool.  Labs reviewed, supplements lytes prn  PE: normal oral mucosa, site of line without erythema, + BS in all 4 quadrants,   Continue Acyclovir prophylaxis  Continue supportive care.

## 2024-08-28 LAB
ALBUMIN SERPL ELPH-MCNC: 3.7 G/DL — SIGNIFICANT CHANGE UP (ref 3.3–5)
ALP SERPL-CCNC: 70 U/L — SIGNIFICANT CHANGE UP (ref 40–120)
ALT FLD-CCNC: 11 U/L — SIGNIFICANT CHANGE UP (ref 10–45)
ANION GAP SERPL CALC-SCNC: 8 MMOL/L — SIGNIFICANT CHANGE UP (ref 5–17)
AST SERPL-CCNC: 18 U/L — SIGNIFICANT CHANGE UP (ref 10–40)
BASOPHILS # BLD AUTO: 0.01 K/UL — SIGNIFICANT CHANGE UP (ref 0–0.2)
BASOPHILS NFR BLD AUTO: 0.6 % — SIGNIFICANT CHANGE UP (ref 0–2)
BILIRUB DIRECT SERPL-MCNC: <0.1 MG/DL — SIGNIFICANT CHANGE UP (ref 0–0.3)
BILIRUB INDIRECT FLD-MCNC: >0.4 MG/DL — SIGNIFICANT CHANGE UP (ref 0.2–1)
BILIRUB SERPL-MCNC: 0.5 MG/DL — SIGNIFICANT CHANGE UP (ref 0.2–1.2)
BLD GP AB SCN SERPL QL: POSITIVE — SIGNIFICANT CHANGE UP
BUN SERPL-MCNC: 21 MG/DL — SIGNIFICANT CHANGE UP (ref 7–23)
CALCIUM SERPL-MCNC: 9.1 MG/DL — SIGNIFICANT CHANGE UP (ref 8.4–10.5)
CHLORIDE SERPL-SCNC: 106 MMOL/L — SIGNIFICANT CHANGE UP (ref 96–108)
CO2 SERPL-SCNC: 23 MMOL/L — SIGNIFICANT CHANGE UP (ref 22–31)
CREAT SERPL-MCNC: 0.78 MG/DL — SIGNIFICANT CHANGE UP (ref 0.5–1.3)
EGFR: 84 ML/MIN/1.73M2 — SIGNIFICANT CHANGE UP
EOSINOPHIL # BLD AUTO: 0 K/UL — SIGNIFICANT CHANGE UP (ref 0–0.5)
EOSINOPHIL NFR BLD AUTO: 0 % — SIGNIFICANT CHANGE UP (ref 0–6)
GLUCOSE SERPL-MCNC: 116 MG/DL — HIGH (ref 70–99)
HCT VFR BLD CALC: 27.2 % — LOW (ref 34.5–45)
HGB BLD-MCNC: 9.2 G/DL — LOW (ref 11.5–15.5)
IMM GRANULOCYTES NFR BLD AUTO: 1.3 % — HIGH (ref 0–0.9)
LDH SERPL L TO P-CCNC: 162 U/L — SIGNIFICANT CHANGE UP (ref 50–242)
LYMPHOCYTES # BLD AUTO: 0.06 K/UL — LOW (ref 1–3.3)
LYMPHOCYTES # BLD AUTO: 3.8 % — LOW (ref 13–44)
MAGNESIUM SERPL-MCNC: 1.9 MG/DL — SIGNIFICANT CHANGE UP (ref 1.6–2.6)
MCHC RBC-ENTMCNC: 31.4 PG — SIGNIFICANT CHANGE UP (ref 27–34)
MCHC RBC-ENTMCNC: 33.8 GM/DL — SIGNIFICANT CHANGE UP (ref 32–36)
MCV RBC AUTO: 92.8 FL — SIGNIFICANT CHANGE UP (ref 80–100)
MONOCYTES # BLD AUTO: 0 K/UL — SIGNIFICANT CHANGE UP (ref 0–0.9)
MONOCYTES NFR BLD AUTO: 0 % — LOW (ref 2–14)
NEUTROPHILS # BLD AUTO: 1.47 K/UL — LOW (ref 1.8–7.4)
NEUTROPHILS NFR BLD AUTO: 94.3 % — HIGH (ref 43–77)
NRBC # BLD: 0 /100 WBCS — SIGNIFICANT CHANGE UP (ref 0–0)
PHOSPHATE SERPL-MCNC: 2.7 MG/DL — SIGNIFICANT CHANGE UP (ref 2.5–4.5)
PLATELET # BLD AUTO: 100 K/UL — LOW (ref 150–400)
POTASSIUM SERPL-MCNC: 3.7 MMOL/L — SIGNIFICANT CHANGE UP (ref 3.5–5.3)
POTASSIUM SERPL-SCNC: 3.7 MMOL/L — SIGNIFICANT CHANGE UP (ref 3.5–5.3)
PROT SERPL-MCNC: 5.7 G/DL — LOW (ref 6–8.3)
RBC # BLD: 2.93 M/UL — LOW (ref 3.8–5.2)
RBC # FLD: 14.1 % — SIGNIFICANT CHANGE UP (ref 10.3–14.5)
RH IG SCN BLD-IMP: POSITIVE — SIGNIFICANT CHANGE UP
SODIUM SERPL-SCNC: 137 MMOL/L — SIGNIFICANT CHANGE UP (ref 135–145)
WBC # BLD: 1.56 K/UL — LOW (ref 3.8–10.5)
WBC # FLD AUTO: 1.56 K/UL — LOW (ref 3.8–10.5)

## 2024-08-28 PROCEDURE — 99233 SBSQ HOSP IP/OBS HIGH 50: CPT

## 2024-08-28 RX ADMIN — Medication 5 MILLILITER(S): at 00:32

## 2024-08-28 RX ADMIN — Medication 5 MILLILITER(S): at 17:40

## 2024-08-28 RX ADMIN — SODIUM BICARBONATE 10 MILLILITER(S): 84 INJECTION, SOLUTION INTRAVENOUS at 07:58

## 2024-08-28 RX ADMIN — Medication 800 MILLIGRAM(S): at 17:39

## 2024-08-28 RX ADMIN — Medication 5 MILLILITER(S): at 11:25

## 2024-08-28 RX ADMIN — LORAZEPAM 0.5 MILLIGRAM(S): 4 INJECTION INTRAMUSCULAR; INTRAVENOUS at 21:32

## 2024-08-28 RX ADMIN — TIZANIDINE 4 MILLIGRAM(S): 4 TABLET ORAL at 21:31

## 2024-08-28 RX ADMIN — ZINC OXIDE 1 APPLICATION(S): 100 OINTMENT TOPICAL at 05:40

## 2024-08-28 RX ADMIN — TIZANIDINE 4 MILLIGRAM(S): 4 TABLET ORAL at 14:30

## 2024-08-28 RX ADMIN — ONDANSETRON 8 MILLIGRAM(S): 2 INJECTION, SOLUTION INTRAMUSCULAR; INTRAVENOUS at 16:27

## 2024-08-28 RX ADMIN — ZINC OXIDE 1 APPLICATION(S): 100 OINTMENT TOPICAL at 17:42

## 2024-08-28 RX ADMIN — CHLORHEXIDINE GLUCONATE 1 APPLICATION(S): 40 SOLUTION TOPICAL at 07:59

## 2024-08-28 RX ADMIN — Medication 800 MILLIGRAM(S): at 05:39

## 2024-08-28 RX ADMIN — FILGRASTIM 300 MICROGRAM(S): 300 INJECTION, SOLUTION INTRAVENOUS; SUBCUTANEOUS at 12:00

## 2024-08-28 RX ADMIN — TIZANIDINE 4 MILLIGRAM(S): 4 TABLET ORAL at 11:04

## 2024-08-28 RX ADMIN — OLANZAPINE 5 MILLIGRAM(S): 7.5 TABLET ORAL at 21:32

## 2024-08-28 RX ADMIN — Medication 1 APPLICATION(S): at 17:41

## 2024-08-28 RX ADMIN — Medication 5 MILLILITER(S): at 07:58

## 2024-08-28 RX ADMIN — SODIUM BICARBONATE 10 MILLILITER(S): 84 INJECTION, SOLUTION INTRAVENOUS at 00:32

## 2024-08-28 RX ADMIN — Medication 1 APPLICATION(S): at 05:40

## 2024-08-28 RX ADMIN — VALSARTAN 80 MILLIGRAM(S): 40 TABLET ORAL at 05:39

## 2024-08-28 RX ADMIN — Medication 40 MILLIGRAM(S): at 06:20

## 2024-08-28 RX ADMIN — SODIUM BICARBONATE 10 MILLILITER(S): 84 INJECTION, SOLUTION INTRAVENOUS at 17:40

## 2024-08-28 RX ADMIN — SODIUM BICARBONATE 10 MILLILITER(S): 84 INJECTION, SOLUTION INTRAVENOUS at 11:25

## 2024-08-28 NOTE — PROGRESS NOTE ADULT - SUBJECTIVE AND OBJECTIVE BOX
[facility] Transplant Team                                                      Critical / Counseling Time Provided: 30 minutes                                                                                                                                                        Chief Complaint: Autologous pbsct with high dose melphalan prep regimen for treatment of IgG lambda multiple myeloma    Disease: IgG lambda multiple myeloma  Type of transplant: Autologous  Conditioning prep: Melphalan (200 mg/m2)   ABO / CMV: O POS / POS     S: Patient seen and examined with [facility] Transplant Team:       O: Vitals:   Vital Signs Last 24 Hrs  T(C): 36.9 (28 Aug 2024 05:36), Max: 36.9 (27 Aug 2024 21:20)  T(F): 98.4 (28 Aug 2024 05:36), Max: 98.4 (27 Aug 2024 21:20)  HR: 99 (28 Aug 2024 05:36) (95 - 117)  BP: 128/75 (28 Aug 2024 05:36) (117/74 - 130/76)  BP(mean): --  RR: 18 (28 Aug 2024 05:36) (18 - 18)  SpO2: 97% (28 Aug 2024 05:36) (97% - 98%)    Parameters below as of 28 Aug 2024 05:36  Patient On (Oxygen Delivery Method): room air        Admit weight: 62.2kg       Intake / Output:   08-27 @ 07:01  -  08-28 @ 07:00  --------------------------------------------------------  IN: 827 mL / OUT: 1450 mL / NET: -623 mL      PE:   Oropharynx: no erythema or ulcerations   Oral Mucositis:              -                                          Grade: n/a   CVS: S1, S2 RRR   Lungs: CTA throughout bilaterally   Abdomen: + BS x 4, soft, NT, ND  Extremities: no edema   Gastric Mucositis:       +                                          ndGndrndanddndend:nd nd2nd Intestinal Mucositis:     -                                         Grade: n/a   Skin: no rash   TLC: CDI   Neuro: A&Ox3   Pain: denies pain at present       Labs:   CBC Full  -  ( 28 Aug 2024 07:02 )  WBC Count : 1.56 K/uL  Hemoglobin : 9.2 g/dL  Hematocrit : 27.2 %  Platelet Count - Automated : 100 K/uL  Mean Cell Volume : 92.8 fl  Mean Cell Hemoglobin : 31.4 pg  Mean Cell Hemoglobin Concentration : 33.8 gm/dL  Auto Neutrophil # : x  Auto Lymphocyte # : x  Auto Monocyte # : x  Auto Eosinophil # : x  Auto Basophil # : x  Auto Neutrophil % : x  Auto Lymphocyte % : x  Auto Monocyte % : x  Auto Eosinophil % : x  Auto Basophil % : x                          9.2    1.56  )-----------( 100      ( 28 Aug 2024 07:02 )             27.2     Radiology:   ACC: [id number] EXAM:  XR ABDOMEN PORTABLE URGENT 1V   ORDERED BY: GREGORY ALLISON   PROCEDURE DATE:  08/24/2024    INTERPRETATION:  CLINICAL INFORMATION: multiple myeloma abd pain nausea  IMPRESSION:  Nonobstructive bowel gas pattern.    Meds:   Antimicrobials:   acyclovir   Oral Tab/Cap 800 milliGRAM(s) Oral every 12 hours      Heme / Onc:       GI:  loperamide 4 milliGRAM(s) Oral every 4 hours PRN  pantoprazole    Tablet 40 milliGRAM(s) Oral before breakfast  simethicone 80 milliGRAM(s) Chew four times a day PRN  sodium bicarbonate Mouth Rinse 10 milliLiter(s) Swish and Spit five times a day      Cardiovascular:   valsartan 80 milliGRAM(s) Oral daily      Immunologic:   filgrastim-aafi (NIVESTYM) Injectable 300 MICROGram(s) SubCutaneous daily      Other medications:   Biotene Dry Mouth Oral Rinse 5 milliLiter(s) Swish and Spit five times a day  chlorhexidine 4% Liquid 1 Application(s) Topical <User Schedule>  nystatin Powder 1 Application(s) Topical two times a day  OLANZapine 5 milliGRAM(s) Oral at bedtime  phytonadione   Solution 5 milliGRAM(s) Oral <User Schedule>  sodium chloride 0.9%. 1000 milliLiter(s) IV Continuous <Continuous>  zinc oxide 40% Paste 1 Application(s) Topical two times a day      PRN:   acetaminophen     Tablet .. 650 milliGRAM(s) Oral every 6 hours PRN  loperamide 4 milliGRAM(s) Oral every 4 hours PRN  LORazepam   Injectable 0.5 milliGRAM(s) IV Push every 8 hours PRN  ondansetron Injectable 8 milliGRAM(s) IV Push every 8 hours PRN  simethicone 80 milliGRAM(s) Chew four times a day PRN  sodium chloride 0.9% lock flush 10 milliLiter(s) IV Push every 1 hour PRN    A/P:  64 year old female with a history of multiple myeloma   Post:  Autologous PBSCT day + 5 /23- HPC transplant today, continue hydration for 24 hours post infusion of cells. Daily weights, strict I&O, prn diuresis. Start levaquin / fluconazole prophylaxis when neutropenic.   8/24 c/o intermittent abd pain, AXR done, reading from radiologist requested. weight gain from admission, monitor closely, Lasix PRN   8/25 Zofran to PRN, switched Compazine-->reglan PRN  8/27- refractory nausea / vomiting, will do trial of olanzapine. d/c reglan. Grade 1 chemotherapy induced GI mucositis. Continue supportive measures.     1. Infectious Disease:   acyclovir   Oral Tab/Cap 800 milliGRAM(s) Oral every 12 hours    2. GI Prophylaxis:    pantoprazole    Tablet 40 milliGRAM(s) Oral before breakfast    3. Mouthcare - NS / NaHCO3 rinses, Biotene; Skin care     4. Transfuse & replete electrolytes prn     5. IV hydration, daily weights, strict I&O, prn diuresis     6. PO intake as tolerated, nutrition follow up as needed    7. Antiemetics, anti-diarrhea medications:   loperamide 4 milliGRAM(s) Oral every 4 hours PRN  ondansetron Injectable 8 milliGRAM(s) IV Push every 8 hours PRN  LORazepam   Injectable 0.5 milliGRAM(s) IV Push every 8 hours PRN  OLANZapine 5 milliGRAM(s) Oral at bedtime    8. OOB as tolerated, physical therapy consult if needed     9. Monitor coags / fibrinogen 2x week, vitamin K as needed   phytonadione   Solution 5 milliGRAM(s) Oral <User Schedule>    10. Monitor closely for clinical changes, monitor for fevers     11. Emotional support provided, plan of care discussed and questions addressed     12. Patient education done regarding plan of care, restrictions and discharge planning     13. Continue regular social work input     I have written the above note for Dr. Martinez who performed service with me in the room.   Jessy Ferrell NP-C ([phone])    I have seen and examined patient with NP, I agree with above note as scribed.        [facility] Transplant Team                                                      Critical / Counseling Time Provided: 30 minutes                                                                                                                                                        Chief Complaint: Autologous pbsct with high dose melphalan prep regimen for treatment of IgG lambda multiple myeloma    Disease: IgG lambda multiple myeloma  Type of transplant: Autologous  Conditioning prep: Melphalan (200 mg/m2)   ABO / CMV: O POS / POS     S: Patient seen and examined with [facility] Transplant Team:       O: Vitals:   Vital Signs Last 24 Hrs  T(C): 36.9 (28 Aug 2024 05:36), Max: 36.9 (27 Aug 2024 21:20)  T(F): 98.4 (28 Aug 2024 05:36), Max: 98.4 (27 Aug 2024 21:20)  HR: 99 (28 Aug 2024 05:36) (95 - 117)  BP: 128/75 (28 Aug 2024 05:36) (117/74 - 130/76)  BP(mean): --  RR: 18 (28 Aug 2024 05:36) (18 - 18)  SpO2: 97% (28 Aug 2024 05:36) (97% - 98%)    Parameters below as of 28 Aug 2024 05:36  Patient On (Oxygen Delivery Method): room air        Admit weight: 62.2kg       Intake / Output:   08-27 @ 07:01  -  08-28 @ 07:00  --------------------------------------------------------  IN: 827 mL / OUT: 1450 mL / NET: -623 mL      PE:   Oropharynx: no erythema or ulcerations   Oral Mucositis:              -                                          Grade: n/a   CVS: S1, S2 RRR   Lungs: CTA throughout bilaterally   Abdomen: + BS x 4, soft, NT, ND  Extremities: no edema   Gastric Mucositis:       +                                          ndGndrndanddndend:nd nd2nd Intestinal Mucositis:     -                                         Grade: n/a   Skin: no rash   TLC: CDI   Neuro: A&Ox3   Pain: denies pain at present       Labs:   CBC Full  -  ( 28 Aug 2024 07:02 )  WBC Count : 1.56 K/uL  Hemoglobin : 9.2 g/dL  Hematocrit : 27.2 %  Platelet Count - Automated : 100 K/uL  Mean Cell Volume : 92.8 fl  Mean Cell Hemoglobin : 31.4 pg  Mean Cell Hemoglobin Concentration : 33.8 gm/dL  Auto Neutrophil # : x  Auto Lymphocyte # : x  Auto Monocyte # : x  Auto Eosinophil # : x  Auto Basophil # : x  Auto Neutrophil % : x  Auto Lymphocyte % : x  Auto Monocyte % : x  Auto Eosinophil % : x  Auto Basophil % : x                          9.2    1.56  )-----------( 100      ( 28 Aug 2024 07:02 )             27.2     08-28    137  |  106  |  21  ----------------------------<  116<H>  3.7   |  23  |  0.78    Ca    9.1      28 Aug 2024 07:01  Phos  2.7     08-28  Mg     1.9     08-28    TPro  5.7<L>  /  Alb  3.7  /  TBili  0.5  /  DBili  <0.1  /  AST  18  /  ALT  11  /  AlkPhos  70  08-28      Radiology:   ACC: [id number] EXAM:  XR ABDOMEN PORTABLE URGENT 1V   ORDERED BY: GREGORY ALLISON   PROCEDURE DATE:  08/24/2024    INTERPRETATION:  CLINICAL INFORMATION: multiple myeloma abd pain nausea  IMPRESSION:  Nonobstructive bowel gas pattern.    Meds:   Antimicrobials:   acyclovir   Oral Tab/Cap 800 milliGRAM(s) Oral every 12 hours      Heme / Onc:       GI:  loperamide 4 milliGRAM(s) Oral every 4 hours PRN  pantoprazole    Tablet 40 milliGRAM(s) Oral before breakfast  simethicone 80 milliGRAM(s) Chew four times a day PRN  sodium bicarbonate Mouth Rinse 10 milliLiter(s) Swish and Spit five times a day      Cardiovascular:   valsartan 80 milliGRAM(s) Oral daily      Immunologic:   filgrastim-aafi (NIVESTYM) Injectable 300 MICROGram(s) SubCutaneous daily      Other medications:   Biotene Dry Mouth Oral Rinse 5 milliLiter(s) Swish and Spit five times a day  chlorhexidine 4% Liquid 1 Application(s) Topical <User Schedule>  nystatin Powder 1 Application(s) Topical two times a day  OLANZapine 5 milliGRAM(s) Oral at bedtime  phytonadione   Solution 5 milliGRAM(s) Oral <User Schedule>  sodium chloride 0.9%. 1000 milliLiter(s) IV Continuous <Continuous>  zinc oxide 40% Paste 1 Application(s) Topical two times a day      PRN:   acetaminophen     Tablet .. 650 milliGRAM(s) Oral every 6 hours PRN  loperamide 4 milliGRAM(s) Oral every 4 hours PRN  LORazepam   Injectable 0.5 milliGRAM(s) IV Push every 8 hours PRN  ondansetron Injectable 8 milliGRAM(s) IV Push every 8 hours PRN  simethicone 80 milliGRAM(s) Chew four times a day PRN  sodium chloride 0.9% lock flush 10 milliLiter(s) IV Push every 1 hour PRN    A/P:  64 year old female with a history of multiple myeloma   Post:  Autologous PBSCT day + 5 /23- HPC transplant today, continue hydration for 24 hours post infusion of cells. Daily weights, strict I&O, prn diuresis. Start levaquin / fluconazole prophylaxis when neutropenic.   8/24 c/o intermittent abd pain, AXR done, reading from radiologist requested. weight gain from admission, monitor closely, Lasix PRN   8/25 Zofran to PRN, switched Compazine-->reglan PRN  8/27- refractory nausea / vomiting, will do trial of olanzapine. d/c reglan. Grade 1 chemotherapy induced GI mucositis. Continue supportive measures.     1. Infectious Disease:   acyclovir   Oral Tab/Cap 800 milliGRAM(s) Oral every 12 hours    2. GI Prophylaxis:    pantoprazole    Tablet 40 milliGRAM(s) Oral before breakfast    3. Mouthcare - NS / NaHCO3 rinses, Biotene; Skin care     4. Transfuse & replete electrolytes prn     5. IV hydration, daily weights, strict I&O, prn diuresis     6. PO intake as tolerated, nutrition follow up as needed    7. Antiemetics, anti-diarrhea medications:   loperamide 4 milliGRAM(s) Oral every 4 hours PRN  ondansetron Injectable 8 milliGRAM(s) IV Push every 8 hours PRN  LORazepam   Injectable 0.5 milliGRAM(s) IV Push every 8 hours PRN  OLANZapine 5 milliGRAM(s) Oral at bedtime    8. OOB as tolerated, physical therapy consult if needed     9. Monitor coags / fibrinogen 2x week, vitamin K as needed   phytonadione   Solution 5 milliGRAM(s) Oral <User Schedule>    10. Monitor closely for clinical changes, monitor for fevers     11. Emotional support provided, plan of care discussed and questions addressed     12. Patient education done regarding plan of care, restrictions and discharge planning     13. Continue regular social work input     I have written the above note for Dr. Martinez who performed service with me in the room.   Jessy Ferrell NP-C ([phone])    I have seen and examined patient with NP, I agree with above note as scribed.        \[facility]\"" Transplant Team                                                      Critical / Counseling Time Provided: 30 minutes                                                                                                                                                        Chief Complaint: Autologous pbsct with high dose melphalan prep regimen for treatment of IgG lambda multiple myeloma    Disease: IgG lambda multiple myeloma  Type of transplant: Autologous  Conditioning prep: Melphalan (200 mg/m2)   ABO / CMV: O POS / POS     S: Patient seen and examined with \[facility]\"" Transplant Team:   Nausea improved   + fatigue   + intermittent dyspepsia     O: Vitals:   Vital Signs Last 24 Hrs  T(C): 36.9 (28 Aug 2024 05:36), Max: 36.9 (27 Aug 2024 21:20)  T(F): 98.4 (28 Aug 2024 05:36), Max: 98.4 (27 Aug 2024 21:20)  HR: 99 (28 Aug 2024 05:36) (95 - 117)  BP: 128/75 (28 Aug 2024 05:36) (117/74 - 130/76)  BP(mean): --  RR: 18 (28 Aug 2024 05:36) (18 - 18)  SpO2: 97% (28 Aug 2024 05:36) (97% - 98%)    Parameters below as of 28 Aug 2024 05:36  Patient On (Oxygen Delivery Method): room air        Admit weight: 62.2kg       Intake / Output:   08-27 @ 07:01  -  08-28 @ 07:00  --------------------------------------------------------  IN: 827 mL / OUT: 1450 mL / NET: -623 mL      PE:   Oropharynx: no erythema or ulcerations   Oral Mucositis:              -                                          Grade: n/a   CVS: S1, S2 RRR   Lungs: CTA throughout bilaterally   Abdomen: + BS x 4, soft, NT, ND  Extremities: no edema   Gastric Mucositis:       +                                          ndGndrndanddndend:nd nd2nd Intestinal Mucositis:     -                                         Grade: n/a   Skin: no rash   TLC: CDI   Neuro: A&Ox3   Pain: denies pain at present       Labs:   CBC Full  -  ( 28 Aug 2024 07:02 )  WBC Count : 1.56 K/uL  Hemoglobin : 9.2 g/dL  Hematocrit : 27.2 %  Platelet Count - Automated : 100 K/uL  Mean Cell Volume : 92.8 fl  Mean Cell Hemoglobin : 31.4 pg  Mean Cell Hemoglobin Concentration : 33.8 gm/dL  Auto Neutrophil # : x  Auto Lymphocyte # : x  Auto Monocyte # : x  Auto Eosinophil # : x  Auto Basophil # : x  Auto Neutrophil % : x  Auto Lymphocyte % : x  Auto Monocyte % : x  Auto Eosinophil % : x  Auto Basophil % : x                          9.2    1.56  )-----------( 100      ( 28 Aug 2024 07:02 )             27.2     08-28    137  |  106  |  21  ----------------------------<  116<H>  3.7   |  23  |  0.78    Ca    9.1      28 Aug 2024 07:01  Phos  2.7     08-28  Mg     1.9     08-28    TPro  5.7<L>  /  Alb  3.7  /  TBili  0.5  /  DBili  <0.1  /  AST  18  /  ALT  11  /  AlkPhos  70  08-28      Radiology:   ACC: [id number] EXAM:  XR ABDOMEN PORTABLE URGENT 1V   ORDERED BY: GREGORY ALLISON   PROCEDURE DATE:  08/24/2024    INTERPRETATION:  CLINICAL INFORMATION: multiple myeloma abd pain nausea  IMPRESSION:  Nonobstructive bowel gas pattern.    Meds:   Antimicrobials:   acyclovir   Oral Tab/Cap 800 milliGRAM(s) Oral every 12 hours      Heme / Onc:       GI:  loperamide 4 milliGRAM(s) Oral every 4 hours PRN  pantoprazole    Tablet 40 milliGRAM(s) Oral before breakfast  simethicone 80 milliGRAM(s) Chew four times a day PRN  sodium bicarbonate Mouth Rinse 10 milliLiter(s) Swish and Spit five times a day      Cardiovascular:   valsartan 80 milliGRAM(s) Oral daily      Immunologic:   filgrastim-aafi (NIVESTYM) Injectable 300 MICROGram(s) SubCutaneous daily      Other medications:   Biotene Dry Mouth Oral Rinse 5 milliLiter(s) Swish and Spit five times a day  chlorhexidine 4% Liquid 1 Application(s) Topical <User Schedule>  nystatin Powder 1 Application(s) Topical two times a day  OLANZapine 5 milliGRAM(s) Oral at bedtime  phytonadione   Solution 5 milliGRAM(s) Oral <User Schedule>  sodium chloride 0.9%. 1000 milliLiter(s) IV Continuous <Continuous>  zinc oxide 40% Paste 1 Application(s) Topical two times a day      PRN:   acetaminophen     Tablet .. 650 milliGRAM(s) Oral every 6 hours PRN  loperamide 4 milliGRAM(s) Oral every 4 hours PRN  LORazepam   Injectable 0.5 milliGRAM(s) IV Push every 8 hours PRN  ondansetron Injectable 8 milliGRAM(s) IV Push every 8 hours PRN  simethicone 80 milliGRAM(s) Chew four times a day PRN  sodium chloride 0.9% lock flush 10 milliLiter(s) IV Push every 1 hour PRN    A/P:  64 year old female with a history of multiple myeloma   Post:  Autologous PBSCT day + 5 /23- HPC transplant today, continue hydration for 24 hours post infusion of cells. Daily weights, strict I&O, prn diuresis. Start levaquin / fluconazole prophylaxis when neutropenic.   8/24 c/o intermittent abd pain, AXR done, reading from radiologist requested. weight gain from admission, monitor closely, Lasix PRN   8/25 Zofran to PRN, switched Compazine-->reglan PRN  8/27- refractory nausea / vomiting, will do trial of olanzapine. d/c reglan. Grade 1 chemotherapy induced GI mucositis. Continue supportive measures.     1. Infectious Disease:   acyclovir   Oral Tab/Cap 800 milliGRAM(s) Oral every 12 hours    2. GI Prophylaxis:    pantoprazole    Tablet 40 milliGRAM(s) Oral before breakfast    3. Mouthcare - NS / NaHCO3 rinses, Biotene; Skin care     4. Transfuse & replete electrolytes prn     5. IV hydration, daily weights, strict I&O, prn diuresis     6. PO intake as tolerated, nutrition follow up as needed    7. Antiemetics, anti-diarrhea medications:   loperamide 4 milliGRAM(s) Oral every 4 hours PRN  ondansetron Injectable 8 milliGRAM(s) IV Push every 8 hours PRN  LORazepam   Injectable 0.5 milliGRAM(s) IV Push every 8 hours PRN  OLANZapine 5 milliGRAM(s) Oral at bedtime    8. OOB as tolerated, physical therapy consult if needed     9. Monitor coags / fibrinogen 2x week, vitamin K as needed   phytonadione   Solution 5 milliGRAM(s) Oral <User Schedule>    10. Monitor closely for clinical changes, monitor for fevers     11. Emotional support provided, plan of care discussed and questions addressed     12. Patient education done regarding plan of care, restrictions and discharge planning     13. Continue regular social work input     I have written the above note for Dr. Martinez who performed service with me in the room.   Jessy Ferrell NP-C ([phone])    I have seen and examined patient with NP, I agree with above note as scribed.

## 2024-08-28 NOTE — PROGRESS NOTE ADULT - NS ATTEND AMEND GEN_ALL_CORE FT
64 year old female with a history of multiple myeloma admitted for auto-HSCT Day + 5 today     Disease: IgG lambda multiple myeloma  Type of transplant: Autologous  Conditioning prep: Melphalan (200 mg/m2)   ABO / CMV: O POS / POS    Complications:   - Day 0: nausea/vomitting, abdominal pain   -Day 1: B/L lower extremities tremors --> compazine D/C, replaced by reglan     Patient was discussed, seen and examined in IDR rounds.   Patient's nausea improved; Zofran now PRN; compazine changed to reglan. Monitor frequency of bowel movements.   Has frequent bowel movements, but this is chronic with use of Imodium at home. Today, episode of loose stool.  Labs reviewed, supplements lytes prn  PE: normal oral mucosa, site of line without erythema, + BS in all 4 quadrants,   Continue Acyclovir prophylaxis  Continue supportive care. 64 year old female with a history of multiple myeloma admitted for auto-HSCT Day + 5 today     Disease: IgG lambda multiple myeloma  Type of transplant: Autologous  Conditioning prep: Melphalan (200 mg/m2)   ABO / CMV: O POS / POS    Complications:   - Day 0: nausea/vomitting, abdominal pain   -Day 1: B/L lower extremities tremors --> compazine D/C, replaced by reglan     Patient was discussed, seen and examined in IDR rounds.   Patient's nausea much improved on Zyprexa, Reglan discontinued. Zofran prn. Eating much better. Monitor frequency of bowel movements.   Has frequent bowel movements, but this is chronic with use of Imodium at home.   Labs reviewed, supplements lytes prn  PE: normal oral mucosa, site of line without erythema, + BS in all 4 quadrants  Begin Zarxio daily  Continue Acyclovir prophylaxis  Continue supportive care.

## 2024-08-28 NOTE — PHARMACOTHERAPY INTERVENTION NOTE - COMMENTS
64-year-old female with PMH of HTN and IgG lambda multiple myeloma treated with Rosalind-RVD x4 cycles and admitted for autologous HSCT with melphalan 200 mg/m2 conditioning. Today is day +5. Course has been complicated by nausea, abdominal discomfort and anxiety.    Patient complaining of nausea and reports leg tremors with PRN prochlorperazine. Prochlorperazine was switched to metoclopramide (2 dose/24 hours) with good relief. Ondansetron was switched from ATC to PRN (2 doses/24 hours). Patient reports that nausea is not anticipatory and not caused by anxiety, in spite of having good relief from lorazepam. On the following for nausea: Olanzapine 5 mg PO at bedtime (started 8/28), ondansetron 8 mg IV every 8 hours PRN (first line for N/V), lorazepam 1 mg IV every 8 hours (second line for anxiety, third line for N/V). QTc on 8/26 was 429 msec (goal <500 msec). Patient's nausea is resolved/improved, since starting the olanzapine. Continue olanzapine with PRN ondansetron and lorazepam, and monitor QTC weekly (especially if we start levofloxacin ppx).    History of latex allergy (contact dermatitis) on chart and confirmed by patient. Will replace filgrastim-sndz (zarxio) with filgrastim-aaffi (Nievstym) due to latex stopper in zarxio.    Once patient becomes neutropenic (ANC <1000), please start levofloxacin 500 mg PO QD, vanco 125 mg PO BID. Once patient becomes severely neutropenic (ANC <500) start fluconazole 400 mg PO QD. Patient will be on medications until engraftment (ANC >500). Will continue to monitor renal/hepatic function. CrCl = 57 mL/min (based on ABW and SCr of 0.8).    In case patient becomes febrile (100.4F sustained over an hour or one temp of 101F and above), please order pip/tazo 4.5 g every 8 hours and vancomycin 1250 mg every 24 hours. Please draw vanco trough prior to 4th dose - expected AUC is 479 mg*hr/L and trough ~10.6 mcg/L. If blood cultures have no gram positive growth x48 hours, please d/c vanco.      Chirag Yaich, PharmD  PGY-2 Critical Care Pharmacy Resident  Available via [employer] Teams   64-year-old female with PMH of HTN and IgG lambda multiple myeloma treated with Rosalind-RVD x4 cycles and admitted for autologous HSCT with melphalan 200 mg/m2 conditioning. Today is day +5. Course has been complicated by nausea, abdominal discomfort and anxiety.    Patient complaining of nausea and reports leg tremors with PRN prochlorperazine. Prochlorperazine was switched to metoclopramide (2 dose/24 hours) with good relief. Ondansetron was switched from ATC to PRN (2 doses/24 hours). Patient reports that nausea is not anticipatory and not caused by anxiety, in spite of having good relief from lorazepam. On the following for nausea: Olanzapine 5 mg PO at bedtime (started 8/28), ondansetron 8 mg IV every 8 hours PRN (first line for N/V), lorazepam 1 mg IV every 8 hours (second line for anxiety, third line for N/V). QTc on 8/26 was 429 msec (goal <500 msec). Patient's nausea is resolved/improved, since starting the olanzapine. Continue olanzapine with PRN ondansetron and lorazepam, and monitor QTC weekly (especially if we start levofloxacin ppx).    History of latex allergy (contact dermatitis) on chart and confirmed by patient. Will replace filgrastim-sndz (zarxio) with filgrastim-aaffi (Nievstym) due to latex stopper in zarxio. Starting filgrastim-aaffi (Nievstym) today on day +5 (8/28)    Once patient becomes neutropenic (ANC <1000), please start levofloxacin 500 mg PO QD, vanco 125 mg PO BID. Once patient becomes severely neutropenic (ANC <500) start fluconazole 400 mg PO QD. Patient will be on medications until engraftment (ANC >500). Will continue to monitor renal/hepatic function. CrCl = 57 mL/min (based on ABW and SCr of 0.8).    In case patient becomes febrile (100.4F sustained over an hour or one temp of 101F and above), please order pip/tazo 4.5 g every 8 hours and vancomycin 1250 mg every 24 hours. Please draw vanco trough prior to 4th dose - expected AUC is 479 mg*hr/L and trough ~10.6 mcg/L. If blood cultures have no gram positive growth x48 hours, please d/c shaina.      Chirag Ronquillo, PharmD  PGY-2 Critical Care Pharmacy Resident  Available via [employer] Teams   64-year-old female with PMH of HTN and IgG lambda multiple myeloma treated with Rosalind-RVD x4 cycles and admitted for autologous HSCT with melphalan 200 mg/m2 conditioning. Today is day +5. Course has been complicated by nausea, abdominal discomfort and anxiety.    Patient complaining of nausea and reports leg tremors with PRN prochlorperazine. Prochlorperazine was switched to metoclopramide with good relief, though no longer on it. Ondansetron was switched from ATC to PRN (2 doses/24 hours). Patient reports that nausea is not anticipatory and not caused by anxiety, in spite of having good relief from lorazepam. On the following for nausea: Olanzapine 5 mg PO at bedtime (started 8/28), ondansetron 8 mg IV every 8 hours PRN (first line for N/V), lorazepam 1 mg IV every 8 hours (first line for anxiety, second line for N/V). QTc on 8/26 was 429 msec (goal <500 msec). Patient's nausea is resolved/improved, since starting the olanzapine. Continue olanzapine with PRN ondansetron and lorazepam, and monitor QTC weekly (especially if we start levofloxacin and fluconazole ppx).    History of latex allergy (contact dermatitis) on chart and confirmed by patient. Will replace filgrastim-sndz (zarxio) with filgrastim-aaffi (Nievstym) due to latex stopper in zarxio. Starting filgrastim-aaffi (Nievstym) today on day +5 (8/28)    Once patient becomes neutropenic (ANC <1000), please start levofloxacin 500 mg PO QD, vanco 125 mg PO BID. Once patient becomes severely neutropenic (ANC <500) start fluconazole 400 mg PO QD. Patient will be on medications until engraftment (ANC >500). Will continue to monitor renal/hepatic function. CrCl = 57 mL/min (based on ABW and SCr of 0.8).    In case patient becomes febrile (100.4F sustained over an hour or one temp of 101F and above), please order pip/tazo 4.5 g every 8 hours and vancomycin 1250 mg every 24 hours. Please draw vanco trough prior to 4th dose - expected AUC is 479 mg*hr/L and trough ~10.6 mcg/L. If blood cultures have no gram positive growth x48 hours, please d/c shaina.      Chirag Ronquillo, PharmD  PGY-2 Critical Care Pharmacy Resident  Available via [employer] Teams

## 2024-08-29 LAB
ALBUMIN SERPL ELPH-MCNC: 3.7 G/DL — SIGNIFICANT CHANGE UP (ref 3.3–5)
ALP SERPL-CCNC: 74 U/L — SIGNIFICANT CHANGE UP (ref 40–120)
ALT FLD-CCNC: 12 U/L — SIGNIFICANT CHANGE UP (ref 10–45)
ANION GAP SERPL CALC-SCNC: 13 MMOL/L — SIGNIFICANT CHANGE UP (ref 5–17)
APTT BLD: 26.1 SEC — SIGNIFICANT CHANGE UP (ref 24.5–35.6)
AST SERPL-CCNC: 20 U/L — SIGNIFICANT CHANGE UP (ref 10–40)
BASOPHILS # BLD AUTO: 0.01 K/UL — SIGNIFICANT CHANGE UP (ref 0–0.2)
BASOPHILS NFR BLD AUTO: 0.6 % — SIGNIFICANT CHANGE UP (ref 0–2)
BILIRUB SERPL-MCNC: 0.4 MG/DL — SIGNIFICANT CHANGE UP (ref 0.2–1.2)
BUN SERPL-MCNC: 20 MG/DL — SIGNIFICANT CHANGE UP (ref 7–23)
CALCIUM SERPL-MCNC: 9.5 MG/DL — SIGNIFICANT CHANGE UP (ref 8.4–10.5)
CHLORIDE SERPL-SCNC: 105 MMOL/L — SIGNIFICANT CHANGE UP (ref 96–108)
CO2 SERPL-SCNC: 21 MMOL/L — LOW (ref 22–31)
CREAT SERPL-MCNC: 0.74 MG/DL — SIGNIFICANT CHANGE UP (ref 0.5–1.3)
EGFR: 90 ML/MIN/1.73M2 — SIGNIFICANT CHANGE UP
EOSINOPHIL # BLD AUTO: 0 K/UL — SIGNIFICANT CHANGE UP (ref 0–0.5)
EOSINOPHIL NFR BLD AUTO: 0 % — SIGNIFICANT CHANGE UP (ref 0–6)
GLUCOSE SERPL-MCNC: 110 MG/DL — HIGH (ref 70–99)
HCT VFR BLD CALC: 28.1 % — LOW (ref 34.5–45)
HGB BLD-MCNC: 9.6 G/DL — LOW (ref 11.5–15.5)
IMM GRANULOCYTES NFR BLD AUTO: 3.8 % — HIGH (ref 0–0.9)
INR BLD: 1.03 RATIO — SIGNIFICANT CHANGE UP (ref 0.85–1.18)
LDH SERPL L TO P-CCNC: 164 U/L — SIGNIFICANT CHANGE UP (ref 50–242)
LYMPHOCYTES # BLD AUTO: 0.07 K/UL — LOW (ref 1–3.3)
LYMPHOCYTES # BLD AUTO: 4.4 % — LOW (ref 13–44)
MAGNESIUM SERPL-MCNC: 1.9 MG/DL — SIGNIFICANT CHANGE UP (ref 1.6–2.6)
MCHC RBC-ENTMCNC: 31.2 PG — SIGNIFICANT CHANGE UP (ref 27–34)
MCHC RBC-ENTMCNC: 34.2 GM/DL — SIGNIFICANT CHANGE UP (ref 32–36)
MCV RBC AUTO: 91.2 FL — SIGNIFICANT CHANGE UP (ref 80–100)
MONOCYTES # BLD AUTO: 0 K/UL — SIGNIFICANT CHANGE UP (ref 0–0.9)
MONOCYTES NFR BLD AUTO: 0 % — LOW (ref 2–14)
NEUTROPHILS # BLD AUTO: 1.45 K/UL — LOW (ref 1.8–7.4)
NEUTROPHILS NFR BLD AUTO: 91.2 % — HIGH (ref 43–77)
NRBC # BLD: 0 /100 WBCS — SIGNIFICANT CHANGE UP (ref 0–0)
PHOSPHATE SERPL-MCNC: 3.2 MG/DL — SIGNIFICANT CHANGE UP (ref 2.5–4.5)
PLATELET # BLD AUTO: 75 K/UL — LOW (ref 150–400)
POTASSIUM SERPL-MCNC: 3.6 MMOL/L — SIGNIFICANT CHANGE UP (ref 3.5–5.3)
POTASSIUM SERPL-SCNC: 3.6 MMOL/L — SIGNIFICANT CHANGE UP (ref 3.5–5.3)
PROT SERPL-MCNC: 5.8 G/DL — LOW (ref 6–8.3)
PROTHROM AB SERPL-ACNC: 10.8 SEC — SIGNIFICANT CHANGE UP (ref 9.5–13)
RBC # BLD: 3.08 M/UL — LOW (ref 3.8–5.2)
RBC # FLD: 14 % — SIGNIFICANT CHANGE UP (ref 10.3–14.5)
SODIUM SERPL-SCNC: 139 MMOL/L — SIGNIFICANT CHANGE UP (ref 135–145)
WBC # BLD: 1.59 K/UL — LOW (ref 3.8–10.5)
WBC # FLD AUTO: 1.59 K/UL — LOW (ref 3.8–10.5)

## 2024-08-29 PROCEDURE — 99233 SBSQ HOSP IP/OBS HIGH 50: CPT

## 2024-08-29 RX ADMIN — FILGRASTIM 300 MICROGRAM(S): 300 INJECTION, SOLUTION INTRAVENOUS; SUBCUTANEOUS at 13:30

## 2024-08-29 RX ADMIN — Medication 40 MILLIGRAM(S): at 05:53

## 2024-08-29 RX ADMIN — TIZANIDINE 4 MILLIGRAM(S): 4 TABLET ORAL at 21:07

## 2024-08-29 RX ADMIN — SODIUM BICARBONATE 10 MILLILITER(S): 84 INJECTION, SOLUTION INTRAVENOUS at 12:28

## 2024-08-29 RX ADMIN — SODIUM BICARBONATE 10 MILLILITER(S): 84 INJECTION, SOLUTION INTRAVENOUS at 16:54

## 2024-08-29 RX ADMIN — VALSARTAN 80 MILLIGRAM(S): 40 TABLET ORAL at 05:53

## 2024-08-29 RX ADMIN — Medication 1 APPLICATION(S): at 05:53

## 2024-08-29 RX ADMIN — Medication 1 APPLICATION(S): at 18:06

## 2024-08-29 RX ADMIN — ZINC OXIDE 1 APPLICATION(S): 100 OINTMENT TOPICAL at 18:07

## 2024-08-29 RX ADMIN — Medication 5 MILLILITER(S): at 08:44

## 2024-08-29 RX ADMIN — TIZANIDINE 4 MILLIGRAM(S): 4 TABLET ORAL at 13:43

## 2024-08-29 RX ADMIN — ZINC OXIDE 1 APPLICATION(S): 100 OINTMENT TOPICAL at 05:54

## 2024-08-29 RX ADMIN — Medication 800 MILLIGRAM(S): at 05:53

## 2024-08-29 RX ADMIN — LORAZEPAM 0.5 MILLIGRAM(S): 4 INJECTION INTRAMUSCULAR; INTRAVENOUS at 21:53

## 2024-08-29 RX ADMIN — Medication 5 MILLILITER(S): at 12:27

## 2024-08-29 RX ADMIN — Medication 5 MILLIGRAM(S): at 12:27

## 2024-08-29 RX ADMIN — Medication 800 MILLIGRAM(S): at 18:05

## 2024-08-29 RX ADMIN — OLANZAPINE 5 MILLIGRAM(S): 7.5 TABLET ORAL at 21:07

## 2024-08-29 RX ADMIN — Medication 5 MILLILITER(S): at 16:54

## 2024-08-29 RX ADMIN — SODIUM BICARBONATE 10 MILLILITER(S): 84 INJECTION, SOLUTION INTRAVENOUS at 08:44

## 2024-08-29 RX ADMIN — CHLORHEXIDINE GLUCONATE 1 APPLICATION(S): 40 SOLUTION TOPICAL at 08:45

## 2024-08-29 NOTE — PHARMACOTHERAPY INTERVENTION NOTE - COMMENTS
64-year-old female with PMH of HTN and IgG lambda multiple myeloma treated with Rosalind-RVD x4 cycles and admitted for autologous HSCT with melphalan 200 mg/m2 conditioning. Today is day +6. Course has been complicated by nausea, abdominal discomfort and anxiety.    Patient complaining of nausea and reports leg tremors with PRN prochlorperazine. Prochlorperazine was switched to metoclopramide with good relief, though no longer on it. Ondansetron was switched from ATC to PRN (2 doses/24 hours). Patient reports that nausea is not anticipatory and not caused by anxiety, in spite of having good relief from lorazepam. On the following for nausea: Olanzapine 5 mg PO at bedtime (started 8/28), ondansetron 8 mg IV every 8 hours PRN (first line for N/V), lorazepam 1 mg IV every 8 hours (first line for anxiety, second line for N/V). QTc on 8/26 was 429 msec (goal <500 msec). Patient's nausea is resolved/improved, since starting the olanzapine. Continue olanzapine with PRN ondansetron and lorazepam, and monitor QTC weekly (especially if we start levofloxacin and fluconazole ppx).    Patient has chronic diarrhea, had ~6 bowel movements (BM) in the last 24 hours, and received 3 doses of loperamide.  However, the patient reports "more firm than prior days". Will continue to monitor BM's.    History of latex allergy (contact dermatitis) on chart and confirmed by patient. Will replace filgrastim-sndz (zarxio) with filgrastim-aaffi (Nievstym) due to latex stopper in zarxio. Started filgrastim-aaffi (Nievstym) on day +5 (8/28)    Once patient becomes neutropenic (ANC <1000), please start levofloxacin 500 mg PO QD, vanco 125 mg PO BID. Once patient becomes severely neutropenic (ANC <500) start fluconazole 400 mg PO QD. Patient will be on medications until engraftment (ANC >500). Will continue to monitor renal/hepatic function. CrCl = 57 mL/min (based on ABW and SCr of 0.8).    In case patient becomes febrile (100.4F sustained over an hour or one temp of 101F and above), please order pip/tazo 4.5 g every 8 hours and vancomycin 1250 mg every 24 hours. Please draw vanco trough prior to 4th dose - expected AUC is 479 mg*hr/L and trough ~10.6 mcg/L. If blood cultures have no gram positive growth x48 hours, please d/c vanco.      Chirag Ronquillo, PharmD  PGY-2 Critical Care Pharmacy Resident  Available via [employer] Teams

## 2024-08-29 NOTE — PROGRESS NOTE ADULT - SUBJECTIVE AND OBJECTIVE BOX
[facility] Transplant Team                                                      Critical / Counseling Time Provided: 30 minutes                                                                                                                                                        Chief Complaint: Autologous pbsct with high dose melphalan prep regimen for treatment of IgG lambda multiple myeloma    Disease: IgG lambda multiple myeloma  Type of transplant: Autologous  Conditioning prep: Melphalan (200 mg/m2)   ABO / CMV: O POS / POS     S: Patient seen and examined with [facility] Transplant Team:       O: Vitals:   Vital Signs Last 24 Hrs  T(C): 36.9 (29 Aug 2024 05:45), Max: 36.9 (28 Aug 2024 21:25)  T(F): 98.4 (29 Aug 2024 05:45), Max: 98.4 (28 Aug 2024 21:25)  HR: 105 (29 Aug 2024 05:45) (105 - 116)  BP: 131/72 (29 Aug 2024 05:45) (117/69 - 132/76)  BP(mean): --  RR: 18 (29 Aug 2024 05:45) (18 - 18)  SpO2: 97% (29 Aug 2024 05:45) (97% - 99%)    Parameters below as of 29 Aug 2024 05:45  Patient On (Oxygen Delivery Method): room air      Admit weight: 62.2kg   Daily Weight in k (28 Aug 2024 08:56)    Intake / Output:    @ 07:01  -   @ 07:00  --------------------------------------------------------  IN: 1135 mL / OUT: 1730 mL / NET: -595 mL        PE:   Oropharynx: no erythema or ulcerations   Oral Mucositis:              -                                          Grade: n/a   CVS: S1, S2 RRR   Lungs: CTA throughout bilaterally   Abdomen: + BS x 4, soft, NT, ND  Extremities: no edema   Gastric Mucositis:       +                                          ndGndrndanddndend:nd nd2nd Intestinal Mucositis:     -                                         Grade: n/a   Skin: no rash   TLC: CDI   Neuro: A&Ox3   Pain: denies pain at present     Labs:   CBC Full  -  ( 29 Aug 2024 06:46 )  WBC Count : 1.59 K/uL  Hemoglobin : 9.6 g/dL  Hematocrit : 28.1 %  Platelet Count - Automated : 75 K/uL  Mean Cell Volume : 91.2 fl  Mean Cell Hemoglobin : 31.2 pg  Mean Cell Hemoglobin Concentration : 34.2 gm/dL  Auto Neutrophil # : x  Auto Lymphocyte # : x  Auto Monocyte # : x  Auto Eosinophil # : x  Auto Basophil # : x  Auto Neutrophil % : x  Auto Lymphocyte % : x  Auto Monocyte % : x  Auto Eosinophil % : x  Auto Basophil % : x                          9.6    1.59  )-----------( 75       ( 29 Aug 2024 06:46 )             28.1       PT/INR - ( 29 Aug 2024 06:46 )   PT: 10.8 sec;   INR: 1.03 ratio         PTT - ( 29 Aug 2024 06:46 )  PTT:26.1 sec  LIVER FUNCTIONS - ( 28 Aug 2024 07:01 )  Alb: 3.7 g/dL / Pro: 5.7 g/dL / ALK PHOS: 70 U/L / ALT: 11 U/L / AST: 18 U/L / GGT: x                   Meds:   Antimicrobials:   acyclovir   Oral Tab/Cap 800 milliGRAM(s) Oral every 12 hours      Heme / Onc:       GI:  loperamide 4 milliGRAM(s) Oral every 4 hours PRN  pantoprazole    Tablet 40 milliGRAM(s) Oral before breakfast  simethicone 80 milliGRAM(s) Chew four times a day PRN  sodium bicarbonate Mouth Rinse 10 milliLiter(s) Swish and Spit five times a day      Cardiovascular:   valsartan 80 milliGRAM(s) Oral daily      Immunologic:   filgrastim-aafi (NIVESTYM) Injectable 300 MICROGram(s) SubCutaneous daily      Other medications:   Biotene Dry Mouth Oral Rinse 5 milliLiter(s) Swish and Spit five times a day  chlorhexidine 4% Liquid 1 Application(s) Topical <User Schedule>  nystatin Powder 1 Application(s) Topical two times a day  OLANZapine 5 milliGRAM(s) Oral at bedtime  phytonadione   Solution 5 milliGRAM(s) Oral <User Schedule>  sodium chloride 0.9%. 1000 milliLiter(s) IV Continuous <Continuous>  zinc oxide 40% Paste 1 Application(s) Topical two times a day      PRN:   acetaminophen     Tablet .. 650 milliGRAM(s) Oral every 6 hours PRN  loperamide 4 milliGRAM(s) Oral every 4 hours PRN  LORazepam   Injectable 0.5 milliGRAM(s) IV Push every 8 hours PRN  ondansetron Injectable 8 milliGRAM(s) IV Push every 8 hours PRN  simethicone 80 milliGRAM(s) Chew four times a day PRN  sodium chloride 0.9% lock flush 10 milliLiter(s) IV Push every 1 hour PRN    A/P:  64 year old female with a history of multiple myeloma   Post:  Autologous PBSCT day + - HPC transplant today, continue hydration for 24 hours post infusion of cells. Daily weights, strict I&O, prn diuresis. Start levaquin / fluconazole prophylaxis when neutropenic.    c/o intermittent abd pain, AXR done, reading from radiologist requested. weight gain from admission, monitor closely, Lasix PRN    Zofran to PRN, switched Compazine-->reglan PRN  - refractory nausea / vomiting, will do trial of olanzapine. d/c reglan. Grade 1 chemotherapy induced GI mucositis. Continue supportive measures.     1. Infectious Disease:   acyclovir   Oral Tab/Cap 800 milliGRAM(s) Oral every 12 hours    2. GI Prophylaxis:    pantoprazole    Tablet 40 milliGRAM(s) Oral before breakfast    3. Mouthcare - NS / NaHCO3 rinses, Biotene; Skin care     4. Transfuse & replete electrolytes prn     5. IV hydration, daily weights, strict I&O, prn diuresis     6. PO intake as tolerated, nutrition follow up as needed    7. Antiemetics, anti-diarrhea medications:   loperamide 4 milliGRAM(s) Oral every 4 hours PRN  ondansetron Injectable 8 milliGRAM(s) IV Push every 8 hours PRN  LORazepam   Injectable 0.5 milliGRAM(s) IV Push every 8 hours PRN  OLANZapine 5 milliGRAM(s) Oral at bedtime    8. OOB as tolerated, physical therapy consult if needed     9. Monitor coags / fibrinogen 2x week, vitamin K as needed   phytonadione   Solution 5 milliGRAM(s) Oral <User Schedule>    10. Monitor closely for clinical changes, monitor for fevers     11. Emotional support provided, plan of care discussed and questions addressed     12. Patient education done regarding plan of care, restrictions and discharge planning     13. Continue regular social work input     I have written the above note for Dr. Martinez who performed service with me in the room.   Jessy CULVER ([phone])    I have seen and examined patient with NP, I agree with above note as scribed.    [facility] Transplant Team                                                      Critical / Counseling Time Provided: 30 minutes                                                                                                                                                        Chief Complaint: Autologous pbsct with high dose melphalan prep regimen for treatment of IgG lambda multiple myeloma    Disease: IgG lambda multiple myeloma  Type of transplant: Autologous  Conditioning prep: Melphalan (200 mg/m2)   ABO / CMV: O POS / POS     S: Patient seen and examined with [facility] Transplant Team:       O: Vitals:   Vital Signs Last 24 Hrs  T(C): 36.9 (29 Aug 2024 05:45), Max: 36.9 (28 Aug 2024 21:25)  T(F): 98.4 (29 Aug 2024 05:45), Max: 98.4 (28 Aug 2024 21:25)  HR: 105 (29 Aug 2024 05:45) (105 - 116)  BP: 131/72 (29 Aug 2024 05:45) (117/69 - 132/76)  BP(mean): --  RR: 18 (29 Aug 2024 05:45) (18 - 18)  SpO2: 97% (29 Aug 2024 05:45) (97% - 99%)    Parameters below as of 29 Aug 2024 05:45  Patient On (Oxygen Delivery Method): room air      Admit weight: 62.2kg   Daily Weight in k (28 Aug 2024 08:56)    Intake / Output:    @ 07:01  -   @ 07:00  --------------------------------------------------------  IN: 1135 mL / OUT: 1730 mL / NET: -595 mL        PE:   Oropharynx: no erythema or ulcerations   Oral Mucositis:              -                                          Grade: n/a   CVS: S1, S2 RRR   Lungs: CTA throughout bilaterally   Abdomen: + BS x 4, soft, NT, ND  Extremities: no edema   Gastric Mucositis:       +                                          ndGndrndanddndend:nd nd2nd Intestinal Mucositis:     -                                         Grade: n/a   Skin: no rash   TLC: CDI   Neuro: A&Ox3   Pain: denies pain at present     Labs:   CBC Full  -  ( 29 Aug 2024 06:46 )  WBC Count : 1.59 K/uL  Hemoglobin : 9.6 g/dL  Hematocrit : 28.1 %  Platelet Count - Automated : 75 K/uL  Mean Cell Volume : 91.2 fl  Mean Cell Hemoglobin : 31.2 pg  Mean Cell Hemoglobin Concentration : 34.2 gm/dL  Auto Neutrophil # : x  Auto Lymphocyte # : x  Auto Monocyte # : x  Auto Eosinophil # : x  Auto Basophil # : x  Auto Neutrophil % : x  Auto Lymphocyte % : x  Auto Monocyte % : x  Auto Eosinophil % : x  Auto Basophil % : x                          9.6    1.59  )-----------( 75       ( 29 Aug 2024 06:46 )             28.1           139  |  105  |  20  ----------------------------<  110<H>  3.6   |  21<L>  |  0.74    Ca    9.5      29 Aug 2024 06:49  Phos  3.2       Mg     1.9         TPro  5.8<L>  /  Alb  3.7  /  TBili  0.4  /  DBili  x   /  AST  20  /  ALT  12  /  AlkPhos  74      PT/INR - ( 29 Aug 2024 06:46 )   PT: 10.8 sec;   INR: 1.03 ratio         PTT - ( 29 Aug 2024 06:46 )  PTT:26.1 sec  LIVER FUNCTIONS - ( 28 Aug 2024 07:01 )  Alb: 3.7 g/dL / Pro: 5.7 g/dL / ALK PHOS: 70 U/L / ALT: 11 U/L / AST: 18 U/L / GGT: x                   Meds:   Antimicrobials:   acyclovir   Oral Tab/Cap 800 milliGRAM(s) Oral every 12 hours      Heme / Onc:       GI:  loperamide 4 milliGRAM(s) Oral every 4 hours PRN  pantoprazole    Tablet 40 milliGRAM(s) Oral before breakfast  simethicone 80 milliGRAM(s) Chew four times a day PRN  sodium bicarbonate Mouth Rinse 10 milliLiter(s) Swish and Spit five times a day      Cardiovascular:   valsartan 80 milliGRAM(s) Oral daily      Immunologic:   filgrastim-aafi (NIVESTYM) Injectable 300 MICROGram(s) SubCutaneous daily      Other medications:   Biotene Dry Mouth Oral Rinse 5 milliLiter(s) Swish and Spit five times a day  chlorhexidine 4% Liquid 1 Application(s) Topical <User Schedule>  nystatin Powder 1 Application(s) Topical two times a day  OLANZapine 5 milliGRAM(s) Oral at bedtime  phytonadione   Solution 5 milliGRAM(s) Oral <User Schedule>  sodium chloride 0.9%. 1000 milliLiter(s) IV Continuous <Continuous>  zinc oxide 40% Paste 1 Application(s) Topical two times a day      PRN:   acetaminophen     Tablet .. 650 milliGRAM(s) Oral every 6 hours PRN  loperamide 4 milliGRAM(s) Oral every 4 hours PRN  LORazepam   Injectable 0.5 milliGRAM(s) IV Push every 8 hours PRN  ondansetron Injectable 8 milliGRAM(s) IV Push every 8 hours PRN  simethicone 80 milliGRAM(s) Chew four times a day PRN  sodium chloride 0.9% lock flush 10 milliLiter(s) IV Push every 1 hour PRN    A/P:  64 year old female with a history of multiple myeloma   Post:  Autologous PBSCT day + - HPC transplant today, continue hydration for 24 hours post infusion of cells. Daily weights, strict I&O, prn diuresis. Start levaquin / fluconazole prophylaxis when neutropenic.    c/o intermittent abd pain, AXR done, reading from radiologist requested. weight gain from admission, monitor closely, Lasix PRN    Zofran to PRN, switched Compazine-->reglan PRN  - refractory nausea / vomiting, will do trial of olanzapine. d/c reglan. Grade 1 chemotherapy induced GI mucositis. Continue supportive measures.     1. Infectious Disease:   acyclovir   Oral Tab/Cap 800 milliGRAM(s) Oral every 12 hours    2. GI Prophylaxis:    pantoprazole    Tablet 40 milliGRAM(s) Oral before breakfast    3. Mouthcare - NS / NaHCO3 rinses, Biotene; Skin care     4. Transfuse & replete electrolytes prn     5. IV hydration, daily weights, strict I&O, prn diuresis     6. PO intake as tolerated, nutrition follow up as needed    7. Antiemetics, anti-diarrhea medications:   loperamide 4 milliGRAM(s) Oral every 4 hours PRN  ondansetron Injectable 8 milliGRAM(s) IV Push every 8 hours PRN  LORazepam   Injectable 0.5 milliGRAM(s) IV Push every 8 hours PRN  OLANZapine 5 milliGRAM(s) Oral at bedtime    8. OOB as tolerated, physical therapy consult if needed     9. Monitor coags / fibrinogen 2x week, vitamin K as needed   phytonadione   Solution 5 milliGRAM(s) Oral <User Schedule>    10. Monitor closely for clinical changes, monitor for fevers     11. Emotional support provided, plan of care discussed and questions addressed     12. Patient education done regarding plan of care, restrictions and discharge planning     13. Continue regular social work input     I have written the above note for Dr. Martinez who performed service with me in the room.   Jessy Ferrell NP-C ([phone])    I have seen and examined patient with NP, I agree with above note as scribed.    [facility] Transplant Team                                                      Critical / Counseling Time Provided: 30 minutes                                                                                                                                                        Chief Complaint: Autologous pbsct with high dose melphalan prep regimen for treatment of IgG lambda multiple myeloma    Disease: IgG lambda multiple myeloma  Type of transplant: Autologous  Conditioning prep: Melphalan (200 mg/m2)   ABO / CMV: O POS / POS     S: Patient seen and examined with [facility] Transplant Team:   + fatigue  + intermittent nausea - improved       O: Vitals:   Vital Signs Last 24 Hrs  T(C): 36.9 (29 Aug 2024 05:45), Max: 36.9 (28 Aug 2024 21:25)  T(F): 98.4 (29 Aug 2024 05:45), Max: 98.4 (28 Aug 2024 21:25)  HR: 105 (29 Aug 2024 05:45) (105 - 116)  BP: 131/72 (29 Aug 2024 05:45) (117/69 - 132/76)  BP(mean): --  RR: 18 (29 Aug 2024 05:45) (18 - 18)  SpO2: 97% (29 Aug 2024 05:45) (97% - 99%)    Parameters below as of 29 Aug 2024 05:45  Patient On (Oxygen Delivery Method): room air      Admit weight: 62.2kg   Daily Weight in k (28 Aug 2024 08:56)    Intake / Output:    @ 07:01  -   @ 07:00  --------------------------------------------------------  IN: 1135 mL / OUT: 1730 mL / NET: -595 mL        PE:   Oropharynx: no erythema or ulcerations   Oral Mucositis:              -                                          Grade: n/a   CVS: S1, S2 RRR   Lungs: CTA throughout bilaterally   Abdomen: + BS x 4, soft, NT, ND  Extremities: no edema   Gastric Mucositis:       +                                          ndGndrndanddndend:nd nd2nd Intestinal Mucositis:     -                                         Grade: n/a   Skin: no rash   TLC: CDI   Neuro: A&Ox3   Pain: denies pain at present     Labs:   CBC Full  -  ( 29 Aug 2024 06:46 )  WBC Count : 1.59 K/uL  Hemoglobin : 9.6 g/dL  Hematocrit : 28.1 %  Platelet Count - Automated : 75 K/uL  Mean Cell Volume : 91.2 fl  Mean Cell Hemoglobin : 31.2 pg  Mean Cell Hemoglobin Concentration : 34.2 gm/dL  Auto Neutrophil # : x  Auto Lymphocyte # : x  Auto Monocyte # : x  Auto Eosinophil # : x  Auto Basophil # : x  Auto Neutrophil % : x  Auto Lymphocyte % : x  Auto Monocyte % : x  Auto Eosinophil % : x  Auto Basophil % : x                          9.6    1.59  )-----------( 75       ( 29 Aug 2024 06:46 )             28.1           139  |  105  |  20  ----------------------------<  110<H>  3.6   |  21<L>  |  0.74    Ca    9.5      29 Aug 2024 06:49  Phos  3.2       Mg     1.9         TPro  5.8<L>  /  Alb  3.7  /  TBili  0.4  /  DBili  x   /  AST  20  /  ALT  12  /  AlkPhos  74      PT/INR - ( 29 Aug 2024 06:46 )   PT: 10.8 sec;   INR: 1.03 ratio         PTT - ( 29 Aug 2024 06:46 )  PTT:26.1 sec  LIVER FUNCTIONS - ( 28 Aug 2024 07:01 )  Alb: 3.7 g/dL / Pro: 5.7 g/dL / ALK PHOS: 70 U/L / ALT: 11 U/L / AST: 18 U/L / GGT: x                   Meds:   Antimicrobials:   acyclovir   Oral Tab/Cap 800 milliGRAM(s) Oral every 12 hours      Heme / Onc:       GI:  loperamide 4 milliGRAM(s) Oral every 4 hours PRN  pantoprazole    Tablet 40 milliGRAM(s) Oral before breakfast  simethicone 80 milliGRAM(s) Chew four times a day PRN  sodium bicarbonate Mouth Rinse 10 milliLiter(s) Swish and Spit five times a day      Cardiovascular:   valsartan 80 milliGRAM(s) Oral daily      Immunologic:   filgrastim-aafi (NIVESTYM) Injectable 300 MICROGram(s) SubCutaneous daily      Other medications:   Biotene Dry Mouth Oral Rinse 5 milliLiter(s) Swish and Spit five times a day  chlorhexidine 4% Liquid 1 Application(s) Topical <User Schedule>  nystatin Powder 1 Application(s) Topical two times a day  OLANZapine 5 milliGRAM(s) Oral at bedtime  phytonadione   Solution 5 milliGRAM(s) Oral <User Schedule>  sodium chloride 0.9%. 1000 milliLiter(s) IV Continuous <Continuous>  zinc oxide 40% Paste 1 Application(s) Topical two times a day      PRN:   acetaminophen     Tablet .. 650 milliGRAM(s) Oral every 6 hours PRN  loperamide 4 milliGRAM(s) Oral every 4 hours PRN  LORazepam   Injectable 0.5 milliGRAM(s) IV Push every 8 hours PRN  ondansetron Injectable 8 milliGRAM(s) IV Push every 8 hours PRN  simethicone 80 milliGRAM(s) Chew four times a day PRN  sodium chloride 0.9% lock flush 10 milliLiter(s) IV Push every 1 hour PRN    A/P:  64 year old female with a history of multiple myeloma   Post:  Autologous PBSCT day + - HPC transplant today, continue hydration for 24 hours post infusion of cells. Daily weights, strict I&O, prn diuresis. Start levaquin / fluconazole prophylaxis when neutropenic.    c/o intermittent abd pain, AXR done, reading from radiologist requested. weight gain from admission, monitor closely, Lasix PRN    Zofran to PRN, switched Compazine-->reglan PRN  - refractory nausea / vomiting, will do trial of olanzapine. d/c reglan. Grade 1 chemotherapy induced GI mucositis. Continue supportive measures.     1. Infectious Disease:   acyclovir   Oral Tab/Cap 800 milliGRAM(s) Oral every 12 hours    2. GI Prophylaxis:    pantoprazole    Tablet 40 milliGRAM(s) Oral before breakfast    3. Mouthcare - NS / NaHCO3 rinses, Biotene; Skin care     4. Transfuse & replete electrolytes prn     5. IV hydration, daily weights, strict I&O, prn diuresis     6. PO intake as tolerated, nutrition follow up as needed    7. Antiemetics, anti-diarrhea medications:   loperamide 4 milliGRAM(s) Oral every 4 hours PRN  ondansetron Injectable 8 milliGRAM(s) IV Push every 8 hours PRN  LORazepam   Injectable 0.5 milliGRAM(s) IV Push every 8 hours PRN  OLANZapine 5 milliGRAM(s) Oral at bedtime    8. OOB as tolerated, physical therapy consult if needed     9. Monitor coags / fibrinogen 2x week, vitamin K as needed   phytonadione   Solution 5 milliGRAM(s) Oral <User Schedule>    10. Monitor closely for clinical changes, monitor for fevers     11. Emotional support provided, plan of care discussed and questions addressed     12. Patient education done regarding plan of care, restrictions and discharge planning     13. Continue regular social work input     I have written the above note for Dr. Martinez who performed service with me in the room.   Jessy Ferrell NP-C ([phone])    I have seen and examined patient with NP, I agree with above note as scribed.

## 2024-08-29 NOTE — PROGRESS NOTE ADULT - NS ATTEND AMEND GEN_ALL_CORE FT
64 year old female with a history of multiple myeloma admitted for auto-HSCT Day + 6 today     Disease: IgG lambda multiple myeloma  Type of transplant: Autologous  Conditioning prep: Melphalan (200 mg/m2)   ABO / CMV: O POS / POS    Complications:   - Day 0: nausea/vomitting, abdominal pain   -Day 1: B/L lower extremities tremors --> compazine D/C, replaced by reglan     Patient was discussed, seen and examined in IDR rounds.   Patient's nausea improved; Zofran now PRN; compazine changed to reglan. Monitor frequency of bowel movements.   Has frequent bowel movements, but this is chronic with use of Imodium at home. Today, episode of loose stool.  Labs reviewed, supplements lytes prn  PE: normal oral mucosa, site of line without erythema, + BS in all 4 quadrants,   Continue Acyclovir prophylaxis  Continue supportive care.

## 2024-08-30 LAB
ALBUMIN SERPL ELPH-MCNC: 3.9 G/DL — SIGNIFICANT CHANGE UP (ref 3.3–5)
ALP SERPL-CCNC: 76 U/L — SIGNIFICANT CHANGE UP (ref 40–120)
ALT FLD-CCNC: 15 U/L — SIGNIFICANT CHANGE UP (ref 10–45)
ANION GAP SERPL CALC-SCNC: 12 MMOL/L — SIGNIFICANT CHANGE UP (ref 5–17)
AST SERPL-CCNC: 21 U/L — SIGNIFICANT CHANGE UP (ref 10–40)
BILIRUB SERPL-MCNC: 0.4 MG/DL — SIGNIFICANT CHANGE UP (ref 0.2–1.2)
BLD GP AB SCN SERPL QL: POSITIVE — SIGNIFICANT CHANGE UP
BUN SERPL-MCNC: 18 MG/DL — SIGNIFICANT CHANGE UP (ref 7–23)
CALCIUM SERPL-MCNC: 9.3 MG/DL — SIGNIFICANT CHANGE UP (ref 8.4–10.5)
CHLORIDE SERPL-SCNC: 108 MMOL/L — SIGNIFICANT CHANGE UP (ref 96–108)
CO2 SERPL-SCNC: 20 MMOL/L — LOW (ref 22–31)
CREAT SERPL-MCNC: 0.7 MG/DL — SIGNIFICANT CHANGE UP (ref 0.5–1.3)
EBV DNA SERPL NAA+PROBE-ACNC: SIGNIFICANT CHANGE UP IU/ML
EBVPCR LOG: SIGNIFICANT CHANGE UP LOG10IU/ML
EGFR: 96 ML/MIN/1.73M2 — SIGNIFICANT CHANGE UP
GLUCOSE SERPL-MCNC: 104 MG/DL — HIGH (ref 70–99)
HCT VFR BLD CALC: 27.2 % — LOW (ref 34.5–45)
HGB BLD-MCNC: 9.2 G/DL — LOW (ref 11.5–15.5)
LDH SERPL L TO P-CCNC: 156 U/L — SIGNIFICANT CHANGE UP (ref 50–242)
MAGNESIUM SERPL-MCNC: 1.9 MG/DL — SIGNIFICANT CHANGE UP (ref 1.6–2.6)
MCHC RBC-ENTMCNC: 31.1 PG — SIGNIFICANT CHANGE UP (ref 27–34)
MCHC RBC-ENTMCNC: 33.8 GM/DL — SIGNIFICANT CHANGE UP (ref 32–36)
MCV RBC AUTO: 91.9 FL — SIGNIFICANT CHANGE UP (ref 80–100)
NRBC # BLD: 0 /100 WBCS — SIGNIFICANT CHANGE UP (ref 0–0)
PHOSPHATE SERPL-MCNC: 3.1 MG/DL — SIGNIFICANT CHANGE UP (ref 2.5–4.5)
PLATELET # BLD AUTO: 52 K/UL — LOW (ref 150–400)
POTASSIUM SERPL-MCNC: 3.4 MMOL/L — LOW (ref 3.5–5.3)
POTASSIUM SERPL-SCNC: 3.4 MMOL/L — LOW (ref 3.5–5.3)
PROT SERPL-MCNC: 5.8 G/DL — LOW (ref 6–8.3)
RBC # BLD: 2.96 M/UL — LOW (ref 3.8–5.2)
RBC # FLD: 14 % — SIGNIFICANT CHANGE UP (ref 10.3–14.5)
RH IG SCN BLD-IMP: POSITIVE — SIGNIFICANT CHANGE UP
SODIUM SERPL-SCNC: 140 MMOL/L — SIGNIFICANT CHANGE UP (ref 135–145)
WBC # BLD: 0.09 K/UL — CRITICAL LOW (ref 3.8–10.5)
WBC # FLD AUTO: 0.09 K/UL — CRITICAL LOW (ref 3.8–10.5)

## 2024-08-30 PROCEDURE — 93010 ELECTROCARDIOGRAM REPORT: CPT

## 2024-08-30 PROCEDURE — 99233 SBSQ HOSP IP/OBS HIGH 50: CPT

## 2024-08-30 RX ORDER — POTASSIUM CHLORIDE 10 MEQ
20 TABLET, EXT RELEASE, PARTICLES/CRYSTALS ORAL ONCE
Refills: 0 | Status: COMPLETED | OUTPATIENT
Start: 2024-08-30 | End: 2024-08-30

## 2024-08-30 RX ORDER — FLUCONAZOLE 150 MG/1
400 TABLET ORAL DAILY
Refills: 0 | Status: DISCONTINUED | OUTPATIENT
Start: 2024-08-30 | End: 2024-09-02

## 2024-08-30 RX ORDER — VANCOMYCIN/0.9 % SOD CHLORIDE 1.75G/25
125 PLASTIC BAG, INJECTION (ML) INTRAVENOUS EVERY 12 HOURS
Refills: 0 | Status: DISCONTINUED | OUTPATIENT
Start: 2024-08-30 | End: 2024-09-05

## 2024-08-30 RX ADMIN — ZINC OXIDE 1 APPLICATION(S): 100 OINTMENT TOPICAL at 18:00

## 2024-08-30 RX ADMIN — SODIUM BICARBONATE 10 MILLILITER(S): 84 INJECTION, SOLUTION INTRAVENOUS at 20:06

## 2024-08-30 RX ADMIN — Medication 5 MILLILITER(S): at 20:06

## 2024-08-30 RX ADMIN — Medication 125 MILLIGRAM(S): at 17:57

## 2024-08-30 RX ADMIN — Medication 1 APPLICATION(S): at 17:57

## 2024-08-30 RX ADMIN — Medication 800 MILLIGRAM(S): at 17:56

## 2024-08-30 RX ADMIN — SODIUM BICARBONATE 10 MILLILITER(S): 84 INJECTION, SOLUTION INTRAVENOUS at 17:14

## 2024-08-30 RX ADMIN — VALSARTAN 80 MILLIGRAM(S): 40 TABLET ORAL at 06:02

## 2024-08-30 RX ADMIN — Medication 40 MILLIGRAM(S): at 06:02

## 2024-08-30 RX ADMIN — Medication 800 MILLIGRAM(S): at 06:02

## 2024-08-30 RX ADMIN — TIZANIDINE 4 MILLIGRAM(S): 4 TABLET ORAL at 09:00

## 2024-08-30 RX ADMIN — Medication 1 APPLICATION(S): at 06:03

## 2024-08-30 RX ADMIN — LORAZEPAM 0.5 MILLIGRAM(S): 4 INJECTION INTRAMUSCULAR; INTRAVENOUS at 22:00

## 2024-08-30 RX ADMIN — Medication 5 MILLILITER(S): at 17:14

## 2024-08-30 RX ADMIN — CHLORHEXIDINE GLUCONATE 1 APPLICATION(S): 40 SOLUTION TOPICAL at 08:45

## 2024-08-30 RX ADMIN — OLANZAPINE 5 MILLIGRAM(S): 7.5 TABLET ORAL at 21:07

## 2024-08-30 RX ADMIN — ZINC OXIDE 1 APPLICATION(S): 100 OINTMENT TOPICAL at 06:03

## 2024-08-30 RX ADMIN — FLUCONAZOLE 400 MILLIGRAM(S): 150 TABLET ORAL at 13:04

## 2024-08-30 RX ADMIN — ONDANSETRON 8 MILLIGRAM(S): 2 INJECTION, SOLUTION INTRAMUSCULAR; INTRAVENOUS at 10:00

## 2024-08-30 RX ADMIN — SODIUM BICARBONATE 10 MILLILITER(S): 84 INJECTION, SOLUTION INTRAVENOUS at 08:45

## 2024-08-30 RX ADMIN — FILGRASTIM 300 MICROGRAM(S): 300 INJECTION, SOLUTION INTRAVENOUS; SUBCUTANEOUS at 14:18

## 2024-08-30 RX ADMIN — TIZANIDINE 4 MILLIGRAM(S): 4 TABLET ORAL at 17:14

## 2024-08-30 RX ADMIN — Medication 20 MILLIEQUIVALENT(S): at 13:05

## 2024-08-30 RX ADMIN — SODIUM BICARBONATE 10 MILLILITER(S): 84 INJECTION, SOLUTION INTRAVENOUS at 13:04

## 2024-08-30 RX ADMIN — ONDANSETRON 8 MILLIGRAM(S): 2 INJECTION, SOLUTION INTRAMUSCULAR; INTRAVENOUS at 18:37

## 2024-08-30 RX ADMIN — Medication 5 MILLILITER(S): at 13:03

## 2024-08-30 RX ADMIN — Medication 5 MILLILITER(S): at 08:44

## 2024-08-30 NOTE — CHART NOTE - NSCHARTNOTEFT_GEN_A_CORE
NUTRITION FOLLOW UP NOTE    PATIENT SEEN FOR: follow-up for BMTU admission     SOURCE: [x] Patient  [x] Current Medical Record  [x] RN  [] Family/support person at bedside  [] Patient unavailable/inappropriate  [] Other:    CHART REVIEWED/EVENTS NOTED.  [] No changes to nutrition care plan to note  [x] Nutrition Status:  -- multiple myeloma, Autologous PBSCT day +7    DIET ORDER:   Diet, Regular:   Supplement Feeding Modality:  Oral  Ensure Enlive Cans or Servings Per Day:  1       Frequency:  Three Times a day (24)  Protein-fortified smoothie (berry flavor) 1x/day (Monday-Friday, 300 gracie 18 gm protein)     CURRENT DIET ORDER IS:  [] Appropriate:  [] Inadequate:  [] Other:    NUTRITION INTAKE/PROVISION:  [x] PO:  [] Enteral Nutrition:  [] Parenteral Nutrition:    ANTHROPOMETRICS:  Drug Dosing Weight  Height (cm): 152 (22 Aug 2024 08:23)  Weight (kg): 62.2 (22 Aug 2024 08:23)  BMI (kg/m2): 26.9 (22 Aug 2024 08:23)  Weights:   Daily Weight in k.7 (-30), Weight in k.9 (-), Weight in k (), Weight in k.1 (), Weight in k.3 (), Weight in k.3 (), Weight in k.1 ()   weight fluctuation in house might be multifactorial: fluid shift with treatment, inconsistent PO intake. Will continue to monitor weight trends as available/able.     MEDICATIONS:  MEDICATIONS  (STANDING):  acyclovir   Oral Tab/Cap 800 milliGRAM(s) Oral every 12 hours  Biotene Dry Mouth Oral Rinse 5 milliLiter(s) Swish and Spit five times a day  chlorhexidine 4% Liquid 1 Application(s) Topical <User Schedule>  filgrastim-aafi (NIVESTYM) Injectable 300 MICROGram(s) SubCutaneous daily  fluconAZOLE   Tablet 400 milliGRAM(s) Oral daily  levoFLOXacin  Tablet 500 milliGRAM(s) Oral every 24 hours  nystatin Powder 1 Application(s) Topical two times a day  OLANZapine 5 milliGRAM(s) Oral at bedtime  pantoprazole    Tablet 40 milliGRAM(s) Oral before breakfast  phytonadione   Solution 5 milliGRAM(s) Oral <User Schedule>  sodium bicarbonate Mouth Rinse 10 milliLiter(s) Swish and Spit five times a day  valsartan 80 milliGRAM(s) Oral daily  vancomycin    Solution 125 milliGRAM(s) Oral every 12 hours  zinc oxide 40% Paste 1 Application(s) Topical two times a day    MEDICATIONS  (PRN):  acetaminophen     Tablet .. 650 milliGRAM(s) Oral every 6 hours PRN Temp greater or equal to 38C (100.4F), Mild Pain (1 - 3)  loperamide 4 milliGRAM(s) Oral every 4 hours PRN Diarrhea  LORazepam   Injectable 0.5 milliGRAM(s) IV Push every 8 hours PRN Nausea and/or Vomiting  ondansetron Injectable 8 milliGRAM(s) IV Push every 8 hours PRN Nausea and/or Vomiting  simethicone 80 milliGRAM(s) Chew four times a day PRN Gas  sodium chloride 0.9% lock flush 10 milliLiter(s) IV Push every 1 hour PRN Pre/post blood products, medications, blood draw, and to maintain line patency      NUTRITIONALLY PERTINENT LABS:   Na140 mmol/L Glu 104 mg/dL<H> K+ 3.4 mmol/L<L> Cr  0.70 mg/dL BUN 18 mg/dL  Phos 3.1 mg/dL  Alb 3.9 g/dL ALT 15 U/L AST 21 U/L Alkaline Phosphatase 76 U/L    Finger Sticks:    NUTRITIONALLY PERTINENT MEDICATIONS/LABS:  [x] Reviewed  [x] Relevant notes on medications/labs:  -- hypokalemia s/p repletion today    EDEMA:  [x] Reviewed  [] Relevant notes:    GI/ I&O:  [x] Reviewed  [x] Relevant notes: noted loose BM, on Loperamide  [] Other: on Ativan, Zofran, Simethicone, Protonix  -- Mouth Care: Biotene, NaHCO3    SKIN:   [x] No pressure injuries documented, per nursing flowsheet  [] Pressure injury previously noted  [] Change in pressure injury documentation:  [] Other:    ESTIMATED NEEDS:  [] No change:  [] Updated:  Energy:   kcal/day (xx-xx kcal/kg)  Protein:   g/day (xx-xx g/kg)  Fluid:   ml/day or [] defer to team  Based on:    NUTRITION DIAGNOSIS:  [x] Prior Dx:  Increased nutrient needs, inadequate Protein energy intake   [] New Dx:    EDUCATION:  [x] Yes: Reinforced BMT nutrition recommendations: food safety recommendations, increased protein-energy needs   [] Not appropriate/warranted    NUTRITION CARE PLAN:  1. Diet: Continue diet free of therapeutic restriction. RD remains available to adjust diet as needed.   2. Supplements:  3. Multivitamin/mineral supplementation:  4:     [] Achieved - Continue current nutrition intervention(s)  [] Current medical condition precludes nutrition intervention at this time.    MONITORING AND EVALUATION:   RD remains available upon request and will follow up per protocol:     Available on [facility] TE[facility] NUTRITION FOLLOW UP NOTE    PATIENT SEEN FOR: follow-up for BMTU admission     SOURCE: [] Patient  [x] Current Medical Record  [x] RN  [x]  at bedside  [x] Patient unavailable/inappropriate- sleeping upon visit  [] Other:    CHART REVIEWED/EVENTS NOTED.  [] No changes to nutrition care plan to note  [x] Nutrition Status:  -- multiple myeloma, Autologous PBSCT day +7    DIET ORDER:   Diet, Regular:   Supplement Feeding Modality:  Oral  Ensure Enlive Cans or Servings Per Day:  1       Frequency:  Three Times a day (24)  Protein-fortified smoothie (berry flavor) 1x/day (Monday-Friday, 300 gracie 18 gm protein)     CURRENT DIET ORDER IS:  [x] Appropriate:  [] Inadequate:  [] Other:    NUTRITION INTAKE/PROVISION:  [x] PO: poor PO intake <50% per  as pt not feeling well, also with nausea and loose BM. Able to drink ~50% of the protein-fortified smoothie provided, little intake of the Ensure provided.  said he will order foods for pt as needed, declines RD to order meal for pt upon visit.   [] Enteral Nutrition:  [] Parenteral Nutrition:    ANTHROPOMETRICS:  Drug Dosing Weight  Height (cm): 152 (22 Aug 2024 08:23)  Weight (kg): 62.2 (22 Aug 2024 08:23)  BMI (kg/m2): 26.9 (22 Aug 2024 08:23)  Weights:   Daily Weight in k.7 (-30), Weight in k.9 (-), Weight in k (), Weight in k.1 (), Weight in k.3 (), Weight in k.3 (), Weight in k.1 ()   -- noted with weight loss of 1.5kg/2,4% in 1 week since admission- might be multifactorial: fluid shift with treatment, decreased PO intake. Will continue to monitor weight trends as available/able.   -- unable to perform nutrition focused physical exam upon visit (sleeping)     MEDICATIONS:  MEDICATIONS  (STANDING):  acyclovir   Oral Tab/Cap 800 milliGRAM(s) Oral every 12 hours  Biotene Dry Mouth Oral Rinse 5 milliLiter(s) Swish and Spit five times a day  chlorhexidine 4% Liquid 1 Application(s) Topical <User Schedule>  filgrastim-aafi (NIVESTYM) Injectable 300 MICROGram(s) SubCutaneous daily  fluconAZOLE   Tablet 400 milliGRAM(s) Oral daily  levoFLOXacin  Tablet 500 milliGRAM(s) Oral every 24 hours  nystatin Powder 1 Application(s) Topical two times a day  OLANZapine 5 milliGRAM(s) Oral at bedtime  pantoprazole    Tablet 40 milliGRAM(s) Oral before breakfast  phytonadione   Solution 5 milliGRAM(s) Oral <User Schedule>  sodium bicarbonate Mouth Rinse 10 milliLiter(s) Swish and Spit five times a day  valsartan 80 milliGRAM(s) Oral daily  vancomycin    Solution 125 milliGRAM(s) Oral every 12 hours  zinc oxide 40% Paste 1 Application(s) Topical two times a day    MEDICATIONS  (PRN):  acetaminophen     Tablet .. 650 milliGRAM(s) Oral every 6 hours PRN Temp greater or equal to 38C (100.4F), Mild Pain (1 - 3)  loperamide 4 milliGRAM(s) Oral every 4 hours PRN Diarrhea  LORazepam   Injectable 0.5 milliGRAM(s) IV Push every 8 hours PRN Nausea and/or Vomiting  ondansetron Injectable 8 milliGRAM(s) IV Push every 8 hours PRN Nausea and/or Vomiting  simethicone 80 milliGRAM(s) Chew four times a day PRN Gas  sodium chloride 0.9% lock flush 10 milliLiter(s) IV Push every 1 hour PRN Pre/post blood products, medications, blood draw, and to maintain line patency      NUTRITIONALLY PERTINENT LABS:   Na140 mmol/L Glu 104 mg/dL<H> K+ 3.4 mmol/L<L> Cr  0.70 mg/dL BUN 18 mg/dL  Phos 3.1 mg/dL  Alb 3.9 g/dL ALT 15 U/L AST 21 U/L Alkaline Phosphatase 76 U/L    Finger Sticks:    NUTRITIONALLY PERTINENT MEDICATIONS/LABS:  [x] Reviewed  [x] Relevant notes on medications/labs:  -- hypokalemia s/p repletion today    EDEMA:  [x] Reviewed  [] Relevant notes:    GI/ I&O:  [x] Reviewed  [x] Relevant notes: noted loose BM, on Loperamide  [] Other: on Ativan, Zofran, Simethicone, Protonix  -- Mouth Care: Biotene, NaHCO3    SKIN:   [x] No pressure injuries documented, per nursing flowsheet  [] Pressure injury previously noted  [] Change in pressure injury documentation:  [] Other:    ESTIMATED NEEDS:  [] No change:  [x] Updated:  Energy: 0113-2018  kcal/day (30-35 kcal/kg)  Protein: 81-93  g/day (1.3-1.5 g/kg)  Fluid:   ml/day or [x] defer to team  Based on: dosing weight of 62.2kg in consideration of malnutrition and Increased nutrient needs     NUTRITION DIAGNOSIS:  [x] Prior Dx:  Increased nutrient needs, inadequate Protein energy intake   [x] New Dx: acute severe malnutrition related to decreased ability to consume adequate protein-energy in setting of increased physiological demand for nutrients as evidenced by PO <50% for > 5 days, >2% weight loss in 1 week.   Goal: Pt will meet >75% estimated nutrient needs as tolerated.     EDUCATION:  [x] Yes: limited to  at bedside: Reinforced BMT nutrition recommendations: food safety recommendations, increased protein-energy needs   [] Not appropriate/warranted    NUTRITION CARE PLAN:  1. Diet: Continue diet free of therapeutic restriction. RD remains available to adjust diet as needed.   2. Supplements: continue Ensure plus high protein 3x daily (1050kcal, 60g proteins), protein-fortified smoothie (berry flavor) 1x daily from Monday to Friday.   3. Monitor and encourage PO intake. Encourage use of daily menus. Honor dietary preferences as expressed as able.     [x] Achieved - Continue current nutrition intervention(s)  [] Current medical condition precludes nutrition intervention at this time.    MONITORING AND EVALUATION:   RD remains available upon request and will follow up per protocol:   Renetta Mejia, MS, RDN, CDN (Teams)

## 2024-08-30 NOTE — DIETITIAN NUTRITION RISK NOTIFICATION - TREATMENT: THE FOLLOWING DIET HAS BEEN RECOMMENDED
Diet, Regular:   Supplement Feeding Modality:  Oral  Ensure Enlive Cans or Servings Per Day:  1       Frequency:  Three Times a day (08-26-24 @ 10:10) [Active]

## 2024-08-30 NOTE — PHARMACOTHERAPY INTERVENTION NOTE - COMMENTS
64-year-old female with PMH of HTN and IgG lambda multiple myeloma treated with Rosalind-RVD x4 cycles and admitted for autologous HSCT with melphalan 200 mg/m2 conditioning. Today is day +7. Course has been complicated by nausea, abdominal discomfort and anxiety.    Patient complaining of nausea and reports leg tremors with PRN prochlorperazine. Prochlorperazine was switched to metoclopramide with good relief, though no longer on it. Ondansetron was switched from ATC to PRN (2 doses/24 hours). Patient reports that nausea is not anticipatory and not caused by anxiety, in spite of having good relief from lorazepam. On the following for nausea: Olanzapine 5 mg PO at bedtime (started 8/28), ondansetron 8 mg IV every 8 hours PRN (first line for N/V), lorazepam 1 mg IV every 8 hours (first line for anxiety, second line for N/V). QTc on 8/26 was 429 msec (goal <500 msec). Patient's nausea is resolved/improved, since starting the olanzapine. Discussed potentially discontinuing the olanzapine, but the patient still experiencing very slight nausea. Will reassess need next week.      Will obtain EKG as on olanzapine, levofloxacin, and fluconazole concomitantly (8/30), and continue to monitor QTC weekly.    Patient has chronic diarrhea, had ~5 bowel movements (BM) in the last 24 hours, and received 3 doses of loperamide. Will continue to monitor BM's.    History of latex allergy (contact dermatitis) on chart and confirmed by patient. Will replace filgrastim-sndz (zarxio) with filgrastim-aaffi (Nievstym) due to latex stopper in zarxio. Started filgrastim-aaffi (Nievstym) on day +5 (8/28)    Patient is now profoundly neutropenic (ANC <100), started levofloxacin 500 mg PO QD, vancomycin 125 mg PO BID and fluconazole 400 mg PO QD. Patient will be on medications until engraftment (ANC >500). Will continue to monitor renal/hepatic function. CrCl = 57 mL/min (based on ABW and SCr of 0.8).    In case patient becomes febrile (100.4F sustained over an hour or one temp of 101F and above), please order pip/tazo 4.5 g every 8 hours and vancomycin 1250 mg every 24 hours. Please draw vanco trough prior to 4th dose - expected AUC is 479 mg*hr/L and trough ~10.6 mcg/L. If blood cultures have no gram positive growth x48 hours, please d/c vanco.      Chirag Ronquillo, PharmD  PGY-2 Critical Care Pharmacy Resident  Available via [employer] Teams 64-year-old female with PMH of HTN and IgG lambda multiple myeloma treated with Rosalind-RVD x4 cycles and admitted for autologous HSCT with melphalan 200 mg/m2 conditioning. Today is day +7. Course has been complicated by nausea, abdominal discomfort and anxiety.    Patient complaining of nausea and reports leg tremors with PRN prochlorperazine. Prochlorperazine was switched to metoclopramide with good relief, though no longer on it. Ondansetron was switched from ATC to PRN (2 doses/24 hours). Patient reports that nausea is not anticipatory and not caused by anxiety, in spite of having good relief from lorazepam. On the following for nausea: Olanzapine 5 mg PO at bedtime (started 8/28), ondansetron 8 mg IV every 8 hours PRN (first line for N/V), lorazepam 1 mg IV every 8 hours (first line for anxiety, second line for N/V). QTc on 8/30 was 441msec (goal <500 msec). Patient's nausea is resolved/improved, since starting the olanzapine. Discussed potentially discontinuing the olanzapine, but the patient still experiencing very slight nausea. Will reassess need next week.      Will obtain EKG as on olanzapine, levofloxacin, and fluconazole concomitantly (8/30), and continue to monitor QTC weekly.    Patient has chronic diarrhea, had ~5 bowel movements (BM) in the last 24 hours, and received 3 doses of loperamide. Will continue to monitor BM's.    History of latex allergy (contact dermatitis) on chart and confirmed by patient. Will replace filgrastim-sndz (zarxio) with filgrastim-aaffi (Nievstym) due to latex stopper in zarxio. Started filgrastim-aaffi (Nievstym) on day +5 (8/28)    Patient is now profoundly neutropenic (ANC <100), started levofloxacin 500 mg PO QD, vancomycin 125 mg PO BID and fluconazole 400 mg PO QD. Patient will be on medications until engraftment (ANC >500). Will continue to monitor renal/hepatic function. CrCl = 57 mL/min (based on ABW and SCr of 0.8).    In case patient becomes febrile (100.4F sustained over an hour or one temp of 101F and above), please order pip/tazo 4.5 g every 8 hours and vancomycin 1250 mg every 24 hours. Please draw vanco trough prior to 4th dose - expected AUC is 479 mg*hr/L and trough ~10.6 mcg/L. If blood cultures have no gram positive growth x48 hours, please d/c vanco.      Chirag Ronquillo, PharmD  PGY-2 Critical Care Pharmacy Resident  Available via [employer] Teams

## 2024-08-30 NOTE — PROGRESS NOTE ADULT - NS ATTEND AMEND GEN_ALL_CORE FT
64 year old female with a history of multiple myeloma admitted for auto-HSCT Day + 6 today     Disease: IgG lambda multiple myeloma  Type of transplant: Autologous  Conditioning prep: Melphalan (200 mg/m2)   ABO / CMV: O POS / POS    Complications:   - Day 0: nausea/vomitting, abdominal pain   -Day 1: B/L lower extremities tremors --> compazine D/C, replaced by reglan     Patient was discussed, seen and examined in IDR rounds.   Patient's nausea improved; Zofran now PRN; compazine changed to reglan. Monitor frequency of bowel movements.   Has frequent bowel movements, but this is chronic with use of Imodium at home. Today, episode of loose stool.  Labs reviewed, supplements lytes prn  PE: normal oral mucosa, site of line without erythema, + BS in all 4 quadrants,   Continue Acyclovir prophylaxis  Continue supportive care. 64 year old female with a history of multiple myeloma admitted for auto-HSCT Day + 7 today     Disease: IgG lambda multiple myeloma  Type of transplant: Autologous  Conditioning prep: Melphalan (200 mg/m2)   ABO / CMV: O POS / POS    Complications:   - Day 0: nausea/vomitting, abdominal pain   -Day 1: B/L lower extremities tremors --> compazine D/C, replaced by reglan     Patient was discussed, seen and examined in IDR rounds.   Patient's nausea improved on Zyprexa; Zofran PRN; compazine changed to reglan.  Has frequent bowel movements, but this is chronic with use of Imodium at home. Today, episode of loose stool.  Labs reviewed, supplements lytes prn  PE: normal oral mucosa, site of line without erythema, + BS in all 4 quadrants,   Neutropenic, afebrile- Continue Acyclovir prophylaxis, begin Levaquin, Fluconazole, Vanco PO  NIVESTYM 300 MICROGram(s) SubCutaneous daily, started on day +5  Continue supportive care.

## 2024-08-30 NOTE — PROGRESS NOTE ADULT - SUBJECTIVE AND OBJECTIVE BOX
[facility] Transplant Team                                                      Critical / Counseling Time Provided: 30 minutes                                                                                                                                                        Chief Complaint: Autologous pbsct with high dose melphalan prep regimen for treatment of IgG lambda multiple myeloma    S: Patient seen and examined with [facility] Transplant Team:       O:     Vital Signs Last 24 Hrs  T(C): 37.7 (30 Aug 2024 05:15), Max: 37.7 (30 Aug 2024 05:15)  T(F): 99.9 (30 Aug 2024 05:15), Max: 99.9 (30 Aug 2024 05:15)  HR: 109 (30 Aug 2024 05:15) (105 - 117)  BP: 127/71 (30 Aug 2024 05:15) (104/63 - 127/71)  BP(mean): --  RR: 18 (30 Aug 2024 05:15) (18 - 18)  SpO2: 96% (30 Aug 2024 05:15) (96% - 98%)    Parameters below as of 30 Aug 2024 05:15  Patient On (Oxygen Delivery Method): room air      Admit weight: 62.2kg  Daily Weight in kG:     Intake / Output:   08-29 @ 07:01  -  08-30 @ 07:00  --------------------------------------------------------  IN: 1020 mL / OUT: 1505 mL / NET: -485 mL    PE:   Oropharynx: no erythema or ulcerations   Oral Mucositis:              -                                          Grade: n/a   CVS: S1, S2 RRR   Lungs: CTA throughout bilaterally   Abdomen: + BS x 4, soft, NT, ND  Extremities: no edema   Gastric Mucositis:       +                                          ndGndrndanddndend:nd nd2nd Intestinal Mucositis:     -                                         Grade: n/a   Skin: no rash   TLC: CDI   Neuro: A&Ox3   Pain: denies pain at present           Labs:     -----        Cultures:         Radiology:       Meds:   Antimicrobials:   acyclovir   Oral Tab/Cap 800 milliGRAM(s) Oral every 12 hours      Heme / Onc:       GI:  loperamide 4 milliGRAM(s) Oral every 4 hours PRN  pantoprazole    Tablet 40 milliGRAM(s) Oral before breakfast  simethicone 80 milliGRAM(s) Chew four times a day PRN  sodium bicarbonate Mouth Rinse 10 milliLiter(s) Swish and Spit five times a day      Cardiovascular:   valsartan 80 milliGRAM(s) Oral daily      Immunologic:   filgrastim-aafi (NIVESTYM) Injectable 300 MICROGram(s) SubCutaneous daily      Other medications:   Biotene Dry Mouth Oral Rinse 5 milliLiter(s) Swish and Spit five times a day  chlorhexidine 4% Liquid 1 Application(s) Topical <User Schedule>  nystatin Powder 1 Application(s) Topical two times a day  OLANZapine 5 milliGRAM(s) Oral at bedtime  phytonadione   Solution 5 milliGRAM(s) Oral <User Schedule>  zinc oxide 40% Paste 1 Application(s) Topical two times a day      PRN:   acetaminophen     Tablet .. 650 milliGRAM(s) Oral every 6 hours PRN  loperamide 4 milliGRAM(s) Oral every 4 hours PRN  LORazepam   Injectable 0.5 milliGRAM(s) IV Push every 8 hours PRN  ondansetron Injectable 8 milliGRAM(s) IV Push every 8 hours PRN  simethicone 80 milliGRAM(s) Chew four times a day PRN  sodium chloride 0.9% lock flush 10 milliLiter(s) IV Push every 1 hour PRN        A/P:  64 year old female with a history of multiple myeloma   Post:  Autologous PBSCT day + 7 /23- [facility]PC transplant today, continue hydration for 24 hours post infusion of cells. Daily weights, strict I&O, prn diuresis. Start levaquin / fluconazole prophylaxis when neutropenic.   8/24 c/o intermittent abd pain, AXR done, reading from radiologist requested. weight gain from admission, monitor closely, Lasix PRN   8/25 Zofran to PRN, switched Compazine-->reglan PRN  8/27- refractory nausea / vomiting, will do trial of olanzapine. d/c reglan. Grade 1 chemotherapy induced GI mucositis. Continue supportive measures.     1. Infectious Disease:   acyclovir   Oral Tab/Cap 800 milliGRAM(s) Oral every 12 hours    2. GI Prophylaxis:    pantoprazole    Tablet 40 milliGRAM(s) Oral before breakfast    3. Mouthcare - NS / NaHCO3 rinses, Biotene; Skin care     4. Transfuse & replete electrolytes prn     5. IV hydration, daily weights, strict I&O, prn diuresis     6. PO intake as tolerated, nutrition follow up as needed    7. Antiemetics, anti-diarrhea medications:   loperamide 4 milliGRAM(s) Oral every 4 hours PRN  ondansetron Injectable 8 milliGRAM(s) IV Push every 8 hours PRN  LORazepam   Injectable 0.5 milliGRAM(s) IV Push every 8 hours PRN  OLANZapine 5 milliGRAM(s) Oral at bedtime    8. OOB as tolerated, physical therapy consult if needed     9. Monitor coags / fibrinogen 2x week, vitamin K as needed   phytonadione   Solution 5 milliGRAM(s) Oral <User Schedule>    10. Monitor closely for clinical changes, monitor for fevers     11. Emotional support provided, plan of care discussed and questions addressed     12. Patient education done regarding plan of care, restrictions and discharge planning     13. Continue regular social work input     I have written the above note for Dr. Martinez who performed service with me in the room.   Guillermo Hollis PA-C ([phone])    I have seen and examined patient with PA, I agree with above note as scribed.                [facility] Transplant Team                                                      Critical / Counseling Time Provided: 30 minutes                                                                                                                                                        Chief Complaint: Autologous pbsct with high dose melphalan prep regimen for treatment of IgG lambda multiple myeloma    Disease: IgG lambda multiple myeloma  Type of transplant: Autologous  Conditioning prep: Melphalan (200 mg/m2)   ABO / CMV: O POS / POS     S: Patient seen and examined with [facility] Transplant Team:       O:     Vital Signs Last 24 Hrs  T(C): 37.7 (30 Aug 2024 05:15), Max: 37.7 (30 Aug 2024 05:15)  T(F): 99.9 (30 Aug 2024 05:15), Max: 99.9 (30 Aug 2024 05:15)  HR: 109 (30 Aug 2024 05:15) (105 - 117)  BP: 127/71 (30 Aug 2024 05:15) (104/63 - 127/71)  BP(mean): --  RR: 18 (30 Aug 2024 05:15) (18 - 18)  SpO2: 96% (30 Aug 2024 05:15) (96% - 98%)    Parameters below as of 30 Aug 2024 05:15  Patient On (Oxygen Delivery Method): room air      Admit weight: 62.2kg  Daily Weight in kG:     Intake / Output:   08-29 @ 07:01  -  08-30 @ 07:00  --------------------------------------------------------  IN: 1020 mL / OUT: 1505 mL / NET: -485 mL    PE:   Oropharynx: no erythema or ulcerations   Oral Mucositis:              -                                          Grade: n/a   CVS: S1, S2 RRR   Lungs: CTA throughout bilaterally   Abdomen: + BS x 4, soft, NT, ND  Extremities: no edema   Gastric Mucositis:       +                                          ndGndrndanddndend:nd nd2nd Intestinal Mucositis:     -                                         Grade: n/a   Skin: no rash   TLC: CDI   Neuro: A&Ox3   Pain: denies pain at present           Labs:     -----        Cultures:         Radiology:       Meds:   Antimicrobials:   acyclovir   Oral Tab/Cap 800 milliGRAM(s) Oral every 12 hours      Heme / Onc:       GI:  loperamide 4 milliGRAM(s) Oral every 4 hours PRN  pantoprazole    Tablet 40 milliGRAM(s) Oral before breakfast  simethicone 80 milliGRAM(s) Chew four times a day PRN  sodium bicarbonate Mouth Rinse 10 milliLiter(s) Swish and Spit five times a day      Cardiovascular:   valsartan 80 milliGRAM(s) Oral daily      Immunologic:   filgrastim-aafi (NIVESTYM) Injectable 300 MICROGram(s) SubCutaneous daily      Other medications:   Biotene Dry Mouth Oral Rinse 5 milliLiter(s) Swish and Spit five times a day  chlorhexidine 4% Liquid 1 Application(s) Topical <User Schedule>  nystatin Powder 1 Application(s) Topical two times a day  OLANZapine 5 milliGRAM(s) Oral at bedtime  phytonadione   Solution 5 milliGRAM(s) Oral <User Schedule>  zinc oxide 40% Paste 1 Application(s) Topical two times a day      PRN:   acetaminophen     Tablet .. 650 milliGRAM(s) Oral every 6 hours PRN  loperamide 4 milliGRAM(s) Oral every 4 hours PRN  LORazepam   Injectable 0.5 milliGRAM(s) IV Push every 8 hours PRN  ondansetron Injectable 8 milliGRAM(s) IV Push every 8 hours PRN  simethicone 80 milliGRAM(s) Chew four times a day PRN  sodium chloride 0.9% lock flush 10 milliLiter(s) IV Push every 1 hour PRN        A/P:  64 year old female with a history of multiple myeloma   Post:  Autologous PBSCT day + 7 /23- HPC transplant today, continue hydration for 24 hours post infusion of cells. Daily weights, strict I&O, prn diuresis. Start levaquin / fluconazole prophylaxis when neutropenic.   8/24 c/o intermittent abd pain, AXR done, reading from radiologist requested. weight gain from admission, monitor closely, Lasix PRN   8/25 Zofran to PRN, switched Compazine-->reglan PRN  8/27- refractory nausea / vomiting, will do trial of olanzapine. d/c reglan. Grade 1 chemotherapy induced GI mucositis. Continue supportive measures.     1. Infectious Disease:   acyclovir   Oral Tab/Cap 800 milliGRAM(s) Oral every 12 hours    2. GI Prophylaxis:    pantoprazole    Tablet 40 milliGRAM(s) Oral before breakfast    3. Mouthcare - NS / NaHCO3 rinses, Biotene; Skin care     4. Transfuse & replete electrolytes prn     5. IV hydration, daily weights, strict I&O, prn diuresis     6. PO intake as tolerated, nutrition follow up as needed    7. Antiemetics, anti-diarrhea medications:   loperamide 4 milliGRAM(s) Oral every 4 hours PRN  ondansetron Injectable 8 milliGRAM(s) IV Push every 8 hours PRN  LORazepam   Injectable 0.5 milliGRAM(s) IV Push every 8 hours PRN  OLANZapine 5 milliGRAM(s) Oral at bedtime    8. OOB as tolerated, physical therapy consult if needed     9. Monitor coags / fibrinogen 2x week, vitamin K as needed   phytonadione   Solution 5 milliGRAM(s) Oral <User Schedule>    10. Monitor closely for clinical changes, monitor for fevers     11. Emotional support provided, plan of care discussed and questions addressed     12. Patient education done regarding plan of care, restrictions and discharge planning     13. Continue regular social work input     I have written the above note for Dr. Martinez who performed service with me in the room.   Guillermo Hollis PA-C ([phone])    I have seen and examined patient with PA, I agree with above note as scribed.                [facility] Transplant Team                                                      Critical / Counseling Time Provided: 30 minutes                                                                                                                                                        Chief Complaint: Autologous pbsct with high dose melphalan prep regimen for treatment of IgG lambda multiple myeloma    Disease: IgG lambda multiple myeloma  Type of transplant: Autologous  Conditioning prep: Melphalan (200 mg/m2)   ABO / CMV: O POS / POS     S: Patient seen and examined with [facility] Transplant Team:   +loose stools (x2)  +Abdominal cramping  +nausea    O: rest of ROS negative    Vital Signs Last 24 Hrs  T(C): 37.7 (30 Aug 2024 05:15), Max: 37.7 (30 Aug 2024 05:15)  T(F): 99.9 (30 Aug 2024 05:15), Max: 99.9 (30 Aug 2024 05:15)  HR: 109 (30 Aug 2024 05:15) (105 - 117)  BP: 127/71 (30 Aug 2024 05:15) (104/63 - 127/71)  BP(mean): --  RR: 18 (30 Aug 2024 05:15) (18 - 18)  SpO2: 96% (30 Aug 2024 05:15) (96% - 98%)    Parameters below as of 30 Aug 2024 05:15  Patient On (Oxygen Delivery Method): room air      Admit weight: 62.2kg  Daily Weight in k.7kg    Intake / Output:    @ 07:01  -   @ 07:00  --------------------------------------------------------  IN: 1020 mL / OUT: 1505 mL / NET: -485 mL    PE:   Oropharynx: no erythema or ulcerations   Oral Mucositis:              -                                          Grade: n/a   CVS: S1, S2 RRR   Lungs: CTA throughout bilaterally   Abdomen: + BS x 4, soft, NT, ND, +mild epigastric tenderness  Extremities: no edema   Gastric Mucositis:       +                                          ndGndrndanddndend:nd nd2nd Intestinal Mucositis:     -                                         Grade: n/a   Skin: no rash   TLC: CDI   Neuro: A&Ox3   Pain: denies pain at present       Labs:                         9.2    0.09  )-----------( 52       ( 30 Aug 2024 06:54 )             27.2     30 Aug 2024 06:51    140    |  108    |  18     ----------------------------<  104    3.4     |  20     |  0.70     Ca    9.3        30 Aug 2024 06:51  Phos  3.1       30 Aug 2024 06:51  Mg     1.9       30 Aug 2024 06:51    TPro  5.8    /  Alb  3.9    /  TBili  0.4    /  DBili  x      /  AST  21     /  ALT  15     /  AlkPhos  76     30 Aug 2024 06:51    PT/INR - ( 29 Aug 2024 06:46 )   PT: 10.8 sec;   INR: 1.03 ratio    PTT - ( 29 Aug 2024 06:46 )  PTT:26.1 sec    LIVER FUNCTIONS - ( 30 Aug 2024 06:51 )  Alb: 3.9 g/dL / Pro: 5.8 g/dL / ALK PHOS: 76 U/L / ALT: 15 U/L / AST: 21 U/L / GGT: x           Urinalysis Basic - ( 30 Aug 2024 06:51 )    Color: x / Appearance: x / SG: x / pH: x  Gluc: 104 mg/dL / Ketone: x  / Bili: x / Urobili: x   Blood: x / Protein: x / Nitrite: x   Leuk Esterase: x / RBC: x / WBC x   Sq Epi: x / Non Sq Epi: x / Bacteria: x        Cultures:         Radiology:       Meds:   Antimicrobials:   acyclovir   Oral Tab/Cap 800 milliGRAM(s) Oral every 12 hours      Heme / Onc:       GI:  loperamide 4 milliGRAM(s) Oral every 4 hours PRN  pantoprazole    Tablet 40 milliGRAM(s) Oral before breakfast  simethicone 80 milliGRAM(s) Chew four times a day PRN  sodium bicarbonate Mouth Rinse 10 milliLiter(s) Swish and Spit five times a day      Cardiovascular:   valsartan 80 milliGRAM(s) Oral daily      Immunologic:   filgrastim-aafi (NIVESTYM) Injectable 300 MICROGram(s) SubCutaneous daily      Other medications:   Biotene Dry Mouth Oral Rinse 5 milliLiter(s) Swish and Spit five times a day  chlorhexidine 4% Liquid 1 Application(s) Topical <User Schedule>  nystatin Powder 1 Application(s) Topical two times a day  OLANZapine 5 milliGRAM(s) Oral at bedtime  phytonadione   Solution 5 milliGRAM(s) Oral <User Schedule>  zinc oxide 40% Paste 1 Application(s) Topical two times a day      PRN:   acetaminophen     Tablet .. 650 milliGRAM(s) Oral every 6 hours PRN  loperamide 4 milliGRAM(s) Oral every 4 hours PRN  LORazepam   Injectable 0.5 milliGRAM(s) IV Push every 8 hours PRN  ondansetron Injectable 8 milliGRAM(s) IV Push every 8 hours PRN  simethicone 80 milliGRAM(s) Chew four times a day PRN  sodium chloride 0.9% lock flush 10 milliLiter(s) IV Push every 1 hour PRN        A/P:  64 year old female with a history of multiple myeloma   Post:  Autologous PBSCT day + - HPC transplant today, continue hydration for 24 hours post infusion of cells. Daily weights, strict I&O, prn diuresis. Start levaquin / fluconazole prophylaxis when neutropenic.    c/o intermittent abd pain, AXR done, reading from radiologist requested. weight gain from admission, monitor closely, Lasix PRN    Zofran to PRN, switched Compazine-->reglan PRN  - refractory nausea / vomiting, will do trial of olanzapine. d/c reglan. Grade 1 chemotherapy induced GI mucositis. Continue supportive measures.    Check ECG while on Zyprexa +Levaquin     1. Infectious Disease:   acyclovir   Oral Tab/Cap 800 milliGRAM(s) Oral every 12 hours    2. GI Prophylaxis:    pantoprazole    Tablet 40 milliGRAM(s) Oral before breakfast    3. Mouthcare - NS / NaHCO3 rinses, Biotene; Skin care     4. Transfuse & replete electrolytes prn   Replete K+ (po)    5. IV hydration, daily weights, strict I&O, prn diuresis     6. PO intake as tolerated, nutrition follow up as needed    7. Antiemetics, anti-diarrhea medications:   loperamide 4 milliGRAM(s) Oral every 4 hours PRN  ondansetron Injectable 8 milliGRAM(s) IV Push every 8 hours PRN  LORazepam   Injectable 0.5 milliGRAM(s) IV Push every 8 hours PRN  OLANZapine 5 milliGRAM(s) Oral at bedtime    8. OOB as tolerated, physical therapy consult if needed     9. Monitor coags / fibrinogen 2x week, vitamin K as needed   phytonadione   Solution 5 milliGRAM(s) Oral <User Schedule>    10. Monitor closely for clinical changes, monitor for fevers     11. Emotional support provided, plan of care discussed and questions addressed     12. Patient education done regarding plan of care, restrictions and discharge planning     13. Continue regular social work input     I have written the above note for Dr. Martinez who performed service with me in the room.   Guillermo Hollis PA-C ([phone])    I have seen and examined patient with PA, I agree with above note as scribed.

## 2024-08-31 LAB
ALBUMIN SERPL ELPH-MCNC: 3.6 G/DL — SIGNIFICANT CHANGE UP (ref 3.3–5)
ALP SERPL-CCNC: 77 U/L — SIGNIFICANT CHANGE UP (ref 40–120)
ALT FLD-CCNC: 20 U/L — SIGNIFICANT CHANGE UP (ref 10–45)
ANION GAP SERPL CALC-SCNC: 12 MMOL/L — SIGNIFICANT CHANGE UP (ref 5–17)
AST SERPL-CCNC: 22 U/L — SIGNIFICANT CHANGE UP (ref 10–40)
BILIRUB SERPL-MCNC: 0.5 MG/DL — SIGNIFICANT CHANGE UP (ref 0.2–1.2)
BUN SERPL-MCNC: 17 MG/DL — SIGNIFICANT CHANGE UP (ref 7–23)
CALCIUM SERPL-MCNC: 9.4 MG/DL — SIGNIFICANT CHANGE UP (ref 8.4–10.5)
CHLORIDE SERPL-SCNC: 106 MMOL/L — SIGNIFICANT CHANGE UP (ref 96–108)
CO2 SERPL-SCNC: 21 MMOL/L — LOW (ref 22–31)
CREAT SERPL-MCNC: 0.78 MG/DL — SIGNIFICANT CHANGE UP (ref 0.5–1.3)
EGFR: 84 ML/MIN/1.73M2 — SIGNIFICANT CHANGE UP
GLUCOSE SERPL-MCNC: 113 MG/DL — HIGH (ref 70–99)
HADV DNA FLD NAA+PROBE-LOG#: SIGNIFICANT CHANGE UP COPIES/ML
HCT VFR BLD CALC: 26.3 % — LOW (ref 34.5–45)
HGB BLD-MCNC: 8.9 G/DL — LOW (ref 11.5–15.5)
LDH SERPL L TO P-CCNC: 148 U/L — SIGNIFICANT CHANGE UP (ref 50–242)
MAGNESIUM SERPL-MCNC: 1.8 MG/DL — SIGNIFICANT CHANGE UP (ref 1.6–2.6)
MCHC RBC-ENTMCNC: 30.7 PG — SIGNIFICANT CHANGE UP (ref 27–34)
MCHC RBC-ENTMCNC: 33.8 GM/DL — SIGNIFICANT CHANGE UP (ref 32–36)
MCV RBC AUTO: 90.7 FL — SIGNIFICANT CHANGE UP (ref 80–100)
NRBC # BLD: 0 /100 WBCS — SIGNIFICANT CHANGE UP (ref 0–0)
PHOSPHATE SERPL-MCNC: 3 MG/DL — SIGNIFICANT CHANGE UP (ref 2.5–4.5)
PLATELET # BLD AUTO: 27 K/UL — LOW (ref 150–400)
POTASSIUM SERPL-MCNC: 3.4 MMOL/L — LOW (ref 3.5–5.3)
POTASSIUM SERPL-SCNC: 3.4 MMOL/L — LOW (ref 3.5–5.3)
PROT SERPL-MCNC: 5.8 G/DL — LOW (ref 6–8.3)
RBC # BLD: 2.9 M/UL — LOW (ref 3.8–5.2)
RBC # FLD: 14 % — SIGNIFICANT CHANGE UP (ref 10.3–14.5)
SODIUM SERPL-SCNC: 139 MMOL/L — SIGNIFICANT CHANGE UP (ref 135–145)
WBC # BLD: 0.04 K/UL — CRITICAL LOW (ref 3.8–10.5)
WBC # FLD AUTO: 0.04 K/UL — CRITICAL LOW (ref 3.8–10.5)

## 2024-08-31 PROCEDURE — 99233 SBSQ HOSP IP/OBS HIGH 50: CPT

## 2024-08-31 RX ORDER — BENZOCAINE/MENTHOL 20 %-0.5 %
1 AEROSOL (GRAM) TOPICAL EVERY 8 HOURS
Refills: 0 | Status: DISCONTINUED | OUTPATIENT
Start: 2024-08-31 | End: 2024-09-13

## 2024-08-31 RX ORDER — MAGNESIUM, ALUMINUM HYDROXIDE 200-225/5
30 SUSPENSION, ORAL (FINAL DOSE FORM) ORAL EVERY 6 HOURS
Refills: 0 | Status: DISCONTINUED | OUTPATIENT
Start: 2024-08-31 | End: 2024-09-13

## 2024-08-31 RX ORDER — POTASSIUM CHLORIDE 10 MEQ
20 TABLET, EXT RELEASE, PARTICLES/CRYSTALS ORAL
Refills: 0 | Status: COMPLETED | OUTPATIENT
Start: 2024-08-31 | End: 2024-08-31

## 2024-08-31 RX ADMIN — ONDANSETRON 8 MILLIGRAM(S): 2 INJECTION, SOLUTION INTRAMUSCULAR; INTRAVENOUS at 15:00

## 2024-08-31 RX ADMIN — LORAZEPAM 0.5 MILLIGRAM(S): 4 INJECTION INTRAMUSCULAR; INTRAVENOUS at 22:10

## 2024-08-31 RX ADMIN — CHLORHEXIDINE GLUCONATE 1 APPLICATION(S): 40 SOLUTION TOPICAL at 11:37

## 2024-08-31 RX ADMIN — FLUCONAZOLE 400 MILLIGRAM(S): 150 TABLET ORAL at 13:38

## 2024-08-31 RX ADMIN — FILGRASTIM 300 MICROGRAM(S): 300 INJECTION, SOLUTION INTRAVENOUS; SUBCUTANEOUS at 17:56

## 2024-08-31 RX ADMIN — Medication 125 MILLIGRAM(S): at 06:13

## 2024-08-31 RX ADMIN — VALSARTAN 80 MILLIGRAM(S): 40 TABLET ORAL at 06:13

## 2024-08-31 RX ADMIN — Medication 5 MILLILITER(S): at 20:44

## 2024-08-31 RX ADMIN — Medication 800 MILLIGRAM(S): at 18:04

## 2024-08-31 RX ADMIN — SODIUM BICARBONATE 10 MILLILITER(S): 84 INJECTION, SOLUTION INTRAVENOUS at 20:45

## 2024-08-31 RX ADMIN — SODIUM BICARBONATE 10 MILLILITER(S): 84 INJECTION, SOLUTION INTRAVENOUS at 00:16

## 2024-08-31 RX ADMIN — OLANZAPINE 5 MILLIGRAM(S): 7.5 TABLET ORAL at 21:04

## 2024-08-31 RX ADMIN — Medication 800 MILLIGRAM(S): at 06:13

## 2024-08-31 RX ADMIN — SODIUM BICARBONATE 10 MILLILITER(S): 84 INJECTION, SOLUTION INTRAVENOUS at 16:00

## 2024-08-31 RX ADMIN — TIZANIDINE 4 MILLIGRAM(S): 4 TABLET ORAL at 22:19

## 2024-08-31 RX ADMIN — Medication 5 MILLILITER(S): at 00:16

## 2024-08-31 RX ADMIN — Medication 50 MILLIEQUIVALENT(S): at 15:00

## 2024-08-31 RX ADMIN — Medication 125 MILLIGRAM(S): at 18:08

## 2024-08-31 RX ADMIN — Medication 5 MILLILITER(S): at 16:02

## 2024-08-31 RX ADMIN — Medication 1 APPLICATION(S): at 06:15

## 2024-08-31 RX ADMIN — Medication 50 MILLIEQUIVALENT(S): at 12:37

## 2024-08-31 RX ADMIN — SODIUM BICARBONATE 10 MILLILITER(S): 84 INJECTION, SOLUTION INTRAVENOUS at 12:36

## 2024-08-31 RX ADMIN — ZINC OXIDE 1 APPLICATION(S): 100 OINTMENT TOPICAL at 06:14

## 2024-08-31 RX ADMIN — Medication 30 MILLILITER(S): at 17:00

## 2024-08-31 RX ADMIN — Medication 1 LOZENGE: at 16:00

## 2024-08-31 RX ADMIN — Medication 5 MILLILITER(S): at 08:34

## 2024-08-31 RX ADMIN — Medication 1 APPLICATION(S): at 17:59

## 2024-08-31 RX ADMIN — SODIUM BICARBONATE 10 MILLILITER(S): 84 INJECTION, SOLUTION INTRAVENOUS at 08:24

## 2024-08-31 RX ADMIN — Medication 40 MILLIGRAM(S): at 06:13

## 2024-08-31 RX ADMIN — ZINC OXIDE 1 APPLICATION(S): 100 OINTMENT TOPICAL at 18:00

## 2024-08-31 RX ADMIN — Medication 5 MILLILITER(S): at 12:36

## 2024-08-31 NOTE — PROGRESS NOTE ADULT - NS ATTEND AMEND GEN_ALL_CORE FT
64 year old female with a history of multiple myeloma admitted for auto-HSCT Day + 7 today     Disease: IgG lambda multiple myeloma  Type of transplant: Autologous  Conditioning prep: Melphalan (200 mg/m2)   ABO / CMV: O POS / POS    Complications:   - Day 0: nausea/vomitting, abdominal pain   -Day 1: B/L lower extremities tremors --> compazine D/C, replaced by reglan     Patient was discussed, seen and examined in IDR rounds.   Patient's nausea improved on Zyprexa; Zofran PRN; compazine changed to reglan.  Has frequent bowel movements, but this is chronic with use of Imodium at home. Today, episode of loose stool.  Labs reviewed, supplements lytes prn  PE: normal oral mucosa, site of line without erythema, + BS in all 4 quadrants,   Neutropenic, afebrile- Continue Acyclovir prophylaxis, begin Levaquin, Fluconazole, Vanco PO  NIVESTYM 300 MICROGram(s) SubCutaneous daily, started on day +5  Continue supportive care. 64 year old female with a history of multiple myeloma admitted for auto-HSCT Day + 8 today     Disease: IgG lambda multiple myeloma  Type of transplant: Autologous  Conditioning prep: Melphalan (200 mg/m2)   ABO / CMV: O POS / POS    Complications:   - Day 0: nausea/vomitting, abdominal pain   -Day 1: B/L lower extremities tremors --> compazine D/C, replaced by reglan     Patient was discussed, seen and examined in IDR rounds.   Patient's nausea improved on Zyprexa; Zofran PRN.  Has frequent bowel movements, but this is chronic with use of Imodium at home. Today, episode of loose stool.  Labs reviewed, supplements lytes prn  PE: normal oral mucosa, site of line without erythema, + BS in all 4 quadrants,   Neutropenic, afebrile- Continue Acyclovir prophylaxis, begin Levaquin, Fluconazole, Vanco PO. If febrile, culture and start Zosyn and Vanco IV; D/C Levaquin.   NIVESTYM 300 MICROGram(s) SubCutaneous daily, started on day +5  Continue supportive care.

## 2024-08-31 NOTE — PROGRESS NOTE ADULT - SUBJECTIVE AND OBJECTIVE BOX
[facility] Transplant Team                                                      Critical / Counseling Time Provided: 30 minutes                                                                                                                                                          Chief Complaint: Autologous pbsct with high dose melphalan prep regimen for treatment of IgG lambda multiple myeloma    Disease: IgG lambda multiple myeloma  Type of transplant: Autologous  Conditioning prep: Melphalan (200 mg/m2)   ABO / CMV: O POS / POS     S: Patient seen and examined with [facility] Transplant Team:   +loose stools (x2)  +Abdominal cramping  +nausea    O: rest of ROS negative          O: Vitals:   Vital Signs Last 24 Hrs  T(C): 37.2 (31 Aug 2024 05:26), Max: 37.8 (30 Aug 2024 13:40)  T(F): 99 (31 Aug 2024 05:26), Max: 100.1 (30 Aug 2024 13:40)  HR: 111 (31 Aug 2024 05:26) (111 - 115)  BP: 117/69 (31 Aug 2024 05:26) (117/61 - 122/75)  BP(mean): --  RR: 18 (31 Aug 2024 05:26) (18 - 18)  SpO2: 95% (31 Aug 2024 05:26) (95% - 97%)    Parameters below as of 31 Aug 2024 05:26  Patient On (Oxygen Delivery Method): room air      Admit weight: 62.2kg  Daily Weight in k.7kg    Intake / Output:    @ 07:01  -   @ 07:00  --------------------------------------------------------  IN: 840 mL / OUT: 1600 mL / NET: -760 mL      PE:   Oropharynx: no erythema or ulcerations   Oral Mucositis:              -                                          Grade: n/a   CVS: S1, S2 RRR   Lungs: CTA throughout bilaterally   Abdomen: + BS x 4, soft, NT, ND, +mild epigastric tenderness  Extremities: no edema   Gastric Mucositis:       +                                          ndGndrndanddndend:nd nd2nd Intestinal Mucositis:     -                                         Grade: n/a   Skin: no rash   TLC: CDI   Neuro: A&Ox3   Pain: denies pain at present     Labs:         Cultures:         Radiology:       Meds:   Antimicrobials:   acyclovir   Oral Tab/Cap 800 milliGRAM(s) Oral every 12 hours  fluconAZOLE   Tablet 400 milliGRAM(s) Oral daily  levoFLOXacin  Tablet 500 milliGRAM(s) Oral every 24 hours  vancomycin    Solution 125 milliGRAM(s) Oral every 12 hours      GI:  loperamide 4 milliGRAM(s) Oral every 4 hours PRN  pantoprazole    Tablet 40 milliGRAM(s) Oral before breakfast  simethicone 80 milliGRAM(s) Chew four times a day PRN  sodium bicarbonate Mouth Rinse 10 milliLiter(s) Swish and Spit five times a day      Cardiovascular:   valsartan 80 milliGRAM(s) Oral daily    Immunologic:   filgrastim-aafi (NIVESTYM) Injectable 300 MICROGram(s) SubCutaneous daily      Other medications:   Biotene Dry Mouth Oral Rinse 5 milliLiter(s) Swish and Spit five times a day  chlorhexidine 4% Liquid 1 Application(s) Topical <User Schedule>  nystatin Powder 1 Application(s) Topical two times a day  OLANZapine 5 milliGRAM(s) Oral at bedtime  phytonadione   Solution 5 milliGRAM(s) Oral <User Schedule>  zinc oxide 40% Paste 1 Application(s) Topical two times a day      PRN:   acetaminophen     Tablet .. 650 milliGRAM(s) Oral every 6 hours PRN  loperamide 4 milliGRAM(s) Oral every 4 hours PRN  LORazepam   Injectable 0.5 milliGRAM(s) IV Push every 8 hours PRN  ondansetron Injectable 8 milliGRAM(s) IV Push every 8 hours PRN  simethicone 80 milliGRAM(s) Chew four times a day PRN  sodium chloride 0.9% lock flush 10 milliLiter(s) IV Push every 1 hour PRN    A/P:  64 year old female with a history of multiple myeloma   Post:  Autologous PBSCT day + - HPC transplant today, continue hydration for 24 hours post infusion of cells. Daily weights, strict I&O, prn diuresis. Start levaquin / fluconazole prophylaxis when neutropenic.    c/o intermittent abd pain, AXR done, reading from radiologist requested. weight gain from admission, monitor closely, Lasix PRN    Zofran to PRN, switched Compazine-->reglan PRN  - refractory nausea / vomiting, will do trial of olanzapine. d/c reglan. Grade 1 chemotherapy induced GI mucositis. Continue supportive measures.    Check ECG while on Zyprexa +Levaquin     1. Infectious Disease:   acyclovir   Oral Tab/Cap 800 milliGRAM(s) Oral every 12 hours    2. GI Prophylaxis:    pantoprazole    Tablet 40 milliGRAM(s) Oral before breakfast    3. Mouthcare - NS / NaHCO3 rinses, Biotene; Skin care     4. Transfuse & replete electrolytes prn   Replete K+ (po)    5. IV hydration, daily weights, strict I&O, prn diuresis     6. PO intake as tolerated, nutrition follow up as needed    7. Antiemetics, anti-diarrhea medications:   loperamide 4 milliGRAM(s) Oral every 4 hours PRN  ondansetron Injectable 8 milliGRAM(s) IV Push every 8 hours PRN  LORazepam   Injectable 0.5 milliGRAM(s) IV Push every 8 hours PRN  OLANZapine 5 milliGRAM(s) Oral at bedtime    8. OOB as tolerated, physical therapy consult if needed     9. Monitor coags / fibrinogen 2x week, vitamin K as needed   phytonadione   Solution 5 milliGRAM(s) Oral <User Schedule>    10. Monitor closely for clinical changes, monitor for fevers     11. Emotional support provided, plan of care discussed and questions addressed     12. Patient education done regarding plan of care, restrictions and discharge planning     13. Continue regular social work input     I have written the above note for Dr. Martinez who performed service with me in the room.   Karishma Olivia  NP-C ([phone])    I have seen and examined patient with NP, I agree with above note as scribed.                    [facility] Transplant Team                                                      Critical / Counseling Time Provided: 30 minutes                                                                                                                                                          Chief Complaint: Autologous pbsct with high dose melphalan prep regimen for treatment of IgG lambda multiple myeloma    Disease: IgG lambda multiple myeloma  Type of transplant: Autologous  Conditioning prep: Melphalan (200 mg/m2)   ABO / CMV: O POS / POS     S: Patient seen and examined with [facility] Transplant Team:   +throat discomfort  +intermittent nausea    O: rest of ROS negative    O: Vitals:   Vital Signs Last 24 Hrs  T(C): 37.2 (31 Aug 2024 05:26), Max: 37.8 (30 Aug 2024 13:40)  T(F): 99 (31 Aug 2024 05:26), Max: 100.1 (30 Aug 2024 13:40)  HR: 111 (31 Aug 2024 05:) (111 - 115)  BP: 117/69 (31 Aug 2024 05:26) (117/61 - 122/75)  BP(mean): --  RR: 18 (31 Aug 2024 05:26) (18 - 18)  SpO2: 95% (31 Aug 2024 05:) (95% - 97%)    Parameters below as of 31 Aug 2024 05:26  Patient On (Oxygen Delivery Method): room air    Admit weight: 62.2kg  Daily Weight in k.6 kg    Intake / Output:    @ 07:01  -   @ 07:00  --------------------------------------------------------  IN: 840 mL / OUT: 1600 mL / NET: -760 mL    PE:   Oropharynx: no erythema or ulcerations   Oral Mucositis:              -                                          Grade: n/a   CVS: S1, S2 RRR   Lungs: CTA throughout bilaterally   Abdomen: + BS x 4, soft, NT, ND, +mild epigastric tenderness  Extremities: no edema   Gastric Mucositis:       +                                          ndGndrndanddndend:nd nd2nd Intestinal Mucositis:     -                                         Grade: n/a   Skin: no rash   TLC: CDI   Neuro: A&Ox3   Pain: denies pain at present     Labs:                       8.9    0.04  )-----------( 27       ( 31 Aug 2024 07:35 )             26.3   Mean Cell Volume : 90.7 fl  Mean Cell Hemoglobin : 30.7 pg  Mean Cell Hemoglobin Concentration : 33.8 gm/dL  Auto Neutrophil # : x  Auto Lymphocyte # : x  Auto Monocyte # : x  Auto Eosinophil # : x  Auto Basophil # : x  Auto Neutrophil % : x  Auto Lymphocyte % : x  Auto Monocyte % : x  Auto Eosinophil % : x  Auto Basophil % : x      08-31    139  |  106  |  17  ----------------------------<  113<H>  3.4<L>   |  21<L>  |  0.78    Ca    9.4      31 Aug 2024 07:35  Phos  3.0     08-31  Mg     1.8     08-31    TPro  5.8<L>  /  Alb  3.6  /  TBili  0.5  /  DBili  x   /  AST  22  /  ALT  20  /  AlkPhos  77  08-31  Mg 1.8  Phos 3.0    Uric Acid --    Cultures:   NA      Radiology:   NA    Meds:   Antimicrobials:   acyclovir   Oral Tab/Cap 800 milliGRAM(s) Oral every 12 hours  fluconAZOLE   Tablet 400 milliGRAM(s) Oral daily  levoFLOXacin  Tablet 500 milliGRAM(s) Oral every 24 hours  vancomycin    Solution 125 milliGRAM(s) Oral every 12 hours    GI:  loperamide 4 milliGRAM(s) Oral every 4 hours PRN  pantoprazole    Tablet 40 milliGRAM(s) Oral before breakfast  simethicone 80 milliGRAM(s) Chew four times a day PRN  sodium bicarbonate Mouth Rinse 10 milliLiter(s) Swish and Spit five times a day    Cardiovascular:   valsartan 80 milliGRAM(s) Oral daily    Immunologic:   filgrastim-aafi (NIVESTYM) Injectable 300 MICROGram(s) SubCutaneous daily    Other medications:   Biotene Dry Mouth Oral Rinse 5 milliLiter(s) Swish and Spit five times a day  chlorhexidine 4% Liquid 1 Application(s) Topical <User Schedule>  nystatin Powder 1 Application(s) Topical two times a day  OLANZapine 5 milliGRAM(s) Oral at bedtime  phytonadione   Solution 5 milliGRAM(s) Oral <User Schedule>  zinc oxide 40% Paste 1 Application(s) Topical two times a day    PRN:   acetaminophen     Tablet .. 650 milliGRAM(s) Oral every 6 hours PRN  loperamide 4 milliGRAM(s) Oral every 4 hours PRN  LORazepam   Injectable 0.5 milliGRAM(s) IV Push every 8 hours PRN  ondansetron Injectable 8 milliGRAM(s) IV Push every 8 hours PRN  simethicone 80 milliGRAM(s) Chew four times a day PRN  sodium chloride 0.9% lock flush 10 milliLiter(s) IV Push every 1 hour PRN    A/P:  64 year old female with a history of multiple myeloma   Post:  Autologous PBSCT day + - HPC transplant today, continue hydration for 24 hours post infusion of cells. Daily weights, strict I&O, prn diuresis. Start levaquin / fluconazole prophylaxis when neutropenic.    c/o intermittent abd pain, AXR done, reading from radiologist requested. weight gain from admission, monitor closely, Lasix PRN    Zofran to PRN, switched Compazine-->reglan PRN  - refractory nausea / vomiting, will do trial of olanzapine. d/c reglan. Grade 1 chemotherapy induced GI mucositis. Continue supportive measures.    Check ECG while on Zyprexa +Levaquin     1. Infectious Disease:   acyclovir   Oral Tab/Cap 800 milliGRAM(s) Oral every 12 hours  fluconAZOLE   Tablet 400 milliGRAM(s) Oral daily  levoFLOXacin  Tablet 500 milliGRAM(s) Oral every 24 hours  vancomycin    Solution 125 milliGRAM(s) Oral every 12 hours  If febrile pan culture, change antibiotics to Vancomycin and Zosyn.     2. GI Prophylaxis:    pantoprazole    Tablet 40 milliGRAM(s) Oral before breakfast    3. Mouthcare - NS / NaHCO3 rinses, Biotene; Skin care     4. Transfuse & replete electrolytes prn   - Hypokalemia - Replete K+.    5. IV hydration, daily weights, strict I&O, prn diuresis     6. PO intake as tolerated, nutrition follow up as needed    7. Antiemetics, anti-diarrhea medications:   loperamide 4 milliGRAM(s) Oral every 4 hours PRN  ondansetron Injectable 8 milliGRAM(s) IV Push every 8 hours PRN  LORazepam   Injectable 0.5 milliGRAM(s) IV Push every 8 hours PRN  OLANZapine 5 milliGRAM(s) Oral at bedtime    8. OOB as tolerated, physical therapy consult if needed     9. Monitor coags / fibrinogen 2x week, vitamin K as needed   phytonadione   Solution 5 milliGRAM(s) Oral <User Schedule>    10. Monitor closely for clinical changes, monitor for fevers     11. Emotional support provided, plan of care discussed and questions addressed     12. Patient education done regarding plan of care, restrictions and discharge planning     13. Continue regular social work input     I have written the above note for Dr. Martinez who performed service with me in the room.   Karishma Olivia  NP-C ([phone])    I have seen and examined patient with NP, I agree with above note as scribed.                    [facility] Transplant Team                                                      Critical / Counseling Time Provided: 30 minutes                                                                                                                                                          Chief Complaint: Autologous pbsct with high dose melphalan prep regimen for treatment of IgG lambda multiple myeloma    Disease: IgG lambda multiple myeloma  Type of transplant: Autologous  Conditioning prep: Melphalan (200 mg/m2)   ABO / CMV: O POS / POS     S: Patient seen and examined with [facility] Transplant Team:   +throat discomfort  +intermittent nausea  +loose stool  O: rest of ROS negative    O: Vitals:   Vital Signs Last 24 Hrs  T(C): 37.2 (31 Aug 2024 05:26), Max: 37.8 (30 Aug 2024 13:40)  T(F): 99 (31 Aug 2024 05:26), Max: 100.1 (30 Aug 2024 13:40)  HR: 111 (31 Aug 2024 05:26) (111 - 115)  BP: 117/69 (31 Aug 2024 05:26) (117/61 - 122/75)  BP(mean): --  RR: 18 (31 Aug 2024 05:26) (18 - 18)  SpO2: 95% (31 Aug 2024 05:26) (95% - 97%)    Parameters below as of 31 Aug 2024 05:26  Patient On (Oxygen Delivery Method): room air    Admit weight: 62.2kg  Daily Weight in k.6 kg    Intake / Output:    @ 07:01  -   @ 07:00  --------------------------------------------------------  IN: 840 mL / OUT: 1600 mL / NET: -760 mL    PE:   Oropharynx: no erythema or ulcerations   Oral Mucositis:              -                                          Grade: n/a   CVS: S1, S2 RRR   Lungs: CTA throughout bilaterally   Abdomen: + BS x 4, soft, NT, ND, +mild epigastric tenderness  Extremities: no edema   Gastric Mucositis:       +                                          ndGndrndanddndend:nd nd2nd Intestinal Mucositis:     -                                         Grade: n/a   Skin: no rash   TLC: CDI   Neuro: A&Ox3   Pain: denies pain at present     Labs:                       8.9    0.04  )-----------( 27       ( 31 Aug 2024 07:35 )             26.3   Mean Cell Volume : 90.7 fl  Mean Cell Hemoglobin : 30.7 pg  Mean Cell Hemoglobin Concentration : 33.8 gm/dL  Auto Neutrophil # : x  Auto Lymphocyte # : x  Auto Monocyte # : x  Auto Eosinophil # : x  Auto Basophil # : x  Auto Neutrophil % : x  Auto Lymphocyte % : x  Auto Monocyte % : x  Auto Eosinophil % : x  Auto Basophil % : x      08-31    139  |  106  |  17  ----------------------------<  113<H>  3.4<L>   |  21<L>  |  0.78    Ca    9.4      31 Aug 2024 07:35  Phos  3.0     08-31  Mg     1.8     08-31    TPro  5.8<L>  /  Alb  3.6  /  TBili  0.5  /  DBili  x   /  AST  22  /  ALT  20  /  AlkPhos  77  08-31  Mg 1.8  Phos 3.0    Uric Acid --    Cultures:   NA      Radiology:   NA    Meds:   Antimicrobials:   acyclovir   Oral Tab/Cap 800 milliGRAM(s) Oral every 12 hours  fluconAZOLE   Tablet 400 milliGRAM(s) Oral daily  levoFLOXacin  Tablet 500 milliGRAM(s) Oral every 24 hours  vancomycin    Solution 125 milliGRAM(s) Oral every 12 hours    GI:  loperamide 4 milliGRAM(s) Oral every 4 hours PRN  pantoprazole    Tablet 40 milliGRAM(s) Oral before breakfast  simethicone 80 milliGRAM(s) Chew four times a day PRN  sodium bicarbonate Mouth Rinse 10 milliLiter(s) Swish and Spit five times a day    Cardiovascular:   valsartan 80 milliGRAM(s) Oral daily    Immunologic:   filgrastim-aafi (NIVESTYM) Injectable 300 MICROGram(s) SubCutaneous daily    Other medications:   Biotene Dry Mouth Oral Rinse 5 milliLiter(s) Swish and Spit five times a day  chlorhexidine 4% Liquid 1 Application(s) Topical <User Schedule>  nystatin Powder 1 Application(s) Topical two times a day  OLANZapine 5 milliGRAM(s) Oral at bedtime  phytonadione   Solution 5 milliGRAM(s) Oral <User Schedule>  zinc oxide 40% Paste 1 Application(s) Topical two times a day    PRN:   acetaminophen     Tablet .. 650 milliGRAM(s) Oral every 6 hours PRN  loperamide 4 milliGRAM(s) Oral every 4 hours PRN  LORazepam   Injectable 0.5 milliGRAM(s) IV Push every 8 hours PRN  ondansetron Injectable 8 milliGRAM(s) IV Push every 8 hours PRN  simethicone 80 milliGRAM(s) Chew four times a day PRN  sodium chloride 0.9% lock flush 10 milliLiter(s) IV Push every 1 hour PRN    A/P:  64 year old female with a history of multiple myeloma   Post:  Autologous PBSCT day + - HPC transplant today, continue hydration for 24 hours post infusion of cells. Daily weights, strict I&O, prn diuresis. Start levaquin / fluconazole prophylaxis when neutropenic.    c/o intermittent abd pain, AXR done, reading from radiologist requested. weight gain from admission, monitor closely, Lasix PRN    Zofran to PRN, switched Compazine-->reglan PRN  - refractory nausea / vomiting, will do trial of olanzapine. d/c reglan. Grade 1 chemotherapy induced GI mucositis. Continue supportive measures.    Check ECG while on Zyprexa +Levaquin     1. Infectious Disease:   acyclovir   Oral Tab/Cap 800 milliGRAM(s) Oral every 12 hours  fluconAZOLE   Tablet 400 milliGRAM(s) Oral daily  levoFLOXacin  Tablet 500 milliGRAM(s) Oral every 24 hours  vancomycin    Solution 125 milliGRAM(s) Oral every 12 hours  If febrile pan culture, change antibiotics to Vancomycin and Zosyn.     2. GI Prophylaxis:    pantoprazole    Tablet 40 milliGRAM(s) Oral before breakfast    3. Mouthcare - NS / NaHCO3 rinses, Biotene; Skin care     4. Transfuse & replete electrolytes prn   - Hypokalemia - Replete K+.    5. IV hydration, daily weights, strict I&O, prn diuresis     6. PO intake as tolerated, nutrition follow up as needed    7. Antiemetics, anti-diarrhea medications:   loperamide 4 milliGRAM(s) Oral every 4 hours PRN  ondansetron Injectable 8 milliGRAM(s) IV Push every 8 hours PRN  LORazepam   Injectable 0.5 milliGRAM(s) IV Push every 8 hours PRN  OLANZapine 5 milliGRAM(s) Oral at bedtime    8. OOB as tolerated, physical therapy consult if needed     9. Monitor coags / fibrinogen 2x week, vitamin K as needed   phytonadione   Solution 5 milliGRAM(s) Oral <User Schedule>    10. Monitor closely for clinical changes, monitor for fevers     11. Emotional support provided, plan of care discussed and questions addressed     12. Patient education done regarding plan of care, restrictions and discharge planning     13. Continue regular social work input     I have written the above note for Dr. Martinez who performed service with me in the room.   Karishma Olivia  NP-C ([phone])    I have seen and examined patient with NP, I agree with above note as scribed.

## 2024-09-01 LAB
ALBUMIN SERPL ELPH-MCNC: 4.6 G/DL — SIGNIFICANT CHANGE UP (ref 3.3–5)
ALP SERPL-CCNC: 92 U/L — SIGNIFICANT CHANGE UP (ref 40–120)
ALT FLD-CCNC: 27 U/L — SIGNIFICANT CHANGE UP (ref 10–45)
ANION GAP SERPL CALC-SCNC: 13 MMOL/L — SIGNIFICANT CHANGE UP (ref 5–17)
APPEARANCE UR: ABNORMAL
AST SERPL-CCNC: 29 U/L — SIGNIFICANT CHANGE UP (ref 10–40)
BACTERIA # UR AUTO: NEGATIVE /HPF — SIGNIFICANT CHANGE UP
BILIRUB SERPL-MCNC: 0.3 MG/DL — SIGNIFICANT CHANGE UP (ref 0.2–1.2)
BILIRUB UR-MCNC: NEGATIVE — SIGNIFICANT CHANGE UP
BUN SERPL-MCNC: 18 MG/DL — SIGNIFICANT CHANGE UP (ref 7–23)
CALCIUM SERPL-MCNC: 9.6 MG/DL — SIGNIFICANT CHANGE UP (ref 8.4–10.5)
CAST: 0 /LPF — SIGNIFICANT CHANGE UP (ref 0–4)
CHLORIDE SERPL-SCNC: 106 MMOL/L — SIGNIFICANT CHANGE UP (ref 96–108)
CO2 SERPL-SCNC: 20 MMOL/L — LOW (ref 22–31)
COLOR SPEC: YELLOW — SIGNIFICANT CHANGE UP
CREAT SERPL-MCNC: 0.72 MG/DL — SIGNIFICANT CHANGE UP (ref 0.5–1.3)
DIFF PNL FLD: NEGATIVE — SIGNIFICANT CHANGE UP
EGFR: 93 ML/MIN/1.73M2 — SIGNIFICANT CHANGE UP
FLUAV AG NPH QL: SIGNIFICANT CHANGE UP
FLUBV AG NPH QL: SIGNIFICANT CHANGE UP
GLUCOSE SERPL-MCNC: 117 MG/DL — HIGH (ref 70–99)
GLUCOSE UR QL: NEGATIVE MG/DL — SIGNIFICANT CHANGE UP
HCT VFR BLD CALC: 24.6 % — LOW (ref 34.5–45)
HGB BLD-MCNC: 8.4 G/DL — LOW (ref 11.5–15.5)
KETONES UR-MCNC: ABNORMAL MG/DL
LDH SERPL L TO P-CCNC: 137 U/L — SIGNIFICANT CHANGE UP (ref 50–242)
LEUKOCYTE ESTERASE UR-ACNC: NEGATIVE — SIGNIFICANT CHANGE UP
MAGNESIUM SERPL-MCNC: 1.7 MG/DL — SIGNIFICANT CHANGE UP (ref 1.6–2.6)
MCHC RBC-ENTMCNC: 31.3 PG — SIGNIFICANT CHANGE UP (ref 27–34)
MCHC RBC-ENTMCNC: 34.1 GM/DL — SIGNIFICANT CHANGE UP (ref 32–36)
MCV RBC AUTO: 91.8 FL — SIGNIFICANT CHANGE UP (ref 80–100)
NITRITE UR-MCNC: NEGATIVE — SIGNIFICANT CHANGE UP
NRBC # BLD: 0 /100 WBCS — SIGNIFICANT CHANGE UP (ref 0–0)
PH UR: 5.5 — SIGNIFICANT CHANGE UP (ref 5–8)
PHOSPHATE SERPL-MCNC: 3.1 MG/DL — SIGNIFICANT CHANGE UP (ref 2.5–4.5)
PLATELET # BLD AUTO: 10 K/UL — CRITICAL LOW (ref 150–400)
POTASSIUM SERPL-MCNC: 3.5 MMOL/L — SIGNIFICANT CHANGE UP (ref 3.5–5.3)
POTASSIUM SERPL-SCNC: 3.5 MMOL/L — SIGNIFICANT CHANGE UP (ref 3.5–5.3)
PROT SERPL-MCNC: 5.7 G/DL — LOW (ref 6–8.3)
PROT UR-MCNC: SIGNIFICANT CHANGE UP MG/DL
RBC # BLD: 2.68 M/UL — LOW (ref 3.8–5.2)
RBC # FLD: 13.8 % — SIGNIFICANT CHANGE UP (ref 10.3–14.5)
RBC CASTS # UR COMP ASSIST: 1 /HPF — SIGNIFICANT CHANGE UP (ref 0–4)
RSV RNA NPH QL NAA+NON-PROBE: SIGNIFICANT CHANGE UP
SARS-COV-2 RNA SPEC QL NAA+PROBE: SIGNIFICANT CHANGE UP
SODIUM SERPL-SCNC: 139 MMOL/L — SIGNIFICANT CHANGE UP (ref 135–145)
SP GR SPEC: 1.03 — SIGNIFICANT CHANGE UP (ref 1–1.03)
SQUAMOUS # UR AUTO: 2 /HPF — SIGNIFICANT CHANGE UP (ref 0–5)
UROBILINOGEN FLD QL: 0.2 MG/DL — SIGNIFICANT CHANGE UP (ref 0.2–1)
WBC # BLD: 0.06 K/UL — CRITICAL LOW (ref 3.8–10.5)
WBC # FLD AUTO: 0.06 K/UL — CRITICAL LOW (ref 3.8–10.5)
WBC UR QL: 1 /HPF — SIGNIFICANT CHANGE UP (ref 0–5)

## 2024-09-01 PROCEDURE — 99233 SBSQ HOSP IP/OBS HIGH 50: CPT

## 2024-09-01 PROCEDURE — 71045 X-RAY EXAM CHEST 1 VIEW: CPT | Mod: 26

## 2024-09-01 RX ORDER — PIPERACILLIN SODIUM AND TAZOBACTAM SODIUM 3; .375 G/15ML; G/15ML
4.5 INJECTION, POWDER, FOR SOLUTION INTRAVENOUS
Refills: 0 | Status: DISCONTINUED | OUTPATIENT
Start: 2024-09-01 | End: 2024-09-01

## 2024-09-01 RX ORDER — VANCOMYCIN/0.9 % SOD CHLORIDE 1.75G/25
1250 PLASTIC BAG, INJECTION (ML) INTRAVENOUS EVERY 12 HOURS
Refills: 0 | Status: DISCONTINUED | OUTPATIENT
Start: 2024-09-01 | End: 2024-09-01

## 2024-09-01 RX ORDER — PIPERACILLIN SODIUM AND TAZOBACTAM SODIUM 3; .375 G/15ML; G/15ML
4.5 INJECTION, POWDER, FOR SOLUTION INTRAVENOUS EVERY 8 HOURS
Refills: 0 | Status: DISCONTINUED | OUTPATIENT
Start: 2024-09-01 | End: 2024-09-03

## 2024-09-01 RX ORDER — PIPERACILLIN SODIUM AND TAZOBACTAM SODIUM 3; .375 G/15ML; G/15ML
4.5 INJECTION, POWDER, FOR SOLUTION INTRAVENOUS ONCE
Refills: 0 | Status: DISCONTINUED | OUTPATIENT
Start: 2024-09-01 | End: 2024-09-01

## 2024-09-01 RX ORDER — PIPERACILLIN SODIUM AND TAZOBACTAM SODIUM 3; .375 G/15ML; G/15ML
4.5 INJECTION, POWDER, FOR SOLUTION INTRAVENOUS ONCE
Refills: 0 | Status: COMPLETED | OUTPATIENT
Start: 2024-09-01 | End: 2024-09-01

## 2024-09-01 RX ORDER — VANCOMYCIN/0.9 % SOD CHLORIDE 1.75G/25
1250 PLASTIC BAG, INJECTION (ML) INTRAVENOUS
Refills: 0 | Status: DISCONTINUED | OUTPATIENT
Start: 2024-09-02 | End: 2024-09-02

## 2024-09-01 RX ORDER — VANCOMYCIN/0.9 % SOD CHLORIDE 1.75G/25
12.5 PLASTIC BAG, INJECTION (ML) INTRAVENOUS EVERY 12 HOURS
Refills: 0 | Status: DISCONTINUED | OUTPATIENT
Start: 2024-09-01 | End: 2024-09-01

## 2024-09-01 RX ORDER — PIPERACILLIN SODIUM AND TAZOBACTAM SODIUM 3; .375 G/15ML; G/15ML
4.5 INJECTION, POWDER, FOR SOLUTION INTRAVENOUS EVERY 8 HOURS
Refills: 0 | Status: DISCONTINUED | OUTPATIENT
Start: 2024-09-01 | End: 2024-09-01

## 2024-09-01 RX ORDER — VANCOMYCIN/0.9 % SOD CHLORIDE 1.75G/25
125 PLASTIC BAG, INJECTION (ML) INTRAVENOUS EVERY 12 HOURS
Refills: 0 | Status: DISCONTINUED | OUTPATIENT
Start: 2024-09-01 | End: 2024-09-01

## 2024-09-01 RX ADMIN — Medication 125 MILLIGRAM(S): at 17:48

## 2024-09-01 RX ADMIN — OLANZAPINE 5 MILLIGRAM(S): 7.5 TABLET ORAL at 21:15

## 2024-09-01 RX ADMIN — ACETAMINOPHEN 650 MILLIGRAM(S): 325 TABLET ORAL at 17:50

## 2024-09-01 RX ADMIN — Medication 1 APPLICATION(S): at 06:12

## 2024-09-01 RX ADMIN — SODIUM BICARBONATE 10 MILLILITER(S): 84 INJECTION, SOLUTION INTRAVENOUS at 12:52

## 2024-09-01 RX ADMIN — SODIUM BICARBONATE 10 MILLILITER(S): 84 INJECTION, SOLUTION INTRAVENOUS at 00:27

## 2024-09-01 RX ADMIN — Medication 5 MILLILITER(S): at 20:27

## 2024-09-01 RX ADMIN — Medication 40 MILLIGRAM(S): at 06:11

## 2024-09-01 RX ADMIN — FILGRASTIM 300 MICROGRAM(S): 300 INJECTION, SOLUTION INTRAVENOUS; SUBCUTANEOUS at 17:50

## 2024-09-01 RX ADMIN — SODIUM BICARBONATE 10 MILLILITER(S): 84 INJECTION, SOLUTION INTRAVENOUS at 17:49

## 2024-09-01 RX ADMIN — Medication 5 MILLILITER(S): at 08:38

## 2024-09-01 RX ADMIN — Medication 125 MILLIGRAM(S): at 06:11

## 2024-09-01 RX ADMIN — Medication 800 MILLIGRAM(S): at 06:12

## 2024-09-01 RX ADMIN — TIZANIDINE 4 MILLIGRAM(S): 4 TABLET ORAL at 02:37

## 2024-09-01 RX ADMIN — ACETAMINOPHEN 650 MILLIGRAM(S): 325 TABLET ORAL at 07:19

## 2024-09-01 RX ADMIN — ACETAMINOPHEN 650 MILLIGRAM(S): 325 TABLET ORAL at 06:14

## 2024-09-01 RX ADMIN — FLUCONAZOLE 400 MILLIGRAM(S): 150 TABLET ORAL at 12:47

## 2024-09-01 RX ADMIN — ZINC OXIDE 1 APPLICATION(S): 100 OINTMENT TOPICAL at 17:50

## 2024-09-01 RX ADMIN — Medication 166.67 MILLIGRAM(S): at 09:30

## 2024-09-01 RX ADMIN — CHLORHEXIDINE GLUCONATE 1 APPLICATION(S): 40 SOLUTION TOPICAL at 08:38

## 2024-09-01 RX ADMIN — LORAZEPAM 0.5 MILLIGRAM(S): 4 INJECTION INTRAMUSCULAR; INTRAVENOUS at 22:04

## 2024-09-01 RX ADMIN — Medication 5 MILLILITER(S): at 12:52

## 2024-09-01 RX ADMIN — Medication 5 MILLILITER(S): at 17:48

## 2024-09-01 RX ADMIN — SODIUM BICARBONATE 10 MILLILITER(S): 84 INJECTION, SOLUTION INTRAVENOUS at 08:38

## 2024-09-01 RX ADMIN — Medication 5 MILLILITER(S): at 00:27

## 2024-09-01 RX ADMIN — ACETAMINOPHEN 650 MILLIGRAM(S): 325 TABLET ORAL at 17:47

## 2024-09-01 RX ADMIN — Medication 1 LOZENGE: at 21:18

## 2024-09-01 RX ADMIN — PIPERACILLIN SODIUM AND TAZOBACTAM SODIUM 25 GRAM(S): 3; .375 INJECTION, POWDER, FOR SOLUTION INTRAVENOUS at 14:01

## 2024-09-01 RX ADMIN — VALSARTAN 80 MILLIGRAM(S): 40 TABLET ORAL at 06:11

## 2024-09-01 RX ADMIN — PIPERACILLIN SODIUM AND TAZOBACTAM SODIUM 25 GRAM(S): 3; .375 INJECTION, POWDER, FOR SOLUTION INTRAVENOUS at 22:24

## 2024-09-01 RX ADMIN — TIZANIDINE 4 MILLIGRAM(S): 4 TABLET ORAL at 18:00

## 2024-09-01 RX ADMIN — Medication 800 MILLIGRAM(S): at 17:50

## 2024-09-01 RX ADMIN — SODIUM BICARBONATE 10 MILLILITER(S): 84 INJECTION, SOLUTION INTRAVENOUS at 20:27

## 2024-09-01 RX ADMIN — Medication 1 APPLICATION(S): at 17:49

## 2024-09-01 RX ADMIN — ZINC OXIDE 1 APPLICATION(S): 100 OINTMENT TOPICAL at 06:15

## 2024-09-01 RX ADMIN — PIPERACILLIN SODIUM AND TAZOBACTAM SODIUM 200 GRAM(S): 3; .375 INJECTION, POWDER, FOR SOLUTION INTRAVENOUS at 06:42

## 2024-09-01 RX ADMIN — ONDANSETRON 8 MILLIGRAM(S): 2 INJECTION, SOLUTION INTRAMUSCULAR; INTRAVENOUS at 04:16

## 2024-09-01 NOTE — PROGRESS NOTE ADULT - SUBJECTIVE AND OBJECTIVE BOX
[facility] Transplant Team                                                      Critical / Counseling Time Provided: 30 minutes                                                                                                                                                        Chief Complaint:     S: Patient seen and examined with [facility] Transplant Team:   Denies mouth / tongue / throat pain, dyspnea, cough, nausea, vomiting, diarrhea, abdominal pain     O: Vitals:   Vital Signs Last 24 Hrs  T(C): 38.3 (01 Sep 2024 05:56), Max: 38.3 (01 Sep 2024 05:56)  T(F): 100.9 (01 Sep 2024 05:56), Max: 100.9 (01 Sep 2024 05:56)  HR: 112 (01 Sep 2024 05:56) (108 - 117)  BP: 110/67 (01 Sep 2024 05:56) (110/67 - 113/70)  BP(mean): --  RR: 18 (01 Sep 2024 05:56) (16 - 18)  SpO2: 96% (01 Sep 2024 05:56) (96% - 96%)    Parameters below as of 01 Sep 2024 05:56  Patient On (Oxygen Delivery Method): room air        Admit weight:   Daily     Daily Weight in k.6 (31 Aug 2024 08:19)    Intake / Output:    @ 07: @ 07:00  --------------------------------------------------------  IN: 840 mL / OUT: 1600 mL / NET: -760 mL     @ 07: @ 06:57  --------------------------------------------------------  IN: 805 mL / OUT: 1775 mL / NET: -970 mL          PE:   Oropharynx:   Oral Mucositis:                                                        Grade:   CVS:   Lungs:   Abdomen:  Extremities:   Gastric Mucositis:                                                  Grade:   Intestinal Mucositis:                                              Grade:   Skin:   TLC:   Neuro:   Pain:     Labs:   CBC Full  -  ( 31 Aug 2024 07:35 )  WBC Count : 0.04 K/uL  Hemoglobin : 8.9 g/dL  Hematocrit : 26.3 %  Platelet Count - Automated : 27 K/uL  Mean Cell Volume : 90.7 fl  Mean Cell Hemoglobin : 30.7 pg  Mean Cell Hemoglobin Concentration : 33.8 gm/dL  Auto Neutrophil # : x  Auto Lymphocyte # : x  Auto Monocyte # : x  Auto Eosinophil # : x  Auto Basophil # : x  Auto Neutrophil % : x  Auto Lymphocyte % : x  Auto Monocyte % : x  Auto Eosinophil % : x  Auto Basophil % : x                          8.9    0.04  )-----------(        ( 31 Aug 2024 07:35 )             26.3         139  |  106  |  17  ----------------------------<  113<H>  3.4<L>   |  21<L>  |  0.78    Ca    9.4      31 Aug 2024 07:35  Phos  3.0       Mg     1.8         TPro  5.8<L>  /  Alb  3.6  /  TBili  0.5  /  DBili  x   /  AST  22  /  ALT  20  /  AlkPhos  77        LIVER FUNCTIONS - ( 31 Aug 2024 07:35 )  Alb: 3.6 g/dL / Pro: 5.8 g/dL / ALK PHOS: 77 U/L / ALT: 20 U/L / AST: 22 U/L / GGT: x           Lactate Dehydrogenase, Serum: 148 U/L ( @ 07:35)          Karnofsky / Lansky Scale:   GVHD:   Skin:   Liver:   Gut:   Overall Grade:       Cultures:         Radiology:       Meds:   Antimicrobials:   acyclovir   Oral Tab/Cap 800 milliGRAM(s) Oral every 12 hours  fluconAZOLE   Tablet 400 milliGRAM(s) Oral daily  piperacillin/tazobactam IVPB.- 4.5 Gram(s) IV Intermittent once  piperacillin/tazobactam IVPB.- 4.5 Gram(s) IV Intermittent once  piperacillin/tazobactam IVPB.. 4.5 Gram(s) IV Intermittent every 8 hours  vancomycin    Solution 125 milliGRAM(s) Oral every 12 hours  vancomycin  IVPB 12.5 milliGRAM(s) IV Intermittent every 12 hours      Heme / Onc:       GI:  aluminum hydroxide/magnesium hydroxide/simethicone Suspension 30 milliLiter(s) Oral every 6 hours PRN  loperamide 4 milliGRAM(s) Oral every 4 hours PRN  pantoprazole    Tablet 40 milliGRAM(s) Oral before breakfast  simethicone 80 milliGRAM(s) Chew four times a day PRN  sodium bicarbonate Mouth Rinse 10 milliLiter(s) Swish and Spit five times a day      Cardiovascular:   valsartan 80 milliGRAM(s) Oral daily      Immunologic:   filgrastim-aafi (NIVESTYM) Injectable 300 MICROGram(s) SubCutaneous daily      Other medications:   Biotene Dry Mouth Oral Rinse 5 milliLiter(s) Swish and Spit five times a day  chlorhexidine 4% Liquid 1 Application(s) Topical <User Schedule>  nystatin Powder 1 Application(s) Topical two times a day  OLANZapine 5 milliGRAM(s) Oral at bedtime  phytonadione   Solution 5 milliGRAM(s) Oral <User Schedule>  zinc oxide 40% Paste 1 Application(s) Topical two times a day      PRN:   acetaminophen     Tablet .. 650 milliGRAM(s) Oral every 6 hours PRN  aluminum hydroxide/magnesium hydroxide/simethicone Suspension 30 milliLiter(s) Oral every 6 hours PRN  benzocaine/menthol Lozenge 1 Lozenge Oral every 8 hours PRN  loperamide 4 milliGRAM(s) Oral every 4 hours PRN  LORazepam   Injectable 0.5 milliGRAM(s) IV Push every 8 hours PRN  ondansetron Injectable 8 milliGRAM(s) IV Push every 8 hours PRN  simethicone 80 milliGRAM(s) Chew four times a day PRN  sodium chloride 0.9% lock flush 10 milliLiter(s) IV Push every 1 hour PRN      A/P:   ___ year old ___  with a history of ______________________  Pre / Status Post :  Autologous / Allogeneic PBSCT / BMT day ____________    1. Infectious Disease:   Fluconazole, Acyclovir     2. VOD Prophylaxis: Actigall, Glutamine, Heparin (dosed at 100 units / kg / day)     3. GI Prophylaxis:  Protonix    4. Mouthcare - NS / NaHCO3 rinses, Mycelex, Caphosol, skin care     5. GVHD prophylaxis     6. Transfuse & replete electrolytes prn     7. IV hydration, daily weights, strict I&O, prn diuresis     8. PO intake as tolerated, nutrition follow up as needed, MVI, folic acid     9. Antiemetics, anti-diarrhea medications:   Reglan, Ativan    10. OOB as tolerated, physical therapy consult if needed     11. Monitor coags / fibrinogen 2x week, vitamin K as needed     12. Monitor closely for clinical changes, monitor for fevers     13. Emotional support provided, plan of care discussed with patient and family, questions addressed     14. Patient education done regarding chemotherapy prep, plan of care, restrictions and discharge planning     15. Continue regular social work input     I have written the above note for Dr. Martinez who performed service with me in the room.   Karishma Olivia  NP-C ([phone])    I have seen and examined patient with NP, I agree with above note as scribed.                    [facility] Transplant Team                                                      Critical / Counseling Time Provided: 30 minutes                                                                                                                                                          Chief Complaint: Autologous pbsct with high dose melphalan prep regimen for treatment of IgG lambda multiple myeloma    Disease: IgG lambda multiple myeloma  Type of transplant: Autologous  Conditioning prep: Melphalan (200 mg/m2)   ABO / CMV: O POS / POS     S: Patient seen and examined with [facility] Transplant Team:   Sore throat  +intermittent nausea  +loose stool    O: rest of ROS negative    O: Vitals:   Vital Signs Last 24 Hrs  T(C): 38.3 (01 Sep 2024 05:56), Max: 38.3 (01 Sep 2024 05:56)  T(F): 100.9 (01 Sep 2024 05:56), Max: 100.9 (01 Sep 2024 05:56)  HR: 112 (01 Sep 2024 05:56) (108 - 117)  BP: 110/67 (01 Sep 2024 05:56) (110/67 - 113/70)  BP(mean): --  RR: 18 (01 Sep 2024 05:56) (16 - 18)  SpO2: 96% (01 Sep 2024 05:56) (96% - 96%)    Parameters below as of 01 Sep 2024 05:56  Patient On (Oxygen Delivery Method): room air    Admit weight: 62.2kg  Daily Weight in kG:  Pending    Intake / Output:   08-30 @ 07:01 - 08-31 @ 07:00  --------------------------------------------------------  IN: 840 mL / OUT: 1600 mL / NET: -760 mL    08-31 @ 07:01 - 09-01 @ 06:57  --------------------------------------------------------  IN: 805 mL / OUT: 1775 mL / NET: -970 mL    PE:   Oropharynx: no erythema or ulcerations   Oral Mucositis:              -                                          Grade: n/a   CVS: S1, S2 RRR   Lungs: CTA throughout bilaterally   Abdomen: + BS x 4, soft, NT, ND, +mild lower abd discomfort, tenderness on palpation  Extremities: no edema   Gastric Mucositis:       +                                          ndGndrndanddndend:nd nd2nd Intestinal Mucositis:     -                                         Grade: n/a   Skin: no rash   TLC: CDI   Neuro: A&Ox3   Pain: denies pain at present     Labs:                       8.4    0.06  )-----------( 10       ( 01 Sep 2024 07:57 )             24.6   Mean Cell Volume : 91.8 fl  Mean Cell Hemoglobin : 31.3 pg  Mean Cell Hemoglobin Concentration : 34.1 gm/dL  Auto Neutrophil # : x  Auto Lymphocyte # : x  Auto Monocyte # : x  Auto Eosinophil # : x  Auto Basophil # : x  Auto Neutrophil % : x  Auto Lymphocyte % : x  Auto Monocyte % : x  Auto Eosinophil % : x  Auto Basophil % : x    09-01    139  |  106  |  18  ----------------------------<  117<H>  3.5   |  20<L>  |  0.72    Ca    9.6      01 Sep 2024 07:49  Phos  3.1     09-01  Mg     1.7     09-01    TPro  5.7<L>  /  Alb  4.6  /  TBili  0.3  /  DBili  x   /  AST  29  /  ALT  27  /  AlkPhos  92  09-01  Mg 1.7  Phos 3.1    Uric Acid --    Cultures:   9/1- BCX pending    Radiology:   Xray Chest 1 View- PORTABLE-Urgent (Xray Chest 1 View- PORTABLE-Urgent .) (09.01.24 @ 07:03) >  Clear lungs.    Meds:   Antimicrobials:   acyclovir   Oral Tab/Cap 800 milliGRAM(s) Oral every 12 hours  fluconAZOLE   Tablet 400 milliGRAM(s) Oral daily  piperacillin/tazobactam IVPB.- 4.5 Gram(s) IV Intermittent once  piperacillin/tazobactam IVPB.- 4.5 Gram(s) IV Intermittent once  piperacillin/tazobactam IVPB.. 4.5 Gram(s) IV Intermittent every 8 hours  vancomycin    Solution 125 milliGRAM(s) Oral every 12 hours  vancomycin  IVPB 12.5 milliGRAM(s) IV Intermittent every 12 hours    GI:  aluminum hydroxide/magnesium hydroxide/simethicone Suspension 30 milliLiter(s) Oral every 6 hours PRN  loperamide 4 milliGRAM(s) Oral every 4 hours PRN  pantoprazole    Tablet 40 milliGRAM(s) Oral before breakfast  simethicone 80 milliGRAM(s) Chew four times a day PRN  sodium bicarbonate Mouth Rinse 10 milliLiter(s) Swish and Spit five times a day    Cardiovascular:   valsartan 80 milliGRAM(s) Oral daily    Immunologic:   filgrastim-aafi (NIVESTYM) Injectable 300 MICROGram(s) SubCutaneous daily    Other medications:   Biotene Dry Mouth Oral Rinse 5 milliLiter(s) Swish and Spit five times a day  chlorhexidine 4% Liquid 1 Application(s) Topical <User Schedule>  nystatin Powder 1 Application(s) Topical two times a day  OLANZapine 5 milliGRAM(s) Oral at bedtime  phytonadione   Solution 5 milliGRAM(s) Oral <User Schedule>  zinc oxide 40% Paste 1 Application(s) Topical two times a day    PRN:   acetaminophen     Tablet .. 650 milliGRAM(s) Oral every 6 hours PRN  aluminum hydroxide/magnesium hydroxide/simethicone Suspension 30 milliLiter(s) Oral every 6 hours PRN  benzocaine/menthol Lozenge 1 Lozenge Oral every 8 hours PRN  loperamide 4 milliGRAM(s) Oral every 4 hours PRN  LORazepam   Injectable 0.5 milliGRAM(s) IV Push every 8 hours PRN  ondansetron Injectable 8 milliGRAM(s) IV Push every 8 hours PRN  simethicone 80 milliGRAM(s) Chew four times a day PRN  sodium chloride 0.9% lock flush 10 milliLiter(s) IV Push every 1 hour PRN    A/P:  64 year old female with a history of multiple myeloma   Post:  Autologous PBSCT day + 9 /23- HPC transplant today, continue hydration for 24 hours post infusion of cells. Daily weights, strict I&O, prn diuresis. Start levaquin / fluconazole prophylaxis when neutropenic.   8/24 c/o intermittent abd pain, AXR done, reading from radiologist requested. weight gain from admission, monitor closely, Lasix PRN   8/25 Zofran to PRN, switched Compazine-->reglan PRN  8/27- refractory nausea / vomiting, will do trial of olanzapine. d/c reglan. Grade 1 chemotherapy induced GI mucositis. Continue supportive measures.   8/30 Check ECG while on Zyprexa +Levaquin   9/1- Febrile, F/U BCX, PO antibiotics switched to Zosyn and Vanco.     1. Infectious Disease:   acyclovir   Oral Tab/Cap 800 milliGRAM(s) Oral every 12 hours  fluconAZOLE   Tablet 400 milliGRAM(s) Oral daily  piperacillin/tazobactam IVPB.- 4.5 Gram(s) IV Intermittent once  piperacillin/tazobactam IVPB.. 4.5 Gram(s) IV Intermittent every 8 hours  vancomycin    Solution 125 milliGRAM(s) Oral every 12 hours  vancomycin  IVPB 12.5 milliGRAM(s) IV Intermittent every 24 hours    2. GI Prophylaxis:    pantoprazole    Tablet 40 milliGRAM(s) Oral before breakfast    3. Mouthcare - NS / NaHCO3 rinses, Biotene; Skin care     4. Transfuse & replete electrolytes prn   9/1 - transfuse one unit platelets today.     5. IV hydration, daily weights, strict I&O, prn diuresis     6. PO intake as tolerated, nutrition follow up as needed    7. Antiemetics, anti-diarrhea medications:   loperamide 4 milliGRAM(s) Oral every 4 hours PRN  ondansetron Injectable 8 milliGRAM(s) IV Push every 8 hours PRN  LORazepam   Injectable 0.5 milliGRAM(s) IV Push every 8 hours PRN  OLANZapine 5 milliGRAM(s) Oral at bedtime    8. OOB as tolerated, physical therapy consult if needed     9. Monitor coags / fibrinogen 2x week, vitamin K as needed   phytonadione   Solution 5 milliGRAM(s) Oral <User Schedule>    10. Monitor closely for clinical changes, monitor for fevers     11. Emotional support provided, plan of care discussed and questions addressed     12. Patient education done regarding plan of care, restrictions and discharge planning     13. Continue regular social work input     I have written the above note for Dr. Martinez who performed service with me in the room.   Karishma Olivia NP-C ([phone])    I have seen and examined patient with NP, I agree with above note as scribed.                    [facility] Transplant Team                                                      Critical / Counseling Time Provided: 30 minutes                                                                                                                                                          Chief Complaint: Autologous pbsct with high dose melphalan prep regimen for treatment of IgG lambda multiple myeloma    Disease: IgG lambda multiple myeloma  Type of transplant: Autologous  Conditioning prep: Melphalan (200 mg/m2)   ABO / CMV: O POS / POS     S: Patient seen and examined with [facility] Transplant Team:   + slight sore throat  +intermittent nausea  +diarrhea    O: rest of ROS negative    O: Vitals:   Vital Signs Last 24 Hrs  T(C): 38.3 (01 Sep 2024 05:56), Max: 38.3 (01 Sep 2024 05:56)  T(F): 100.9 (01 Sep 2024 05:56), Max: 100.9 (01 Sep 2024 05:56)  HR: 112 (01 Sep 2024 05:56) (108 - 117)  BP: 110/67 (01 Sep 2024 05:56) (110/67 - 113/70)  BP(mean): --  RR: 18 (01 Sep 2024 05:56) (16 - 18)  SpO2: 96% (01 Sep 2024 05:56) (96% - 96%)    Parameters below as of 01 Sep 2024 05:56  Patient On (Oxygen Delivery Method): room air    Admit weight: 62.2kg  Daily Weight in kG:  Pending    Intake / Output:   08-30 @ 07:01 - 08-31 @ 07:00  --------------------------------------------------------  IN: 840 mL / OUT: 1600 mL / NET: -760 mL    08-31 @ 07:01 - 09-01 @ 06:57  --------------------------------------------------------  IN: 805 mL / OUT: 1775 mL / NET: -970 mL    PE:   Oropharynx: no erythema or ulcerations   Oral Mucositis:              -                                          Grade: n/a   CVS: TachyRR, normal S1S2  Lungs: CTA throughout bilaterally   Abdomen: + BS x 4, soft, ND, +mild lower abd discomfort, tenderness on palpation  Extremities: no edema   Gastric Mucositis:       +                                          ndGndrndanddndend:nd nd2nd Intestinal Mucositis:     -                                         Grade: n/a   Skin: no rash   TLC: CDI   Neuro: A&Ox3   Pain: denies pain at present     Labs:                       8.4    0.06  )-----------( 10       ( 01 Sep 2024 07:57 )             24.6   Mean Cell Volume : 91.8 fl  Mean Cell Hemoglobin : 31.3 pg  Mean Cell Hemoglobin Concentration : 34.1 gm/dL  Auto Neutrophil # : x  Auto Lymphocyte # : x  Auto Monocyte # : x  Auto Eosinophil # : x  Auto Basophil # : x  Auto Neutrophil % : x  Auto Lymphocyte % : x  Auto Monocyte % : x  Auto Eosinophil % : x  Auto Basophil % : x    09-01    139  |  106  |  18  ----------------------------<  117<H>  3.5   |  20<L>  |  0.72    Ca    9.6      01 Sep 2024 07:49  Phos  3.1     09-01  Mg     1.7     09-01    TPro  5.7<L>  /  Alb  4.6  /  TBili  0.3  /  DBili  x   /  AST  29  /  ALT  27  /  AlkPhos  92  09-01  Mg 1.7  Phos 3.1    Uric Acid --    Cultures:   9/1- BCX pending    Radiology:   Xray Chest 1 View- PORTABLE-Urgent (Xray Chest 1 View- PORTABLE-Urgent .) (09.01.24 @ 07:03) >  Clear lungs.    Meds:   Antimicrobials:   acyclovir   Oral Tab/Cap 800 milliGRAM(s) Oral every 12 hours  fluconAZOLE   Tablet 400 milliGRAM(s) Oral daily  piperacillin/tazobactam IVPB.- 4.5 Gram(s) IV Intermittent once  piperacillin/tazobactam IVPB.- 4.5 Gram(s) IV Intermittent once  piperacillin/tazobactam IVPB.. 4.5 Gram(s) IV Intermittent every 8 hours  vancomycin    Solution 125 milliGRAM(s) Oral every 12 hours  vancomycin  IVPB 12.5 milliGRAM(s) IV Intermittent every 12 hours    GI:  aluminum hydroxide/magnesium hydroxide/simethicone Suspension 30 milliLiter(s) Oral every 6 hours PRN  loperamide 4 milliGRAM(s) Oral every 4 hours PRN  pantoprazole    Tablet 40 milliGRAM(s) Oral before breakfast  simethicone 80 milliGRAM(s) Chew four times a day PRN  sodium bicarbonate Mouth Rinse 10 milliLiter(s) Swish and Spit five times a day    Cardiovascular:   valsartan 80 milliGRAM(s) Oral daily    Immunologic:   filgrastim-aafi (NIVESTYM) Injectable 300 MICROGram(s) SubCutaneous daily    Other medications:   Biotene Dry Mouth Oral Rinse 5 milliLiter(s) Swish and Spit five times a day  chlorhexidine 4% Liquid 1 Application(s) Topical <User Schedule>  nystatin Powder 1 Application(s) Topical two times a day  OLANZapine 5 milliGRAM(s) Oral at bedtime  phytonadione   Solution 5 milliGRAM(s) Oral <User Schedule>  zinc oxide 40% Paste 1 Application(s) Topical two times a day    PRN:   acetaminophen     Tablet .. 650 milliGRAM(s) Oral every 6 hours PRN  aluminum hydroxide/magnesium hydroxide/simethicone Suspension 30 milliLiter(s) Oral every 6 hours PRN  benzocaine/menthol Lozenge 1 Lozenge Oral every 8 hours PRN  loperamide 4 milliGRAM(s) Oral every 4 hours PRN  LORazepam   Injectable 0.5 milliGRAM(s) IV Push every 8 hours PRN  ondansetron Injectable 8 milliGRAM(s) IV Push every 8 hours PRN  simethicone 80 milliGRAM(s) Chew four times a day PRN  sodium chloride 0.9% lock flush 10 milliLiter(s) IV Push every 1 hour PRN    A/P:  64 year old female with a history of multiple myeloma   Post:  Autologous PBSCT day + 9 /23- HPC transplant today, continue hydration for 24 hours post infusion of cells. Daily weights, strict I&O, prn diuresis. Start levaquin / fluconazole prophylaxis when neutropenic.   8/24 c/o intermittent abd pain, AXR done, reading from radiologist requested. weight gain from admission, monitor closely, Lasix PRN   8/25 Zofran to PRN, switched Compazine-->reglan PRN  8/27- refractory nausea / vomiting, will do trial of olanzapine. d/c reglan. Grade 1 chemotherapy induced GI mucositis. Continue supportive measures.   8/30 Check ECG while on Zyprexa +Levaquin   9/1- Febrile, F/U BCX, PO antibiotics switched to Zosyn and Vanco.     1. Infectious Disease:   acyclovir   Oral Tab/Cap 800 milliGRAM(s) Oral every 12 hours  fluconAZOLE   Tablet 400 milliGRAM(s) Oral daily  piperacillin/tazobactam IVPB.- 4.5 Gram(s) IV Intermittent once  piperacillin/tazobactam IVPB.. 4.5 Gram(s) IV Intermittent every 8 hours  vancomycin    Solution 125 milliGRAM(s) Oral every 12 hours  vancomycin  IVPB 1250 milliGRAM(s) IV Intermittent every 24 hours    2. GI Prophylaxis:    pantoprazole    Tablet 40 milliGRAM(s) Oral before breakfast    3. Mouthcare - NS / NaHCO3 rinses, Biotene; Skin care     4. Transfuse & replete electrolytes prn   9/1 - transfuse one unit platelets today.     5. IV hydration, daily weights, strict I&O, prn diuresis     6. PO intake as tolerated, nutrition follow up as needed    7. Antiemetics, anti-diarrhea medications:   loperamide 4 milliGRAM(s) Oral every 4 hours PRN  ondansetron Injectable 8 milliGRAM(s) IV Push every 8 hours PRN  LORazepam   Injectable 0.5 milliGRAM(s) IV Push every 8 hours PRN  OLANZapine 5 milliGRAM(s) Oral at bedtime    8. OOB as tolerated, physical therapy consult if needed     9. Monitor coags / fibrinogen 2x week, vitamin K as needed   phytonadione   Solution 5 milliGRAM(s) Oral <User Schedule>    10. Monitor closely for clinical changes, monitor for fevers     11. Emotional support provided, plan of care discussed and questions addressed     12. Patient education done regarding plan of care, restrictions and discharge planning     13. Continue regular social work input     I have written the above note for Dr. Martinez who performed service with me in the room.   Karishma Olivia NP-C ([phone])    I have seen and examined patient with NP, I agree with above note as scribed.

## 2024-09-01 NOTE — CHART NOTE - NSCHARTNOTEFT_GEN_A_CORE
Medicine PA Fever Note    Notified by RN patient with temperature 100.9F. Pt. seen and examined at bedside states that this is her first fever during her hospital state to date. Patient is alert, NAD. Denies any chills,  HA, CP, SOB, cough, N/V, or abd pain.    VITAL SIGNS:  T(C): 38.3 (09-01-24 @ 05:56), Max: 38.3 (09-01-24 @ 05:56)  HR: 112 (09-01-24 @ 05:56) (108 - 117)  BP: 110/67 (09-01-24 @ 05:56) (110/67 - 113/70)  RR: 18 (09-01-24 @ 05:56) (16 - 18)  SpO2: 96% (09-01-24 @ 05:56) (96% - 96%)  Wt(kg): --      LABORATORY:                          8.9    0.04  )-----------( 27       ( 31 Aug 2024 07:35 )             26.3       08-31    139  |  106  |  17  ----------------------------<  113<H>  3.4<L>   |  21<L>  |  0.78    Ca    9.4      31 Aug 2024 07:35  Phos  3.0     08-31  Mg     1.8     08-31    TPro  5.8<L>  /  Alb  3.6  /  TBili  0.5  /  DBili  x   /  AST  22  /  ALT  20  /  AlkPhos  77  08-31          PHYSICAL EXAM:    Constitutional: AOx3. NAD.    Respiratory: clear lungs bilaterally. No wheezing, rhonchi, or crackles.    Cardiovascular: S1 S2. No murmurs.    Gastrointestinal: BS X4 active. soft. nontender.    Extremities/Vascular: +2 pulses bilaterally. No BLE edema.      ASSESSMENT/PLAN:   HPI:  This is a 63 year old female with a medical history of HTN, HLD and multiple myeloma admitted for an autologous pbsct with high dose melphalan prep regimen (200mg/ m2). 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 She was followed with serial labs until 9/23 when her M spike was 2.0, and IgG level 2818. An IR guided biopsy 1/11/24 showed 40% plasma cells. A PET CT 2/29/24 showed focal hypermetabolic skeletal lucencies within L1 and the right iliac bone, consistent with a new diagnosis of multiple myeloma. She was started on Rosalind-RVD 3/14/24. She received 4 cycles with minimal complications. She has no complaints on admission.  (22 Aug 2024 11:37)        # Neutropenic Fever  - Tylenol and cooling measures prn for pyrexia  - BC x2, UA/UC order  - CXR order  - C/W zosyn and vanco  - F/U primary team in AM  Spoke with dr. santoyo over the phone with agree with the plan.         Leah Alston PA-C   Department of Medicine  [facility]

## 2024-09-01 NOTE — PROGRESS NOTE ADULT - NS ATTEND AMEND GEN_ALL_CORE FT
64 year old female with a history of multiple myeloma admitted for auto-HSCT Day + 8 today     Disease: IgG lambda multiple myeloma  Type of transplant: Autologous  Conditioning prep: Melphalan (200 mg/m2)   ABO / CMV: O POS / POS    Complications:   - Day 0: nausea/vomitting, abdominal pain   -Day 1: B/L lower extremities tremors --> compazine D/C, replaced by reglan     Patient was discussed, seen and examined in IDR rounds.   Patient's nausea improved on Zyprexa; Zofran PRN.  Has frequent bowel movements, but this is chronic with use of Imodium at home. Today, episode of loose stool.  Labs reviewed, supplements lytes prn  PE: normal oral mucosa, site of line without erythema, + BS in all 4 quadrants,   Neutropenic, afebrile- Continue Acyclovir prophylaxis, begin Levaquin, Fluconazole, Vanco PO. If febrile, culture and start Zosyn and Vanco IV; D/C Levaquin.   NIVESTYM 300 MICROGram(s) SubCutaneous daily, started on day +5  Continue supportive care. 64 year old female with a history of multiple myeloma admitted for auto-HSCT Day + 9 today     Disease: IgG lambda multiple myeloma  Type of transplant: Autologous  Conditioning prep: Melphalan (200 mg/m2)   ABO / CMV: O POS / POS    Complications:   - Day 0: nausea/vomiting, abdominal pain   -Day 1: B/L lower extremities tremors --> compazine D/C, replaced by reglan     I rounded with the team of nursing staff, ACP.  I reviewed the data.  I saw and examined the patient and answered her questions.   Patient's nausea improved on Zyprexa; Zofran and Ativan PRN. Reglan discontinued while on Zyprexa  Has frequent bowel movements, but this is chronic with use of Imodium at home. Today, episode of diarrhea.  Labs reviewed, supplements lytes prn  Neutropenic fever(9/1)- order cultures and CXR;  start Zosyn and Vanco IV; D/C Levaquin. Continue Acyclovir, Fluconazole, Vanco PO prophylaxis.  NIVESTYM 300 MICROGram(s) SubCutaneous daily, started on day +5  Continue supportive care.  OOB/ambulate

## 2024-09-02 LAB
ALBUMIN SERPL ELPH-MCNC: 3.2 G/DL — LOW (ref 3.3–5)
ALP SERPL-CCNC: 109 U/L — SIGNIFICANT CHANGE UP (ref 40–120)
ALT FLD-CCNC: 48 U/L — HIGH (ref 10–45)
ANION GAP SERPL CALC-SCNC: 15 MMOL/L — SIGNIFICANT CHANGE UP (ref 5–17)
APTT BLD: 27.8 SEC — SIGNIFICANT CHANGE UP (ref 24.5–35.6)
AST SERPL-CCNC: 48 U/L — HIGH (ref 10–40)
BILIRUB DIRECT SERPL-MCNC: 0.3 MG/DL — SIGNIFICANT CHANGE UP (ref 0–0.3)
BILIRUB INDIRECT FLD-MCNC: 0.3 MG/DL — SIGNIFICANT CHANGE UP (ref 0.2–1)
BILIRUB SERPL-MCNC: 0.6 MG/DL — SIGNIFICANT CHANGE UP (ref 0.2–1.2)
BLD GP AB SCN SERPL QL: POSITIVE — SIGNIFICANT CHANGE UP
BUN SERPL-MCNC: 22 MG/DL — SIGNIFICANT CHANGE UP (ref 7–23)
CALCIUM SERPL-MCNC: 9.4 MG/DL — SIGNIFICANT CHANGE UP (ref 8.4–10.5)
CHLORIDE SERPL-SCNC: 104 MMOL/L — SIGNIFICANT CHANGE UP (ref 96–108)
CO2 SERPL-SCNC: 19 MMOL/L — LOW (ref 22–31)
CREAT SERPL-MCNC: 1.52 MG/DL — HIGH (ref 0.5–1.3)
CULTURE RESULTS: NO GROWTH — SIGNIFICANT CHANGE UP
EGFR: 38 ML/MIN/1.73M2 — LOW
GLUCOSE SERPL-MCNC: 107 MG/DL — HIGH (ref 70–99)
HCT VFR BLD CALC: 24.3 % — LOW (ref 34.5–45)
HGB BLD-MCNC: 8.3 G/DL — LOW (ref 11.5–15.5)
INR BLD: 1.11 RATIO — SIGNIFICANT CHANGE UP (ref 0.85–1.18)
LDH SERPL L TO P-CCNC: 156 U/L — SIGNIFICANT CHANGE UP (ref 50–242)
MAGNESIUM SERPL-MCNC: 1.6 MG/DL — SIGNIFICANT CHANGE UP (ref 1.6–2.6)
MCHC RBC-ENTMCNC: 31.3 PG — SIGNIFICANT CHANGE UP (ref 27–34)
MCHC RBC-ENTMCNC: 34.2 GM/DL — SIGNIFICANT CHANGE UP (ref 32–36)
MCV RBC AUTO: 91.7 FL — SIGNIFICANT CHANGE UP (ref 80–100)
NRBC # BLD: 0 /100 WBCS — SIGNIFICANT CHANGE UP (ref 0–0)
PHOSPHATE SERPL-MCNC: 3.4 MG/DL — SIGNIFICANT CHANGE UP (ref 2.5–4.5)
PLATELET # BLD AUTO: 22 K/UL — LOW (ref 150–400)
POTASSIUM SERPL-MCNC: 3.3 MMOL/L — LOW (ref 3.5–5.3)
POTASSIUM SERPL-SCNC: 3.3 MMOL/L — LOW (ref 3.5–5.3)
PROT SERPL-MCNC: 5.6 G/DL — LOW (ref 6–8.3)
PROTHROM AB SERPL-ACNC: 12.2 SEC — SIGNIFICANT CHANGE UP (ref 9.5–13)
RBC # BLD: 2.65 M/UL — LOW (ref 3.8–5.2)
RBC # FLD: 13.6 % — SIGNIFICANT CHANGE UP (ref 10.3–14.5)
RH IG SCN BLD-IMP: POSITIVE — SIGNIFICANT CHANGE UP
SODIUM SERPL-SCNC: 138 MMOL/L — SIGNIFICANT CHANGE UP (ref 135–145)
SPECIMEN SOURCE: SIGNIFICANT CHANGE UP
WBC # BLD: 0.11 K/UL — CRITICAL LOW (ref 3.8–10.5)
WBC # FLD AUTO: 0.11 K/UL — CRITICAL LOW (ref 3.8–10.5)

## 2024-09-02 PROCEDURE — 99233 SBSQ HOSP IP/OBS HIGH 50: CPT

## 2024-09-02 RX ORDER — LORAZEPAM 4 MG/ML
0.5 INJECTION INTRAMUSCULAR; INTRAVENOUS ONCE
Refills: 0 | Status: DISCONTINUED | OUTPATIENT
Start: 2024-09-02 | End: 2024-09-02

## 2024-09-02 RX ORDER — FLUCONAZOLE 150 MG/1
200 TABLET ORAL DAILY
Refills: 0 | Status: DISCONTINUED | OUTPATIENT
Start: 2024-09-02 | End: 2024-09-05

## 2024-09-02 RX ORDER — LORAZEPAM 4 MG/ML
0.5 INJECTION INTRAMUSCULAR; INTRAVENOUS EVERY 8 HOURS
Refills: 0 | Status: DISCONTINUED | OUTPATIENT
Start: 2024-09-03 | End: 2024-09-10

## 2024-09-02 RX ORDER — POTASSIUM CHLORIDE 10 MEQ
20 TABLET, EXT RELEASE, PARTICLES/CRYSTALS ORAL
Refills: 0 | Status: COMPLETED | OUTPATIENT
Start: 2024-09-02 | End: 2024-09-02

## 2024-09-02 RX ORDER — SODIUM CHLORIDE 9 MG/ML
1000 INJECTION INTRAMUSCULAR; INTRAVENOUS; SUBCUTANEOUS
Refills: 0 | Status: DISCONTINUED | OUTPATIENT
Start: 2024-09-02 | End: 2024-09-03

## 2024-09-02 RX ADMIN — Medication 40 MILLIGRAM(S): at 06:04

## 2024-09-02 RX ADMIN — Medication 5 MILLILITER(S): at 00:31

## 2024-09-02 RX ADMIN — Medication 5 MILLIGRAM(S): at 08:36

## 2024-09-02 RX ADMIN — LORAZEPAM 0.5 MILLIGRAM(S): 4 INJECTION INTRAMUSCULAR; INTRAVENOUS at 23:19

## 2024-09-02 RX ADMIN — SODIUM BICARBONATE 10 MILLILITER(S): 84 INJECTION, SOLUTION INTRAVENOUS at 12:35

## 2024-09-02 RX ADMIN — OLANZAPINE 5 MILLIGRAM(S): 7.5 TABLET ORAL at 20:48

## 2024-09-02 RX ADMIN — Medication 1 APPLICATION(S): at 06:05

## 2024-09-02 RX ADMIN — SODIUM BICARBONATE 10 MILLILITER(S): 84 INJECTION, SOLUTION INTRAVENOUS at 00:31

## 2024-09-02 RX ADMIN — SODIUM CHLORIDE 75 MILLILITER(S): 9 INJECTION INTRAMUSCULAR; INTRAVENOUS; SUBCUTANEOUS at 19:50

## 2024-09-02 RX ADMIN — PIPERACILLIN SODIUM AND TAZOBACTAM SODIUM 25 GRAM(S): 3; .375 INJECTION, POWDER, FOR SOLUTION INTRAVENOUS at 20:48

## 2024-09-02 RX ADMIN — CHLORHEXIDINE GLUCONATE 1 APPLICATION(S): 40 SOLUTION TOPICAL at 08:34

## 2024-09-02 RX ADMIN — SODIUM CHLORIDE 75 MILLILITER(S): 9 INJECTION INTRAMUSCULAR; INTRAVENOUS; SUBCUTANEOUS at 11:13

## 2024-09-02 RX ADMIN — Medication 125 MILLIGRAM(S): at 17:36

## 2024-09-02 RX ADMIN — TIZANIDINE 4 MILLIGRAM(S): 4 TABLET ORAL at 18:02

## 2024-09-02 RX ADMIN — Medication 1 APPLICATION(S): at 17:36

## 2024-09-02 RX ADMIN — Medication 5 MILLILITER(S): at 12:36

## 2024-09-02 RX ADMIN — PIPERACILLIN SODIUM AND TAZOBACTAM SODIUM 25 GRAM(S): 3; .375 INJECTION, POWDER, FOR SOLUTION INTRAVENOUS at 13:37

## 2024-09-02 RX ADMIN — Medication 5 MILLILITER(S): at 19:48

## 2024-09-02 RX ADMIN — Medication 5 MILLILITER(S): at 16:34

## 2024-09-02 RX ADMIN — Medication 800 MILLIGRAM(S): at 17:35

## 2024-09-02 RX ADMIN — Medication 125 MILLIGRAM(S): at 06:04

## 2024-09-02 RX ADMIN — TIZANIDINE 4 MILLIGRAM(S): 4 TABLET ORAL at 04:27

## 2024-09-02 RX ADMIN — ACETAMINOPHEN 650 MILLIGRAM(S): 325 TABLET ORAL at 13:25

## 2024-09-02 RX ADMIN — Medication 20 MILLIEQUIVALENT(S): at 13:38

## 2024-09-02 RX ADMIN — SODIUM BICARBONATE 10 MILLILITER(S): 84 INJECTION, SOLUTION INTRAVENOUS at 16:34

## 2024-09-02 RX ADMIN — SODIUM BICARBONATE 10 MILLILITER(S): 84 INJECTION, SOLUTION INTRAVENOUS at 23:33

## 2024-09-02 RX ADMIN — PIPERACILLIN SODIUM AND TAZOBACTAM SODIUM 25 GRAM(S): 3; .375 INJECTION, POWDER, FOR SOLUTION INTRAVENOUS at 06:06

## 2024-09-02 RX ADMIN — TIZANIDINE 4 MILLIGRAM(S): 4 TABLET ORAL at 23:33

## 2024-09-02 RX ADMIN — ACETAMINOPHEN 650 MILLIGRAM(S): 325 TABLET ORAL at 18:30

## 2024-09-02 RX ADMIN — FILGRASTIM 300 MICROGRAM(S): 300 INJECTION, SOLUTION INTRAVENOUS; SUBCUTANEOUS at 13:03

## 2024-09-02 RX ADMIN — TIZANIDINE 4 MILLIGRAM(S): 4 TABLET ORAL at 09:03

## 2024-09-02 RX ADMIN — Medication 20 MILLIEQUIVALENT(S): at 11:38

## 2024-09-02 RX ADMIN — Medication 800 MILLIGRAM(S): at 06:04

## 2024-09-02 RX ADMIN — Medication 5 MILLILITER(S): at 08:34

## 2024-09-02 RX ADMIN — SODIUM BICARBONATE 10 MILLILITER(S): 84 INJECTION, SOLUTION INTRAVENOUS at 19:49

## 2024-09-02 RX ADMIN — FLUCONAZOLE 200 MILLIGRAM(S): 150 TABLET ORAL at 12:36

## 2024-09-02 RX ADMIN — Medication 5 MILLILITER(S): at 23:32

## 2024-09-02 RX ADMIN — SODIUM BICARBONATE 10 MILLILITER(S): 84 INJECTION, SOLUTION INTRAVENOUS at 08:34

## 2024-09-02 RX ADMIN — ACETAMINOPHEN 650 MILLIGRAM(S): 325 TABLET ORAL at 23:33

## 2024-09-02 RX ADMIN — ACETAMINOPHEN 650 MILLIGRAM(S): 325 TABLET ORAL at 04:33

## 2024-09-02 RX ADMIN — ZINC OXIDE 1 APPLICATION(S): 100 OINTMENT TOPICAL at 06:05

## 2024-09-02 RX ADMIN — ZINC OXIDE 1 APPLICATION(S): 100 OINTMENT TOPICAL at 17:39

## 2024-09-02 RX ADMIN — ONDANSETRON 8 MILLIGRAM(S): 2 INJECTION, SOLUTION INTRAMUSCULAR; INTRAVENOUS at 20:36

## 2024-09-02 RX ADMIN — ACETAMINOPHEN 650 MILLIGRAM(S): 325 TABLET ORAL at 18:00

## 2024-09-02 NOTE — PROGRESS NOTE ADULT - NS ATTEND AMEND GEN_ALL_CORE FT
64 year old female with a history of multiple myeloma admitted for auto-HSCT Day + 9 today     Disease: IgG lambda multiple myeloma  Type of transplant: Autologous  Conditioning prep: Melphalan (200 mg/m2)   ABO / CMV: O POS / POS    Complications:   - Day 0: nausea/vomiting, abdominal pain   -Day 1: B/L lower extremities tremors --> compazine D/C, replaced by reglan     I rounded with the team of nursing staff, ACP.  I reviewed the data.  I saw and examined the patient and answered her questions.   Patient's nausea improved on Zyprexa; Zofran and Ativan PRN. Reglan discontinued while on Zyprexa  Has frequent bowel movements, but this is chronic with use of Imodium at home. Today, episode of diarrhea.  Labs reviewed, supplements lytes prn  Neutropenic fever(9/1)- order cultures and CXR;  start Zosyn and Vanco IV; D/C Levaquin. Continue Acyclovir, Fluconazole, Vanco PO prophylaxis.  NIVESTYM 300 MICROGram(s) SubCutaneous daily, started on day +5  Continue supportive care.  OOB/ambulate 64 year old female with a history of multiple myeloma admitted for auto-HSCT Day + 10 today     Disease: IgG lambda multiple myeloma  Type of transplant: Autologous  Conditioning prep: Melphalan (200 mg/m2)   ABO / CMV: O POS / POS    Complications:   - Day 0: nausea/vomiting, abdominal pain   -Day 1: B/L lower extremities tremors --> compazine D/C, replaced by reglan     I rounded with the team of nursing staff, ACP.  I reviewed the data.  I saw and examined the patient and answered her questions.   Patient's nausea improved on Zyprexa; Zofran and Ativan PRN. Reglan discontinued while on Zyprexa  Has frequent bowel movements, but this is chronic with use of Imodium at home. Today, episode of diarrhea.  Labs reviewed, supplements lytes prn  Neutropenic fever(9/1)- order cultures and CXR;  start Zosyn and Vanco IV; D/C Levaquin. Continue Acyclovir, Fluconazole, Vanco PO prophylaxis. 9/2 given rise in creatinine d/c vanco..cx neg thus far  NIVESTYM 300 MICROGram(s) SubCutaneous daily, started on day +5  Continue supportive care.  OOB/ambulate

## 2024-09-02 NOTE — PROGRESS NOTE ADULT - SUBJECTIVE AND OBJECTIVE BOX
[facility] Team                                                      Critical / Counseling Time Provided: 30 minutes                                                                                                                                                          Chief Complaint: Autologous pbsct with high dose melphalan prep regimen for treatment of IgG lambda multiple myeloma    Disease: IgG lambda multiple myeloma  Type of transplant: Autologous  Conditioning prep: Melphalan (200 mg/m2)   ABO / CMV: O POS / POS     S: Patient seen and examined with [facility] Team:   + sore throat  +intermittent nausea  +diarrhea  +poor appetite  +generalized fatigue    O: rest of ROS negative    O: Vitals:   Vital Signs Last 24 Hrs  T(C): 38.1 (02 Sep 2024 15:20), Max: 38.4 (01 Sep 2024 17:42)  T(F): 100.6 (02 Sep 2024 15:20), Max: 101.1 (01 Sep 2024 17:42)  HR: 115 (02 Sep 2024 14:13) (109 - 127)  BP: 97/61 (02 Sep 2024 14:13) (93/54 - 112/69)  BP(mean): --  RR: 18 (02 Sep 2024 14:13) (16 - 18)  SpO2: 100% (02 Sep 2024 14:13) (94% - 100%)    Parameters below as of 02 Sep 2024 14:13  Patient On (Oxygen Delivery Method): room air    Admit weight: 62.2 kg  Daily Weight in k.7 (02 Sep 2024 08:05)    Intake / Output:    @ :  -   @ 07:00  --------------------------------------------------------  IN: 1401 mL / OUT: 2095 mL / NET: -694 mL     @ 07:  -   @ 15:48  --------------------------------------------------------  IN: 925 mL / OUT: 485 mL / NET: 440 mL    PE:   Oropharynx: no erythema or ulcerations   Oral Mucositis:              -                                          Grade: n/a   CVS: RR, +S1,S2  Lungs: CTA throughout bilaterally   Abdomen: + BS x 4, soft, ND, +mild lower abd discomfort in lower abdomen  Extremities: no edema in BLE  Gastric Mucositis:       +                                          ndGndrndanddndend:nd nd2nd Intestinal Mucositis:     -                                         Grade: n/a   Skin: no rash   TLC: CDI   Neuro: A&Ox3   Pain: denies    Labs:   CBC Full  -  ( 02 Sep 2024 07:13 )  WBC Count : 0.11 K/uL  Hemoglobin : 8.3 g/dL  Hematocrit : 24.3 %  Platelet Count - Automated : 22 K/uL  Mean Cell Volume : 91.7 fl  Mean Cell Hemoglobin : 31.3 pg  Mean Cell Hemoglobin Concentration : 34.2 gm/dL  Auto Neutrophil # : x  Auto Lymphocyte # : x  Auto Monocyte # : x  Auto Eosinophil # : x  Auto Basophil # : x  Auto Neutrophil % : x  Auto Lymphocyte % : x  Auto Monocyte % : x  Auto Eosinophil % : x  Auto Basophil % : x                          8.3    0.11  )-----------( 22       ( 02 Sep 2024 07:13 )             24.3         138  |  104  |  22  ----------------------------<  107<H>  3.3<L>   |  19<L>  |  1.52<H>    Ca    9.4      02 Sep 2024 07:06  Phos  3.4       Mg     1.6         TPro  5.6<L>  /  Alb  3.2<L>  /  TBili  0.6  /  DBili  0.3  /  AST  48<H>  /  ALT  48<H>  /  AlkPhos  109      PT/INR - ( 02 Sep 2024 07:06 )   PT: 12.2 sec;   INR: 1.11 ratio    PTT - ( 02 Sep 2024 07:06 )  PTT:27.8 sec  LIVER FUNCTIONS - ( 02 Sep 2024 07:06 )  Alb: 3.2 g/dL / Pro: 5.6 g/dL / ALK PHOS: 109 U/L / ALT: 48 U/L / AST: 48 U/L / GGT: x           Lactate Dehydrogenase, Serum: 156 U/L ( @ 07:06)    Cultures:   Culture - Blood (24 @ 06:50)    Specimen Source: .Blood Blood-Peripheral   Culture Results:   No growth at 24 hours    Culture - Urine (24 @ 13:20)    Specimen Source: Clean Catch Clean Catch (Midstream)   Culture Results:   No growth    Radiology:   Xray Chest 1 View- PORTABLE-Urgent (Xray Chest 1 View- PORTABLE-Urgent .) (24 @ 07:03) >  Clear lungs.    Meds:   Antimicrobials:   acyclovir   Oral Tab/Cap 800 milliGRAM(s) Oral every 12 hours  fluconAZOLE   Tablet 200 milliGRAM(s) Oral daily  piperacillin/tazobactam IVPB.. 4.5 Gram(s) IV Intermittent every 8 hours  vancomycin    Solution 125 milliGRAM(s) Oral every 12 hours    GI:  aluminum hydroxide/magnesium hydroxide/simethicone Suspension 30 milliLiter(s) Oral every 6 hours PRN  loperamide 4 milliGRAM(s) Oral every 4 hours PRN  pantoprazole    Tablet 40 milliGRAM(s) Oral before breakfast  simethicone 80 milliGRAM(s) Chew four times a day PRN  sodium bicarbonate Mouth Rinse 10 milliLiter(s) Swish and Spit five times a day    Cardiovascular:   valsartan 80 milliGRAM(s) Oral daily    Immunologic:   filgrastim-aafi (NIVESTYM) Injectable 300 MICROGram(s) SubCutaneous daily    Other medications:   Biotene Dry Mouth Oral Rinse 5 milliLiter(s) Swish and Spit five times a day  chlorhexidine 4% Liquid 1 Application(s) Topical <User Schedule>  nystatin Powder 1 Application(s) Topical two times a day  OLANZapine 5 milliGRAM(s) Oral at bedtime  phytonadione   Solution 5 milliGRAM(s) Oral <User Schedule>  sodium chloride 0.9%. 1000 milliLiter(s) IV Continuous <Continuous>  zinc oxide 40% Paste 1 Application(s) Topical two times a day    PRN:   acetaminophen     Tablet .. 650 milliGRAM(s) Oral every 6 hours PRN  aluminum hydroxide/magnesium hydroxide/simethicone Suspension 30 milliLiter(s) Oral every 6 hours PRN  benzocaine/menthol Lozenge 1 Lozenge Oral every 8 hours PRN  loperamide 4 milliGRAM(s) Oral every 4 hours PRN  LORazepam   Injectable 0.5 milliGRAM(s) IV Push every 8 hours PRN  ondansetron Injectable 8 milliGRAM(s) IV Push every 8 hours PRN  simethicone 80 milliGRAM(s) Chew four times a day PRN  sodium chloride 0.9% lock flush 10 milliLiter(s) IV Push every 1 hour PRN    A/P:  64 year old female with a history of multiple myeloma   Post:  Autologous PBSCT day + 10  /23- [facility] transplant today, continue hydration for 24 hours post infusion of cells. Daily weights, strict I&O, prn diuresis. Start levaquin / fluconazole prophylaxis when neutropenic.    c/o intermittent abd pain, AXR done, reading from radiologist requested. weight gain from admission, monitor closely, Lasix PRN    Zofran to PRN, switched Compazine-->reglan PRN  - refractory nausea / vomiting, will do trial of olanzapine. d/c reglan. Grade 1 chemotherapy induced GI mucositis. Continue supportive measures.    Check ECG while on Zyprexa +Levaquin   - Febrile, F/U BCX, PO antibiotics switched to Zosyn and Vanco.    - Rising S. Cr- 1.52, will D/C Vanco IV, started hydration NS @ 75 cc/hr for 24 hrs, renally adjust meds, Cr.Cl- 36 will continue Zosyn 4.5 gm Q 8 hrs, change Diflucan to 200 mg PO daily.   - Grade 1 transaminitis, will monitor closely.    1. Infectious Disease:   acyclovir   Oral Tab/Cap 800 milliGRAM(s) Oral every 12 hours  fluconAZOLE   Tablet 200 milliGRAM(s) Oral daily  piperacillin/tazobactam IVPB.. 4.5 Gram(s) IV Intermittent every 8 hours  vancomycin    Solution 125 milliGRAM(s) Oral every 12 hours  - BCX, UCX (-)/24 hrs    2. GI Prophylaxis:    pantoprazole    Tablet 40 milliGRAM(s) Oral before breakfast    3. Mouthcare - NS / NaHCO3 rinses, Biotene; Skin care     4. Transfuse & replete electrolytes prn   Hypokalemia - Replete K+.     5. IV hydration, daily weights, strict I&O, prn diuresis     6. PO intake as tolerated, nutrition follow up as needed    7. Antiemetics, anti-diarrhea medications:   loperamide 4 milliGRAM(s) Oral every 4 hours PRN  ondansetron Injectable 8 milliGRAM(s) IV Push every 8 hours PRN  LORazepam   Injectable 0.5 milliGRAM(s) IV Push every 8 hours PRN  OLANZapine 5 milliGRAM(s) Oral at bedtime    8. OOB as tolerated, physical therapy consult if needed     9. Monitor coags / fibrinogen 2x week, vitamin K as needed   phytonadione   Solution 5 milliGRAM(s) Oral <User Schedule>    10. Monitor closely for clinical changes, monitor for fevers     11. Emotional support provided, plan of care discussed and questions addressed     12. Patient education done regarding plan of care, restrictions and discharge planning     13. Continue regular social work input     I have written the above note for Dr. Mckeon who performed service with me in the room.   Karishma Olivia  NP-C ([phone])    I have seen and examined patient with NP, I agree with above note as scribed.

## 2024-09-03 LAB
ALBUMIN SERPL ELPH-MCNC: 3.2 G/DL — LOW (ref 3.3–5)
ALP SERPL-CCNC: 106 U/L — SIGNIFICANT CHANGE UP (ref 40–120)
ALT FLD-CCNC: 36 U/L — SIGNIFICANT CHANGE UP (ref 10–45)
ANION GAP SERPL CALC-SCNC: 15 MMOL/L — SIGNIFICANT CHANGE UP (ref 5–17)
ANION GAP SERPL CALC-SCNC: 15 MMOL/L — SIGNIFICANT CHANGE UP (ref 5–17)
AST SERPL-CCNC: 29 U/L — SIGNIFICANT CHANGE UP (ref 10–40)
BILIRUB SERPL-MCNC: 0.5 MG/DL — SIGNIFICANT CHANGE UP (ref 0.2–1.2)
BUN SERPL-MCNC: 26 MG/DL — HIGH (ref 7–23)
BUN SERPL-MCNC: 28 MG/DL — HIGH (ref 7–23)
C DIFF GDH STL QL: NEGATIVE — SIGNIFICANT CHANGE UP
C DIFF GDH STL QL: SIGNIFICANT CHANGE UP
CALCIUM SERPL-MCNC: 9.2 MG/DL — SIGNIFICANT CHANGE UP (ref 8.4–10.5)
CALCIUM SERPL-MCNC: 9.3 MG/DL — SIGNIFICANT CHANGE UP (ref 8.4–10.5)
CHLORIDE SERPL-SCNC: 104 MMOL/L — SIGNIFICANT CHANGE UP (ref 96–108)
CHLORIDE SERPL-SCNC: 105 MMOL/L — SIGNIFICANT CHANGE UP (ref 96–108)
CHLORIDE UR-SCNC: 40 MMOL/L — SIGNIFICANT CHANGE UP
CO2 SERPL-SCNC: 16 MMOL/L — LOW (ref 22–31)
CO2 SERPL-SCNC: 17 MMOL/L — LOW (ref 22–31)
CREAT ?TM UR-MCNC: 103 MG/DL — SIGNIFICANT CHANGE UP
CREAT SERPL-MCNC: 2.73 MG/DL — HIGH (ref 0.5–1.3)
CREAT SERPL-MCNC: 2.9 MG/DL — HIGH (ref 0.5–1.3)
EGFR: 17 ML/MIN/1.73M2 — LOW
EGFR: 19 ML/MIN/1.73M2 — LOW
GLUCOSE SERPL-MCNC: 91 MG/DL — SIGNIFICANT CHANGE UP (ref 70–99)
GLUCOSE SERPL-MCNC: 96 MG/DL — SIGNIFICANT CHANGE UP (ref 70–99)
HCT VFR BLD CALC: 24.4 % — LOW (ref 34.5–45)
HGB BLD-MCNC: 8.2 G/DL — LOW (ref 11.5–15.5)
LDH SERPL L TO P-CCNC: 153 U/L — SIGNIFICANT CHANGE UP (ref 50–242)
MAGNESIUM SERPL-MCNC: 1.7 MG/DL — SIGNIFICANT CHANGE UP (ref 1.6–2.6)
MCHC RBC-ENTMCNC: 30.9 PG — SIGNIFICANT CHANGE UP (ref 27–34)
MCHC RBC-ENTMCNC: 33.6 GM/DL — SIGNIFICANT CHANGE UP (ref 32–36)
MCV RBC AUTO: 92.1 FL — SIGNIFICANT CHANGE UP (ref 80–100)
NRBC # BLD: 0 /100 WBCS — SIGNIFICANT CHANGE UP (ref 0–0)
PHOSPHATE SERPL-MCNC: 3.7 MG/DL — SIGNIFICANT CHANGE UP (ref 2.5–4.5)
PLATELET # BLD AUTO: 17 K/UL — CRITICAL LOW (ref 150–400)
POTASSIUM SERPL-MCNC: 3.6 MMOL/L — SIGNIFICANT CHANGE UP (ref 3.5–5.3)
POTASSIUM SERPL-MCNC: 3.9 MMOL/L — SIGNIFICANT CHANGE UP (ref 3.5–5.3)
POTASSIUM SERPL-SCNC: 3.6 MMOL/L — SIGNIFICANT CHANGE UP (ref 3.5–5.3)
POTASSIUM SERPL-SCNC: 3.9 MMOL/L — SIGNIFICANT CHANGE UP (ref 3.5–5.3)
PROT SERPL-MCNC: 5.8 G/DL — LOW (ref 6–8.3)
RBC # BLD: 2.65 M/UL — LOW (ref 3.8–5.2)
RBC # FLD: 13.6 % — SIGNIFICANT CHANGE UP (ref 10.3–14.5)
SODIUM SERPL-SCNC: 135 MMOL/L — SIGNIFICANT CHANGE UP (ref 135–145)
SODIUM SERPL-SCNC: 137 MMOL/L — SIGNIFICANT CHANGE UP (ref 135–145)
SODIUM UR-SCNC: 58 MMOL/L — SIGNIFICANT CHANGE UP
UUN UR-MCNC: 253 MG/DL — SIGNIFICANT CHANGE UP
WBC # BLD: 0.2 K/UL — CRITICAL LOW (ref 3.8–10.5)
WBC # FLD AUTO: 0.2 K/UL — CRITICAL LOW (ref 3.8–10.5)

## 2024-09-03 PROCEDURE — 71045 X-RAY EXAM CHEST 1 VIEW: CPT | Mod: 26

## 2024-09-03 PROCEDURE — 99233 SBSQ HOSP IP/OBS HIGH 50: CPT

## 2024-09-03 RX ORDER — SODIUM BICARBONATE 84 MG/ML
0.3 INJECTION, SOLUTION INTRAVENOUS
Qty: 150 | Refills: 0 | Status: DISCONTINUED | OUTPATIENT
Start: 2024-09-03 | End: 2024-09-03

## 2024-09-03 RX ORDER — PIPERACILLIN SODIUM AND TAZOBACTAM SODIUM 3; .375 G/15ML; G/15ML
3.38 INJECTION, POWDER, FOR SOLUTION INTRAVENOUS EVERY 12 HOURS
Refills: 0 | Status: DISCONTINUED | OUTPATIENT
Start: 2024-09-03 | End: 2024-09-05

## 2024-09-03 RX ORDER — PIPERACILLIN SODIUM AND TAZOBACTAM SODIUM 3; .375 G/15ML; G/15ML
4.5 INJECTION, POWDER, FOR SOLUTION INTRAVENOUS EVERY 12 HOURS
Refills: 0 | Status: DISCONTINUED | OUTPATIENT
Start: 2024-09-03 | End: 2024-09-03

## 2024-09-03 RX ORDER — SODIUM CHLORIDE 9 MG/ML
1000 INJECTION INTRAMUSCULAR; INTRAVENOUS; SUBCUTANEOUS
Refills: 0 | Status: DISCONTINUED | OUTPATIENT
Start: 2024-09-03 | End: 2024-09-13

## 2024-09-03 RX ORDER — SODIUM CHLORIDE 9 MG/ML
1000 INJECTION INTRAMUSCULAR; INTRAVENOUS; SUBCUTANEOUS
Refills: 0 | Status: DISCONTINUED | OUTPATIENT
Start: 2024-09-03 | End: 2024-09-03

## 2024-09-03 RX ADMIN — Medication 1 APPLICATION(S): at 17:20

## 2024-09-03 RX ADMIN — PIPERACILLIN SODIUM AND TAZOBACTAM SODIUM 25 GRAM(S): 3; .375 INJECTION, POWDER, FOR SOLUTION INTRAVENOUS at 17:22

## 2024-09-03 RX ADMIN — OLANZAPINE 5 MILLIGRAM(S): 7.5 TABLET ORAL at 21:28

## 2024-09-03 RX ADMIN — Medication 125 MILLIGRAM(S): at 05:29

## 2024-09-03 RX ADMIN — ACETAMINOPHEN 650 MILLIGRAM(S): 325 TABLET ORAL at 11:45

## 2024-09-03 RX ADMIN — SODIUM BICARBONATE 10 MILLILITER(S): 84 INJECTION, SOLUTION INTRAVENOUS at 20:49

## 2024-09-03 RX ADMIN — ACETAMINOPHEN 650 MILLIGRAM(S): 325 TABLET ORAL at 00:00

## 2024-09-03 RX ADMIN — Medication 125 MILLIGRAM(S): at 17:19

## 2024-09-03 RX ADMIN — SODIUM CHLORIDE 200 MILLILITER(S): 9 INJECTION INTRAMUSCULAR; INTRAVENOUS; SUBCUTANEOUS at 16:56

## 2024-09-03 RX ADMIN — LORAZEPAM 0.5 MILLIGRAM(S): 4 INJECTION INTRAMUSCULAR; INTRAVENOUS at 21:41

## 2024-09-03 RX ADMIN — Medication 1 APPLICATION(S): at 05:28

## 2024-09-03 RX ADMIN — Medication 5 MILLILITER(S): at 20:49

## 2024-09-03 RX ADMIN — ONDANSETRON 8 MILLIGRAM(S): 2 INJECTION, SOLUTION INTRAMUSCULAR; INTRAVENOUS at 16:55

## 2024-09-03 RX ADMIN — PIPERACILLIN SODIUM AND TAZOBACTAM SODIUM 25 GRAM(S): 3; .375 INJECTION, POWDER, FOR SOLUTION INTRAVENOUS at 05:28

## 2024-09-03 RX ADMIN — ZINC OXIDE 1 APPLICATION(S): 100 OINTMENT TOPICAL at 17:20

## 2024-09-03 RX ADMIN — Medication 5 MILLILITER(S): at 16:55

## 2024-09-03 RX ADMIN — SODIUM BICARBONATE 10 MILLILITER(S): 84 INJECTION, SOLUTION INTRAVENOUS at 16:55

## 2024-09-03 RX ADMIN — ONDANSETRON 8 MILLIGRAM(S): 2 INJECTION, SOLUTION INTRAMUSCULAR; INTRAVENOUS at 05:00

## 2024-09-03 RX ADMIN — Medication 1 TABLET(S): at 21:28

## 2024-09-03 RX ADMIN — Medication 1 TABLET(S): at 15:06

## 2024-09-03 RX ADMIN — Medication 30 MILLILITER(S): at 08:13

## 2024-09-03 RX ADMIN — SODIUM BICARBONATE 10 MILLILITER(S): 84 INJECTION, SOLUTION INTRAVENOUS at 08:13

## 2024-09-03 RX ADMIN — SODIUM CHLORIDE 125 MILLILITER(S): 9 INJECTION INTRAMUSCULAR; INTRAVENOUS; SUBCUTANEOUS at 08:13

## 2024-09-03 RX ADMIN — SODIUM BICARBONATE 10 MILLILITER(S): 84 INJECTION, SOLUTION INTRAVENOUS at 11:55

## 2024-09-03 RX ADMIN — Medication 40 MILLIGRAM(S): at 05:28

## 2024-09-03 RX ADMIN — Medication 5 MILLILITER(S): at 08:19

## 2024-09-03 RX ADMIN — Medication 5 MILLILITER(S): at 11:56

## 2024-09-03 RX ADMIN — FLUCONAZOLE 200 MILLIGRAM(S): 150 TABLET ORAL at 11:56

## 2024-09-03 RX ADMIN — FILGRASTIM 300 MICROGRAM(S): 300 INJECTION, SOLUTION INTRAVENOUS; SUBCUTANEOUS at 11:56

## 2024-09-03 RX ADMIN — ZINC OXIDE 1 APPLICATION(S): 100 OINTMENT TOPICAL at 05:29

## 2024-09-03 RX ADMIN — Medication 800 MILLIGRAM(S): at 05:28

## 2024-09-03 RX ADMIN — CHLORHEXIDINE GLUCONATE 1 APPLICATION(S): 40 SOLUTION TOPICAL at 08:14

## 2024-09-03 NOTE — PHARMACOTHERAPY INTERVENTION NOTE - COMMENTS
64-year-old female with PMH of HTN and IgG lambda multiple myeloma treated with Rosalind-RVD x4 cycles and admitted for autologous HSCT with melphalan 200 mg/m2 conditioning. Today is day +11. Course has been complicated by nausea, abdominal discomfort and anxiety.    Patient is now neutropenic (ANC<500) and spiked a fever on 9/1 of 100.9F, was started on pip/tazo 4.5 q8h and vancomycin 1250 q24h (received 1 dose of vanco IV before discontinuing). Due to the patients drop in crcl from 57 to 17 mL/min, her regimen needed to be dose reduced. Recommended that her regimen be change to: pip/tazo 3.375 mg q12h, fluconazole 200 mg q24h, acyclovir 800 mg q24h, vancomycin  mg q12H. Continue to monitor need for pip/tazo (still febrile as of 9/3), as well as renal dose adjustments.  Patient will be on medications until engraftment (ANC >500). Will continue to monitor renal/hepatic function.    Patient complaining of nausea and reports leg tremors with PRN prochlorperazine. Prochlorperazine was switched to metoclopramide with good relief, though no longer on it. Ondansetron was switched from ATC to PRN (2 doses/24 hours). Patient reports that nausea is not anticipatory and not caused by anxiety, in spite of having good relief from lorazepam. On the following for nausea: Olanzapine 5 mg PO at bedtime (started 8/28), ondansetron 8 mg IV every 8 hours PRN (first line for N/V), lorazepam 1 mg IV every 8 hours (first line for anxiety, second line for N/V). QTc on 8/30 was 441msec (goal <500 msec). Patient's nausea is much improved, since starting the olanzapine. Discussed potentially discontinuing the olanzapine, but the patient still experiencing very slight nausea. Will reassess need later in the week.    Monitor QTC weekly as on olanzapine, and fluconazole concomitantly (8/30),.    Patient has chronic diarrhea, had ~5 bowel movements (BM) in the last 24 hours, and received 4doses of loperamide. Will continue to monitor BM's.    History of latex allergy (contact dermatitis) on chart and confirmed by patient. Will replace filgrastim-sndz (zarxio) with filgrastim-aaffi (Nievstym) due to latex stopper in zarxio. Started filgrastim-aaffi (Nievstym) on day +5 (8/28)    Chirag Ronquillo, PharmD  PGY-2 Critical Care Pharmacy Resident  Available via [employer] Teams

## 2024-09-03 NOTE — PROGRESS NOTE ADULT - SUBJECTIVE AND OBJECTIVE BOX
[facility] Transplant Team                                                      Critical / Counseling Time Provided: 30 minutes                                                                                                                                                        Chief Complaint: Autologous pbsct with high dose melphalan prep regimen for treatment of IgG lambda multiple myeloma    Disease: IgG lambda multiple myeloma  Type of transplant: Autologous  Conditioning prep: Melphalan (200 mg/m2)   ABO / CMV: O POS / POS       S: Patient seen and examined with [facility] Transplant Team:       O:     Vital Signs Last 24 Hrs  T(C): 37.6 (03 Sep 2024 05:20), Max: 38.3 (02 Sep 2024 19:10)  T(F): 99.7 (03 Sep 2024 05:20), Max: 101 (02 Sep 2024 19:10)  HR: 107 (03 Sep 2024 05:20) (107 - 115)  BP: 100/46 (03 Sep 2024 05:20) (93/54 - 104/69)  BP(mean): --  RR: 18 (03 Sep 2024 05:20) (18 - 19)  SpO2: 98% (03 Sep 2024 05:20) (95% - 100%)    Parameters below as of 03 Sep 2024 05:20  Patient On (Oxygen Delivery Method): room air      Admit weight: 62.2kg  Daily Weight in kG:     Intake / Output:   09-02 @ 07:01  -  09-03 @ 07:00  --------------------------------------------------------  IN: 2083 mL / OUT: 2335 mL / NET: -252 mL    PE:   Oropharynx: no erythema or ulcerations   Oral Mucositis:              -                                          Grade: n/a   CVS: RR, +S1,S2  Lungs: CTA throughout bilaterally   Abdomen: + BS x 4, soft, ND, +mild lower abd discomfort in lower abdomen  Extremities: no edema in BLE  Gastric Mucositis:       +                                          ndGndrndanddndend:nd nd2nd Intestinal Mucositis:     -                                         Grade: n/a   Skin: no rash   TLC: CDI   Neuro: A&Ox3   Pain: denies      Labs:   CBC Full  -  ( 03 Sep 2024 07:05 )  WBC Count : 0.20 K/uL  Hemoglobin : 8.2 g/dL  Hematocrit : 24.4 %  Platelet Count - Automated : 17 K/uL  Mean Cell Volume : 92.1 fl  Mean Cell Hemoglobin : 30.9 pg  Mean Cell Hemoglobin Concentration : 33.6 gm/dL  Auto Neutrophil # : x  Auto Lymphocyte # : x  Auto Monocyte # : x  Auto Eosinophil # : x  Auto Basophil # : x  Auto Neutrophil % : x  Auto Lymphocyte % : x  Auto Monocyte % : x  Auto Eosinophil % : x  Auto Basophil % : x                          8.2    0.20  )-----------( 17       ( 03 Sep 2024 07:05 )             24.4             Cultures:   Culture - Blood (09.01.24 @ 06:50)    Specimen Source: .Blood Blood-Peripheral   Culture Results: No growth at 48 hours    Culture - Urine (09.01.24 @ 13:20)    Specimen Source: Clean Catch Clean Catch (Midstream)   Culture Results: No growth      Radiology:   Xray Chest 1 View- PORTABLE-Urgent (Xray Chest 1 View- PORTABLE-Urgent .) (09.01.24 @ 07:03) >  Clear lungs.    Meds:   Antimicrobials:   acyclovir   Oral Tab/Cap 800 milliGRAM(s) Oral every 12 hours  fluconAZOLE   Tablet 200 milliGRAM(s) Oral daily  piperacillin/tazobactam IVPB.. 4.5 Gram(s) IV Intermittent every 12 hours  vancomycin    Solution 125 milliGRAM(s) Oral every 12 hours      Heme / Onc:       GI:  aluminum hydroxide/magnesium hydroxide/simethicone Suspension 30 milliLiter(s) Oral every 6 hours PRN  loperamide 4 milliGRAM(s) Oral every 4 hours PRN  pantoprazole    Tablet 40 milliGRAM(s) Oral before breakfast  simethicone 80 milliGRAM(s) Chew four times a day PRN  sodium bicarbonate Mouth Rinse 10 milliLiter(s) Swish and Spit five times a day      Cardiovascular:   valsartan 80 milliGRAM(s) Oral daily      Immunologic:   filgrastim-aafi (NIVESTYM) Injectable 300 MICROGram(s) SubCutaneous daily      Other medications:   Biotene Dry Mouth Oral Rinse 5 milliLiter(s) Swish and Spit five times a day  chlorhexidine 4% Liquid 1 Application(s) Topical <User Schedule>  nystatin Powder 1 Application(s) Topical two times a day  OLANZapine 5 milliGRAM(s) Oral at bedtime  phytonadione   Solution 5 milliGRAM(s) Oral <User Schedule>  sodium chloride 0.9%. 1000 milliLiter(s) IV Continuous <Continuous>  zinc oxide 40% Paste 1 Application(s) Topical two times a day      PRN:   acetaminophen     Tablet .. 650 milliGRAM(s) Oral every 6 hours PRN  aluminum hydroxide/magnesium hydroxide/simethicone Suspension 30 milliLiter(s) Oral every 6 hours PRN  benzocaine/menthol Lozenge 1 Lozenge Oral every 8 hours PRN  loperamide 4 milliGRAM(s) Oral every 4 hours PRN  LORazepam   Injectable 0.5 milliGRAM(s) IV Push every 8 hours PRN  ondansetron Injectable 8 milliGRAM(s) IV Push every 8 hours PRN  simethicone 80 milliGRAM(s) Chew four times a day PRN  sodium chloride 0.9% lock flush 10 milliLiter(s) IV Push every 1 hour PRN      A/P:  64 year old female with a history of multiple myeloma   Post:  Autologous PBSCT day + 11 /23- HPC transplant today, continue hydration for 24 hours post infusion of cells. Daily weights, strict I&O, prn diuresis. Start levaquin / fluconazole prophylaxis when neutropenic.   8/24 c/o intermittent abd pain, AXR done, reading from radiologist requested. weight gain from admission, monitor closely, Lasix PRN   8/25 Zofran to PRN, switched Compazine-->reglan PRN  8/27- refractory nausea / vomiting, will do trial of olanzapine. d/c reglan. Grade 1 chemotherapy induced GI mucositis. Continue supportive measures.   8/30 Check ECG while on Zyprexa +Levaquin   9/1- Febrile, F/U BCX, PO antibiotics switched to Zosyn and Vanco.   9/2 - Rising S. Cr- 1.52, will D/C Vanco IV, started hydration NS @ 75 cc/hr for 24 hrs, renally adjust meds, Cr.Cl- 36 will continue Zosyn 4.5 gm Q 8 hrs, change Diflucan to 200 mg PO daily.   9/2- Grade 1 transaminitis, will monitor closely.    1. Infectious Disease:   acyclovir   Oral Tab/Cap 800 milliGRAM(s) Oral every 12 hours  fluconAZOLE   Tablet 200 milliGRAM(s) Oral daily  piperacillin/tazobactam IVPB.. 4.5 Gram(s) IV Intermittent every 12 hours  vancomycin    Solution 125 milliGRAM(s) Oral every 12 hours  9/1- BCX, UCX (-)/24 hrs    2. GI Prophylaxis:    pantoprazole    Tablet 40 milliGRAM(s) Oral before breakfast    3. Mouthcare - NS / NaHCO3 rinses, Biotene; Skin care     4. Transfuse & replete electrolytes prn     5. IV hydration, daily weights, strict I&O, prn diuresis     6. PO intake as tolerated, nutrition follow up as needed    7. Antiemetics, anti-diarrhea medications:   loperamide 4 milliGRAM(s) Oral every 4 hours PRN  ondansetron Injectable 8 milliGRAM(s) IV Push every 8 hours PRN  LORazepam   Injectable 0.5 milliGRAM(s) IV Push every 8 hours PRN  OLANZapine 5 milliGRAM(s) Oral at bedtime    8. OOB as tolerated, physical therapy consult if needed     9. Monitor coags / fibrinogen 2x week, vitamin K as needed   phytonadione   Solution 5 milliGRAM(s) Oral <User Schedule>    10. Monitor closely for clinical changes, monitor for fevers     11. Emotional support provided, plan of care discussed and questions addressed     12. Patient education done regarding plan of care, restrictions and discharge planning     13. Continue regular social work input     I have written the above note for Dr. Mckeon who performed service with me in the room.   Guillermo Hollis PA-C ([phone])    I have seen and examined patient with PA, I agree with above note as scribed.                  [facility] Transplant Team                                                      Critical / Counseling Time Provided: 30 minutes                                                                                                                                                        Chief Complaint: Autologous pbsct with high dose melphalan prep regimen for treatment of IgG lambda multiple myeloma    Disease: IgG lambda multiple myeloma  Type of transplant: Autologous  Conditioning prep: Melphalan (200 mg/m2)   ABO / CMV: O POS / POS       S: Patient seen and examined with [facility] Transplant Team: +FEBRILE  +Diarrhea  +fatigue    O: Rest of ROS negative    Vital Signs Last 24 Hrs  T(C): 37.6 (03 Sep 2024 05:20), Max: 38.3 (02 Sep 2024 19:10)  T(F): 99.7 (03 Sep 2024 05:20), Max: 101 (02 Sep 2024 19:10)  HR: 107 (03 Sep 2024 05:20) (107 - 115)  BP: 100/46 (03 Sep 2024 05:20) (93/54 - 104/69)  BP(mean): --  RR: 18 (03 Sep 2024 05:20) (18 - 19)  SpO2: 98% (03 Sep 2024 05:20) (95% - 100%)    Parameters below as of 03 Sep 2024 05:20  Patient On (Oxygen Delivery Method): room air      Admit weight: 62.2kg  Daily Weight in k.9kg    Intake / Output:    @ 07:01  -   @ 07:00  --------------------------------------------------------  IN: 2083 mL / OUT: 2335 mL / NET: -252 mL    PE:   Oropharynx: no erythema or ulcerations   Oral Mucositis:              -                                          Grade: n/a   CVS: RR, +S1,S2  Lungs: CTA throughout bilaterally   Abdomen: + BS x 4, soft, ND, +mild lower abd discomfort in lower abdomen  Extremities: no edema in BLE  Gastric Mucositis:       +                                          ndGndrndanddndend:nd nd2nd Intestinal Mucositis:     -                                         Grade: n/a   Skin: no rashes   TLC: CDI   Neuro: A&Ox3   Pain: denies      Labs:                         8.2    0.20  )-----------( 17       ( 03 Sep 2024 07:05 )             24.4     03 Sep 2024 07:05    137    |  105    |  28     ----------------------------<  91     3.9     |  17     |  2.73     Ca    9.3        03 Sep 2024 07:05  Phos  3.7       03 Sep 2024 07:05  Mg     1.7       03 Sep 2024 07:05    TPro  5.8    /  Alb  3.2    /  TBili  0.5    /  DBili  x      /  AST  29     /  ALT  36     /  AlkPhos  106    03 Sep 2024 07:05    PT/INR - ( 02 Sep 2024 07:06 )   PT: 12.2 sec;   INR: 1.11 ratio    PTT - ( 02 Sep 2024 07:06 )  PTT:27.8 sec    LIVER FUNCTIONS - ( 03 Sep 2024 07:05 )  Alb: 3.2 g/dL / Pro: 5.8 g/dL / ALK PHOS: 106 U/L / ALT: 36 U/L / AST: 29 U/L / GGT: x           Urinalysis Basic - ( 03 Sep 2024 07:05 )    Color: x / Appearance: x / SG: x / pH: x  Gluc: 91 mg/dL / Ketone: x  / Bili: x / Urobili: x   Blood: x / Protein: x / Nitrite: x   Leuk Esterase: x / RBC: x / WBC x   Sq Epi: x / Non Sq Epi: x / Bacteria: x        Cultures:   Culture - Blood (24 @ 06:50)    Specimen Source: .Blood Blood-Peripheral   Culture Results: No growth at 48 hours    Culture - Urine (24 @ 13:20)    Specimen Source: Clean Catch Clean Catch (Midstream)   Culture Results: No growth      Radiology:   Xray Chest 1 View- PORTABLE-Urgent (Xray Chest 1 View- PORTABLE-Urgent .) (24 @ 07:03) Clear lungs    Meds:   Antimicrobials:   acyclovir   Oral Tab/Cap 800 milliGRAM(s) Oral every 12 hours  fluconAZOLE   Tablet 200 milliGRAM(s) Oral daily  piperacillin/tazobactam IVPB.. 4.5 Gram(s) IV Intermittent every 12 hours  vancomycin    Solution 125 milliGRAM(s) Oral every 12 hours      Heme / Onc:       GI:  aluminum hydroxide/magnesium hydroxide/simethicone Suspension 30 milliLiter(s) Oral every 6 hours PRN  loperamide 4 milliGRAM(s) Oral every 4 hours PRN  pantoprazole    Tablet 40 milliGRAM(s) Oral before breakfast  simethicone 80 milliGRAM(s) Chew four times a day PRN  sodium bicarbonate Mouth Rinse 10 milliLiter(s) Swish and Spit five times a day      Cardiovascular:   valsartan 80 milliGRAM(s) Oral daily      Immunologic:   filgrastim-aafi (NIVESTYM) Injectable 300 MICROGram(s) SubCutaneous daily      Other medications:   Biotene Dry Mouth Oral Rinse 5 milliLiter(s) Swish and Spit five times a day  chlorhexidine 4% Liquid 1 Application(s) Topical <User Schedule>  nystatin Powder 1 Application(s) Topical two times a day  OLANZapine 5 milliGRAM(s) Oral at bedtime  phytonadione   Solution 5 milliGRAM(s) Oral <User Schedule>  sodium chloride 0.9%. 1000 milliLiter(s) IV Continuous <Continuous>  zinc oxide 40% Paste 1 Application(s) Topical two times a day      PRN:   acetaminophen     Tablet .. 650 milliGRAM(s) Oral every 6 hours PRN  aluminum hydroxide/magnesium hydroxide/simethicone Suspension 30 milliLiter(s) Oral every 6 hours PRN  benzocaine/menthol Lozenge 1 Lozenge Oral every 8 hours PRN  loperamide 4 milliGRAM(s) Oral every 4 hours PRN  LORazepam   Injectable 0.5 milliGRAM(s) IV Push every 8 hours PRN  ondansetron Injectable 8 milliGRAM(s) IV Push every 8 hours PRN  simethicone 80 milliGRAM(s) Chew four times a day PRN  sodium chloride 0.9% lock flush 10 milliLiter(s) IV Push every 1 hour PRN      A/P:  64 year old female with a history of multiple myeloma   Post:  Autologous PBSCT day + - HPC transplant today, continue hydration for 24 hours post infusion of cells. Daily weights, strict I&O, prn diuresis. Start levaquin / fluconazole prophylaxis when neutropenic.    c/o intermittent abd pain, AXR done, reading from radiologist requested. weight gain from admission, monitor closely, Lasix PRN    Zofran to PRN, switched Compazine-->reglan PRN  - refractory nausea / vomiting, will do trial of olanzapine. d/c reglan. Grade 1 chemotherapy induced GI mucositis. Continue supportive measures.    Check ECG while on Zyprexa +Levaquin   - Febrile, F/U BCX, PO antibiotics switched to Zosyn and Vanco.    - Rising S. Cr- 1.52, will D/C Vanco IV, started hydration NS @ 75 cc/hr for 24 hrs, renally adjust meds, Cr.Cl- 36 will continue Zosyn 4.5 gm Q 8 hrs, change Diflucan to 200 mg PO daily.    - Grade 1 transaminitis, will monitor closely  9/3 - +MAMADOU, f/u urine lytes, increase IVF's. Repeat BMP this afternoon. Dose adjust medications    1. Infectious Disease:   acyclovir   Oral Tab/Cap 800 milliGRAM(s) Oral every 12 hours  fluconAZOLE   Tablet 200 milliGRAM(s) Oral daily  piperacillin/tazobactam IVPB.. 3.375 Gram(s) IV Intermittent every 12 hours  vancomycin    Solution 125 milliGRAM(s) Oral every 12 hours  - BCX, UCX (-)/72 hrs  9/3 C DIFF - NEG, F/U repeat Bl cx's     2. GI Prophylaxis:    pantoprazole    Tablet 40 milliGRAM(s) Oral before breakfast    3. Mouthcare - NS / NaHCO3 rinses, Biotene; Skin care     4. Transfuse & replete electrolytes prn     5. IV hydration, daily weights, strict I&O, prn diuresis   Increase IVF's 125cc/hr    6. PO intake as tolerated, nutrition follow up as needed    7. Antiemetics, anti-diarrhea medications:   loperamide 4 milliGRAM(s) Oral every 4 hours PRN  ondansetron Injectable 8 milliGRAM(s) IV Push every 8 hours PRN  LORazepam   Injectable 0.5 milliGRAM(s) IV Push every 8 hours PRN  OLANZapine 5 milliGRAM(s) Oral at bedtime    8. OOB as tolerated, physical therapy consult if needed     9. Monitor coags / fibrinogen 2x week, vitamin K as needed   phytonadione   Solution 5 milliGRAM(s) Oral <User Schedule>    10. Monitor closely for clinical changes, monitor for fevers     11. Emotional support provided, plan of care discussed and questions addressed     12. Patient education done regarding plan of care, restrictions and discharge planning     13. Continue regular social work input     I have written the above note for Dr. Mckeon who performed service with me in the room.   Guillermo Hollis PA-C ([phone])    I have seen and examined patient with PA, I agree with above note as scribed.

## 2024-09-03 NOTE — CHART NOTE - NSCHARTNOTEFT_GEN_A_CORE
NUTRITION FOLLOW UP NOTE    PATIENT SEEN FOR: BMTU Malnutrition follow up    SOURCE: [x] Patient  [x] Current Medical Record  [x] RN  [x]  at bedside  [] Patient unavailable/inappropriate [] Other: Interdisciplinary rounds     CHART REVIEWED/EVENTS NOTED.  [] No changes to nutrition care plan to note  [x] Nutrition Status: Post: Autologous PBSCT. Transplant Day: 2024. Day +11    DIET ORDER:   Diet, Regular:   Supplement Feeding Modality:  Oral  Ensure Enlive Cans or Servings Per Day:  1       Frequency:  Three Times a day (24 @ 10:10) [Active]  --Protein-fortified smoothie (berry flavor) 1x/day (Monday-Friday, 300 gracie 18 gm protein)     CURRENT DIET ORDER IS:  [x] Appropriate:  [] Inadequate:  [] Other:    NUTRITION INTAKE/PROVISION:  [x] PO: Pt reports appetite/PO intake; consuming % of most meals.   [] Enteral Nutrition:  [] Parenteral Nutrition:    ANTHROPOMETRICS:  Drug Dosing Weight  Height (cm): 152 (22 Aug 2024 08:23)  Weight (kg): 62.2 (22 Aug 2024 08:23)  BMI (kg/m2): 26.9 (22 Aug 2024 08:23)  Weights:   Daily Weight in k.9 (03 Sep 2024 07:55), 60.7 (-30), Weight in k.9 (-), Weight in k (-), Weight in k.1 (-27), Weight in k.3 (-), Weight in k.3 (-), Weight in k.1 (-)   -- noted with weight loss of 1.5kg/2.4% in 1 week since admission- might be multifactorial: fluid shift with treatment, decreased PO intake. Will continue to monitor weight trends as available/able.     MEDICATIONS:  MEDICATIONS  (STANDING):  Biotene Dry Mouth Oral Rinse 5 milliLiter(s) Swish and Spit five times a day  chlorhexidine 4% Liquid 1 Application(s) Topical <User Schedule>  filgrastim-aafi (NIVESTYM) Injectable 300 MICROGram(s) SubCutaneous daily  fluconAZOLE   Tablet 200 milliGRAM(s) Oral daily  nystatin Powder 1 Application(s) Topical two times a day  OLANZapine 5 milliGRAM(s) Oral at bedtime  pantoprazole    Tablet 40 milliGRAM(s) Oral before breakfast  phytonadione   Solution 5 milliGRAM(s) Oral <User Schedule>  piperacillin/tazobactam IVPB.. 3.375 Gram(s) IV Intermittent every 12 hours  sodium bicarbonate Mouth Rinse 10 milliLiter(s) Swish and Spit five times a day  sodium chloride 0.9%. 1000 milliLiter(s) (125 mL/Hr) IV Continuous <Continuous>  vancomycin    Solution 125 milliGRAM(s) Oral every 12 hours  zinc oxide 40% Paste 1 Application(s) Topical two times a day        NUTRITIONALLY PERTINENT LABS:   @ 07:05: Na 137, BUN 28<H>, Cr 2.73<H>, BG 91, K+ 3.9, Phos 3.7, Mg 1.7, Alk Phos 106, ALT/SGPT 36, AST/SGOT 29, HbA1c --      Finger Sticks:    NUTRITIONALLY PERTINENT MEDICATIONS/LABS:  [x] Reviewed  [x] Relevant notes on medications/labs:    EDEMA:  [x] Reviewed  [] Relevant notes:    GI/ I&O:  [x] Reviewed  [x] Relevant notes:   [] Other: on Ativan, Zofran, Simethicone, Protonix  -- Mouth Care: Biotene, NaHCO3    SKIN:   [x] No pressure injuries documented, per nursing flowsheet  [] Pressure injury previously noted  [] Change in pressure injury documentation:  [] Other:    ESTIMATED NEEDS:  [] No change:  [x] Updated:  Energy: 1006-3930  kcal/day (30-35 kcal/kg)  Protein: 81-93  g/day (1.3-1.5 g/kg)  Fluid:   ml/day or [x] defer to team  Based on: dosing weight of 62.2kg in consideration of malnutrition and Increased nutrient needs     NUTRITION DIAGNOSIS:  [x] Prior Dx:  Increased nutrient needs, acute severe malnutrition  [] New Dx:     EDUCATION:  [x] Yes: Emphasized the importance of adequate kcal and protein intake, provided recommendations to optimize nutritional intake in case of decreased appetite, recommended small frequent meals by consuming nutrient-dense snacks between meals, to start with protein, and sips of supplement throughout the day.   [] Not appropriate/warranted    NUTRITION CARE PLAN:  1. Diet: Continue diet free of therapeutic restriction. RD remains available to adjust diet as needed.   2. Supplements: continue Ensure plus high protein 3x daily (1050kcal, 60g protein), protein-fortified smoothie (berry flavor) 1x daily from Monday to Friday.   3. Monitor and encourage PO intake. Encourage use of daily menus. Honor dietary preferences as expressed as able.     [x] Achieved - Continue current nutrition intervention(s)  [] Current medical condition precludes nutrition intervention at this time.    MONITORING AND EVALUATION:     RD remains available upon request and will follow up per protocol:   Lelia Zafar RDN CDN (TEAMS) NUTRITION FOLLOW UP NOTE    PATIENT SEEN FOR: BMTU Malnutrition follow up    SOURCE: [x] Patient  [x] Current Medical Record  [x] RN  [x]  at bedside  [] Patient unavailable/inappropriate [] Other: Interdisciplinary rounds     CHART REVIEWED/EVENTS NOTED.  [] No changes to nutrition care plan to note  [x] Nutrition Status: Post: Autologous PBSCT. Transplant Day: 2024. Day +11    DIET ORDER:   Diet, Regular:   Supplement Feeding Modality:  Oral  Ensure Enlive Cans or Servings Per Day:  1       Frequency:  Three Times a day (24 @ 10:10) [Active]  --Protein-fortified smoothie (berry flavor) 1x/day (Monday-Friday, 300 gracie 18 gm protein)     CURRENT DIET ORDER IS:  [] Appropriate:  [] Inadequate:  [x] Other:    NUTRITION INTAKE/PROVISION:  [x] PO: Pt reports poor-fair appetite/PO intake; consuming ~50% of most meals. Endorses drinking Ensure shakes 1x/day; would like to  to 1x daily (currently ordered for 3x). Pt requesting strawberry banana smoothie daily. Food preferences; will honor as able.    [] Enteral Nutrition:  [] Parenteral Nutrition:    ANTHROPOMETRICS:  Drug Dosing Weight  Height (cm): 152 (22 Aug 2024 08:23)  Weight (kg): 62.2 (22 Aug 2024 08:23)  BMI (kg/m2): 26.9 (22 Aug 2024 08:23)  Weights:   Daily Weight in k.9 (03 Sep 2024 07:55), 60.7 (-30), Weight in k.9 (-29), Weight in k (-28), Weight in k.1 (-27), Weight in k.3 (-), Weight in k.3 (-), Weight in k.1 (-24)   -- noted with weight loss of 1.5kg/2.4% in 1 week since admission- might be multifactorial: fluid shift with treatment, decreased PO intake. Will continue to monitor weight trends as available/able.     MEDICATIONS:  MEDICATIONS  (STANDING):  Biotene Dry Mouth Oral Rinse 5 milliLiter(s) Swish and Spit five times a day  chlorhexidine 4% Liquid 1 Application(s) Topical <User Schedule>  filgrastim-aafi (NIVESTYM) Injectable 300 MICROGram(s) SubCutaneous daily  fluconAZOLE   Tablet 200 milliGRAM(s) Oral daily  nystatin Powder 1 Application(s) Topical two times a day  OLANZapine 5 milliGRAM(s) Oral at bedtime  pantoprazole    Tablet 40 milliGRAM(s) Oral before breakfast  phytonadione   Solution 5 milliGRAM(s) Oral <User Schedule>  piperacillin/tazobactam IVPB.. 3.375 Gram(s) IV Intermittent every 12 hours  sodium bicarbonate Mouth Rinse 10 milliLiter(s) Swish and Spit five times a day  sodium chloride 0.9%. 1000 milliLiter(s) (125 mL/Hr) IV Continuous <Continuous>  vancomycin    Solution 125 milliGRAM(s) Oral every 12 hours  zinc oxide 40% Paste 1 Application(s) Topical two times a day        NUTRITIONALLY PERTINENT LABS:   @ 07:05: Na 137, BUN 28<H>, Cr 2.73<H>, BG 91, K+ 3.9, Phos 3.7, Mg 1.7, Alk Phos 106, ALT/SGPT 36, AST/SGOT 29, HbA1c --      Finger Sticks:    NUTRITIONALLY PERTINENT MEDICATIONS/LABS:  [x] Reviewed  [x] Relevant notes on medications/labs:    EDEMA:  [x] Reviewed  [] Relevant notes:    GI/ I&O:  [x] Reviewed  [x] Relevant notes:   [] Other: on Ativan, Zofran, Simethicone, Protonix  -- Mouth Care: Biotene, NaHCO3    SKIN:   [x] No pressure injuries documented, per nursing flowsheet  [] Pressure injury previously noted  [] Change in pressure injury documentation:  [] Other:    ESTIMATED NEEDS:  [] No change:  [x] Updated:  Energy: 1275-9574  kcal/day (30-35 kcal/kg)  Protein: 81-93  g/day (1.3-1.5 g/kg)  Fluid:   ml/day or [x] defer to team  Based on: dosing weight of 62.2kg in consideration of malnutrition and Increased nutrient needs     NUTRITION DIAGNOSIS:  [x] Prior Dx:  Increased nutrient needs, acute severe malnutrition  [] New Dx:     EDUCATION:  [x] Yes: Emphasized the importance of adequate kcal and protein intake, provided recommendations to optimize nutritional intake in case of decreased appetite, recommended small frequent meals by consuming nutrient-dense snacks between meals, to start with protein, and sips of supplement throughout the day.   [] Not appropriate/warranted    NUTRITION CARE PLAN:  1. Diet: Continue diet free of therapeutic restriction. RD remains available to adjust diet as needed.   2. Supplements: continue Ensure plus high protein, decrease to 1x/day. Continue protein-fortified smoothie (strawberry banana flavor) 1x daily from Monday to Friday.   3. Monitor and encourage PO intake. Encourage use of daily menus. Honor dietary preferences as expressed as able (obtained today).     [x] Achieved - Continue current nutrition intervention(s)  [] Current medical condition precludes nutrition intervention at this time.    MONITORING AND EVALUATION:     RD remains available upon request and will follow up per protocol:   Lelia Zafar RDN CDN (TEAMS) NUTRITION FOLLOW UP NOTE    PATIENT SEEN FOR: BMTU Malnutrition follow up    SOURCE: [x] Patient  [x] Current Medical Record  [x] RN  [x]  at bedside  [] Patient unavailable/inappropriate [] Other: Interdisciplinary rounds     CHART REVIEWED/EVENTS NOTED.  [] No changes to nutrition care plan to note  [x] Nutrition Status: Post: Autologous PBSCT. Transplant Day: 2024. Day +11    DIET ORDER:   Diet, Regular:   Supplement Feeding Modality:  Oral  Ensure Enlive Cans or Servings Per Day:  1       Frequency:  Three Times a day (24 @ 10:10) [Active]  --Protein-fortified smoothie (berry flavor) 1x/day (Monday-Friday, 300 gracie 18 gm protein)     CURRENT DIET ORDER IS:  [] Appropriate:  [] Inadequate:  [x] Other:    NUTRITION INTAKE/PROVISION:  [x] PO: Pt reports poor-fair appetite/PO intake; consuming ~50% of most meals. Endorses drinking Ensure shakes 1x/day; would like to  to 1x daily (currently ordered for 3x). Pt requesting strawberry banana smoothie daily. Food preferences; will honor as able.    [] Enteral Nutrition:  [] Parenteral Nutrition:    ANTHROPOMETRICS:  Drug Dosing Weight  Height (cm): 152 (22 Aug 2024 08:23)  Weight (kg): 62.2 (22 Aug 2024 08:23)  BMI (kg/m2): 26.9 (22 Aug 2024 08:23)  Weights:   Daily Weight in k.9 (03 Sep 2024 07:55), 60.7 (-30), Weight in k.9 (-29), Weight in k (-28), Weight in k.1 (-27), Weight in k.3 (-), Weight in k.3 (-), Weight in k.1 (-24)   -- noted with weight loss of 1.5kg/2.4% in 1 week since admission- might be multifactorial: fluid shift with treatment, decreased PO intake. Will continue to monitor weight trends as available/able.     MEDICATIONS:  MEDICATIONS  (STANDING):  Biotene Dry Mouth Oral Rinse 5 milliLiter(s) Swish and Spit five times a day  chlorhexidine 4% Liquid 1 Application(s) Topical <User Schedule>  filgrastim-aafi (NIVESTYM) Injectable 300 MICROGram(s) SubCutaneous daily  fluconAZOLE   Tablet 200 milliGRAM(s) Oral daily  nystatin Powder 1 Application(s) Topical two times a day  OLANZapine 5 milliGRAM(s) Oral at bedtime  pantoprazole    Tablet 40 milliGRAM(s) Oral before breakfast  phytonadione   Solution 5 milliGRAM(s) Oral <User Schedule>  piperacillin/tazobactam IVPB.. 3.375 Gram(s) IV Intermittent every 12 hours  sodium bicarbonate Mouth Rinse 10 milliLiter(s) Swish and Spit five times a day  sodium chloride 0.9%. 1000 milliLiter(s) (125 mL/Hr) IV Continuous <Continuous>  vancomycin    Solution 125 milliGRAM(s) Oral every 12 hours  zinc oxide 40% Paste 1 Application(s) Topical two times a day        NUTRITIONALLY PERTINENT LABS:   @ 07:05: Na 137, BUN 28<H>, Cr 2.73<H>, BG 91, K+ 3.9, Phos 3.7, Mg 1.7, Alk Phos 106, ALT/SGPT 36, AST/SGOT 29, HbA1c --      Finger Sticks:    NUTRITIONALLY PERTINENT MEDICATIONS/LABS:  [x] Reviewed  [x] Relevant notes on medications/labs:    EDEMA:  [x] Reviewed  [] Relevant notes:    GI/ I&O:  [x] Reviewed  [x] Relevant notes:   [] Other: on Ativan, Zofran, Simethicone, Protonix  -- Mouth Care: Biotene, NaHCO3    SKIN:   [x] No pressure injuries documented, per nursing flowsheet  [] Pressure injury previously noted  [] Change in pressure injury documentation:  [] Other:    ESTIMATED NEEDS:  [] No change:  [x] Updated:  Energy: 6371-0742  kcal/day (30-35 kcal/kg)  Protein: 81-93  g/day (1.3-1.5 g/kg)  Fluid:   ml/day or [x] defer to team  Based on: dosing weight of 62.2kg in consideration of malnutrition and Increased nutrient needs     NUTRITION DIAGNOSIS:  [x] Prior Dx:  Increased nutrient needs, acute severe malnutrition  [] New Dx:     EDUCATION:  [x] Yes: Emphasized the importance of adequate kcal and protein intake, provided recommendations to optimize nutritional intake in case of decreased appetite, recommended small frequent meals by consuming nutrient-dense snacks between meals, to start with protein, and sips of supplement throughout the day.   [] Not appropriate/warranted    NUTRITION CARE PLAN:  1. Diet: Continue diet free of therapeutic restriction. RD remains available to adjust diet as needed.   2. Supplements: continue Ensure plus high protein, decrease to 1x/day. Continue protein-fortified smoothie (strawberry banana flavor) 1x daily from Monday to Friday. Will trial Gelatein Plus pineapple; RD to follow up for acceptability.   3. Monitor and encourage PO intake. Encourage use of daily menus. Honor dietary preferences as expressed as able (obtained today).     [x] Achieved - Continue current nutrition intervention(s)  [] Current medical condition precludes nutrition intervention at this time.    MONITORING AND EVALUATION:     RD remains available upon request and will follow up per protocol:   Lelia Zafar RDN CDN (TEAMS)

## 2024-09-03 NOTE — PROGRESS NOTE ADULT - NS ATTEND AMEND GEN_ALL_CORE FT
64 year old female with a history of multiple myeloma admitted for auto-HSCT Day + 10 today     Disease: IgG lambda multiple myeloma  Type of transplant: Autologous  Conditioning prep: Melphalan (200 mg/m2)   ABO / CMV: O POS / POS    Complications:   - Day 0: nausea/vomiting, abdominal pain   -Day 1: B/L lower extremities tremors --> compazine D/C, replaced by reglan     I rounded with the team of nursing staff, ACP.  I reviewed the data.  I saw and examined the patient and answered her questions.   Patient's nausea improved on Zyprexa; Zofran and Ativan PRN. Reglan discontinued while on Zyprexa  Has frequent bowel movements, but this is chronic with use of Imodium at home. Today, episode of diarrhea.  Labs reviewed, supplements lytes prn  Neutropenic fever(9/1)- order cultures and CXR;  start Zosyn and Vanco IV; D/C Levaquin. Continue Acyclovir, Fluconazole, Vanco PO prophylaxis. 9/2 given rise in creatinine d/c vanco..cx neg thus far  NIVESTYM 300 MICROGram(s) SubCutaneous daily, started on day +5  Continue supportive care.  OOB/ambulate 64 year old female with a history of multiple myeloma admitted for auto-HSCT Day + 11 today ...engrafting    Disease: IgG lambda multiple myeloma  Type of transplant: Autologous  Conditioning prep: Melphalan (200 mg/m2)   ABO / CMV: O POS / POS    Complications:   - Day 0: nausea/vomiting, abdominal pain   -Day 1: B/L lower extremities tremors --> compazine D/C, replaced by reglan     I rounded with the team of nursing staff, ACP.  I reviewed the data.  I saw and examined the patient and answered her questions.   Patient's nausea improved on Zyprexa; Zofran and Ativan PRN. Reglan discontinued while on Zyprexa  Has frequent bowel movements, but this has been chronic with use of Imodium at home. Today, multiple episodes of watery diarrhea...if continues check cdiff....pt does describe lower abdominal cramping  Labs reviewed, supplements lytes prn  Neutropenic fever(9/1)- order cultures and CXR;  start Zosyn and Vanco IV; D/C Levaquin. Continue Acyclovir, Fluconazole, Vanco PO prophylaxis. 9/2 given rise in creatinine d/c vanco..cx neg thus far....  given further rise in creatinine increase ivf and recheck later today  NIVESTYM 300 MICROGram(s) SubCutaneous daily, started on day +5  Continue supportive care.  OOB/ambulate

## 2024-09-04 LAB
ALBUMIN SERPL ELPH-MCNC: 2.8 G/DL — LOW (ref 3.3–5)
ALP SERPL-CCNC: 99 U/L — SIGNIFICANT CHANGE UP (ref 40–120)
ALT FLD-CCNC: 26 U/L — SIGNIFICANT CHANGE UP (ref 10–45)
ANION GAP SERPL CALC-SCNC: 12 MMOL/L — SIGNIFICANT CHANGE UP (ref 5–17)
ANION GAP SERPL CALC-SCNC: 14 MMOL/L — SIGNIFICANT CHANGE UP (ref 5–17)
APPEARANCE UR: CLEAR — SIGNIFICANT CHANGE UP
AST SERPL-CCNC: 19 U/L — SIGNIFICANT CHANGE UP (ref 10–40)
BASOPHILS # BLD AUTO: 0 K/UL — SIGNIFICANT CHANGE UP (ref 0–0.2)
BASOPHILS NFR BLD AUTO: 0 % — SIGNIFICANT CHANGE UP (ref 0–2)
BILIRUB SERPL-MCNC: 0.3 MG/DL — SIGNIFICANT CHANGE UP (ref 0.2–1.2)
BILIRUB UR-MCNC: NEGATIVE — SIGNIFICANT CHANGE UP
BLD GP AB SCN SERPL QL: POSITIVE — SIGNIFICANT CHANGE UP
BUN SERPL-MCNC: 22 MG/DL — SIGNIFICANT CHANGE UP (ref 7–23)
BUN SERPL-MCNC: 25 MG/DL — HIGH (ref 7–23)
CALCIUM SERPL-MCNC: 8.4 MG/DL — SIGNIFICANT CHANGE UP (ref 8.4–10.5)
CALCIUM SERPL-MCNC: 9.3 MG/DL — SIGNIFICANT CHANGE UP (ref 8.4–10.5)
CHLORIDE SERPL-SCNC: 106 MMOL/L — SIGNIFICANT CHANGE UP (ref 96–108)
CHLORIDE SERPL-SCNC: 112 MMOL/L — HIGH (ref 96–108)
CO2 SERPL-SCNC: 14 MMOL/L — LOW (ref 22–31)
CO2 SERPL-SCNC: 16 MMOL/L — LOW (ref 22–31)
COLOR SPEC: YELLOW — SIGNIFICANT CHANGE UP
CREAT SERPL-MCNC: 2.06 MG/DL — HIGH (ref 0.5–1.3)
CREAT SERPL-MCNC: 2.29 MG/DL — HIGH (ref 0.5–1.3)
CULTURE RESULTS: NO GROWTH — SIGNIFICANT CHANGE UP
DIFF PNL FLD: NEGATIVE — SIGNIFICANT CHANGE UP
EGFR: 23 ML/MIN/1.73M2 — LOW
EGFR: 26 ML/MIN/1.73M2 — LOW
EOSINOPHIL # BLD AUTO: 0 K/UL — SIGNIFICANT CHANGE UP (ref 0–0.5)
EOSINOPHIL NFR BLD AUTO: 0 % — SIGNIFICANT CHANGE UP (ref 0–6)
GLUCOSE SERPL-MCNC: 146 MG/DL — HIGH (ref 70–99)
GLUCOSE SERPL-MCNC: 84 MG/DL — SIGNIFICANT CHANGE UP (ref 70–99)
GLUCOSE UR QL: NEGATIVE MG/DL — SIGNIFICANT CHANGE UP
HCT VFR BLD CALC: 20.4 % — CRITICAL LOW (ref 34.5–45)
HGB BLD-MCNC: 6.9 G/DL — CRITICAL LOW (ref 11.5–15.5)
KETONES UR-MCNC: NEGATIVE MG/DL — SIGNIFICANT CHANGE UP
LDH SERPL L TO P-CCNC: 133 U/L — SIGNIFICANT CHANGE UP (ref 50–242)
LEUKOCYTE ESTERASE UR-ACNC: NEGATIVE — SIGNIFICANT CHANGE UP
LYMPHOCYTES # BLD AUTO: 0.2 K/UL — LOW (ref 1–3.3)
LYMPHOCYTES # BLD AUTO: 25 % — SIGNIFICANT CHANGE UP (ref 13–44)
MAGNESIUM SERPL-MCNC: 1.5 MG/DL — LOW (ref 1.6–2.6)
MANUAL SMEAR VERIFICATION: SIGNIFICANT CHANGE UP
MCHC RBC-ENTMCNC: 30.9 PG — SIGNIFICANT CHANGE UP (ref 27–34)
MCHC RBC-ENTMCNC: 33.8 GM/DL — SIGNIFICANT CHANGE UP (ref 32–36)
MCV RBC AUTO: 91.5 FL — SIGNIFICANT CHANGE UP (ref 80–100)
MONOCYTES # BLD AUTO: 0.13 K/UL — SIGNIFICANT CHANGE UP (ref 0–0.9)
MONOCYTES NFR BLD AUTO: 16.7 % — HIGH (ref 2–14)
NEUTROPHILS # BLD AUTO: 0.46 K/UL — LOW (ref 1.8–7.4)
NEUTROPHILS NFR BLD AUTO: 52.8 % — SIGNIFICANT CHANGE UP (ref 43–77)
NEUTS BAND # BLD: 5.5 % — SIGNIFICANT CHANGE UP (ref 0–8)
NITRITE UR-MCNC: NEGATIVE — SIGNIFICANT CHANGE UP
PH UR: 5 — SIGNIFICANT CHANGE UP (ref 5–8)
PHOSPHATE SERPL-MCNC: 2.9 MG/DL — SIGNIFICANT CHANGE UP (ref 2.5–4.5)
PLAT MORPH BLD: NORMAL — SIGNIFICANT CHANGE UP
PLATELET # BLD AUTO: 14 K/UL — CRITICAL LOW (ref 150–400)
POTASSIUM SERPL-MCNC: 3.5 MMOL/L — SIGNIFICANT CHANGE UP (ref 3.5–5.3)
POTASSIUM SERPL-MCNC: 3.6 MMOL/L — SIGNIFICANT CHANGE UP (ref 3.5–5.3)
POTASSIUM SERPL-SCNC: 3.5 MMOL/L — SIGNIFICANT CHANGE UP (ref 3.5–5.3)
POTASSIUM SERPL-SCNC: 3.6 MMOL/L — SIGNIFICANT CHANGE UP (ref 3.5–5.3)
PROT SERPL-MCNC: 5.1 G/DL — LOW (ref 6–8.3)
PROT UR-MCNC: SIGNIFICANT CHANGE UP MG/DL
RBC # BLD: 2.23 M/UL — LOW (ref 3.8–5.2)
RBC # FLD: 13.9 % — SIGNIFICANT CHANGE UP (ref 10.3–14.5)
RBC BLD AUTO: SIGNIFICANT CHANGE UP
RH IG SCN BLD-IMP: POSITIVE — SIGNIFICANT CHANGE UP
SODIUM SERPL-SCNC: 136 MMOL/L — SIGNIFICANT CHANGE UP (ref 135–145)
SODIUM SERPL-SCNC: 138 MMOL/L — SIGNIFICANT CHANGE UP (ref 135–145)
SP GR SPEC: 1.01 — SIGNIFICANT CHANGE UP (ref 1–1.03)
SPECIMEN SOURCE: SIGNIFICANT CHANGE UP
UROBILINOGEN FLD QL: 0.2 MG/DL — SIGNIFICANT CHANGE UP (ref 0.2–1)
WBC # BLD: 0.79 K/UL — CRITICAL LOW (ref 3.8–10.5)
WBC # FLD AUTO: 0.79 K/UL — CRITICAL LOW (ref 3.8–10.5)

## 2024-09-04 PROCEDURE — 99233 SBSQ HOSP IP/OBS HIGH 50: CPT

## 2024-09-04 RX ORDER — POVIDONE, PROPYLENE GLYCOL 6.8; 3 MG/ML; MG/ML
1 LIQUID OPHTHALMIC
Refills: 0 | Status: DISCONTINUED | OUTPATIENT
Start: 2024-09-04 | End: 2024-09-13

## 2024-09-04 RX ORDER — SODIUM BICARBONATE 84 MG/ML
0.24 INJECTION, SOLUTION INTRAVENOUS
Qty: 150 | Refills: 0 | Status: DISCONTINUED | OUTPATIENT
Start: 2024-09-04 | End: 2024-09-05

## 2024-09-04 RX ORDER — POVIDONE, PROPYLENE GLYCOL 6.8; 3 MG/ML; MG/ML
1 LIQUID OPHTHALMIC AT BEDTIME
Refills: 0 | Status: DISCONTINUED | OUTPATIENT
Start: 2024-09-04 | End: 2024-09-13

## 2024-09-04 RX ORDER — ONDANSETRON 2 MG/ML
1 INJECTION, SOLUTION INTRAMUSCULAR; INTRAVENOUS
Qty: 90 | Refills: 0
Start: 2024-09-04 | End: 2024-10-03

## 2024-09-04 RX ORDER — SULFAMETHOXAZOLE AND TRIMETHOPRIM 800; 160 MG/1; MG/1
1 TABLET ORAL
Qty: 30 | Refills: 0
Start: 2024-09-04 | End: 2024-10-03

## 2024-09-04 RX ADMIN — FLUCONAZOLE 200 MILLIGRAM(S): 150 TABLET ORAL at 13:30

## 2024-09-04 RX ADMIN — ACETAMINOPHEN 650 MILLIGRAM(S): 325 TABLET ORAL at 21:47

## 2024-09-04 RX ADMIN — SODIUM BICARBONATE 10 MILLILITER(S): 84 INJECTION, SOLUTION INTRAVENOUS at 08:48

## 2024-09-04 RX ADMIN — Medication 1 TABLET(S): at 21:09

## 2024-09-04 RX ADMIN — TIZANIDINE 4 MILLIGRAM(S): 4 TABLET ORAL at 01:23

## 2024-09-04 RX ADMIN — Medication 5 MILLILITER(S): at 13:34

## 2024-09-04 RX ADMIN — TIZANIDINE 4 MILLIGRAM(S): 4 TABLET ORAL at 17:28

## 2024-09-04 RX ADMIN — PIPERACILLIN SODIUM AND TAZOBACTAM SODIUM 25 GRAM(S): 3; .375 INJECTION, POWDER, FOR SOLUTION INTRAVENOUS at 05:39

## 2024-09-04 RX ADMIN — Medication 1 APPLICATION(S): at 17:29

## 2024-09-04 RX ADMIN — ACETAMINOPHEN 650 MILLIGRAM(S): 325 TABLET ORAL at 14:00

## 2024-09-04 RX ADMIN — OLANZAPINE 5 MILLIGRAM(S): 7.5 TABLET ORAL at 21:09

## 2024-09-04 RX ADMIN — TIZANIDINE 4 MILLIGRAM(S): 4 TABLET ORAL at 08:47

## 2024-09-04 RX ADMIN — SODIUM CHLORIDE 200 MILLILITER(S): 9 INJECTION INTRAMUSCULAR; INTRAVENOUS; SUBCUTANEOUS at 01:12

## 2024-09-04 RX ADMIN — Medication 40 MILLIGRAM(S): at 05:39

## 2024-09-04 RX ADMIN — ZINC OXIDE 1 APPLICATION(S): 100 OINTMENT TOPICAL at 17:29

## 2024-09-04 RX ADMIN — Medication 5 MILLILITER(S): at 08:47

## 2024-09-04 RX ADMIN — Medication 800 MILLIGRAM(S): at 13:31

## 2024-09-04 RX ADMIN — SODIUM BICARBONATE 10 MILLILITER(S): 84 INJECTION, SOLUTION INTRAVENOUS at 16:01

## 2024-09-04 RX ADMIN — LORAZEPAM 0.5 MILLIGRAM(S): 4 INJECTION INTRAMUSCULAR; INTRAVENOUS at 21:38

## 2024-09-04 RX ADMIN — Medication 25 GRAM(S): at 10:57

## 2024-09-04 RX ADMIN — ACETAMINOPHEN 650 MILLIGRAM(S): 325 TABLET ORAL at 13:31

## 2024-09-04 RX ADMIN — Medication 5 MILLILITER(S): at 16:01

## 2024-09-04 RX ADMIN — Medication 1 APPLICATION(S): at 05:40

## 2024-09-04 RX ADMIN — Medication 25 GRAM(S): at 13:36

## 2024-09-04 RX ADMIN — ACETAMINOPHEN 650 MILLIGRAM(S): 325 TABLET ORAL at 01:22

## 2024-09-04 RX ADMIN — SODIUM BICARBONATE 100 MEQ/KG/HR: 84 INJECTION, SOLUTION INTRAVENOUS at 10:00

## 2024-09-04 RX ADMIN — Medication 1 TABLET(S): at 06:02

## 2024-09-04 RX ADMIN — ACETAMINOPHEN 650 MILLIGRAM(S): 325 TABLET ORAL at 02:12

## 2024-09-04 RX ADMIN — ZINC OXIDE 1 APPLICATION(S): 100 OINTMENT TOPICAL at 05:40

## 2024-09-04 RX ADMIN — ACETAMINOPHEN 650 MILLIGRAM(S): 325 TABLET ORAL at 22:45

## 2024-09-04 RX ADMIN — Medication 1 TABLET(S): at 13:34

## 2024-09-04 RX ADMIN — SODIUM BICARBONATE 10 MILLILITER(S): 84 INJECTION, SOLUTION INTRAVENOUS at 13:34

## 2024-09-04 RX ADMIN — FILGRASTIM 300 MICROGRAM(S): 300 INJECTION, SOLUTION INTRAVENOUS; SUBCUTANEOUS at 14:31

## 2024-09-04 RX ADMIN — CHLORHEXIDINE GLUCONATE 1 APPLICATION(S): 40 SOLUTION TOPICAL at 08:48

## 2024-09-04 RX ADMIN — Medication 125 MILLIGRAM(S): at 05:39

## 2024-09-04 RX ADMIN — PIPERACILLIN SODIUM AND TAZOBACTAM SODIUM 25 GRAM(S): 3; .375 INJECTION, POWDER, FOR SOLUTION INTRAVENOUS at 17:47

## 2024-09-04 RX ADMIN — Medication 125 MILLIGRAM(S): at 17:47

## 2024-09-04 NOTE — PROGRESS NOTE ADULT - NS ATTEND AMEND GEN_ALL_CORE FT
64 year old female with a history of multiple myeloma admitted for auto-HSCT Day + 12 today ...engrafting    Disease: IgG lambda multiple myeloma  Type of transplant: Autologous  Conditioning prep: Melphalan (200 mg/m2)   ABO / CMV: O POS / POS    Complications:   - Day 0: nausea/vomiting, abdominal pain   -Day 1: B/L lower extremities tremors --> compazine D/C, replaced by reglan     I rounded with the team of nursing staff, ACP.  I reviewed the data.  I saw and examined the patient and answered her questions.   Patient's nausea improved on Zyprexa; Zofran and Ativan PRN. Reglan discontinued while on Zyprexa  Has frequent bowel movements, but this has been chronic with use of Imodium at home. Today, multiple episodes of watery diarrhea...if continues check cdiff....pt does describe lower abdominal cramping  Labs reviewed, supplements lytes prn  Neutropenic fever(9/1)- order cultures and CXR;  start Zosyn and Vanco IV; D/C Levaquin. Continue Acyclovir, Fluconazole, Vanco PO prophylaxis. 9/2 given rise in creatinine d/c vanco..cx neg thus far....  given further rise in creatinine increase ivf and recheck later today  NIVESTYM 300 MICROGram(s) SubCutaneous daily, started on day +5  Continue supportive care.  OOB/ambulate 64 year old female with a history of multiple myeloma admitted for auto-HSCT Day + 12 today ...engrafting...watery green stools with incontinence..cdiff neg..will check stool gi pcr    Disease: IgG lambda multiple myeloma  Type of transplant: Autologous  Conditioning prep: Melphalan (200 mg/m2)   ABO / CMV: O POS / POS    Complications:   - Day 0: nausea/vomiting, abdominal pain   -Day 1: B/L lower extremities tremors --> compazine D/C, replaced by reglan     I rounded with the team of nursing staff, ACP.  I reviewed the data.  I saw and examined the patient and answered her questions.   Patient's nausea improved on Zyprexa; Zofran and Ativan PRN. Reglan discontinued while on Zyprexa  Has frequent bowel movements, but this has been chronic with use of Imodium at home. Today, multiple episodes of watery diarrhea...if continues check GI pcr....pt does describe lower abdominal cramping  Labs reviewed, supplements lytes prn  Neutropenic fever(9/1)- order cultures and CXR;  start Zosyn and Vanco IV; D/C Levaquin. Continue Acyclovir, Fluconazole, Vanco PO prophylaxis. 9/2 given rise in creatinine d/c vanco..cx neg thus far....  given  rise in creatinine increased ivf..switch to d5 with bicarb today...creatinine improved today..meds adjusted..consider renal consult if not continuing to improve...likely multi factorial... related to drug, dehydration and engraftment  NIVESTYM 300 MICROGram(s) SubCutaneous daily, started on day +5  Continue supportive care.  OOB/ambulate

## 2024-09-04 NOTE — PHARMACOTHERAPY INTERVENTION NOTE - COMMENTS
64-year-old female with PMH of HTN and IgG lambda multiple myeloma treated with Rosalind-RVD x4 cycles and admitted for autologous HSCT with melphalan 200 mg/m2 conditioning. Today is day +12. Course has been complicated by nausea, abdominal discomfort and anxiety.    Patient is now neutropenic (ANC<500) and spiked a fever on 9/1 of 100.9F, was started on pip/tazo 4.5 q8h on 9/1 (now day 4) and vancomycin 1250 q24h (received 1 dose of vanco IV before discontinuing). The patient continues to be febrile. Additionally, due to the patients drop in crcl from 57 to 19 mL/min, her regimen needed to be dose reduced. Recommended that her regimen be changed to: pip/tazo 3.375 mg q12h, fluconazole 200 mg q24h, acyclovir 800 mg q24h, vancomycin  mg q12H. The patients renal function has improved slightly (scr down to 2.3), crcl still <20.  Patient will continue on renally dose adjusted regimens until engraftment (ANC >500) or renal recovery. Will continue to monitor renal/hepatic function for dose adjustments.    Patient complaining of nausea and reports leg tremors with PRN prochlorperazine. Prochlorperazine was switched to metoclopramide with good relief, though no longer on it. Ondansetron was switched from ATC to PRN (2 doses/24 hours). Patient reports that nausea is not anticipatory and not caused by anxiety, in spite of having good relief from lorazepam. On the following for nausea: Olanzapine 5 mg PO at bedtime (started 8/28), ondansetron 8 mg IV every 8 hours PRN (first line for N/V), lorazepam 1 mg IV every 8 hours (first line for anxiety, second line for N/V). QTc on 8/30 was 441msec (goal <500 msec). Patient's nausea is much improved, since starting the olanzapine. Discussed potentially discontinuing the olanzapine, but the patient still experiencing very slight nausea. Will reassess need later in the week.    Monitor QTC weekly as on olanzapine, and fluconazole concomitantly (8/30),.    Patient has chronic diarrhea, had ~9 bowel movements (BM) in the last 24 hours, and received 3 doses of Lomotil and 1 dose of loperamide! Though negative for C.Diff.. Will continue to monitor BM's.    History of latex allergy (contact dermatitis) on chart and confirmed by patient. Will replace filgrastim-sndz (zarxio) with filgrastim-aaffi (Nievstym) due to latex stopper in zarxio. Started filgrastim-aaffi (Nievstym) on day +5 (8/28)    Chirag Ronquillo, PharmD  PGY-2 Critical Care Pharmacy Resident  Available via [employer] Teams 64-year-old female with PMH of HTN and IgG lambda multiple myeloma treated with Rosalind-RVD x4 cycles and admitted for autologous HSCT with melphalan 200 mg/m2 conditioning. Today is day +12. Course has been complicated by nausea, diarrhea, anxiety and MAMADOU.    Patient is now neutropenic (ANC<500) and spiked a fever on 9/1 of 100.9F, was started on pip/tazo 4.5 q8h on 9/1 (now day 4) and vancomycin 1250 q24h (received 1 dose of vanco IV before discontinuing). The patient continues to be febrile. Additionally, due to the patients drop in crcl from 57 to 19 mL/min, her regimen needed to be dose reduced. Recommended that her regimen be changed to: pip/tazo 3.375 mg q12h, fluconazole 200 mg q24h, acyclovir 800 mg q24h, vancomycin  mg q12H. The patients renal function has improved slightly (scr down to 2.3), crcl still <20.  Patient will continue on fluconazole and vancomycin  until engraftment (ANC >500). Will continue to monitor renal/hepatic function for dose adjustments.    Patient complaining of nausea and reports leg tremors with PRN prochlorperazine. Prochlorperazine was switched to metoclopramide with good relief, though no longer on it. Ondansetron was switched from ATC to PRN (2 doses/24 hours). Patient reports that nausea is not anticipatory and not caused by anxiety, in spite of having good relief from lorazepam. On the following for nausea: Olanzapine 5 mg PO at bedtime (started 8/28), ondansetron 8 mg IV every 8 hours PRN (first line for N/V), lorazepam 1 mg IV every 8 hours (first line for anxiety, second line for N/V). QTc on 8/30 was 441msec (goal <500 msec). Patient's nausea is much improved, since starting the olanzapine. Discussed potentially discontinuing the olanzapine, but the patient still experiencing very slight nausea. Will reassess need later in the week.    Monitor QTC weekly as on olanzapine, and fluconazole concomitantly (8/30), due for next EKG on 9/6,.    Patient has chronic diarrhea, had ~9 bowel movements (BM) in the last 24 hours, and received 3 doses of Lomotil and 1 dose of loperamide! Though negative for C.Diff.. Will continue to monitor BM's and getting GI PCR.    History of latex allergy (contact dermatitis) on chart and confirmed by patient. Will replace filgrastim-sndz (zarxio) with filgrastim-aaffi (Nievstym) due to latex stopper in zarxio. Started filgrastim-aaffi (Nievstym) on day +5 (8/28)    Chirag Ronquillo, PharmD  PGY-2 Critical Care Pharmacy Resident  Available via [employer] Teams

## 2024-09-04 NOTE — PROGRESS NOTE ADULT - SUBJECTIVE AND OBJECTIVE BOX
[facility] Transplant Team                                                      Critical / Counseling Time Provided: 30 minutes                                                                                                                                                        Chief Complaint: Autologous pbsct with high dose melphalan prep regimen for treatment of IgG lambda multiple myeloma    Disease: IgG lambda multiple myeloma  Type of transplant: Autologous  Conditioning prep: Melphalan (200 mg/m2)   ABO / CMV: O POS / POS     S: Patient seen and examined with [facility] Transplant Team:     O: Vitals:   Vital Signs Last 24 Hrs  T(C): 37 (04 Sep 2024 05:40), Max: 38.3 (03 Sep 2024 11:43)  T(F): 98.6 (04 Sep 2024 05:40), Max: 101 (03 Sep 2024 11:43)  HR: 105 (04 Sep 2024 05:40) (101 - 110)  BP: 108/66 (04 Sep 2024 05:40) (97/61 - 109/66)  BP(mean): --  RR: 18 (04 Sep 2024 05:40) (18 - 18)  SpO2: 96% (04 Sep 2024 05:40) (95% - 99%)    Parameters below as of 04 Sep 2024 05:40  Patient On (Oxygen Delivery Method): room air      Admit weight: 62.2kg   Daily Weight in k.9 (03 Sep 2024 07:55)    Intake / Output:    @ 07:01  -   @ 07:00  --------------------------------------------------------  IN: 4692 mL / OUT: 2385 mL / NET: 2307 mL      PE:   Oropharynx: no erythema or ulcerations   Oral Mucositis:              -                                          Grade: n/a   CVS: RR, +S1,S2  Lungs: CTA throughout bilaterally   Abdomen: + BS x 4, soft, ND, +mild lower abd discomfort in lower abdomen  Extremities: no edema in BLE  Gastric Mucositis:       +                                          ndGndrndanddndend:nd nd2nd Intestinal Mucositis:     -                                         Grade: n/a   Skin: no rashes   TLC: CDI   Neuro: A&Ox3   Pain: denies      Labs:     Cultures:   Culture Results:   No growth (24 @ 13:20)    Culture Results:   No growth at 48 Hours (24 @ 06:50)    Culture Results:   No growth at 48 Hours (24 @ 06:00)    Culture Results:   No growth at 48 Hours (24 @ 06:00)      Radiology:   ACC: [id number] EXAM:  XR CHEST PORTABLE URGENT 1V   ORDERED BY: ARNOLDO ASTUDILLO   PROCEDURE DATE:  2024    IMPRESSION:  Right-sided IJ catheter with tip in similar position comparedto prior.   No focal consolidation or pleural effusion..      Meds:   Antimicrobials:   acyclovir   Oral Tab/Cap 800 milliGRAM(s) Oral daily  fluconAZOLE   Tablet 200 milliGRAM(s) Oral daily  piperacillin/tazobactam IVPB.. 3.375 Gram(s) IV Intermittent every 12 hours  vancomycin    Solution 125 milliGRAM(s) Oral every 12 hours      Heme / Onc:       GI:  aluminum hydroxide/magnesium hydroxide/simethicone Suspension 30 milliLiter(s) Oral every 6 hours PRN  diphenoxylate/atropine 1 Tablet(s) Oral every 6 hours PRN  loperamide 4 milliGRAM(s) Oral every 4 hours PRN  pantoprazole    Tablet 40 milliGRAM(s) Oral before breakfast  simethicone 80 milliGRAM(s) Chew four times a day PRN  sodium bicarbonate Mouth Rinse 10 milliLiter(s) Swish and Spit five times a day      Cardiovascular:       Immunologic:   filgrastim-aafi (NIVESTYM) Injectable 300 MICROGram(s) SubCutaneous daily      Other medications:   Biotene Dry Mouth Oral Rinse 5 milliLiter(s) Swish and Spit five times a day  chlorhexidine 4% Liquid 1 Application(s) Topical <User Schedule>  nystatin Powder 1 Application(s) Topical two times a day  OLANZapine 5 milliGRAM(s) Oral at bedtime  phytonadione   Solution 5 milliGRAM(s) Oral <User Schedule>  sodium chloride 0.9%. 1000 milliLiter(s) IV Continuous <Continuous>  zinc oxide 40% Paste 1 Application(s) Topical two times a day      PRN:   acetaminophen     Tablet .. 650 milliGRAM(s) Oral every 6 hours PRN  aluminum hydroxide/magnesium hydroxide/simethicone Suspension 30 milliLiter(s) Oral every 6 hours PRN  benzocaine/menthol Lozenge 1 Lozenge Oral every 8 hours PRN  diphenoxylate/atropine 1 Tablet(s) Oral every 6 hours PRN  loperamide 4 milliGRAM(s) Oral every 4 hours PRN  LORazepam   Injectable 0.5 milliGRAM(s) IV Push every 8 hours PRN  ondansetron Injectable 8 milliGRAM(s) IV Push every 8 hours PRN  simethicone 80 milliGRAM(s) Chew four times a day PRN  sodium chloride 0.9% lock flush 10 milliLiter(s) IV Push every 1 hour PRN    A/P:  64 year old female with a history of multiple myeloma   Post:  Autologous PBSCT day + - HPC transplant today, continue hydration for 24 hours post infusion of cells. Daily weights, strict I&O, prn diuresis. Start levaquin / fluconazole prophylaxis when neutropenic.    c/o intermittent abd pain, AXR done, reading from radiologist requested. weight gain from admission, monitor closely, Lasix PRN    Zofran to PRN, switched Compazine-->reglan PRN  - refractory nausea / vomiting, will do trial of olanzapine. d/c reglan. Grade 1 chemotherapy induced GI mucositis. Continue supportive measures.    Check ECG while on Zyprexa +Levaquin   - Febrile, F/U BCX, PO antibiotics switched to Zosyn and Vanco.    - Rising S. Cr- 1.52, will D/C Vanco IV, started hydration NS @ 75 cc/hr for 24 hrs, renally adjust meds, Cr.Cl- 36 will continue Zosyn 4.5 gm Q 8 hrs, change Diflucan to 200 mg PO daily.    - Grade 1 transaminitis, will monitor closely  9/3 - +MAMADOU, f/u urine lytes, increase IVF's. Repeat BMP this afternoon. Dose adjust medications      1. Infectious Disease:   acyclovir   Oral Tab/Cap 800 milliGRAM(s) Oral daily  fluconAZOLE   Tablet 200 milliGRAM(s) Oral daily  piperacillin/tazobactam IVPB.. 3.375 Gram(s) IV Intermittent every 12 hours  vancomycin    Solution 125 milliGRAM(s) Oral every 12 hours    2. GI Prophylaxis:    pantoprazole    Tablet 40 milliGRAM(s) Oral before breakfast    3. Mouthcare - NS / NaHCO3 rinses, Biotene; Skin care     4. Transfuse & replete electrolytes prn     5. IV hydration, daily weights, strict I&O, prn diuresis     6. PO intake as tolerated, nutrition follow up as needed    7. Antiemetics, anti-diarrhea medications:   diphenoxylate/atropine 1 Tablet(s) Oral every 6 hours PRN  loperamide 4 milliGRAM(s) Oral every 4 hours PRN  LORazepam   Injectable 0.5 milliGRAM(s) IV Push every 8 hours PRN  ondansetron Injectable 8 milliGRAM(s) IV Push every 8 hours PRN  OLANZapine 5 milliGRAM(s) Oral at bedtime    8. OOB as tolerated, physical therapy consult if needed     9. Monitor coags / fibrinogen 2x week, vitamin K as needed   phytonadione   Solution 5 milliGRAM(s) Oral <User Schedule>    10. Monitor closely for clinical changes, monitor for fevers     11. Emotional support provided, plan of care discussed and questions addressed     12. Patient education done regarding plan of care, restrictions and discharge planning     13. Continue regular social work input     I have written the above note for Dr. Mckeon who performed service with me in the room.   Jessy Ferrell NP-C ([phone])    I have seen and examined patient with NP, I agree with above note as scribed.                    [facility] Transplant Team                                                      Critical / Counseling Time Provided: 30 minutes                                                                                                                                                        Chief Complaint: Autologous pbsct with high dose melphalan prep regimen for treatment of IgG lambda multiple myeloma    Disease: IgG lambda multiple myeloma  Type of transplant: Autologous  Conditioning prep: Melphalan (200 mg/m2)   ABO / CMV: O POS / POS     S: Patient seen and examined with [facility] Transplant Team:     O: Vitals:   Vital Signs Last 24 Hrs  T(C): 37 (04 Sep 2024 05:40), Max: 38.3 (03 Sep 2024 11:43)  T(F): 98.6 (04 Sep 2024 05:40), Max: 101 (03 Sep 2024 11:43)  HR: 105 (04 Sep 2024 05:40) (101 - 110)  BP: 108/66 (04 Sep 2024 05:40) (97/61 - 109/66)  BP(mean): --  RR: 18 (04 Sep 2024 05:40) (18 - 18)  SpO2: 96% (04 Sep 2024 05:40) (95% - 99%)    Parameters below as of 04 Sep 2024 05:40  Patient On (Oxygen Delivery Method): room air      Admit weight: 62.2kg   Daily Weight in k.9 (03 Sep 2024 07:55)    Intake / Output:    @ 07:01  -   @ 07:00  --------------------------------------------------------  IN: 4692 mL / OUT: 2385 mL / NET: 2307 mL      PE:   Oropharynx: no erythema or ulcerations   Oral Mucositis:              -                                          Grade: n/a   CVS: RR, +S1,S2  Lungs: CTA throughout bilaterally   Abdomen: + BS x 4, soft, ND, +mild lower abd discomfort in lower abdomen  Extremities: no edema in BLE  Gastric Mucositis:       +                                          ndGndrndanddndend:nd nd2nd Intestinal Mucositis:     -                                         Grade: n/a   Skin: no rashes   TLC: CDI   Neuro: A&Ox3   Pain: denies      Labs:                         6.9    0.79  )-----------( 14       ( 04 Sep 2024 06:58 )             20.4           138  |  112<H>  |  25<H>  ----------------------------<  84  3.6   |  14<L>  |  2.29<H>    Ca    8.4      04 Sep 2024 06:58  Phos  2.9       Mg     1.5         TPro  5.1<L>  /  Alb  2.8<L>  /  TBili  0.3  /  DBili  x   /  AST  19  /  ALT  26  /  AlkPhos  99        Cultures:   Culture Results:   No growth (24 @ 13:20)    Culture Results:   No growth at 48 Hours (24 @ 06:50)    Culture Results:   No growth at 48 Hours (24 @ 06:00)    Culture Results:   No growth at 48 Hours (24 @ 06:00)      Radiology:   ACC: [id number] EXAM:  XR CHEST PORTABLE URGENT 1V   ORDERED BY: ARNOLDO ASTUDILLO   PROCEDURE DATE:  2024    IMPRESSION:  Right-sided IJ catheter with tip in similar position comparedto prior.   No focal consolidation or pleural effusion..      Meds:   Antimicrobials:   acyclovir   Oral Tab/Cap 800 milliGRAM(s) Oral daily  fluconAZOLE   Tablet 200 milliGRAM(s) Oral daily  piperacillin/tazobactam IVPB.. 3.375 Gram(s) IV Intermittent every 12 hours  vancomycin    Solution 125 milliGRAM(s) Oral every 12 hours      Heme / Onc:       GI:  aluminum hydroxide/magnesium hydroxide/simethicone Suspension 30 milliLiter(s) Oral every 6 hours PRN  diphenoxylate/atropine 1 Tablet(s) Oral every 6 hours PRN  loperamide 4 milliGRAM(s) Oral every 4 hours PRN  pantoprazole    Tablet 40 milliGRAM(s) Oral before breakfast  simethicone 80 milliGRAM(s) Chew four times a day PRN  sodium bicarbonate Mouth Rinse 10 milliLiter(s) Swish and Spit five times a day      Cardiovascular:       Immunologic:   filgrastim-aafi (NIVESTYM) Injectable 300 MICROGram(s) SubCutaneous daily      Other medications:   Biotene Dry Mouth Oral Rinse 5 milliLiter(s) Swish and Spit five times a day  chlorhexidine 4% Liquid 1 Application(s) Topical <User Schedule>  nystatin Powder 1 Application(s) Topical two times a day  OLANZapine 5 milliGRAM(s) Oral at bedtime  phytonadione   Solution 5 milliGRAM(s) Oral <User Schedule>  sodium chloride 0.9%. 1000 milliLiter(s) IV Continuous <Continuous>  zinc oxide 40% Paste 1 Application(s) Topical two times a day      PRN:   acetaminophen     Tablet .. 650 milliGRAM(s) Oral every 6 hours PRN  aluminum hydroxide/magnesium hydroxide/simethicone Suspension 30 milliLiter(s) Oral every 6 hours PRN  benzocaine/menthol Lozenge 1 Lozenge Oral every 8 hours PRN  diphenoxylate/atropine 1 Tablet(s) Oral every 6 hours PRN  loperamide 4 milliGRAM(s) Oral every 4 hours PRN  LORazepam   Injectable 0.5 milliGRAM(s) IV Push every 8 hours PRN  ondansetron Injectable 8 milliGRAM(s) IV Push every 8 hours PRN  simethicone 80 milliGRAM(s) Chew four times a day PRN  sodium chloride 0.9% lock flush 10 milliLiter(s) IV Push every 1 hour PRN    A/P:  64 year old female with a history of multiple myeloma   Post:  Autologous PBSCT day + - HPC transplant today, continue hydration for 24 hours post infusion of cells. Daily weights, strict I&O, prn diuresis. Start levaquin / fluconazole prophylaxis when neutropenic.    c/o intermittent abd pain, AXR done, reading from radiologist requested. weight gain from admission, monitor closely, Lasix PRN    Zofran to PRN, switched Compazine-->reglan PRN  - refractory nausea / vomiting, will do trial of olanzapine. d/c reglan. Grade 1 chemotherapy induced GI mucositis. Continue supportive measures.    Check ECG while on Zyprexa +Levaquin   - Febrile, F/U BCX, PO antibiotics switched to Zosyn and Vanco.    - Rising S. Cr- 1.52, will D/C Vanco IV, started hydration NS @ 75 cc/hr for 24 hrs, renally adjust meds, Cr.Cl- 36 will continue Zosyn 4.5 gm Q 8 hrs, change Diflucan to 200 mg PO daily.    - Grade 1 transaminitis, will monitor closely  9/3 - +MAMADOU, f/u urine lytes, increase IVF's. Repeat BMP this afternoon. Dose adjust medications  - non anion gap metabolic acidosis - likely secondary to diarrhea and / or MAMADOU, MAMADOU likely secondary to intravascular volume depletion. Creatinine improved today with increased IVF (NS) overnight, however hyperchloremia noted. Will change IVF to D5W with 3 amps NaHCO3- at 100ml /hr, repeat BMP at 3pm. Quantify diarrhea, continue lomotil. C diff negative.        1. Infectious Disease:   acyclovir   Oral Tab/Cap 800 milliGRAM(s) Oral daily  fluconAZOLE   Tablet 200 milliGRAM(s) Oral daily  piperacillin/tazobactam IVPB.. 3.375 Gram(s) IV Intermittent every 12 hours  vancomycin    Solution 125 milliGRAM(s) Oral every 12 hours    2. GI Prophylaxis:    pantoprazole    Tablet 40 milliGRAM(s) Oral before breakfast    3. Mouthcare - NS / NaHCO3 rinses, Biotene; Skin care     4. Transfuse & replete electrolytes prn   1 unit PRBC     5. IV hydration, daily weights, strict I&O, prn diuresis     6. PO intake as tolerated, nutrition follow up as needed    7. Antiemetics, anti-diarrhea medications:   diphenoxylate/atropine 1 Tablet(s) Oral every 6 hours PRN  loperamide 4 milliGRAM(s) Oral every 4 hours PRN  LORazepam   Injectable 0.5 milliGRAM(s) IV Push every 8 hours PRN  ondansetron Injectable 8 milliGRAM(s) IV Push every 8 hours PRN  OLANZapine 5 milliGRAM(s) Oral at bedtime    8. OOB as tolerated, physical therapy consult if needed     9. Monitor coags / fibrinogen 2x week, vitamin K as needed   phytonadione   Solution 5 milliGRAM(s) Oral <User Schedule>    10. Monitor closely for clinical changes, monitor for fevers     11. Emotional support provided, plan of care discussed and questions addressed     12. Patient education done regarding plan of care, restrictions and discharge planning     13. Continue regular social work input     I have written the above note for Dr. Mckeon who performed service with me in the room.   Jessy Ferrell NP-C ([phone])    I have seen and examined patient with NP, I agree with above note as scribed.                    [facility] Transplant Team                                                      Critical / Counseling Time Provided: 30 minutes                                                                                                                                                        Chief Complaint: Autologous pbsct with high dose melphalan prep regimen for treatment of IgG lambda multiple myeloma    Disease: IgG lambda multiple myeloma  Type of transplant: Autologous  Conditioning prep: Melphalan (200 mg/m2)   ABO / CMV: O POS / POS     S: Patient seen and examined with [facility] Transplant Team:   + fatigue  + diarrhea     O: Vitals:   Vital Signs Last 24 Hrs  T(C): 37 (04 Sep 2024 05:40), Max: 38.3 (03 Sep 2024 11:43)  T(F): 98.6 (04 Sep 2024 05:40), Max: 101 (03 Sep 2024 11:43)  HR: 105 (04 Sep 2024 05:40) (101 - 110)  BP: 108/66 (04 Sep 2024 05:40) (97/61 - 109/66)  BP(mean): --  RR: 18 (04 Sep 2024 05:40) (18 - 18)  SpO2: 96% (04 Sep 2024 05:40) (95% - 99%)    Parameters below as of 04 Sep 2024 05:40  Patient On (Oxygen Delivery Method): room air      Admit weight: 62.2kg   Daily Weight in k.9 (03 Sep 2024 07:55)    Intake / Output:    @ 07:01  -   @ 07:00  --------------------------------------------------------  IN: 4692 mL / OUT: 2385 mL / NET: 2307 mL      PE:   Oropharynx: no erythema or ulcerations   Oral Mucositis:              -                                          Grade: n/a   CVS: RR, +S1,S2  Lungs: CTA throughout bilaterally   Abdomen: + BS x 4, soft, ND, +mild lower abd discomfort in lower abdomen  Extremities: no edema in BLE  Gastric Mucositis:       +                                          ndGndrndanddndend:nd nd2nd Intestinal Mucositis:     -                                         Grade: n/a   Skin: no rashes   TLC: CDI   Neuro: A&Ox3   Pain: denies      Labs:                         6.9    0.79  )-----------( 14       ( 04 Sep 2024 06:58 )             20.4       09-04    138  |  112<H>  |  25<H>  ----------------------------<  84  3.6   |  14<L>  |  2.29<H>    Ca    8.4      04 Sep 2024 06:58  Phos  2.9       Mg     1.5         TPro  5.1<L>  /  Alb  2.8<L>  /  TBili  0.3  /  DBili  x   /  AST  19  /  ALT  26  /  AlkPhos  99        Cultures:   Culture Results:   No growth (24 @ 13:20)    Culture Results:   No growth at 48 Hours (24 @ 06:50)    Culture Results:   No growth at 48 Hours (24 @ 06:00)    Culture Results:   No growth at 48 Hours (24 @ 06:00)      Radiology:   ACC: [id number] EXAM:  XR CHEST PORTABLE URGENT 1V   ORDERED BY: ARNOLDO ASUTDILLO   PROCEDURE DATE:  2024    IMPRESSION:  Right-sided IJ catheter with tip in similar position comparedto prior.   No focal consolidation or pleural effusion..      Meds:   Antimicrobials:   acyclovir   Oral Tab/Cap 800 milliGRAM(s) Oral daily  fluconAZOLE   Tablet 200 milliGRAM(s) Oral daily  piperacillin/tazobactam IVPB.. 3.375 Gram(s) IV Intermittent every 12 hours  vancomycin    Solution 125 milliGRAM(s) Oral every 12 hours      Heme / Onc:       GI:  aluminum hydroxide/magnesium hydroxide/simethicone Suspension 30 milliLiter(s) Oral every 6 hours PRN  diphenoxylate/atropine 1 Tablet(s) Oral every 6 hours PRN  loperamide 4 milliGRAM(s) Oral every 4 hours PRN  pantoprazole    Tablet 40 milliGRAM(s) Oral before breakfast  simethicone 80 milliGRAM(s) Chew four times a day PRN  sodium bicarbonate Mouth Rinse 10 milliLiter(s) Swish and Spit five times a day      Cardiovascular:       Immunologic:   filgrastim-aafi (NIVESTYM) Injectable 300 MICROGram(s) SubCutaneous daily      Other medications:   Biotene Dry Mouth Oral Rinse 5 milliLiter(s) Swish and Spit five times a day  chlorhexidine 4% Liquid 1 Application(s) Topical <User Schedule>  nystatin Powder 1 Application(s) Topical two times a day  OLANZapine 5 milliGRAM(s) Oral at bedtime  phytonadione   Solution 5 milliGRAM(s) Oral <User Schedule>  sodium chloride 0.9%. 1000 milliLiter(s) IV Continuous <Continuous>  zinc oxide 40% Paste 1 Application(s) Topical two times a day      PRN:   acetaminophen     Tablet .. 650 milliGRAM(s) Oral every 6 hours PRN  aluminum hydroxide/magnesium hydroxide/simethicone Suspension 30 milliLiter(s) Oral every 6 hours PRN  benzocaine/menthol Lozenge 1 Lozenge Oral every 8 hours PRN  diphenoxylate/atropine 1 Tablet(s) Oral every 6 hours PRN  loperamide 4 milliGRAM(s) Oral every 4 hours PRN  LORazepam   Injectable 0.5 milliGRAM(s) IV Push every 8 hours PRN  ondansetron Injectable 8 milliGRAM(s) IV Push every 8 hours PRN  simethicone 80 milliGRAM(s) Chew four times a day PRN  sodium chloride 0.9% lock flush 10 milliLiter(s) IV Push every 1 hour PRN    A/P:  64 year old female with a history of multiple myeloma   Post:  Autologous PBSCT day + - HPC transplant today, continue hydration for 24 hours post infusion of cells. Daily weights, strict I&O, prn diuresis. Start levaquin / fluconazole prophylaxis when neutropenic.    c/o intermittent abd pain, AXR done, reading from radiologist requested. weight gain from admission, monitor closely, Lasix PRN    Zofran to PRN, switched Compazine-->reglan PRN  - refractory nausea / vomiting, will do trial of olanzapine. d/c reglan. Grade 1 chemotherapy induced GI mucositis. Continue supportive measures.    Check ECG while on Zyprexa +Levaquin   - Febrile, F/U BCX, PO antibiotics switched to Zosyn and Vanco.    - Rising S. Cr- 1.52, will D/C Vanco IV, started hydration NS @ 75 cc/hr for 24 hrs, renally adjust meds, Cr.Cl- 36 will continue Zosyn 4.5 gm Q 8 hrs, change Diflucan to 200 mg PO daily.    - Grade 1 transaminitis, will monitor closely  9/3 - +MAMADOU, f/u urine lytes, increase IVF's. Repeat BMP this afternoon. Dose adjust medications  - non anion gap metabolic acidosis - likely secondary to diarrhea and / or MAMADOU, MAMADOU likely secondary to intravascular volume depletion. Creatinine improved today with increased IVF (NS) overnight, however hyperchloremia noted. Will change IVF to D5W with 3 amps NaHCO3- at 100ml /hr, repeat BMP at 3pm. Quantify diarrhea, continue lomotil. C diff negative.        1. Infectious Disease:   acyclovir   Oral Tab/Cap 800 milliGRAM(s) Oral daily  fluconAZOLE   Tablet 200 milliGRAM(s) Oral daily  piperacillin/tazobactam IVPB.. 3.375 Gram(s) IV Intermittent every 12 hours  vancomycin    Solution 125 milliGRAM(s) Oral every 12 hours    2. GI Prophylaxis:    pantoprazole    Tablet 40 milliGRAM(s) Oral before breakfast    3. Mouthcare - NS / NaHCO3 rinses, Biotene; Skin care     4. Transfuse & replete electrolytes prn   1 unit PRBC     5. IV hydration, daily weights, strict I&O, prn diuresis     6. PO intake as tolerated, nutrition follow up as needed    7. Antiemetics, anti-diarrhea medications:   diphenoxylate/atropine 1 Tablet(s) Oral every 6 hours PRN  loperamide 4 milliGRAM(s) Oral every 4 hours PRN  LORazepam   Injectable 0.5 milliGRAM(s) IV Push every 8 hours PRN  ondansetron Injectable 8 milliGRAM(s) IV Push every 8 hours PRN  OLANZapine 5 milliGRAM(s) Oral at bedtime    8. OOB as tolerated, physical therapy consult if needed     9. Monitor coags / fibrinogen 2x week, vitamin K as needed   phytonadione   Solution 5 milliGRAM(s) Oral <User Schedule>    10. Monitor closely for clinical changes, monitor for fevers     11. Emotional support provided, plan of care discussed and questions addressed     12. Patient education done regarding plan of care, restrictions and discharge planning     13. Continue regular social work input     I have written the above note for Dr. Mckeon who performed service with me in the room.   Jessy Ferrell NP-C ([phone])    I have seen and examined patient with NP, I agree with above note as scribed.

## 2024-09-05 LAB
ALBUMIN SERPL ELPH-MCNC: 3 G/DL — LOW (ref 3.3–5)
ALP SERPL-CCNC: 118 U/L — SIGNIFICANT CHANGE UP (ref 40–120)
ALT FLD-CCNC: 21 U/L — SIGNIFICANT CHANGE UP (ref 10–45)
ANION GAP SERPL CALC-SCNC: 14 MMOL/L — SIGNIFICANT CHANGE UP (ref 5–17)
APPEARANCE UR: CLEAR — SIGNIFICANT CHANGE UP
APTT BLD: 30 SEC — SIGNIFICANT CHANGE UP (ref 24.5–35.6)
AST SERPL-CCNC: 19 U/L — SIGNIFICANT CHANGE UP (ref 10–40)
BACTERIA # UR AUTO: NEGATIVE /HPF — SIGNIFICANT CHANGE UP
BASOPHILS # BLD AUTO: 0 K/UL — SIGNIFICANT CHANGE UP (ref 0–0.2)
BASOPHILS NFR BLD AUTO: 0 % — SIGNIFICANT CHANGE UP (ref 0–2)
BILIRUB SERPL-MCNC: 0.4 MG/DL — SIGNIFICANT CHANGE UP (ref 0.2–1.2)
BILIRUB UR-MCNC: NEGATIVE — SIGNIFICANT CHANGE UP
BUN SERPL-MCNC: 19 MG/DL — SIGNIFICANT CHANGE UP (ref 7–23)
CALCIUM SERPL-MCNC: 9 MG/DL — SIGNIFICANT CHANGE UP (ref 8.4–10.5)
CAST: 0 /LPF — SIGNIFICANT CHANGE UP (ref 0–4)
CHLORIDE SERPL-SCNC: 104 MMOL/L — SIGNIFICANT CHANGE UP (ref 96–108)
CO2 SERPL-SCNC: 22 MMOL/L — SIGNIFICANT CHANGE UP (ref 22–31)
COLOR SPEC: YELLOW — SIGNIFICANT CHANGE UP
CREAT SERPL-MCNC: 1.76 MG/DL — HIGH (ref 0.5–1.3)
DIFF PNL FLD: ABNORMAL
EGFR: 32 ML/MIN/1.73M2 — LOW
EOSINOPHIL # BLD AUTO: 0 K/UL — SIGNIFICANT CHANGE UP (ref 0–0.5)
EOSINOPHIL NFR BLD AUTO: 0 % — SIGNIFICANT CHANGE UP (ref 0–6)
GIANT PLATELETS BLD QL SMEAR: PRESENT — SIGNIFICANT CHANGE UP
GLUCOSE SERPL-MCNC: 124 MG/DL — HIGH (ref 70–99)
GLUCOSE UR QL: NEGATIVE MG/DL — SIGNIFICANT CHANGE UP
HCT VFR BLD CALC: 24.7 % — LOW (ref 34.5–45)
HGB BLD-MCNC: 8.6 G/DL — LOW (ref 11.5–15.5)
INR BLD: 1.08 RATIO — SIGNIFICANT CHANGE UP (ref 0.85–1.18)
KETONES UR-MCNC: NEGATIVE MG/DL — SIGNIFICANT CHANGE UP
LDH SERPL L TO P-CCNC: 193 U/L — SIGNIFICANT CHANGE UP (ref 50–242)
LEUKOCYTE ESTERASE UR-ACNC: NEGATIVE — SIGNIFICANT CHANGE UP
LYMPHOCYTES # BLD AUTO: 0.5 K/UL — LOW (ref 1–3.3)
LYMPHOCYTES # BLD AUTO: 10.7 % — LOW (ref 13–44)
MAGNESIUM SERPL-MCNC: 2.4 MG/DL — SIGNIFICANT CHANGE UP (ref 1.6–2.6)
MANUAL SMEAR VERIFICATION: SIGNIFICANT CHANGE UP
MCHC RBC-ENTMCNC: 30.3 PG — SIGNIFICANT CHANGE UP (ref 27–34)
MCHC RBC-ENTMCNC: 34.8 GM/DL — SIGNIFICANT CHANGE UP (ref 32–36)
MCV RBC AUTO: 87 FL — SIGNIFICANT CHANGE UP (ref 80–100)
METAMYELOCYTES # FLD: 2.7 % — HIGH (ref 0–0)
MONOCYTES # BLD AUTO: 0.3 K/UL — SIGNIFICANT CHANGE UP (ref 0–0.9)
MONOCYTES NFR BLD AUTO: 6.3 % — SIGNIFICANT CHANGE UP (ref 2–14)
NEUTROPHILS # BLD AUTO: 3.77 K/UL — SIGNIFICANT CHANGE UP (ref 1.8–7.4)
NEUTROPHILS NFR BLD AUTO: 70.5 % — SIGNIFICANT CHANGE UP (ref 43–77)
NEUTS BAND # BLD: 9.8 % — HIGH (ref 0–8)
NITRITE UR-MCNC: NEGATIVE — SIGNIFICANT CHANGE UP
PH UR: 5.5 — SIGNIFICANT CHANGE UP (ref 5–8)
PHOSPHATE SERPL-MCNC: 2.3 MG/DL — LOW (ref 2.5–4.5)
PLAT MORPH BLD: ABNORMAL
PLATELET # BLD AUTO: 26 K/UL — LOW (ref 150–400)
POTASSIUM SERPL-MCNC: 3 MMOL/L — LOW (ref 3.5–5.3)
POTASSIUM SERPL-SCNC: 3 MMOL/L — LOW (ref 3.5–5.3)
PROT SERPL-MCNC: 5.4 G/DL — LOW (ref 6–8.3)
PROT UR-MCNC: NEGATIVE MG/DL — SIGNIFICANT CHANGE UP
PROTHROM AB SERPL-ACNC: 11.3 SEC — SIGNIFICANT CHANGE UP (ref 9.5–13)
RBC # BLD: 2.84 M/UL — LOW (ref 3.8–5.2)
RBC # FLD: 13.7 % — SIGNIFICANT CHANGE UP (ref 10.3–14.5)
RBC BLD AUTO: SIGNIFICANT CHANGE UP
RBC CASTS # UR COMP ASSIST: 2 /HPF — SIGNIFICANT CHANGE UP (ref 0–4)
SODIUM SERPL-SCNC: 140 MMOL/L — SIGNIFICANT CHANGE UP (ref 135–145)
SP GR SPEC: 1.01 — SIGNIFICANT CHANGE UP (ref 1–1.03)
SQUAMOUS # UR AUTO: 2 /HPF — SIGNIFICANT CHANGE UP (ref 0–5)
UROBILINOGEN FLD QL: 0.2 MG/DL — SIGNIFICANT CHANGE UP (ref 0.2–1)
WBC # BLD: 4.69 K/UL — SIGNIFICANT CHANGE UP (ref 3.8–10.5)
WBC # FLD AUTO: 4.69 K/UL — SIGNIFICANT CHANGE UP (ref 3.8–10.5)
WBC UR QL: 1 /HPF — SIGNIFICANT CHANGE UP (ref 0–5)

## 2024-09-05 PROCEDURE — 99233 SBSQ HOSP IP/OBS HIGH 50: CPT

## 2024-09-05 RX ORDER — POTASSIUM CHLORIDE 10 MEQ
20 TABLET, EXT RELEASE, PARTICLES/CRYSTALS ORAL
Refills: 0 | Status: COMPLETED | OUTPATIENT
Start: 2024-09-05 | End: 2024-09-05

## 2024-09-05 RX ADMIN — Medication 1 APPLICATION(S): at 05:13

## 2024-09-05 RX ADMIN — Medication 75 MILLILITER(S): at 22:59

## 2024-09-05 RX ADMIN — ACETAMINOPHEN 650 MILLIGRAM(S): 325 TABLET ORAL at 21:30

## 2024-09-05 RX ADMIN — TIZANIDINE 4 MILLIGRAM(S): 4 TABLET ORAL at 21:30

## 2024-09-05 RX ADMIN — ONDANSETRON 8 MILLIGRAM(S): 2 INJECTION, SOLUTION INTRAMUSCULAR; INTRAVENOUS at 17:36

## 2024-09-05 RX ADMIN — Medication 5 MILLILITER(S): at 16:16

## 2024-09-05 RX ADMIN — Medication 50 MILLIEQUIVALENT(S): at 09:03

## 2024-09-05 RX ADMIN — Medication 5 MILLIGRAM(S): at 09:04

## 2024-09-05 RX ADMIN — LORAZEPAM 0.5 MILLIGRAM(S): 4 INJECTION INTRAMUSCULAR; INTRAVENOUS at 21:31

## 2024-09-05 RX ADMIN — Medication 1 APPLICATION(S): at 17:37

## 2024-09-05 RX ADMIN — SODIUM BICARBONATE 100 MEQ/KG/HR: 84 INJECTION, SOLUTION INTRAVENOUS at 00:31

## 2024-09-05 RX ADMIN — SODIUM BICARBONATE 10 MILLILITER(S): 84 INJECTION, SOLUTION INTRAVENOUS at 09:03

## 2024-09-05 RX ADMIN — CHLORHEXIDINE GLUCONATE 1 APPLICATION(S): 40 SOLUTION TOPICAL at 09:04

## 2024-09-05 RX ADMIN — Medication 5 MILLILITER(S): at 20:12

## 2024-09-05 RX ADMIN — Medication 5 MILLILITER(S): at 09:03

## 2024-09-05 RX ADMIN — TIZANIDINE 4 MILLIGRAM(S): 4 TABLET ORAL at 01:18

## 2024-09-05 RX ADMIN — SODIUM BICARBONATE 10 MILLILITER(S): 84 INJECTION, SOLUTION INTRAVENOUS at 16:16

## 2024-09-05 RX ADMIN — ZINC OXIDE 1 APPLICATION(S): 100 OINTMENT TOPICAL at 05:13

## 2024-09-05 RX ADMIN — Medication 75 MILLILITER(S): at 10:08

## 2024-09-05 RX ADMIN — FILGRASTIM 300 MICROGRAM(S): 300 INJECTION, SOLUTION INTRAVENOUS; SUBCUTANEOUS at 15:00

## 2024-09-05 RX ADMIN — Medication 40 MILLIGRAM(S): at 05:16

## 2024-09-05 RX ADMIN — TIZANIDINE 4 MILLIGRAM(S): 4 TABLET ORAL at 17:38

## 2024-09-05 RX ADMIN — PIPERACILLIN SODIUM AND TAZOBACTAM SODIUM 25 GRAM(S): 3; .375 INJECTION, POWDER, FOR SOLUTION INTRAVENOUS at 05:12

## 2024-09-05 RX ADMIN — SODIUM BICARBONATE 10 MILLILITER(S): 84 INJECTION, SOLUTION INTRAVENOUS at 20:12

## 2024-09-05 RX ADMIN — Medication 50 MILLIEQUIVALENT(S): at 12:32

## 2024-09-05 RX ADMIN — Medication 125 MILLIGRAM(S): at 05:13

## 2024-09-05 RX ADMIN — Medication 50 MILLIEQUIVALENT(S): at 13:53

## 2024-09-05 RX ADMIN — ZINC OXIDE 1 APPLICATION(S): 100 OINTMENT TOPICAL at 17:37

## 2024-09-05 RX ADMIN — ACETAMINOPHEN 650 MILLIGRAM(S): 325 TABLET ORAL at 23:00

## 2024-09-05 RX ADMIN — TIZANIDINE 4 MILLIGRAM(S): 4 TABLET ORAL at 09:00

## 2024-09-05 NOTE — PHARMACOTHERAPY INTERVENTION NOTE - COMMENTS
64-year-old female with PMH of HTN and IgG lambda multiple myeloma treated with Rosalind-RVD x4 cycles and admitted for autologous HSCT with melphalan 200 mg/m2 conditioning. Today is day +12. Course has been complicated by nausea, diarrhea, anxiety, MAMADOU, and engraftment syndrome.    Patient is no longer neutropenic (ANC= [id number]) though spiked a fever on 9/1 of 100.9F, was started on pip/tazo 4.5 q8h (9/1-9/5) and vancomycin 1250 q24h (received 1 dose of vanco IV before discontinuing). The patient continues to be febrile, though thought to be engraftment. Due to negative cultures and neutrophil improvement, recommended to d/c pip/tazo 3.375 mg q12h, fluconazole 200 mg q24h, vancomycin  mg q12H as no longer neutropenic. The patients renal function has improved slightly (scr down from 2.3-->1.74), crcl=26. Can also recommend increasing frequency of acyclovir to 800 mg PO q12h.  Will continue to monitor renal/hepatic function for dose adjustments.    Patient complaining of nausea and reports leg tremors with PRN prochlorperazine. Prochlorperazine was switched to metoclopramide with good relief, though no longer on it. Ondansetron was switched from ATC to PRN (2 doses/24 hours). Patient reports that nausea is not anticipatory and not caused by anxiety, in spite of having good relief from lorazepam. On the following for nausea: Olanzapine 5 mg PO at bedtime (started 8/28), ondansetron 8 mg IV every 8 hours PRN (first line for N/V), lorazepam 1 mg IV every 8 hours (first line for anxiety, second line for N/V). QTc on 8/30 was 441msec (goal <500 msec). Patient's nausea is much improved, since starting the olanzapine. Recommended to discontinue olanzapine and monitor s/s of N/V.    Patient has chronic diarrhea, had ~9 bowel movements (BM) in the last 24 hours, and received 3 doses of Lomotil and 3 dose of loperamide. Though negative for C.Diff.. Will continue to monitor BM's and getting a GI PCR.    History of latex allergy (contact dermatitis) on chart and confirmed by patient. Will replace filgrastim-sndz (zarxio) with filgrastim-aaffi (Nievstym) due to latex stopper in zarxio. Started filgrastim-aaffi (Nievstym) on day +5 (8/28).  Will receive last dose of filgrastim today with neutrophil recovery.    Chirag Ronquillo, PharmD  PGY-2 Critical Care Pharmacy Resident  Available via [employer] Teams

## 2024-09-05 NOTE — PROGRESS NOTE ADULT - NS ATTEND AMEND GEN_ALL_CORE FT
64 year old female with a history of multiple myeloma admitted for auto-HSCT Day + 13 today ...engrafting...watery green stools with incontinence..cdiff neg..will check stool gi pcr    Disease: IgG lambda multiple myeloma  Type of transplant: Autologous  Conditioning prep: Melphalan (200 mg/m2)   ABO / CMV: O POS / POS    Complications:   - Day 0: nausea/vomiting, abdominal pain   -Day 1: B/L lower extremities tremors --> compazine D/C, replaced by reglan     I rounded with the team of nursing staff, ACP.  I reviewed the data.  I saw and examined the patient and answered her questions.   Patient's nausea improved on Zyprexa; Zofran and Ativan PRN. Reglan discontinued while on Zyprexa  Has frequent bowel movements, but this has been chronic with use of Imodium at home. Today, multiple episodes of watery diarrhea...if continues check GI pcr....pt does describe lower abdominal cramping  Labs reviewed, supplements lytes prn  Neutropenic fever(9/1)- order cultures and CXR;  start Zosyn and Vanco IV; D/C Levaquin. Continue Acyclovir, Fluconazole, Vanco PO prophylaxis. 9/2 given rise in creatinine d/c vanco..cx neg thus far....  given  rise in creatinine increased ivf..switch to d5 with bicarb today...creatinine improved today..meds adjusted..consider renal consult if not continuing to improve...likely multi factorial... related to drug, dehydration and engraftment  NIVESTYM 300 MICROGram(s) SubCutaneous daily, started on day +5  Continue supportive care.  OOB/ambulate 64 year old female with a history of multiple myeloma admitted for auto-HSCT Day + 13 today ...engrafting...watery green stools with incontinence..cdiff neg..will check stool gi pcr if continues    Disease: IgG lambda multiple myeloma  Type of transplant: Autologous  Conditioning prep: Melphalan (200 mg/m2)   ABO / CMV: O POS / POS    Complications:   - Day 0: nausea/vomiting, abdominal pain   -Day 1: B/L lower extremities tremors --> compazine D/C, replaced by reglan     I rounded with the team of nursing staff, ACP.  I reviewed the data.  I saw and examined the patient and answered her questions.   Patient's nausea improved on Zyprexa; Zofran and Ativan PRN. Reglan discontinued while on Zyprexa  Has frequent bowel movements, but this has been chronic with use of Imodium at home. Today, multiple episodes of watery diarrhea...if continues check GI pcr....pt does describe lower abdominal cramping  Labs reviewed, supplements lytes prn  Neutropenic fever(9/1)- order cultures and CXR;  start Zosyn and Vanco IV; D/C Levaquin. Continue Acyclovir, Fluconazole, Vanco PO prophylaxis. 9/2 given rise in creatinine d/c vanco..cx neg thus far....d/c zosyn 9/5  given  rise in creatinine increased ivf..switch to LR today and decrease rate.....creatinine improved today..meds adjusted..consider renal consult if not continuing to improve...likely multi factorial... related to drug, dehydration and engraftment  NIVESTYM 300 MICROGram(s) SubCutaneous daily, started on day +5..will likely d/c after today's dose  Continue supportive care.  OOB/ambulate

## 2024-09-05 NOTE — PROGRESS NOTE ADULT - SUBJECTIVE AND OBJECTIVE BOX
[facility] Transplant Team                                                      Critical / Counseling Time Provided: 30 minutes                                                                                                                                                        Chief Complaint: Autologous pbsct with high dose melphalan prep regimen for treatment of IgG lambda multiple myeloma    Disease: IgG lambda multiple myeloma  Type of transplant: Autologous  Conditioning prep: Melphalan (200 mg/m2)   ABO / CMV: O POS / POS     S: Patient seen and examined with [facility] Transplant Team:       O: Vitals:   Vital Signs Last 24 Hrs  T(C): 37.3 (05 Sep 2024 05:20), Max: 38.1 (04 Sep 2024 21:10)  T(F): 99.1 (05 Sep 2024 05:20), Max: 100.6 (04 Sep 2024 21:10)  HR: 105 (05 Sep 2024 05:20) (102 - 108)  BP: 108/66 (05 Sep 2024 05:20) (108/66 - 126/75)  BP(mean): --  RR: 18 (05 Sep 2024 05:20) (18 - 19)  SpO2: 96% (05 Sep 2024 05:20) (96% - 98%)    Parameters below as of 05 Sep 2024 05:20  Patient On (Oxygen Delivery Method): room air        Admit weight: 62.2kg   Daily Weight in k.2 (04 Sep 2024 08:36)    Intake / Output:    @ 07:01  -   @ 07:00  --------------------------------------------------------  IN: 2864 mL / OUT: 2265 mL / NET: 599 mL      PE:   Oropharynx: no erythema or ulcerations   Oral Mucositis:              -                                          Grade: n/a   CVS: RR, +S1,S2  Lungs: CTA throughout bilaterally   Abdomen: + BS x 4, soft, ND, +mild lower abd discomfort in lower abdomen  Extremities: no edema in BLE  Gastric Mucositis:       +                                          ndGndrndanddndend:nd nd2nd Intestinal Mucositis:     -                                         Grade: n/a   Skin: no rashes   TLC: CDI   Neuro: A&Ox3   Pain: denies    Labs:     Cultures:   Culture Results:   No growth (24 @ 13:20)    Culture Results:   No growth at 48 Hours (24 @ 06:50)    Culture Results:   No growth at 48 Hours (24 @ 06:00)    Culture Results:   No growth at 48 Hours (24 @ 06:00)      Radiology:   ACC: [id number] EXAM:  XR CHEST PORTABLE URGENT 1V   ORDERED BY: ARNOLDO ASTUDILLO   PROCEDURE DATE:  2024    IMPRESSION:  Right-sided IJ catheter with tip in similar position comparedto prior.   No focal consolidation or pleural effusion..    Meds:   Antimicrobials:   acyclovir   Oral Tab/Cap 800 milliGRAM(s) Oral daily  fluconAZOLE   Tablet 200 milliGRAM(s) Oral daily  piperacillin/tazobactam IVPB.. 3.375 Gram(s) IV Intermittent every 12 hours  vancomycin    Solution 125 milliGRAM(s) Oral every 12 hours      Heme / Onc:       GI:  aluminum hydroxide/magnesium hydroxide/simethicone Suspension 30 milliLiter(s) Oral every 6 hours PRN  diphenoxylate/atropine 1 Tablet(s) Oral every 6 hours PRN  loperamide 4 milliGRAM(s) Oral every 4 hours PRN  pantoprazole    Tablet 40 milliGRAM(s) Oral before breakfast  simethicone 80 milliGRAM(s) Chew four times a day PRN  sodium bicarbonate Mouth Rinse 10 milliLiter(s) Swish and Spit five times a day      Cardiovascular:       Immunologic:   filgrastim-aafi (NIVESTYM) Injectable 300 MICROGram(s) SubCutaneous daily      Other medications:   Biotene Dry Mouth Oral Rinse 5 milliLiter(s) Swish and Spit five times a day  chlorhexidine 4% Liquid 1 Application(s) Topical <User Schedule>  nystatin Powder 1 Application(s) Topical two times a day  OLANZapine 5 milliGRAM(s) Oral at bedtime  phytonadione   Solution 5 milliGRAM(s) Oral <User Schedule>  sodium bicarbonate  Infusion 0.241 mEq/kG/Hr IV Continuous <Continuous>  sodium chloride 0.9%. 1000 milliLiter(s) IV Continuous <Continuous>  zinc oxide 40% Paste 1 Application(s) Topical two times a day      PRN:   acetaminophen     Tablet .. 650 milliGRAM(s) Oral every 6 hours PRN  aluminum hydroxide/magnesium hydroxide/simethicone Suspension 30 milliLiter(s) Oral every 6 hours PRN  artificial tears (preservative free) Ophthalmic Solution 1 Drop(s) Both EYES every 3 hours PRN  benzocaine/menthol Lozenge 1 Lozenge Oral every 8 hours PRN  diphenoxylate/atropine 1 Tablet(s) Oral every 6 hours PRN  loperamide 4 milliGRAM(s) Oral every 4 hours PRN  LORazepam   Injectable 0.5 milliGRAM(s) IV Push every 8 hours PRN  ondansetron Injectable 8 milliGRAM(s) IV Push every 8 hours PRN  petrolatum Ophthalmic Ointment 1 Application(s) Both EYES at bedtime PRN  simethicone 80 milliGRAM(s) Chew four times a day PRN  sodium chloride 0.9% lock flush 10 milliLiter(s) IV Push every 1 hour PRN    A/P:  64 year old female with a history of multiple myeloma   Post:  Autologous PBSCT day + - HPC transplant today, continue hydration for 24 hours post infusion of cells. Daily weights, strict I&O, prn diuresis. Start levaquin / fluconazole prophylaxis when neutropenic.    c/o intermittent abd pain, AXR done, reading from radiologist requested. weight gain from admission, monitor closely, Lasix PRN    Zofran to PRN, switched Compazine-->reglan PRN  - refractory nausea / vomiting, will do trial of olanzapine. d/c reglan. Grade 1 chemotherapy induced GI mucositis. Continue supportive measures.    Check ECG while on Zyprexa +Levaquin   - Febrile, F/U BCX, PO antibiotics switched to Zosyn and Vanco.    - Rising S. Cr- 1.52, will D/C Vanco IV, started hydration NS @ 75 cc/hr for 24 hrs, renally adjust meds, Cr.Cl- 36 will continue Zosyn 4.5 gm Q 8 hrs, change Diflucan to 200 mg PO daily.    - Grade 1 transaminitis, will monitor closely  9/3 - +MAMADOU, f/u urine lytes, increase IVF's. Repeat BMP this afternoon. Dose adjust medications  - non anion gap metabolic acidosis - likely secondary to diarrhea and / or MAMADOU, MAMADOU likely secondary to intravascular volume depletion. Creatinine improved today with increased IVF (NS) overnight, however hyperchloremia noted. Will change IVF to D5W with 3 amps NaHCO3- at 100ml /hr, repeat BMP at 3pm. Quantify diarrhea, continue lomotil. C diff negative.        1. Infectious Disease:   acyclovir   Oral Tab/Cap 800 milliGRAM(s) Oral daily  fluconAZOLE   Tablet 200 milliGRAM(s) Oral daily  piperacillin/tazobactam IVPB.. 3.375 Gram(s) IV Intermittent every 12 hours  vancomycin    Solution 125 milliGRAM(s) Oral every 12 hours    2. GI Prophylaxis:    pantoprazole    Tablet 40 milliGRAM(s) Oral before breakfast    3. Mouthcare - NS / NaHCO3 rinses, Biotene; Skin care     4. Transfuse & replete electrolytes prn     5. IV hydration, daily weights, strict I&O, prn diuresis     6. PO intake as tolerated, nutrition follow up as needed    7. Antiemetics, anti-diarrhea medications:   diphenoxylate/atropine 1 Tablet(s) Oral every 6 hours PRN  loperamide 4 milliGRAM(s) Oral every 4 hours PRN  LORazepam   Injectable 0.5 milliGRAM(s) IV Push every 8 hours PRN  ondansetron Injectable 8 milliGRAM(s) IV Push every 8 hours PRN  OLANZapine 5 milliGRAM(s) Oral at bedtime    8. OOB as tolerated, physical therapy consult if needed     9. Monitor coags / fibrinogen 2x week, vitamin K as needed   phytonadione   Solution 5 milliGRAM(s) Oral <User Schedule>    10. Monitor closely for clinical changes, monitor for fevers     11. Emotional support provided, plan of care discussed and questions addressed     12. Patient education done regarding plan of care, restrictions and discharge planning     13. Continue regular social work input     I have written the above note for Dr. Mckeon who performed service with me in the room.   Jessy Ferrell NP-C ([phone])    I have seen and examined patient with NP, I agree with above note as scribed.                  [facility] Transplant Team                                                      Critical / Counseling Time Provided: 30 minutes                                                                                                                                                        Chief Complaint: Autologous pbsct with high dose melphalan prep regimen for treatment of IgG lambda multiple myeloma    Disease: IgG lambda multiple myeloma  Type of transplant: Autologous  Conditioning prep: Melphalan (200 mg/m2)   ABO / CMV: O POS / POS     S: Patient seen and examined with [facility] Transplant Team:       O: Vitals:   Vital Signs Last 24 Hrs  T(C): 37.3 (05 Sep 2024 05:20), Max: 38.1 (04 Sep 2024 21:10)  T(F): 99.1 (05 Sep 2024 05:20), Max: 100.6 (04 Sep 2024 21:10)  HR: 105 (05 Sep 2024 05:20) (102 - 108)  BP: 108/66 (05 Sep 2024 05:20) (108/66 - 126/75)  BP(mean): --  RR: 18 (05 Sep 2024 05:20) (18 - 19)  SpO2: 96% (05 Sep 2024 05:20) (96% - 98%)    Parameters below as of 05 Sep 2024 05:20  Patient On (Oxygen Delivery Method): room air        Admit weight: 62.2kg   Daily Weight in k.2 (04 Sep 2024 08:36)    Intake / Output:    @ 07:01  -   @ 07:00  --------------------------------------------------------  IN: 2864 mL / OUT: 2265 mL / NET: 599 mL      PE:   Oropharynx: no erythema or ulcerations   Oral Mucositis:              -                                          Grade: n/a   CVS: RR, +S1,S2  Lungs: CTA throughout bilaterally   Abdomen: + BS x 4, soft, ND, +mild lower abd discomfort in lower abdomen  Extremities: no edema in BLE  Gastric Mucositis:       +                                          ndGndrndanddndend:nd nd2nd Intestinal Mucositis:     -                                         Grade: n/a   Skin: no rashes   TLC: CDI   Neuro: A&Ox3   Pain: denies    Labs:                         8.6    4.69  )-----------( 26       ( 05 Sep 2024 07:26 )             24.7     09-05    140  |  104  |  19  ----------------------------<  124<H>  3.0<L>   |  22  |  1.76<H>    Ca    9.0      05 Sep 2024 07:26  Phos  2.3       Mg     2.4         TPro  5.4<L>  /  Alb  3.0<L>  /  TBili  0.4  /  DBili  x   /  AST  19  /  ALT  21  /  AlkPhos  118        Cultures:   Culture Results:   No growth (24 @ 13:20)    Culture Results:   No growth at 48 Hours (24 @ 06:50)    Culture Results:   No growth at 48 Hours (24 @ 06:00)    Culture Results:   No growth at 48 Hours (24 @ 06:00)      Radiology:   ACC: [id number] EXAM:  XR CHEST PORTABLE URGENT 1V   ORDERED BY: ARNOLDO ASTUDILLO   PROCEDURE DATE:  2024    IMPRESSION:  Right-sided IJ catheter with tip in similar position comparedto prior.   No focal consolidation or pleural effusion..    Meds:   Antimicrobials:   acyclovir   Oral Tab/Cap 800 milliGRAM(s) Oral daily  fluconAZOLE   Tablet 200 milliGRAM(s) Oral daily  piperacillin/tazobactam IVPB.. 3.375 Gram(s) IV Intermittent every 12 hours  vancomycin    Solution 125 milliGRAM(s) Oral every 12 hours      Heme / Onc:       GI:  aluminum hydroxide/magnesium hydroxide/simethicone Suspension 30 milliLiter(s) Oral every 6 hours PRN  diphenoxylate/atropine 1 Tablet(s) Oral every 6 hours PRN  loperamide 4 milliGRAM(s) Oral every 4 hours PRN  pantoprazole    Tablet 40 milliGRAM(s) Oral before breakfast  simethicone 80 milliGRAM(s) Chew four times a day PRN  sodium bicarbonate Mouth Rinse 10 milliLiter(s) Swish and Spit five times a day      Cardiovascular:       Immunologic:   filgrastim-aafi (NIVESTYM) Injectable 300 MICROGram(s) SubCutaneous daily      Other medications:   Biotene Dry Mouth Oral Rinse 5 milliLiter(s) Swish and Spit five times a day  chlorhexidine 4% Liquid 1 Application(s) Topical <User Schedule>  nystatin Powder 1 Application(s) Topical two times a day  OLANZapine 5 milliGRAM(s) Oral at bedtime  phytonadione   Solution 5 milliGRAM(s) Oral <User Schedule>  sodium bicarbonate  Infusion 0.241 mEq/kG/Hr IV Continuous <Continuous>  sodium chloride 0.9%. 1000 milliLiter(s) IV Continuous <Continuous>  zinc oxide 40% Paste 1 Application(s) Topical two times a day      PRN:   acetaminophen     Tablet .. 650 milliGRAM(s) Oral every 6 hours PRN  aluminum hydroxide/magnesium hydroxide/simethicone Suspension 30 milliLiter(s) Oral every 6 hours PRN  artificial tears (preservative free) Ophthalmic Solution 1 Drop(s) Both EYES every 3 hours PRN  benzocaine/menthol Lozenge 1 Lozenge Oral every 8 hours PRN  diphenoxylate/atropine 1 Tablet(s) Oral every 6 hours PRN  loperamide 4 milliGRAM(s) Oral every 4 hours PRN  LORazepam   Injectable 0.5 milliGRAM(s) IV Push every 8 hours PRN  ondansetron Injectable 8 milliGRAM(s) IV Push every 8 hours PRN  petrolatum Ophthalmic Ointment 1 Application(s) Both EYES at bedtime PRN  simethicone 80 milliGRAM(s) Chew four times a day PRN  sodium chloride 0.9% lock flush 10 milliLiter(s) IV Push every 1 hour PRN    A/P:  64 year old female with a history of multiple myeloma   Post:  Autologous PBSCT day + - HPC transplant today, continue hydration for 24 hours post infusion of cells. Daily weights, strict I&O, prn diuresis. Start levaquin / fluconazole prophylaxis when neutropenic.    c/o intermittent abd pain, AXR done, reading from radiologist requested. weight gain from admission, monitor closely, Lasix PRN    Zofran to PRN, switched Compazine-->reglan PRN  - refractory nausea / vomiting, will do trial of olanzapine. d/c reglan. Grade 1 chemotherapy induced GI mucositis. Continue supportive measures.    Check ECG while on Zyprexa +Levaquin   - Febrile, F/U BCX, PO antibiotics switched to Zosyn and Vanco.    - Rising S. Cr- 1.52, will D/C Vanco IV, started hydration NS @ 75 cc/hr for 24 hrs, renally adjust meds, Cr.Cl- 36 will continue Zosyn 4.5 gm Q 8 hrs, change Diflucan to 200 mg PO daily.    - Grade 1 transaminitis, will monitor closely  9/3 - +MAMADOU, f/u urine lytes, increase IVF's. Repeat BMP this afternoon. Dose adjust medications  - non anion gap metabolic acidosis - likely secondary to diarrhea and / or MAMADOU, MAMADOU likely secondary to intravascular volume depletion. Creatinine improved today with increased IVF (NS) overnight, however hyperchloremia noted. Will change IVF to D5W with 3 amps NaHCO3- at 100ml /hr, repeat BMP at 3pm. Quantify diarrhea, continue lomotil. C diff negative.    - creatinine 1.76, serum CO2 22. d/c NaHCO3- gtt, started LR @ 75ml /hr. Engrafted. d/c zosyn, po vanco, fluconazole. d/c G-CSF after today's dose.       1. Infectious Disease:   acyclovir   Oral Tab/Cap 800 milliGRAM(s) Oral daily  fluconAZOLE   Tablet 200 milliGRAM(s) Oral daily  piperacillin/tazobactam IVPB.. 3.375 Gram(s) IV Intermittent every 12 hours  vancomycin    Solution 125 milliGRAM(s) Oral every 12 hours    2. GI Prophylaxis:    pantoprazole    Tablet 40 milliGRAM(s) Oral before breakfast    3. Mouthcare - NS / NaHCO3 rinses, Biotene; Skin care     4. Transfuse & replete electrolytes prn   KCl 20mEq IV q 2 hours x 3 doses     5. IV hydration, daily weights, strict I&O, prn diuresis     6. PO intake as tolerated, nutrition follow up as needed    7. Antiemetics, anti-diarrhea medications:   diphenoxylate/atropine 1 Tablet(s) Oral every 6 hours PRN  loperamide 4 milliGRAM(s) Oral every 4 hours PRN  LORazepam   Injectable 0.5 milliGRAM(s) IV Push every 8 hours PRN  ondansetron Injectable 8 milliGRAM(s) IV Push every 8 hours PRN  OLANZapine 5 milliGRAM(s) Oral at bedtime    8. OOB as tolerated, physical therapy consult if needed     9. Monitor coags / fibrinogen 2x week, vitamin K as needed   phytonadione   Solution 5 milliGRAM(s) Oral <User Schedule>    10. Monitor closely for clinical changes, monitor for fevers     11. Emotional support provided, plan of care discussed and questions addressed     12. Patient education done regarding plan of care, restrictions and discharge planning     13. Continue regular social work input     I have written the above note for Dr. Mckeon who performed service with me in the room.   Jessy Ferrell NP-C ([phone])    I have seen and examined patient with NP, I agree with above note as scribed.                  [facility] Transplant Team                                                      Critical / Counseling Time Provided: 30 minutes                                                                                                                                                        Chief Complaint: Autologous pbsct with high dose melphalan prep regimen for treatment of IgG lambda multiple myeloma    Disease: IgG lambda multiple myeloma  Type of transplant: Autologous  Conditioning prep: Melphalan (200 mg/m2)   ABO / CMV: O POS / POS     S: Patient seen and examined with [facility] Transplant Team:   + fatigue  + diarrhea   nausea improved     O: Vitals:   Vital Signs Last 24 Hrs  T(C): 37.3 (05 Sep 2024 05:20), Max: 38.1 (04 Sep 2024 21:10)  T(F): 99.1 (05 Sep 2024 05:20), Max: 100.6 (04 Sep 2024 21:10)  HR: 105 (05 Sep 2024 05:20) (102 - 108)  BP: 108/66 (05 Sep 2024 05:20) (108/66 - 126/75)  BP(mean): --  RR: 18 (05 Sep 2024 05:20) (18 - 19)  SpO2: 96% (05 Sep 2024 05:20) (96% - 98%)    Parameters below as of 05 Sep 2024 05:20  Patient On (Oxygen Delivery Method): room air        Admit weight: 62.2kg   Daily Weight in k.2 (04 Sep 2024 08:36)    Intake / Output:    @ 07:01  -   @ 07:00  --------------------------------------------------------  IN: 2864 mL / OUT: 2265 mL / NET: 599 mL      PE:   Oropharynx: no erythema or ulcerations   Oral Mucositis:              -                                          Grade: n/a   CVS: RR, +S1,S2  Lungs: CTA throughout bilaterally   Abdomen: + BS x 4, soft, ND, +mild lower abd discomfort in lower abdomen  Extremities: no edema in BLE  Gastric Mucositis:       +                                          ndGndrndanddndend:nd nd2nd Intestinal Mucositis:     -                                         Grade: n/a   Skin: no rashes   TLC: CDI   Neuro: A&Ox3   Pain: denies    Labs:                         8.6    4.69  )-----------( 26       ( 05 Sep 2024 07:26 )             24.7     09-    140  |  104  |  19  ----------------------------<  124<H>  3.0<L>   |  22  |  1.76<H>    Ca    9.0      05 Sep 2024 07:26  Phos  2.3       Mg     2.4         TPro  5.4<L>  /  Alb  3.0<L>  /  TBili  0.4  /  DBili  x   /  AST  19  /  ALT  21  /  AlkPhos  118        Cultures:   Culture Results:   No growth (24 @ 13:20)    Culture Results:   No growth at 48 Hours (24 @ 06:50)    Culture Results:   No growth at 48 Hours (24 @ 06:00)    Culture Results:   No growth at 48 Hours (24 @ 06:00)      Radiology:   ACC: [id number] EXAM:  XR CHEST PORTABLE URGENT 1V   ORDERED BY: ARNOLDO ASTUDILLO   PROCEDURE DATE:  2024    IMPRESSION:  Right-sided IJ catheter with tip in similar position comparedto prior.   No focal consolidation or pleural effusion..    Meds:   Antimicrobials:   acyclovir   Oral Tab/Cap 800 milliGRAM(s) Oral daily  fluconAZOLE   Tablet 200 milliGRAM(s) Oral daily  piperacillin/tazobactam IVPB.. 3.375 Gram(s) IV Intermittent every 12 hours  vancomycin    Solution 125 milliGRAM(s) Oral every 12 hours      Heme / Onc:       GI:  aluminum hydroxide/magnesium hydroxide/simethicone Suspension 30 milliLiter(s) Oral every 6 hours PRN  diphenoxylate/atropine 1 Tablet(s) Oral every 6 hours PRN  loperamide 4 milliGRAM(s) Oral every 4 hours PRN  pantoprazole    Tablet 40 milliGRAM(s) Oral before breakfast  simethicone 80 milliGRAM(s) Chew four times a day PRN  sodium bicarbonate Mouth Rinse 10 milliLiter(s) Swish and Spit five times a day      Cardiovascular:       Immunologic:   filgrastim-aafi (NIVESTYM) Injectable 300 MICROGram(s) SubCutaneous daily      Other medications:   Biotene Dry Mouth Oral Rinse 5 milliLiter(s) Swish and Spit five times a day  chlorhexidine 4% Liquid 1 Application(s) Topical <User Schedule>  nystatin Powder 1 Application(s) Topical two times a day  OLANZapine 5 milliGRAM(s) Oral at bedtime  phytonadione   Solution 5 milliGRAM(s) Oral <User Schedule>  sodium bicarbonate  Infusion 0.241 mEq/kG/Hr IV Continuous <Continuous>  sodium chloride 0.9%. 1000 milliLiter(s) IV Continuous <Continuous>  zinc oxide 40% Paste 1 Application(s) Topical two times a day      PRN:   acetaminophen     Tablet .. 650 milliGRAM(s) Oral every 6 hours PRN  aluminum hydroxide/magnesium hydroxide/simethicone Suspension 30 milliLiter(s) Oral every 6 hours PRN  artificial tears (preservative free) Ophthalmic Solution 1 Drop(s) Both EYES every 3 hours PRN  benzocaine/menthol Lozenge 1 Lozenge Oral every 8 hours PRN  diphenoxylate/atropine 1 Tablet(s) Oral every 6 hours PRN  loperamide 4 milliGRAM(s) Oral every 4 hours PRN  LORazepam   Injectable 0.5 milliGRAM(s) IV Push every 8 hours PRN  ondansetron Injectable 8 milliGRAM(s) IV Push every 8 hours PRN  petrolatum Ophthalmic Ointment 1 Application(s) Both EYES at bedtime PRN  simethicone 80 milliGRAM(s) Chew four times a day PRN  sodium chloride 0.9% lock flush 10 milliLiter(s) IV Push every 1 hour PRN    A/P:  64 year old female with a history of multiple myeloma   Post:  Autologous PBSCT day + - HPC transplant today, continue hydration for 24 hours post infusion of cells. Daily weights, strict I&O, prn diuresis. Start levaquin / fluconazole prophylaxis when neutropenic.    c/o intermittent abd pain, AXR done, reading from radiologist requested. weight gain from admission, monitor closely, Lasix PRN    Zofran to PRN, switched Compazine-->reglan PRN  - refractory nausea / vomiting, will do trial of olanzapine. d/c reglan. Grade 1 chemotherapy induced GI mucositis. Continue supportive measures.    Check ECG while on Zyprexa +Levaquin   - Febrile, F/U BCX, PO antibiotics switched to Zosyn and Vanco.    - Rising S. Cr- 1.52, will D/C Vanco IV, started hydration NS @ 75 cc/hr for 24 hrs, renally adjust meds, Cr.Cl- 36 will continue Zosyn 4.5 gm Q 8 hrs, change Diflucan to 200 mg PO daily.    - Grade 1 transaminitis, will monitor closely  9/3 - +MAMADOU, f/u urine lytes, increase IVF's. Repeat BMP this afternoon. Dose adjust medications  - non anion gap metabolic acidosis - likely secondary to diarrhea and / or MAMADOU, MAMADOU likely secondary to intravascular volume depletion. Creatinine improved today with increased IVF (NS) overnight, however hyperchloremia noted. Will change IVF to D5W with 3 amps NaHCO3- at 100ml /hr, repeat BMP at 3pm. Quantify diarrhea, continue lomotil. C diff negative.    - creatinine 1.76, serum CO2 22. d/c NaHCO3- gtt, started LR @ 75ml /hr. Engrafted. d/c zosyn, po vanco, fluconazole. d/c G-CSF after today's dose.       1. Infectious Disease:   acyclovir   Oral Tab/Cap 800 milliGRAM(s) Oral daily  fluconAZOLE   Tablet 200 milliGRAM(s) Oral daily  piperacillin/tazobactam IVPB.. 3.375 Gram(s) IV Intermittent every 12 hours  vancomycin    Solution 125 milliGRAM(s) Oral every 12 hours    2. GI Prophylaxis:    pantoprazole    Tablet 40 milliGRAM(s) Oral before breakfast    3. Mouthcare - NS / NaHCO3 rinses, Biotene; Skin care     4. Transfuse & replete electrolytes prn   KCl 20mEq IV q 2 hours x 3 doses     5. IV hydration, daily weights, strict I&O, prn diuresis     6. PO intake as tolerated, nutrition follow up as needed    7. Antiemetics, anti-diarrhea medications:   diphenoxylate/atropine 1 Tablet(s) Oral every 6 hours PRN  loperamide 4 milliGRAM(s) Oral every 4 hours PRN  LORazepam   Injectable 0.5 milliGRAM(s) IV Push every 8 hours PRN  ondansetron Injectable 8 milliGRAM(s) IV Push every 8 hours PRN  OLANZapine 5 milliGRAM(s) Oral at bedtime    8. OOB as tolerated, physical therapy consult if needed     9. Monitor coags / fibrinogen 2x week, vitamin K as needed   phytonadione   Solution 5 milliGRAM(s) Oral <User Schedule>    10. Monitor closely for clinical changes, monitor for fevers     11. Emotional support provided, plan of care discussed and questions addressed     12. Patient education done regarding plan of care, restrictions and discharge planning     13. Continue regular social work input     I have written the above note for Dr. Mckeon who performed service with me in the room.   Jessy Ferrell NP-C ([phone])    I have seen and examined patient with NP, I agree with above note as scribed.

## 2024-09-06 ENCOUNTER — TRANSCRIPTION ENCOUNTER (OUTPATIENT)
Age: 64
End: 2024-09-06

## 2024-09-06 DIAGNOSIS — C90.00 MULTIPLE MYELOMA NOT HAVING ACHIEVED REMISSION: ICD-10-CM

## 2024-09-06 DIAGNOSIS — Z94.81 BONE MARROW TRANSPLANT STATUS: ICD-10-CM

## 2024-09-06 PROBLEM — I10 ESSENTIAL (PRIMARY) HYPERTENSION: Chronic | Status: ACTIVE | Noted: 2024-08-09

## 2024-09-06 PROBLEM — E78.00 PURE HYPERCHOLESTEROLEMIA, UNSPECIFIED: Chronic | Status: ACTIVE | Noted: 2024-08-09

## 2024-09-06 LAB
ALBUMIN SERPL ELPH-MCNC: 2.9 G/DL — LOW (ref 3.3–5)
ALP SERPL-CCNC: 155 U/L — HIGH (ref 40–120)
ALT FLD-CCNC: 18 U/L — SIGNIFICANT CHANGE UP (ref 10–45)
ANION GAP SERPL CALC-SCNC: 11 MMOL/L — SIGNIFICANT CHANGE UP (ref 5–17)
ANION GAP SERPL CALC-SCNC: 11 MMOL/L — SIGNIFICANT CHANGE UP (ref 5–17)
AST SERPL-CCNC: 21 U/L — SIGNIFICANT CHANGE UP (ref 10–40)
BASOPHILS # BLD AUTO: 0 K/UL — SIGNIFICANT CHANGE UP (ref 0–0.2)
BASOPHILS NFR BLD AUTO: 0 % — SIGNIFICANT CHANGE UP (ref 0–2)
BILIRUB DIRECT SERPL-MCNC: 0.1 MG/DL — SIGNIFICANT CHANGE UP (ref 0–0.3)
BILIRUB INDIRECT FLD-MCNC: 0.2 MG/DL — SIGNIFICANT CHANGE UP (ref 0.2–1)
BILIRUB SERPL-MCNC: 0.3 MG/DL — SIGNIFICANT CHANGE UP (ref 0.2–1.2)
BLD GP AB SCN SERPL QL: POSITIVE — SIGNIFICANT CHANGE UP
BUN SERPL-MCNC: 12 MG/DL — SIGNIFICANT CHANGE UP (ref 7–23)
BUN SERPL-MCNC: 14 MG/DL — SIGNIFICANT CHANGE UP (ref 7–23)
CALCIUM SERPL-MCNC: 8.9 MG/DL — SIGNIFICANT CHANGE UP (ref 8.4–10.5)
CALCIUM SERPL-MCNC: 9 MG/DL — SIGNIFICANT CHANGE UP (ref 8.4–10.5)
CHLORIDE SERPL-SCNC: 104 MMOL/L — SIGNIFICANT CHANGE UP (ref 96–108)
CHLORIDE SERPL-SCNC: 107 MMOL/L — SIGNIFICANT CHANGE UP (ref 96–108)
CMV DNA CSF QL NAA+PROBE: SIGNIFICANT CHANGE UP IU/ML
CMV DNA SPEC NAA+PROBE-LOG#: SIGNIFICANT CHANGE UP LOG10IU/ML
CO2 SERPL-SCNC: 22 MMOL/L — SIGNIFICANT CHANGE UP (ref 22–31)
CO2 SERPL-SCNC: 23 MMOL/L — SIGNIFICANT CHANGE UP (ref 22–31)
CREAT SERPL-MCNC: 1.28 MG/DL — SIGNIFICANT CHANGE UP (ref 0.5–1.3)
CREAT SERPL-MCNC: 1.39 MG/DL — HIGH (ref 0.5–1.3)
CULTURE RESULTS: SIGNIFICANT CHANGE UP
CULTURE RESULTS: SIGNIFICANT CHANGE UP
EBV DNA SERPL NAA+PROBE-ACNC: SIGNIFICANT CHANGE UP IU/ML
EBVPCR LOG: SIGNIFICANT CHANGE UP LOG10IU/ML
EGFR: 42 ML/MIN/1.73M2 — LOW
EGFR: 46 ML/MIN/1.73M2 — LOW
EOSINOPHIL # BLD AUTO: 0 K/UL — SIGNIFICANT CHANGE UP (ref 0–0.5)
EOSINOPHIL NFR BLD AUTO: 0 % — SIGNIFICANT CHANGE UP (ref 0–6)
GLUCOSE SERPL-MCNC: 102 MG/DL — HIGH (ref 70–99)
GLUCOSE SERPL-MCNC: 84 MG/DL — SIGNIFICANT CHANGE UP (ref 70–99)
HCT VFR BLD CALC: 24.7 % — LOW (ref 34.5–45)
HGB BLD-MCNC: 8.6 G/DL — LOW (ref 11.5–15.5)
LDH SERPL L TO P-CCNC: 289 U/L — HIGH (ref 50–242)
LYMPHOCYTES # BLD AUTO: 1.09 K/UL — SIGNIFICANT CHANGE UP (ref 1–3.3)
LYMPHOCYTES # BLD AUTO: 7 % — LOW (ref 13–44)
MAGNESIUM SERPL-MCNC: 1.7 MG/DL — SIGNIFICANT CHANGE UP (ref 1.6–2.6)
MANUAL SMEAR VERIFICATION: SIGNIFICANT CHANGE UP
MCHC RBC-ENTMCNC: 31.2 PG — SIGNIFICANT CHANGE UP (ref 27–34)
MCHC RBC-ENTMCNC: 34.8 GM/DL — SIGNIFICANT CHANGE UP (ref 32–36)
MCV RBC AUTO: 89.5 FL — SIGNIFICANT CHANGE UP (ref 80–100)
METAMYELOCYTES # FLD: 3.5 % — HIGH (ref 0–0)
MONOCYTES # BLD AUTO: 2.03 K/UL — HIGH (ref 0–0.9)
MONOCYTES NFR BLD AUTO: 13 % — SIGNIFICANT CHANGE UP (ref 2–14)
MYELOCYTES NFR BLD: 0.9 % — HIGH (ref 0–0)
NEUTROPHILS # BLD AUTO: 11.54 K/UL — HIGH (ref 1.8–7.4)
NEUTROPHILS NFR BLD AUTO: 67.8 % — SIGNIFICANT CHANGE UP (ref 43–77)
NEUTS BAND # BLD: 6.1 % — SIGNIFICANT CHANGE UP (ref 0–8)
PHOSPHATE SERPL-MCNC: 1.9 MG/DL — LOW (ref 2.5–4.5)
PLAT MORPH BLD: NORMAL — SIGNIFICANT CHANGE UP
PLATELET # BLD AUTO: 39 K/UL — LOW (ref 150–400)
POTASSIUM SERPL-MCNC: 3.1 MMOL/L — LOW (ref 3.5–5.3)
POTASSIUM SERPL-MCNC: 4.6 MMOL/L — SIGNIFICANT CHANGE UP (ref 3.5–5.3)
POTASSIUM SERPL-SCNC: 3.1 MMOL/L — LOW (ref 3.5–5.3)
POTASSIUM SERPL-SCNC: 4.6 MMOL/L — SIGNIFICANT CHANGE UP (ref 3.5–5.3)
PROMYELOCYTES # FLD: 1.7 % — HIGH (ref 0–0)
PROT SERPL-MCNC: 5.3 G/DL — LOW (ref 6–8.3)
RBC # BLD: 2.76 M/UL — LOW (ref 3.8–5.2)
RBC # FLD: 14.1 % — SIGNIFICANT CHANGE UP (ref 10.3–14.5)
RBC BLD AUTO: SIGNIFICANT CHANGE UP
RH IG SCN BLD-IMP: POSITIVE — SIGNIFICANT CHANGE UP
SODIUM SERPL-SCNC: 138 MMOL/L — SIGNIFICANT CHANGE UP (ref 135–145)
SODIUM SERPL-SCNC: 140 MMOL/L — SIGNIFICANT CHANGE UP (ref 135–145)
SPECIMEN SOURCE: SIGNIFICANT CHANGE UP
SPECIMEN SOURCE: SIGNIFICANT CHANGE UP
TOXIC GRANULES BLD QL SMEAR: PRESENT — SIGNIFICANT CHANGE UP
WBC # BLD: 15.61 K/UL — HIGH (ref 3.8–10.5)
WBC # FLD AUTO: 15.61 K/UL — HIGH (ref 3.8–10.5)

## 2024-09-06 PROCEDURE — 99233 SBSQ HOSP IP/OBS HIGH 50: CPT

## 2024-09-06 RX ORDER — SULFAMETHOXAZOLE AND TRIMETHOPRIM 800; 160 MG/1; MG/1
1 TABLET ORAL DAILY
Refills: 0 | Status: DISCONTINUED | OUTPATIENT
Start: 2024-09-06 | End: 2024-09-13

## 2024-09-06 RX ORDER — POTASSIUM CHLORIDE 10 MEQ
40 TABLET, EXT RELEASE, PARTICLES/CRYSTALS ORAL EVERY 4 HOURS
Refills: 0 | Status: DISCONTINUED | OUTPATIENT
Start: 2024-09-06 | End: 2024-09-06

## 2024-09-06 RX ORDER — POTASSIUM CHLORIDE 10 MEQ
20 TABLET, EXT RELEASE, PARTICLES/CRYSTALS ORAL
Refills: 0 | Status: COMPLETED | OUTPATIENT
Start: 2024-09-06 | End: 2024-09-06

## 2024-09-06 RX ORDER — POTASSIUM PHOSPHATE 236; 224 MG/ML; MG/ML
30 INJECTION, SOLUTION INTRAVENOUS ONCE
Refills: 0 | Status: COMPLETED | OUTPATIENT
Start: 2024-09-06 | End: 2024-09-06

## 2024-09-06 RX ORDER — POTASSIUM CHLORIDE 10 MEQ
20 TABLET, EXT RELEASE, PARTICLES/CRYSTALS ORAL
Refills: 0 | Status: DISCONTINUED | OUTPATIENT
Start: 2024-09-06 | End: 2024-09-06

## 2024-09-06 RX ADMIN — SODIUM BICARBONATE 10 MILLILITER(S): 84 INJECTION, SOLUTION INTRAVENOUS at 00:25

## 2024-09-06 RX ADMIN — LORAZEPAM 0.5 MILLIGRAM(S): 4 INJECTION INTRAMUSCULAR; INTRAVENOUS at 13:29

## 2024-09-06 RX ADMIN — Medication 5 MILLILITER(S): at 16:16

## 2024-09-06 RX ADMIN — SODIUM BICARBONATE 10 MILLILITER(S): 84 INJECTION, SOLUTION INTRAVENOUS at 20:19

## 2024-09-06 RX ADMIN — SODIUM BICARBONATE 10 MILLILITER(S): 84 INJECTION, SOLUTION INTRAVENOUS at 12:59

## 2024-09-06 RX ADMIN — Medication 40 MILLIGRAM(S): at 05:31

## 2024-09-06 RX ADMIN — SULFAMETHOXAZOLE AND TRIMETHOPRIM 1 TABLET(S): 800; 160 TABLET ORAL at 13:00

## 2024-09-06 RX ADMIN — Medication 50 MILLILITER(S): at 12:03

## 2024-09-06 RX ADMIN — Medication 1 APPLICATION(S): at 05:32

## 2024-09-06 RX ADMIN — Medication 1 APPLICATION(S): at 17:35

## 2024-09-06 RX ADMIN — Medication 50 MILLIEQUIVALENT(S): at 13:31

## 2024-09-06 RX ADMIN — Medication 50 MILLIEQUIVALENT(S): at 10:43

## 2024-09-06 RX ADMIN — Medication 5 MILLILITER(S): at 13:00

## 2024-09-06 RX ADMIN — ONDANSETRON 8 MILLIGRAM(S): 2 INJECTION, SOLUTION INTRAMUSCULAR; INTRAVENOUS at 08:50

## 2024-09-06 RX ADMIN — Medication 5 MILLILITER(S): at 20:18

## 2024-09-06 RX ADMIN — Medication 50 MILLIEQUIVALENT(S): at 12:59

## 2024-09-06 RX ADMIN — Medication 1 TABLET(S): at 08:05

## 2024-09-06 RX ADMIN — ACETAMINOPHEN 650 MILLIGRAM(S): 325 TABLET ORAL at 22:30

## 2024-09-06 RX ADMIN — TIZANIDINE 4 MILLIGRAM(S): 4 TABLET ORAL at 10:01

## 2024-09-06 RX ADMIN — Medication 50 MILLIEQUIVALENT(S): at 08:36

## 2024-09-06 RX ADMIN — SODIUM BICARBONATE 10 MILLILITER(S): 84 INJECTION, SOLUTION INTRAVENOUS at 16:15

## 2024-09-06 RX ADMIN — Medication 800 MILLIGRAM(S): at 17:34

## 2024-09-06 RX ADMIN — ZINC OXIDE 1 APPLICATION(S): 100 OINTMENT TOPICAL at 17:35

## 2024-09-06 RX ADMIN — Medication 800 MILLIGRAM(S): at 05:31

## 2024-09-06 RX ADMIN — POTASSIUM PHOSPHATE 83.33 MILLIMOLE(S): 236; 224 INJECTION, SOLUTION INTRAVENOUS at 13:29

## 2024-09-06 RX ADMIN — Medication 1 LOZENGE: at 21:10

## 2024-09-06 RX ADMIN — Medication 5 MILLILITER(S): at 00:24

## 2024-09-06 RX ADMIN — Medication 5 MILLILITER(S): at 08:05

## 2024-09-06 RX ADMIN — ZINC OXIDE 1 APPLICATION(S): 100 OINTMENT TOPICAL at 05:32

## 2024-09-06 RX ADMIN — SODIUM BICARBONATE 10 MILLILITER(S): 84 INJECTION, SOLUTION INTRAVENOUS at 08:05

## 2024-09-06 RX ADMIN — CHLORHEXIDINE GLUCONATE 1 APPLICATION(S): 40 SOLUTION TOPICAL at 08:06

## 2024-09-06 RX ADMIN — ACETAMINOPHEN 650 MILLIGRAM(S): 325 TABLET ORAL at 21:16

## 2024-09-06 NOTE — DISCHARGE NOTE PROVIDER - NSDCFUSCHEDAPPT_GEN_ALL_CORE_FT
[facility]  [facility]  Scheduled Appointment: 09/10/2024    [facility]  Scheduled Appointment: 09/10/2024     [facility]  Scheduled Appointment: 09/11/2024    [facility]  Scheduled Appointment: 09/11/2024     [facility]  Scheduled Appointment: 09/17/2024    [facility]  Scheduled Appointment: 09/17/2024

## 2024-09-06 NOTE — DISCHARGE NOTE PROVIDER - DETAILS OF MALNUTRITION DIAGNOSIS/DIAGNOSES
This patient has been assessed with a concern for Malnutrition and was treated during this hospitalization for the following Nutrition diagnosis/diagnoses:     -  08/30/2024: Severe protein-calorie malnutrition

## 2024-09-06 NOTE — PROGRESS NOTE ADULT - SUBJECTIVE AND OBJECTIVE BOX
[facility] Transplant Team                                                      Critical / Counseling Time Provided: 30 minutes                                                                                                                                                        Chief Complaint: Autologous pbsct with high dose melphalan prep regimen for treatment of IgG lambda multiple myeloma    Disease: IgG lambda multiple myeloma  Type of transplant: Autologous  Conditioning prep: Melphalan (200 mg/m2)   ABO / CMV: O POS / POS     S: Patient seen and examined with [facility] Transplant Team:     O: Vitals:   Vital Signs Last 24 Hrs  T(C): 37.5 (06 Sep 2024 05:23), Max: 38.1 (05 Sep 2024 20:59)  T(F): 99.5 (06 Sep 2024 05:23), Max: 100.6 (05 Sep 2024 20:59)  HR: 100 (06 Sep 2024 05:23) (100 - 112)  BP: 127/69 (06 Sep 2024 05:23) (127/69 - 129/68)  BP(mean): --  RR: 18 (06 Sep 2024 05:23) (18 - 18)  SpO2: 92% (06 Sep 2024 05:23) (92% - 95%)    Parameters below as of 06 Sep 2024 05:23  Patient On (Oxygen Delivery Method): room air        Admit weight: 62.2kg   Daily Weight in k.5 (05 Sep 2024 07:44)    Intake / Output:    @ 07:01  -   @ 07:00  --------------------------------------------------------  IN: 3291 mL / OUT: 2675 mL / NET: 616 mL      PE:   Oropharynx: no erythema or ulcerations   Oral Mucositis:              -                                          Grade: n/a   CVS: RR, +S1,S2  Lungs: CTA throughout bilaterally   Abdomen: + BS x 4, soft, ND, +mild lower abd discomfort in lower abdomen  Extremities: no edema in BLE  Gastric Mucositis:       +                                          ndGndrndanddndend:nd nd2nd Intestinal Mucositis:     -                                         Grade: n/a   Skin: no rashes   TLC: CDI   Neuro: A&Ox3   Pain: denies      Labs:         138  |  104  |  14  ----------------------------<  84  3.1<L>   |  23  |  1.39<H>    Ca    9.0      06 Sep 2024 06:36  Phos  1.9       Mg     1.7         TPro  5.3<L>  /  Alb  2.9<L>  /  TBili  0.3  /  DBili  0.1  /  AST  21  /  ALT  18  /  AlkPhos  155<H>      LIVER FUNCTIONS - ( 06 Sep 2024 06:36 )  Alb: 2.9 g/dL / Pro: 5.3 g/dL / ALK PHOS: 155 U/L / ALT: 18 U/L / AST: 21 U/L / GGT: x           Lactate Dehydrogenase, Serum: 289 U/L ( @ 06:36)  Lactate Dehydrogenase, Serum: 193 U/L ( @ 07:26)        Cultures:   Culture Results:   No growth (24 @ 13:20)    Culture Results:   No growth at 48 Hours (24 @ 06:50)    Culture Results:   No growth at 48 Hours (24 @ 06:00)    Culture Results:   No growth at 48 Hours (24 @ 06:00)      Radiology:   ACC: [id number] EXAM:  XR CHEST PORTABLE URGENT 1V   ORDERED BY: ARNOLDO ASTUDILLO   PROCEDURE DATE:  2024    IMPRESSION:  Right-sided IJ catheter with tip in similar position comparedto prior.   No focal consolidation or pleural effusion..        Meds:   Antimicrobials:   acyclovir   Oral Tab/Cap 800 milliGRAM(s) Oral every 12 hours      Heme / Onc:       GI:  aluminum hydroxide/magnesium hydroxide/simethicone Suspension 30 milliLiter(s) Oral every 6 hours PRN  diphenoxylate/atropine 1 Tablet(s) Oral every 6 hours PRN  loperamide 4 milliGRAM(s) Oral every 4 hours PRN  pantoprazole    Tablet 40 milliGRAM(s) Oral before breakfast  simethicone 80 milliGRAM(s) Chew four times a day PRN  sodium bicarbonate Mouth Rinse 10 milliLiter(s) Swish and Spit five times a day      Cardiovascular:       Immunologic:       Other medications:   Biotene Dry Mouth Oral Rinse 5 milliLiter(s) Swish and Spit five times a day  chlorhexidine 4% Liquid 1 Application(s) Topical <User Schedule>  lactated ringers. 1000 milliLiter(s) IV Continuous <Continuous>  nystatin Powder 1 Application(s) Topical two times a day  phytonadione   Solution 5 milliGRAM(s) Oral <User Schedule>  potassium chloride  20 mEq/100 mL IVPB 20 milliEquivalent(s) IV Intermittent every 2 hours  sodium chloride 0.9%. 1000 milliLiter(s) IV Continuous <Continuous>  zinc oxide 40% Paste 1 Application(s) Topical two times a day      PRN:   acetaminophen     Tablet .. 650 milliGRAM(s) Oral every 6 hours PRN  aluminum hydroxide/magnesium hydroxide/simethicone Suspension 30 milliLiter(s) Oral every 6 hours PRN  artificial tears (preservative free) Ophthalmic Solution 1 Drop(s) Both EYES every 3 hours PRN  benzocaine/menthol Lozenge 1 Lozenge Oral every 8 hours PRN  diphenoxylate/atropine 1 Tablet(s) Oral every 6 hours PRN  loperamide 4 milliGRAM(s) Oral every 4 hours PRN  LORazepam   Injectable 0.5 milliGRAM(s) IV Push every 8 hours PRN  ondansetron Injectable 8 milliGRAM(s) IV Push every 8 hours PRN  petrolatum Ophthalmic Ointment 1 Application(s) Both EYES at bedtime PRN  simethicone 80 milliGRAM(s) Chew four times a day PRN  sodium chloride 0.9% lock flush 10 milliLiter(s) IV Push every 1 hour PRN    A/P:  64 year old female with a history of multiple myeloma   Post:  Autologous PBSCT day + - HPC transplant today, continue hydration for 24 hours post infusion of cells. Daily weights, strict I&O, prn diuresis. Start levaquin / fluconazole prophylaxis when neutropenic.    c/o intermittent abd pain, AXR done, reading from radiologist requested. weight gain from admission, monitor closely, Lasix PRN    Zofran to PRN, switched Compazine-->reglan PRN  - refractory nausea / vomiting, will do trial of olanzapine. d/c reglan. Grade 1 chemotherapy induced GI mucositis. Continue supportive measures.    Check ECG while on Zyprexa +Levaquin   - Febrile, F/U BCX, PO antibiotics switched to Zosyn and Vanco.    - Rising S. Cr- 1.52, will D/C Vanco IV, started hydration NS @ 75 cc/hr for 24 hrs, renally adjust meds, Cr.Cl- 36 will continue Zosyn 4.5 gm Q 8 hrs, change Diflucan to 200 mg PO daily.    - Grade 1 transaminitis, will monitor closely  9/3 - +MAMADOU, f/u urine lytes, increase IVF's. Repeat BMP this afternoon. Dose adjust medications  - non anion gap metabolic acidosis - likely secondary to diarrhea and / or MAMADOU, MAMADOU likely secondary to intravascular volume depletion. Creatinine improved today with increased IVF (NS) overnight, however hyperchloremia noted. Will change IVF to D5W with 3 amps NaHCO3- at 100ml /hr, repeat BMP at 3pm. Quantify diarrhea, continue lomotil. C diff negative.    - creatinine 1.76, serum CO2 22. d/c NaHCO3- gtt, started LR @ 75ml /hr. Engrafted. d/c zosyn, po vanco, fluconazole. d/c G-CSF after today's dose.   - febrile overnight, tmax 100.6, likely related to engraftment. F/u cultures, observe off of antibiotics. Creatinine improving, continue LR for now.     1. Infectious Disease:   acyclovir   Oral Tab/Cap 800 milliGRAM(s) Oral daily      2. GI Prophylaxis:    pantoprazole    Tablet 40 milliGRAM(s) Oral before breakfast    3. Mouthcare - NS / NaHCO3 rinses, Biotene; Skin care     4. Transfuse & replete electrolytes prn     5. IV hydration, daily weights, strict I&O, prn diuresis     6. PO intake as tolerated, nutrition follow up as needed    7. Antiemetics, anti-diarrhea medications:   diphenoxylate/atropine 1 Tablet(s) Oral every 6 hours PRN  loperamide 4 milliGRAM(s) Oral every 4 hours PRN  LORazepam   Injectable 0.5 milliGRAM(s) IV Push every 8 hours PRN  ondansetron Injectable 8 milliGRAM(s) IV Push every 8 hours PRN  OLANZapine 5 milliGRAM(s) Oral at bedtime    8. OOB as tolerated, physical therapy consult if needed     9. Monitor coags / fibrinogen 2x week, vitamin K as needed   phytonadione   Solution 5 milliGRAM(s) Oral <User Schedule>    10. Monitor closely for clinical changes, monitor for fevers     11. Emotional support provided, plan of care discussed and questions addressed     12. Patient education done regarding plan of care, restrictions and discharge planning     13. Continue regular social work input     I have written the above note for Dr. Mckeon who performed service with me in the room.   Jessy Ferrell NP-C ([phone])    I have seen and examined patient with NP, I agree with above note as scribed.      [facility] Transplant Team                                                      Critical / Counseling Time Provided: 30 minutes                                                                                                                                                        Chief Complaint: Autologous pbsct with high dose melphalan prep regimen for treatment of IgG lambda multiple myeloma    Disease: IgG lambda multiple myeloma  Type of transplant: Autologous  Conditioning prep: Melphalan (200 mg/m2)   ABO / CMV: O POS / POS     S: Patient seen and examined with [facility] Transplant Team:     O: Vitals:   Vital Signs Last 24 Hrs  T(C): 37.5 (06 Sep 2024 05:23), Max: 38.1 (05 Sep 2024 20:59)  T(F): 99.5 (06 Sep 2024 05:23), Max: 100.6 (05 Sep 2024 20:59)  HR: 100 (06 Sep 2024 05:23) (100 - 112)  BP: 127/69 (06 Sep 2024 05:23) (127/69 - 129/68)  BP(mean): --  RR: 18 (06 Sep 2024 05:23) (18 - 18)  SpO2: 92% (06 Sep 2024 05:23) (92% - 95%)    Parameters below as of 06 Sep 2024 05:23  Patient On (Oxygen Delivery Method): room air        Admit weight: 62.2kg   Daily Weight in k.5 (05 Sep 2024 07:44)    Intake / Output:    @ 07:01  -   @ 07:00  --------------------------------------------------------  IN: 3291 mL / OUT: 2675 mL / NET: 616 mL      PE:   Oropharynx: no erythema or ulcerations   Oral Mucositis:              -                                          Grade: n/a   CVS: RR, +S1,S2  Lungs: CTA throughout bilaterally   Abdomen: + BS x 4, soft, ND, +mild lower abd discomfort in lower abdomen  Extremities: no edema in BLE  Gastric Mucositis:       +                                          ndGndrndanddndend:nd nd2nd Intestinal Mucositis:     -                                         Grade: n/a   Skin: no rashes   TLC: CDI   Neuro: A&Ox3   Pain: denies      Labs:                         8.6    15.61 )-----------( 39       ( 06 Sep 2024 06:37 )             24.7       09-06    138  |  104  |  14  ----------------------------<  84  3.1<L>   |  23  |  1.39<H>    Ca    9.0      06 Sep 2024 06:36  Phos  1.9       Mg     1.7         TPro  5.3<L>  /  Alb  2.9<L>  /  TBili  0.3  /  DBili  0.1  /  AST  21  /  ALT  18  /  AlkPhos  155<H>      LIVER FUNCTIONS - ( 06 Sep 2024 06:36 )  Alb: 2.9 g/dL / Pro: 5.3 g/dL / ALK PHOS: 155 U/L / ALT: 18 U/L / AST: 21 U/L / GGT: x           Lactate Dehydrogenase, Serum: 289 U/L ( @ 06:36)  Lactate Dehydrogenase, Serum: 193 U/L ( @ 07:26)        Cultures:   Culture Results:   No growth (24 @ 13:20)    Culture Results:   No growth at 48 Hours (24 @ 06:50)    Culture Results:   No growth at 48 Hours (24 @ 06:00)    Culture Results:   No growth at 48 Hours (24 @ 06:00)      Radiology:   ACC: [id number] EXAM:  XR CHEST PORTABLE URGENT 1V   ORDERED BY: ARNOLDO ASTUDILLO   PROCEDURE DATE:  2024    IMPRESSION:  Right-sided IJ catheter with tip in similar position comparedto prior.   No focal consolidation or pleural effusion..        Meds:   Antimicrobials:   acyclovir   Oral Tab/Cap 800 milliGRAM(s) Oral every 12 hours      Heme / Onc:       GI:  aluminum hydroxide/magnesium hydroxide/simethicone Suspension 30 milliLiter(s) Oral every 6 hours PRN  diphenoxylate/atropine 1 Tablet(s) Oral every 6 hours PRN  loperamide 4 milliGRAM(s) Oral every 4 hours PRN  pantoprazole    Tablet 40 milliGRAM(s) Oral before breakfast  simethicone 80 milliGRAM(s) Chew four times a day PRN  sodium bicarbonate Mouth Rinse 10 milliLiter(s) Swish and Spit five times a day      Cardiovascular:       Immunologic:       Other medications:   Biotene Dry Mouth Oral Rinse 5 milliLiter(s) Swish and Spit five times a day  chlorhexidine 4% Liquid 1 Application(s) Topical <User Schedule>  lactated ringers. 1000 milliLiter(s) IV Continuous <Continuous>  nystatin Powder 1 Application(s) Topical two times a day  phytonadione   Solution 5 milliGRAM(s) Oral <User Schedule>  potassium chloride  20 mEq/100 mL IVPB 20 milliEquivalent(s) IV Intermittent every 2 hours  sodium chloride 0.9%. 1000 milliLiter(s) IV Continuous <Continuous>  zinc oxide 40% Paste 1 Application(s) Topical two times a day      PRN:   acetaminophen     Tablet .. 650 milliGRAM(s) Oral every 6 hours PRN  aluminum hydroxide/magnesium hydroxide/simethicone Suspension 30 milliLiter(s) Oral every 6 hours PRN  artificial tears (preservative free) Ophthalmic Solution 1 Drop(s) Both EYES every 3 hours PRN  benzocaine/menthol Lozenge 1 Lozenge Oral every 8 hours PRN  diphenoxylate/atropine 1 Tablet(s) Oral every 6 hours PRN  loperamide 4 milliGRAM(s) Oral every 4 hours PRN  LORazepam   Injectable 0.5 milliGRAM(s) IV Push every 8 hours PRN  ondansetron Injectable 8 milliGRAM(s) IV Push every 8 hours PRN  petrolatum Ophthalmic Ointment 1 Application(s) Both EYES at bedtime PRN  simethicone 80 milliGRAM(s) Chew four times a day PRN  sodium chloride 0.9% lock flush 10 milliLiter(s) IV Push every 1 hour PRN    A/P:  64 year old female with a history of multiple myeloma   Post:  Autologous PBSCT day + - HPC transplant today, continue hydration for 24 hours post infusion of cells. Daily weights, strict I&O, prn diuresis. Start levaquin / fluconazole prophylaxis when neutropenic.    c/o intermittent abd pain, AXR done, reading from radiologist requested. weight gain from admission, monitor closely, Lasix PRN    Zofran to PRN, switched Compazine-->reglan PRN  - refractory nausea / vomiting, will do trial of olanzapine. d/c reglan. Grade 1 chemotherapy induced GI mucositis. Continue supportive measures.    Check ECG while on Zyprexa +Levaquin   - Febrile, F/U BCX, PO antibiotics switched to Zosyn and Vanco.    - Rising S. Cr- 1.52, will D/C Vanco IV, started hydration NS @ 75 cc/hr for 24 hrs, renally adjust meds, Cr.Cl- 36 will continue Zosyn 4.5 gm Q 8 hrs, change Diflucan to 200 mg PO daily.    - Grade 1 transaminitis, will monitor closely  9/3 - +MAMADOU, f/u urine lytes, increase IVF's. Repeat BMP this afternoon. Dose adjust medications  - non anion gap metabolic acidosis - likely secondary to diarrhea and / or MAMADOU, MAMADOU likely secondary to intravascular volume depletion. Creatinine improved today with increased IVF (NS) overnight, however hyperchloremia noted. Will change IVF to D5W with 3 amps NaHCO3- at 100ml /hr, repeat BMP at 3pm. Quantify diarrhea, continue lomotil. C diff negative.    - creatinine 1.76, serum CO2 22. d/c NaHCO3- gtt, started LR @ 75ml /hr. Engrafted. d/c zosyn, po vanco, fluconazole. d/c G-CSF after today's dose.   - febrile overnight, tmax 100.6, likely related to engraftment. F/u cultures, observe off of antibiotics. Creatinine improving, continue LR for now.     1. Infectious Disease:   acyclovir   Oral Tab/Cap 800 milliGRAM(s) Oral daily      2. GI Prophylaxis:    pantoprazole    Tablet 40 milliGRAM(s) Oral before breakfast    3. Mouthcare - NS / NaHCO3 rinses, Biotene; Skin care     4. Transfuse & replete electrolytes prn   KCl 40mEq po q 4 hours x 2 doses   Kphos 30mmol IV x 1     5. IV hydration, daily weights, strict I&O, prn diuresis     6. PO intake as tolerated, nutrition follow up as needed    7. Antiemetics, anti-diarrhea medications:   diphenoxylate/atropine 1 Tablet(s) Oral every 6 hours PRN  loperamide 4 milliGRAM(s) Oral every 4 hours PRN  LORazepam   Injectable 0.5 milliGRAM(s) IV Push every 8 hours PRN  ondansetron Injectable 8 milliGRAM(s) IV Push every 8 hours PRN  OLANZapine 5 milliGRAM(s) Oral at bedtime    8. OOB as tolerated, physical therapy consult if needed     9. Monitor coags / fibrinogen 2x week, vitamin K as needed   phytonadione   Solution 5 milliGRAM(s) Oral <User Schedule>    10. Monitor closely for clinical changes, monitor for fevers     11. Emotional support provided, plan of care discussed and questions addressed     12. Patient education done regarding plan of care, restrictions and discharge planning     13. Continue regular social work input     I have written the above note for Dr. Mckeon who performed service with me in the room.   Jessy Ferrell NP-C ([phone])    I have seen and examined patient with NP, I agree with above note as scribed.

## 2024-09-06 NOTE — PHARMACOTHERAPY INTERVENTION NOTE - COMMENTS
64-year-old female with PMH of HTN and IgG lambda multiple myeloma treated with Rosalind-RVD x4 cycles and admitted for autologous HSCT with melphalan 200 mg/m2 conditioning. Today is day +13. Course has been complicated by nausea, diarrhea, anxiety, MAMADOU, and engraftment syndrome.    Patient is no longer neutropenic (ANC= [phone]) though spiked a fever on 9/1 of 100.9F, was started on pip/tazo 4.5 q8h (9/1-9/5) and vancomycin 1250 q24h (received 1 dose of vanco IV before discontinuing). The patient continues to be febrile, though thought to be engraftment. Due to negative cultures and neutrophil improvement, pip/tazo 3.375 mg q12h, fluconazole 200 mg q24h, & vancomycin  mg q12H were discontinued as no longer neutropenic and not thought to be infectious. Since the patient has engrafted, recommended to start Bactrim SS once daily for PCP ppx. The patients renal function has continued to improve (scr down from 1.74-->1.39), crcl=33.  Will continue to monitor renal/hepatic function for dose adjustments.    Patient complaining of nausea and reports leg tremors with PRN prochlorperazine. Prochlorperazine was switched to metoclopramide with good relief, though no longer on it. Ondansetron was switched from ATC to PRN (2 doses/24 hours). Patient reports that nausea is not anticipatory and not caused by anxiety, in spite of having good relief from lorazepam. On the following for nausea: Olanzapine 5 mg PO at bedtime (started 8/28), ondansetron 8 mg IV every 8 hours PRN (first line for N/V), lorazepam 1 mg IV every 8 hours (first line for anxiety, second line for N/V). QTc on 8/30 was 441msec (goal <500 msec). Patient's nausea is much improved, since starting the olanzapine. Recommended to discontinue olanzapine and monitor s/s of N/V.    Patient has chronic diarrhea and received 1 dose of Lomotil and 3 doses of loperamide. Though negative for C.Diff.. Will continue to monitor BM's and getting a GI PCR.    Lastly, the patient is on valsartan 80 mg PO QD at home-has been held inpatient due to MAMADOU.    Chirag Ronquillo, PharmD  PGY-2 Critical Care Pharmacy Resident  Available via [employer] Teams 64-year-old female with PMH of HTN and IgG lambda multiple myeloma treated with Rosalind-RVD x4 cycles and admitted for autologous HSCT with melphalan 200 mg/m2 conditioning. Today is day +13. Course has been complicated by nausea, diarrhea, anxiety, MAMADOU, and engraftment syndrome.    Patient is no longer neutropenic (ANC= 11,540) though spiked a fever on 9/1 of 100.9F, was started on pip/tazo 4.5 q8h (9/1-9/5) and vancomycin 1250 q24h (received 1 dose of vanco IV before discontinuing). The patient continues to be febrile, though thought to be engraftment. Due to negative cultures and neutrophil improvement, pip/tazo 3.375 mg q12h, fluconazole 200 mg q24h, & vancomycin  mg q12H were discontinued as no longer neutropenic and not thought to be infectious. Since the patient has engrafted, recommended to start Bactrim SS once daily for PCP ppx. The patients renal function has continued to improve (scr down from 1.74-->1.39), crcl=33.  Will continue to monitor renal/hepatic function for dose adjustments.    Patient complaining of nausea and reports leg tremors with PRN prochlorperazine. Prochlorperazine was switched to metoclopramide with good relief, though no longer on it. Ondansetron was switched from ATC to PRN (2 doses/24 hours). Patient reports that nausea is not anticipatory and not caused by anxiety, in spite of having good relief from lorazepam. On the following for nausea: ondansetron 8 mg IV every 8 hours PRN (first line for N/V), lorazepam 1 mg IV every 8 hours (first line for anxiety, second line for N/V). Patient's nausea is much improved, will continue monitor s/s of N/V.    Patient has chronic diarrhea and received 1 dose of Lomotil and 3 doses of loperamide. Though negative for C.Diff.. Will continue to monitor BM's.    Lastly, the patient is on valsartan 80 mg PO QD at home-has been held inpatient due to MAMADOU and will be restarted upon discharge.    Chirag Ronquillo, PharmD  PGY-2 Critical Care Pharmacy Resident  Available via [employer] Teams

## 2024-09-06 NOTE — DISCHARGE NOTE PROVIDER - HOSPITAL COURSE
This is a 64 year old female with a medical history of HTN, HLD and multiple myeloma admitted for an autologous pbsct with high dose melphalan prep regimen (200mg/ m2). 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 She was followed with serial labs until 9/23 when her M spike was 2.0, and IgG level 2818. An IR guided biopsy 1/11/24 showed 40% plasma cells. A PET CT 2/29/24 showed focal hypermetabolic skeletal lucencies within L1 and the right iliac bone, consistent with a new diagnosis of multiple myeloma. She was started on Rosalind-RVD 3/14/24. She received 4 cycles with minimal complications. She had no complaints on admission.     Upon admission, a TLC was placed in IR. Ms. Baekr received IV hydration, pain management, nutritional support and antibacterial / antiviral / antifungal / GI / VOD and PCP prophylaxis. Labs were monitored on a daily basis and she received electrolyte repletion and transfusional support as needed. When her ANC dropped below 500, she was started on prophylactic levaquin.     On 8/23/24, after pre-medication, Ms. Baker received 310ml of autologous, pooled, thawed, washed, mobilized, plasma reduced, HPC apheresis over approximately 1 hour. Cell counts as follows:   Total MNCs / TNCs (x10^8/kg) = 6.80  CD34+ cells (x10^6/kg) = 2.95  Cell Viability (%) = 81%   She tolerated the infusion well with no adverse reactions noted.     During admission, Ms. Baker experienced pancytopenia related to the high dose chemotherapy prep regimen. She also had neutropenic fevers. When she became febrile, blood and urine cultures were sent, a CXR completed and the levaquin broadened to zosyn and vancomycin. Antibiotics were de-escalated as appropriate.   Infectious work up was negative. Ms. Baker had refractory nausea and required a short course of zyprexa, which she responded well to. An AXR on 8/24/24 showed a non obstructive bowel gas pattern. Her course was further complicated by MAMADOU, likely the result of intravascular depletion and resulting in normal anion gap metabolic acidosis. HEr creatinine peaked at 2.9, and the serum CO2 was its lowest at 14. Medications we adjusted for renal function. She was started on a sodium bicarbonate drip and her renal function gradually regurned to baseline with hydration. Chemotherapy induced diarrhea was likely a contributing factor. C difficile was negative.     Engraftment was noted on 9/5/24. Fluconazole, zosyn and oral vancomycin (given for c difficile prophylaxis) were discontinued. CMV monitoring was started post engraftment and was negative. Zarxio was discontinued after the 9/5/24 dose.     Currently, Ms. Baker is stable for discharge home with outpatient follow up at the [facility]. This is a 64 year old female with a medical history of HTN, HLD and multiple myeloma admitted for an autologous pbsct with high dose melphalan prep regimen (200mg/ m2). 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 She was followed with serial labs until 9/23 when her M spike was 2.0, and IgG level 2818. An IR guided biopsy 1/11/24 showed 40% plasma cells. A PET CT 2/29/24 showed focal hypermetabolic skeletal lucencies within L1 and the right iliac bone, consistent with a new diagnosis of multiple myeloma. She was started on Rosalind-RVD 3/14/24. She received 4 cycles with minimal complications. She had no complaints on admission.     Upon admission, a TLC was placed in IR. Ms. Baker received IV hydration, pain management, nutritional support and antibacterial / antiviral / antifungal / GI / VOD and PCP prophylaxis. Labs were monitored on a daily basis and she received electrolyte repletion and transfusional support as needed. When her ANC dropped below 500, she was started on prophylactic levaquin.     On 8/23/24, after pre-medication, Ms. Baker received 310ml of autologous, pooled, thawed, washed, mobilized, plasma reduced, HPC apheresis over approximately 1 hour. Cell counts as follows:   Total MNCs / TNCs (x10^8/kg) = 6.80  CD34+ cells (x10^6/kg) = 2.95  Cell Viability (%) = 81%   She tolerated the infusion well with no adverse reactions noted.     During admission, Ms. Baker experienced pancytopenia related to the high dose chemotherapy prep regimen. She also had neutropenic fevers. When she became febrile, blood and urine cultures were sent, a CXR completed and the levaquin broadened to zosyn and vancomycin. Antibiotics were de-escalated as appropriate.   Infectious work up was negative. Ms. Baker had refractory nausea and required a short course of zyprexa, which she responded well to. An AXR on 8/24/24 showed a non obstructive bowel gas pattern. Her course was further complicated by MAMADOU, likely the result of intravascular depletion and resulting in normal anion gap metabolic acidosis. HEr creatinine peaked at 2.9, and the serum CO2 was its lowest at 14. Medications we adjusted for renal function. She was started on a sodium bicarbonate drip and her renal function gradually regurned to baseline with hydration. Chemotherapy induced diarrhea was likely a contributing factor. C difficile was negative.     Engraftment was noted on 9/5/24. Fluconazole, zosyn and oral vancomycin (given for c difficile prophylaxis) were discontinued. CMV monitoring was started post engraftment and was negative. Zarxio was discontinued after the 9/5/24 dose.     Prior to discharge, Ms. Baker had intermittent fevers. Infectious work up was negative. She received a short course of zosyn. A CT C/A/P on 9/12/24 preliminary report showed:   Left lower lobe linear consolidation (3-76). Small bilateral pleural effusions with adjacent lower lobe atelectasis. Mild prominence of the appendix measuring up to 0.7 cm in diameter   (3-220, 6-49), with mild periappendiceal stranding. Correlate with physical exam.    Currently, Ms. Baker is stable for discharge home with outpatient follow up at the [facility].

## 2024-09-06 NOTE — PROGRESS NOTE ADULT - NS ATTEND AMEND GEN_ALL_CORE FT
64 year old female with a history of multiple myeloma admitted for auto-HSCT Day + 14 today ...engrafting...watery green stools with incontinence..cdiff neg..will check stool gi pcr if continues    Disease: IgG lambda multiple myeloma  Type of transplant: Autologous  Conditioning prep: Melphalan (200 mg/m2)   ABO / CMV: O POS / POS    Complications:   - Day 0: nausea/vomiting, abdominal pain   -Day 1: B/L lower extremities tremors --> compazine D/C, replaced by reglan     I rounded with the team of nursing staff, ACP.  I reviewed the data.  I saw and examined the patient and answered her questions.   Patient's nausea improved on Zyprexa; Zofran and Ativan PRN. Reglan discontinued while on Zyprexa  Has frequent bowel movements, but this has been chronic with use of Imodium at home. Today, multiple episodes of watery diarrhea...if continues check GI pcr....pt does describe lower abdominal cramping  Labs reviewed, supplements lytes prn  Neutropenic fever(9/1)- order cultures and CXR;  start Zosyn and Vanco IV; D/C Levaquin. Continue Acyclovir, Fluconazole, Vanco PO prophylaxis. 9/2 given rise in creatinine d/c vanco..cx neg thus far....d/c zosyn 9/5  given  rise in creatinine increased ivf..switch to LR today and decrease rate.....creatinine improved today..meds adjusted..consider renal consult if not continuing to improve...likely multi factorial... related to drug, dehydration and engraftment  NIVESTYM 300 MICROGram(s) SubCutaneous daily, started on day +5..will likely d/c after today's dose  Continue supportive care.  OOB/ambulate 64 year old female with a history of multiple myeloma admitted for auto-HSCT Day + 14 today ...engrafting...watery green stools with incontinence..cdiff neg..will check stool gi pcr if continues, some nausea    Disease: IgG lambda multiple myeloma  Type of transplant: Autologous  Conditioning prep: Melphalan (200 mg/m2)   ABO / CMV: O POS / POS    Complications:   - Day 0: nausea/vomiting, abdominal pain   -Day 1: B/L lower extremities tremors --> compazine D/C, replaced by reglan     I rounded with the team of nursing staff, ACP.  I reviewed the data.  I saw and examined the patient and answered her questions.   Patient's nausea improved on Zyprexa; Zofran and Ativan PRN. Reglan discontinued while on Zyprexa..now d/c'd  Has frequent bowel movements, but this has been chronic with use of Imodium at home. Today, few episodes of watery diarrhea...if continues check GI pcr....pt did describe lower abdominal cramping..now better  Labs reviewed, supplements lytes prn  Neutropenic fever(9/1)- order cultures and CXR;  start Zosyn and Vanco IV; D/C Levaquin. Continue Acyclovir, Fluconazole, Vanco PO prophylaxis. 9/2 given rise in creatinine d/c vanco..cx neg thus far....d/c zosyn 9/5..spikes 9/5 pm..f/u cx  given  rise in creatinine increased ivf..switch to LR today and decrease rate.....creatinine improved further today..meds adjusted..consider renal consult if not continuing to improve...likely multi factorial... related to drug, dehydration and engraftment  NIVESTYM 300 MICROGram(s) SubCutaneous daily, started on day +5..d/c 9/6  Continue supportive care.  OOB/ambulate  d/c planning for monday..increase po, ambulate

## 2024-09-06 NOTE — PROVIDER CONTACT NOTE (OTHER) - BACKGROUND
Day +14 Autologous stem cell transplant, Multiple Myeloma
Day + 13 Autologous stem cell transplant, Multiple Myeloma
s/p stem cells

## 2024-09-06 NOTE — DISCHARGE NOTE PROVIDER - CARE PROVIDER_API CALL
Therese Mc  Hematology/Oncology  [address] [place] [id number]  Phone: [phone]  Fax: [phone]  Follow Up Time:

## 2024-09-06 NOTE — DISCHARGE NOTE PROVIDER - NSDCCPCAREPLAN_GEN_ALL_CORE_FT
PRINCIPAL DISCHARGE DIAGNOSIS  Diagnosis: Stem cells transplant status  Assessment and Plan of Treatment: 1. Diet and activities as per [facility] discharge guidelines and safe food handling guidelines. NO RESTAURANT OR TAKE OUT FOOD AT THIS TIME, ONLY HOME COOKED PREPARED/FROZEN FOODS. You are allowed to have fresh baked pizza right out of the oven. This is the ONLY takeout food at this time.  2. Notify your physician if bleeding; swelling; persistent nausea and vomiting; unable to urinate; pain not relieved by medications; fever; numbness, tingling; excessive diarrhea, inability to tolerate liquids or foods; increased irritability or sluggishness, rash        SECONDARY DISCHARGE DIAGNOSES  Diagnosis: Need for prophylactic measure  Assessment and Plan of Treatment: 1. Continue your prophylactic medications as directed by your doctor and / or pharmacist   Acyclovir - antiviral prophylaxis   Bactrim - PCP prophylaxis

## 2024-09-06 NOTE — PROVIDER CONTACT NOTE (OTHER) - NAME OF MD/NP/PA/DO NOTIFIED:
Zulma Ling NP
Zulma Ling NP
josh witt/shen michaud
SSKI Counseling:  I discussed with the patient the risks of SSKI including but not limited to thyroid abnormalities, metallic taste, GI upset, fever, headache, acne, arthralgias, paraesthesias, lymphadenopathy, easy bleeding, arrhythmias, and allergic reaction.

## 2024-09-06 NOTE — DISCHARGE NOTE PROVIDER - NSDCFUADDAPPT_GEN_ALL_CORE_FT
You have the following appointments at the [facility]:   Tuesday, 9/10/24 at 9am with Dr. Mc.   Please arrive at 8:30 to have your blood drawn.      You have the following appointments at the [facility]:        You have the following appointments at the [facility]:     Wednesday, 9/11/24:  8am - lab work  8:30am - Dr. Mc     You have the following appointments at the [facility]:      You have the following appointments at the [facility]:     Tuesday, 9/17/24 at 4pm with Dr. Mc. Please arrive 1 hour early to have your labs drawn.

## 2024-09-07 LAB
ALBUMIN SERPL ELPH-MCNC: 2.7 G/DL — LOW (ref 3.3–5)
ALP SERPL-CCNC: 163 U/L — HIGH (ref 40–120)
ALT FLD-CCNC: 16 U/L — SIGNIFICANT CHANGE UP (ref 10–45)
ANION GAP SERPL CALC-SCNC: 11 MMOL/L — SIGNIFICANT CHANGE UP (ref 5–17)
AST SERPL-CCNC: 23 U/L — SIGNIFICANT CHANGE UP (ref 10–40)
BASOPHILS # BLD AUTO: 0 K/UL — SIGNIFICANT CHANGE UP (ref 0–0.2)
BASOPHILS NFR BLD AUTO: 0 % — SIGNIFICANT CHANGE UP (ref 0–2)
BILIRUB SERPL-MCNC: 0.2 MG/DL — SIGNIFICANT CHANGE UP (ref 0.2–1.2)
BUN SERPL-MCNC: 12 MG/DL — SIGNIFICANT CHANGE UP (ref 7–23)
CALCIUM SERPL-MCNC: 8.6 MG/DL — SIGNIFICANT CHANGE UP (ref 8.4–10.5)
CHLORIDE SERPL-SCNC: 108 MMOL/L — SIGNIFICANT CHANGE UP (ref 96–108)
CO2 SERPL-SCNC: 22 MMOL/L — SIGNIFICANT CHANGE UP (ref 22–31)
CREAT SERPL-MCNC: 1.26 MG/DL — SIGNIFICANT CHANGE UP (ref 0.5–1.3)
CULTURE RESULTS: SIGNIFICANT CHANGE UP
EGFR: 47 ML/MIN/1.73M2 — LOW
EOSINOPHIL # BLD AUTO: 0 K/UL — SIGNIFICANT CHANGE UP (ref 0–0.5)
EOSINOPHIL NFR BLD AUTO: 0 % — SIGNIFICANT CHANGE UP (ref 0–6)
GLUCOSE SERPL-MCNC: 89 MG/DL — SIGNIFICANT CHANGE UP (ref 70–99)
HADV DNA FLD NAA+PROBE-LOG#: SIGNIFICANT CHANGE UP COPIES/ML
HCT VFR BLD CALC: 25.4 % — LOW (ref 34.5–45)
HGB BLD-MCNC: 8.6 G/DL — LOW (ref 11.5–15.5)
LDH SERPL L TO P-CCNC: 360 U/L — HIGH (ref 50–242)
LYMPHOCYTES # BLD AUTO: 0.44 K/UL — LOW (ref 1–3.3)
LYMPHOCYTES # BLD AUTO: 2.6 % — LOW (ref 13–44)
MAGNESIUM SERPL-MCNC: 1.4 MG/DL — LOW (ref 1.6–2.6)
MANUAL SMEAR VERIFICATION: SIGNIFICANT CHANGE UP
MCHC RBC-ENTMCNC: 30.5 PG — SIGNIFICANT CHANGE UP (ref 27–34)
MCHC RBC-ENTMCNC: 33.9 GM/DL — SIGNIFICANT CHANGE UP (ref 32–36)
MCV RBC AUTO: 90.1 FL — SIGNIFICANT CHANGE UP (ref 80–100)
METAMYELOCYTES # FLD: 2.6 % — HIGH (ref 0–0)
MONOCYTES # BLD AUTO: 1.33 K/UL — HIGH (ref 0–0.9)
MONOCYTES NFR BLD AUTO: 7.8 % — SIGNIFICANT CHANGE UP (ref 2–14)
MYELOCYTES NFR BLD: 1.8 % — HIGH (ref 0–0)
NEUTROPHILS # BLD AUTO: 14.09 K/UL — HIGH (ref 1.8–7.4)
NEUTROPHILS NFR BLD AUTO: 80 % — HIGH (ref 43–77)
NEUTS BAND # BLD: 2.6 % — SIGNIFICANT CHANGE UP (ref 0–8)
PHOSPHATE SERPL-MCNC: 3.5 MG/DL — SIGNIFICANT CHANGE UP (ref 2.5–4.5)
PLAT MORPH BLD: NORMAL — SIGNIFICANT CHANGE UP
PLATELET # BLD AUTO: 58 K/UL — LOW (ref 150–400)
POTASSIUM SERPL-MCNC: 3.8 MMOL/L — SIGNIFICANT CHANGE UP (ref 3.5–5.3)
POTASSIUM SERPL-SCNC: 3.8 MMOL/L — SIGNIFICANT CHANGE UP (ref 3.5–5.3)
PROMYELOCYTES # FLD: 2.6 % — HIGH (ref 0–0)
PROT SERPL-MCNC: 5.2 G/DL — LOW (ref 6–8.3)
RBC # BLD: 2.82 M/UL — LOW (ref 3.8–5.2)
RBC # FLD: 14.4 % — SIGNIFICANT CHANGE UP (ref 10.3–14.5)
RBC BLD AUTO: SIGNIFICANT CHANGE UP
SODIUM SERPL-SCNC: 141 MMOL/L — SIGNIFICANT CHANGE UP (ref 135–145)
SPECIMEN SOURCE: SIGNIFICANT CHANGE UP
WBC # BLD: 17.06 K/UL — HIGH (ref 3.8–10.5)
WBC # FLD AUTO: 17.06 K/UL — HIGH (ref 3.8–10.5)

## 2024-09-07 PROCEDURE — 99233 SBSQ HOSP IP/OBS HIGH 50: CPT

## 2024-09-07 RX ADMIN — Medication 40 MILLIGRAM(S): at 05:24

## 2024-09-07 RX ADMIN — ACETAMINOPHEN 650 MILLIGRAM(S): 325 TABLET ORAL at 14:30

## 2024-09-07 RX ADMIN — SODIUM CHLORIDE 10 MILLILITER(S): 9 INJECTION INTRAMUSCULAR; INTRAVENOUS; SUBCUTANEOUS at 21:20

## 2024-09-07 RX ADMIN — Medication 5 MILLILITER(S): at 19:41

## 2024-09-07 RX ADMIN — Medication 1 LOZENGE: at 19:39

## 2024-09-07 RX ADMIN — ONDANSETRON 8 MILLIGRAM(S): 2 INJECTION, SOLUTION INTRAMUSCULAR; INTRAVENOUS at 05:25

## 2024-09-07 RX ADMIN — TIZANIDINE 4 MILLIGRAM(S): 4 TABLET ORAL at 21:10

## 2024-09-07 RX ADMIN — Medication 1 APPLICATION(S): at 05:25

## 2024-09-07 RX ADMIN — SODIUM BICARBONATE 10 MILLILITER(S): 84 INJECTION, SOLUTION INTRAVENOUS at 00:18

## 2024-09-07 RX ADMIN — LORAZEPAM 0.5 MILLIGRAM(S): 4 INJECTION INTRAMUSCULAR; INTRAVENOUS at 21:01

## 2024-09-07 RX ADMIN — Medication 1 APPLICATION(S): at 18:15

## 2024-09-07 RX ADMIN — SODIUM BICARBONATE 10 MILLILITER(S): 84 INJECTION, SOLUTION INTRAVENOUS at 13:04

## 2024-09-07 RX ADMIN — Medication 5 MILLILITER(S): at 13:04

## 2024-09-07 RX ADMIN — LORAZEPAM 0.5 MILLIGRAM(S): 4 INJECTION INTRAMUSCULAR; INTRAVENOUS at 10:33

## 2024-09-07 RX ADMIN — Medication 50 MILLILITER(S): at 21:20

## 2024-09-07 RX ADMIN — SODIUM BICARBONATE 10 MILLILITER(S): 84 INJECTION, SOLUTION INTRAVENOUS at 16:49

## 2024-09-07 RX ADMIN — Medication 800 MILLIGRAM(S): at 05:25

## 2024-09-07 RX ADMIN — Medication 800 MILLIGRAM(S): at 18:16

## 2024-09-07 RX ADMIN — Medication 5 MILLILITER(S): at 16:49

## 2024-09-07 RX ADMIN — Medication 5 MILLILITER(S): at 00:17

## 2024-09-07 RX ADMIN — Medication 5 MILLILITER(S): at 08:45

## 2024-09-07 RX ADMIN — ZINC OXIDE 1 APPLICATION(S): 100 OINTMENT TOPICAL at 18:15

## 2024-09-07 RX ADMIN — ONDANSETRON 8 MILLIGRAM(S): 2 INJECTION, SOLUTION INTRAMUSCULAR; INTRAVENOUS at 19:37

## 2024-09-07 RX ADMIN — SODIUM BICARBONATE 10 MILLILITER(S): 84 INJECTION, SOLUTION INTRAVENOUS at 08:46

## 2024-09-07 RX ADMIN — TIZANIDINE 4 MILLIGRAM(S): 4 TABLET ORAL at 05:24

## 2024-09-07 RX ADMIN — ACETAMINOPHEN 650 MILLIGRAM(S): 325 TABLET ORAL at 13:26

## 2024-09-07 RX ADMIN — Medication 25 GRAM(S): at 08:48

## 2024-09-07 RX ADMIN — CHLORHEXIDINE GLUCONATE 1 APPLICATION(S): 40 SOLUTION TOPICAL at 08:47

## 2024-09-07 RX ADMIN — ZINC OXIDE 1 APPLICATION(S): 100 OINTMENT TOPICAL at 05:25

## 2024-09-07 RX ADMIN — SODIUM BICARBONATE 10 MILLILITER(S): 84 INJECTION, SOLUTION INTRAVENOUS at 19:41

## 2024-09-07 RX ADMIN — SULFAMETHOXAZOLE AND TRIMETHOPRIM 1 TABLET(S): 800; 160 TABLET ORAL at 13:05

## 2024-09-07 NOTE — PROGRESS NOTE ADULT - SUBJECTIVE AND OBJECTIVE BOX
[facility] Transplant Team                                                      Critical / Counseling Time Provided: 30 minutes                                                                                                                                                        Chief Complaint: Autologous pbsct with high dose melphalan prep regimen for treatment of IgG lambda multiple myeloma    O: Vitals:   Vital Signs Last 24 Hrs  T(C): 37.4 (07 Sep 2024 05:22), Max: 38.3 (06 Sep 2024 21:13)  T(F): 99.3 (07 Sep 2024 05:22), Max: 100.9 (06 Sep 2024 21:13)  HR: 98 (07 Sep 2024 05:22) (98 - 115)  BP: 147/84 (07 Sep 2024 05:22) (131/77 - 147/84)  BP(mean): --  RR: 18 (07 Sep 2024 05:22) (18 - 18)  SpO2: 92% (07 Sep 2024 05:) (92% - 94%)    Parameters below as of 07 Sep 2024 05:22  Patient On (Oxygen Delivery Method): room air    Admit weight: 62.2kg   Daily     Daily Weight in k.4 (06 Sep 2024 08:09)    Intake / Output:    @ 07:01  -   @ 07:00  --------------------------------------------------------  IN: 2901 mL / OUT: 1925 mL / NET: 976 mL    PE:   Oropharynx: no erythema or ulcerations   Oral Mucositis:              -                                          Grade: n/a   CVS: RR, +S1,S2  Lungs: CTA throughout bilaterally   Abdomen: + BS x 4, soft, ND, +mild lower abd discomfort in lower abdomen  Extremities: no edema in BLE  Gastric Mucositis:       +                                          ndGndrndanddndend:nd nd2nd Intestinal Mucositis:     -                                         Grade: n/a   Skin: no rashes   TLC: CDI   Neuro: A&Ox3   Pain: denies    Labs:           Meds:   Antimicrobials:   acyclovir   Oral Tab/Cap 800 milliGRAM(s) Oral every 12 hours  trimethoprim   80 mG/sulfamethoxazole 400 mG 1 Tablet(s) Oral daily      GI:  aluminum hydroxide/magnesium hydroxide/simethicone Suspension 30 milliLiter(s) Oral every 6 hours PRN  diphenoxylate/atropine 1 Tablet(s) Oral every 6 hours PRN  loperamide 4 milliGRAM(s) Oral every 4 hours PRN  pantoprazole    Tablet 40 milliGRAM(s) Oral before breakfast  simethicone 80 milliGRAM(s) Chew four times a day PRN  sodium bicarbonate Mouth Rinse 10 milliLiter(s) Swish and Spit five times a day      Other medications:   Biotene Dry Mouth Oral Rinse 5 milliLiter(s) Swish and Spit five times a day  chlorhexidine 4% Liquid 1 Application(s) Topical <User Schedule>  lactated ringers. 1000 milliLiter(s) IV Continuous <Continuous>  nystatin Powder 1 Application(s) Topical two times a day  phytonadione   Solution 5 milliGRAM(s) Oral <User Schedule>  sodium chloride 0.9%. 1000 milliLiter(s) IV Continuous <Continuous>  zinc oxide 40% Paste 1 Application(s) Topical two times a day      PRN:   acetaminophen     Tablet .. 650 milliGRAM(s) Oral every 6 hours PRN  aluminum hydroxide/magnesium hydroxide/simethicone Suspension 30 milliLiter(s) Oral every 6 hours PRN  artificial tears (preservative free) Ophthalmic Solution 1 Drop(s) Both EYES every 3 hours PRN  benzocaine/menthol Lozenge 1 Lozenge Oral every 8 hours PRN  diphenoxylate/atropine 1 Tablet(s) Oral every 6 hours PRN  loperamide 4 milliGRAM(s) Oral every 4 hours PRN  LORazepam   Injectable 0.5 milliGRAM(s) IV Push every 8 hours PRN  ondansetron Injectable 8 milliGRAM(s) IV Push every 8 hours PRN  petrolatum Ophthalmic Ointment 1 Application(s) Both EYES at bedtime PRN  simethicone 80 milliGRAM(s) Chew four times a day PRN  sodium chloride 0.9% lock flush 10 milliLiter(s) IV Push every 1 hour PRN      A/P:  64 year old female with a history of multiple myeloma   Post:  Autologous PBSCT day + 15  /23- HPC transplant today, continue hydration for 24 hours post infusion of cells. Daily weights, strict I&O, prn diuresis. Start levaquin / fluconazole prophylaxis when neutropenic.    c/o intermittent abd pain, AXR done, reading from radiologist requested. weight gain from admission, monitor closely, Lasix PRN    Zofran to PRN, switched Compazine-->reglan PRN  - refractory nausea / vomiting, will do trial of olanzapine. d/c reglan. Grade 1 chemotherapy induced GI mucositis. Continue supportive measures.    Check ECG while on Zyprexa +Levaquin   - Febrile, F/U BCX, PO antibiotics switched to Zosyn and Vanco.    - Rising S. Cr- 1.52, will D/C Vanco IV, started hydration NS @ 75 cc/hr for 24 hrs, renally adjust meds, Cr.Cl- 36 will continue Zosyn 4.5 gm Q 8 hrs, change Diflucan to 200 mg PO daily.    - Grade 1 transaminitis, will monitor closely  9/3 - +MAMADOU, f/u urine lytes, increase IVF's. Repeat BMP this afternoon. Dose adjust medications  - non anion gap metabolic acidosis - likely secondary to diarrhea and / or MAMADOU, MAMADOU likely secondary to intravascular volume depletion. Creatinine improved today with increased IVF (NS) overnight, however hyperchloremia noted. Will change IVF to D5W with 3 amps NaHCO3- at 100ml /hr, repeat BMP at 3pm. Quantify diarrhea, continue lomotil. C diff negative.    - creatinine 1.76, serum CO2 22. d/c NaHCO3- gtt, started LR @ 75ml /hr. Engrafted. d/c zosyn, po vanco, fluconazole. d/c G-CSF after today's dose.   - febrile overnight, tmax 100.6, likely related to engraftment. F/u cultures, observe off of antibiotics. Creatinine improving, continue LR for now.     1. Infectious Disease:   acyclovir   Oral Tab/Cap 800 milliGRAM(s) Oral daily      2. GI Prophylaxis:    pantoprazole    Tablet 40 milliGRAM(s) Oral before breakfast    3. Mouthcare - NS / NaHCO3 rinses, Biotene; Skin care     4. Transfuse & replete electrolytes prn   KCl 40mEq po q 4 hours x 2 doses   Kphos 30mmol IV x 1     5. IV hydration, daily weights, strict I&O, prn diuresis     6. PO intake as tolerated, nutrition follow up as needed    7. Antiemetics, anti-diarrhea medications:   diphenoxylate/atropine 1 Tablet(s) Oral every 6 hours PRN  loperamide 4 milliGRAM(s) Oral every 4 hours PRN  LORazepam   Injectable 0.5 milliGRAM(s) IV Push every 8 hours PRN  ondansetron Injectable 8 milliGRAM(s) IV Push every 8 hours PRN  OLANZapine 5 milliGRAM(s) Oral at bedtime    8. OOB as tolerated, physical therapy consult if needed     9. Monitor coags / fibrinogen 2x week, vitamin K as needed   phytonadione   Solution 5 milliGRAM(s) Oral <User Schedule>    10. Monitor closely for clinical changes, monitor for fevers     11. Emotional support provided, plan of care discussed and questions addressed     12. Patient education done regarding plan of care, restrictions and discharge planning     13. Continue regular social work input     I have written the above note for Dr. Mckeon who performed service with me in the room.   Ladonna King NP-C ([phone])     [facility] Transplant Team                                                      Critical / Counseling Time Provided: 30 minutes                                                                                                                                                        Chief Complaint: Autologous pbsct with high dose melphalan prep regimen for treatment of IgG lambda multiple myeloma  Patient states diarrhea is improved.     O: Vitals:   Vital Signs Last 24 Hrs  T(C): 37.4 (07 Sep 2024 05:22), Max: 38.3 (06 Sep 2024 21:13)  T(F): 99.3 (07 Sep 2024 05:22), Max: 100.9 (06 Sep 2024 21:13)  HR: 98 (07 Sep 2024 05:22) (98 - 115)  BP: 147/84 (07 Sep 2024 05:22) (131/77 - 147/84)  BP(mean): --  RR: 18 (07 Sep 2024 05:22) (18 - 18)  SpO2: 92% (07 Sep 2024 05:22) (92% - 94%)    Parameters below as of 07 Sep 2024 05:22  Patient On (Oxygen Delivery Method): room air    Admit weight: 62.2kg   Daily  66.5kg   Daily Weight in k.4 (06 Sep 2024 08:09)    Intake / Output:    @ 07:01  -   @ 07:00  --------------------------------------------------------  IN: 2901 mL / OUT: 1925 mL / NET: 976 mL    PE:   Oropharynx: no erythema or ulcerations   Oral Mucositis:              -                                          Grade: n/a   CVS: RR, +S1,S2  Lungs: CTA throughout bilaterally   Abdomen: + BS x 4, soft, ND, +mild lower abd discomfort in lower abdomen  Extremities: no edema in BLE  Gastric Mucositis:       +                                          ndGndrndanddndend:nd nd2nd Intestinal Mucositis:     -                                         Grade: n/a   Skin: no rashes   TLC: CDI   Neuro: A&Ox3   Pain: denies    LABS:                          8.6    17.06 )-----------( 58       ( 07 Sep 2024 06:55 )             25.4         Mean Cell Volume : 90.1 fl  Mean Cell Hemoglobin : 30.5 pg  Mean Cell Hemoglobin Concentration : 33.9 gm/dL  Auto Neutrophil # : 14.09 K/uL  Auto Lymphocyte # : 0.44 K/uL  Auto Monocyte # : 1.33 K/uL  Auto Eosinophil # : 0.00 K/uL  Auto Basophil # : 0.00 K/uL  Auto Neutrophil % : 80.0 %  Auto Lymphocyte % : 2.6 %  Auto Monocyte % : 7.8 %  Auto Eosinophil % : 0.0 %  Auto Basophil % : 0.0 %          141  |  108  |  12  ----------------------------<  89  3.8   |  22  |  1.26    Ca    8.6      07 Sep 2024 06:54  Phos  3.5       Mg     1.4         TPro  5.2<L>  /  Alb  2.7<L>  /  TBili  0.2  /  DBili  x   /  AST  23  /  ALT  16  /  AlkPhos  163<H>    Mg 1.4    Uric Acid --    Meds:   Antimicrobials:   acyclovir   Oral Tab/Cap 800 milliGRAM(s) Oral every 12 hours  trimethoprim   80 mG/sulfamethoxazole 400 mG 1 Tablet(s) Oral daily      GI:  aluminum hydroxide/magnesium hydroxide/simethicone Suspension 30 milliLiter(s) Oral every 6 hours PRN  diphenoxylate/atropine 1 Tablet(s) Oral every 6 hours PRN  loperamide 4 milliGRAM(s) Oral every 4 hours PRN  pantoprazole    Tablet 40 milliGRAM(s) Oral before breakfast  simethicone 80 milliGRAM(s) Chew four times a day PRN  sodium bicarbonate Mouth Rinse 10 milliLiter(s) Swish and Spit five times a day      Other medications:   Biotene Dry Mouth Oral Rinse 5 milliLiter(s) Swish and Spit five times a day  chlorhexidine 4% Liquid 1 Application(s) Topical <User Schedule>  lactated ringers. 1000 milliLiter(s) IV Continuous <Continuous>  nystatin Powder 1 Application(s) Topical two times a day  phytonadione   Solution 5 milliGRAM(s) Oral <User Schedule>  sodium chloride 0.9%. 1000 milliLiter(s) IV Continuous <Continuous>  zinc oxide 40% Paste 1 Application(s) Topical two times a day      PRN:   acetaminophen     Tablet .. 650 milliGRAM(s) Oral every 6 hours PRN  aluminum hydroxide/magnesium hydroxide/simethicone Suspension 30 milliLiter(s) Oral every 6 hours PRN  artificial tears (preservative free) Ophthalmic Solution 1 Drop(s) Both EYES every 3 hours PRN  benzocaine/menthol Lozenge 1 Lozenge Oral every 8 hours PRN  diphenoxylate/atropine 1 Tablet(s) Oral every 6 hours PRN  loperamide 4 milliGRAM(s) Oral every 4 hours PRN  LORazepam   Injectable 0.5 milliGRAM(s) IV Push every 8 hours PRN  ondansetron Injectable 8 milliGRAM(s) IV Push every 8 hours PRN  petrolatum Ophthalmic Ointment 1 Application(s) Both EYES at bedtime PRN  simethicone 80 milliGRAM(s) Chew four times a day PRN  sodium chloride 0.9% lock flush 10 milliLiter(s) IV Push every 1 hour PRN      A/P:  64 year old female with a history of multiple myeloma   Post:  Autologous PBSCT day + 15  /23- HPC transplant today, continue hydration for 24 hours post infusion of cells. Daily weights, strict I&O, prn diuresis. Start levaquin / fluconazole prophylaxis when neutropenic.    c/o intermittent abd pain, AXR done, reading from radiologist requested. weight gain from admission, monitor closely, Lasix PRN    Zofran to PRN, switched Compazine-->reglan PRN  - refractory nausea / vomiting, will do trial of olanzapine. d/c reglan. Grade 1 chemotherapy induced GI mucositis. Continue supportive measures.    Check ECG while on Zyprexa +Levaquin   - Febrile, F/U BCX, PO antibiotics switched to Zosyn and Vanco.    - Rising S. Cr- 1.52, will D/C Vanco IV, started hydration NS @ 75 cc/hr for 24 hrs, renally adjust meds, Cr.Cl- 36 will continue Zosyn 4.5 gm Q 8 hrs, change Diflucan to 200 mg PO daily.    - Grade 1 transaminitis, will monitor closely  9/3 - +MAMADOU, f/u urine lytes, increase IVF's. Repeat BMP this afternoon. Dose adjust medications  - non anion gap metabolic acidosis - likely secondary to diarrhea and / or MAMADOU, MAMADOU likely secondary to intravascular volume depletion. Creatinine improved today with increased IVF (NS) overnight, however hyperchloremia noted. Will change IVF to D5W with 3 amps NaHCO3- at 100ml /hr, repeat BMP at 3pm. Quantify diarrhea, continue lomotil. C diff negative.    - creatinine 1.76, serum CO2 22. d/c NaHCO3- gtt, started LR @ 75ml /hr. Engrafted. d/c zosyn, po vanco, fluconazole. d/c G-CSF after today's dose.   - febrile overnight, tmax 100.6, likely related to engraftment. F/u cultures, observe off of antibiotics. Creatinine improving, continue LR for now.     1. Infectious Disease:   acyclovir   Oral Tab/Cap 800 milliGRAM(s) Oral daily    2. GI Prophylaxis:    pantoprazole    Tablet 40 milliGRAM(s) Oral before breakfast    3. Mouthcare - NS / NaHCO3 rinses, Biotene; Skin care     4. Transfuse & replete electrolytes prn   hypomagnesemia-replace mg    5. IV hydration, daily weights, strict I&O, prn diuresis     6. PO intake as tolerated, nutrition follow up as needed    7. Antiemetics, anti-diarrhea medications:   diphenoxylate/atropine 1 Tablet(s) Oral every 6 hours PRN  loperamide 4 milliGRAM(s) Oral every 4 hours PRN  LORazepam   Injectable 0.5 milliGRAM(s) IV Push every 8 hours PRN  ondansetron Injectable 8 milliGRAM(s) IV Push every 8 hours PRN  OLANZapine 5 milliGRAM(s) Oral at bedtime    8. OOB as tolerated, physical therapy consult if needed     9. Monitor coags / fibrinogen 2x week, vitamin K as needed   phytonadione   Solution 5 milliGRAM(s) Oral <User Schedule>    10. Monitor closely for clinical changes, monitor for fevers     11. Emotional support provided, plan of care discussed and questions addressed     12. Patient education done regarding plan of care, restrictions and discharge planning     13. Continue regular social work input     I have written the above note for Dr. Mckeon who performed service with me in the room.   Ladonna King NP-C ([phone])

## 2024-09-07 NOTE — PROGRESS NOTE ADULT - NS ATTEND AMEND GEN_ALL_CORE FT
64 year old female with a history of multiple myeloma admitted for auto-HSCT Day + 14 today ...engrafting...watery green stools with incontinence..cdiff neg..will check stool gi pcr if continues, some nausea    Disease: IgG lambda multiple myeloma  Type of transplant: Autologous  Conditioning prep: Melphalan (200 mg/m2)   ABO / CMV: O POS / POS    Complications:   - Day 0: nausea/vomiting, abdominal pain   -Day 1: B/L lower extremities tremors --> compazine D/C, replaced by reglan     I rounded with the team of nursing staff, ACP.  I reviewed the data.  I saw and examined the patient and answered her questions.   Patient's nausea improved on Zyprexa; Zofran and Ativan PRN. Reglan discontinued while on Zyprexa..now d/c'd  Has frequent bowel movements, but this has been chronic with use of Imodium at home. Today, few episodes of watery diarrhea...if continues check GI pcr....pt did describe lower abdominal cramping..now better  Labs reviewed, supplements lytes prn  Neutropenic fever(9/1)- order cultures and CXR;  start Zosyn and Vanco IV; D/C Levaquin. Continue Acyclovir, Fluconazole, Vanco PO prophylaxis. 9/2 given rise in creatinine d/c vanco..cx neg thus far....d/c zosyn 9/5..spikes 9/5 pm..f/u cx  given  rise in creatinine increased ivf..switch to LR today and decrease rate.....creatinine improved further today..meds adjusted..consider renal consult if not continuing to improve...likely multi factorial... related to drug, dehydration and engraftment  NIVESTYM 300 MICROGram(s) SubCutaneous daily, started on day +5..d/c 9/6  Continue supportive care.  OOB/ambulate  d/c planning for monday..increase po, ambulate 64 year old female with a history of multiple myeloma admitted for auto-HSCT Day + 14 today ...engrafting...watery green stools with incontinence..cdiff neg..will check stool gi pcr if continues, some nausea today..pt offers that stools are less volume    Disease: IgG lambda multiple myeloma  Type of transplant: Autologous  Conditioning prep: Melphalan (200 mg/m2)   ABO / CMV: O POS / POS    Complications:   - Day 0: nausea/vomiting, abdominal pain   -Day 1: B/L lower extremities tremors --> compazine D/C, replaced by reglan     I rounded with the team of nursing staff, ACP.  I reviewed the data.  I saw and examined the patient and answered her questions.   Patient's nausea improved on Zyprexa; Zofran and Ativan PRN. Reglan discontinued while on Zyprexa..now d/c'd  Has frequent bowel movements, but this has been chronic with use of Imodium at home. Today, few episodes of watery diarrhea...if continues check GI pcr....pt did describe lower abdominal cramping..now better  Labs reviewed, supplements lytes prn  Neutropenic fever(9/1)- order cultures and CXR;  start Zosyn and Vanco IV; D/C Levaquin. Continue Acyclovir, Fluconazole, Vanco PO prophylaxis. 9/2 given rise in creatinine d/c vanco..cx neg thus far....d/c zosyn 9/5..spikes 9/5 pm..f/u cx  given  rise in creatinine increased ivf..switch to LR today and decrease rate.....creatinine improved further today..meds adjusted..consider renal consult if not continuing to improve...likely multi factorial... related to drug, dehydration and engraftment  NIVESTYM 300 MICROGram(s) SubCutaneous daily, started on day +5..d/c 9/6  Continue supportive care.  OOB/ambulate  d/c planning for monday..increase po, ambulate 64 year old female with a history of multiple myeloma admitted for auto-HSCT Day + 15 today ...engrafting...watery green stools with incontinence..cdiff neg..will check stool gi pcr if continues, some nausea today..pt offers that stools are less volume    Disease: IgG lambda multiple myeloma  Type of transplant: Autologous  Conditioning prep: Melphalan (200 mg/m2)   ABO / CMV: O POS / POS    Complications:   - Day 0: nausea/vomiting, abdominal pain   -Day 1: B/L lower extremities tremors --> compazine D/C, replaced by reglan     I rounded with the team of nursing staff, ACP.  I reviewed the data.  I saw and examined the patient and answered her questions.   Patient's nausea improved on Zyprexa; Zofran and Ativan PRN. Reglan discontinued while on Zyprexa..now d/c'd  Has frequent bowel movements, but this has been chronic with use of Imodium at home. Today, few episodes of watery diarrhea...if continues check GI pcr....pt did describe lower abdominal cramping..now better  Labs reviewed, supplements lytes prn  Neutropenic fever(9/1)- order cultures and CXR;  start Zosyn and Vanco IV; D/C Levaquin. Continue Acyclovir, Fluconazole, Vanco PO prophylaxis. 9/2 given rise in creatinine d/c vanco..cx neg thus far....d/c zosyn 9/5..spikes 9/5 pm..f/u cx  given  rise in creatinine increased ivf..switch to LR today and decrease rate.....creatinine improved further today..meds adjusted..consider renal consult if not continuing to improve...likely multi factorial... related to drug, dehydration and engraftment  NIVESTYM 300 MICROGram(s) SubCutaneous daily, started on day +5..d/c 9/6  Continue supportive care.  OOB/ambulate  d/c planning for monday..increase po, ambulate

## 2024-09-08 LAB
ALBUMIN SERPL ELPH-MCNC: 2.8 G/DL — LOW (ref 3.3–5)
ALP SERPL-CCNC: 153 U/L — HIGH (ref 40–120)
ALT FLD-CCNC: 13 U/L — SIGNIFICANT CHANGE UP (ref 10–45)
ANION GAP SERPL CALC-SCNC: 14 MMOL/L — SIGNIFICANT CHANGE UP (ref 5–17)
AST SERPL-CCNC: 22 U/L — SIGNIFICANT CHANGE UP (ref 10–40)
BASOPHILS # BLD AUTO: 0 K/UL — SIGNIFICANT CHANGE UP (ref 0–0.2)
BASOPHILS NFR BLD AUTO: 0 % — SIGNIFICANT CHANGE UP (ref 0–2)
BILIRUB SERPL-MCNC: 0.2 MG/DL — SIGNIFICANT CHANGE UP (ref 0.2–1.2)
BLASTS # FLD: 1 % — HIGH (ref 0–0)
BUN SERPL-MCNC: 12 MG/DL — SIGNIFICANT CHANGE UP (ref 7–23)
CALCIUM SERPL-MCNC: 8.8 MG/DL — SIGNIFICANT CHANGE UP (ref 8.4–10.5)
CHLORIDE SERPL-SCNC: 104 MMOL/L — SIGNIFICANT CHANGE UP (ref 96–108)
CO2 SERPL-SCNC: 22 MMOL/L — SIGNIFICANT CHANGE UP (ref 22–31)
CREAT SERPL-MCNC: 1.26 MG/DL — SIGNIFICANT CHANGE UP (ref 0.5–1.3)
CULTURE RESULTS: SIGNIFICANT CHANGE UP
CULTURE RESULTS: SIGNIFICANT CHANGE UP
EGFR: 47 ML/MIN/1.73M2 — LOW
EOSINOPHIL # BLD AUTO: 0 K/UL — SIGNIFICANT CHANGE UP (ref 0–0.5)
EOSINOPHIL NFR BLD AUTO: 0 % — SIGNIFICANT CHANGE UP (ref 0–6)
GIANT PLATELETS BLD QL SMEAR: PRESENT — SIGNIFICANT CHANGE UP
GLUCOSE SERPL-MCNC: 98 MG/DL — SIGNIFICANT CHANGE UP (ref 70–99)
HCT VFR BLD CALC: 24.4 % — LOW (ref 34.5–45)
HGB BLD-MCNC: 8.3 G/DL — LOW (ref 11.5–15.5)
LDH SERPL L TO P-CCNC: 361 U/L — HIGH (ref 50–242)
LYMPHOCYTES # BLD AUTO: 0.48 K/UL — LOW (ref 1–3.3)
LYMPHOCYTES # BLD AUTO: 4 % — LOW (ref 13–44)
MAGNESIUM SERPL-MCNC: 1.6 MG/DL — SIGNIFICANT CHANGE UP (ref 1.6–2.6)
MANUAL SMEAR VERIFICATION: SIGNIFICANT CHANGE UP
MCHC RBC-ENTMCNC: 31.3 PG — SIGNIFICANT CHANGE UP (ref 27–34)
MCHC RBC-ENTMCNC: 34 GM/DL — SIGNIFICANT CHANGE UP (ref 32–36)
MCV RBC AUTO: 92.1 FL — SIGNIFICANT CHANGE UP (ref 80–100)
METAMYELOCYTES # FLD: 5 % — HIGH (ref 0–0)
MONOCYTES # BLD AUTO: 1.09 K/UL — HIGH (ref 0–0.9)
MONOCYTES NFR BLD AUTO: 9 % — SIGNIFICANT CHANGE UP (ref 2–14)
MYELOCYTES NFR BLD: 4 % — HIGH (ref 0–0)
NEUTROPHILS # BLD AUTO: 8.8 K/UL — HIGH (ref 1.8–7.4)
NEUTROPHILS NFR BLD AUTO: 69 % — SIGNIFICANT CHANGE UP (ref 43–77)
NEUTS BAND # BLD: 4 % — SIGNIFICANT CHANGE UP (ref 0–8)
NRBC # BLD: 0 /100 WBCS — SIGNIFICANT CHANGE UP (ref 0–0)
PHOSPHATE SERPL-MCNC: 3.8 MG/DL — SIGNIFICANT CHANGE UP (ref 2.5–4.5)
PLAT MORPH BLD: ABNORMAL
PLATELET # BLD AUTO: 80 K/UL — LOW (ref 150–400)
POTASSIUM SERPL-MCNC: 3.2 MMOL/L — LOW (ref 3.5–5.3)
POTASSIUM SERPL-SCNC: 3.2 MMOL/L — LOW (ref 3.5–5.3)
PROMYELOCYTES # FLD: 4 % — HIGH (ref 0–0)
PROT SERPL-MCNC: 5.2 G/DL — LOW (ref 6–8.3)
RBC # BLD: 2.65 M/UL — LOW (ref 3.8–5.2)
RBC # FLD: 14.6 % — HIGH (ref 10.3–14.5)
RBC BLD AUTO: SIGNIFICANT CHANGE UP
SODIUM SERPL-SCNC: 140 MMOL/L — SIGNIFICANT CHANGE UP (ref 135–145)
SPECIMEN SOURCE: SIGNIFICANT CHANGE UP
SPECIMEN SOURCE: SIGNIFICANT CHANGE UP
WBC # BLD: 12.06 K/UL — HIGH (ref 3.8–10.5)
WBC # FLD AUTO: 12.06 K/UL — HIGH (ref 3.8–10.5)

## 2024-09-08 PROCEDURE — 99233 SBSQ HOSP IP/OBS HIGH 50: CPT

## 2024-09-08 RX ORDER — ONDANSETRON 2 MG/ML
8 INJECTION, SOLUTION INTRAMUSCULAR; INTRAVENOUS EVERY 8 HOURS
Refills: 0 | Status: DISCONTINUED | OUTPATIENT
Start: 2024-09-08 | End: 2024-09-13

## 2024-09-08 RX ORDER — POTASSIUM CHLORIDE 10 MEQ
20 TABLET, EXT RELEASE, PARTICLES/CRYSTALS ORAL
Refills: 0 | Status: COMPLETED | OUTPATIENT
Start: 2024-09-08 | End: 2024-09-08

## 2024-09-08 RX ORDER — OLANZAPINE 7.5 MG/1
5 TABLET ORAL DAILY
Refills: 0 | Status: DISCONTINUED | OUTPATIENT
Start: 2024-09-08 | End: 2024-09-09

## 2024-09-08 RX ADMIN — Medication 1 APPLICATION(S): at 05:12

## 2024-09-08 RX ADMIN — Medication 800 MILLIGRAM(S): at 05:12

## 2024-09-08 RX ADMIN — Medication 5 MILLILITER(S): at 00:11

## 2024-09-08 RX ADMIN — SODIUM BICARBONATE 10 MILLILITER(S): 84 INJECTION, SOLUTION INTRAVENOUS at 00:12

## 2024-09-08 RX ADMIN — CHLORHEXIDINE GLUCONATE 1 APPLICATION(S): 40 SOLUTION TOPICAL at 09:06

## 2024-09-08 RX ADMIN — Medication 800 MILLIGRAM(S): at 17:52

## 2024-09-08 RX ADMIN — LORAZEPAM 0.5 MILLIGRAM(S): 4 INJECTION INTRAMUSCULAR; INTRAVENOUS at 21:58

## 2024-09-08 RX ADMIN — SODIUM BICARBONATE 10 MILLILITER(S): 84 INJECTION, SOLUTION INTRAVENOUS at 17:48

## 2024-09-08 RX ADMIN — Medication 5 MILLILITER(S): at 13:04

## 2024-09-08 RX ADMIN — ONDANSETRON 8 MILLIGRAM(S): 2 INJECTION, SOLUTION INTRAMUSCULAR; INTRAVENOUS at 21:59

## 2024-09-08 RX ADMIN — Medication 1 TABLET(S): at 09:53

## 2024-09-08 RX ADMIN — OLANZAPINE 5 MILLIGRAM(S): 7.5 TABLET ORAL at 13:07

## 2024-09-08 RX ADMIN — SODIUM BICARBONATE 10 MILLILITER(S): 84 INJECTION, SOLUTION INTRAVENOUS at 13:04

## 2024-09-08 RX ADMIN — ONDANSETRON 8 MILLIGRAM(S): 2 INJECTION, SOLUTION INTRAMUSCULAR; INTRAVENOUS at 05:12

## 2024-09-08 RX ADMIN — Medication 50 MILLIEQUIVALENT(S): at 17:49

## 2024-09-08 RX ADMIN — Medication 1 LOZENGE: at 04:02

## 2024-09-08 RX ADMIN — Medication 5 MILLILITER(S): at 09:05

## 2024-09-08 RX ADMIN — SODIUM BICARBONATE 10 MILLILITER(S): 84 INJECTION, SOLUTION INTRAVENOUS at 20:18

## 2024-09-08 RX ADMIN — ONDANSETRON 8 MILLIGRAM(S): 2 INJECTION, SOLUTION INTRAMUSCULAR; INTRAVENOUS at 13:07

## 2024-09-08 RX ADMIN — Medication 50 MILLIEQUIVALENT(S): at 02:54

## 2024-09-08 RX ADMIN — ZINC OXIDE 1 APPLICATION(S): 100 OINTMENT TOPICAL at 05:13

## 2024-09-08 RX ADMIN — SULFAMETHOXAZOLE AND TRIMETHOPRIM 1 TABLET(S): 800; 160 TABLET ORAL at 13:04

## 2024-09-08 RX ADMIN — SODIUM BICARBONATE 10 MILLILITER(S): 84 INJECTION, SOLUTION INTRAVENOUS at 09:05

## 2024-09-08 RX ADMIN — Medication 50 MILLIEQUIVALENT(S): at 13:05

## 2024-09-08 RX ADMIN — Medication 5 MILLILITER(S): at 20:17

## 2024-09-08 RX ADMIN — Medication 1 APPLICATION(S): at 17:50

## 2024-09-08 RX ADMIN — Medication 40 MILLIGRAM(S): at 05:12

## 2024-09-08 RX ADMIN — ZINC OXIDE 1 APPLICATION(S): 100 OINTMENT TOPICAL at 17:51

## 2024-09-08 RX ADMIN — Medication 5 MILLILITER(S): at 17:49

## 2024-09-08 NOTE — PROGRESS NOTE ADULT - SUBJECTIVE AND OBJECTIVE BOX
[facility] Transplant Team                                                      Critical / Counseling Time Provided: 30 minutes                                                                                                                                                                                                                                                                                                              Chief Complaint: Autologous pbsct with high dose melphalan prep regimen for treatment of IgG lambda multiple myeloma  Patient states diarrhea is improved.     S: Patient seen and examined with [facility] Transplant Team:   + fatigue  nausea persists with GERD like symptoms    O: Vitals:   Vital Signs Last 24 Hrs  T(C): 37.9 (08 Sep 2024 05:10), Max: 38 (07 Sep 2024 13:25)  T(F): 100.2 (08 Sep 2024 05:10), Max: 100.4 (07 Sep 2024 13:25)  HR: 106 (08 Sep 2024 05:10) (106 - 112)  BP: 151/76 (08 Sep 2024 05:10) (142/75 - 154/81)  BP(mean): --  RR: 18 (08 Sep 2024 05:10) (18 - 18)  SpO2: 91% (08 Sep 2024 05:10) (91% - 92%)    Parameters below as of 08 Sep 2024 05:10  Patient On (Oxygen Delivery Method): room air    Admit weight: 62.2kg   Daily Weight in k.7 (08 Sep 2024 09:07)    Intake / Output:    @ 07: @ 07:00  --------------------------------------------------------  IN: 2407 mL / OUT: 1675 mL / NET: 732 mL     @ 07: @ 11:24  --------------------------------------------------------  IN: 0 mL / OUT: 550 mL / NET: -550 mL    PE:   Oropharynx: no erythema or ulcerations   Oral Mucositis:              -                                          Grade: n/a   CVS: RR, +S1,S2  Lungs: CTA throughout bilaterally   Abdomen: + BS x 4, soft, ND, +mild lower abd discomfort in lower abdomen  Extremities: no edema in BLE  Gastric Mucositis:       +                                          ndGndrndanddndend:nd nd2nd Intestinal Mucositis:     -                                         Grade: n/a   Skin: no rashes   TLC: CDI   Neuro: A&Ox3   Pain: denies    Labs:   CBC Full  -  ( 08 Sep 2024 07:08 )  WBC Count : 12.06 K/uL  Hemoglobin : 8.3 g/dL  Hematocrit : 24.4 %  Platelet Count - Automated : 80 K/uL  Mean Cell Volume : 92.1 fl  Mean Cell Hemoglobin : 31.3 pg  Mean Cell Hemoglobin Concentration : 34.0 gm/dL  Auto Neutrophil # : 8.80 K/uL  Auto Lymphocyte # : 0.48 K/uL  Auto Monocyte # : 1.09 K/uL  Auto Eosinophil # : 0.00 K/uL  Auto Basophil # : 0.00 K/uL  Auto Neutrophil % : 69.0 %  Auto Lymphocyte % : 4.0 %  Auto Monocyte % : 9.0 %  Auto Eosinophil % : 0.0 %  Auto Basophil % : 0.0 %                          8.3    12.06 )-----------( 80       ( 08 Sep 2024 07:08 )             24.4         140  |  104  |  12  ----------------------------<  98  3.2<L>   |  22  |  1.26    Ca    8.8      08 Sep 2024 07:08  Phos  3.8       Mg     1.6         TPro  5.2<L>  /  Alb  2.8<L>  /  TBili  0.2  /  DBili  x   /  AST  22  /  ALT  13  /  AlkPhos  153<H>        LIVER FUNCTIONS - ( 08 Sep 2024 07:08 )  Alb: 2.8 g/dL / Pro: 5.2 g/dL / ALK PHOS: 153 U/L / ALT: 13 U/L / AST: 22 U/L / GGT: x           Lactate Dehydrogenase, Serum: 361 U/L ( @ 07:08)    Meds:   Antimicrobials:   acyclovir   Oral Tab/Cap 800 milliGRAM(s) Oral every 12 hours  trimethoprim   80 mG/sulfamethoxazole 400 mG 1 Tablet(s) Oral daily    GI:  aluminum hydroxide/magnesium hydroxide/simethicone Suspension 30 milliLiter(s) Oral every 6 hours PRN  diphenoxylate/atropine 1 Tablet(s) Oral every 6 hours PRN  loperamide 4 milliGRAM(s) Oral every 4 hours PRN  pantoprazole    Tablet 40 milliGRAM(s) Oral before breakfast  simethicone 80 milliGRAM(s) Chew four times a day PRN  sodium bicarbonate Mouth Rinse 10 milliLiter(s) Swish and Spit five times a day      Other medications:   Biotene Dry Mouth Oral Rinse 5 milliLiter(s) Swish and Spit five times a day  chlorhexidine 4% Liquid 1 Application(s) Topical <User Schedule>  lactated ringers. 1000 milliLiter(s) IV Continuous <Continuous>  nystatin Powder 1 Application(s) Topical two times a day  OLANZapine 5 milliGRAM(s) Oral daily  ondansetron Injectable 8 milliGRAM(s) IV Push every 8 hours  phytonadione   Solution 5 milliGRAM(s) Oral <User Schedule>  potassium chloride  20 mEq/100 mL IVPB 20 milliEquivalent(s) IV Intermittent every 2 hours  sodium chloride 0.9%. 1000 milliLiter(s) IV Continuous <Continuous>  zinc oxide 40% Paste 1 Application(s) Topical two times a day      PRN:   acetaminophen     Tablet .. 650 milliGRAM(s) Oral every 6 hours PRN  aluminum hydroxide/magnesium hydroxide/simethicone Suspension 30 milliLiter(s) Oral every 6 hours PRN  artificial tears (preservative free) Ophthalmic Solution 1 Drop(s) Both EYES every 3 hours PRN  benzocaine/menthol Lozenge 1 Lozenge Oral every 8 hours PRN  diphenoxylate/atropine 1 Tablet(s) Oral every 6 hours PRN  loperamide 4 milliGRAM(s) Oral every 4 hours PRN  LORazepam   Injectable 0.5 milliGRAM(s) IV Push every 8 hours PRN  petrolatum Ophthalmic Ointment 1 Application(s) Both EYES at bedtime PRN  simethicone 80 milliGRAM(s) Chew four times a day PRN  sodium chloride 0.9% lock flush 10 milliLiter(s) IV Push every 1 hour PRN      A/P:  64 year old female with a history of multiple myeloma   Post:  Autologous PBSCT day + - HPC transplant today, continue hydration for 24 hours post infusion of cells. Daily weights, strict I&O, prn diuresis. Start levaquin / fluconazole prophylaxis when neutropenic.    c/o intermittent abd pain, AXR done, reading from radiologist requested. weight gain from admission, monitor closely, Lasix PRN    Zofran to PRN, switched Compazine-->reglan PRN  - refractory nausea / vomiting, will do trial of olanzapine. d/c reglan. Grade 1 chemotherapy induced GI mucositis. Continue supportive measures.    Check ECG while on Zyprexa +Levaquin   - Febrile, F/U BCX, PO antibiotics switched to Zosyn and Vanco.    - Rising S. Cr- 1.52, will D/C Vanco IV, started hydration NS @ 75 cc/hr for 24 hrs, renally adjust meds, Cr.Cl- 36 will continue Zosyn 4.5 gm Q 8 hrs, change Diflucan to 200 mg PO daily.    - Grade 1 transaminitis, will monitor closely  9/3 - +MAMADOU, f/u urine lytes, increase IVF's. Repeat BMP this afternoon. Dose adjust medications  - non anion gap metabolic acidosis - likely secondary to diarrhea and / or MAMADOU, MAMADOU likely secondary to intravascular volume depletion. Creatinine improved today with increased IVF (NS) overnight, however hyperchloremia noted. Will change IVF to D5W with 3 amps NaHCO3- at 100ml /hr, repeat BMP at 3pm. Quantify diarrhea, continue lomotil. C diff negative.    - creatinine 1.76, serum CO2 22. d/c NaHCO3- gtt, started LR @ 75ml /hr. Engrafted. d/c zosyn, po vanco, fluconazole. d/c G-CSF after today's dose.   - febrile overnight, tmax 100.6, likely related to engraftment. F/u cultures, observe off of antibiotics. Creatinine improving, continue LR for now.    Nausea persists. Added zyprexa back and changed Zofran to ATC.     1. Infectious Disease:   acyclovir   Oral Tab/Cap 800 milliGRAM(s) Oral daily    2. GI Prophylaxis:    pantoprazole    Tablet 40 milliGRAM(s) Oral before breakfast    3. Mouthcare - NS / NaHCO3 rinses, Biotene; Skin care     4. Transfuse & replete electrolytes prn   hypokalemia-replace k.     5. IV hydration, daily weights, strict I&O, prn diuresis     6. PO intake as tolerated, nutrition follow up as needed    7. Antiemetics, anti-diarrhea medications:   diphenoxylate/atropine 1 Tablet(s) Oral every 6 hours PRN  loperamide 4 milliGRAM(s) Oral every 4 hours PRN  LORazepam   Injectable 0.5 milliGRAM(s) IV Push every 8 hours PRN  ondansetron Injectable 8 milliGRAM(s) IV Push every 8 hours PRN  OLANZapine 5 milliGRAM(s) Oral at bedtime    8. OOB as tolerated, physical therapy consult if needed     9. Monitor coags / fibrinogen 2x week, vitamin K as needed   phytonadione   Solution 5 milliGRAM(s) Oral <User Schedule>    10. Monitor closely for clinical changes, monitor for fevers     11. Emotional support provided, plan of care discussed and questions addressed     12. Patient education done regarding plan of care, restrictions and discharge planning     13. Continue regular social work input     I have written the above note for Dr. Mckeon who performed service with me in the room.   Ladonna King NP-C ([phone])

## 2024-09-08 NOTE — PROGRESS NOTE ADULT - NS ATTEND AMEND GEN_ALL_CORE FT
64 year old female with a history of multiple myeloma admitted for auto-HSCT Day + 14 today ...engrafting...watery green stools with incontinence..cdiff neg..will check stool gi pcr if continues, some nausea    Disease: IgG lambda multiple myeloma  Type of transplant: Autologous  Conditioning prep: Melphalan (200 mg/m2)   ABO / CMV: O POS / POS    Complications:   - Day 0: nausea/vomiting, abdominal pain   -Day 1: B/L lower extremities tremors --> compazine D/C, replaced by reglan     I rounded with the team of nursing staff, ACP.  I reviewed the data.  I saw and examined the patient and answered her questions.   Patient's nausea improved on Zyprexa; Zofran and Ativan PRN. Reglan discontinued while on Zyprexa..now d/c'd  Has frequent bowel movements, but this has been chronic with use of Imodium at home. Today, few episodes of watery diarrhea...if continues check GI pcr....pt did describe lower abdominal cramping..now better  Labs reviewed, supplements lytes prn  Neutropenic fever(9/1)- order cultures and CXR;  start Zosyn and Vanco IV; D/C Levaquin. Continue Acyclovir, Fluconazole, Vanco PO prophylaxis. 9/2 given rise in creatinine d/c vanco..cx neg thus far....d/c zosyn 9/5..spikes 9/5 pm..f/u cx  given  rise in creatinine increased ivf..switch to LR today and decrease rate.....creatinine improved further today..meds adjusted..consider renal consult if not continuing to improve...likely multi factorial... related to drug, dehydration and engraftment  NIVESTYM 300 MICROGram(s) SubCutaneous daily, started on day +5..d/c 9/6  Continue supportive care.  OOB/ambulate  d/c planning for monday..increase po, ambulate 64 year old female with a history of multiple myeloma admitted for auto-HSCT Day + 16 today ...engrafting...watery green stools with incontinence..cdiff neg..will check stool gi pcr if continues, more nausea and vomitting today..stools less..will restart zyprexa and change zofran to atc...suspect sx related to engraftment..plts 80..temps down    Disease: IgG lambda multiple myeloma  Type of transplant: Autologous  Conditioning prep: Melphalan (200 mg/m2)   ABO / CMV: O POS / POS    Complications:   - Day 0: nausea/vomiting, abdominal pain   -Day 1: B/L lower extremities tremors --> compazine D/C, replaced by reglan     I rounded with the team of nursing staff, ACP.  I reviewed the data.  I saw and examined the patient and answered her questions.   Patient's nausea improved on Zyprexa in the past; Zofran and Ativan PRN. Reglan discontinued while on Zyprexa..now d/c'd..will restart zyprexa given persistent nausea  Has frequent bowel movements, but this has been chronic with use of Imodium at home. Today, few episodes of watery diarrhea...if continues check GI pcr....pt did describe lower abdominal cramping..now better  Labs reviewed, supplements lytes prn  Neutropenic fever(9/1)- order cultures and CXR;  start Zosyn and Vanco IV; D/C Levaquin. Continue Acyclovir, Fluconazole,  prophylaxis. 9/2 given rise in creatinine d/c vanco..cx neg thus far....d/c zosyn 9/5..spikes 9/5 pm..f/u cx  given  rise in creatinine increased ivf..switch to LR  and decrease rate.....creatinine improved further today..meds adjusted..consider renal consult if not continuing to improve...likely multi factorial... related to drug, dehydration and engraftment  NIVESTYM 300 MICROGram(s) SubCutaneous daily, started on day +5..d/c 9/6  Continue supportive care.  OOB/ambulate  d/c planning for monday or tuesday..increase po, ambulate

## 2024-09-09 DIAGNOSIS — Z94.84 STEM CELLS TRANSPLANT STATUS: ICD-10-CM

## 2024-09-09 LAB
ALBUMIN SERPL ELPH-MCNC: 3 G/DL — LOW (ref 3.3–5)
ALP SERPL-CCNC: 155 U/L — HIGH (ref 40–120)
ALT FLD-CCNC: 12 U/L — SIGNIFICANT CHANGE UP (ref 10–45)
ANION GAP SERPL CALC-SCNC: 17 MMOL/L — SIGNIFICANT CHANGE UP (ref 5–17)
APTT BLD: 29.1 SEC — SIGNIFICANT CHANGE UP (ref 24.5–35.6)
AST SERPL-CCNC: 21 U/L — SIGNIFICANT CHANGE UP (ref 10–40)
BASOPHILS # BLD AUTO: 0 K/UL — SIGNIFICANT CHANGE UP (ref 0–0.2)
BASOPHILS NFR BLD AUTO: 0 % — SIGNIFICANT CHANGE UP (ref 0–2)
BILIRUB DIRECT SERPL-MCNC: <0.1 MG/DL — SIGNIFICANT CHANGE UP (ref 0–0.3)
BILIRUB INDIRECT FLD-MCNC: >0.1 MG/DL — LOW (ref 0.2–1)
BILIRUB SERPL-MCNC: 0.2 MG/DL — SIGNIFICANT CHANGE UP (ref 0.2–1.2)
BLD GP AB SCN SERPL QL: POSITIVE — SIGNIFICANT CHANGE UP
BUN SERPL-MCNC: 12 MG/DL — SIGNIFICANT CHANGE UP (ref 7–23)
CALCIUM SERPL-MCNC: 8.7 MG/DL — SIGNIFICANT CHANGE UP (ref 8.4–10.5)
CHLORIDE SERPL-SCNC: 103 MMOL/L — SIGNIFICANT CHANGE UP (ref 96–108)
CO2 SERPL-SCNC: 21 MMOL/L — LOW (ref 22–31)
CREAT SERPL-MCNC: 1.16 MG/DL — SIGNIFICANT CHANGE UP (ref 0.5–1.3)
EGFR: 52 ML/MIN/1.73M2 — LOW
EOSINOPHIL # BLD AUTO: 0 K/UL — SIGNIFICANT CHANGE UP (ref 0–0.5)
EOSINOPHIL NFR BLD AUTO: 0 % — SIGNIFICANT CHANGE UP (ref 0–6)
GLUCOSE SERPL-MCNC: 86 MG/DL — SIGNIFICANT CHANGE UP (ref 70–99)
HCT VFR BLD CALC: 25.2 % — LOW (ref 34.5–45)
HGB BLD-MCNC: 8.4 G/DL — LOW (ref 11.5–15.5)
INR BLD: 1.2 RATIO — HIGH (ref 0.85–1.18)
LDH SERPL L TO P-CCNC: 324 U/L — HIGH (ref 50–242)
LYMPHOCYTES # BLD AUTO: 0.29 K/UL — LOW (ref 1–3.3)
LYMPHOCYTES # BLD AUTO: 2.8 % — LOW (ref 13–44)
MAGNESIUM SERPL-MCNC: 1.3 MG/DL — LOW (ref 1.6–2.6)
MANUAL SMEAR VERIFICATION: SIGNIFICANT CHANGE UP
MCHC RBC-ENTMCNC: 30.2 PG — SIGNIFICANT CHANGE UP (ref 27–34)
MCHC RBC-ENTMCNC: 33.3 GM/DL — SIGNIFICANT CHANGE UP (ref 32–36)
MCV RBC AUTO: 90.6 FL — SIGNIFICANT CHANGE UP (ref 80–100)
METAMYELOCYTES # FLD: 5.5 % — HIGH (ref 0–0)
MONOCYTES # BLD AUTO: 1.63 K/UL — HIGH (ref 0–0.9)
MONOCYTES NFR BLD AUTO: 15.6 % — HIGH (ref 2–14)
MYELOCYTES NFR BLD: 5.5 % — HIGH (ref 0–0)
NEUTROPHILS # BLD AUTO: 6.6 K/UL — SIGNIFICANT CHANGE UP (ref 1.8–7.4)
NEUTROPHILS NFR BLD AUTO: 58.7 % — SIGNIFICANT CHANGE UP (ref 43–77)
NEUTS BAND # BLD: 4.6 % — SIGNIFICANT CHANGE UP (ref 0–8)
NRBC # BLD: 2 /100 WBCS — HIGH (ref 0–0)
PHOSPHATE SERPL-MCNC: 3.4 MG/DL — SIGNIFICANT CHANGE UP (ref 2.5–4.5)
PLAT MORPH BLD: NORMAL — SIGNIFICANT CHANGE UP
PLATELET # BLD AUTO: 100 K/UL — LOW (ref 150–400)
POTASSIUM SERPL-MCNC: 3.8 MMOL/L — SIGNIFICANT CHANGE UP (ref 3.5–5.3)
POTASSIUM SERPL-SCNC: 3.8 MMOL/L — SIGNIFICANT CHANGE UP (ref 3.5–5.3)
PROMYELOCYTES # FLD: 7.3 % — HIGH (ref 0–0)
PROT SERPL-MCNC: 5.3 G/DL — LOW (ref 6–8.3)
PROTHROM AB SERPL-ACNC: 12.5 SEC — SIGNIFICANT CHANGE UP (ref 9.5–13)
RBC # BLD: 2.78 M/UL — LOW (ref 3.8–5.2)
RBC # FLD: 14.6 % — HIGH (ref 10.3–14.5)
RBC BLD AUTO: SIGNIFICANT CHANGE UP
RH IG SCN BLD-IMP: POSITIVE — SIGNIFICANT CHANGE UP
SARS-COV-2 RNA SPEC QL NAA+PROBE: SIGNIFICANT CHANGE UP
SODIUM SERPL-SCNC: 141 MMOL/L — SIGNIFICANT CHANGE UP (ref 135–145)
WBC # BLD: 10.42 K/UL — SIGNIFICANT CHANGE UP (ref 3.8–10.5)
WBC # FLD AUTO: 10.42 K/UL — SIGNIFICANT CHANGE UP (ref 3.8–10.5)

## 2024-09-09 PROCEDURE — 99233 SBSQ HOSP IP/OBS HIGH 50: CPT

## 2024-09-09 PROCEDURE — 71045 X-RAY EXAM CHEST 1 VIEW: CPT | Mod: 26

## 2024-09-09 RX ORDER — PIPERACILLIN SODIUM AND TAZOBACTAM SODIUM 3; .375 G/15ML; G/15ML
4.5 INJECTION, POWDER, FOR SOLUTION INTRAVENOUS EVERY 8 HOURS
Refills: 0 | Status: DISCONTINUED | OUTPATIENT
Start: 2024-09-10 | End: 2024-09-13

## 2024-09-09 RX ORDER — PIPERACILLIN SODIUM AND TAZOBACTAM SODIUM 3; .375 G/15ML; G/15ML
4.5 INJECTION, POWDER, FOR SOLUTION INTRAVENOUS ONCE
Refills: 0 | Status: COMPLETED | OUTPATIENT
Start: 2024-09-09 | End: 2024-09-09

## 2024-09-09 RX ADMIN — Medication 25 GRAM(S): at 14:35

## 2024-09-09 RX ADMIN — ZINC OXIDE 1 APPLICATION(S): 100 OINTMENT TOPICAL at 05:53

## 2024-09-09 RX ADMIN — Medication 25 GRAM(S): at 12:20

## 2024-09-09 RX ADMIN — Medication 40 MILLIGRAM(S): at 06:43

## 2024-09-09 RX ADMIN — ZINC OXIDE 1 APPLICATION(S): 100 OINTMENT TOPICAL at 17:29

## 2024-09-09 RX ADMIN — Medication 800 MILLIGRAM(S): at 17:30

## 2024-09-09 RX ADMIN — PIPERACILLIN SODIUM AND TAZOBACTAM SODIUM 25 GRAM(S): 3; .375 INJECTION, POWDER, FOR SOLUTION INTRAVENOUS at 21:43

## 2024-09-09 RX ADMIN — SODIUM BICARBONATE 10 MILLILITER(S): 84 INJECTION, SOLUTION INTRAVENOUS at 00:39

## 2024-09-09 RX ADMIN — Medication 5 MILLILITER(S): at 12:20

## 2024-09-09 RX ADMIN — LORAZEPAM 0.5 MILLIGRAM(S): 4 INJECTION INTRAMUSCULAR; INTRAVENOUS at 21:43

## 2024-09-09 RX ADMIN — Medication 800 MILLIGRAM(S): at 06:43

## 2024-09-09 RX ADMIN — SODIUM BICARBONATE 10 MILLILITER(S): 84 INJECTION, SOLUTION INTRAVENOUS at 19:55

## 2024-09-09 RX ADMIN — Medication 1 APPLICATION(S): at 17:30

## 2024-09-09 RX ADMIN — SODIUM BICARBONATE 10 MILLILITER(S): 84 INJECTION, SOLUTION INTRAVENOUS at 16:52

## 2024-09-09 RX ADMIN — Medication 5 MILLILITER(S): at 16:52

## 2024-09-09 RX ADMIN — Medication 5 MILLILITER(S): at 09:59

## 2024-09-09 RX ADMIN — ACETAMINOPHEN 650 MILLIGRAM(S): 325 TABLET ORAL at 17:20

## 2024-09-09 RX ADMIN — Medication 5 MILLILITER(S): at 19:55

## 2024-09-09 RX ADMIN — CHLORHEXIDINE GLUCONATE 1 APPLICATION(S): 40 SOLUTION TOPICAL at 06:43

## 2024-09-09 RX ADMIN — SODIUM BICARBONATE 10 MILLILITER(S): 84 INJECTION, SOLUTION INTRAVENOUS at 09:59

## 2024-09-09 RX ADMIN — ACETAMINOPHEN 650 MILLIGRAM(S): 325 TABLET ORAL at 16:51

## 2024-09-09 RX ADMIN — ONDANSETRON 8 MILLIGRAM(S): 2 INJECTION, SOLUTION INTRAMUSCULAR; INTRAVENOUS at 14:35

## 2024-09-09 RX ADMIN — Medication 25 GRAM(S): at 10:02

## 2024-09-09 RX ADMIN — PIPERACILLIN SODIUM AND TAZOBACTAM SODIUM 200 GRAM(S): 3; .375 INJECTION, POWDER, FOR SOLUTION INTRAVENOUS at 17:29

## 2024-09-09 RX ADMIN — SODIUM BICARBONATE 10 MILLILITER(S): 84 INJECTION, SOLUTION INTRAVENOUS at 12:20

## 2024-09-09 RX ADMIN — ONDANSETRON 8 MILLIGRAM(S): 2 INJECTION, SOLUTION INTRAMUSCULAR; INTRAVENOUS at 21:43

## 2024-09-09 RX ADMIN — SULFAMETHOXAZOLE AND TRIMETHOPRIM 1 TABLET(S): 800; 160 TABLET ORAL at 12:26

## 2024-09-09 RX ADMIN — ONDANSETRON 8 MILLIGRAM(S): 2 INJECTION, SOLUTION INTRAMUSCULAR; INTRAVENOUS at 06:52

## 2024-09-09 RX ADMIN — Medication 5 MILLIGRAM(S): at 09:59

## 2024-09-09 RX ADMIN — Medication 5 MILLILITER(S): at 00:39

## 2024-09-09 RX ADMIN — Medication 1 APPLICATION(S): at 05:52

## 2024-09-09 NOTE — PROGRESS NOTE ADULT - NS ATTEND AMEND GEN_ALL_CORE FT
64 year old female with a history of multiple myeloma admitted for auto-HSCT Day + 17 today ...engrafting...watery green stools with incontinence..cdiff neg..will check stool gi pcr if continues, more nausea and vomitting today..stools less..will restart zyprexa and change zofran to atc...suspect sx related to engraftment..plts 80..temps down    Disease: IgG lambda multiple myeloma  Type of transplant: Autologous  Conditioning prep: Melphalan (200 mg/m2)   ABO / CMV: O POS / POS    Complications:   - Day 0: nausea/vomiting, abdominal pain   -Day 1: B/L lower extremities tremors --> compazine D/C, replaced by reglan     I rounded with the team of nursing staff, ACP.  I reviewed the data.  I saw and examined the patient and answered her questions.   Patient's nausea improved on Zyprexa in the past; Zofran and Ativan PRN. Reglan discontinued while on Zyprexa..now d/c'd..will restart zyprexa given persistent nausea  Has frequent bowel movements, but this has been chronic with use of Imodium at home. Today, few episodes of watery diarrhea...if continues check GI pcr....pt did describe lower abdominal cramping..now better  Labs reviewed, supplements lytes prn  Neutropenic fever(9/1)- order cultures and CXR;  start Zosyn and Vanco IV; D/C Levaquin. Continue Acyclovir, Fluconazole,  prophylaxis. 9/2 given rise in creatinine d/c vanco..cx neg thus far....d/c zosyn 9/5..spikes 9/5 pm..f/u cx  given  rise in creatinine increased ivf..switch to LR  and decrease rate.....creatinine improved further today..meds adjusted..consider renal consult if not continuing to improve...likely multi factorial... related to drug, dehydration and engraftment  NIVESTYM 300 MICROGram(s) SubCutaneous daily, started on day +5..d/c 9/6  Continue supportive care.  OOB/ambulate  d/c planning for monday or tuesday..increase po, ambulate 64 year old female with a history of multiple myeloma admitted for auto-HSCT Day + 17 today    Disease: IgG lambda multiple myeloma  Type of transplant: Autologous  Conditioning prep: Melphalan (200 mg/m2)   ABO / CMV: O POS / POS    Complications:   - Day 0: nausea/vomiting, abdominal pain   - Day 1: B/L lower extremities tremors --> compazine D/C, replaced by reglan   - MAMADOU – intravascular depletion; on IVF; was up to 2.9, now down to 1.26  - Rising Alk phos - stable   - Febrile 9/6 ; cultures negative; no abx   - Persistent nausea and watery stool: addition of zyprexa on 9/8; now on zofran. improving.     I rounded with the team of nursing staff, ACP.  I reviewed the data.  I saw and examined the patient and answered her questions.   Labs reviewed, supplements lytes prn  New fever today up to 101.3; resumed antibiotics; infectious work up pending.   CXR today; appears to have atelactasis with possible pleural effusion. Awaiting final read.   IVF stopped, encouraged PO intake  Continue supportive care.  OOB/ambulate

## 2024-09-09 NOTE — PROGRESS NOTE ADULT - SUBJECTIVE AND OBJECTIVE BOX
[facility] Transplant Team                                                      Critical / Counseling Time Provided: 30 minutes                                                                                                                                                        Chief Complaint: Autologous pbsct with high dose melphalan prep regimen for treatment of IgG lambda multiple myeloma    Disease: IgG lambda multiple myeloma  Type of transplant: Autologous  Conditioning prep: Melphalan (200 mg/m2)   ABO / CMV: O POS / POS     S: Patient seen and examined with [facility] Transplant Team:     O: Vitals:   Vital Signs Last 24 Hrs  T(C): 38 (09 Sep 2024 05:49), Max: 38 (09 Sep 2024 05:49)  T(F): 100.4 (09 Sep 2024 05:49), Max: 100.4 (09 Sep 2024 05:49)  HR: 102 (09 Sep 2024 05:49) (102 - 108)  BP: 138/78 (09 Sep 2024 05:49) (130/71 - 147/71)  BP(mean): --  RR: 20 (09 Sep 2024 05:49) (18 - 20)  SpO2: 94% (09 Sep 2024 05:49) (92% - 94%)    Parameters below as of 09 Sep 2024 05:49  Patient On (Oxygen Delivery Method): nasal cannula      Admit weight: 62.2kg   Daily Weight in k.7 (08 Sep 2024 09:07)    Intake / Output:    @ 07:01  -   @ 07:00  --------------------------------------------------------  IN: 1994 mL / OUT: 2000 mL / NET: -6 mL      PE:   Oropharynx: no erythema or ulcerations   Oral Mucositis:              -                                          Grade: n/a   CVS: RR, +S1,S2  Lungs: CTA throughout bilaterally   Abdomen: + BS x 4, soft, ND, +mild lower abd discomfort in lower abdomen  Extremities: no edema in BLE  Gastric Mucositis:       +                                          ndGndrndanddndend:nd nd2nd Intestinal Mucositis:     -                                         Grade: n/a   Skin: no rashes   TLC: CDI   Neuro: A&Ox3   Pain: denies    Labs:       Cultures:   Culture Results:   <10,000 CFU/mL Normal Urogenital Nuzhat (24 @ 22:44)    Culture Results:   No growth at 72 Hours (09.05.24 @ 22:44)    Culture Results:   No growth at 72 Hours (24 @ 22:44)    Culture Results:   No growth (24 @ 14:48)    Culture Results:   No growth at 5 days (24 @ 14:39)    Culture Results:   No growth at 5 days (24 @ 12:35)    Culture Results:   No growth (24 @ 13:20)    Culture Results:   No growth at 48 Hours (24 @ 06:50)    Culture Results:   No growth at 48 Hours (24 @ 06:00)    Culture Results:   No growth at 48 Hours (24 @ 06:00)      Radiology:   ACC: [id number] EXAM:  XR CHEST PORTABLE URGENT 1V   ORDERED BY: ARNOLDO ASTUDILLO   PROCEDURE DATE:  2024    IMPRESSION:  Right-sided IJ catheter with tip in similar position comparedto prior.   No focal consolidation or pleural effusion..    Meds:   Antimicrobials:   acyclovir   Oral Tab/Cap 800 milliGRAM(s) Oral every 12 hours  trimethoprim   80 mG/sulfamethoxazole 400 mG 1 Tablet(s) Oral daily      Heme / Onc:       GI:  aluminum hydroxide/magnesium hydroxide/simethicone Suspension 30 milliLiter(s) Oral every 6 hours PRN  diphenoxylate/atropine 1 Tablet(s) Oral every 6 hours PRN  loperamide 4 milliGRAM(s) Oral every 4 hours PRN  pantoprazole    Tablet 40 milliGRAM(s) Oral before breakfast  simethicone 80 milliGRAM(s) Chew four times a day PRN  sodium bicarbonate Mouth Rinse 10 milliLiter(s) Swish and Spit five times a day      Cardiovascular:       Immunologic:       Other medications:   Biotene Dry Mouth Oral Rinse 5 milliLiter(s) Swish and Spit five times a day  chlorhexidine 4% Liquid 1 Application(s) Topical <User Schedule>  lactated ringers. 1000 milliLiter(s) IV Continuous <Continuous>  nystatin Powder 1 Application(s) Topical two times a day  OLANZapine 5 milliGRAM(s) Oral daily  ondansetron Injectable 8 milliGRAM(s) IV Push every 8 hours  phytonadione   Solution 5 milliGRAM(s) Oral <User Schedule>  sodium chloride 0.9%. 1000 milliLiter(s) IV Continuous <Continuous>  zinc oxide 40% Paste 1 Application(s) Topical two times a day      PRN:   acetaminophen     Tablet .. 650 milliGRAM(s) Oral every 6 hours PRN  aluminum hydroxide/magnesium hydroxide/simethicone Suspension 30 milliLiter(s) Oral every 6 hours PRN  artificial tears (preservative free) Ophthalmic Solution 1 Drop(s) Both EYES every 3 hours PRN  benzocaine/menthol Lozenge 1 Lozenge Oral every 8 hours PRN  diphenoxylate/atropine 1 Tablet(s) Oral every 6 hours PRN  loperamide 4 milliGRAM(s) Oral every 4 hours PRN  LORazepam   Injectable 0.5 milliGRAM(s) IV Push every 8 hours PRN  petrolatum Ophthalmic Ointment 1 Application(s) Both EYES at bedtime PRN  simethicone 80 milliGRAM(s) Chew four times a day PRN  sodium chloride 0.9% lock flush 10 milliLiter(s) IV Push every 1 hour PRN    A/P:  64 year old female with a history of multiple myeloma   Post:  Autologous PBSCT day + - HPC transplant today, continue hydration for 24 hours post infusion of cells. Daily weights, strict I&O, prn diuresis. Start levaquin / fluconazole prophylaxis when neutropenic.    c/o intermittent abd pain, AXR done, reading from radiologist requested. weight gain from admission, monitor closely, Lasix PRN    Zofran to PRN, switched Compazine-->reglan PRN  - refractory nausea / vomiting, will do trial of olanzapine. d/c reglan. Grade 1 chemotherapy induced GI mucositis. Continue supportive measures.    Check ECG while on Zyprexa +Levaquin   - Febrile, F/U BCX, PO antibiotics switched to Zosyn and Vanco.    - Rising S. Cr- 1.52, will D/C Vanco IV, started hydration NS @ 75 cc/hr for 24 hrs, renally adjust meds, Cr.Cl- 36 will continue Zosyn 4.5 gm Q 8 hrs, change Diflucan to 200 mg PO daily.    - Grade 1 transaminitis, will monitor closely  9/3 - +MAMADOU, f/u urine lytes, increase IVF's. Repeat BMP this afternoon. Dose adjust medications  - non anion gap metabolic acidosis - likely secondary to diarrhea and / or MAMADOU, MAMADOU likely secondary to intravascular volume depletion. Creatinine improved today with increased IVF (NS) overnight, however hyperchloremia noted. Will change IVF to D5W with 3 amps NaHCO3- at 100ml /hr, repeat BMP at 3pm. Quantify diarrhea, continue lomotil. C diff negative.    - creatinine 1.76, serum CO2 22. d/c NaHCO3- gtt, started LR @ 75ml /hr. Engrafted. d/c zosyn, po vanco, fluconazole. d/c G-CSF after today's dose.   - febrile overnight, tmax 100.6, likely related to engraftment. F/u cultures, observe off of antibiotics. Creatinine improving, continue LR for now.    Nausea persists. Added zyprexa back and changed Zofran to ATC.     1. Infectious Disease:   acyclovir   Oral Tab/Cap 800 milliGRAM(s) Oral every 12 hours  trimethoprim   80 mG/sulfamethoxazole 400 mG 1 Tablet(s) Oral daily    2. GI Prophylaxis:    pantoprazole    Tablet 40 milliGRAM(s) Oral before breakfast    3. Mouthcare - NS / NaHCO3 rinses, Biotene; Skin care     4. Transfuse & replete electrolytes prn     5. IV hydration, daily weights, strict I&O, prn diuresis     6. PO intake as tolerated, nutrition follow up as needed    7. Antiemetics, anti-diarrhea medications:   diphenoxylate/atropine 1 Tablet(s) Oral every 6 hours PRN  loperamide 4 milliGRAM(s) Oral every 4 hours PRN  LORazepam   Injectable 0.5 milliGRAM(s) IV Push every 8 hours PRN  OLANZapine 5 milliGRAM(s) Oral daily  ondansetron Injectable 8 milliGRAM(s) IV Push every 8 hours    8. OOB as tolerated, physical therapy consult if needed     9. Monitor coags / fibrinogen 2x week, vitamin K as needed   phytonadione   Solution 5 milliGRAM(s) Oral <User Schedule>    10. Monitor closely for clinical changes, monitor for fevers     11. Emotional support provided, plan of care discussed and questions addressed     12. Patient education done regarding plan of care, restrictions and discharge planning     13. Continue regular social work input     I have written the above note for Dr. Ambrocio who performed service with me in the room.   Jessy CULVER ([phone])    I have seen and examined patient with NP, I agree with above note as scribed.                    [facility] Transplant Team                                                      Critical / Counseling Time Provided: 30 minutes                                                                                                                                                        Chief Complaint: Autologous pbsct with high dose melphalan prep regimen for treatment of IgG lambda multiple myeloma    Disease: IgG lambda multiple myeloma  Type of transplant: Autologous  Conditioning prep: Melphalan (200 mg/m2)   ABO / CMV: O POS / POS     S: Patient seen and examined with [facility] Transplant Team:   + fatigue   + dyspnea    O: Vitals:   Vital Signs Last 24 Hrs  T(C): 38 (09 Sep 2024 05:49), Max: 38 (09 Sep 2024 05:49)  T(F): 100.4 (09 Sep 2024 05:49), Max: 100.4 (09 Sep 2024 05:49)  HR: 102 (09 Sep 2024 05:49) (102 - 108)  BP: 138/78 (09 Sep 2024 05:49) (130/71 - 147/71)  BP(mean): --  RR: 20 (09 Sep 2024 05:49) (18 - 20)  SpO2: 94% (09 Sep 2024 05:49) (92% - 94%)    Parameters below as of 09 Sep 2024 05:49  Patient On (Oxygen Delivery Method): nasal cannula      Admit weight: 62.2kg   Daily Weight in k.7 (08 Sep 2024 09:07)    Intake / Output:    @ 07:01  -   @ 07:00  --------------------------------------------------------  IN: 1994 mL / OUT: 2000 mL / NET: -6 mL      PE:   Oropharynx: no erythema or ulcerations   Oral Mucositis:              -                                          Grade: n/a   CVS: RR, +S1,S2  Lungs: CTA throughout bilaterally   Abdomen: + BS x 4, soft, ND, +mild lower abd discomfort in lower abdomen  Extremities: no edema in BLE  Gastric Mucositis:       +                                          ndGndrndanddndend:nd nd2nd Intestinal Mucositis:     -                                         Grade: n/a   Skin: no rashes   TLC: CDI   Neuro: A&Ox3   Pain: denies    Labs:       Cultures:   Culture Results:   <10,000 CFU/mL Normal Urogenital Nuzhat (24 @ 22:44)    Culture Results:   No growth at 72 Hours (24 @ 22:44)    Culture Results:   No growth at 72 Hours (24 @ 22:44)    Culture Results:   No growth (24 @ 14:48)    Culture Results:   No growth at 5 days (24 @ 14:39)    Culture Results:   No growth at 5 days (24 @ 12:35)    Culture Results:   No growth (24 @ 13:20)    Culture Results:   No growth at 48 Hours (24 @ 06:50)    Culture Results:   No growth at 48 Hours (24 @ 06:00)    Culture Results:   No growth at 48 Hours (24 @ 06:00)      Radiology:   ACC: [id number] EXAM:  XR CHEST PORTABLE URGENT 1V   ORDERED BY: ARNOLDO ASTUDILLO   PROCEDURE DATE:  2024    IMPRESSION:  Right-sided IJ catheter with tip in similar position comparedto prior.   No focal consolidation or pleural effusion..    Meds:   Antimicrobials:   acyclovir   Oral Tab/Cap 800 milliGRAM(s) Oral every 12 hours  trimethoprim   80 mG/sulfamethoxazole 400 mG 1 Tablet(s) Oral daily      Heme / Onc:       GI:  aluminum hydroxide/magnesium hydroxide/simethicone Suspension 30 milliLiter(s) Oral every 6 hours PRN  diphenoxylate/atropine 1 Tablet(s) Oral every 6 hours PRN  loperamide 4 milliGRAM(s) Oral every 4 hours PRN  pantoprazole    Tablet 40 milliGRAM(s) Oral before breakfast  simethicone 80 milliGRAM(s) Chew four times a day PRN  sodium bicarbonate Mouth Rinse 10 milliLiter(s) Swish and Spit five times a day      Cardiovascular:       Immunologic:       Other medications:   Biotene Dry Mouth Oral Rinse 5 milliLiter(s) Swish and Spit five times a day  chlorhexidine 4% Liquid 1 Application(s) Topical <User Schedule>  lactated ringers. 1000 milliLiter(s) IV Continuous <Continuous>  nystatin Powder 1 Application(s) Topical two times a day  OLANZapine 5 milliGRAM(s) Oral daily  ondansetron Injectable 8 milliGRAM(s) IV Push every 8 hours  phytonadione   Solution 5 milliGRAM(s) Oral <User Schedule>  sodium chloride 0.9%. 1000 milliLiter(s) IV Continuous <Continuous>  zinc oxide 40% Paste 1 Application(s) Topical two times a day      PRN:   acetaminophen     Tablet .. 650 milliGRAM(s) Oral every 6 hours PRN  aluminum hydroxide/magnesium hydroxide/simethicone Suspension 30 milliLiter(s) Oral every 6 hours PRN  artificial tears (preservative free) Ophthalmic Solution 1 Drop(s) Both EYES every 3 hours PRN  benzocaine/menthol Lozenge 1 Lozenge Oral every 8 hours PRN  diphenoxylate/atropine 1 Tablet(s) Oral every 6 hours PRN  loperamide 4 milliGRAM(s) Oral every 4 hours PRN  LORazepam   Injectable 0.5 milliGRAM(s) IV Push every 8 hours PRN  petrolatum Ophthalmic Ointment 1 Application(s) Both EYES at bedtime PRN  simethicone 80 milliGRAM(s) Chew four times a day PRN  sodium chloride 0.9% lock flush 10 milliLiter(s) IV Push every 1 hour PRN    A/P:  64 year old female with a history of multiple myeloma   Post:  Autologous PBSCT day + - HPC transplant today, continue hydration for 24 hours post infusion of cells. Daily weights, strict I&O, prn diuresis. Start levaquin / fluconazole prophylaxis when neutropenic.    c/o intermittent abd pain, AXR done, reading from radiologist requested. weight gain from admission, monitor closely, Lasix PRN    Zofran to PRN, switched Compazine-->reglan PRN  - refractory nausea / vomiting, will do trial of olanzapine. d/c reglan. Grade 1 chemotherapy induced GI mucositis. Continue supportive measures.    Check ECG while on Zyprexa +Levaquin   - Febrile, F/U BCX, PO antibiotics switched to Zosyn and Vanco.    - Rising S. Cr- 1.52, will D/C Vanco IV, started hydration NS @ 75 cc/hr for 24 hrs, renally adjust meds, Cr.Cl- 36 will continue Zosyn 4.5 gm Q 8 hrs, change Diflucan to 200 mg PO daily.    - Grade 1 transaminitis, will monitor closely  9/3 - +MAMADOU, f/u urine lytes, increase IVF's. Repeat BMP this afternoon. Dose adjust medications  - non anion gap metabolic acidosis - likely secondary to diarrhea and / or MAMADOU, MAMADOU likely secondary to intravascular volume depletion. Creatinine improved today with increased IVF (NS) overnight, however hyperchloremia noted. Will change IVF to D5W with 3 amps NaHCO3- at 100ml /hr, repeat BMP at 3pm. Quantify diarrhea, continue lomotil. C diff negative.    - creatinine 1.76, serum CO2 22. d/c NaHCO3- gtt, started LR @ 75ml /hr. Engrafted. d/c zosyn, po vanco, fluconazole. d/c G-CSF after today's dose.   - febrile overnight, tmax 100.6, likely related to engraftment. F/u cultures, observe off of antibiotics. Creatinine improving, continue LR for now.    Nausea persists. Added zyprexa back and changed Zofran to ATC.   - febrile overnight, tmax 100.4. Currently low grade (100.2) with dyspnea; will check CXR.     1. Infectious Disease:   acyclovir   Oral Tab/Cap 800 milliGRAM(s) Oral every 12 hours  trimethoprim   80 mG/sulfamethoxazole 400 mG 1 Tablet(s) Oral daily    2. GI Prophylaxis:    pantoprazole    Tablet 40 milliGRAM(s) Oral before breakfast    3. Mouthcare - NS / NaHCO3 rinses, Biotene; Skin care     4. Transfuse & replete electrolytes prn     5. IV hydration, daily weights, strict I&O, prn diuresis     6. PO intake as tolerated, nutrition follow up as needed    7. Antiemetics, anti-diarrhea medications:   diphenoxylate/atropine 1 Tablet(s) Oral every 6 hours PRN  loperamide 4 milliGRAM(s) Oral every 4 hours PRN  LORazepam   Injectable 0.5 milliGRAM(s) IV Push every 8 hours PRN  OLANZapine 5 milliGRAM(s) Oral daily  ondansetron Injectable 8 milliGRAM(s) IV Push every 8 hours    8. OOB as tolerated, physical therapy consult if needed     9. Monitor coags / fibrinogen 2x week, vitamin K as needed   phytonadione   Solution 5 milliGRAM(s) Oral <User Schedule>    10. Monitor closely for clinical changes, monitor for fevers     11. Emotional support provided, plan of care discussed and questions addressed     12. Patient education done regarding plan of care, restrictions and discharge planning     13. Continue regular social work input     I have written the above note for Dr. Ambrocio who performed service with me in the room.   Jessy Ferrell NP-C ([phone])    I have seen and examined patient with NP, I agree with above note as scribed.                    [facility] Transplant Team                                                      Critical / Counseling Time Provided: 30 minutes                                                                                                                                                        Chief Complaint: Autologous pbsct with high dose melphalan prep regimen for treatment of IgG lambda multiple myeloma    Disease: IgG lambda multiple myeloma  Type of transplant: Autologous  Conditioning prep: Melphalan (200 mg/m2)   ABO / CMV: O POS / POS     S: Patient seen and examined with [facility] Transplant Team:   + fatigue   + dyspnea    O: Vitals:   Vital Signs Last 24 Hrs  T(C): 38 (09 Sep 2024 05:49), Max: 38 (09 Sep 2024 05:49)  T(F): 100.4 (09 Sep 2024 05:49), Max: 100.4 (09 Sep 2024 05:49)  HR: 102 (09 Sep 2024 05:49) (102 - 108)  BP: 138/78 (09 Sep 2024 05:49) (130/71 - 147/71)  BP(mean): --  RR: 20 (09 Sep 2024 05:49) (18 - 20)  SpO2: 94% (09 Sep 2024 05:49) (92% - 94%)    Parameters below as of 09 Sep 2024 05:49  Patient On (Oxygen Delivery Method): nasal cannula      Admit weight: 62.2kg   Daily Weight in k.7 (08 Sep 2024 09:07)    Intake / Output:    @ 07:01  -   @ 07:00  --------------------------------------------------------  IN: 1994 mL / OUT: 2000 mL / NET: -6 mL      PE:   Oropharynx: no erythema or ulcerations   Oral Mucositis:              -                                          Grade: n/a   CVS: RR, +S1,S2  Lungs: CTA throughout bilaterally   Abdomen: + BS x 4, soft, ND, +mild lower abd discomfort in lower abdomen  Extremities: no edema in BLE  Gastric Mucositis:       +                                          ndGndrndanddndend:nd nd2nd Intestinal Mucositis:     -                                         Grade: n/a   Skin: no rashes   TLC: CDI   Neuro: A&Ox3   Pain: denies    Labs:                         8.4    10.42 )-----------( 100      ( 09 Sep 2024 07:13 )             25.2           141  |  103  |  12  ----------------------------<  86  3.8   |  21<L>  |  1.16    Ca    8.7      09 Sep 2024 07:17  Phos  3.4       Mg     1.3         TPro  5.3<L>  /  Alb  3.0<L>  /  TBili  0.2  /  DBili  <0.1  /  AST  21  /  ALT  12  /  AlkPhos  155<H>      Cultures:   Culture Results:   <10,000 CFU/mL Normal Urogenital Nuzhat (24 @ 22:44)    Culture Results:   No growth at 72 Hours (24 @ 22:44)    Culture Results:   No growth at 72 Hours (24 @ 22:44)    Culture Results:   No growth (24 @ 14:48)    Culture Results:   No growth at 5 days (24 @ 14:39)    Culture Results:   No growth at 5 days (24 @ 12:35)    Culture Results:   No growth (24 @ 13:20)    Culture Results:   No growth at 48 Hours (24 @ 06:50)    Culture Results:   No growth at 48 Hours (24 @ 06:00)    Culture Results:   No growth at 48 Hours (24 @ 06:00)      Radiology:   ACC: [id number] EXAM:  XR CHEST PORTABLE URGENT 1V   ORDERED BY: ARNOLDO ASTUDILLO   PROCEDURE DATE:  2024    IMPRESSION:  Right-sided IJ catheter with tip in similar position comparedto prior.   No focal consolidation or pleural effusion..    Meds:   Antimicrobials:   acyclovir   Oral Tab/Cap 800 milliGRAM(s) Oral every 12 hours  trimethoprim   80 mG/sulfamethoxazole 400 mG 1 Tablet(s) Oral daily      Heme / Onc:       GI:  aluminum hydroxide/magnesium hydroxide/simethicone Suspension 30 milliLiter(s) Oral every 6 hours PRN  diphenoxylate/atropine 1 Tablet(s) Oral every 6 hours PRN  loperamide 4 milliGRAM(s) Oral every 4 hours PRN  pantoprazole    Tablet 40 milliGRAM(s) Oral before breakfast  simethicone 80 milliGRAM(s) Chew four times a day PRN  sodium bicarbonate Mouth Rinse 10 milliLiter(s) Swish and Spit five times a day      Cardiovascular:       Immunologic:       Other medications:   Biotene Dry Mouth Oral Rinse 5 milliLiter(s) Swish and Spit five times a day  chlorhexidine 4% Liquid 1 Application(s) Topical <User Schedule>  lactated ringers. 1000 milliLiter(s) IV Continuous <Continuous>  nystatin Powder 1 Application(s) Topical two times a day  OLANZapine 5 milliGRAM(s) Oral daily  ondansetron Injectable 8 milliGRAM(s) IV Push every 8 hours  phytonadione   Solution 5 milliGRAM(s) Oral <User Schedule>  sodium chloride 0.9%. 1000 milliLiter(s) IV Continuous <Continuous>  zinc oxide 40% Paste 1 Application(s) Topical two times a day      PRN:   acetaminophen     Tablet .. 650 milliGRAM(s) Oral every 6 hours PRN  aluminum hydroxide/magnesium hydroxide/simethicone Suspension 30 milliLiter(s) Oral every 6 hours PRN  artificial tears (preservative free) Ophthalmic Solution 1 Drop(s) Both EYES every 3 hours PRN  benzocaine/menthol Lozenge 1 Lozenge Oral every 8 hours PRN  diphenoxylate/atropine 1 Tablet(s) Oral every 6 hours PRN  loperamide 4 milliGRAM(s) Oral every 4 hours PRN  LORazepam   Injectable 0.5 milliGRAM(s) IV Push every 8 hours PRN  petrolatum Ophthalmic Ointment 1 Application(s) Both EYES at bedtime PRN  simethicone 80 milliGRAM(s) Chew four times a day PRN  sodium chloride 0.9% lock flush 10 milliLiter(s) IV Push every 1 hour PRN    A/P:  64 year old female with a history of multiple myeloma   Post:  Autologous PBSCT day + - HPC transplant today, continue hydration for 24 hours post infusion of cells. Daily weights, strict I&O, prn diuresis. Start levaquin / fluconazole prophylaxis when neutropenic.    c/o intermittent abd pain, AXR done, reading from radiologist requested. weight gain from admission, monitor closely, Lasix PRN    Zofran to PRN, switched Compazine-->reglan PRN  - refractory nausea / vomiting, will do trial of olanzapine. d/c reglan. Grade 1 chemotherapy induced GI mucositis. Continue supportive measures.    Check ECG while on Zyprexa +Levaquin   - Febrile, F/U BCX, PO antibiotics switched to Zosyn and Vanco.    - Rising S. Cr- 1.52, will D/C Vanco IV, started hydration NS @ 75 cc/hr for 24 hrs, renally adjust meds, Cr.Cl- 36 will continue Zosyn 4.5 gm Q 8 hrs, change Diflucan to 200 mg PO daily.    - Grade 1 transaminitis, will monitor closely  9/3 - +MAMADOU, f/u urine lytes, increase IVF's. Repeat BMP this afternoon. Dose adjust medications  - non anion gap metabolic acidosis - likely secondary to diarrhea and / or MAMADOU, MAMADOU likely secondary to intravascular volume depletion. Creatinine improved today with increased IVF (NS) overnight, however hyperchloremia noted. Will change IVF to D5W with 3 amps NaHCO3- at 100ml /hr, repeat BMP at 3pm. Quantify diarrhea, continue lomotil. C diff negative.    - creatinine 1.76, serum CO2 22. d/c NaHCO3- gtt, started LR @ 75ml /hr. Engrafted. d/c zosyn, po vanco, fluconazole. d/c G-CSF after today's dose.   - febrile overnight, tmax 100.6, likely related to engraftment. F/u cultures, observe off of antibiotics. Creatinine improving, continue LR for now.    Nausea persists. Added zyprexa back and changed Zofran to ATC.   - febrile overnight, tmax 100.4. Currently low grade (100.2) with dyspnea; will check CXR.     1. Infectious Disease:   acyclovir   Oral Tab/Cap 800 milliGRAM(s) Oral every 12 hours  trimethoprim   80 mG/sulfamethoxazole 400 mG 1 Tablet(s) Oral daily    2. GI Prophylaxis:    pantoprazole    Tablet 40 milliGRAM(s) Oral before breakfast    3. Mouthcare - NS / NaHCO3 rinses, Biotene; Skin care     4. Transfuse & replete electrolytes prn   magnesium sulfate 2g IV q 2 hours x 3 doses     5. IV hydration, daily weights, strict I&O, prn diuresis     6. PO intake as tolerated, nutrition follow up as needed    7. Antiemetics, anti-diarrhea medications:   diphenoxylate/atropine 1 Tablet(s) Oral every 6 hours PRN  loperamide 4 milliGRAM(s) Oral every 4 hours PRN  LORazepam   Injectable 0.5 milliGRAM(s) IV Push every 8 hours PRN  OLANZapine 5 milliGRAM(s) Oral daily  ondansetron Injectable 8 milliGRAM(s) IV Push every 8 hours    8. OOB as tolerated, physical therapy consult if needed     9. Monitor coags / fibrinogen 2x week, vitamin K as needed   phytonadione   Solution 5 milliGRAM(s) Oral <User Schedule>    10. Monitor closely for clinical changes, monitor for fevers     11. Emotional support provided, plan of care discussed and questions addressed     12. Patient education done regarding plan of care, restrictions and discharge planning     13. Continue regular social work input     I have written the above note for Dr. Ambrocio who performed service with me in the room.   Jessy Ferrell NP-C ([phone])    I have seen and examined patient with NP, I agree with above note as scribed.                    [facility] Team                                                      Critical / Counseling Time Provided: 30 minutes                                                                                                                                                        Chief Complaint: Autologous pbsct with high dose melphalan prep regimen for treatment of IgG lambda multiple myeloma    Disease: IgG lambda multiple myeloma  Type of transplant: Autologous  Conditioning prep: Melphalan (200 mg/m2)   ABO / CMV: O POS / POS     S: Patient seen and examined with [facility] Team:   + fatigue   + dyspnea  + nausea   + diarrhea - slightly improved   + cough     O: Vitals:   Vital Signs Last 24 Hrs  T(C): 38 (09 Sep 2024 05:49), Max: 38 (09 Sep 2024 05:49)  T(F): 100.4 (09 Sep 2024 05:49), Max: 100.4 (09 Sep 2024 05:49)  HR: 102 (09 Sep 2024 05:49) (102 - 108)  BP: 138/78 (09 Sep 2024 05:49) (130/71 - 147/71)  BP(mean): --  RR: 20 (09 Sep 2024 05:49) (18 - 20)  SpO2: 94% (09 Sep 2024 05:49) (92% - 94%)    Parameters below as of 09 Sep 2024 05:49  Patient On (Oxygen Delivery Method): nasal cannula      Admit weight: 62.2kg   Daily Weight in k.7 (08 Sep 2024 09:07)    Intake / Output:    @ 07:01  -   @ 07:00  --------------------------------------------------------  IN: 1994 mL / OUT: 2000 mL / NET: -6 mL      PE:   Oropharynx: no erythema or ulcerations   Oral Mucositis:              -                                          Grade: n/a   CVS: RR, +S1,S2  Lungs: CTA throughout bilaterally   Abdomen: + BS x 4, soft, ND, +mild lower abd discomfort in lower abdomen  Extremities: no edema in BLE  Gastric Mucositis:       +                                          ndGndrndanddndend:nd nd2nd Intestinal Mucositis:     -                                         Grade: n/a   Skin: no rashes   TLC: CDI   Neuro: A&Ox3   Pain: denies    Labs:                         8.4    10.42 )-----------( 100      ( 09 Sep 2024 07:13 )             25.2           141  |  103  |  12  ----------------------------<  86  3.8   |  21<L>  |  1.16    Ca    8.7      09 Sep 2024 07:17  Phos  3.4       Mg     1.3         TPro  5.3<L>  /  Alb  3.0<L>  /  TBili  0.2  /  DBili  <0.1  /  AST  21  /  ALT  12  /  AlkPhos  155<H>      Cultures:   Culture Results:   <10,000 CFU/mL Normal Urogenital Nuzhat (24 @ 22:44)    Culture Results:   No growth at 72 Hours (24 @ 22:44)    Culture Results:   No growth at 72 Hours (24 @ 22:44)    Culture Results:   No growth (24 @ 14:48)    Culture Results:   No growth at 5 days (24 @ 14:39)    Culture Results:   No growth at 5 days (24 @ 12:35)    Culture Results:   No growth (24 @ 13:20)    Culture Results:   No growth at 48 Hours (24 @ 06:50)    Culture Results:   No growth at 48 Hours (24 @ 06:00)    Culture Results:   No growth at 48 Hours (24 @ 06:00)      Radiology:   - CXR completed this am for ANGEL and cough. Pending official read.     ACC: [id number] EXAM:  XR CHEST PORTABLE URGENT 1V   ORDERED BY: ARNOLDO ASTUDILLO   PROCEDURE DATE:  2024    IMPRESSION:  Right-sided IJ catheter with tip in similar position comparedto prior.   No focal consolidation or pleural effusion..    Meds:   Antimicrobials:   acyclovir   Oral Tab/Cap 800 milliGRAM(s) Oral every 12 hours  trimethoprim   80 mG/sulfamethoxazole 400 mG 1 Tablet(s) Oral daily      Heme / Onc:       GI:  aluminum hydroxide/magnesium hydroxide/simethicone Suspension 30 milliLiter(s) Oral every 6 hours PRN  diphenoxylate/atropine 1 Tablet(s) Oral every 6 hours PRN  loperamide 4 milliGRAM(s) Oral every 4 hours PRN  pantoprazole    Tablet 40 milliGRAM(s) Oral before breakfast  simethicone 80 milliGRAM(s) Chew four times a day PRN  sodium bicarbonate Mouth Rinse 10 milliLiter(s) Swish and Spit five times a day      Cardiovascular:       Immunologic:       Other medications:   Biotene Dry Mouth Oral Rinse 5 milliLiter(s) Swish and Spit five times a day  chlorhexidine 4% Liquid 1 Application(s) Topical <User Schedule>  lactated ringers. 1000 milliLiter(s) IV Continuous <Continuous>  nystatin Powder 1 Application(s) Topical two times a day  OLANZapine 5 milliGRAM(s) Oral daily  ondansetron Injectable 8 milliGRAM(s) IV Push every 8 hours  phytonadione   Solution 5 milliGRAM(s) Oral <User Schedule>  sodium chloride 0.9%. 1000 milliLiter(s) IV Continuous <Continuous>  zinc oxide 40% Paste 1 Application(s) Topical two times a day      PRN:   acetaminophen     Tablet .. 650 milliGRAM(s) Oral every 6 hours PRN  aluminum hydroxide/magnesium hydroxide/simethicone Suspension 30 milliLiter(s) Oral every 6 hours PRN  artificial tears (preservative free) Ophthalmic Solution 1 Drop(s) Both EYES every 3 hours PRN  benzocaine/menthol Lozenge 1 Lozenge Oral every 8 hours PRN  diphenoxylate/atropine 1 Tablet(s) Oral every 6 hours PRN  loperamide 4 milliGRAM(s) Oral every 4 hours PRN  LORazepam   Injectable 0.5 milliGRAM(s) IV Push every 8 hours PRN  petrolatum Ophthalmic Ointment 1 Application(s) Both EYES at bedtime PRN  simethicone 80 milliGRAM(s) Chew four times a day PRN  sodium chloride 0.9% lock flush 10 milliLiter(s) IV Push every 1 hour PRN    A/P:  64 year old female with a history of multiple myeloma   Post:  Autologous PBSCT day + - HPC transplant today, continue hydration for 24 hours post infusion of cells. Daily weights, strict I&O, prn diuresis. Start levaquin / fluconazole prophylaxis when neutropenic.    c/o intermittent abd pain, AXR done, reading from radiologist requested. weight gain from admission, monitor closely, Lasix PRN    Zofran to PRN, switched Compazine-->reglan PRN  8/27- refractory nausea / vomiting, will do trial of olanzapine. d/c reglan. Grade 1 chemotherapy induced GI mucositis. Continue supportive measures.    Check ECG while on Zyprexa +Levaquin   - Febrile, F/U BCX, PO antibiotics switched to Zosyn and Vanco.    - Rising S. Cr- 1.52, will D/C Vanco IV, started hydration NS @ 75 cc/hr for 24 hrs, renally adjust meds, Cr.Cl- 36 will continue Zosyn 4.5 gm Q 8 hrs, change Diflucan to 200 mg PO daily.    - Grade 1 transaminitis, will monitor closely  9/3 - +MAMADOU, f/u urine lytes, increase IVF's. Repeat BMP this afternoon. Dose adjust medications  - non anion gap metabolic acidosis - likely secondary to diarrhea and / or MAMADOU, MAMADOU likely secondary to intravascular volume depletion. Creatinine improved today with increased IVF (NS) overnight, however hyperchloremia noted. Will change IVF to D5W with 3 amps NaHCO3- at 100ml /hr, repeat BMP at 3pm. Quantify diarrhea, continue lomotil. C diff negative.    - creatinine 1.76, serum CO2 22. d/c NaHCO3- gtt, started LR @ 75ml /hr. Engrafted. d/c zosyn, po vanco, fluconazole. d/c G-CSF after today's dose.   - febrile overnight, tmax 100.6, likely related to engraftment. F/u cultures, observe off of antibiotics. Creatinine improving, continue LR for now.    Nausea persists. Added zyprexa back and changed Zofran to ATC.   - febrile overnight, tmax 100.4. Currently low grade (100.2) with dyspnea and cough. CXR completed - pending official read. Atelectasis and some pleural effusions present. Not neutropenic. d/c zyprexa. Ambulation and use of incentive spirometer encouraged. d/c LR, encourage po intake.     1. Infectious Disease:   acyclovir   Oral Tab/Cap 800 milliGRAM(s) Oral every 12 hours  trimethoprim   80 mG/sulfamethoxazole 400 mG 1 Tablet(s) Oral daily    2. GI Prophylaxis:    pantoprazole    Tablet 40 milliGRAM(s) Oral before breakfast    3. Mouthcare - NS / NaHCO3 rinses, Biotene; Skin care     4. Transfuse & replete electrolytes prn   magnesium sulfate 2g IV q 2 hours x 3 doses     5. IV hydration, daily weights, strict I&O, prn diuresis     6. PO intake as tolerated, nutrition follow up as needed    7. Antiemetics, anti-diarrhea medications:   diphenoxylate/atropine 1 Tablet(s) Oral every 6 hours PRN  loperamide 4 milliGRAM(s) Oral every 4 hours PRN  LORazepam   Injectable 0.5 milliGRAM(s) IV Push every 8 hours PRN  OLANZapine 5 milliGRAM(s) Oral daily  ondansetron Injectable 8 milliGRAM(s) IV Push every 8 hours    8. OOB as tolerated, physical therapy consult if needed     9. Monitor coags / fibrinogen 2x week, vitamin K as needed   phytonadione   Solution 5 milliGRAM(s) Oral <User Schedule>    10. Monitor closely for clinical changes, monitor for fevers     11. Emotional support provided, plan of care discussed and questions addressed     12. Patient education done regarding plan of care, restrictions and discharge planning     13. Continue regular social work input     I have written the above note for Dr. Ambrocio who performed service with me in the room.   Jessy Ferrell NP-C ([phone])    I have seen and examined patient with NP, I agree with above note as scribed.

## 2024-09-09 NOTE — PHARMACOTHERAPY INTERVENTION NOTE - COMMENTS
64-year-old female with PMH of HTN and IgG lambda multiple myeloma treated with Rosalind-RVD x4 cycles and admitted for autologous HSCT with melphalan 200 mg/m2 conditioning. Today is day +17. Course has been complicated by nausea, diarrhea, anxiety, MAMADOU, and engraftment syndrome.    Patient is no longer neutropenic (ANC>500) though spiked a fever on 9/1 of 100.9F, was started on pip/tazo 4.5 q8h (9/1-9/5) and vancomycin 1250 q24h (received 1 dose of vanco IV before discontinuing). The patient continues to be febrile, though thought to be engraftment. Due to negative cultures and neutrophil improvement, prophylactic meds were discontinued as no longer neutropenic and not thought to be infectious.     Patient complaining of nausea and reports leg tremors with PRN prochlorperazine. Prochlorperazine was switched to metoclopramide with good relief, though no longer on it. Restarted olanzapine 5 mg PO QHS and made ondansetron ATC no 9/8. On the following for nausea: olanzapine 5 mg PO QHS, ondansetron 8 mg IV every 8 hours around the clock, lorazepam 1 mg IV every 8 hours (first line for anxiety and N/V). In anticipation of discharge, will d/c olanzapine and will continue monitor s/s of N/V.    Lastly, the patient is on valsartan 80 mg PO QD at home-has been held inpatient due to MAMADOU which is now improving - SBPs ranging from 130s to 150s. Will monitor patient off fluids for 24 hours and will make a decision to restart tomorrow (9/10).    Pilar Isabel, PharmD, BCOP  Stem Cell Transplant Clinical Pharmacy Specialist  Available via [employer] Teams

## 2024-09-09 NOTE — CHART NOTE - NSCHARTNOTEFT_GEN_A_CORE
NUTRITION FOLLOW UP NOTE    PATIENT SEEN FOR: BMTU Malnutrition follow up    SOURCE: [x] Patient  [x] Current Medical Record  [x] RN  [x]  at bedside  [] Patient unavailable/inappropriate [] Other: Interdisciplinary rounds     CHART REVIEWED/EVENTS NOTED.  [] No changes to nutrition care plan to note  [x] Nutrition Status: Post: Autologous PBSCT. Transplant Day: 2024. Day +17    DIET ORDER:   Diet, Regular:   Supplement Feeding Modality:  Oral  Ensure Enlive Cans or Servings Per Day:  1       Frequency:  Three Times a day (24 @ 10:10) [Active]  --Protein-fortified smoothie (berry flavor) 1x/day (Monday-Friday, 300 gracie 18 gm protein)     CURRENT DIET ORDER IS:  [] Appropriate:  [] Inadequate:  [x] Other:    NUTRITION INTAKE/PROVISION:  [x] PO: Pt reports very poor appetite/PO intake; consuming <50% of most meals >/5 days. Endorses drinking strawberry flavored Ensure shakes 1x/day and strawberry banana smoothie daily. Food preferences; will honor as able.    [] Enteral Nutrition:  [] Parenteral Nutrition:    ANTHROPOMETRICS:  Drug Dosing Weight  Height (cm): 152 (22 Aug 2024 08:23)  Weight (kg): 62.2 (22 Aug 2024 08:23)  BMI (kg/m2): 26.9 (22 Aug 2024 08:23)  Weights:   Daily Weight in kG: Daily     Daily Weight in k.2 (09 Sep 2024 07:46), 60.9 (03 Sep 2024 07:55), 60.7 (-30), Weight in k.9 (-), Weight in k (-), Weight in k.1 (-), Weight in k.3 (-), Weight in k.3 (-), Weight in k.1 (-)   -- noted with weight loss of 1.5kg/2.4% in 1 week since admission- might be multifactorial: fluid shift with treatment, decreased PO intake. Will continue to monitor weight trends as available/able.     MEDICATIONS:  MEDICATIONS  (STANDING):  acyclovir   Oral Tab/Cap 800 milliGRAM(s) Oral every 12 hours  Biotene Dry Mouth Oral Rinse 5 milliLiter(s) Swish and Spit five times a day  chlorhexidine 4% Liquid 1 Application(s) Topical <User Schedule>  magnesium sulfate  IVPB 2 Gram(s) IV Intermittent every 2 hours  nystatin Powder 1 Application(s) Topical two times a day  ondansetron Injectable 8 milliGRAM(s) IV Push every 8 hours  pantoprazole    Tablet 40 milliGRAM(s) Oral before breakfast  phytonadione   Solution 5 milliGRAM(s) Oral <User Schedule>  sodium bicarbonate Mouth Rinse 10 milliLiter(s) Swish and Spit five times a day  sodium chloride 0.9%. 1000 milliLiter(s) (10 mL/Hr) IV Continuous <Continuous>  trimethoprim   80 mG/sulfamethoxazole 400 mG 1 Tablet(s) Oral daily  zinc oxide 40% Paste 1 Application(s) Topical two times a day        NUTRITIONALLY PERTINENT LABS:   @ 07:17: Na 141, BUN 12, Cr 1.16, BG 86, K+ 3.8, Phos 3.4, Mg 1.3<L>, Alk Phos 155<H>, ALT/SGPT 12, AST/SGOT 21, HbA1c --        Finger Sticks:    NUTRITIONALLY PERTINENT MEDICATIONS/LABS:  [x] Reviewed  [x] Relevant notes on medications/labs:    EDEMA:  [x] Reviewed  [] Relevant notes:    GI/ I&O:  [x] Reviewed  [x] Relevant notes:   [] Other: on Ativan, Zofran, Simethicone, Protonix  -- Mouth Care: Biotene, NaHCO3    SKIN:   [x] No pressure injuries documented, per nursing flowsheet  [] Pressure injury previously noted  [] Change in pressure injury documentation:  [] Other:    ESTIMATED NEEDS:  [] No change:  [x] Updated:  Energy: 6040-5252  kcal/day (30-35 kcal/kg)  Protein: 81-93  g/day (1.3-1.5 g/kg)  Fluid:   ml/day or [x] defer to team  Based on: dosing weight of 62.2kg in consideration of malnutrition and Increased nutrient needs     NUTRITION DIAGNOSIS:  [x] Prior Dx:  Increased nutrient needs, acute severe malnutrition  [] New Dx:     EDUCATION:  [x] Yes: Emphasized the importance of adequate kcal and protein intake, provided recommendations to optimize nutritional intake in case of decreased appetite, recommended small frequent meals by consuming nutrient-dense snacks between meals, to start with protein, and sips of supplement throughout the day.   [] Not appropriate/warranted    NUTRITION CARE PLAN:  1. Diet: Continue diet free of therapeutic restriction. RD remains available to adjust diet as needed.   2. Supplements: Continue Ensure plus high protein 1x/day. Continue protein-fortified smoothie (strawberry banana flavor) 1x daily from Monday to Friday.   ------- Will trial Gelatein Plus pineapple; RD to follow up for acceptability.   3. Monitor and encourage PO intake. Encourage use of daily menus. Honor dietary preferences as expressed as able (obtained today).     [x] Achieved - Continue current nutrition intervention(s)  [] Current medical condition precludes nutrition intervention at this time.    MONITORING AND EVALUATION:     RD remains available upon request and will follow up per protocol:   Lelia Zafar RDN CDN (TEAMS) NUTRITION FOLLOW UP NOTE    PATIENT SEEN FOR: BMTU Malnutrition follow up    SOURCE: [] Patient  [x] Current Medical Record  [x] RN  []  at bedside  [] Patient unavailable/inappropriate [] Other: Interdisciplinary rounds     CHART REVIEWED/EVENTS NOTED.  [] No changes to nutrition care plan to note  [x] Nutrition Status: Post: Autologous PBSCT. Transplant Day: 2024. Day +17    DIET ORDER:   Diet, Regular:   Supplement Feeding Modality:  Oral  Ensure Enlive Cans or Servings Per Day:  1       Frequency:  Three Times a day (24 @ 10:10) [Active]  --Protein-fortified smoothie (berry flavor) 1x/day (Monday-Friday, 300 gracie 18 gm protein)     CURRENT DIET ORDER IS:  [] Appropriate:  [] Inadequate:  [x] Other:    NUTRITION INTAKE/PROVISION:  [x] PO: Pt with very poor appetite/PO intake per nursing staff. Is drinking strawberry flavored Ensure shakes 1x/day and strawberry banana smoothie daily. Food preferences; will honor as able.    [] Enteral Nutrition:  [] Parenteral Nutrition:    ANTHROPOMETRICS:  Drug Dosing Weight  Height (cm): 152 (22 Aug 2024 08:23)  Weight (kg): 62.2 (22 Aug 2024 08:23)  BMI (kg/m2): 26.9 (22 Aug 2024 08:23)  Weights:   Daily Weight in kG: Daily     Daily Weight in k.2 (09 Sep 2024 07:46), 60.9 (03 Sep 2024 07:55), 60.7 (-30), Weight in k.9 (-), Weight in k (-), Weight in k.1 (-27), Weight in k.3 (-), Weight in k.3 (-), Weight in k.1 (-)   -- noted with weight loss of 1.5kg/2.4% in 1 week since admission- might be multifactorial: fluid shift with treatment, decreased PO intake. Will continue to monitor weight trends as available/able.     MEDICATIONS:  MEDICATIONS  (STANDING):  acyclovir   Oral Tab/Cap 800 milliGRAM(s) Oral every 12 hours  Biotene Dry Mouth Oral Rinse 5 milliLiter(s) Swish and Spit five times a day  chlorhexidine 4% Liquid 1 Application(s) Topical <User Schedule>  magnesium sulfate  IVPB 2 Gram(s) IV Intermittent every 2 hours  nystatin Powder 1 Application(s) Topical two times a day  ondansetron Injectable 8 milliGRAM(s) IV Push every 8 hours  pantoprazole    Tablet 40 milliGRAM(s) Oral before breakfast  phytonadione   Solution 5 milliGRAM(s) Oral <User Schedule>  sodium bicarbonate Mouth Rinse 10 milliLiter(s) Swish and Spit five times a day  sodium chloride 0.9%. 1000 milliLiter(s) (10 mL/Hr) IV Continuous <Continuous>  trimethoprim   80 mG/sulfamethoxazole 400 mG 1 Tablet(s) Oral daily  zinc oxide 40% Paste 1 Application(s) Topical two times a day        NUTRITIONALLY PERTINENT LABS:   @ 07:17: Na 141, BUN 12, Cr 1.16, BG 86, K+ 3.8, Phos 3.4, Mg 1.3<L>, Alk Phos 155<H>, ALT/SGPT 12, AST/SGOT 21, HbA1c --        Finger Sticks:    NUTRITIONALLY PERTINENT MEDICATIONS/LABS:  [x] Reviewed  [x] Relevant notes on medications/labs:  -Magnesium noted low; s/p repletion.    EDEMA:  [x] Reviewed  [] Relevant notes:    GI/ I&O:  [x] Reviewed  [x] Relevant notes:   [] Other: on Ativan, Zofran, Simethicone, Protonix  -- Mouth Care: Biotene, NaHCO3    SKIN:   [x] No pressure injuries documented, per nursing flowsheet  [] Pressure injury previously noted  [] Change in pressure injury documentation:  [] Other:    ESTIMATED NEEDS:  [] No change:  [x] Updated:  Energy: 2350-7901  kcal/day (30-35 kcal/kg)  Protein: 81-93  g/day (1.3-1.5 g/kg)  Fluid:   ml/day or [x] defer to team  Based on: dosing weight of 62.2kg in consideration of malnutrition and Increased nutrient needs     NUTRITION DIAGNOSIS:  [x] Prior Dx:  Increased nutrient needs, acute severe malnutrition  [] New Dx:     EDUCATION:  [x] Yes: Emphasized the importance of adequate kcal and protein intake, provided recommendations to optimize nutritional intake in case of decreased appetite, recommended small frequent meals by consuming nutrient-dense snacks between meals, to start with protein, and sips of supplement throughout the day.   [] Not appropriate/warranted    NUTRITION CARE PLAN:  1. Diet: Continue diet free of therapeutic restriction. RD remains available to adjust diet as needed.   2. Supplements: Continue Ensure plus high protein 3x/day and Gelatein 1x/day. Continue protein-fortified smoothie (strawberry banana flavor) 1x daily from Monday to Friday.   3. Monitor and encourage PO intake. Encourage use of daily menus. Honor dietary preferences as expressed as able (obtained today).     [x] Achieved - Continue current nutrition intervention(s)  [] Current medical condition precludes nutrition intervention at this time.    MONITORING AND EVALUATION:     RD remains available upon request and will follow up per protocol:   Lelia Zafar RDN CDN (TEAMS)

## 2024-09-10 ENCOUNTER — APPOINTMENT (OUTPATIENT)
Dept: HEMATOLOGY ONCOLOGY | Facility: CLINIC | Age: 64
End: 2024-09-10

## 2024-09-10 LAB
ALBUMIN SERPL ELPH-MCNC: 2.9 G/DL — LOW (ref 3.3–5)
ALP SERPL-CCNC: 178 U/L — HIGH (ref 40–120)
ALT FLD-CCNC: 20 U/L — SIGNIFICANT CHANGE UP (ref 10–45)
ANION GAP SERPL CALC-SCNC: 15 MMOL/L — SIGNIFICANT CHANGE UP (ref 5–17)
AST SERPL-CCNC: 45 U/L — HIGH (ref 10–40)
BASOPHILS # BLD AUTO: 0 K/UL — SIGNIFICANT CHANGE UP (ref 0–0.2)
BASOPHILS NFR BLD AUTO: 0 % — SIGNIFICANT CHANGE UP (ref 0–2)
BILIRUB SERPL-MCNC: 0.3 MG/DL — SIGNIFICANT CHANGE UP (ref 0.2–1.2)
BUN SERPL-MCNC: 12 MG/DL — SIGNIFICANT CHANGE UP (ref 7–23)
CALCIUM SERPL-MCNC: 8.1 MG/DL — LOW (ref 8.4–10.5)
CHLORIDE SERPL-SCNC: 96 MMOL/L — SIGNIFICANT CHANGE UP (ref 96–108)
CO2 SERPL-SCNC: 24 MMOL/L — SIGNIFICANT CHANGE UP (ref 22–31)
CREAT SERPL-MCNC: 1.16 MG/DL — SIGNIFICANT CHANGE UP (ref 0.5–1.3)
EGFR: 52 ML/MIN/1.73M2 — LOW
EOSINOPHIL # BLD AUTO: 0 K/UL — SIGNIFICANT CHANGE UP (ref 0–0.5)
EOSINOPHIL NFR BLD AUTO: 0 % — SIGNIFICANT CHANGE UP (ref 0–6)
FLUAV AG NPH QL: SIGNIFICANT CHANGE UP
FLUBV AG NPH QL: SIGNIFICANT CHANGE UP
GIANT PLATELETS BLD QL SMEAR: PRESENT — SIGNIFICANT CHANGE UP
GLUCOSE SERPL-MCNC: 111 MG/DL — HIGH (ref 70–99)
HCT VFR BLD CALC: 25.4 % — LOW (ref 34.5–45)
HGB BLD-MCNC: 8.7 G/DL — LOW (ref 11.5–15.5)
LDH SERPL L TO P-CCNC: 333 U/L — HIGH (ref 50–242)
LYMPHOCYTES # BLD AUTO: 0.2 K/UL — LOW (ref 1–3.3)
LYMPHOCYTES # BLD AUTO: 3.7 % — LOW (ref 13–44)
MAGNESIUM SERPL-MCNC: 2 MG/DL — SIGNIFICANT CHANGE UP (ref 1.6–2.6)
MANUAL SMEAR VERIFICATION: SIGNIFICANT CHANGE UP
MCHC RBC-ENTMCNC: 30.5 PG — SIGNIFICANT CHANGE UP (ref 27–34)
MCHC RBC-ENTMCNC: 34.3 GM/DL — SIGNIFICANT CHANGE UP (ref 32–36)
MCV RBC AUTO: 89.1 FL — SIGNIFICANT CHANGE UP (ref 80–100)
METAMYELOCYTES # FLD: 6.2 % — HIGH (ref 0–0)
MONOCYTES # BLD AUTO: 1.12 K/UL — HIGH (ref 0–0.9)
MONOCYTES NFR BLD AUTO: 21 % — HIGH (ref 2–14)
MYELOCYTES NFR BLD: 4.9 % — HIGH (ref 0–0)
NEUTROPHILS # BLD AUTO: 3.42 K/UL — SIGNIFICANT CHANGE UP (ref 1.8–7.4)
NEUTROPHILS NFR BLD AUTO: 58 % — SIGNIFICANT CHANGE UP (ref 43–77)
NEUTS BAND # BLD: 6.2 % — SIGNIFICANT CHANGE UP (ref 0–8)
PHOSPHATE SERPL-MCNC: 3.3 MG/DL — SIGNIFICANT CHANGE UP (ref 2.5–4.5)
PLAT MORPH BLD: NORMAL — SIGNIFICANT CHANGE UP
PLATELET # BLD AUTO: 103 K/UL — LOW (ref 150–400)
POTASSIUM SERPL-MCNC: 3.1 MMOL/L — LOW (ref 3.5–5.3)
POTASSIUM SERPL-SCNC: 3.1 MMOL/L — LOW (ref 3.5–5.3)
PROT SERPL-MCNC: 5.1 G/DL — LOW (ref 6–8.3)
RAPID RVP RESULT: SIGNIFICANT CHANGE UP
RBC # BLD: 2.85 M/UL — LOW (ref 3.8–5.2)
RBC # FLD: 14.5 % — SIGNIFICANT CHANGE UP (ref 10.3–14.5)
RBC BLD AUTO: SIGNIFICANT CHANGE UP
RSV RNA NPH QL NAA+NON-PROBE: SIGNIFICANT CHANGE UP
SARS-COV-2 RNA SPEC QL NAA+PROBE: SIGNIFICANT CHANGE UP
SARS-COV-2 RNA SPEC QL NAA+PROBE: SIGNIFICANT CHANGE UP
SODIUM SERPL-SCNC: 135 MMOL/L — SIGNIFICANT CHANGE UP (ref 135–145)
TOXIC GRANULES BLD QL SMEAR: PRESENT — SIGNIFICANT CHANGE UP
WBC # BLD: 5.33 K/UL — SIGNIFICANT CHANGE UP (ref 3.8–10.5)
WBC # FLD AUTO: 5.33 K/UL — SIGNIFICANT CHANGE UP (ref 3.8–10.5)

## 2024-09-10 PROCEDURE — 99233 SBSQ HOSP IP/OBS HIGH 50: CPT

## 2024-09-10 RX ORDER — POTASSIUM CHLORIDE 10 MEQ
20 TABLET, EXT RELEASE, PARTICLES/CRYSTALS ORAL
Refills: 0 | Status: COMPLETED | OUTPATIENT
Start: 2024-09-10 | End: 2024-09-10

## 2024-09-10 RX ORDER — LORAZEPAM 4 MG/ML
0.5 INJECTION INTRAMUSCULAR; INTRAVENOUS EVERY 8 HOURS
Refills: 0 | Status: DISCONTINUED | OUTPATIENT
Start: 2024-09-10 | End: 2024-09-13

## 2024-09-10 RX ORDER — VANCOMYCIN/0.9 % SOD CHLORIDE 1.75G/25
125 PLASTIC BAG, INJECTION (ML) INTRAVENOUS EVERY 12 HOURS
Refills: 0 | Status: DISCONTINUED | OUTPATIENT
Start: 2024-09-10 | End: 2024-09-13

## 2024-09-10 RX ORDER — BENZONATATE 100 MG
100 CAPSULE ORAL ONCE
Refills: 0 | Status: COMPLETED | OUTPATIENT
Start: 2024-09-10 | End: 2024-09-11

## 2024-09-10 RX ORDER — METOCLOPRAMIDE HCL 5 MG
10 TABLET ORAL EVERY 6 HOURS
Refills: 0 | Status: DISCONTINUED | OUTPATIENT
Start: 2024-09-10 | End: 2024-09-13

## 2024-09-10 RX ORDER — BENZONATATE 100 MG
100 CAPSULE ORAL ONCE
Refills: 0 | Status: COMPLETED | OUTPATIENT
Start: 2024-09-10 | End: 2024-09-10

## 2024-09-10 RX ADMIN — SODIUM BICARBONATE 10 MILLILITER(S): 84 INJECTION, SOLUTION INTRAVENOUS at 23:57

## 2024-09-10 RX ADMIN — PIPERACILLIN SODIUM AND TAZOBACTAM SODIUM 25 GRAM(S): 3; .375 INJECTION, POWDER, FOR SOLUTION INTRAVENOUS at 06:46

## 2024-09-10 RX ADMIN — Medication 5 MILLILITER(S): at 23:56

## 2024-09-10 RX ADMIN — PIPERACILLIN SODIUM AND TAZOBACTAM SODIUM 25 GRAM(S): 3; .375 INJECTION, POWDER, FOR SOLUTION INTRAVENOUS at 21:37

## 2024-09-10 RX ADMIN — Medication 800 MILLIGRAM(S): at 06:46

## 2024-09-10 RX ADMIN — SODIUM BICARBONATE 10 MILLILITER(S): 84 INJECTION, SOLUTION INTRAVENOUS at 00:35

## 2024-09-10 RX ADMIN — Medication 100 MILLIGRAM(S): at 02:57

## 2024-09-10 RX ADMIN — SODIUM BICARBONATE 10 MILLILITER(S): 84 INJECTION, SOLUTION INTRAVENOUS at 16:13

## 2024-09-10 RX ADMIN — Medication 50 MILLIEQUIVALENT(S): at 13:28

## 2024-09-10 RX ADMIN — ACETAMINOPHEN 650 MILLIGRAM(S): 325 TABLET ORAL at 22:00

## 2024-09-10 RX ADMIN — SODIUM BICARBONATE 10 MILLILITER(S): 84 INJECTION, SOLUTION INTRAVENOUS at 09:09

## 2024-09-10 RX ADMIN — Medication 10 MILLIGRAM(S): at 09:10

## 2024-09-10 RX ADMIN — ACETAMINOPHEN 650 MILLIGRAM(S): 325 TABLET ORAL at 16:24

## 2024-09-10 RX ADMIN — Medication 5 MILLILITER(S): at 00:35

## 2024-09-10 RX ADMIN — LORAZEPAM 0.5 MILLIGRAM(S): 4 INJECTION INTRAMUSCULAR; INTRAVENOUS at 21:37

## 2024-09-10 RX ADMIN — Medication 50 MILLIEQUIVALENT(S): at 11:59

## 2024-09-10 RX ADMIN — Medication 5 MILLILITER(S): at 11:58

## 2024-09-10 RX ADMIN — SODIUM BICARBONATE 10 MILLILITER(S): 84 INJECTION, SOLUTION INTRAVENOUS at 19:15

## 2024-09-10 RX ADMIN — Medication 1 APPLICATION(S): at 17:54

## 2024-09-10 RX ADMIN — ZINC OXIDE 1 APPLICATION(S): 100 OINTMENT TOPICAL at 17:58

## 2024-09-10 RX ADMIN — Medication 5 MILLILITER(S): at 09:09

## 2024-09-10 RX ADMIN — SODIUM BICARBONATE 10 MILLILITER(S): 84 INJECTION, SOLUTION INTRAVENOUS at 11:58

## 2024-09-10 RX ADMIN — SULFAMETHOXAZOLE AND TRIMETHOPRIM 1 TABLET(S): 800; 160 TABLET ORAL at 11:58

## 2024-09-10 RX ADMIN — Medication 800 MILLIGRAM(S): at 17:54

## 2024-09-10 RX ADMIN — ACETAMINOPHEN 650 MILLIGRAM(S): 325 TABLET ORAL at 07:29

## 2024-09-10 RX ADMIN — ACETAMINOPHEN 650 MILLIGRAM(S): 325 TABLET ORAL at 06:46

## 2024-09-10 RX ADMIN — Medication 125 MILLIGRAM(S): at 17:55

## 2024-09-10 RX ADMIN — ONDANSETRON 8 MILLIGRAM(S): 2 INJECTION, SOLUTION INTRAMUSCULAR; INTRAVENOUS at 13:52

## 2024-09-10 RX ADMIN — ACETAMINOPHEN 650 MILLIGRAM(S): 325 TABLET ORAL at 15:25

## 2024-09-10 RX ADMIN — Medication 40 MILLIGRAM(S): at 06:46

## 2024-09-10 RX ADMIN — CHLORHEXIDINE GLUCONATE 1 APPLICATION(S): 40 SOLUTION TOPICAL at 06:47

## 2024-09-10 RX ADMIN — ONDANSETRON 8 MILLIGRAM(S): 2 INJECTION, SOLUTION INTRAMUSCULAR; INTRAVENOUS at 21:37

## 2024-09-10 RX ADMIN — Medication 10 MILLIGRAM(S): at 15:30

## 2024-09-10 RX ADMIN — ZINC OXIDE 1 APPLICATION(S): 100 OINTMENT TOPICAL at 07:29

## 2024-09-10 RX ADMIN — Medication 5 MILLILITER(S): at 16:14

## 2024-09-10 RX ADMIN — Medication 50 MILLIEQUIVALENT(S): at 19:15

## 2024-09-10 RX ADMIN — Medication 5 MILLILITER(S): at 19:14

## 2024-09-10 RX ADMIN — ACETAMINOPHEN 650 MILLIGRAM(S): 325 TABLET ORAL at 21:38

## 2024-09-10 RX ADMIN — PIPERACILLIN SODIUM AND TAZOBACTAM SODIUM 25 GRAM(S): 3; .375 INJECTION, POWDER, FOR SOLUTION INTRAVENOUS at 15:20

## 2024-09-10 RX ADMIN — ONDANSETRON 8 MILLIGRAM(S): 2 INJECTION, SOLUTION INTRAMUSCULAR; INTRAVENOUS at 06:46

## 2024-09-10 RX ADMIN — Medication 1 APPLICATION(S): at 07:28

## 2024-09-10 NOTE — PROGRESS NOTE ADULT - NS ATTEND AMEND GEN_ALL_CORE FT
64 year old female with a history of multiple myeloma admitted for auto-HSCT Day + 17 today    Disease: IgG lambda multiple myeloma  Type of transplant: Autologous  Conditioning prep: Melphalan (200 mg/m2)   ABO / CMV: O POS / POS    Complications:   - Day 0: nausea/vomiting, abdominal pain   - Day 1: B/L lower extremities tremors --> compazine D/C, replaced by reglan   - MAMADOU – intravascular depletion; on IVF; was up to 2.9, now down to 1.26  - Rising Alk phos - stable   - Febrile 9/6 ; cultures negative; no abx   - Persistent nausea and watery stool: addition of zyprexa on 9/8; now on zofran. improving.     I rounded with the team of nursing staff, ACP.  I reviewed the data.  I saw and examined the patient and answered her questions.   Labs reviewed, supplements lytes prn  New fever today up to 101.3; resumed antibiotics; infectious work up pending.   CXR today; appears to have atelactasis with possible pleural effusion. Awaiting final read.   IVF stopped, encouraged PO intake  Continue supportive care.  OOB/ambulate 64 year old female with a history of multiple myeloma admitted for auto-HSCT Day + 18 today    Disease: IgG lambda multiple myeloma  Type of transplant: Autologous  Conditioning prep: Melphalan (200 mg/m2)   ABO / CMV: O POS / POS    Complications:   - Day 0: nausea/vomiting, abdominal pain   - Day 1: B/L lower extremities tremors --> compazine D/C, replaced by reglan   - MAMADOU – intravascular depletion; on IVF; was up to 2.9, now down to 1.26  - Rising Alk phos - stable   - Febrile 9/6 ; cultures negative; no abx   - Persistent nausea and watery stool: addition of zyprexa on 9/8; now on zofran. improving.   -9/9-9/10: fever, Tmax 101.8; started on zosyn, infectious work up pending     I rounded with the team of nursing staff, ACP.  I reviewed the data.  I saw and examined the patient and answered her questions.   Labs reviewed, supplements lytes prn  continues to be febrile today; pending infectious work up   Cr stable, continue to monitor off fluids.  Continue supportive care.  OOB/ambulate

## 2024-09-10 NOTE — PHARMACOTHERAPY INTERVENTION NOTE - COMMENTS
64-year-old female with PMH of HTN and IgG lambda multiple myeloma treated with Rosalind-RVD x4 cycles and admitted for autologous HSCT with melphalan 200 mg/m2 conditioning. Today is day +18. Course has been complicated by nausea, diarrhea, anxiety, MAMADOU, and engraftment syndrome.    Patient is no longer neutropenic (ANC>500) though spiked a fever on 9/9 of 101.3F, was started on pip/tazo 4.5 q8h, today is day 2. RVP on 9/9 was negative, and pending blood cultures from 9/9 to de-escalate to d/c antibiotics.    Patient complaining of nausea and reports leg tremors with PRN prochlorperazine. Prochlorperazine was switched to metoclopramide with good relief, though no longer on it. Restarted olanzapine 5 mg PO QHS and made ondansetron ATC on 9/8. On the following for nausea: olanzapine 5 mg PO QHS, ondansetron 8 mg IV every 8 hours around the clock, lorazepam 1 mg IV every 8 hours (first line for anxiety and N/V). In anticipation of discharge, will d/c olanzapine and will continue monitor s/s of N/V. Recommend adding metoclopramide 10 mg IV q6h PRN for nausea in addition to standing medications.    Lastly, the patient is on valsartan 80 mg PO QD at home-has been held inpatient due to MAMADOU which is now improving - SBPs ranging from 130s to 150s. Will monitor patient off fluids for 24 hours and will make a decision to restart tomorrow (9/11).    Pilar Isabel, PharmD, [facility]  Stem Cell Transplant Clinical Pharmacy Specialist  Available via [employer] Teams 64-year-old female with PMH of HTN and IgG lambda multiple myeloma treated with Rosalind-RVD x4 cycles and admitted for autologous HSCT with melphalan 200 mg/m2 conditioning. Today is day +18. Course has been complicated by nausea, diarrhea, anxiety, MAMADOU, and engraftment syndrome.    Patient is no longer neutropenic (ANC>500) though spiked a fever on 9/9 of 101.3F, was started on pip/tazo 4.5 q8h, today is day 2. RVP on 9/9 was negative, and pending blood cultures from 9/9 to de-escalate to d/c antibiotics. CrCl is 40.4 mL/min (based on SCr of 1.16 and AdjBW) - can continue current dose of pip/tazo - will continue to monitor for dose adjustments.    Patient complaining of nausea and reports leg tremors with PRN prochlorperazine. Prochlorperazine was switched to metoclopramide with good relief, though no longer on it. Restarted olanzapine 5 mg PO QHS and made ondansetron ATC on 9/8. On the following for nausea: olanzapine 5 mg PO QHS, ondansetron 8 mg IV every 8 hours around the clock, lorazepam 1 mg IV every 8 hours (first line for anxiety and N/V). In anticipation of discharge, will d/c olanzapine and will continue monitor s/s of N/V. Recommend adding metoclopramide 10 mg IV q6h PRN for nausea in addition to standing medications.    Lastly, the patient is on valsartan 80 mg PO QD at home-has been held inpatient due to MAMADOU which is now improving - SBPs ranging from 130s to 150s. Will monitor patient off fluids for 24 hours and will make a decision to restart tomorrow (9/11).    Pilar Isabel, PharmD, BCOP  Stem Cell Transplant Clinical Pharmacy Specialist  Available via [employer] Teams

## 2024-09-10 NOTE — PHYSICAL THERAPY INITIAL EVALUATION ADULT - PERTINENT HX OF CURRENT PROBLEM, REHAB EVAL
63 year old female with a medical history of HTN, HLD and multiple myeloma admitted for an autologous pbsct with high dose melphalan prep regimen (200mg/ m2). 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 She was followed with serial labs until 9/23 when her M spike was 2.0, and IgG level 2818. An IR guided biopsy 1/11/24 showed 40% plasma cells. A PET CT 2/29/24 showed focal hypermetabolic skeletal lucencies within L1 and the right iliac bone, consistent with a new diagnosis of multiple myeloma. She was started on Rosalind-RVD 3/14/24. She received 4 cycles with minimal complications. She has no complaints on admission. CXR 9/9, bibasialr atelectasis. ECG, tachy

## 2024-09-10 NOTE — PHARMACOTHERAPY INTERVENTION NOTE - INTERVENTION TYPE RECOOMEND
Duration of Therapy Recommended
Therapy Discontinuation Recommended - No indication
Therapy Recommended - Additional therapy

## 2024-09-10 NOTE — PHYSICAL THERAPY INITIAL EVALUATION ADULT - ADDITIONAL COMMENTS
as per pt, resides in a PH with spouse, 1 step to enter, no stairs indoor, PTA, pt amb (I), (I) with ADLs.

## 2024-09-10 NOTE — PROGRESS NOTE ADULT - SUBJECTIVE AND OBJECTIVE BOX
[facility]PC Transplant Team                                                      Critical / Counseling Time Provided: 30 minutes                                                                                                                                                        Chief Complaint: Autologous pbsct with high dose melphalan prep regimen for treatment of IgG lambda multiple myeloma    S: Patient seen and examined with [facility] Transplant Team:       O:     Vital Signs Last 24 Hrs  T(C): 38.8 (10 Sep 2024 06:00), Max: 38.8 (10 Sep 2024 06:00)  T(F): 101.8 (10 Sep 2024 06:00), Max: 101.8 (10 Sep 2024 06:00)  HR: 116 (10 Sep 2024 06:00) (107 - 116)  BP: 120/80 (10 Sep 2024 06:00) (120/80 - 154/78)  BP(mean): --  RR: 20 (10 Sep 2024 06:00) (20 - 20)  SpO2: 94% (10 Sep 2024 06:00) (91% - 94%)    Parameters below as of 10 Sep 2024 06:00  Patient On (Oxygen Delivery Method): nasal cannula    Admit weight: 62.2kg  Daily Weight in kG:    Intake / Output:   09-09 @ 07:01  -  09-10 @ 07:00  --------------------------------------------------------  IN: 1377 mL / OUT: 2425 mL / NET: -1048 mL    PE:   Oropharynx: no erythema or ulcerations   Oral Mucositis:              -                                          Grade: n/a   CVS: RR, +S1,S2  Lungs: CTA throughout bilaterally   Abdomen: + BS x 4, soft, ND, +mild lower abd discomfort in lower abdomen  Extremities: no edema in BLE  Gastric Mucositis:       +                                          ndGndrndanddndend:nd nd2nd Intestinal Mucositis:     -                                         Grade: n/a   Skin: no rashes   TLC: CDI   Neuro: A&Ox3   Pain: denies        Labs:              ----      Cultures:   9/9 Bl cx - p    COVID-19 PCR . (09.09.24 @ 17:53)   COVID-19 PCR: NotDetec    Jose Barr Virus DNA by PCR - Blood (09.06.24 @ 06:37)   Jose Barr Virus DNA by PCR - Blood: NotDetec IU/mL  EBVPCR Log: NotDetec: A  Adenovirus by PCR (09.06.24 @ 06:34)   Adenovirus by PCR: NotDetec    Cytomegalovirus By PCR (09.05.24 @ 07:26)   Cytomegalovirus By PCR: NotDetec IU/mL  CMVPCR Log: NotDetec:    Culture Results:   <10,000 CFU/mL Normal Urogenital Nuzhat (09.05.24 @ 22:44)    Culture Results:   No growth at  5 days (09.05.24 @ 22:44)    Culture Results:   No growth at  5 days (09.05.24 @ 22:44)    Culture Results:   No growth (09.03.24 @ 14:48)    Culture Results:   No growth at 5 days (09.03.24 @ 14:39)    Culture Results:   No growth at 5 days (09.03.24 @ 12:35)    Culture Results:   No growth  5 days(09.01.24 @ 13:20)    Culture Results:   No growth at  5 days (09.01.24 @ 06:50)    Culture Results:   No growth at  5 days (09.01.24 @ 06:00)    Culture Results:   No growth at 5 days (09.01.24 @ 06:00)        Radiology:   CXR 9/9  IMPRESSION:  Bibasilar atelectasis and or effusions.    Meds:   Antimicrobials:   acyclovir   Oral Tab/Cap 800 milliGRAM(s) Oral every 12 hours  piperacillin/tazobactam IVPB.. 4.5 Gram(s) IV Intermittent every 8 hours  trimethoprim   80 mG/sulfamethoxazole 400 mG 1 Tablet(s) Oral daily      Heme / Onc:       GI:  aluminum hydroxide/magnesium hydroxide/simethicone Suspension 30 milliLiter(s) Oral every 6 hours PRN  diphenoxylate/atropine 1 Tablet(s) Oral every 6 hours PRN  loperamide 4 milliGRAM(s) Oral every 4 hours PRN  pantoprazole    Tablet 40 milliGRAM(s) Oral before breakfast  simethicone 80 milliGRAM(s) Chew four times a day PRN  sodium bicarbonate Mouth Rinse 10 milliLiter(s) Swish and Spit five times a day      Cardiovascular:       Immunologic:       Other medications:   Biotene Dry Mouth Oral Rinse 5 milliLiter(s) Swish and Spit five times a day  chlorhexidine 4% Liquid 1 Application(s) Topical <User Schedule>  nystatin Powder 1 Application(s) Topical two times a day  ondansetron Injectable 8 milliGRAM(s) IV Push every 8 hours  phytonadione   Solution 5 milliGRAM(s) Oral <User Schedule>  sodium chloride 0.9%. 1000 milliLiter(s) IV Continuous <Continuous>  zinc oxide 40% Paste 1 Application(s) Topical two times a day      PRN:   acetaminophen     Tablet .. 650 milliGRAM(s) Oral every 6 hours PRN  aluminum hydroxide/magnesium hydroxide/simethicone Suspension 30 milliLiter(s) Oral every 6 hours PRN  artificial tears (preservative free) Ophthalmic Solution 1 Drop(s) Both EYES every 3 hours PRN  benzocaine/menthol Lozenge 1 Lozenge Oral every 8 hours PRN  diphenoxylate/atropine 1 Tablet(s) Oral every 6 hours PRN  loperamide 4 milliGRAM(s) Oral every 4 hours PRN  LORazepam   Injectable 0.5 milliGRAM(s) IV Push every 8 hours PRN  petrolatum Ophthalmic Ointment 1 Application(s) Both EYES at bedtime PRN  simethicone 80 milliGRAM(s) Chew four times a day PRN  sodium chloride 0.9% lock flush 10 milliLiter(s) IV Push every 1 hour PRN      A/P:  64 year old female with a history of multiple myeloma   Post:  Autologous PBSCT day + 18 /23- HPC transplant today, continue hydration for 24 hours post infusion of cells. Daily weights, strict I&O, prn diuresis. Start levaquin / fluconazole prophylaxis when neutropenic.   8/24 c/o intermittent abd pain, AXR done, reading from radiologist requested. weight gain from admission, monitor closely, Lasix PRN   8/25 Zofran to PRN, switched Compazine-->reglan PRN  8/27- refractory nausea / vomiting, will do trial of olanzapine. d/c reglan. Grade 1 chemotherapy induced GI mucositis. Continue supportive measures.   8/30 Check ECG while on Zyprexa +Levaquin   9/1- Febrile, F/U BCX, PO antibiotics switched to Zosyn and Vanco.   9/2 - Rising S. Cr- 1.52, will D/C Vanco IV, started hydration NS @ 75 cc/hr for 24 hrs, renally adjust meds, Cr.Cl- 36 will continue Zosyn 4.5 gm Q 8 hrs, change Diflucan to 200 mg PO daily.   9/2 - Grade 1 transaminitis, will monitor closely  9/3 - +MAMADOU, f/u urine lytes, increase IVF's. Repeat BMP this afternoon. Dose adjust medications  9/4- non anion gap metabolic acidosis - likely secondary to diarrhea and / or MAMADOU, MAMADOU likely secondary to intravascular volume depletion. Creatinine improved today with increased IVF (NS) overnight, however hyperchloremia noted. Will change IVF to D5W with 3 amps NaHCO3- at 100ml /hr, repeat BMP at 3pm. Quantify diarrhea, continue lomotil. C diff negative.    9/5- creatinine 1.76, serum CO2 22. d/c NaHCO3- gtt, started LR @ 75ml /hr. Engrafted. d/c zosyn, po vanco, fluconazole. d/c G-CSF after today's dose.   9/6- febrile overnight, tmax 100.6, likely related to engraftment. F/u cultures, observe off of antibiotics. Creatinine improving, continue LR for now.   9/8 Nausea persists. Added zyprexa back and changed Zofran to ATC.   9/9- febrile overnight, tmax 100.4. Currently low grade (100.2) with dyspnea and cough. CXR completed - pending official read. Atelectasis and some pleural effusions present. Not neutropenic. d/c zyprexa. Ambulation and use of incentive spirometer encouraged. d/c LR, encourage po intake.     1. Infectious Disease:   acyclovir   Oral Tab/Cap 800 milliGRAM(s) Oral every 12 hours  trimethoprim   80 mG/sulfamethoxazole 400 mG 1 Tablet(s) Oral daily    2. GI Prophylaxis:    pantoprazole    Tablet 40 milliGRAM(s) Oral before breakfast    3. Mouthcare - NS / NaHCO3 rinses, Biotene; Skin care     4. Transfuse & replete electrolytes prn     5. IV hydration, daily weights, strict I&O, prn diuresis     6. PO intake as tolerated, nutrition follow up as needed    7. Antiemetics, anti-diarrhea medications:   diphenoxylate/atropine 1 Tablet(s) Oral every 6 hours PRN  loperamide 4 milliGRAM(s) Oral every 4 hours PRN  LORazepam   Injectable 0.5 milliGRAM(s) IV Push every 8 hours PRN  OLANZapine 5 milliGRAM(s) Oral daily  ondansetron Injectable 8 milliGRAM(s) IV Push every 8 hours    8. OOB as tolerated, physical therapy consult if needed     9. Monitor coags / fibrinogen 2x week, vitamin K as needed   phytonadione   Solution 5 milliGRAM(s) Oral <User Schedule>    10. Monitor closely for clinical changes, monitor for fevers     11. Emotional support provided, plan of care discussed and questions addressed     12. Patient education done regarding plan of care, restrictions and discharge planning     13. Continue regular social work input     I have written the above note for Dr. Ambrocio who performed service with me in the room.   Guillermo Hollis PA-C ([phone])    I have seen and examined patient with PA, I agree with above note as scribed.                HPC Transplant Team                                                      Critical / Counseling Time Provided: 30 minutes                                                                                                                                                        Chief Complaint: Autologous pbsct with high dose melphalan prep regimen for treatment of IgG lambda multiple myeloma    S: Patient seen and examined with [facility] Transplant Team:       O:     Vital Signs Last 24 Hrs  T(C): 38.8 (10 Sep 2024 06:00), Max: 38.8 (10 Sep 2024 06:00)  T(F): 101.8 (10 Sep 2024 06:00), Max: 101.8 (10 Sep 2024 06:00)  HR: 116 (10 Sep 2024 06:00) (107 - 116)  BP: 120/80 (10 Sep 2024 06:00) (120/80 - 154/78)  BP(mean): --  RR: 20 (10 Sep 2024 06:00) (20 - 20)  SpO2: 94% (10 Sep 2024 06:00) (91% - 94%)    Parameters below as of 10 Sep 2024 06:00  Patient On (Oxygen Delivery Method): nasal cannula    Admit weight: 62.2kg  Daily Weight in kG:    Intake / Output:   09-09 @ 07:01  -  09-10 @ 07:00  --------------------------------------------------------  IN: 1377 mL / OUT: 2425 mL / NET: -1048 mL    PE:   Oropharynx: no erythema or ulcerations   Oral Mucositis:              -                                          Grade: n/a   CVS: RR, +S1,S2  Lungs: CTA throughout bilaterally   Abdomen: + BS x 4, soft, ND, +mild lower abd discomfort in lower abdomen  Extremities: no edema in BLE  Gastric Mucositis:       +                                          ndGndrndanddndend:nd nd2nd Intestinal Mucositis:     -                                         Grade: n/a   Skin: no rashes   TLC: CDI   Neuro: A&Ox3   Pain: denies        Labs:              ----      Cultures:   9/9 Bl cx - p    COVID-19 PCR . (09.09.24 @ 17:53)   COVID-19 PCR: NotDetec    Jose Barr Virus DNA by PCR - Blood (09.06.24 @ 06:37)   Jose Barr Virus DNA by PCR - Blood: NotDetec IU/mL  EBVPCR Log: NotDetec: A  Adenovirus by PCR (09.06.24 @ 06:34)   Adenovirus by PCR: NotDetec    Cytomegalovirus By PCR (09.05.24 @ 07:26)   Cytomegalovirus By PCR: NotDetec IU/mL  CMVPCR Log: NotDetec:    Culture Results:   <10,000 CFU/mL Normal Urogenital Nuzhat (09.05.24 @ 22:44)    Culture Results:   No growth at  5 days (09.05.24 @ 22:44)    Culture Results:   No growth at  5 days (09.05.24 @ 22:44)    Culture Results:   No growth (09.03.24 @ 14:48)    Culture Results:   No growth at 5 days (09.03.24 @ 14:39)    Culture Results:   No growth at 5 days (09.03.24 @ 12:35)    Culture Results:   No growth  5 days(09.01.24 @ 13:20)    Culture Results:   No growth at  5 days (09.01.24 @ 06:50)    Culture Results:   No growth at  5 days (09.01.24 @ 06:00)    Culture Results:   No growth at 5 days (09.01.24 @ 06:00)        Radiology:   CXR 9/9  IMPRESSION:  Bibasilar atelectasis and or effusions.    Meds:   Antimicrobials:   acyclovir   Oral Tab/Cap 800 milliGRAM(s) Oral every 12 hours  piperacillin/tazobactam IVPB.. 4.5 Gram(s) IV Intermittent every 8 hours  trimethoprim   80 mG/sulfamethoxazole 400 mG 1 Tablet(s) Oral daily      Heme / Onc:       GI:  aluminum hydroxide/magnesium hydroxide/simethicone Suspension 30 milliLiter(s) Oral every 6 hours PRN  diphenoxylate/atropine 1 Tablet(s) Oral every 6 hours PRN  loperamide 4 milliGRAM(s) Oral every 4 hours PRN  pantoprazole    Tablet 40 milliGRAM(s) Oral before breakfast  simethicone 80 milliGRAM(s) Chew four times a day PRN  sodium bicarbonate Mouth Rinse 10 milliLiter(s) Swish and Spit five times a day      Cardiovascular:       Immunologic:       Other medications:   Biotene Dry Mouth Oral Rinse 5 milliLiter(s) Swish and Spit five times a day  chlorhexidine 4% Liquid 1 Application(s) Topical <User Schedule>  nystatin Powder 1 Application(s) Topical two times a day  ondansetron Injectable 8 milliGRAM(s) IV Push every 8 hours  phytonadione   Solution 5 milliGRAM(s) Oral <User Schedule>  sodium chloride 0.9%. 1000 milliLiter(s) IV Continuous <Continuous>  zinc oxide 40% Paste 1 Application(s) Topical two times a day      PRN:   acetaminophen     Tablet .. 650 milliGRAM(s) Oral every 6 hours PRN  aluminum hydroxide/magnesium hydroxide/simethicone Suspension 30 milliLiter(s) Oral every 6 hours PRN  artificial tears (preservative free) Ophthalmic Solution 1 Drop(s) Both EYES every 3 hours PRN  benzocaine/menthol Lozenge 1 Lozenge Oral every 8 hours PRN  diphenoxylate/atropine 1 Tablet(s) Oral every 6 hours PRN  loperamide 4 milliGRAM(s) Oral every 4 hours PRN  LORazepam   Injectable 0.5 milliGRAM(s) IV Push every 8 hours PRN  petrolatum Ophthalmic Ointment 1 Application(s) Both EYES at bedtime PRN  simethicone 80 milliGRAM(s) Chew four times a day PRN  sodium chloride 0.9% lock flush 10 milliLiter(s) IV Push every 1 hour PRN      A/P:  64 year old female with a history of multiple myeloma   Post:  Autologous PBSCT day + 18 /23- HPC transplant today, continue hydration for 24 hours post infusion of cells. Daily weights, strict I&O, prn diuresis. Start levaquin / fluconazole prophylaxis when neutropenic.   8/24 c/o intermittent abd pain, AXR done, reading from radiologist requested. weight gain from admission, monitor closely, Lasix PRN   8/25 Zofran to PRN, switched Compazine-->reglan PRN  8/27- refractory nausea / vomiting, will do trial of olanzapine. d/c reglan. Grade 1 chemotherapy induced GI mucositis. Continue supportive measures.   8/30 Check ECG while on Zyprexa +Levaquin   9/1- Febrile, F/U BCX, PO antibiotics switched to Zosyn and Vanco.   9/2 - Rising S. Cr- 1.52, will D/C Vanco IV, started hydration NS @ 75 cc/hr for 24 hrs, renally adjust meds, Cr.Cl- 36 will continue Zosyn 4.5 gm Q 8 hrs, change Diflucan to 200 mg PO daily.   9/2 - Grade 1 transaminitis, will monitor closely  9/3 - +MAMADOU, f/u urine lytes, increase IVF's. Repeat BMP this afternoon. Dose adjust medications  9/4- non anion gap metabolic acidosis - likely secondary to diarrhea and / or MAMADOU, MAMADOU likely secondary to intravascular volume depletion. Creatinine improved today with increased IVF (NS) overnight, however hyperchloremia noted. Will change IVF to D5W with 3 amps NaHCO3- at 100ml /hr, repeat BMP at 3pm. Quantify diarrhea, continue lomotil. C diff negative.    9/5- creatinine 1.76, serum CO2 22. d/c NaHCO3- gtt, started LR @ 75ml /hr. Engrafted. d/c zosyn, po vanco, fluconazole. d/c G-CSF after today's dose.   9/6- febrile overnight, tmax 100.6, likely related to engraftment. F/u cultures, observe off of antibiotics. Creatinine improving, continue LR for now.   9/8 Nausea persists. Added zyprexa back and changed Zofran to ATC.   9/9- febrile overnight, tmax 100.4. Currently low grade (100.2) with dyspnea and cough. CXR completed - pending official read. Atelectasis and some pleural effusions present. Not neutropenic. d/c zyprexa. Ambulation and use of incentive spirometer encouraged. d/c LR, encourage po intake.     1. Infectious Disease:   acyclovir   Oral Tab/Cap 800 milliGRAM(s) Oral every 12 hours  piperacillin/tazobactam IVPB.. 4.5 Gram(s) IV Intermittent every 8 hours  trimethoprim   80 mG/sulfamethoxazole 400 mG 1 Tablet(s) Oral daily    2. GI Prophylaxis:    pantoprazole    Tablet 40 milliGRAM(s) Oral before breakfast    3. Mouthcare - NS / NaHCO3 rinses, Biotene; Skin care     4. Transfuse & replete electrolytes prn     5. IV hydration, daily weights, strict I&O, prn diuresis     6. PO intake as tolerated, nutrition follow up as needed    7. Antiemetics, anti-diarrhea medications:   diphenoxylate/atropine 1 Tablet(s) Oral every 6 hours PRN  loperamide 4 milliGRAM(s) Oral every 4 hours PRN  LORazepam   Injectable 0.5 milliGRAM(s) IV Push every 8 hours PRN  OLANZapine 5 milliGRAM(s) Oral daily  ondansetron Injectable 8 milliGRAM(s) IV Push every 8 hours    8. OOB as tolerated, physical therapy consult if needed     9. Monitor coags / fibrinogen 2x week, vitamin K as needed   phytonadione   Solution 5 milliGRAM(s) Oral <User Schedule>    10. Monitor closely for clinical changes, monitor for fevers     11. Emotional support provided, plan of care discussed and questions addressed     12. Patient education done regarding plan of care, restrictions and discharge planning     13. Continue regular social work input     I have written the above note for Dr. Ambrocio who performed service with me in the room.   Guillermo Hollis PA-C ([phone])    I have seen and examined patient with PA, I agree with above note as scribed.                [facility] Transplant Team                                                      Critical / Counseling Time Provided: 30 minutes                                                                                                                                                        Chief Complaint: Autologous pbsct with high dose melphalan prep regimen for treatment of IgG lambda multiple myeloma    Disease: IgG lambda multiple myeloma  Type of transplant: Autologous  Conditioning prep: Melphalan (200 mg/m2)   ABO / CMV: O POS / POS     S: Patient seen and examined with [facility] Transplant Team: +FEBRILE  +fatigue  +cough (dry, non productive)  +nausea  +poor po's  +lose stools    O: rest of ROS negative    Vital Signs Last 24 Hrs  T(C): 38.8 (10 Sep 2024 06:00), Max: 38.8 (10 Sep 2024 06:00)  T(F): 101.8 (10 Sep 2024 06:00), Max: 101.8 (10 Sep 2024 06:00)  HR: 116 (10 Sep 2024 06:00) (107 - 116)  BP: 120/80 (10 Sep 2024 06:00) (120/80 - 154/78)  BP(mean): --  RR: 20 (10 Sep 2024 06:) (20 - 20)  SpO2: 94% (10 Sep 2024 06:00) (91% - 94%)    Parameters below as of 10 Sep 2024 06:00  Patient On (Oxygen Delivery Method): nasal cannula    Admit weight: 62.2kg  Daily Weight in k.3kg    Intake / Output:    @ 07:01  -  -10 @ 07:00  --------------------------------------------------------  IN: 1377 mL / OUT: 2425 mL / NET: -1048 mL    PE:   Oropharynx: no erythema or ulcerations   Oral Mucositis:              -                                          Grade: n/a   CVS: RR, +S1,S2  Lungs: +mild bibasilar crackles (L>R)  Abdomen: + BS x 4, soft, ND, +mild lower abd discomfort in lower abdomen  Extremities: no edema in BLE  Gastric Mucositis:       +                                          ndGndrndanddndend:nd nd2nd Intestinal Mucositis:     -                                         Grade: n/a   Skin: no rashes   TLC: CDI   Neuro: A&Ox3   Pain: denies        Labs:                                  8.7    5.33  )-----------( 103      ( 10 Sep 2024 07:19 )             25.4     10 Sep 2024 07:22    135    |  96     |  12     ----------------------------<  111    3.1     |  24     |  1.16     Ca    8.1        10 Sep 2024 07:22  Phos  3.3       10 Sep 2024 07:22  Mg     2.0       10 Sep 2024 07:22    TPro  5.1    /  Alb  2.9    /  TBili  0.3    /  DBili  x      /  AST  45     /  ALT  20     /  AlkPhos  178    10 Sep 2024 07:22    PT/INR - ( 09 Sep 2024 07:17 )   PT: 12.5 sec;   INR: 1.20 ratio    PTT - ( 09 Sep 2024 07:17 )  PTT:29.1 sec      LIVER FUNCTIONS - ( 10 Sep 2024 07:22 )  Alb: 2.9 g/dL / Pro: 5.1 g/dL / ALK PHOS: 178 U/L / ALT: 20 U/L / AST: 45 U/L / GGT: x           Urinalysis Basic - ( 10 Sep 2024 07:22 )    Color: x / Appearance: x / SG: x / pH: x  Gluc: 111 mg/dL / Ketone: x  / Bili: x / Urobili: x   Blood: x / Protein: x / Nitrite: x   Leuk Esterase: x / RBC: x / WBC x   Sq Epi: x / Non Sq Epi: x / Bacteria: x      Cultures:    Bl cx - p    COVID-19 PCR/RVP . (24 @ 17:53)   COVID-19/RVP PCR: NotDetec    Jose Barr Virus DNA by PCR - Blood (24 @ 06:37)   Jose Barr Virus DNA by PCR - Blood: NotDetec IU/mL  EBVPCR Log: NotDetec: A  Adenovirus by PCR (24 @ 06:34)   Adenovirus by PCR: NotDetec    Cytomegalovirus By PCR (24 @ 07:26)   Cytomegalovirus By PCR: NotDetec IU/mL  CMVPCR Log: NotDetec:    Culture Results:   <10,000 CFU/mL Normal Urogenital Nuzhat (24 @ 22:44)    Culture Results:   No growth at  5 days (24 @ 22:44)    Culture Results:   No growth at  5 days (24 @ 22:44)    Culture Results:   No growth (24 @ 14:48)    Culture Results:   No growth at 5 days (24 @ 14:39)    Culture Results:   No growth at 5 days (24 @ 12:35)    Culture Results:   No growth  5 days(24 @ 13:20)    Culture Results:   No growth at  5 days (24 @ 06:50)    Culture Results:   No growth at  5 days (24 @ 06:00)    Culture Results:   No growth at 5 days (24 @ 06:00)        Radiology:   CXR   IMPRESSION:  Bibasilar atelectasis and or effusions.    Meds:   Antimicrobials:   acyclovir   Oral Tab/Cap 800 milliGRAM(s) Oral every 12 hours  piperacillin/tazobactam IVPB.. 4.5 Gram(s) IV Intermittent every 8 hours  trimethoprim   80 mG/sulfamethoxazole 400 mG 1 Tablet(s) Oral daily      Heme / Onc:       GI:  aluminum hydroxide/magnesium hydroxide/simethicone Suspension 30 milliLiter(s) Oral every 6 hours PRN  diphenoxylate/atropine 1 Tablet(s) Oral every 6 hours PRN  loperamide 4 milliGRAM(s) Oral every 4 hours PRN  pantoprazole    Tablet 40 milliGRAM(s) Oral before breakfast  simethicone 80 milliGRAM(s) Chew four times a day PRN  sodium bicarbonate Mouth Rinse 10 milliLiter(s) Swish and Spit five times a day      Cardiovascular:       Immunologic:       Other medications:   Biotene Dry Mouth Oral Rinse 5 milliLiter(s) Swish and Spit five times a day  chlorhexidine 4% Liquid 1 Application(s) Topical <User Schedule>  nystatin Powder 1 Application(s) Topical two times a day  ondansetron Injectable 8 milliGRAM(s) IV Push every 8 hours  phytonadione   Solution 5 milliGRAM(s) Oral <User Schedule>  sodium chloride 0.9%. 1000 milliLiter(s) IV Continuous <Continuous>  zinc oxide 40% Paste 1 Application(s) Topical two times a day      PRN:   acetaminophen     Tablet .. 650 milliGRAM(s) Oral every 6 hours PRN  aluminum hydroxide/magnesium hydroxide/simethicone Suspension 30 milliLiter(s) Oral every 6 hours PRN  artificial tears (preservative free) Ophthalmic Solution 1 Drop(s) Both EYES every 3 hours PRN  benzocaine/menthol Lozenge 1 Lozenge Oral every 8 hours PRN  diphenoxylate/atropine 1 Tablet(s) Oral every 6 hours PRN  loperamide 4 milliGRAM(s) Oral every 4 hours PRN  LORazepam   Injectable 0.5 milliGRAM(s) IV Push every 8 hours PRN  petrolatum Ophthalmic Ointment 1 Application(s) Both EYES at bedtime PRN  simethicone 80 milliGRAM(s) Chew four times a day PRN  sodium chloride 0.9% lock flush 10 milliLiter(s) IV Push every 1 hour PRN      A/P:  64 year old female with a history of multiple myeloma   Post:  Autologous PBSCT day + - HPC transplant today, continue hydration for 24 hours post infusion of cells. Daily weights, strict I&O, prn diuresis. Start levaquin / fluconazole prophylaxis when neutropenic.    c/o intermittent abd pain, AXR done, reading from radiologist requested. weight gain from admission, monitor closely, Lasix PRN    Zofran to PRN, switched Compazine-->reglan PRN  - refractory nausea / vomiting, will do trial of olanzapine. d/c reglan. Grade 1 chemotherapy induced GI mucositis. Continue supportive measures.    Check ECG while on Zyprexa +Levaquin   - Febrile, F/U BCX, PO antibiotics switched to Zosyn and Vanco.    - Rising S. Cr- 1.52, will D/C Vanco IV, started hydration NS @ 75 cc/hr for 24 hrs, renally adjust meds, Cr.Cl- 36 will continue Zosyn 4.5 gm Q 8 hrs, change Diflucan to 200 mg PO daily.    - Grade 1 transaminitis, will monitor closely  9/3 - +MAMADOU, f/u urine lytes, increase IVF's. Repeat BMP this afternoon. Dose adjust medications  - non anion gap metabolic acidosis - likely secondary to diarrhea and / or MAMADOU, MAMADOU likely secondary to intravascular volume depletion. Creatinine improved today with increased IVF (NS) overnight, however hyperchloremia noted. Will change IVF to D5W with 3 amps NaHCO3- at 100ml /hr, repeat BMP at 3pm. Quantify diarrhea, continue lomotil. C diff negative.    - creatinine 1.76, serum CO2 21. d/c NaHCO3- gtt, started LR @ 75ml /hr. Engrafted. d/c zosyn, po vanco, fluconazole. d/c G-CSF after today's dose.   - febrile overnight, tmax 100.6, likely related to engraftment. F/u cultures, observe off of antibiotics. Creatinine improving, continue LR for now.    Nausea persists. Added zyprexa back and changed Zofran to ATC.   - febrile overnight, tmax 100.4. Currently low grade (100.2) with dyspnea and cough. CXR completed - pending official read. Atelectasis and some pleural effusions present. Not neutropenic. d/c zyprexa. Ambulation and use of incentive spirometer encouraged. d/c LR, encourage po intake.     1. Infectious Disease:   acyclovir   Oral Tab/Cap 800 milliGRAM(s) Oral every 12 hours  piperacillin/tazobactam IVPB.. 4.5 Gram(s) IV Intermittent every 8 hours  trimethoprim   80 mG/sulfamethoxazole 400 mG 1 Tablet(s) Oral daily    2. GI Prophylaxis:    pantoprazole    Tablet 40 milliGRAM(s) Oral before breakfast    3. Mouthcare - NS / NaHCO3 rinses, Biotene; Skin care     4. Transfuse & replete electrolytes prn   Replete K (IV)    5. IV hydration, daily weights, strict I&O, prn diuresis     6. PO intake as tolerated, nutrition follow up as needed    7. Antiemetics, anti-diarrhea medications:   diphenoxylate/atropine 1 Tablet(s) Oral every 6 hours PRN  loperamide 4 milliGRAM(s) Oral every 4 hours PRN  LORazepam   Injectable 0.5 milliGRAM(s) IV Push every 8 hours PRN  OLANZapine 5 milliGRAM(s) Oral daily  ondansetron Injectable 8 milliGRAM(s) IV Push every 8 hours    8. OOB as tolerated, physical therapy consult if needed     9. Monitor coags / fibrinogen 2x week, vitamin K as needed   phytonadione   Solution 5 milliGRAM(s) Oral <User Schedule>    10. Monitor closely for clinical changes, monitor for fevers     11. Emotional support provided, plan of care discussed and questions addressed     12. Patient education done regarding plan of care, restrictions and discharge planning     13. Continue regular social work input     I have written the above note for Dr. Ambrocio who performed service with me in the room.   Guillermo Hollis PA-C ([phone])    I have seen and examined patient with PA, I agree with above note as scribed.                [facility]                                                      Critical / Counseling Time Provided: 30 minutes                                                                                                                                                        Chief Complaint: Autologous pbsct with high dose melphalan prep regimen for treatment of IgG lambda multiple myeloma    Disease: IgG lambda multiple myeloma  Type of transplant: Autologous  Conditioning prep: Melphalan (200 mg/m2)   ABO / CMV: O POS / POS     S: Patient seen and examined with [facility] Team: +FEBRILE  +fatigue  +cough (dry, non productive)  +nausea  +poor po's  +lose stools    O: rest of ROS negative    Vital Signs Last 24 Hrs  T(C): 38.8 (10 Sep 2024 06:00), Max: 38.8 (10 Sep 2024 06:00)  T(F): 101.8 (10 Sep 2024 06:00), Max: 101.8 (10 Sep 2024 06:00)  HR: 116 (10 Sep 2024 06:00) (107 - 116)  BP: 120/80 (10 Sep 2024 06:00) (120/80 - 154/78)  BP(mean): --  RR: 20 (10 Sep 2024 06:) (20 - 20)  SpO2: 94% (10 Sep 2024 06:00) (91% - 94%)    Parameters below as of 10 Sep 2024 06:00  Patient On (Oxygen Delivery Method): nasal cannula    Admit weight: 62.2kg  Daily Weight in k.3kg    Intake / Output:    @ 07:01  -  -10 @ 07:00  --------------------------------------------------------  IN: 1377 mL / OUT: 2425 mL / NET: -1048 mL    PE:   Oropharynx: no erythema or ulcerations   Oral Mucositis:              -                                          Grade: n/a   CVS: RR, +S1,S2  Lungs: +mild bibasilar crackles (L>R)  Abdomen: + BS x 4, soft, ND, +mild lower abd discomfort in lower abdomen  Extremities: no edema in BLE  Gastric Mucositis:       +                                          ndGndrndanddndend:nd nd2nd Intestinal Mucositis:     -                                         Grade: n/a   Skin: no rashes   TLC: CDI   Neuro: A&Ox3   Pain: denies        Labs:                                  8.7    5.33  )-----------( 103      ( 10 Sep 2024 07:19 )             25.4     10 Sep 2024 07:22    135    |  96     |  12     ----------------------------<  111    3.1     |  24     |  1.16     Ca    8.1        10 Sep 2024 07:22  Phos  3.3       10 Sep 2024 07:22  Mg     2.0       10 Sep 2024 07:22    TPro  5.1    /  Alb  2.9    /  TBili  0.3    /  DBili  x      /  AST  45     /  ALT  20     /  AlkPhos  178    10 Sep 2024 07:22    PT/INR - ( 09 Sep 2024 07:17 )   PT: 12.5 sec;   INR: 1.20 ratio    PTT - ( 09 Sep 2024 07:17 )  PTT:29.1 sec      LIVER FUNCTIONS - ( 10 Sep 2024 07:22 )  Alb: 2.9 g/dL / Pro: 5.1 g/dL / ALK PHOS: 178 U/L / ALT: 20 U/L / AST: 45 U/L / GGT: x           Urinalysis Basic - ( 10 Sep 2024 07:22 )    Color: x / Appearance: x / SG: x / pH: x  Gluc: 111 mg/dL / Ketone: x  / Bili: x / Urobili: x   Blood: x / Protein: x / Nitrite: x   Leuk Esterase: x / RBC: x / WBC x   Sq Epi: x / Non Sq Epi: x / Bacteria: x      Cultures:    Bl cx - p    COVID-19 PCR/RVP . (24 @ 17:53)   COVID-19/RVP PCR: NotDetec    Jose Barr Virus DNA by PCR - Blood (24 @ 06:37)   Jose Barr Virus DNA by PCR - Blood: NotDetec IU/mL  EBVPCR Log: NotDetec: A  Adenovirus by PCR (24 @ 06:34)   Adenovirus by PCR: NotDetec    Cytomegalovirus By PCR (24 @ 07:26)   Cytomegalovirus By PCR: NotDetec IU/mL  CMVPCR Log: NotDetec:    Culture Results:   <10,000 CFU/mL Normal Urogenital Nuzhat (24 @ 22:44)    Culture Results:   No growth at  5 days (24 @ 22:44)    Culture Results:   No growth at  5 days (24 @ 22:44)    Culture Results:   No growth (24 @ 14:48)    Culture Results:   No growth at 5 days (24 @ 14:39)    Culture Results:   No growth at 5 days (24 @ 12:35)    Culture Results:   No growth  5 days(24 @ 13:20)    Culture Results:   No growth at  5 days (24 @ 06:50)    Culture Results:   No growth at  5 days (24 @ 06:00)    Culture Results:   No growth at 5 days (24 @ 06:00)        Radiology:   CXR   IMPRESSION:  Bibasilar atelectasis and or effusions.    Meds:   Antimicrobials:   acyclovir   Oral Tab/Cap 800 milliGRAM(s) Oral every 12 hours  piperacillin/tazobactam IVPB.. 4.5 Gram(s) IV Intermittent every 8 hours  trimethoprim   80 mG/sulfamethoxazole 400 mG 1 Tablet(s) Oral daily      Heme / Onc:       GI:  aluminum hydroxide/magnesium hydroxide/simethicone Suspension 30 milliLiter(s) Oral every 6 hours PRN  diphenoxylate/atropine 1 Tablet(s) Oral every 6 hours PRN  loperamide 4 milliGRAM(s) Oral every 4 hours PRN  pantoprazole    Tablet 40 milliGRAM(s) Oral before breakfast  simethicone 80 milliGRAM(s) Chew four times a day PRN  sodium bicarbonate Mouth Rinse 10 milliLiter(s) Swish and Spit five times a day      Cardiovascular:       Immunologic:       Other medications:   Biotene Dry Mouth Oral Rinse 5 milliLiter(s) Swish and Spit five times a day  chlorhexidine 4% Liquid 1 Application(s) Topical <User Schedule>  nystatin Powder 1 Application(s) Topical two times a day  ondansetron Injectable 8 milliGRAM(s) IV Push every 8 hours  phytonadione   Solution 5 milliGRAM(s) Oral <User Schedule>  sodium chloride 0.9%. 1000 milliLiter(s) IV Continuous <Continuous>  zinc oxide 40% Paste 1 Application(s) Topical two times a day      PRN:   acetaminophen     Tablet .. 650 milliGRAM(s) Oral every 6 hours PRN  aluminum hydroxide/magnesium hydroxide/simethicone Suspension 30 milliLiter(s) Oral every 6 hours PRN  artificial tears (preservative free) Ophthalmic Solution 1 Drop(s) Both EYES every 3 hours PRN  benzocaine/menthol Lozenge 1 Lozenge Oral every 8 hours PRN  diphenoxylate/atropine 1 Tablet(s) Oral every 6 hours PRN  loperamide 4 milliGRAM(s) Oral every 4 hours PRN  LORazepam   Injectable 0.5 milliGRAM(s) IV Push every 8 hours PRN  petrolatum Ophthalmic Ointment 1 Application(s) Both EYES at bedtime PRN  simethicone 80 milliGRAM(s) Chew four times a day PRN  sodium chloride 0.9% lock flush 10 milliLiter(s) IV Push every 1 hour PRN      A/P:  64 year old female with a history of multiple myeloma   Post:  Autologous PBSCT day + - HPC transplant today, continue hydration for 24 hours post infusion of cells. Daily weights, strict I&O, prn diuresis. Start levaquin / fluconazole prophylaxis when neutropenic.    c/o intermittent abd pain, AXR done, reading from radiologist requested. weight gain from admission, monitor closely, Lasix PRN    Zofran to PRN, switched Compazine-->reglan PRN  - refractory nausea / vomiting, will do trial of olanzapine. d/c reglan. Grade 1 chemotherapy induced GI mucositis. Continue supportive measures.    Check ECG while on Zyprexa +Levaquin   - Febrile, F/U BCX, PO antibiotics switched to Zosyn and Vanco.    - Rising S. Cr- 1.52, will D/C Vanco IV, started hydration NS @ 75 cc/hr for 24 hrs, renally adjust meds, Cr.Cl- 36 will continue Zosyn 4.5 gm Q 8 hrs, change Diflucan to 200 mg PO daily.    - Grade 1 transaminitis, will monitor closely  9/3 - +MAMADOU, f/u urine lytes, increase IVF's. Repeat BMP this afternoon. Dose adjust medications  - non anion gap metabolic acidosis - likely secondary to diarrhea and / or MAMADOU, MAMADOU likely secondary to intravascular volume depletion. Creatinine improved today with increased IVF (NS) overnight, however hyperchloremia noted. Will change IVF to D5W with 3 amps NaHCO3- at 100ml /hr, repeat BMP at 3pm. Quantify diarrhea, continue lomotil. C diff negative.    - creatinine 1.76, serum CO2 22. d/c NaHCO3- gtt, started LR @ 75ml /hr. Engrafted. d/c zosyn, po vanco, fluconazole. d/c G-CSF after today's dose.   - febrile overnight, tmax 100.6, likely related to engraftment. F/u cultures, observe off of antibiotics. Creatinine improving, continue LR for now.    Nausea persists. Added zyprexa back and changed Zofran to ATC.   - febrile overnight, tmax 100.4. Currently low grade (100.2) with dyspnea and cough. CXR completed - pending official read. Atelectasis and some pleural effusions present. Not neutropenic. d/c zyprexa. Ambulation and use of incentive spirometer encouraged. d/c LR, encourage po intake.     1. Infectious Disease:   acyclovir   Oral Tab/Cap 800 milliGRAM(s) Oral every 12 hours  piperacillin/tazobactam IVPB.. 4.5 Gram(s) IV Intermittent every 8 hours  trimethoprim   80 mG/sulfamethoxazole 400 mG 1 Tablet(s) Oral daily  Vancomycin 125mg po bid    2. GI Prophylaxis:    pantoprazole    Tablet 40 milliGRAM(s) Oral before breakfast    3. Mouthcare - NS / NaHCO3 rinses, Biotene; Skin care     4. Transfuse & replete electrolytes prn   Replete K (IV)    5. IV hydration, daily weights, strict I&O, prn diuresis     6. PO intake as tolerated, nutrition follow up as needed    7. Antiemetics, anti-diarrhea medications:   diphenoxylate/atropine 1 Tablet(s) Oral every 6 hours PRN  loperamide 4 milliGRAM(s) Oral every 4 hours PRN  LORazepam   Injectable 0.5 milliGRAM(s) IV Push every 8 hours PRN  ondansetron Injectable 8 milliGRAM(s) IV Push every 8 hours    8. OOB as tolerated, physical therapy consult if needed     9. Monitor coags / fibrinogen 2x week, vitamin K as needed   phytonadione   Solution 5 milliGRAM(s) Oral <User Schedule>    10. Monitor closely for clinical changes, monitor for fevers     11. Emotional support provided, plan of care discussed and questions addressed     12. Patient education done regarding plan of care, restrictions and discharge planning     13. Continue regular social work input     I have written the above note for Dr. Ambrocio who performed service with me in the room.   Guillermo Hollis PA-C ([phone])    I have seen and examined patient with PA, I agree with above note as scribed.

## 2024-09-11 ENCOUNTER — APPOINTMENT (OUTPATIENT)
Dept: HEMATOLOGY ONCOLOGY | Facility: CLINIC | Age: 64
End: 2024-09-11

## 2024-09-11 LAB
ADD ON TEST-SPECIMEN IN LAB: SIGNIFICANT CHANGE UP
ALBUMIN SERPL ELPH-MCNC: 2.9 G/DL — LOW (ref 3.3–5)
ALP SERPL-CCNC: 176 U/L — HIGH (ref 40–120)
ALT FLD-CCNC: 22 U/L — SIGNIFICANT CHANGE UP (ref 10–45)
ANION GAP SERPL CALC-SCNC: 11 MMOL/L — SIGNIFICANT CHANGE UP (ref 5–17)
APPEARANCE UR: CLEAR — SIGNIFICANT CHANGE UP
AST SERPL-CCNC: 34 U/L — SIGNIFICANT CHANGE UP (ref 10–40)
BASOPHILS # BLD AUTO: 0 K/UL — SIGNIFICANT CHANGE UP (ref 0–0.2)
BASOPHILS NFR BLD AUTO: 0 % — SIGNIFICANT CHANGE UP (ref 0–2)
BILIRUB DIRECT SERPL-MCNC: 0.2 MG/DL — SIGNIFICANT CHANGE UP (ref 0–0.3)
BILIRUB INDIRECT FLD-MCNC: 0.2 MG/DL — SIGNIFICANT CHANGE UP (ref 0.2–1)
BILIRUB SERPL-MCNC: 0.4 MG/DL — SIGNIFICANT CHANGE UP (ref 0.2–1.2)
BILIRUB UR-MCNC: NEGATIVE — SIGNIFICANT CHANGE UP
BLASTS # FLD: 0.9 % — HIGH (ref 0–0)
BLD GP AB SCN SERPL QL: POSITIVE — SIGNIFICANT CHANGE UP
BUN SERPL-MCNC: 12 MG/DL — SIGNIFICANT CHANGE UP (ref 7–23)
CALCIUM SERPL-MCNC: 8.6 MG/DL — SIGNIFICANT CHANGE UP (ref 8.4–10.5)
CHLORIDE SERPL-SCNC: 99 MMOL/L — SIGNIFICANT CHANGE UP (ref 96–108)
CO2 SERPL-SCNC: 24 MMOL/L — SIGNIFICANT CHANGE UP (ref 22–31)
COLOR SPEC: YELLOW — SIGNIFICANT CHANGE UP
CREAT SERPL-MCNC: 1.17 MG/DL — SIGNIFICANT CHANGE UP (ref 0.5–1.3)
CULTURE RESULTS: SIGNIFICANT CHANGE UP
CULTURE RESULTS: SIGNIFICANT CHANGE UP
DIFF PNL FLD: NEGATIVE — SIGNIFICANT CHANGE UP
EGFR: 52 ML/MIN/1.73M2 — LOW
EOSINOPHIL # BLD AUTO: 0 K/UL — SIGNIFICANT CHANGE UP (ref 0–0.5)
EOSINOPHIL NFR BLD AUTO: 0 % — SIGNIFICANT CHANGE UP (ref 0–6)
GIANT PLATELETS BLD QL SMEAR: PRESENT — SIGNIFICANT CHANGE UP
GLUCOSE SERPL-MCNC: 105 MG/DL — HIGH (ref 70–99)
GLUCOSE UR QL: NEGATIVE MG/DL — SIGNIFICANT CHANGE UP
HCT VFR BLD CALC: 26.7 % — LOW (ref 34.5–45)
HGB BLD-MCNC: 8.9 G/DL — LOW (ref 11.5–15.5)
KETONES UR-MCNC: NEGATIVE MG/DL — SIGNIFICANT CHANGE UP
LDH SERPL L TO P-CCNC: 280 U/L — HIGH (ref 50–242)
LEUKOCYTE ESTERASE UR-ACNC: NEGATIVE — SIGNIFICANT CHANGE UP
LYMPHOCYTES # BLD AUTO: 0.35 K/UL — LOW (ref 1–3.3)
LYMPHOCYTES # BLD AUTO: 8.1 % — LOW (ref 13–44)
MAGNESIUM SERPL-MCNC: 1.6 MG/DL — SIGNIFICANT CHANGE UP (ref 1.6–2.6)
MANUAL SMEAR VERIFICATION: SIGNIFICANT CHANGE UP
MCHC RBC-ENTMCNC: 30 PG — SIGNIFICANT CHANGE UP (ref 27–34)
MCHC RBC-ENTMCNC: 33.3 GM/DL — SIGNIFICANT CHANGE UP (ref 32–36)
MCV RBC AUTO: 89.9 FL — SIGNIFICANT CHANGE UP (ref 80–100)
METAMYELOCYTES # FLD: 1.8 % — HIGH (ref 0–0)
MONOCYTES # BLD AUTO: 0.69 K/UL — SIGNIFICANT CHANGE UP (ref 0–0.9)
MONOCYTES NFR BLD AUTO: 16.2 % — HIGH (ref 2–14)
MYELOCYTES NFR BLD: 3.6 % — HIGH (ref 0–0)
NEUTROPHILS # BLD AUTO: 2.96 K/UL — SIGNIFICANT CHANGE UP (ref 1.8–7.4)
NEUTROPHILS NFR BLD AUTO: 64.9 % — SIGNIFICANT CHANGE UP (ref 43–77)
NEUTS BAND # BLD: 4.5 % — SIGNIFICANT CHANGE UP (ref 0–8)
NITRITE UR-MCNC: NEGATIVE — SIGNIFICANT CHANGE UP
PH UR: 7 — SIGNIFICANT CHANGE UP (ref 5–8)
PHOSPHATE SERPL-MCNC: 3.5 MG/DL — SIGNIFICANT CHANGE UP (ref 2.5–4.5)
PLAT MORPH BLD: NORMAL — SIGNIFICANT CHANGE UP
PLATELET # BLD AUTO: 91 K/UL — LOW (ref 150–400)
POTASSIUM SERPL-MCNC: 3.6 MMOL/L — SIGNIFICANT CHANGE UP (ref 3.5–5.3)
POTASSIUM SERPL-SCNC: 3.6 MMOL/L — SIGNIFICANT CHANGE UP (ref 3.5–5.3)
PROT SERPL-MCNC: 5.4 G/DL — LOW (ref 6–8.3)
PROT UR-MCNC: SIGNIFICANT CHANGE UP MG/DL
RBC # BLD: 2.97 M/UL — LOW (ref 3.8–5.2)
RBC # FLD: 14.6 % — HIGH (ref 10.3–14.5)
RBC BLD AUTO: SIGNIFICANT CHANGE UP
RH IG SCN BLD-IMP: POSITIVE — SIGNIFICANT CHANGE UP
SODIUM SERPL-SCNC: 134 MMOL/L — LOW (ref 135–145)
SP GR SPEC: 1.02 — SIGNIFICANT CHANGE UP (ref 1–1.03)
SPECIMEN SOURCE: SIGNIFICANT CHANGE UP
SPECIMEN SOURCE: SIGNIFICANT CHANGE UP
UROBILINOGEN FLD QL: 0.2 MG/DL — SIGNIFICANT CHANGE UP (ref 0.2–1)
WBC # BLD: 4.26 K/UL — SIGNIFICANT CHANGE UP (ref 3.8–10.5)
WBC # FLD AUTO: 4.26 K/UL — SIGNIFICANT CHANGE UP (ref 3.8–10.5)

## 2024-09-11 PROCEDURE — 99233 SBSQ HOSP IP/OBS HIGH 50: CPT

## 2024-09-11 RX ORDER — BENZONATATE 100 MG
100 CAPSULE ORAL THREE TIMES A DAY
Refills: 0 | Status: DISCONTINUED | OUTPATIENT
Start: 2024-09-11 | End: 2024-09-13

## 2024-09-11 RX ADMIN — CHLORHEXIDINE GLUCONATE 1 APPLICATION(S): 40 SOLUTION TOPICAL at 06:46

## 2024-09-11 RX ADMIN — ZINC OXIDE 1 APPLICATION(S): 100 OINTMENT TOPICAL at 18:07

## 2024-09-11 RX ADMIN — PIPERACILLIN SODIUM AND TAZOBACTAM SODIUM 25 GRAM(S): 3; .375 INJECTION, POWDER, FOR SOLUTION INTRAVENOUS at 22:22

## 2024-09-11 RX ADMIN — Medication 800 MILLIGRAM(S): at 06:44

## 2024-09-11 RX ADMIN — ONDANSETRON 8 MILLIGRAM(S): 2 INJECTION, SOLUTION INTRAMUSCULAR; INTRAVENOUS at 22:23

## 2024-09-11 RX ADMIN — Medication 1 APPLICATION(S): at 06:57

## 2024-09-11 RX ADMIN — SODIUM BICARBONATE 10 MILLILITER(S): 84 INJECTION, SOLUTION INTRAVENOUS at 13:06

## 2024-09-11 RX ADMIN — Medication 800 MILLIGRAM(S): at 18:08

## 2024-09-11 RX ADMIN — Medication 40 MILLIGRAM(S): at 06:46

## 2024-09-11 RX ADMIN — LORAZEPAM 0.5 MILLIGRAM(S): 4 INJECTION INTRAMUSCULAR; INTRAVENOUS at 22:22

## 2024-09-11 RX ADMIN — ONDANSETRON 8 MILLIGRAM(S): 2 INJECTION, SOLUTION INTRAMUSCULAR; INTRAVENOUS at 06:44

## 2024-09-11 RX ADMIN — PIPERACILLIN SODIUM AND TAZOBACTAM SODIUM 25 GRAM(S): 3; .375 INJECTION, POWDER, FOR SOLUTION INTRAVENOUS at 13:09

## 2024-09-11 RX ADMIN — Medication 10 MILLIGRAM(S): at 18:25

## 2024-09-11 RX ADMIN — Medication 100 MILLIGRAM(S): at 01:55

## 2024-09-11 RX ADMIN — ONDANSETRON 8 MILLIGRAM(S): 2 INJECTION, SOLUTION INTRAMUSCULAR; INTRAVENOUS at 13:09

## 2024-09-11 RX ADMIN — Medication 125 MILLIGRAM(S): at 06:44

## 2024-09-11 RX ADMIN — ACETAMINOPHEN 650 MILLIGRAM(S): 325 TABLET ORAL at 06:44

## 2024-09-11 RX ADMIN — SODIUM BICARBONATE 10 MILLILITER(S): 84 INJECTION, SOLUTION INTRAVENOUS at 09:00

## 2024-09-11 RX ADMIN — SODIUM BICARBONATE 10 MILLILITER(S): 84 INJECTION, SOLUTION INTRAVENOUS at 19:48

## 2024-09-11 RX ADMIN — Medication 5 MILLILITER(S): at 09:00

## 2024-09-11 RX ADMIN — ACETAMINOPHEN 650 MILLIGRAM(S): 325 TABLET ORAL at 15:25

## 2024-09-11 RX ADMIN — Medication 1 APPLICATION(S): at 18:06

## 2024-09-11 RX ADMIN — Medication 5 MILLILITER(S): at 13:06

## 2024-09-11 RX ADMIN — ZINC OXIDE 1 APPLICATION(S): 100 OINTMENT TOPICAL at 06:57

## 2024-09-11 RX ADMIN — SULFAMETHOXAZOLE AND TRIMETHOPRIM 1 TABLET(S): 800; 160 TABLET ORAL at 13:08

## 2024-09-11 RX ADMIN — SODIUM BICARBONATE 10 MILLILITER(S): 84 INJECTION, SOLUTION INTRAVENOUS at 17:28

## 2024-09-11 RX ADMIN — Medication 5 MILLILITER(S): at 17:28

## 2024-09-11 RX ADMIN — ACETAMINOPHEN 650 MILLIGRAM(S): 325 TABLET ORAL at 07:15

## 2024-09-11 RX ADMIN — Medication 100 MILLIGRAM(S): at 14:28

## 2024-09-11 RX ADMIN — Medication 125 MILLIGRAM(S): at 18:08

## 2024-09-11 RX ADMIN — Medication 100 MILLIGRAM(S): at 09:23

## 2024-09-11 RX ADMIN — Medication 5 MILLILITER(S): at 19:48

## 2024-09-11 RX ADMIN — ACETAMINOPHEN 650 MILLIGRAM(S): 325 TABLET ORAL at 14:25

## 2024-09-11 RX ADMIN — PIPERACILLIN SODIUM AND TAZOBACTAM SODIUM 25 GRAM(S): 3; .375 INJECTION, POWDER, FOR SOLUTION INTRAVENOUS at 06:44

## 2024-09-11 NOTE — PROGRESS NOTE ADULT - NS ATTEND AMEND GEN_ALL_CORE FT
64 year old female with a history of multiple myeloma admitted for auto-HSCT Day + 19 today    Disease: IgG lambda multiple myeloma  Type of transplant: Autologous  Conditioning prep: Melphalan (200 mg/m2)   ABO / CMV: O POS / POS    Complications:   - Day 0: nausea/vomiting, abdominal pain   - Day 1: B/L lower extremities tremors --> compazine D/C, replaced by reglan   - MAMADOU – intravascular depletion; on IVF; was up to 2.9, now down to 1.26  - Rising Alk phos - stable   - Febrile 9/6 ; cultures negative; no abx   - Persistent nausea and watery stool: addition of zyprexa on 9/8; now on zofran. improving.   -9/9-9/10: fever, Tmax 101.8; started on zosyn, infectious work up pending     I rounded with the team of nursing staff, ACP.  I reviewed the data.  I saw and examined the patient and answered her questions.   Labs reviewed, supplements lytes prn  continues to be febrile today; pending infectious work up   Cr stable, continue to monitor off fluids.  Continue supportive care.  OOB/ambulate 64 year old female with a history of multiple myeloma admitted for auto-HSCT Day + 19 today    Disease: IgG lambda multiple myeloma  Type of transplant: Autologous  Conditioning prep: Melphalan (200 mg/m2)   ABO / CMV: O POS / POS    Complications:   - Day 0: nausea/vomiting, abdominal pain   - Day 1: B/L lower extremities tremors --> compazine D/C, replaced by reglan   - MAMADOU – intravascular depletion; on IVF; was up to 2.9, now down to 1.26  - Rising Alk phos - stable   - Febrile 9/6 ; cultures negative; no abx   - Persistent nausea and watery stool: addition of zyprexa on 9/8; now on zofran. improving.   - 9/9-9/11: fever, Tmax 101.8; started on zosyn, infectious work up negative thus far. await HHV-6    I rounded with the team of nursing staff, ACP.  I reviewed the data.  I saw and examined the patient and answered her questions.   Labs reviewed, supplements lytes prn  continues to be febrile today; pending HHV-6 PCR; re-sent today.   Cr stable, continue to monitor off fluids.   Continue supportive care.  OOB/ambulate

## 2024-09-11 NOTE — PHARMACOTHERAPY INTERVENTION NOTE - COMMENTS
64-year-old female with PMH of HTN and IgG lambda multiple myeloma treated with Rosalind-RVD x4 cycles and admitted for autologous HSCT with melphalan 200 mg/m2 conditioning. Today is day +19. Course has been complicated by nausea, diarrhea, anxiety, MAMADOU, and engraftment syndrome.    Patient is no longer neutropenic (ANC>500) though spiked a fever on 9/9 of 101.3F, was started on pip/tazo 4.5 q8h, today is day 3. RVP on 9/9 was negative, and pending blood cultures from 9/9 to de-escalate to d/c antibiotics. CrCl is ~41 mL/min (based on SCr of 1.17 and AdjBW) - can continue current dose of pip/tazo - will continue to monitor for dose adjustments.    Patient complaining of nausea and reports leg tremors with PRN prochlorperazine. Prochlorperazine was switched to metoclopramide with good relief, though no longer on it. Restarted olanzapine 5 mg PO QHS (x1 dose on 9/8) and made ondansetron ATC on 9/8. On the following for nausea: ondansetron 8 mg IV every 8 hours around the clock, metoclopramide 10 mg Iv q6h PRN (first line for N/V) and lorazepam 1 mg IV every 8 hours PRN (first line for anxiety and second line for N/V). In anticipation of discharge, will d/c olanzapine and will continue monitor s/s of N/V.     Lastly, the patient is on valsartan 80 mg PO QD at home-has been held inpatient due to MAMADOU which is now improving. SBPs dipped to 92 mmHg in past 24 hours. Will monitor patient will make a decision to restart, most likely will be post discharge.    Pilar Isabel, PharmD, BCOP  St[facility] Transplant Clinical Pharmacy Specialist  Available via [employer] Teams

## 2024-09-11 NOTE — PROGRESS NOTE ADULT - SUBJECTIVE AND OBJECTIVE BOX
HPC Transplant Team                                                      Critical / Counseling Time Provided: 30 minutes                                                                                                                                                        Chief Complaint: Autologous pbsct with high dose melphalan prep regimen for treatment of IgG lambda multiple myeloma    Disease: IgG lambda multiple myeloma  Type of transplant: Autologous  Conditioning prep: Melphalan (200 mg/m2)   ABO / CMV: O POS / POS     S: Patient seen and examined with [facility] Transplant Team:       O: Vitals:   Vital Signs Last 24 Hrs  T(C): 38.2 (11 Sep 2024 05:58), Max: 38.5 (10 Sep 2024 15:26)  T(F): 100.8 (11 Sep 2024 05:58), Max: 101.3 (10 Sep 2024 15:26)  HR: 108 (11 Sep 2024 05:58) (108 - 112)  BP: 134/80 (11 Sep 2024 05:58) (92/61 - 138/69)  BP(mean): --  RR: 18 (11 Sep 2024 05:58) (18 - 18)  SpO2: 93% (11 Sep 2024 05:58) (92% - 99%)    Parameters below as of 11 Sep 2024 05:58  Patient On (Oxygen Delivery Method): nasal cannula        Admit weight: 62.2kg       Intake / Output:   09-10 @ 07:01  -  09-11 @ 07:00  --------------------------------------------------------  IN: 701 mL / OUT: 1475 mL / NET: -774 mL      PE:   Oropharynx: no erythema or ulcerations   Oral Mucositis:              -                                          Grade: n/a   CVS: RR, +S1,S2  Lungs: +mild bibasilar crackles (L>R)  Abdomen: + BS x 4, soft, ND, +mild lower abd discomfort in lower abdomen  Extremities: no edema in BLE  Gastric Mucositis:       +                                          ndGndrndanddndend:nd nd2nd Intestinal Mucositis:     -                                         Grade: n/a   Skin: no rashes   TLC: CDI   Neuro: A&Ox3   Pain: denies    Labs:   CBC Full  -  ( 11 Sep 2024 06:47 )  WBC Count : 4.26 K/uL  Hemoglobin : 8.9 g/dL  Hematocrit : 26.7 %  Platelet Count - Automated : 91 K/uL  Mean Cell Volume : 89.9 fl  Mean Cell Hemoglobin : 30.0 pg  Mean Cell Hemoglobin Concentration : 33.3 gm/dL  Auto Neutrophil # : x  Auto Lymphocyte # : x  Auto Monocyte # : x  Auto Eosinophil # : x  Auto Basophil # : x  Auto Neutrophil % : x  Auto Lymphocyte % : x  Auto Monocyte % : x  Auto Eosinophil % : x  Auto Basophil % : x                          8.9    4.26  )-----------( 91       ( 11 Sep 2024 06:47 )             26.7     Cultures:   Culture Results:   No growth at 24 hours (09.09.24 @ 08:35)    COVID-19 PCR/RVP . (09.09.24 @ 17:53)   COVID-19/RVP PCR: NotDetec    Cytomegalovirus By PCR (09.05.24 @ 07:26)   Cytomegalovirus By PCR: NotDetec IU/mL  CMVPCR Log: NotDetec:    Culture Results:   <10,000 CFU/mL Normal Urogenital Nuzhat (09.05.24 @ 22:44)    Culture Results:   No growth at  5 days (09.05.24 @ 22:44)    Culture Results:   No growth at  5 days (09.05.24 @ 22:44)    Culture Results:   No growth (09.03.24 @ 14:48)    Culture Results:   No growth at 5 days (09.03.24 @ 14:39)    Culture Results:   No growth at 5 days (09.03.24 @ 12:35)    Culture Results:   No growth  5 days(09.01.24 @ 13:20)    Culture Results:   No growth at  5 days (09.01.24 @ 06:50)    Culture Results:   No growth at  5 days (09.01.24 @ 06:00)    Culture Results:   No growth at 5 days (09.01.24 @ 06:00)        Radiology:   CXR 9/9  IMPRESSION:  Bibasilar atelectasis and or effusions.      Meds:   Antimicrobials:   acyclovir   Oral Tab/Cap 800 milliGRAM(s) Oral every 12 hours  piperacillin/tazobactam IVPB.. 4.5 Gram(s) IV Intermittent every 8 hours  trimethoprim   80 mG/sulfamethoxazole 400 mG 1 Tablet(s) Oral daily  vancomycin    Solution 125 milliGRAM(s) Oral every 12 hours      Heme / Onc:       GI:  aluminum hydroxide/magnesium hydroxide/simethicone Suspension 30 milliLiter(s) Oral every 6 hours PRN  loperamide 4 milliGRAM(s) Oral every 4 hours PRN  pantoprazole    Tablet 40 milliGRAM(s) Oral before breakfast  simethicone 80 milliGRAM(s) Chew four times a day PRN  sodium bicarbonate Mouth Rinse 10 milliLiter(s) Swish and Spit five times a day      Cardiovascular:       Immunologic:       Other medications:   Biotene Dry Mouth Oral Rinse 5 milliLiter(s) Swish and Spit five times a day  chlorhexidine 4% Liquid 1 Application(s) Topical <User Schedule>  nystatin Powder 1 Application(s) Topical two times a day  ondansetron Injectable 8 milliGRAM(s) IV Push every 8 hours  phytonadione   Solution 5 milliGRAM(s) Oral <User Schedule>  sodium chloride 0.9%. 1000 milliLiter(s) IV Continuous <Continuous>  zinc oxide 40% Paste 1 Application(s) Topical two times a day      PRN:   acetaminophen     Tablet .. 650 milliGRAM(s) Oral every 6 hours PRN  aluminum hydroxide/magnesium hydroxide/simethicone Suspension 30 milliLiter(s) Oral every 6 hours PRN  artificial tears (preservative free) Ophthalmic Solution 1 Drop(s) Both EYES every 3 hours PRN  benzocaine/menthol Lozenge 1 Lozenge Oral every 8 hours PRN  loperamide 4 milliGRAM(s) Oral every 4 hours PRN  LORazepam   Injectable 0.5 milliGRAM(s) IV Push every 8 hours PRN  metoclopramide Injectable 10 milliGRAM(s) IV Push every 6 hours PRN  petrolatum Ophthalmic Ointment 1 Application(s) Both EYES at bedtime PRN  simethicone 80 milliGRAM(s) Chew four times a day PRN  sodium chloride 0.9% lock flush 10 milliLiter(s) IV Push every 1 hour PRN    A/P:  64 year old female with a history of multiple myeloma   Post:  Autologous PBSCT day + 19 /23- HPC transplant today, continue hydration for 24 hours post infusion of cells. Daily weights, strict I&O, prn diuresis. Start levaquin / fluconazole prophylaxis when neutropenic.   8/24 c/o intermittent abd pain, AXR done, reading from radiologist requested. weight gain from admission, monitor closely, Lasix PRN   8/25 Zofran to PRN, switched Compazine-->reglan PRN  8/27- refractory nausea / vomiting, will do trial of olanzapine. d/c reglan. Grade 1 chemotherapy induced GI mucositis. Continue supportive measures.   8/30 Check ECG while on Zyprexa +Levaquin   9/1- Febrile, F/U BCX, PO antibiotics switched to Zosyn and Vanco.   9/2 - Rising S. Cr- 1.52, will D/C Vanco IV, started hydration NS @ 75 cc/hr for 24 hrs, renally adjust meds, Cr.Cl- 36 will continue Zosyn 4.5 gm Q 8 hrs, change Diflucan to 200 mg PO daily.   9/2 - Grade 1 transaminitis, will monitor closely  9/3 - +MAMADOU, f/u urine lytes, increase IVF's. Repeat BMP this afternoon. Dose adjust medications  9/4- non anion gap metabolic acidosis - likely secondary to diarrhea and / or MAMADOU, MAMADOU likely secondary to intravascular volume depletion. Creatinine improved today with increased IVF (NS) overnight, however hyperchloremia noted. Will change IVF to D5W with 3 amps NaHCO3- at 100ml /hr, repeat BMP at 3pm. Quantify diarrhea, continue lomotil. C diff negative.    9/5- creatinine 1.76, serum CO2 22. d/c NaHCO3- gtt, started LR @ 75ml /hr. Engrafted. d/c zosyn, po vanco, fluconazole. d/c G-CSF after today's dose.   9/6- febrile overnight, tmax 100.6, likely related to engraftment. F/u cultures, observe off of antibiotics. Creatinine improving, continue LR for now.   9/8 Nausea persists. Added zyprexa back and changed Zofran to ATC.   9/9- febrile overnight, tmax 100.4. Currently low grade (100.2) with dyspnea and cough. CXR completed - pending official read. Atelectasis and some pleural effusions present. Not neutropenic. d/c zyprexa. Ambulation and use of incentive spirometer encouraged. d/c LR, encourage po intake.   9/11- remains intermittently febrile (tmax 101.3), infectious work up has been negative. Engraftment likely a contributing factor.     1. Infectious Disease:   acyclovir   Oral Tab/Cap 800 milliGRAM(s) Oral every 12 hours  piperacillin/tazobactam IVPB.. 4.5 Gram(s) IV Intermittent every 8 hours  trimethoprim   80 mG/sulfamethoxazole 400 mG 1 Tablet(s) Oral daily  Vancomycin 125mg po bid    2. GI Prophylaxis:    pantoprazole    Tablet 40 milliGRAM(s) Oral before breakfast    3. Mouthcare - NS / NaHCO3 rinses, Biotene; Skin care     4. Transfuse & replete electrolytes prn     5. IV hydration, daily weights, strict I&O, prn diuresis     6. PO intake as tolerated, nutrition follow up as needed    7. Antiemetics, anti-diarrhea medications:   loperamide 4 milliGRAM(s) Oral every 4 hours PRN  LORazepam   Injectable 0.5 milliGRAM(s) IV Push every 8 hours PRN  metoclopramide Injectable 10 milliGRAM(s) IV Push every 6 hours PRN  ondansetron Injectable 8 milliGRAM(s) IV Push every 8 hours    8. OOB as tolerated, physical therapy consult if needed     9. Monitor coags / fibrinogen 2x week, vitamin K as needed   phytonadione   Solution 5 milliGRAM(s) Oral <User Schedule>    10. Monitor closely for clinical changes, monitor for fevers     11. Emotional support provided, plan of care discussed and questions addressed     12. Patient education done regarding plan of care, restrictions and discharge planning     13. Continue regular social work input     I have written the above note for Dr. Ambrocio who performed service with me in the room.   Jessy Ferrell NP-C ([phone])    I have seen and examined patient with NP, I agree with above note as scribed.                    [facility] Transplant Team                                                      Critical / Counseling Time Provided: 30 minutes                                                                                                                                                        Chief Complaint: Autologous pbsct with high dose melphalan prep regimen for treatment of IgG lambda multiple myeloma    Disease: IgG lambda multiple myeloma  Type of transplant: Autologous  Conditioning prep: Melphalan (200 mg/m2)   ABO / CMV: O POS / POS     S: Patient seen and examined with [facility] Transplant Team:   + cough   + loose stool with associated intermittent abdominal pain   + fatigue   + intermittent nausea   po intake improved       O: Vitals:   Vital Signs Last 24 Hrs  T(C): 38.2 (11 Sep 2024 05:58), Max: 38.5 (10 Sep 2024 15:26)  T(F): 100.8 (11 Sep 2024 05:58), Max: 101.3 (10 Sep 2024 15:26)  HR: 108 (11 Sep 2024 05:58) (108 - 112)  BP: 134/80 (11 Sep 2024 05:58) (92/61 - 138/69)  BP(mean): --  RR: 18 (11 Sep 2024 05:58) (18 - 18)  SpO2: 93% (11 Sep 2024 05:58) (92% - 99%)    Parameters below as of 11 Sep 2024 05:58  Patient On (Oxygen Delivery Method): nasal cannula        Admit weight: 62.2kg       Intake / Output:   09-10 @ 07:01  -  09-11 @ 07:00  --------------------------------------------------------  IN: 701 mL / OUT: 1475 mL / NET: -774 mL      PE:   Oropharynx: no erythema or ulcerations   Oral Mucositis:              -                                          Grade: n/a   CVS: RR, +S1,S2  Lungs: +mild bibasilar crackles (L>R)  Abdomen: + BS x 4, soft, ND, +mild lower abd discomfort in lower abdomen  Extremities: no edema in BLE  Gastric Mucositis:       +                                          ndGndrndanddndend:nd nd2nd Intestinal Mucositis:     -                                         Grade: n/a   Skin: no rashes   TLC: CDI   Neuro: A&Ox3   Pain: denies    Labs:   CBC Full  -  ( 11 Sep 2024 06:47 )  WBC Count : 4.26 K/uL  Hemoglobin : 8.9 g/dL  Hematocrit : 26.7 %  Platelet Count - Automated : 91 K/uL  Mean Cell Volume : 89.9 fl  Mean Cell Hemoglobin : 30.0 pg  Mean Cell Hemoglobin Concentration : 33.3 gm/dL  Auto Neutrophil # : x  Auto Lymphocyte # : x  Auto Monocyte # : x  Auto Eosinophil # : x  Auto Basophil # : x  Auto Neutrophil % : x  Auto Lymphocyte % : x  Auto Monocyte % : x  Auto Eosinophil % : x  Auto Basophil % : x                          8.9    4.26  )-----------( 91       ( 11 Sep 2024 06:47 )             26.7     Cultures:   Culture Results:   No growth at 24 hours (09.09.24 @ 08:35)    COVID-19 PCR/RVP . (09.09.24 @ 17:53)   COVID-19/RVP PCR: NotDetec    Cytomegalovirus By PCR (09.05.24 @ 07:26)   Cytomegalovirus By PCR: NotDetec IU/mL  CMVPCR Log: NotDetec:    Culture Results:   <10,000 CFU/mL Normal Urogenital Nuzhat (09.05.24 @ 22:44)    Culture Results:   No growth at  5 days (09.05.24 @ 22:44)    Culture Results:   No growth at  5 days (09.05.24 @ 22:44)    Culture Results:   No growth (09.03.24 @ 14:48)    Culture Results:   No growth at 5 days (09.03.24 @ 14:39)    Culture Results:   No growth at 5 days (09.03.24 @ 12:35)    Culture Results:   No growth  5 days(09.01.24 @ 13:20)    Culture Results:   No growth at  5 days (09.01.24 @ 06:50)    Culture Results:   No growth at  5 days (09.01.24 @ 06:00)    Culture Results:   No growth at 5 days (09.01.24 @ 06:00)        Radiology:   CXR 9/9  IMPRESSION:  Bibasilar atelectasis and or effusions.      Meds:   Antimicrobials:   acyclovir   Oral Tab/Cap 800 milliGRAM(s) Oral every 12 hours  piperacillin/tazobactam IVPB.. 4.5 Gram(s) IV Intermittent every 8 hours  trimethoprim   80 mG/sulfamethoxazole 400 mG 1 Tablet(s) Oral daily  vancomycin    Solution 125 milliGRAM(s) Oral every 12 hours      Heme / Onc:       GI:  aluminum hydroxide/magnesium hydroxide/simethicone Suspension 30 milliLiter(s) Oral every 6 hours PRN  loperamide 4 milliGRAM(s) Oral every 4 hours PRN  pantoprazole    Tablet 40 milliGRAM(s) Oral before breakfast  simethicone 80 milliGRAM(s) Chew four times a day PRN  sodium bicarbonate Mouth Rinse 10 milliLiter(s) Swish and Spit five times a day      Cardiovascular:       Immunologic:       Other medications:   Biotene Dry Mouth Oral Rinse 5 milliLiter(s) Swish and Spit five times a day  chlorhexidine 4% Liquid 1 Application(s) Topical <User Schedule>  nystatin Powder 1 Application(s) Topical two times a day  ondansetron Injectable 8 milliGRAM(s) IV Push every 8 hours  phytonadione   Solution 5 milliGRAM(s) Oral <User Schedule>  sodium chloride 0.9%. 1000 milliLiter(s) IV Continuous <Continuous>  zinc oxide 40% Paste 1 Application(s) Topical two times a day      PRN:   acetaminophen     Tablet .. 650 milliGRAM(s) Oral every 6 hours PRN  aluminum hydroxide/magnesium hydroxide/simethicone Suspension 30 milliLiter(s) Oral every 6 hours PRN  artificial tears (preservative free) Ophthalmic Solution 1 Drop(s) Both EYES every 3 hours PRN  benzocaine/menthol Lozenge 1 Lozenge Oral every 8 hours PRN  loperamide 4 milliGRAM(s) Oral every 4 hours PRN  LORazepam   Injectable 0.5 milliGRAM(s) IV Push every 8 hours PRN  metoclopramide Injectable 10 milliGRAM(s) IV Push every 6 hours PRN  petrolatum Ophthalmic Ointment 1 Application(s) Both EYES at bedtime PRN  simethicone 80 milliGRAM(s) Chew four times a day PRN  sodium chloride 0.9% lock flush 10 milliLiter(s) IV Push every 1 hour PRN    A/P:  64 year old female with a history of multiple myeloma   Post:  Autologous PBSCT day + 19 /23- HPC transplant today, continue hydration for 24 hours post infusion of cells. Daily weights, strict I&O, prn diuresis. Start levaquin / fluconazole prophylaxis when neutropenic.   8/24 c/o intermittent abd pain, AXR done, reading from radiologist requested. weight gain from admission, monitor closely, Lasix PRN   8/25 Zofran to PRN, switched Compazine-->reglan PRN  8/27- refractory nausea / vomiting, will do trial of olanzapine. d/c reglan. Grade 1 chemotherapy induced GI mucositis. Continue supportive measures.   8/30 Check ECG while on Zyprexa +Levaquin   9/1- Febrile, F/U BCX, PO antibiotics switched to Zosyn and Vanco.   9/2 - Rising S. Cr- 1.52, will D/C Vanco IV, started hydration NS @ 75 cc/hr for 24 hrs, renally adjust meds, Cr.Cl- 36 will continue Zosyn 4.5 gm Q 8 hrs, change Diflucan to 200 mg PO daily.   9/2 - Grade 1 transaminitis, will monitor closely  9/3 - +MAMADOU, f/u urine lytes, increase IVF's. Repeat BMP this afternoon. Dose adjust medications  9/4- non anion gap metabolic acidosis - likely secondary to diarrhea and / or MAMADOU, AMMADOU likely secondary to intravascular volume depletion. Creatinine improved today with increased IVF (NS) overnight, however hyperchloremia noted. Will change IVF to D5W with 3 amps NaHCO3- at 100ml /hr, repeat BMP at 3pm. Quantify diarrhea, continue lomotil. C diff negative.    9/5- creatinine 1.76, serum CO2 22. d/c NaHCO3- gtt, started LR @ 75ml /hr. Engrafted. d/c zosyn, po vanco, fluconazole. d/c G-CSF after today's dose.   9/6- febrile overnight, tmax 100.6, likely related to engraftment. F/u cultures, observe off of antibiotics. Creatinine improving, continue LR for now.   9/8 Nausea persists. Added zyprexa back and changed Zofran to ATC.   9/9- febrile overnight, tmax 100.4. Currently low grade (100.2) with dyspnea and cough. CXR completed - pending official read. Atelectasis and some pleural effusions present. Not neutropenic. d/c zyprexa. Ambulation and use of incentive spirometer encouraged. d/c LR, encourage po intake.   9/11- remains intermittently febrile (tmax 101.3), infectious work up has been negative. Engraftment likely a contributing factor.     1. Infectious Disease:   acyclovir   Oral Tab/Cap 800 milliGRAM(s) Oral every 12 hours  piperacillin/tazobactam IVPB.. 4.5 Gram(s) IV Intermittent every 8 hours  trimethoprim   80 mG/sulfamethoxazole 400 mG 1 Tablet(s) Oral daily  Vancomycin 125mg po bid    2. GI Prophylaxis:    pantoprazole    Tablet 40 milliGRAM(s) Oral before breakfast    3. Mouthcare - NS / NaHCO3 rinses, Biotene; Skin care     4. Transfuse & replete electrolytes prn     5. IV hydration, daily weights, strict I&O, prn diuresis     6. PO intake as tolerated, nutrition follow up as needed    7. Antiemetics, anti-diarrhea medications:   loperamide 4 milliGRAM(s) Oral every 4 hours PRN  LORazepam   Injectable 0.5 milliGRAM(s) IV Push every 8 hours PRN  metoclopramide Injectable 10 milliGRAM(s) IV Push every 6 hours PRN  ondansetron Injectable 8 milliGRAM(s) IV Push every 8 hours    8. OOB as tolerated, physical therapy consult if needed     9. Monitor coags / fibrinogen 2x week, vitamin K as needed   phytonadione   Solution 5 milliGRAM(s) Oral <User Schedule>    10. Monitor closely for clinical changes, monitor for fevers     11. Emotional support provided, plan of care discussed and questions addressed     12. Patient education done regarding plan of care, restrictions and discharge planning     13. Continue regular social work input     I have written the above note for Dr. Ambrocio who performed service with me in the room.   Jessy Ferrell NP-C ([phone])    I have seen and examined patient with NP, I agree with above note as scribed.

## 2024-09-12 LAB
ALBUMIN SERPL ELPH-MCNC: 2.9 G/DL — LOW (ref 3.3–5)
ALP SERPL-CCNC: 163 U/L — HIGH (ref 40–120)
ALT FLD-CCNC: 16 U/L — SIGNIFICANT CHANGE UP (ref 10–45)
ANION GAP SERPL CALC-SCNC: 12 MMOL/L — SIGNIFICANT CHANGE UP (ref 5–17)
APTT BLD: 29.5 SEC — SIGNIFICANT CHANGE UP (ref 24.5–35.6)
AST SERPL-CCNC: 28 U/L — SIGNIFICANT CHANGE UP (ref 10–40)
BASOPHILS # BLD AUTO: 0 K/UL — SIGNIFICANT CHANGE UP (ref 0–0.2)
BASOPHILS NFR BLD AUTO: 0 % — SIGNIFICANT CHANGE UP (ref 0–2)
BILIRUB SERPL-MCNC: 0.3 MG/DL — SIGNIFICANT CHANGE UP (ref 0.2–1.2)
BUN SERPL-MCNC: 14 MG/DL — SIGNIFICANT CHANGE UP (ref 7–23)
CALCIUM SERPL-MCNC: 8.3 MG/DL — LOW (ref 8.4–10.5)
CHLORIDE SERPL-SCNC: 100 MMOL/L — SIGNIFICANT CHANGE UP (ref 96–108)
CO2 SERPL-SCNC: 23 MMOL/L — SIGNIFICANT CHANGE UP (ref 22–31)
CREAT SERPL-MCNC: 1.06 MG/DL — SIGNIFICANT CHANGE UP (ref 0.5–1.3)
CULTURE RESULTS: NO GROWTH — SIGNIFICANT CHANGE UP
EGFR: 58 ML/MIN/1.73M2 — LOW
EOSINOPHIL # BLD AUTO: 0 K/UL — SIGNIFICANT CHANGE UP (ref 0–0.5)
EOSINOPHIL NFR BLD AUTO: 0 % — SIGNIFICANT CHANGE UP (ref 0–6)
GIANT PLATELETS BLD QL SMEAR: PRESENT — SIGNIFICANT CHANGE UP
GLUCOSE SERPL-MCNC: 85 MG/DL — SIGNIFICANT CHANGE UP (ref 70–99)
HCT VFR BLD CALC: 25 % — LOW (ref 34.5–45)
HGB BLD-MCNC: 8.5 G/DL — LOW (ref 11.5–15.5)
INR BLD: 1.1 RATIO — SIGNIFICANT CHANGE UP (ref 0.85–1.18)
LDH SERPL L TO P-CCNC: 259 U/L — HIGH (ref 50–242)
LYMPHOCYTES # BLD AUTO: 0.46 K/UL — LOW (ref 1–3.3)
LYMPHOCYTES # BLD AUTO: 13.3 % — SIGNIFICANT CHANGE UP (ref 13–44)
MAGNESIUM SERPL-MCNC: 1.5 MG/DL — LOW (ref 1.6–2.6)
MANUAL SMEAR VERIFICATION: SIGNIFICANT CHANGE UP
MCHC RBC-ENTMCNC: 30.8 PG — SIGNIFICANT CHANGE UP (ref 27–34)
MCHC RBC-ENTMCNC: 34 GM/DL — SIGNIFICANT CHANGE UP (ref 32–36)
MCV RBC AUTO: 90.6 FL — SIGNIFICANT CHANGE UP (ref 80–100)
METAMYELOCYTES # FLD: 1.9 % — HIGH (ref 0–0)
MONOCYTES # BLD AUTO: 0.29 K/UL — SIGNIFICANT CHANGE UP (ref 0–0.9)
MONOCYTES NFR BLD AUTO: 8.6 % — SIGNIFICANT CHANGE UP (ref 2–14)
MYELOCYTES NFR BLD: 0.9 % — HIGH (ref 0–0)
NEUTROPHILS # BLD AUTO: 2.58 K/UL — SIGNIFICANT CHANGE UP (ref 1.8–7.4)
NEUTROPHILS NFR BLD AUTO: 70.5 % — SIGNIFICANT CHANGE UP (ref 43–77)
NEUTS BAND # BLD: 4.8 % — SIGNIFICANT CHANGE UP (ref 0–8)
PHOSPHATE SERPL-MCNC: 3.5 MG/DL — SIGNIFICANT CHANGE UP (ref 2.5–4.5)
PLAT MORPH BLD: ABNORMAL
PLATELET # BLD AUTO: 93 K/UL — LOW (ref 150–400)
POTASSIUM SERPL-MCNC: 3.3 MMOL/L — LOW (ref 3.5–5.3)
POTASSIUM SERPL-SCNC: 3.3 MMOL/L — LOW (ref 3.5–5.3)
PROT SERPL-MCNC: 5.3 G/DL — LOW (ref 6–8.3)
PROTHROM AB SERPL-ACNC: 12.1 SEC — SIGNIFICANT CHANGE UP (ref 9.5–13)
RBC # BLD: 2.76 M/UL — LOW (ref 3.8–5.2)
RBC # FLD: 14.5 % — SIGNIFICANT CHANGE UP (ref 10.3–14.5)
RBC BLD AUTO: SIGNIFICANT CHANGE UP
SODIUM SERPL-SCNC: 135 MMOL/L — SIGNIFICANT CHANGE UP (ref 135–145)
SPECIMEN SOURCE: SIGNIFICANT CHANGE UP
WBC # BLD: 3.43 K/UL — LOW (ref 3.8–10.5)
WBC # FLD AUTO: 3.43 K/UL — LOW (ref 3.8–10.5)

## 2024-09-12 PROCEDURE — 71250 CT THORAX DX C-: CPT | Mod: 26

## 2024-09-12 PROCEDURE — 99233 SBSQ HOSP IP/OBS HIGH 50: CPT

## 2024-09-12 PROCEDURE — 74176 CT ABD & PELVIS W/O CONTRAST: CPT | Mod: 26

## 2024-09-12 RX ORDER — ZINC OXIDE 100 MG/G
1 OINTMENT TOPICAL
Refills: 0 | Status: DISCONTINUED | OUTPATIENT
Start: 2024-09-12 | End: 2024-09-13

## 2024-09-12 RX ORDER — POTASSIUM CHLORIDE 10 MEQ
20 TABLET, EXT RELEASE, PARTICLES/CRYSTALS ORAL
Refills: 0 | Status: COMPLETED | OUTPATIENT
Start: 2024-09-12 | End: 2024-09-12

## 2024-09-12 RX ADMIN — ZINC OXIDE 1 APPLICATION(S): 100 OINTMENT TOPICAL at 07:16

## 2024-09-12 RX ADMIN — Medication 5 MILLILITER(S): at 10:30

## 2024-09-12 RX ADMIN — Medication 1 APPLICATION(S): at 07:16

## 2024-09-12 RX ADMIN — SULFAMETHOXAZOLE AND TRIMETHOPRIM 1 TABLET(S): 800; 160 TABLET ORAL at 11:21

## 2024-09-12 RX ADMIN — Medication 800 MILLIGRAM(S): at 06:36

## 2024-09-12 RX ADMIN — Medication 25 GRAM(S): at 11:21

## 2024-09-12 RX ADMIN — SODIUM BICARBONATE 10 MILLILITER(S): 84 INJECTION, SOLUTION INTRAVENOUS at 11:21

## 2024-09-12 RX ADMIN — PIPERACILLIN SODIUM AND TAZOBACTAM SODIUM 25 GRAM(S): 3; .375 INJECTION, POWDER, FOR SOLUTION INTRAVENOUS at 22:03

## 2024-09-12 RX ADMIN — Medication 100 MILLIGRAM(S): at 23:12

## 2024-09-12 RX ADMIN — Medication 5 MILLILITER(S): at 16:46

## 2024-09-12 RX ADMIN — CHLORHEXIDINE GLUCONATE 1 APPLICATION(S): 40 SOLUTION TOPICAL at 06:37

## 2024-09-12 RX ADMIN — Medication 1 APPLICATION(S): at 17:30

## 2024-09-12 RX ADMIN — Medication 5 MILLILITER(S): at 00:02

## 2024-09-12 RX ADMIN — ONDANSETRON 8 MILLIGRAM(S): 2 INJECTION, SOLUTION INTRAMUSCULAR; INTRAVENOUS at 21:00

## 2024-09-12 RX ADMIN — SODIUM BICARBONATE 10 MILLILITER(S): 84 INJECTION, SOLUTION INTRAVENOUS at 16:46

## 2024-09-12 RX ADMIN — Medication 50 MILLIEQUIVALENT(S): at 15:11

## 2024-09-12 RX ADMIN — ACETAMINOPHEN 650 MILLIGRAM(S): 325 TABLET ORAL at 13:18

## 2024-09-12 RX ADMIN — Medication 25 GRAM(S): at 12:57

## 2024-09-12 RX ADMIN — PIPERACILLIN SODIUM AND TAZOBACTAM SODIUM 25 GRAM(S): 3; .375 INJECTION, POWDER, FOR SOLUTION INTRAVENOUS at 06:36

## 2024-09-12 RX ADMIN — Medication 40 MILLIGRAM(S): at 06:36

## 2024-09-12 RX ADMIN — ONDANSETRON 8 MILLIGRAM(S): 2 INJECTION, SOLUTION INTRAMUSCULAR; INTRAVENOUS at 06:37

## 2024-09-12 RX ADMIN — Medication 800 MILLIGRAM(S): at 17:30

## 2024-09-12 RX ADMIN — Medication 5 MILLILITER(S): at 20:40

## 2024-09-12 RX ADMIN — SODIUM BICARBONATE 10 MILLILITER(S): 84 INJECTION, SOLUTION INTRAVENOUS at 00:03

## 2024-09-12 RX ADMIN — LORAZEPAM 0.5 MILLIGRAM(S): 4 INJECTION INTRAMUSCULAR; INTRAVENOUS at 22:02

## 2024-09-12 RX ADMIN — Medication 5 MILLILITER(S): at 11:22

## 2024-09-12 RX ADMIN — PIPERACILLIN SODIUM AND TAZOBACTAM SODIUM 25 GRAM(S): 3; .375 INJECTION, POWDER, FOR SOLUTION INTRAVENOUS at 14:55

## 2024-09-12 RX ADMIN — ZINC OXIDE 1 APPLICATION(S): 100 OINTMENT TOPICAL at 17:32

## 2024-09-12 RX ADMIN — ACETAMINOPHEN 650 MILLIGRAM(S): 325 TABLET ORAL at 12:19

## 2024-09-12 RX ADMIN — Medication 125 MILLIGRAM(S): at 06:36

## 2024-09-12 RX ADMIN — ONDANSETRON 8 MILLIGRAM(S): 2 INJECTION, SOLUTION INTRAMUSCULAR; INTRAVENOUS at 15:10

## 2024-09-12 RX ADMIN — SODIUM BICARBONATE 10 MILLILITER(S): 84 INJECTION, SOLUTION INTRAVENOUS at 23:14

## 2024-09-12 RX ADMIN — SODIUM BICARBONATE 10 MILLILITER(S): 84 INJECTION, SOLUTION INTRAVENOUS at 20:41

## 2024-09-12 RX ADMIN — Medication 125 MILLIGRAM(S): at 17:31

## 2024-09-12 RX ADMIN — Medication 5 MILLILITER(S): at 23:12

## 2024-09-12 RX ADMIN — Medication 5 MILLIGRAM(S): at 10:30

## 2024-09-12 RX ADMIN — Medication 50 MILLIEQUIVALENT(S): at 12:56

## 2024-09-12 RX ADMIN — SODIUM BICARBONATE 10 MILLILITER(S): 84 INJECTION, SOLUTION INTRAVENOUS at 10:30

## 2024-09-12 RX ADMIN — Medication 50 MILLIEQUIVALENT(S): at 10:30

## 2024-09-12 NOTE — PHARMACOTHERAPY INTERVENTION NOTE - COMMENTS
64-year-old female with PMH of HTN and IgG lambda multiple myeloma treated with Rosalind-RVD x4 cycles and admitted for autologous HSCT with melphalan 200 mg/m2 conditioning. Today is day +20. Course has been complicated by nausea, diarrhea, anxiety, MAMADOU, and engraftment syndrome.    Patient is no longer neutropenic (ANC>500) though spiked a fever on 9/9 of 101.3F, was started on pip/tazo 4.5 q8h, today is day 4. RVP on 9/9 was negative, and pending blood cultures from 9/9 to de-escalate to d/c antibiotics. CrCl is ~45 mL/min (based on SCr of 1.06 and AdjBW) - can continue current dose of pip/tazo - will continue to monitor for dose adjustments. Patient continues to be febrile, ordering a CT scan to look for source of infection.    Patient complaining of nausea and reports leg tremors with PRN prochlorperazine. Prochlorperazine was switched to metoclopramide with good relief, though no longer on it. Restarted olanzapine 5 mg PO QHS (x1 dose on 9/8) and made ondansetron ATC on 9/8. On the following for nausea: ondansetron 8 mg IV every 8 hours around the clock, metoclopramide 10 mg Iv q6h PRN (first line for N/V) and lorazepam 1 mg IV every 8 hours PRN (first line for anxiety and second line for N/V). In anticipation of discharge, d/c'd olanzapine and will continue monitor s/s of N/V.     Lastly, the patient is on valsartan 80 mg PO QD at home-has been held inpatient due to MAMADOU which is now improving. SBPs dipped to 92 mmHg in past 24 hours. Will monitor patient will make a decision to restart, most likely will be post discharge.    Pilar Isabel, PharmD, BCOP  St[facility] Cell Transplant Clinical Pharmacy Specialist  Available via [employer] Teams

## 2024-09-12 NOTE — PROGRESS NOTE ADULT - NS ATTEND AMEND GEN_ALL_CORE FT
64 year old female with a history of multiple myeloma admitted for auto-HSCT Day + 20 today    Disease: IgG lambda multiple myeloma  Type of transplant: Autologous  Conditioning prep: Melphalan (200 mg/m2)   ABO / CMV: O POS / POS    Complications:   - Day 0: nausea/vomiting, abdominal pain   - Day 1: B/L lower extremities tremors --> compazine D/C, replaced by reglan   - MAMADOU – intravascular depletion; on IVF; was up to 2.9, now down to 1.26  - Rising Alk phos - stable   - Febrile 9/6 ; cultures negative; no abx   - Persistent nausea and watery stool: addition of zyprexa on 9/8; now on zofran. improving.   - 9/9-9/11: fever, Tmax 101.8; started on zosyn, infectious work up negative thus far. await HHV-6    I rounded with the team of nursing staff, ACP.  I reviewed the data.  I saw and examined the patient and answered her questions.   Labs reviewed, supplements lytes prn  continues to be febrile today; pending HHV-6 PCR; re-sent today.   Cr stable, continue to monitor off fluids.   Continue supportive care.  OOB/ambulate 64 year old female with a history of multiple myeloma admitted for auto-HSCT Day + 20 today    Disease: IgG lambda multiple myeloma  Type of transplant: Autologous  Conditioning prep: Melphalan (200 mg/m2)   ABO / CMV: O POS / POS    Complications:   - Day 0: nausea/vomiting, abdominal pain   - Day 1: B/L lower extremities tremors --> compazine D/C, replaced by reglan   - MAMADOU – intravascular depletion; on IVF; was up to 2.9, now back at baseline  - Febrile 9/6 ; cultures negative; no abx   - Persistent nausea and watery stool: addition of zyprexa on 9/8; now on zofran. improving.   - 9/9-9/12: fever, Tmax last 24 hours 101.3; started on zosyn, infectious work up negative thus far. await HHV-6    I rounded with the team of nursing staff, ACP.  I reviewed the data.  I saw and examined the patient and answered her questions.   Labs reviewed, supplements lytes prn  continues to be febrile today; pending HHV-6 PCR; re-sent wednesday  Cr stable, continue to monitor off fluids.   Given continued fever; will order CT C/A/P.   Patient is anxious to go home  Continue supportive care.  OOB/ambulate

## 2024-09-12 NOTE — PROGRESS NOTE ADULT - SUBJECTIVE AND OBJECTIVE BOX
[facility] Transplant Team                                                      Critical / Counseling Time Provided: 30 minutes                                                                                                                                                        Chief Complaint: Autologous pbsct with high dose melphalan prep regimen for treatment of IgG lambda multiple myeloma    Disease: IgG lambda multiple myeloma  Type of transplant: Autologous  Conditioning prep: Melphalan (200 mg/m2)   ABO / CMV: O POS / POS     S: Patient seen and examined with [facility] Transplant Team:       O: Vitals:   Vital Signs Last 24 Hrs  T(C): 37.8 (12 Sep 2024 06:14), Max: 38.6 (11 Sep 2024 14:30)  T(F): 100 (12 Sep 2024 06:14), Max: 101.5 (11 Sep 2024 14:30)  HR: 108 (12 Sep 2024 06:14) (99 - 108)  BP: 140/64 (12 Sep 2024 06:14) (128/76 - 140/64)  BP(mean): --  RR: 18 (12 Sep 2024 06:14) (18 - 18)  SpO2: 93% (12 Sep 2024 06:14) (92% - 94%)    Parameters below as of 12 Sep 2024 06:14  Patient On (Oxygen Delivery Method): nasal cannula      Admit weight: 62.2kg   Daily Weight in k.9 (11 Sep 2024 07:50)    Intake / Output:    @ 07:01  -   @ 07:00  --------------------------------------------------------  IN: 710 mL / OUT: 1900 mL / NET: -1190 mL      PE:   Oropharynx: no erythema or ulcerations   Oral Mucositis:              -                                          Grade: n/a   CVS: RR, +S1,S2  Lungs: +mild bibasilar crackles (L>R)  Abdomen: + BS x 4, soft, ND, +mild lower abd discomfort in lower abdomen  Extremities: no edema in BLE  Gastric Mucositis:       +                                          ndGndrndanddndend:nd nd2nd Intestinal Mucositis:     -                                         Grade: n/a   Skin: no rashes   TLC: CDI   Neuro: A&Ox3   Pain: denies    Labs:   CBC Full  -  ( 12 Sep 2024 07:26 )  WBC Count : 3.43 K/uL  Hemoglobin : 8.5 g/dL  Hematocrit : 25.0 %  Platelet Count - Automated : 93 K/uL  Mean Cell Volume : 90.6 fl  Mean Cell Hemoglobin : 30.8 pg  Mean Cell Hemoglobin Concentration : 34.0 gm/dL  Auto Neutrophil # : x  Auto Lymphocyte # : x  Auto Monocyte # : x  Auto Eosinophil # : x  Auto Basophil # : x  Auto Neutrophil % : x  Auto Lymphocyte % : x  Auto Monocyte % : x  Auto Eosinophil % : x  Auto Basophil % : x                          8.5    3.43  )-----------( 93       ( 12 Sep 2024 07:26 )             25.0       Cultures:   Culture Results:   No growth at 24 hours (24 @ 08:35)    COVID-19 PCR/RVP . (24 @ 17:53)   COVID-19/RVP PCR: NotDetec    Cytomegalovirus By PCR (24 @ 07:26)   Cytomegalovirus By PCR: NotDetec IU/mL  CMVPCR Log: NotDetec:    Culture Results:   <10,000 CFU/mL Normal Urogenital Nuzhat (24 @ 22:44)    Culture Results:   No growth at  5 days (24 @ 22:44)    Culture Results:   No growth at  5 days (24 @ 22:44)    Culture Results:   No growth (24 @ 14:48)    Culture Results:   No growth at 5 days (24 @ 14:39)    Culture Results:   No growth at 5 days (24 @ 12:35)    Culture Results:   No growth  5 days(24 @ 13:20)    Culture Results:   No growth at  5 days (24 @ 06:50)    Culture Results:   No growth at  5 days (24 @ 06:00)    Culture Results:   No growth at 5 days (24 @ 06:00)      Radiology:   CXR   IMPRESSION:  Bibasilar atelectasis and or effusions.    Meds:   Antimicrobials:   acyclovir   Oral Tab/Cap 800 milliGRAM(s) Oral every 12 hours  piperacillin/tazobactam IVPB.. 4.5 Gram(s) IV Intermittent every 8 hours  trimethoprim   80 mG/sulfamethoxazole 400 mG 1 Tablet(s) Oral daily  vancomycin    Solution 125 milliGRAM(s) Oral every 12 hours      Heme / Onc:       GI:  aluminum hydroxide/magnesium hydroxide/simethicone Suspension 30 milliLiter(s) Oral every 6 hours PRN  loperamide 4 milliGRAM(s) Oral every 4 hours PRN  pantoprazole    Tablet 40 milliGRAM(s) Oral before breakfast  simethicone 80 milliGRAM(s) Chew four times a day PRN  sodium bicarbonate Mouth Rinse 10 milliLiter(s) Swish and Spit five times a day      Cardiovascular:       Immunologic:       Other medications:   Biotene Dry Mouth Oral Rinse 5 milliLiter(s) Swish and Spit five times a day  chlorhexidine 4% Liquid 1 Application(s) Topical <User Schedule>  nystatin Powder 1 Application(s) Topical two times a day  ondansetron Injectable 8 milliGRAM(s) IV Push every 8 hours  phytonadione   Solution 5 milliGRAM(s) Oral <User Schedule>  sodium chloride 0.9%. 1000 milliLiter(s) IV Continuous <Continuous>  zinc oxide 40% Paste 1 Application(s) Topical two times a day      PRN:   acetaminophen     Tablet .. 650 milliGRAM(s) Oral every 6 hours PRN  aluminum hydroxide/magnesium hydroxide/simethicone Suspension 30 milliLiter(s) Oral every 6 hours PRN  artificial tears (preservative free) Ophthalmic Solution 1 Drop(s) Both EYES every 3 hours PRN  benzocaine/menthol Lozenge 1 Lozenge Oral every 8 hours PRN  benzonatate 100 milliGRAM(s) Oral three times a day PRN  loperamide 4 milliGRAM(s) Oral every 4 hours PRN  LORazepam   Injectable 0.5 milliGRAM(s) IV Push every 8 hours PRN  metoclopramide Injectable 10 milliGRAM(s) IV Push every 6 hours PRN  petrolatum Ophthalmic Ointment 1 Application(s) Both EYES at bedtime PRN  simethicone 80 milliGRAM(s) Chew four times a day PRN  sodium chloride 0.9% lock flush 10 milliLiter(s) IV Push every 1 hour PRN    A/P:  64 year old female with a history of multiple myeloma   Post:  Autologous PBSCT day + - HPC transplant today, continue hydration for 24 hours post infusion of cells. Daily weights, strict I&O, prn diuresis. Start levaquin / fluconazole prophylaxis when neutropenic.    c/o intermittent abd pain, AXR done, reading from radiologist requested. weight gain from admission, monitor closely, Lasix PRN    Zofran to PRN, switched Compazine-->reglan PRN  - refractory nausea / vomiting, will do trial of olanzapine. d/c reglan. Grade 1 chemotherapy induced GI mucositis. Continue supportive measures.    Check ECG while on Zyprexa +Levaquin   - Febrile, F/U BCX, PO antibiotics switched to Zosyn and Vanco.    - Rising S. Cr- 1.52, will D/C Vanco IV, started hydration NS @ 75 cc/hr for 24 hrs, renally adjust meds, Cr.Cl- 36 will continue Zosyn 4.5 gm Q 8 hrs, change Diflucan to 200 mg PO daily.    - Grade 1 transaminitis, will monitor closely  9/3 - +MAMADOU, f/u urine lytes, increase IVF's. Repeat BMP this afternoon. Dose adjust medications  - non anion gap metabolic acidosis - likely secondary to diarrhea and / or MAMADOU, MAMADOU likely secondary to intravascular volume depletion. Creatinine improved today with increased IVF (NS) overnight, however hyperchloremia noted. Will change IVF to D5W with 3 amps NaHCO3- at 100ml /hr, repeat BMP at 3pm. Quantify diarrhea, continue lomotil. C diff negative.    - creatinine 1.76, serum CO2 22. d/c NaHCO3- gtt, started LR @ 75ml /hr. Engrafted. d/c zosyn, po vanco, fluconazole. d/c G-CSF after today's dose.   - febrile overnight, tmax 100.6, likely related to engraftment. F/u cultures, observe off of antibiotics. Creatinine improving, continue LR for now.    Nausea persists. Added zyprexa back and changed Zofran to ATC.   - febrile overnight, tmax 100.4. Currently low grade (100.2) with dyspnea and cough. CXR completed - pending official read. Atelectasis and some pleural effusions present. Not neutropenic. d/c zyprexa. Ambulation and use of incentive spirometer encouraged. d/c LR, encourage po intake.   9/11- remains intermittently febrile (tmax 101.3), infectious work up has been negative. Engraftment likely a contributing factor.     1. Infectious Disease:   acyclovir   Oral Tab/Cap 800 milliGRAM(s) Oral every 12 hours  piperacillin/tazobactam IVPB.. 4.5 Gram(s) IV Intermittent every 8 hours  trimethoprim   80 mG/sulfamethoxazole 400 mG 1 Tablet(s) Oral daily  Vancomycin 125mg po bid    2. GI Prophylaxis:    pantoprazole    Tablet 40 milliGRAM(s) Oral before breakfast    3. Mouthcare - NS / NaHCO3 rinses, Biotene; Skin care     4. Transfuse & replete electrolytes prn     5. IV hydration, daily weights, strict I&O, prn diuresis     6. PO intake as tolerated, nutrition follow up as needed    7. Antiemetics, anti-diarrhea medications:   loperamide 4 milliGRAM(s) Oral every 4 hours PRN  LORazepam   Injectable 0.5 milliGRAM(s) IV Push every 8 hours PRN  metoclopramide Injectable 10 milliGRAM(s) IV Push every 6 hours PRN  ondansetron Injectable 8 milliGRAM(s) IV Push every 8 hours    8. OOB as tolerated, physical therapy consult if needed     9. Monitor coags / fibrinogen 2x week, vitamin K as needed   phytonadione   Solution 5 milliGRAM(s) Oral <User Schedule>    10. Monitor closely for clinical changes, monitor for fevers     11. Emotional support provided, plan of care discussed and questions addressed     12. Patient education done regarding plan of care, restrictions and discharge planning     13. Continue regular social work input     I have written the above note for Dr. Ambrocio who performed service with me in the room.   Jessy Ferrell NP-C ([phone])    I have seen and examined patient with NP, I agree with above note as scribed.    HPC Transplant Team                                                      Critical / Counseling Time Provided: 30 minutes                                                                                                                                                        Chief Complaint: Autologous pbsct with high dose melphalan prep regimen for treatment of IgG lambda multiple myeloma    Disease: IgG lambda multiple myeloma  Type of transplant: Autologous  Conditioning prep: Melphalan (200 mg/m2)   ABO / CMV: O POS / POS     S: Patient seen and examined with [facility] Transplant Team:       O: Vitals:   Vital Signs Last 24 Hrs  T(C): 37.8 (12 Sep 2024 06:14), Max: 38.6 (11 Sep 2024 14:30)  T(F): 100 (12 Sep 2024 06:14), Max: 101.5 (11 Sep 2024 14:30)  HR: 108 (12 Sep 2024 06:14) (99 - 108)  BP: 140/64 (12 Sep 2024 06:14) (128/76 - 140/64)  BP(mean): --  RR: 18 (12 Sep 2024 06:14) (18 - 18)  SpO2: 93% (12 Sep 2024 06:14) (92% - 94%)    Parameters below as of 12 Sep 2024 06:14  Patient On (Oxygen Delivery Method): nasal cannula      Admit weight: 62.2kg   Daily Weight in k.9 (11 Sep 2024 07:50)    Intake / Output:    @ 07:01  -   @ 07:00  --------------------------------------------------------  IN: 710 mL / OUT: 1900 mL / NET: -1190 mL      PE:   Oropharynx: no erythema or ulcerations   Oral Mucositis:              -                                          Grade: n/a   CVS: RR, +S1,S2  Lungs: +mild bibasilar crackles (L>R)  Abdomen: + BS x 4, soft, ND, +mild lower abd discomfort in lower abdomen  Extremities: no edema in BLE  Gastric Mucositis:       +                                          ndGndrndanddndend:nd nd2nd Intestinal Mucositis:     -                                         Grade: n/a   Skin: no rashes   TLC: CDI   Neuro: A&Ox3   Pain: denies    Labs:   CBC Full  -  ( 12 Sep 2024 07:26 )  WBC Count : 3.43 K/uL  Hemoglobin : 8.5 g/dL  Hematocrit : 25.0 %  Platelet Count - Automated : 93 K/uL  Mean Cell Volume : 90.6 fl  Mean Cell Hemoglobin : 30.8 pg  Mean Cell Hemoglobin Concentration : 34.0 gm/dL  Auto Neutrophil # : x  Auto Lymphocyte # : x  Auto Monocyte # : x  Auto Eosinophil # : x  Auto Basophil # : x  Auto Neutrophil % : x  Auto Lymphocyte % : x  Auto Monocyte % : x  Auto Eosinophil % : x  Auto Basophil % : x                          8.5    3.43  )-----------( 93       ( 12 Sep 2024 07:26 )             25.0         135  |  100  |  14  ----------------------------<  85  3.3<L>   |  23  |  1.06    Ca    8.3<L>      12 Sep 2024 07:21  Phos  3.5       Mg     1.5         TPro  5.3<L>  /  Alb  2.9<L>  /  TBili  0.3  /  DBili  x   /  AST  28  /  ALT  16  /  AlkPhos  163<H>          Cultures:   Culture Results:   No growth at 24 hours (24 @ 08:35)    COVID-19 PCR/RVP . (24 @ 17:53)   COVID-19/RVP PCR: NotDetec    Cytomegalovirus By PCR (24 @ 07:26)   Cytomegalovirus By PCR: NotDetec IU/mL  CMVPCR Log: NotDetec:    Culture Results:   <10,000 CFU/mL Normal Urogenital Nuzhat (24 @ 22:44)    Culture Results:   No growth at  5 days (24 @ 22:44)    Culture Results:   No growth at  5 days (24 @ 22:44)    Culture Results:   No growth (24 @ 14:48)    Culture Results:   No growth at 5 days (24 @ 14:39)    Culture Results:   No growth at 5 days (24 @ 12:35)    Culture Results:   No growth  5 days(24 @ 13:20)    Culture Results:   No growth at  5 days (24 @ 06:50)    Culture Results:   No growth at  5 days (24 @ 06:00)    Culture Results:   No growth at 5 days (24 @ 06:00)      Radiology:   CXR   IMPRESSION:  Bibasilar atelectasis and or effusions.    Meds:   Antimicrobials:   acyclovir   Oral Tab/Cap 800 milliGRAM(s) Oral every 12 hours  piperacillin/tazobactam IVPB.. 4.5 Gram(s) IV Intermittent every 8 hours  trimethoprim   80 mG/sulfamethoxazole 400 mG 1 Tablet(s) Oral daily  vancomycin    Solution 125 milliGRAM(s) Oral every 12 hours      Heme / Onc:       GI:  aluminum hydroxide/magnesium hydroxide/simethicone Suspension 30 milliLiter(s) Oral every 6 hours PRN  loperamide 4 milliGRAM(s) Oral every 4 hours PRN  pantoprazole    Tablet 40 milliGRAM(s) Oral before breakfast  simethicone 80 milliGRAM(s) Chew four times a day PRN  sodium bicarbonate Mouth Rinse 10 milliLiter(s) Swish and Spit five times a day      Cardiovascular:       Immunologic:       Other medications:   Biotene Dry Mouth Oral Rinse 5 milliLiter(s) Swish and Spit five times a day  chlorhexidine 4% Liquid 1 Application(s) Topical <User Schedule>  nystatin Powder 1 Application(s) Topical two times a day  ondansetron Injectable 8 milliGRAM(s) IV Push every 8 hours  phytonadione   Solution 5 milliGRAM(s) Oral <User Schedule>  sodium chloride 0.9%. 1000 milliLiter(s) IV Continuous <Continuous>  zinc oxide 40% Paste 1 Application(s) Topical two times a day      PRN:   acetaminophen     Tablet .. 650 milliGRAM(s) Oral every 6 hours PRN  aluminum hydroxide/magnesium hydroxide/simethicone Suspension 30 milliLiter(s) Oral every 6 hours PRN  artificial tears (preservative free) Ophthalmic Solution 1 Drop(s) Both EYES every 3 hours PRN  benzocaine/menthol Lozenge 1 Lozenge Oral every 8 hours PRN  benzonatate 100 milliGRAM(s) Oral three times a day PRN  loperamide 4 milliGRAM(s) Oral every 4 hours PRN  LORazepam   Injectable 0.5 milliGRAM(s) IV Push every 8 hours PRN  metoclopramide Injectable 10 milliGRAM(s) IV Push every 6 hours PRN  petrolatum Ophthalmic Ointment 1 Application(s) Both EYES at bedtime PRN  simethicone 80 milliGRAM(s) Chew four times a day PRN  sodium chloride 0.9% lock flush 10 milliLiter(s) IV Push every 1 hour PRN    A/P:  64 year old female with a history of multiple myeloma   Post:  Autologous PBSCT day + - HPC transplant today, continue hydration for 24 hours post infusion of cells. Daily weights, strict I&O, prn diuresis. Start levaquin / fluconazole prophylaxis when neutropenic.    c/o intermittent abd pain, AXR done, reading from radiologist requested. weight gain from admission, monitor closely, Lasix PRN    Zofran to PRN, switched Compazine-->reglan PRN  - refractory nausea / vomiting, will do trial of olanzapine. d/c reglan. Grade 1 chemotherapy induced GI mucositis. Continue supportive measures.    Check ECG while on Zyprexa +Levaquin   - Febrile, F/U BCX, PO antibiotics switched to Zosyn and Vanco.    - Rising S. Cr- 1.52, will D/C Vanco IV, started hydration NS @ 75 cc/hr for 24 hrs, renally adjust meds, Cr.Cl- 36 will continue Zosyn 4.5 gm Q 8 hrs, change Diflucan to 200 mg PO daily.    - Grade 1 transaminitis, will monitor closely  9/3 - +MAMADOU, f/u urine lytes, increase IVF's. Repeat BMP this afternoon. Dose adjust medications  - non anion gap metabolic acidosis - likely secondary to diarrhea and / or MAMADOU, MAMADOU likely secondary to intravascular volume depletion. Creatinine improved today with increased IVF (NS) overnight, however hyperchloremia noted. Will change IVF to D5W with 3 amps NaHCO3- at 100ml /hr, repeat BMP at 3pm. Quantify diarrhea, continue lomotil. C diff negative.    - creatinine 1.76, serum CO2 22. d/c NaHCO3- gtt, started LR @ 75ml /hr. Engrafted. d/c zosyn, po vanco, fluconazole. d/c G-CSF after today's dose.   - febrile overnight, tmax 100.6, likely related to engraftment. F/u cultures, observe off of antibiotics. Creatinine improving, continue LR for now.    Nausea persists. Added zyprexa back and changed Zofran to ATC.   - febrile overnight, tmax 100.4. Currently low grade (100.2) with dyspnea and cough. CXR completed - pending official read. Atelectasis and some pleural effusions present. Not neutropenic. d/c zyprexa. Ambulation and use of incentive spirometer encouraged. d/c LR, encourage po intake.   - remains intermittently febrile (tmax 101.3), infectious work up has been negative. Engraftment likely a contributing factor.     1. Infectious Disease:   acyclovir   Oral Tab/Cap 800 milliGRAM(s) Oral every 12 hours  piperacillin/tazobactam IVPB.. 4.5 Gram(s) IV Intermittent every 8 hours  trimethoprim   80 mG/sulfamethoxazole 400 mG 1 Tablet(s) Oral daily  Vancomycin 125mg po bid    2. GI Prophylaxis:    pantoprazole    Tablet 40 milliGRAM(s) Oral before breakfast    3. Mouthcare - NS / NaHCO3 rinses, Biotene; Skin care     4. Transfuse & replete electrolytes prn   magnesium sulfate 2g IV q 2 hours x 2 doses   KCl 20mEq IV q 2 hours x 3 doses     5. IV hydration, daily weights, strict I&O, prn diuresis     6. PO intake as tolerated, nutrition follow up as needed    7. Antiemetics, anti-diarrhea medications:   loperamide 4 milliGRAM(s) Oral every 4 hours PRN  LORazepam   Injectable 0.5 milliGRAM(s) IV Push every 8 hours PRN  metoclopramide Injectable 10 milliGRAM(s) IV Push every 6 hours PRN  ondansetron Injectable 8 milliGRAM(s) IV Push every 8 hours    8. OOB as tolerated, physical therapy consult if needed     9. Monitor coags / fibrinogen 2x week, vitamin K as needed   phytonadione   Solution 5 milliGRAM(s) Oral <User Schedule>    10. Monitor closely for clinical changes, monitor for fevers     11. Emotional support provided, plan of care discussed and questions addressed     12. Patient education done regarding plan of care, restrictions and discharge planning     13. Continue regular social work input     I have written the above note for Dr. Ambrocio who performed service with me in the room.   Jessy PEPPERC ([phone])    I have seen and examined patient with NP, I agree with above note as scribed.    [facility] Transplant Team                                                      Critical / Counseling Time Provided: 30 minutes                                                                                                                                                        Chief Complaint: Autologous pbsct with high dose melphalan prep regimen for treatment of IgG lambda multiple myeloma    Disease: IgG lambda multiple myeloma  Type of transplant: Autologous  Conditioning prep: Melphalan (200 mg/m2)   ABO / CMV: O POS / POS     S: Patient seen and examined with [facility] Transplant Team:   + fatigue   + cough   + intermittent nausea       O: Vitals:   Vital Signs Last 24 Hrs  T(C): 37.8 (12 Sep 2024 06:14), Max: 38.6 (11 Sep 2024 14:30)  T(F): 100 (12 Sep 2024 06:14), Max: 101.5 (11 Sep 2024 14:30)  HR: 108 (12 Sep 2024 06:14) (99 - 108)  BP: 140/64 (12 Sep 2024 06:14) (128/76 - 140/64)  BP(mean): --  RR: 18 (12 Sep 2024 06:14) (18 - 18)  SpO2: 93% (12 Sep 2024 06:14) (92% - 94%)    Parameters below as of 12 Sep 2024 06:14  Patient On (Oxygen Delivery Method): nasal cannula      Admit weight: 62.2kg   Daily Weight in k.9 (11 Sep 2024 07:50)    Intake / Output:    @ 07:01  -   @ 07:00  --------------------------------------------------------  IN: 710 mL / OUT: 1900 mL / NET: -1190 mL      PE:   Oropharynx: no erythema or ulcerations   Oral Mucositis:              -                                          Grade: n/a   CVS: RR, +S1,S2  Lungs: +mild bibasilar crackles (L>R)  Abdomen: + BS x 4, soft, ND, +mild lower abd discomfort in lower abdomen  Extremities: no edema in BLE  Gastric Mucositis:       +                                          ndGndrndanddndend:nd nd2nd Intestinal Mucositis:     -                                         Grade: n/a   Skin: no rashes   TLC: CDI   Neuro: A&Ox3   Pain: denies    Labs:   CBC Full  -  ( 12 Sep 2024 07:26 )  WBC Count : 3.43 K/uL  Hemoglobin : 8.5 g/dL  Hematocrit : 25.0 %  Platelet Count - Automated : 93 K/uL  Mean Cell Volume : 90.6 fl  Mean Cell Hemoglobin : 30.8 pg  Mean Cell Hemoglobin Concentration : 34.0 gm/dL  Auto Neutrophil # : x  Auto Lymphocyte # : x  Auto Monocyte # : x  Auto Eosinophil # : x  Auto Basophil # : x  Auto Neutrophil % : x  Auto Lymphocyte % : x  Auto Monocyte % : x  Auto Eosinophil % : x  Auto Basophil % : x                          8.5    3.43  )-----------( 93       ( 12 Sep 2024 07:26 )             25.0         135  |  100  |  14  ----------------------------<  85  3.3<L>   |  23  |  1.06    Ca    8.3<L>      12 Sep 2024 07:21  Phos  3.5       Mg     1.5         TPro  5.3<L>  /  Alb  2.9<L>  /  TBili  0.3  /  DBili  x   /  AST  28  /  ALT  16  /  AlkPhos  163<H>          Cultures:   Culture Results:   No growth at 24 hours (24 @ 08:35)    COVID-19 PCR/RVP . (24 @ 17:53)   COVID-19/RVP PCR: NotDetec    Cytomegalovirus By PCR (24 @ 07:26)   Cytomegalovirus By PCR: NotDetec IU/mL  CMVPCR Log: NotDetec:    Culture Results:   <10,000 CFU/mL Normal Urogenital Nuzhat (24 @ 22:44)    Culture Results:   No growth at  5 days (24 @ 22:44)    Culture Results:   No growth at  5 days (24 @ 22:44)    Culture Results:   No growth (24 @ 14:48)    Culture Results:   No growth at 5 days (24 @ 14:39)    Culture Results:   No growth at 5 days (24 @ 12:35)    Culture Results:   No growth  5 days(24 @ 13:20)    Culture Results:   No growth at  5 days (24 @ 06:50)    Culture Results:   No growth at  5 days (24 @ 06:00)    Culture Results:   No growth at 5 days (24 @ 06:00)      Radiology:   CXR   IMPRESSION:  Bibasilar atelectasis and or effusions.    Meds:   Antimicrobials:   acyclovir   Oral Tab/Cap 800 milliGRAM(s) Oral every 12 hours  piperacillin/tazobactam IVPB.. 4.5 Gram(s) IV Intermittent every 8 hours  trimethoprim   80 mG/sulfamethoxazole 400 mG 1 Tablet(s) Oral daily  vancomycin    Solution 125 milliGRAM(s) Oral every 12 hours      Heme / Onc:       GI:  aluminum hydroxide/magnesium hydroxide/simethicone Suspension 30 milliLiter(s) Oral every 6 hours PRN  loperamide 4 milliGRAM(s) Oral every 4 hours PRN  pantoprazole    Tablet 40 milliGRAM(s) Oral before breakfast  simethicone 80 milliGRAM(s) Chew four times a day PRN  sodium bicarbonate Mouth Rinse 10 milliLiter(s) Swish and Spit five times a day      Cardiovascular:       Immunologic:       Other medications:   Biotene Dry Mouth Oral Rinse 5 milliLiter(s) Swish and Spit five times a day  chlorhexidine 4% Liquid 1 Application(s) Topical <User Schedule>  nystatin Powder 1 Application(s) Topical two times a day  ondansetron Injectable 8 milliGRAM(s) IV Push every 8 hours  phytonadione   Solution 5 milliGRAM(s) Oral <User Schedule>  sodium chloride 0.9%. 1000 milliLiter(s) IV Continuous <Continuous>  zinc oxide 40% Paste 1 Application(s) Topical two times a day      PRN:   acetaminophen     Tablet .. 650 milliGRAM(s) Oral every 6 hours PRN  aluminum hydroxide/magnesium hydroxide/simethicone Suspension 30 milliLiter(s) Oral every 6 hours PRN  artificial tears (preservative free) Ophthalmic Solution 1 Drop(s) Both EYES every 3 hours PRN  benzocaine/menthol Lozenge 1 Lozenge Oral every 8 hours PRN  benzonatate 100 milliGRAM(s) Oral three times a day PRN  loperamide 4 milliGRAM(s) Oral every 4 hours PRN  LORazepam   Injectable 0.5 milliGRAM(s) IV Push every 8 hours PRN  metoclopramide Injectable 10 milliGRAM(s) IV Push every 6 hours PRN  petrolatum Ophthalmic Ointment 1 Application(s) Both EYES at bedtime PRN  simethicone 80 milliGRAM(s) Chew four times a day PRN  sodium chloride 0.9% lock flush 10 milliLiter(s) IV Push every 1 hour PRN    A/P:  64 year old female with a history of multiple myeloma   Post:  Autologous PBSCT day + - [facility] transplant today, continue hydration for 24 hours post infusion of cells. Daily weights, strict I&O, prn diuresis. Start levaquin / fluconazole prophylaxis when neutropenic.    c/o intermittent abd pain, AXR done, reading from radiologist requested. weight gain from admission, monitor closely, Lasix PRN    Zofran to PRN, switched Compazine-->reglan PRN  - refractory nausea / vomiting, will do trial of olanzapine. d/c reglan. Grade 1 chemotherapy induced GI mucositis. Continue supportive measures.    Check ECG while on Zyprexa +Levaquin   - Febrile, F/U BCX, PO antibiotics switched to Zosyn and Vanco.    - Rising S. Cr- 1.52, will D/C Vanco IV, started hydration NS @ 75 cc/hr for 24 hrs, renally adjust meds, Cr.Cl- 36 will continue Zosyn 4.5 gm Q 8 hrs, change Diflucan to 200 mg PO daily.    - Grade 1 transaminitis, will monitor closely  9/3 - +MAMADOU, f/u urine lytes, increase IVF's. Repeat BMP this afternoon. Dose adjust medications  - non anion gap metabolic acidosis - likely secondary to diarrhea and / or MAMADOU, MAMADOU likely secondary to intravascular volume depletion. Creatinine improved today with increased IVF (NS) overnight, however hyperchloremia noted. Will change IVF to D5W with 3 amps NaHCO3- at 100ml /hr, repeat BMP at 3pm. Quantify diarrhea, continue lomotil. C diff negative.    - creatinine 1.76, serum CO2 22. d/c NaHCO3- gtt, started LR @ 75ml /hr. Engrafted. d/c zosyn, po vanco, fluconazole. d/c G-CSF after today's dose.   - febrile overnight, tmax 100.6, likely related to engraftment. F/u cultures, observe off of antibiotics. Creatinine improving, continue LR for now.    Nausea persists. Added zyprexa back and changed Zofran to ATC.   - febrile overnight, tmax 100.4. Currently low grade (100.2) with dyspnea and cough. CXR completed - pending official read. Atelectasis and some pleural effusions present. Not neutropenic. d/c zyprexa. Ambulation and use of incentive spirometer encouraged. d/c LR, encourage po intake.   - remains intermittently febrile (tmax 101.3), infectious work up has been negative. Engraftment likely a contributing factor.     1. Infectious Disease:   acyclovir   Oral Tab/Cap 800 milliGRAM(s) Oral every 12 hours  piperacillin/tazobactam IVPB.. 4.5 Gram(s) IV Intermittent every 8 hours  trimethoprim   80 mG/sulfamethoxazole 400 mG 1 Tablet(s) Oral daily  Vancomycin 125mg po bid    2. GI Prophylaxis:    pantoprazole    Tablet 40 milliGRAM(s) Oral before breakfast    3. Mouthcare - NS / NaHCO3 rinses, Biotene; Skin care     4. Transfuse & replete electrolytes prn   magnesium sulfate 2g IV q 2 hours x 2 doses   KCl 20mEq IV q 2 hours x 3 doses     5. IV hydration, daily weights, strict I&O, prn diuresis     6. PO intake as tolerated, nutrition follow up as needed    7. Antiemetics, anti-diarrhea medications:   loperamide 4 milliGRAM(s) Oral every 4 hours PRN  LORazepam   Injectable 0.5 milliGRAM(s) IV Push every 8 hours PRN  metoclopramide Injectable 10 milliGRAM(s) IV Push every 6 hours PRN  ondansetron Injectable 8 milliGRAM(s) IV Push every 8 hours    8. OOB as tolerated, physical therapy consult if needed     9. Monitor coags / fibrinogen 2x week, vitamin K as needed   phytonadione   Solution 5 milliGRAM(s) Oral <User Schedule>    10. Monitor closely for clinical changes, monitor for fevers     11. Emotional support provided, plan of care discussed and questions addressed     12. Patient education done regarding plan of care, restrictions and discharge planning     13. Continue regular social work input     I have written the above note for Dr. Ambrocio who performed service with me in the room.   Jessy CULVER ([phone])    I have seen and examined patient with NP, I agree with above note as scribed.

## 2024-09-13 ENCOUNTER — TRANSCRIPTION ENCOUNTER (OUTPATIENT)
Age: 64
End: 2024-09-13

## 2024-09-13 VITALS
RESPIRATION RATE: 18 BRPM | TEMPERATURE: 99 F | HEART RATE: 99 BPM | OXYGEN SATURATION: 95 % | DIASTOLIC BLOOD PRESSURE: 80 MMHG | SYSTOLIC BLOOD PRESSURE: 142 MMHG

## 2024-09-13 LAB
ALBUMIN SERPL ELPH-MCNC: 3 G/DL — LOW (ref 3.3–5)
ALP SERPL-CCNC: 180 U/L — HIGH (ref 40–120)
ALT FLD-CCNC: 17 U/L — SIGNIFICANT CHANGE UP (ref 10–45)
ANION GAP SERPL CALC-SCNC: 14 MMOL/L — SIGNIFICANT CHANGE UP (ref 5–17)
AST SERPL-CCNC: 29 U/L — SIGNIFICANT CHANGE UP (ref 10–40)
BASOPHILS # BLD AUTO: 0.03 K/UL — SIGNIFICANT CHANGE UP (ref 0–0.2)
BASOPHILS NFR BLD AUTO: 1 % — SIGNIFICANT CHANGE UP (ref 0–2)
BILIRUB DIRECT SERPL-MCNC: <0.1 MG/DL — SIGNIFICANT CHANGE UP (ref 0–0.3)
BILIRUB INDIRECT FLD-MCNC: >0.3 MG/DL — SIGNIFICANT CHANGE UP (ref 0.2–1)
BILIRUB SERPL-MCNC: 0.4 MG/DL — SIGNIFICANT CHANGE UP (ref 0.2–1.2)
BLD GP AB SCN SERPL QL: POSITIVE — SIGNIFICANT CHANGE UP
BUN SERPL-MCNC: 14 MG/DL — SIGNIFICANT CHANGE UP (ref 7–23)
CALCIUM SERPL-MCNC: 8.5 MG/DL — SIGNIFICANT CHANGE UP (ref 8.4–10.5)
CHLORIDE SERPL-SCNC: 99 MMOL/L — SIGNIFICANT CHANGE UP (ref 96–108)
CMV DNA CSF QL NAA+PROBE: ABNORMAL IU/ML
CMV DNA SPEC NAA+PROBE-LOG#: ABNORMAL LOG10IU/ML
CO2 SERPL-SCNC: 22 MMOL/L — SIGNIFICANT CHANGE UP (ref 22–31)
CREAT SERPL-MCNC: 1 MG/DL — SIGNIFICANT CHANGE UP (ref 0.5–1.3)
DACRYOCYTES BLD QL SMEAR: SLIGHT — SIGNIFICANT CHANGE UP
EBV DNA SERPL NAA+PROBE-ACNC: SIGNIFICANT CHANGE UP IU/ML
EBVPCR LOG: SIGNIFICANT CHANGE UP LOG10IU/ML
EGFR: 63 ML/MIN/1.73M2 — SIGNIFICANT CHANGE UP
EOSINOPHIL # BLD AUTO: 0.03 K/UL — SIGNIFICANT CHANGE UP (ref 0–0.5)
EOSINOPHIL NFR BLD AUTO: 1 % — SIGNIFICANT CHANGE UP (ref 0–6)
GIANT PLATELETS BLD QL SMEAR: PRESENT — SIGNIFICANT CHANGE UP
GLUCOSE SERPL-MCNC: 91 MG/DL — SIGNIFICANT CHANGE UP (ref 70–99)
HCT VFR BLD CALC: 24.2 % — LOW (ref 34.5–45)
HERPES-6 (HSV-6) PCR: HIGH COPIES/ML
HGB BLD-MCNC: 8.1 G/DL — LOW (ref 11.5–15.5)
LDH SERPL L TO P-CCNC: 264 U/L — HIGH (ref 50–242)
LYMPHOCYTES # BLD AUTO: 0.77 K/UL — LOW (ref 1–3.3)
LYMPHOCYTES # BLD AUTO: 23 % — SIGNIFICANT CHANGE UP (ref 13–44)
MAGNESIUM SERPL-MCNC: 2.1 MG/DL — SIGNIFICANT CHANGE UP (ref 1.6–2.6)
MANUAL SMEAR VERIFICATION: SIGNIFICANT CHANGE UP
MCHC RBC-ENTMCNC: 30.2 PG — SIGNIFICANT CHANGE UP (ref 27–34)
MCHC RBC-ENTMCNC: 33.5 GM/DL — SIGNIFICANT CHANGE UP (ref 32–36)
MCV RBC AUTO: 90.3 FL — SIGNIFICANT CHANGE UP (ref 80–100)
METAMYELOCYTES # FLD: 1 % — HIGH (ref 0–0)
MONOCYTES # BLD AUTO: 0.67 K/UL — SIGNIFICANT CHANGE UP (ref 0–0.9)
MONOCYTES NFR BLD AUTO: 20 % — HIGH (ref 2–14)
MYELOCYTES NFR BLD: 2 % — HIGH (ref 0–0)
NEUTROPHILS # BLD AUTO: 1.68 K/UL — LOW (ref 1.8–7.4)
NEUTROPHILS NFR BLD AUTO: 45 % — SIGNIFICANT CHANGE UP (ref 43–77)
NEUTS BAND # BLD: 5 % — SIGNIFICANT CHANGE UP (ref 0–8)
NRBC # BLD: 0 /100 WBCS — SIGNIFICANT CHANGE UP (ref 0–0)
PHOSPHATE SERPL-MCNC: 2.7 MG/DL — SIGNIFICANT CHANGE UP (ref 2.5–4.5)
PLAT MORPH BLD: ABNORMAL
PLATELET # BLD AUTO: 120 K/UL — LOW (ref 150–400)
POIKILOCYTOSIS BLD QL AUTO: SLIGHT — SIGNIFICANT CHANGE UP
POTASSIUM SERPL-MCNC: 3.8 MMOL/L — SIGNIFICANT CHANGE UP (ref 3.5–5.3)
POTASSIUM SERPL-SCNC: 3.8 MMOL/L — SIGNIFICANT CHANGE UP (ref 3.5–5.3)
PROT SERPL-MCNC: 5.3 G/DL — LOW (ref 6–8.3)
RBC # BLD: 2.68 M/UL — LOW (ref 3.8–5.2)
RBC # FLD: 14.9 % — HIGH (ref 10.3–14.5)
RBC BLD AUTO: ABNORMAL
RH IG SCN BLD-IMP: POSITIVE — SIGNIFICANT CHANGE UP
SCHISTOCYTES BLD QL AUTO: SLIGHT — SIGNIFICANT CHANGE UP
SODIUM SERPL-SCNC: 135 MMOL/L — SIGNIFICANT CHANGE UP (ref 135–145)
VARIANT LYMPHS # BLD: 2 % — SIGNIFICANT CHANGE UP (ref 0–6)
WBC # BLD: 3.36 K/UL — LOW (ref 3.8–10.5)
WBC # FLD AUTO: 3.36 K/UL — LOW (ref 3.8–10.5)

## 2024-09-13 PROCEDURE — 84100 ASSAY OF PHOSPHORUS: CPT

## 2024-09-13 PROCEDURE — 81003 URINALYSIS AUTO W/O SCOPE: CPT

## 2024-09-13 PROCEDURE — 77001 FLUOROGUIDE FOR VEIN DEVICE: CPT

## 2024-09-13 PROCEDURE — C1751: CPT

## 2024-09-13 PROCEDURE — 74018 RADEX ABDOMEN 1 VIEW: CPT

## 2024-09-13 PROCEDURE — 80048 BASIC METABOLIC PNL TOTAL CA: CPT

## 2024-09-13 PROCEDURE — 87799 DETECT AGENT NOS DNA QUANT: CPT

## 2024-09-13 PROCEDURE — P9037: CPT

## 2024-09-13 PROCEDURE — 38209 WASH HARVEST STEM CELLS: CPT

## 2024-09-13 PROCEDURE — 36430 TRANSFUSION BLD/BLD COMPNT: CPT

## 2024-09-13 PROCEDURE — 86922 COMPATIBILITY TEST ANTIGLOB: CPT

## 2024-09-13 PROCEDURE — 82248 BILIRUBIN DIRECT: CPT

## 2024-09-13 PROCEDURE — 82955 ASSAY OF G6PD ENZYME: CPT

## 2024-09-13 PROCEDURE — 87637 SARSCOV2&INF A&B&RSV AMP PRB: CPT

## 2024-09-13 PROCEDURE — 38207 CRYOPRESERVE STEM CELLS: CPT

## 2024-09-13 PROCEDURE — P9040: CPT

## 2024-09-13 PROCEDURE — 99239 HOSP IP/OBS DSCHRG MGMT >30: CPT

## 2024-09-13 PROCEDURE — 81001 URINALYSIS AUTO W/SCOPE: CPT

## 2024-09-13 PROCEDURE — 86367 STEM CELLS TOTAL COUNT: CPT

## 2024-09-13 PROCEDURE — 36415 COLL VENOUS BLD VENIPUNCTURE: CPT

## 2024-09-13 PROCEDURE — 86880 COOMBS TEST DIRECT: CPT

## 2024-09-13 PROCEDURE — 87040 BLOOD CULTURE FOR BACTERIA: CPT

## 2024-09-13 PROCEDURE — 71045 X-RAY EXAM CHEST 1 VIEW: CPT

## 2024-09-13 PROCEDURE — 83735 ASSAY OF MAGNESIUM: CPT

## 2024-09-13 PROCEDURE — 97162 PT EVAL MOD COMPLEX 30 MIN: CPT

## 2024-09-13 PROCEDURE — 83615 LACTATE (LD) (LDH) ENZYME: CPT

## 2024-09-13 PROCEDURE — 71250 CT THORAX DX C-: CPT | Mod: MC

## 2024-09-13 PROCEDURE — 86970 RBC PRETX INCUBATJ W/CHEMICL: CPT

## 2024-09-13 PROCEDURE — 76937 US GUIDE VASCULAR ACCESS: CPT

## 2024-09-13 PROCEDURE — 87086 URINE CULTURE/COLONY COUNT: CPT

## 2024-09-13 PROCEDURE — 87449 NOS EACH ORGANISM AG IA: CPT

## 2024-09-13 PROCEDURE — 85045 AUTOMATED RETICULOCYTE COUNT: CPT

## 2024-09-13 PROCEDURE — 87324 CLOSTRIDIUM AG IA: CPT

## 2024-09-13 PROCEDURE — 86902 BLOOD TYPE ANTIGEN DONOR EA: CPT

## 2024-09-13 PROCEDURE — P9100: CPT

## 2024-09-13 PROCEDURE — 85025 COMPLETE CBC W/AUTO DIFF WBC: CPT

## 2024-09-13 PROCEDURE — 85730 THROMBOPLASTIN TIME PARTIAL: CPT

## 2024-09-13 PROCEDURE — 87533 HHV-6 DNA QUANT: CPT

## 2024-09-13 PROCEDURE — 82436 ASSAY OF URINE CHLORIDE: CPT

## 2024-09-13 PROCEDURE — 0225U NFCT DS DNA&RNA 21 SARSCOV2: CPT

## 2024-09-13 PROCEDURE — 84540 ASSAY OF URINE/UREA-N: CPT

## 2024-09-13 PROCEDURE — 87635 SARS-COV-2 COVID-19 AMP PRB: CPT

## 2024-09-13 PROCEDURE — 93005 ELECTROCARDIOGRAM TRACING: CPT

## 2024-09-13 PROCEDURE — 84300 ASSAY OF URINE SODIUM: CPT

## 2024-09-13 PROCEDURE — 82570 ASSAY OF URINE CREATININE: CPT

## 2024-09-13 PROCEDURE — C1769: CPT

## 2024-09-13 PROCEDURE — 85610 PROTHROMBIN TIME: CPT

## 2024-09-13 PROCEDURE — 86850 RBC ANTIBODY SCREEN: CPT

## 2024-09-13 PROCEDURE — C1894: CPT

## 2024-09-13 PROCEDURE — 86900 BLOOD TYPING SEROLOGIC ABO: CPT

## 2024-09-13 PROCEDURE — 86901 BLOOD TYPING SEROLOGIC RH(D): CPT

## 2024-09-13 PROCEDURE — 74176 CT ABD & PELVIS W/O CONTRAST: CPT | Mod: MC

## 2024-09-13 PROCEDURE — 80053 COMPREHEN METABOLIC PANEL: CPT

## 2024-09-13 PROCEDURE — 36556 INSERT NON-TUNNEL CV CATH: CPT

## 2024-09-13 RX ADMIN — SODIUM CHLORIDE 10 MILLILITER(S): 9 INJECTION INTRAMUSCULAR; INTRAVENOUS; SUBCUTANEOUS at 06:04

## 2024-09-13 RX ADMIN — Medication 100 MILLIGRAM(S): at 08:10

## 2024-09-13 RX ADMIN — PIPERACILLIN SODIUM AND TAZOBACTAM SODIUM 25 GRAM(S): 3; .375 INJECTION, POWDER, FOR SOLUTION INTRAVENOUS at 05:10

## 2024-09-13 RX ADMIN — SULFAMETHOXAZOLE AND TRIMETHOPRIM 1 TABLET(S): 800; 160 TABLET ORAL at 12:48

## 2024-09-13 RX ADMIN — Medication 125 MILLIGRAM(S): at 05:08

## 2024-09-13 RX ADMIN — Medication 800 MILLIGRAM(S): at 05:08

## 2024-09-13 RX ADMIN — CHLORHEXIDINE GLUCONATE 1 APPLICATION(S): 40 SOLUTION TOPICAL at 08:00

## 2024-09-13 RX ADMIN — Medication 5 MILLILITER(S): at 12:48

## 2024-09-13 RX ADMIN — SODIUM BICARBONATE 10 MILLILITER(S): 84 INJECTION, SOLUTION INTRAVENOUS at 12:48

## 2024-09-13 RX ADMIN — Medication 100 MILLIGRAM(S): at 05:09

## 2024-09-13 RX ADMIN — ONDANSETRON 8 MILLIGRAM(S): 2 INJECTION, SOLUTION INTRAMUSCULAR; INTRAVENOUS at 05:09

## 2024-09-13 RX ADMIN — Medication 40 MILLIGRAM(S): at 05:08

## 2024-09-13 RX ADMIN — Medication 5 MILLILITER(S): at 08:00

## 2024-09-13 RX ADMIN — SODIUM BICARBONATE 10 MILLILITER(S): 84 INJECTION, SOLUTION INTRAVENOUS at 07:59

## 2024-09-13 NOTE — DISCHARGE NOTE NURSING/CASE MANAGEMENT/SOCIAL WORK - NSDCPNINST_GEN_ALL_CORE
call your doctor or come to the emergency department for any fever, severe nausea, vomiting , diarrhea, bleeding, headache, and vertigo

## 2024-09-13 NOTE — PROGRESS NOTE ADULT - NS ATTEST RISK PROBLEM GEN_ALL_CORE FT
complex, high risk HSCT procedure
complex high risk HSCT procedure
complex, high risk HSCT procedure

## 2024-09-13 NOTE — PROGRESS NOTE ADULT - SUBJECTIVE AND OBJECTIVE BOX
[facility] Transplant Team                                                      Critical / Counseling Time Provided: 30 minutes                                                                                                                                                        Chief Complaint: Autologous pbsct with high dose melphalan prep regimen for treatment of IgG lambda multiple myeloma    Disease: IgG lambda multiple myeloma  Type of transplant: Autologous  Conditioning prep: Melphalan (200 mg/m2)   ABO / CMV: O POS / POS     S: Patient seen and examined with [facility] Transplant Team:     O: Vitals:   Vital Signs Last 24 Hrs  T(C): 37.3 (13 Sep 2024 05:06), Max: 38.2 (12 Sep 2024 12:09)  T(F): 99.1 (13 Sep 2024 05:06), Max: 100.8 (12 Sep 2024 12:09)  HR: 101 (13 Sep 2024 05:06) (96 - 108)  BP: 149/73 (13 Sep 2024 05:06) (123/63 - 149/73)  BP(mean): --  RR: 18 (13 Sep 2024 05:06) (18 - 18)  SpO2: 93% (13 Sep 2024 05:06) (91% - 94%)    Parameters below as of 13 Sep 2024 05:06  Patient On (Oxygen Delivery Method): room air      Admit weight: 62.2kg   Daily Weight in k.7 (12 Sep 2024 07:55)    Intake / Output:    @ 07:01  -   @ 07:00  --------------------------------------------------------  IN: 1508 mL / OUT: 1850 mL / NET: -342 mL      PE:   Oropharynx: no erythema or ulcerations   Oral Mucositis:              -                                          Grade: n/a   CVS: RR, +S1,S2  Lungs: +mild bibasilar crackles (L>R)  Abdomen: + BS x 4, soft, ND, +mild lower abd discomfort in lower abdomen  Extremities: no edema in BLE  Gastric Mucositis:       +                                          ndGndrndanddndend:nd nd2nd Intestinal Mucositis:     -                                         Grade: n/a   Skin: no rashes   TLC: CDI   Neuro: A&Ox3   Pain: denies      Labs:         Cultures:   Culture Results:   No growth (24 @ 19:12)    Culture Results:   No growth at 24 hours (24 @ 18:37)    Culture Results:   No growth at 24 hours (24 @ 18:37)    COVID-19 PCR/RVP . (24 @ 17:53)   COVID-19/RVP PCR: NotDetec    Cytomegalovirus By PCR (24 @ 07:26)   Cytomegalovirus By PCR: NotDetec IU/mL  CMVPCR Log: NotDetec:    Culture Results:   <10,000 CFU/mL Normal Urogenital Nuzhat (24 @ 22:44)    Culture Results:   No growth at  5 days (24 @ 22:44)    Culture Results:   No growth at  5 days (24 @ 22:44)    Culture Results:   No growth (24 @ 14:48)    Culture Results:   No growth at 5 days (24 @ 14:39)    Culture Results:   No growth at 5 days (24 @ 12:35)    Culture Results:   No growth  5 days(24 @ 13:20)    Culture Results:   No growth at  5 days (24 @ 06:50)    Culture Results:   No growth at  5 days (24 @ 06:00)    Culture Results:   No growth at 5 days (24 @ 06:00)      Radiology:   ******PRELIMINARY REPORT******   ACC: [id number] EXAM:  CT ABDOMEN AND PELVIS   ORDERED BY: ARNOLDO ASTUDILLO   ACC: [id number] EXAM:  CT CHEST   ORDERED BY: ARNOLDO ASTUDILLO   PROCEDURE DATE:  2024    PRELIMINARY  IMPRESSION:  Left lower lobe linear consolidation (3-76). Small bilateral pleural   effusions with adjacent lower lobe atelectasis.  Mild prominence of the appendix measuring up to 0.7 cm in diameter   (3-220, 6-49), with mild periappendiceal stranding. Correlate with   physical exam.    CXR   IMPRESSION:  Bibasilar atelectasis and or effusions.    Meds:   Antimicrobials:   acyclovir   Oral Tab/Cap 800 milliGRAM(s) Oral every 12 hours  piperacillin/tazobactam IVPB.. 4.5 Gram(s) IV Intermittent every 8 hours  trimethoprim   80 mG/sulfamethoxazole 400 mG 1 Tablet(s) Oral daily  vancomycin    Solution 125 milliGRAM(s) Oral every 12 hours      Heme / Onc:       GI:  aluminum hydroxide/magnesium hydroxide/simethicone Suspension 30 milliLiter(s) Oral every 6 hours PRN  loperamide 4 milliGRAM(s) Oral every 4 hours PRN  pantoprazole    Tablet 40 milliGRAM(s) Oral before breakfast  simethicone 80 milliGRAM(s) Chew four times a day PRN  sodium bicarbonate Mouth Rinse 10 milliLiter(s) Swish and Spit five times a day      Cardiovascular:       Immunologic:       Other medications:   Biotene Dry Mouth Oral Rinse 5 milliLiter(s) Swish and Spit five times a day  chlorhexidine 4% Liquid 1 Application(s) Topical <User Schedule>  ondansetron Injectable 8 milliGRAM(s) IV Push every 8 hours  phytonadione   Solution 5 milliGRAM(s) Oral <User Schedule>  sodium chloride 0.9%. 1000 milliLiter(s) IV Continuous <Continuous>      PRN:   acetaminophen     Tablet .. 650 milliGRAM(s) Oral every 6 hours PRN  aluminum hydroxide/magnesium hydroxide/simethicone Suspension 30 milliLiter(s) Oral every 6 hours PRN  artificial tears (preservative free) Ophthalmic Solution 1 Drop(s) Both EYES every 3 hours PRN  benzocaine/menthol Lozenge 1 Lozenge Oral every 8 hours PRN  benzonatate 100 milliGRAM(s) Oral three times a day PRN  loperamide 4 milliGRAM(s) Oral every 4 hours PRN  LORazepam   Injectable 0.5 milliGRAM(s) IV Push every 8 hours PRN  metoclopramide Injectable 10 milliGRAM(s) IV Push every 6 hours PRN  petrolatum Ophthalmic Ointment 1 Application(s) Both EYES at bedtime PRN  simethicone 80 milliGRAM(s) Chew four times a day PRN  sodium chloride 0.9% lock flush 10 milliLiter(s) IV Push every 1 hour PRN  zinc oxide 40% Paste 1 Application(s) Topical two times a day PRN    A/P:  64 year old female with a history of multiple myeloma   Post:  Autologous PBSCT day + - HPC transplant today, continue hydration for 24 hours post infusion of cells. Daily weights, strict I&O, prn diuresis. Start levaquin / fluconazole prophylaxis when neutropenic.    c/o intermittent abd pain, AXR done, reading from radiologist requested. weight gain from admission, monitor closely, Lasix PRN    Zofran to PRN, switched Compazine-->reglan PRN  - refractory nausea / vomiting, will do trial of olanzapine. d/c reglan. Grade 1 chemotherapy induced GI mucositis. Continue supportive measures.    Check ECG while on Zyprexa +Levaquin   - Febrile, F/U BCX, PO antibiotics switched to Zosyn and Vanco.    - Rising S. Cr- 1.52, will D/C Vanco IV, started hydration NS @ 75 cc/hr for 24 hrs, renally adjust meds, Cr.Cl- 36 will continue Zosyn 4.5 gm Q 8 hrs, change Diflucan to 200 mg PO daily.    - Grade 1 transaminitis, will monitor closely  9/3 - +MAMADOU, f/u urine lytes, increase IVF's. Repeat BMP this afternoon. Dose adjust medications  - non anion gap metabolic acidosis - likely secondary to diarrhea and / or MAMADOU, MAMADOU likely secondary to intravascular volume depletion. Creatinine improved today with increased IVF (NS) overnight, however hyperchloremia noted. Will change IVF to D5W with 3 amps NaHCO3- at 100ml /hr, repeat BMP at 3pm. Quantify diarrhea, continue lomotil. C diff negative.    - creatinine 1.76, serum CO2 22. d/c NaHCO3- gtt, started LR @ 75ml /hr. Engrafted. d/c zosyn, po vanco, fluconazole. d/c G-CSF after today's dose.   - febrile overnight, tmax 100.6, likely related to engraftment. F/u cultures, observe off of antibiotics. Creatinine improving, continue LR for now.    Nausea persists. Added zyprexa back and changed Zofran to ATC.   - febrile overnight, tmax 100.4. Currently low grade (100.2) with dyspnea and cough. CXR completed - pending official read. Atelectasis and some pleural effusions present. Not neutropenic. d/c zyprexa. Ambulation and use of incentive spirometer encouraged. d/c LR, encourage po intake.   - remains intermittently febrile (tmax 101.3), infectious work up has been negative. Engraftment likely a contributing factor.   - last temp 100.8F 24, 12:09pm. CT C/A/P (non con) showed LLL linear consolidation, mild prominence of the appendix with mild appendiceal stranding. Pt has had no abdominal complaints other than diarrhea (present since admission)     1. Infectious Disease:   acyclovir   Oral Tab/Cap 800 milliGRAM(s) Oral every 12 hours  piperacillin/tazobactam IVPB.. 4.5 Gram(s) IV Intermittent every 8 hours  trimethoprim   80 mG/sulfamethoxazole 400 mG 1 Tablet(s) Oral daily  Vancomycin 125mg po bid    2. GI Prophylaxis:    pantoprazole    Tablet 40 milliGRAM(s) Oral before breakfast    3. Mouthcare - NS / NaHCO3 rinses, Biotene; Skin care     4. Transfuse & replete electrolytes prn     5. IV hydration, daily weights, strict I&O, prn diuresis     6. PO intake as tolerated, nutrition follow up as needed    7. Antiemetics, anti-diarrhea medications:   loperamide 4 milliGRAM(s) Oral every 4 hours PRN  LORazepam   Injectable 0.5 milliGRAM(s) IV Push every 8 hours PRN  metoclopramide Injectable 10 milliGRAM(s) IV Push every 6 hours PRN  ondansetron Injectable 8 milliGRAM(s) IV Push every 8 hours    8. OOB as tolerated, physical therapy consult if needed     9. Monitor coags / fibrinogen 2x week, vitamin K as needed   phytonadione   Solution 5 milliGRAM(s) Oral <User Schedule>    10. Monitor closely for clinical changes, monitor for fevers     11. Emotional support provided, plan of care discussed and questions addressed     12. Patient education done regarding plan of care, restrictions and discharge planning     13. Continue regular social work input     I have written the above note for Dr. Ambrocio who performed service with me in the room.   Jessy Ferrell NP-C ([phone])    I have seen and examined patient with NP, I agree with above note as scribed.                      [facility] Transplant Team                                                      Critical / Counseling Time Provided: 30 minutes                                                                                                                                                        Chief Complaint: Autologous pbsct with high dose melphalan prep regimen for treatment of IgG lambda multiple myeloma    Disease: IgG lambda multiple myeloma  Type of transplant: Autologous  Conditioning prep: Melphalan (200 mg/m2)   ABO / CMV: O POS / POS     S: Patient seen and examined with [facility] Transplant Team:     O: Vitals:   Vital Signs Last 24 Hrs  T(C): 37.3 (13 Sep 2024 05:06), Max: 38.2 (12 Sep 2024 12:09)  T(F): 99.1 (13 Sep 2024 05:06), Max: 100.8 (12 Sep 2024 12:09)  HR: 101 (13 Sep 2024 05:06) (96 - 108)  BP: 149/73 (13 Sep 2024 05:06) (123/63 - 149/73)  BP(mean): --  RR: 18 (13 Sep 2024 05:06) (18 - 18)  SpO2: 93% (13 Sep 2024 05:06) (91% - 94%)    Parameters below as of 13 Sep 2024 05:06  Patient On (Oxygen Delivery Method): room air      Admit weight: 62.2kg   Daily Weight in k.7 (12 Sep 2024 07:55)    Intake / Output:    @ 07:01  -   @ 07:00  --------------------------------------------------------  IN: 1508 mL / OUT: 1850 mL / NET: -342 mL      PE:   Oropharynx: no erythema or ulcerations   Oral Mucositis:              -                                          Grade: n/a   CVS: RR, +S1,S2  Lungs: +mild bibasilar crackles (L>R)  Abdomen: + BS x 4, soft, ND, +mild lower abd discomfort in lower abdomen  Extremities: no edema in BLE  Gastric Mucositis:       +                                          ndGndrndanddndend:nd nd2nd Intestinal Mucositis:     -                                         Grade: n/a   Skin: no rashes   TLC: CDI   Neuro: A&Ox3   Pain: denies      Labs:                         8.1    3.36  )-----------( 120      ( 13 Sep 2024 07:23 )             24.2     09-13    135  |  99  |  14  ----------------------------<  91  3.8   |  22  |  1.00    Ca    8.5      13 Sep 2024 07:23  Phos  2.7       Mg     2.1         TPro  5.3<L>  /  Alb  3.0<L>  /  TBili  0.4  /  DBili  <0.1  /  AST  29  /  ALT  17  /  AlkPhos  180<H>          Cultures:   Culture Results:   No growth (24 @ 19:12)    Culture Results:   No growth at 24 hours (24 @ 18:37)    Culture Results:   No growth at 24 hours (24 @ 18:37)    COVID-19 PCR/RVP . (24 @ 17:53)   COVID-19/RVP PCR: NotDetec    Cytomegalovirus By PCR (24 @ 07:26)   Cytomegalovirus By PCR: NotDetec IU/mL  CMVPCR Log: NotDetec:    Culture Results:   <10,000 CFU/mL Normal Urogenital Nuzhat (24 @ 22:44)    Culture Results:   No growth at  5 days (24 @ 22:44)    Culture Results:   No growth at  5 days (24 @ 22:44)    Culture Results:   No growth (24 @ 14:48)    Culture Results:   No growth at 5 days (24 @ 14:39)    Culture Results:   No growth at 5 days (24 @ 12:35)    Culture Results:   No growth  5 days(24 @ 13:20)    Culture Results:   No growth at  5 days (24 @ 06:50)    Culture Results:   No growth at  5 days (24 @ 06:00)    Culture Results:   No growth at 5 days (24 @ 06:00)      Radiology:   ******PRELIMINARY REPORT******   ACC: [id number] EXAM:  CT ABDOMEN AND PELVIS   ORDERED BY: ARNOLDO ASTUDILLO   ACC: [id number] EXAM:  CT CHEST   ORDERED BY: ARNOLDO ASTUDILLO   PROCEDURE DATE:  2024    PRELIMINARY  IMPRESSION:  Left lower lobe linear consolidation (3-76). Small bilateral pleural   effusions with adjacent lower lobe atelectasis.  Mild prominence of the appendix measuring up to 0.7 cm in diameter   (3-220, 6-49), with mild periappendiceal stranding. Correlate with   physical exam.    CXR   IMPRESSION:  Bibasilar atelectasis and or effusions.    Meds:   Antimicrobials:   acyclovir   Oral Tab/Cap 800 milliGRAM(s) Oral every 12 hours  piperacillin/tazobactam IVPB.. 4.5 Gram(s) IV Intermittent every 8 hours  trimethoprim   80 mG/sulfamethoxazole 400 mG 1 Tablet(s) Oral daily  vancomycin    Solution 125 milliGRAM(s) Oral every 12 hours      Heme / Onc:       GI:  aluminum hydroxide/magnesium hydroxide/simethicone Suspension 30 milliLiter(s) Oral every 6 hours PRN  loperamide 4 milliGRAM(s) Oral every 4 hours PRN  pantoprazole    Tablet 40 milliGRAM(s) Oral before breakfast  simethicone 80 milliGRAM(s) Chew four times a day PRN  sodium bicarbonate Mouth Rinse 10 milliLiter(s) Swish and Spit five times a day      Cardiovascular:       Immunologic:       Other medications:   Biotene Dry Mouth Oral Rinse 5 milliLiter(s) Swish and Spit five times a day  chlorhexidine 4% Liquid 1 Application(s) Topical <User Schedule>  ondansetron Injectable 8 milliGRAM(s) IV Push every 8 hours  phytonadione   Solution 5 milliGRAM(s) Oral <User Schedule>  sodium chloride 0.9%. 1000 milliLiter(s) IV Continuous <Continuous>      PRN:   acetaminophen     Tablet .. 650 milliGRAM(s) Oral every 6 hours PRN  aluminum hydroxide/magnesium hydroxide/simethicone Suspension 30 milliLiter(s) Oral every 6 hours PRN  artificial tears (preservative free) Ophthalmic Solution 1 Drop(s) Both EYES every 3 hours PRN  benzocaine/menthol Lozenge 1 Lozenge Oral every 8 hours PRN  benzonatate 100 milliGRAM(s) Oral three times a day PRN  loperamide 4 milliGRAM(s) Oral every 4 hours PRN  LORazepam   Injectable 0.5 milliGRAM(s) IV Push every 8 hours PRN  metoclopramide Injectable 10 milliGRAM(s) IV Push every 6 hours PRN  petrolatum Ophthalmic Ointment 1 Application(s) Both EYES at bedtime PRN  simethicone 80 milliGRAM(s) Chew four times a day PRN  sodium chloride 0.9% lock flush 10 milliLiter(s) IV Push every 1 hour PRN  zinc oxide 40% Paste 1 Application(s) Topical two times a day PRN    A/P:  64 year old female with a history of multiple myeloma   Post:  Autologous PBSCT day + - HPC transplant today, continue hydration for 24 hours post infusion of cells. Daily weights, strict I&O, prn diuresis. Start levaquin / fluconazole prophylaxis when neutropenic.    c/o intermittent abd pain, AXR done, reading from radiologist requested. weight gain from admission, monitor closely, Lasix PRN    Zofran to PRN, switched Compazine-->reglan PRN  - refractory nausea / vomiting, will do trial of olanzapine. d/c reglan. Grade 1 chemotherapy induced GI mucositis. Continue supportive measures.    Check ECG while on Zyprexa +Levaquin   - Febrile, F/U BCX, PO antibiotics switched to Zosyn and Vanco.    - Rising S. Cr- 1.52, will D/C Vanco IV, started hydration NS @ 75 cc/hr for 24 hrs, renally adjust meds, Cr.Cl- 36 will continue Zosyn 4.5 gm Q 8 hrs, change Diflucan to 200 mg PO daily.    - Grade 1 transaminitis, will monitor closely  9/3 - +MAMADOU, f/u urine lytes, increase IVF's. Repeat BMP this afternoon. Dose adjust medications  - non anion gap metabolic acidosis - likely secondary to diarrhea and / or MAMADOU, MAMADOU likely secondary to intravascular volume depletion. Creatinine improved today with increased IVF (NS) overnight, however hyperchloremia noted. Will change IVF to D5W with 3 amps NaHCO3- at 100ml /hr, repeat BMP at 3pm. Quantify diarrhea, continue lomotil. C diff negative.    - creatinine 1.76, serum CO2 22. d/c NaHCO3- gtt, started LR @ 75ml /hr. Engrafted. d/c zosyn, po vanco, fluconazole. d/c G-CSF after today's dose.   - febrile overnight, tmax 100.6, likely related to engraftment. F/u cultures, observe off of antibiotics. Creatinine improving, continue LR for now.    Nausea persists. Added zyprexa back and changed Zofran to ATC.   - febrile overnight, tmax 100.4. Currently low grade (100.2) with dyspnea and cough. CXR completed - pending official read. Atelectasis and some pleural effusions present. Not neutropenic. d/c zyprexa. Ambulation and use of incentive spirometer encouraged. d/c LR, encourage po intake.   - remains intermittently febrile (tmax 101.3), infectious work up has been negative. Engraftment likely a contributing factor.   - last temp 100.8F 24, 12:09pm. CT C/A/P (non con) showed LLL linear consolidation, mild prominence of the appendix with mild appendiceal stranding. Pt has had no abdominal complaints other than diarrhea (present since admission)     1. Infectious Disease:   acyclovir   Oral Tab/Cap 800 milliGRAM(s) Oral every 12 hours  piperacillin/tazobactam IVPB.. 4.5 Gram(s) IV Intermittent every 8 hours  trimethoprim   80 mG/sulfamethoxazole 400 mG 1 Tablet(s) Oral daily  Vancomycin 125mg po bid    2. GI Prophylaxis:    pantoprazole    Tablet 40 milliGRAM(s) Oral before breakfast    3. Mouthcare - NS / NaHCO3 rinses, Biotene; Skin care     4. Transfuse & replete electrolytes prn     5. IV hydration, daily weights, strict I&O, prn diuresis     6. PO intake as tolerated, nutrition follow up as needed    7. Antiemetics, anti-diarrhea medications:   loperamide 4 milliGRAM(s) Oral every 4 hours PRN  LORazepam   Injectable 0.5 milliGRAM(s) IV Push every 8 hours PRN  metoclopramide Injectable 10 milliGRAM(s) IV Push every 6 hours PRN  ondansetron Injectable 8 milliGRAM(s) IV Push every 8 hours    8. OOB as tolerated, physical therapy consult if needed     9. Monitor coags / fibrinogen 2x week, vitamin K as needed   phytonadione   Solution 5 milliGRAM(s) Oral <User Schedule>    10. Monitor closely for clinical changes, monitor for fevers     11. Emotional support provided, plan of care discussed and questions addressed     12. Patient education done regarding plan of care, restrictions and discharge planning     13. Continue regular social work input     I have written the above note for Dr. Ambrocio who performed service with me in the room.   Jessy Ferrell NP-C ([phone])    I have seen and examined patient with NP, I agree with above note as scribed.                      [facility] Transplant Team                                                      Critical / Counseling Time Provided: 30 minutes                                                                                                                                                        Chief Complaint: Autologous pbsct with high dose melphalan prep regimen for treatment of IgG lambda multiple myeloma    Disease: IgG lambda multiple myeloma  Type of transplant: Autologous  Conditioning prep: Melphalan (200 mg/m2)   ABO / CMV: O POS / POS     S: Patient seen and examined with [facility] Transplant Team:   + intermittent cough   Feels better today, feels ready for discharge     O: Vitals:   Vital Signs Last 24 Hrs  T(C): 37.3 (13 Sep 2024 05:06), Max: 38.2 (12 Sep 2024 12:09)  T(F): 99.1 (13 Sep 2024 05:06), Max: 100.8 (12 Sep 2024 12:09)  HR: 101 (13 Sep 2024 05:06) (96 - 108)  BP: 149/73 (13 Sep 2024 05:06) (123/63 - 149/73)  BP(mean): --  RR: 18 (13 Sep 2024 05:06) (18 - 18)  SpO2: 93% (13 Sep 2024 05:06) (91% - 94%)    Parameters below as of 13 Sep 2024 05:06  Patient On (Oxygen Delivery Method): room air      Admit weight: 62.2kg   Daily Weight in k.7 (12 Sep 2024 07:55)    Intake / Output:    @ 07:01  -  -13 @ 07:00  --------------------------------------------------------  IN: 1508 mL / OUT: 1850 mL / NET: -342 mL      PE:   Oropharynx: no erythema or ulcerations   Oral Mucositis:              -                                          Grade: n/a   CVS: RR, +S1,S2  Lungs: +mild bibasilar crackles (L>R)  Abdomen: + BS x 4, soft, ND, +mild lower abd discomfort in lower abdomen  Extremities: no edema in BLE  Gastric Mucositis:       +                                          ndGndrndanddndend:nd nd2nd Intestinal Mucositis:     -                                         Grade: n/a   Skin: no rashes   TLC: CDI   Neuro: A&Ox3   Pain: denies      Labs:                         8.1    3.36  )-----------( 120      ( 13 Sep 2024 07:23 )             24.2         135  |  99  |  14  ----------------------------<  91  3.8   |  22  |  1.00    Ca    8.5      13 Sep 2024 07:23  Phos  2.7       Mg     2.1         TPro  5.3<L>  /  Alb  3.0<L>  /  TBili  0.4  /  DBili  <0.1  /  AST  29  /  ALT  17  /  AlkPhos  180<H>          Cultures:   Culture Results:   No growth (24 @ 19:12)    Culture Results:   No growth at 24 hours (24 @ 18:37)    Culture Results:   No growth at 24 hours (24 @ 18:37)    COVID-19 PCR/RVP . (24 @ 17:53)   COVID-19/RVP PCR: NotDetec    Cytomegalovirus By PCR (24 @ 07:26)   Cytomegalovirus By PCR: NotDetec IU/mL  CMVPCR Log: NotDetec:    Culture Results:   <10,000 CFU/mL Normal Urogenital Nuzhat (24 @ 22:44)    Culture Results:   No growth at  5 days (24 @ 22:44)    Culture Results:   No growth at  5 days (24 @ 22:44)    Culture Results:   No growth (24 @ 14:48)    Culture Results:   No growth at 5 days (24 @ 14:39)    Culture Results:   No growth at 5 days (24 @ 12:35)    Culture Results:   No growth  5 days(24 @ 13:20)    Culture Results:   No growth at  5 days (24 @ 06:50)    Culture Results:   No growth at  5 days (24 @ 06:00)    Culture Results:   No growth at 5 days (24 @ 06:00)      Radiology:   ******PRELIMINARY REPORT******   ACC: [id number] EXAM:  CT ABDOMEN AND PELVIS   ORDERED BY: ARNOLDO ASTUDILLO   ACC: [id number] EXAM:  CT CHEST   ORDERED BY: ARNOLDO ASTUDILLO   PROCEDURE DATE:  2024    PRELIMINARY  IMPRESSION:  Left lower lobe linear consolidation (3-76). Small bilateral pleural   effusions with adjacent lower lobe atelectasis.  Mild prominence of the appendix measuring up to 0.7 cm in diameter   (3-220, 6-49), with mild periappendiceal stranding. Correlate with   physical exam.    CXR   IMPRESSION:  Bibasilar atelectasis and or effusions.    Meds:   Antimicrobials:   acyclovir   Oral Tab/Cap 800 milliGRAM(s) Oral every 12 hours  piperacillin/tazobactam IVPB.. 4.5 Gram(s) IV Intermittent every 8 hours  trimethoprim   80 mG/sulfamethoxazole 400 mG 1 Tablet(s) Oral daily  vancomycin    Solution 125 milliGRAM(s) Oral every 12 hours      Heme / Onc:       GI:  aluminum hydroxide/magnesium hydroxide/simethicone Suspension 30 milliLiter(s) Oral every 6 hours PRN  loperamide 4 milliGRAM(s) Oral every 4 hours PRN  pantoprazole    Tablet 40 milliGRAM(s) Oral before breakfast  simethicone 80 milliGRAM(s) Chew four times a day PRN  sodium bicarbonate Mouth Rinse 10 milliLiter(s) Swish and Spit five times a day      Cardiovascular:       Immunologic:       Other medications:   Biotene Dry Mouth Oral Rinse 5 milliLiter(s) Swish and Spit five times a day  chlorhexidine 4% Liquid 1 Application(s) Topical <User Schedule>  ondansetron Injectable 8 milliGRAM(s) IV Push every 8 hours  phytonadione   Solution 5 milliGRAM(s) Oral <User Schedule>  sodium chloride 0.9%. 1000 milliLiter(s) IV Continuous <Continuous>      PRN:   acetaminophen     Tablet .. 650 milliGRAM(s) Oral every 6 hours PRN  aluminum hydroxide/magnesium hydroxide/simethicone Suspension 30 milliLiter(s) Oral every 6 hours PRN  artificial tears (preservative free) Ophthalmic Solution 1 Drop(s) Both EYES every 3 hours PRN  benzocaine/menthol Lozenge 1 Lozenge Oral every 8 hours PRN  benzonatate 100 milliGRAM(s) Oral three times a day PRN  loperamide 4 milliGRAM(s) Oral every 4 hours PRN  LORazepam   Injectable 0.5 milliGRAM(s) IV Push every 8 hours PRN  metoclopramide Injectable 10 milliGRAM(s) IV Push every 6 hours PRN  petrolatum Ophthalmic Ointment 1 Application(s) Both EYES at bedtime PRN  simethicone 80 milliGRAM(s) Chew four times a day PRN  sodium chloride 0.9% lock flush 10 milliLiter(s) IV Push every 1 hour PRN  zinc oxide 40% Paste 1 Application(s) Topical two times a day PRN    A/P:  64 year old female with a history of multiple myeloma   Post:  Autologous PBSCT day + - HPC transplant today, continue hydration for 24 hours post infusion of cells. Daily weights, strict I&O, prn diuresis. Start levaquin / fluconazole prophylaxis when neutropenic.    c/o intermittent abd pain, AXR done, reading from radiologist requested. weight gain from admission, monitor closely, Lasix PRN    Zofran to PRN, switched Compazine-->reglan PRN  - refractory nausea / vomiting, will do trial of olanzapine. d/c reglan. Grade 1 chemotherapy induced GI mucositis. Continue supportive measures.    Check ECG while on Zyprexa +Levaquin   - Febrile, F/U BCX, PO antibiotics switched to Zosyn and Vanco.    - Rising S. Cr- 1.52, will D/C Vanco IV, started hydration NS @ 75 cc/hr for 24 hrs, renally adjust meds, Cr.Cl- 36 will continue Zosyn 4.5 gm Q 8 hrs, change Diflucan to 200 mg PO daily.    - Grade 1 transaminitis, will monitor closely  9/3 - +MAMADOU, f/u urine lytes, increase IVF's. Repeat BMP this afternoon. Dose adjust medications  - non anion gap metabolic acidosis - likely secondary to diarrhea and / or MAMADOU, MAMADOU likely secondary to intravascular volume depletion. Creatinine improved today with increased IVF (NS) overnight, however hyperchloremia noted. Will change IVF to D5W with 3 amps NaHCO3- at 100ml /hr, repeat BMP at 3pm. Quantify diarrhea, continue lomotil. C diff negative.    - creatinine 1.76, serum CO2 22. d/c NaHCO3- gtt, started LR @ 75ml /hr. Engrafted. d/c zosyn, po vanco, fluconazole. d/c G-CSF after today's dose.   - febrile overnight, tmax 100.6, likely related to engraftment. F/u cultures, observe off of antibiotics. Creatinine improving, continue LR for now.    Nausea persists. Added zyprexa back and changed Zofran to ATC.   - febrile overnight, tmax 100.4. Currently low grade (100.2) with dyspnea and cough. CXR completed - pending official read. Atelectasis and some pleural effusions present. Not neutropenic. d/c zyprexa. Ambulation and use of incentive spirometer encouraged. d/c LR, encourage po intake.   - remains intermittently febrile (tmax 101.3), infectious work up has been negative. Engraftment likely a contributing factor.   - last temp 100.8F 24, 12:09pm. CT C/A/P (non con) showed LLL linear consolidation, mild prominence of the appendix with mild appendiceal stranding. Pt has had no abdominal complaints other than diarrhea (present since admission)     1. Infectious Disease:   acyclovir   Oral Tab/Cap 800 milliGRAM(s) Oral every 12 hours  piperacillin/tazobactam IVPB.. 4.5 Gram(s) IV Intermittent every 8 hours  trimethoprim   80 mG/sulfamethoxazole 400 mG 1 Tablet(s) Oral daily  Vancomycin 125mg po bid    2. GI Prophylaxis:    pantoprazole    Tablet 40 milliGRAM(s) Oral before breakfast    3. Mouthcare - NS / NaHCO3 rinses, Biotene; Skin care     4. Transfuse & replete electrolytes prn     5. IV hydration, daily weights, strict I&O, prn diuresis     6. PO intake as tolerated, nutrition follow up as needed    7. Antiemetics, anti-diarrhea medications:   loperamide 4 milliGRAM(s) Oral every 4 hours PRN  LORazepam   Injectable 0.5 milliGRAM(s) IV Push every 8 hours PRN  metoclopramide Injectable 10 milliGRAM(s) IV Push every 6 hours PRN  ondansetron Injectable 8 milliGRAM(s) IV Push every 8 hours    8. OOB as tolerated, physical therapy consult if needed     9. Monitor coags / fibrinogen 2x week, vitamin K as needed   phytonadione   Solution 5 milliGRAM(s) Oral <User Schedule>    10. Monitor closely for clinical changes, monitor for fevers     11. Emotional support provided, plan of care discussed and questions addressed     12. Patient education done regarding plan of care, restrictions and discharge planning     13. Continue regular social work input     I have written the above note for Dr. Ambrocio who performed service with me in the room.   Jessy Ferrell NP-C ([phone])    I have seen and examined patient with NP, I agree with above note as scribed.

## 2024-09-13 NOTE — PROGRESS NOTE ADULT - NS ATTEND AMEND GEN_ALL_CORE FT
64 year old female with a history of multiple myeloma admitted for auto-HSCT Day + 21 today    Disease: IgG lambda multiple myeloma  Type of transplant: Autologous  Conditioning prep: Melphalan (200 mg/m2)   ABO / CMV: O POS / POS    Complications:   - Day 0: nausea/vomiting, abdominal pain   - Day 1: B/L lower extremities tremors --> compazine D/C, replaced by reglan   - MAMADOU – intravascular depletion; on IVF; was up to 2.9, now down to 1.26  - Rising Alk phos - stable   - Febrile 9/6 ; cultures negative; no abx   - Persistent nausea and watery stool: addition of zyprexa on 9/8; now on zofran. improving.   - 9/9-9/11: fever, Tmax 101.8; started on zosyn, infectious work up negative thus far. await HHV-6    I rounded with the team of nursing staff, ACP.  I reviewed the data.  I saw and examined the patient and answered her questions.   Labs reviewed, supplements lytes prn  continues to be febrile today; pending HHV-6 PCR; re-sent today.   Cr stable, continue to monitor off fluids.   Continue supportive care.  OOB/ambulate 64 year old female with a history of multiple myeloma admitted for auto-HSCT Day + 21 today    Disease: IgG lambda multiple myeloma  Type of transplant: Autologous  Conditioning prep: Melphalan (200 mg/m2)   ABO / CMV: O POS / POS    Complications:   - Day 0: nausea/vomiting, abdominal pain   - Day 1: B/L lower extremities tremors --> compazine D/C, replaced by reglan   - MAMADOU – intravascular depletion; on IVF; was up to 2.9, now down to 1.00  - Febrile 9/6 ; cultures negative; no abx     - 9/9-9/11: fever, Tmax 101.8; started on zosyn, infectious work up negative thus far. await HHV-6.    I rounded with the team of nursing staff, ACP.  I reviewed the data.  I saw and examined the patient and answered her questions.   Labs reviewed, supplements lytes prn  Afebrile 24 hours;  pending HHV-6 PCR;   Cr stable,   Will discharge home today; follow up on tuesday in clinic.   Continue supportive care.  OOB/ambulate

## 2024-09-13 NOTE — PHARMACOTHERAPY INTERVENTION NOTE - COMMENTS
Allergies    Andrew (Rash)  aspirin (Swelling)  latex (Unknown)  salicylates (Anaphylaxis)    Intolerances      MEDICATIONS  (STANDING):  acyclovir   Oral Tab/Cap 800 milliGRAM(s) Oral every 12 hours  Biotene Dry Mouth Oral Rinse 5 milliLiter(s) Swish and Spit five times a day  chlorhexidine 4% Liquid 1 Application(s) Topical <User Schedule>  ondansetron Injectable 8 milliGRAM(s) IV Push every 8 hours  pantoprazole    Tablet 40 milliGRAM(s) Oral before breakfast  phytonadione   Solution 5 milliGRAM(s) Oral <User Schedule>  sodium bicarbonate Mouth Rinse 10 milliLiter(s) Swish and Spit five times a day  sodium chloride 0.9%. 1000 milliLiter(s) (10 mL/Hr) IV Continuous <Continuous>  trimethoprim   80 mG/sulfamethoxazole 400 mG 1 Tablet(s) Oral daily    MEDICATIONS  (PRN):  acetaminophen     Tablet .. 650 milliGRAM(s) Oral every 6 hours PRN Temp greater or equal to 38C (100.4F), Mild Pain (1 - 3)  aluminum hydroxide/magnesium hydroxide/simethicone Suspension 30 milliLiter(s) Oral every 6 hours PRN Dyspepsia  artificial tears (preservative free) Ophthalmic Solution 1 Drop(s) Both EYES every 3 hours PRN Dry Eyes  benzocaine/menthol Lozenge 1 Lozenge Oral every 8 hours PRN Sore Throat  benzonatate 100 milliGRAM(s) Oral three times a day PRN Cough  loperamide 4 milliGRAM(s) Oral every 4 hours PRN Diarrhea  LORazepam   Injectable 0.5 milliGRAM(s) IV Push every 8 hours PRN Nausea and/or Vomiting  metoclopramide Injectable 10 milliGRAM(s) IV Push every 6 hours PRN nausea/vomiting  petrolatum Ophthalmic Ointment 1 Application(s) Both EYES at bedtime PRN dry eyes  simethicone 80 milliGRAM(s) Chew four times a day PRN Gas  sodium chloride 0.9% lock flush 10 milliLiter(s) IV Push every 1 hour PRN Pre/post blood products, medications, blood draw, and to maintain line patency  zinc oxide 40% Paste 1 Application(s) Topical two times a day PRN skin irritation    Recipient of Education:  [X]Patient  [X]Family, please specify:   [  ]Other, please specify ____________    Readiness to Learn:  [X]Ready  [  ]Not ready: cognitive  [  ]Not ready: physical  [  ]Not ready: emotional  Comment(s):    Barriers to Information Exhange:  [X]None identified  [  ]Vision  [  ]Hearing  [  ]Language  [  ]Literacy  [  ]Memory and Learning  [  ]Culture/Jewish  [  ]Other, please specify ____________  Comment(s):    Patient Education Topics:  [  ]Anticoagulation  [  ]Heart Failure (HF)  [  ]Chronic Obstructive Pulmonary Disease (COPD)  [  ]Diabetes Mellitus (DM)  [  ]Stroke  [X]Other, please specify: post autoSCT discharge teach  Comment(s):    Medication Counseling Points:  [X]Medications Reviewed  [X]Medication Schedule  [X]Precautions  [X]Side Effects  [X]Indication for Use  [  ]Drug/Drug Interactions  [  ]Drug/Food Interactions  [  ]Other, please specify ____________  Comment(s):    Teaching Method:  [X]Verbal Instruction  [X]Written Material, please specify: personalized medication sheet  [  ]Skill Demonstration  [  ]Teach Back (Patient repeats in own words)  [  ]Ask Me 3  [  ]Computer/Internet  [  ]Other, please specify ____________  Comment(s):    Educational Evaluation:  [X]Meets goals/outcomes  [  ]Partially meets - needs review/practice  [  ]Unable to meet - needs instruction  [  ]Reinforced previously met goal  [  ]Patient declines instruction  [  ]Not applicable  Comment(s):    64-year-old female with PMH of HTN and IgG lambda multiple myeloma treated with Rosalind-RVD x4 cycles and admitted for autologous HSCT with melphalan 200 mg/m2 conditioning. Today is day +21. Course has been complicated by nausea, diarrhea, anxiety, MAMADOU, and engraftment syndrome.    Counseled patient today for discharge on new transplant medications including anti-infectives and PRN nausea meds. Reviewed doses, indications, efficacy and safety monitoring parameters, storage, and medication timeline.  Also counseled patient on the importance of adherence. Advised patient to: wear sunblock SPF>30 secondary to immunosuppressive state increasing risk of skin cancer; avoid over-the-counter NSAIDs, herbal, or dietary supplements as they may be harmful to the kidney and to contact the clinic before initiating any new OTC or RX medications. Patient has been instructed to hold valsartan-HCTZ at home and take BPs twice daily and bring log to clinic to determine when to restart medication.    Patient expressed understanding and teach back method was used to confirm. Provided patient with a personalized medication chart. All questions were answered to the patient's satisfaction and medication sheet was provided with medication name, strength, schedule, indication, and special instructions.        Time spent: _____minutes    Pilar Isabel, PharmD, BCOP  [facility] Trans[facility] Clinical Pharmacy Specialist  Available via [employer] Teams Allergies    Andrew (Rash)  aspirin (Swelling)  latex (Unknown)  salicylates (Anaphylaxis)    Intolerances      MEDICATIONS  (STANDING):  acyclovir   Oral Tab/Cap 800 milliGRAM(s) Oral every 12 hours  Biotene Dry Mouth Oral Rinse 5 milliLiter(s) Swish and Spit five times a day  chlorhexidine 4% Liquid 1 Application(s) Topical <User Schedule>  ondansetron Injectable 8 milliGRAM(s) IV Push every 8 hours  pantoprazole    Tablet 40 milliGRAM(s) Oral before breakfast  phytonadione   Solution 5 milliGRAM(s) Oral <User Schedule>  sodium bicarbonate Mouth Rinse 10 milliLiter(s) Swish and Spit five times a day  sodium chloride 0.9%. 1000 milliLiter(s) (10 mL/Hr) IV Continuous <Continuous>  trimethoprim   80 mG/sulfamethoxazole 400 mG 1 Tablet(s) Oral daily    MEDICATIONS  (PRN):  acetaminophen     Tablet .. 650 milliGRAM(s) Oral every 6 hours PRN Temp greater or equal to 38C (100.4F), Mild Pain (1 - 3)  aluminum hydroxide/magnesium hydroxide/simethicone Suspension 30 milliLiter(s) Oral every 6 hours PRN Dyspepsia  artificial tears (preservative free) Ophthalmic Solution 1 Drop(s) Both EYES every 3 hours PRN Dry Eyes  benzocaine/menthol Lozenge 1 Lozenge Oral every 8 hours PRN Sore Throat  benzonatate 100 milliGRAM(s) Oral three times a day PRN Cough  loperamide 4 milliGRAM(s) Oral every 4 hours PRN Diarrhea  LORazepam   Injectable 0.5 milliGRAM(s) IV Push every 8 hours PRN Nausea and/or Vomiting  metoclopramide Injectable 10 milliGRAM(s) IV Push every 6 hours PRN nausea/vomiting  petrolatum Ophthalmic Ointment 1 Application(s) Both EYES at bedtime PRN dry eyes  simethicone 80 milliGRAM(s) Chew four times a day PRN Gas  sodium chloride 0.9% lock flush 10 milliLiter(s) IV Push every 1 hour PRN Pre/post blood products, medications, blood draw, and to maintain line patency  zinc oxide 40% Paste 1 Application(s) Topical two times a day PRN skin irritation    Recipient of Education:  [X]Patient  [X]Family, please specify:   [  ]Other, please specify ____________    Readiness to Learn:  [X]Ready  [  ]Not ready: cognitive  [  ]Not ready: physical  [  ]Not ready: emotional  Comment(s):    Barriers to Information Exhange:  [X]None identified  [  ]Vision  [  ]Hearing  [  ]Language  [  ]Literacy  [  ]Memory and Learning  [  ]Culture/[place]  [  ]Other, please specify ____________  Comment(s):    Patient Education Topics:  [  ]Anticoagulation  [  ]Heart Failure (HF)  [  ]Chronic Obstructive Pulmonary Disease (COPD)  [  ]Diabetes Mellitus (DM)  [  ]Stroke  [X]Other, please specify: post autoSCT discharge teach  Comment(s):    Medication Counseling Points:  [X]Medications Reviewed  [X]Medication Schedule  [X]Precautions  [X]Side Effects  [X]Indication for Use  [  ]Drug/Drug Interactions  [  ]Drug/Food Interactions  [  ]Other, please specify ____________  Comment(s):    Teaching Method:  [X]Verbal Instruction  [X]Written Material, please specify: personalized medication sheet  [  ]Skill Demonstration  [  ]Teach Back (Patient repeats in own words)  [  ]Ask Me 3  [  ]Computer/Internet  [  ]Other, please specify ____________  Comment(s):    Educational Evaluation:  [X]Meets goals/outcomes  [  ]Partially meets - needs review/practice  [  ]Unable to meet - needs instruction  [  ]Reinforced previously met goal  [  ]Patient declines instruction  [  ]Not applicable  Comment(s):    64-year-old female with PMH of HTN and IgG lambda multiple myeloma treated with Rosalind-RVD x4 cycles and admitted for autologous HSCT with melphalan 200 mg/m2 conditioning. Today is day +21. Course has been complicated by nausea, diarrhea, anxiety, MAMADOU, and engraftment syndrome.    Counseled patient today for discharge on new transplant medications including anti-infectives and PRN nausea meds. Reviewed doses, indications, efficacy and safety monitoring parameters, storage, and medication timeline.  Also counseled patient on the importance of adherence. Advised patient to: wear sunblock SPF>30 secondary to immunosuppressive state increasing risk of skin cancer; avoid over-the-counter NSAIDs, herbal, or dietary supplements as they may be harmful to the kidney and to contact the clinic before initiating any new OTC or RX medications. Patient has been instructed to hold valsartan-HCTZ at home and take BPs twice daily and bring log to clinic to determine when to restart medication.    Patient expressed understanding and teach back method was used to confirm. Provided patient with a personalized medication chart. All questions were answered to the patient's satisfaction and medication sheet was provided with medication name, strength, schedule, indication, and special instructions.        Time spent: 15 minutes    Pilar Isabel, PharmD, BCOP  [facility] Transplant Clinical Pharmacy Specialist  Available via [employer] Teams

## 2024-09-13 NOTE — PHARMACOTHERAPY INTERVENTION NOTE - INTERVENTION CATEGORIES
ASP
Therapy
ASP
Misc
Misc
Therapy
Therapy
Med Reconciliation
Misc
Patient Education
Therapy
ASP
Misc
Therapy
Therapy
Misc

## 2024-09-13 NOTE — CHART NOTE - NSCHARTNOTEFT_GEN_A_CORE
NUTRITION FOLLOW UP NOTE    PATIENT SEEN FOR: BMTU Malnutrition follow up    SOURCE: [x] Patient  [x] Current Medical Record  [x] RN  []  at bedside  [] Patient unavailable/inappropriate [] Other: Interdisciplinary rounds     CHART REVIEWED/EVENTS NOTED.  [] No changes to nutrition care plan to note  [x] Nutrition Status: Post: Autologous PBSCT. Transplant Day: 2024. Day +21    DIET ORDER:   Diet, Regular:   Prosource Gelatein 20 Sugar Free     Qty per Day:  1  Supplement Feeding Modality:  Oral  Ensure Plus High Protein Cans or Servings Per Day:  3       Frequency:  Daily (24 @ 15:09) [Pending Verification By Attending]  Diet, Regular:   Supplement Feeding Modality:  Oral  Ensure Enlive Cans or Servings Per Day:  1       Frequency:  Three Times a day (24 @ 10:10) [Active]  --Protein-fortified smoothie (berry flavor) 1x/day (Monday-Friday, 300 gracie 18 gm protein)     CURRENT DIET ORDER IS:  [] Appropriate:  [] Inadequate:  [x] Other:    NUTRITION INTAKE/PROVISION:  [x] PO: Pt with appetite/PO intake. Is drinking strawberry flavored Ensure shakes 1x/day and strawberry banana smoothie daily. Food preferences; will honor as able.    [] Enteral Nutrition:  [] Parenteral Nutrition:    ANTHROPOMETRICS:  Drug Dosing Weight  Height (cm): 152 (22 Aug 2024 08:23)  Weight (kg): 62.2 (22 Aug 2024 08:23)  BMI (kg/m2): 26.9 (22 Aug 2024 08:23)  Weights:   Daily Weight in kG: Daily     Daily Weight in k.2 (13 Sep 2024 08:12), 66.2 (09 Sep 2024 07:46), 60.9 (03 Sep 2024 07:55), 60.7 (-30), Weight in k.9 (-), Weight in k (-), Weight in k.1 (-), Weight in k.3 (-), Weight in k.3 (-), Weight in k.1 (-)   -- noted with weight loss of 1.5kg/2.4% in 1 week since admission- might be multifactorial: fluid shift with treatment, decreased PO intake. Will continue to monitor weight trends as available/able.     MEDICATIONS:  MEDICATIONS  (STANDING):  acyclovir   Oral Tab/Cap 800 milliGRAM(s) Oral every 12 hours  Biotene Dry Mouth Oral Rinse 5 milliLiter(s) Swish and Spit five times a day  chlorhexidine 4% Liquid 1 Application(s) Topical <User Schedule>  ondansetron Injectable 8 milliGRAM(s) IV Push every 8 hours  pantoprazole    Tablet 40 milliGRAM(s) Oral before breakfast  phytonadione   Solution 5 milliGRAM(s) Oral <User Schedule>  piperacillin/tazobactam IVPB.. 4.5 Gram(s) IV Intermittent every 8 hours  sodium bicarbonate Mouth Rinse 10 milliLiter(s) Swish and Spit five times a day  sodium chloride 0.9%. 1000 milliLiter(s) (10 mL/Hr) IV Continuous <Continuous>  trimethoprim   80 mG/sulfamethoxazole 400 mG 1 Tablet(s) Oral daily  vancomycin    Solution 125 milliGRAM(s) Oral every 12 hours        NUTRITIONALLY PERTINENT LABS:   @ 07:23: Na 135, BUN 14, Cr 1.00, BG 91, K+ 3.8, Phos 2.7, Mg 2.1, Alk Phos 180<H>, ALT/SGPT 17, AST/SGOT 29, HbA1c --          Finger Sticks:    NUTRITIONALLY PERTINENT MEDICATIONS/LABS:  [x] Reviewed  [x] Relevant notes on medications/labs:      EDEMA:  [x] Reviewed  [] Relevant notes:    GI/ I&O:  [x] Reviewed  [x] Relevant notes:   [] Other: on Ativan, Zofran, Simethicone, Protonix  -- Mouth Care: Biotene, NaHCO3    SKIN:   [x] No pressure injuries documented, per nursing flowsheet  [] Pressure injury previously noted  [] Change in pressure injury documentation:  [] Other:    ESTIMATED NEEDS:  [] No change:  [x] Updated:  Energy: 4302-0850  kcal/day (30-35 kcal/kg)  Protein: 81-93  g/day (1.3-1.5 g/kg)  Fluid:   ml/day or [x] defer to team  Based on: dosing weight of 62.2kg in consideration of malnutrition and Increased nutrient needs     NUTRITION DIAGNOSIS:  [x] Prior Dx:  Increased nutrient needs, acute severe malnutrition  [] New Dx:     EDUCATION:  [x] Yes: Reviewed transplant food safety precautions and answered all questions as able. Discussed avoiding any take out/outside foods (including no bakery/deli items), emphasis on consuming home cooked or frozen meals. Discussed consuming bottled or filtered water. Discussed importance of adequate intake when home, including nutrient dense foods as part of diet, consuming small, frequent meals to optimize overall intake.   [] Not appropriate/warranted    NUTRITION CARE PLAN:  1. Diet: Continue diet free of therapeutic restriction. RD remains available to adjust diet as needed.   2. Supplements: Continue Ensure plus high protein 3x/day and Gelatein 1x/day. Continue protein-fortified smoothie (strawberry banana flavor) 1x daily from Monday to Friday.   3. Monitor and encourage PO intake. Encourage use of daily menus. Honor dietary preferences as expressed as able (obtained today).     [x] Achieved - Continue current nutrition intervention(s)  [] Current medical condition precludes nutrition intervention at this time.    MONITORING AND EVALUATION:     RD remains available upon request and will follow up per protocol:   Lelia Zafar RDN CDN (TEAMS) NUTRITION FOLLOW UP NOTE    PATIENT SEEN FOR: BMTU Malnutrition follow up    SOURCE: [x] Patient  [x] Current Medical Record  [x] RN  []  at bedside  [] Patient unavailable/inappropriate [] Other: Interdisciplinary rounds     CHART REVIEWED/EVENTS NOTED.  [] No changes to nutrition care plan to note  [x] Nutrition Status: Post: Autologous PBSCT. Transplant Day: 2024. Day +21    DIET ORDER:   Diet, Regular:   Prosource Gelatein 20 Sugar Free     Qty per Day:  1  Supplement Feeding Modality:  Oral  Ensure Plus High Protein Cans or Servings Per Day:  3       Frequency:  Daily (24 @ 15:09) [Pending Verification By Attending]  Diet, Regular:   Supplement Feeding Modality:  Oral  Ensure Enlive Cans or Servings Per Day:  1       Frequency:  Three Times a day (24 @ 10:10) [Active]  --Protein-fortified smoothie (berry flavor) 1x/day (Monday-Friday, 300 gracie 18 gm protein)     CURRENT DIET ORDER IS:  [] Appropriate:  [] Inadequate:  [x] Other:    NUTRITION INTAKE/PROVISION:  [x] PO: Pt with fair-good appetite/PO intake. Is drinking strawberry flavored Ensure shakes 1x/day and strawberry banana smoothie daily. Food preferences; will honor as able.    [] Enteral Nutrition:  [] Parenteral Nutrition:    ANTHROPOMETRICS:  Drug Dosing Weight  Height (cm): 152 (22 Aug 2024 08:23)  Weight (kg): 62.2 (22 Aug 2024 08:23)  BMI (kg/m2): 26.9 (22 Aug 2024 08:23)  Weights:   Daily Weight in kG: Daily     Daily Weight in k.2 (13 Sep 2024 08:12), 66.2 (09 Sep 2024 07:46), 60.9 (03 Sep 2024 07:55), 60.7 (-30), Weight in k.9 (-), Weight in k (-), Weight in k.1 (-), Weight in k.3 (-), Weight in k.3 (-), Weight in k.1 (-)   -- noted with weight loss of 1.5kg/2.4% in 1 week since admission- might be multifactorial: fluid shift with treatment, decreased PO intake. Will continue to monitor weight trends as available/able.     MEDICATIONS:  MEDICATIONS  (STANDING):  acyclovir   Oral Tab/Cap 800 milliGRAM(s) Oral every 12 hours  Biotene Dry Mouth Oral Rinse 5 milliLiter(s) Swish and Spit five times a day  chlorhexidine 4% Liquid 1 Application(s) Topical <User Schedule>  ondansetron Injectable 8 milliGRAM(s) IV Push every 8 hours  pantoprazole    Tablet 40 milliGRAM(s) Oral before breakfast  phytonadione   Solution 5 milliGRAM(s) Oral <User Schedule>  piperacillin/tazobactam IVPB.. 4.5 Gram(s) IV Intermittent every 8 hours  sodium bicarbonate Mouth Rinse 10 milliLiter(s) Swish and Spit five times a day  sodium chloride 0.9%. 1000 milliLiter(s) (10 mL/Hr) IV Continuous <Continuous>  trimethoprim   80 mG/sulfamethoxazole 400 mG 1 Tablet(s) Oral daily  vancomycin    Solution 125 milliGRAM(s) Oral every 12 hours        NUTRITIONALLY PERTINENT LABS:   @ 07:23: Na 135, BUN 14, Cr 1.00, BG 91, K+ 3.8, Phos 2.7, Mg 2.1, Alk Phos 180<H>, ALT/SGPT 17, AST/SGOT 29, HbA1c --          Finger Sticks:    NUTRITIONALLY PERTINENT MEDICATIONS/LABS:  [x] Reviewed  [x] Relevant notes on medications/labs:      EDEMA:  [x] Reviewed  [] Relevant notes:    GI/ I&O:  [x] Reviewed  [x] Relevant notes:   [] Other: on Ativan, Zofran, Simethicone, Protonix  -- Mouth Care: Biotene, NaHCO3    SKIN:   [x] No pressure injuries documented, per nursing flowsheet  [] Pressure injury previously noted  [] Change in pressure injury documentation:  [] Other:    ESTIMATED NEEDS:  [] No change:  [x] Updated:  Energy: 1474-9030  kcal/day (30-35 kcal/kg)  Protein: 81-93  g/day (1.3-1.5 g/kg)  Fluid:   ml/day or [x] defer to team  Based on: dosing weight of 62.2kg in consideration of malnutrition and Increased nutrient needs     NUTRITION DIAGNOSIS:  [x] Prior Dx:  Increased nutrient needs, acute severe malnutrition  [] New Dx:     EDUCATION:  [x] Yes: Reviewed transplant food safety precautions and answered all questions as able. Discussed avoiding any take out/outside foods (including no bakery/deli items), emphasis on consuming home cooked or frozen meals. Discussed consuming bottled or filtered water. Discussed importance of adequate intake when home, including nutrient dense foods as part of diet, consuming small, frequent meals to optimize overall intake.   [] Not appropriate/warranted    NUTRITION CARE PLAN:  1. Diet: Continue diet free of therapeutic restriction. RD remains available to adjust diet as needed.   2. Supplements: Continue Ensure plus high protein 3x/day and Gelatein 1x/day. Continue protein-fortified smoothie (strawberry banana flavor) 1x daily from Monday to Friday.   3. Monitor and encourage PO intake. Encourage use of daily menus. Honor dietary preferences as expressed as able (obtained today).     [x] Achieved - Continue current nutrition intervention(s)  [] Current medical condition precludes nutrition intervention at this time.    MONITORING AND EVALUATION:     RD remains available upon request and will follow up per protocol:   Lelia Zafar RDN CDN (TEAMS)

## 2024-09-13 NOTE — DISCHARGE NOTE NURSING/CASE MANAGEMENT/SOCIAL WORK - NSDCPEFALRISK_GEN_ALL_CORE
For information on Fall & Injury Prevention, visit: [email or web address] OR  [email or web address] OR  https://www.cdc.gov/steadi/patient.html

## 2024-09-13 NOTE — PROGRESS NOTE ADULT - NS ATTEST RISKLEV GEN_ALL_CORE
High

## 2024-09-13 NOTE — PROGRESS NOTE ADULT - TIME BILLING
high risk HSCT procedure
complex high risk HSCT procedure
high risk HSCT procedure

## 2024-09-13 NOTE — PROGRESS NOTE ADULT - REASON FOR ADMISSION
Autologous pbsct with high dose melphalan prep regimen for treatment of IgG lambda multiple myeloma

## 2024-09-13 NOTE — CHART NOTE - NSCHARTNOTESELECT_GEN_ALL_CORE
Nutrition Services
BMTU NP Infusion Note/Event Note
Fever/Event Note
Nutrition Services
Nutrition Services

## 2024-09-13 NOTE — DISCHARGE NOTE NURSING/CASE MANAGEMENT/SOCIAL WORK - NSDCFUADDAPPT_GEN_ALL_CORE_FT
You have the following appointments at the [facility]:     Wednesday, 9/11/24:  8am - lab work  8:30am - Dr. Mc

## 2024-09-13 NOTE — PHARMACOTHERAPY INTERVENTION NOTE - OUTCOME
accepted

## 2024-09-13 NOTE — PROGRESS NOTE ADULT - NS ATTEND BILL GEN_ALL_CORE
Attending to bill
no

## 2024-09-13 NOTE — PROGRESS NOTE ADULT - NS ATTEST RISK GEN_ALL_CORE
Risk Statement (NON-critical care)

## 2024-09-13 NOTE — PROGRESS NOTE ADULT - NUTRITIONAL ASSESSMENT
This patient has been assessed with a concern for Malnutrition and has been determined to have a diagnosis/diagnoses of Severe protein-calorie malnutrition.    This patient is being managed with:   Diet Regular-  Supplement Feeding Modality:  Oral  Ensure Enlive Cans or Servings Per Day:  1       Frequency:  Three Times a day  Entered: Aug 26 2024 10:09AM  
This patient has been assessed with a concern for Malnutrition and has been determined to have a diagnosis/diagnoses of Severe protein-calorie malnutrition.    This patient is being managed with:   Diet Regular-  Supplement Feeding Modality:  Oral  Ensure Enlive Cans or Servings Per Day:  1       Frequency:  Three Times a day  Entered: Aug 26 2024 10:09AM    The following pending diet order is being considered for treatment of Severe protein-calorie malnutrition:  Diet Regular-  Prosource Gelatein 20 Sugar Free     Qty per Day:  1  Supplement Feeding Modality:  Oral  Ensure Plus High Protein Cans or Servings Per Day:  3       Frequency:  Daily  Entered: Sep  9 2024  3:08PM  
This patient has been assessed with a concern for Malnutrition and has been determined to have a diagnosis/diagnoses of Severe protein-calorie malnutrition.    This patient is being managed with:   Diet Regular-  Supplement Feeding Modality:  Oral  Ensure Enlive Cans or Servings Per Day:  1       Frequency:  Three Times a day  Entered: Aug 26 2024 10:09AM  
This patient has been assessed with a concern for Malnutrition and has been determined to have a diagnosis/diagnoses of Severe protein-calorie malnutrition.    This patient is being managed with:   Diet Regular-  Supplement Feeding Modality:  Oral  Ensure Enlive Cans or Servings Per Day:  1       Frequency:  Three Times a day  Entered: Aug 26 2024 10:09AM    The following pending diet order is being considered for treatment of Severe protein-calorie malnutrition:  Diet Regular-  Prosource Gelatein 20 Sugar Free     Qty per Day:  1  Supplement Feeding Modality:  Oral  Ensure Plus High Protein Cans or Servings Per Day:  3       Frequency:  Daily  Entered: Sep  9 2024  3:08PM

## 2024-09-13 NOTE — DISCHARGE NOTE NURSING/CASE MANAGEMENT/SOCIAL WORK - PATIENT PORTAL LINK FT
You can access the FollowMyHealth Patient Portal offered by [facility] by registering at the following website: [email or web address]. By joining [employer]’s FollowMyHealth portal, you will also be able to view your health information using other applications (apps) compatible with our system.

## 2024-09-14 LAB
CULTURE RESULTS: SIGNIFICANT CHANGE UP
SPECIMEN SOURCE: SIGNIFICANT CHANGE UP

## 2024-09-15 ENCOUNTER — OUTPATIENT (OUTPATIENT)
Dept: OUTPATIENT SERVICES | Facility: HOSPITAL | Age: 64
LOS: 1 days | Discharge: HOME | End: 2024-09-15

## 2024-09-15 DIAGNOSIS — E88.09 OTHER DISORDERS OF PLASMA-PROTEIN METABOLISM, NOT ELSEWHERE CLASSIFIED: ICD-10-CM

## 2024-09-16 ENCOUNTER — APPOINTMENT (OUTPATIENT)
Dept: HEMATOLOGY ONCOLOGY | Facility: CLINIC | Age: 64
End: 2024-09-16

## 2024-09-16 LAB
CULTURE RESULTS: SIGNIFICANT CHANGE UP
CULTURE RESULTS: SIGNIFICANT CHANGE UP
SPECIMEN SOURCE: SIGNIFICANT CHANGE UP
SPECIMEN SOURCE: SIGNIFICANT CHANGE UP

## 2024-09-16 RX ORDER — ACYCLOVIR 800 MG/1
800 TABLET ORAL
Refills: 0 | Status: ACTIVE | COMMUNITY
Start: 2024-09-16

## 2024-09-16 RX ORDER — SULFAMETHOXAZOLE AND TRIMETHOPRIM 400; 80 MG/1; MG/1
400-80 TABLET ORAL DAILY
Refills: 0 | Status: ACTIVE | COMMUNITY
Start: 2024-09-16

## 2024-09-17 ENCOUNTER — APPOINTMENT (OUTPATIENT)
Dept: HEMATOLOGY ONCOLOGY | Facility: CLINIC | Age: 64
End: 2024-09-17

## 2024-09-17 LAB — HADV DNA FLD NAA+PROBE-LOG#: SIGNIFICANT CHANGE UP COPIES/ML

## 2024-09-18 ENCOUNTER — APPOINTMENT (OUTPATIENT)
Dept: HEMATOLOGY ONCOLOGY | Facility: CLINIC | Age: 64
End: 2024-09-18
Payer: COMMERCIAL

## 2024-09-18 ENCOUNTER — APPOINTMENT (OUTPATIENT)
Dept: INFUSION THERAPY | Facility: HOSPITAL | Age: 64
End: 2024-09-18

## 2024-09-18 ENCOUNTER — APPOINTMENT (OUTPATIENT)
Dept: HEMATOLOGY ONCOLOGY | Facility: CLINIC | Age: 64
End: 2024-09-18

## 2024-09-18 ENCOUNTER — RESULT REVIEW (OUTPATIENT)
Age: 64
End: 2024-09-18

## 2024-09-18 VITALS
OXYGEN SATURATION: 97 % | DIASTOLIC BLOOD PRESSURE: 89 MMHG | SYSTOLIC BLOOD PRESSURE: 146 MMHG | WEIGHT: 124.12 LBS | BODY MASS INDEX: 24.59 KG/M2 | TEMPERATURE: 97.2 F | RESPIRATION RATE: 17 BRPM | HEART RATE: 119 BPM

## 2024-09-18 LAB — MAGNESIUM SERPL-MCNC: 1.8 MG/DL

## 2024-09-18 PROCEDURE — 99214 OFFICE O/P EST MOD 30 MIN: CPT

## 2024-09-18 RX ORDER — PROCHLORPERAZINE MALEATE 10 MG/1
10 TABLET ORAL EVERY 6 HOURS
Qty: 120 | Refills: 1 | Status: ACTIVE | COMMUNITY
Start: 2024-09-18 | End: 1900-01-01

## 2024-09-19 DIAGNOSIS — R11.2 NAUSEA WITH VOMITING, UNSPECIFIED: ICD-10-CM

## 2024-09-19 DIAGNOSIS — C90.00 MULTIPLE MYELOMA NOT HAVING ACHIEVED REMISSION: ICD-10-CM

## 2024-09-19 LAB
ALBUMIN SERPL ELPH-MCNC: 4 G/DL
ALP BLD-CCNC: 221 U/L
ALT SERPL-CCNC: 63 U/L
ANION GAP SERPL CALC-SCNC: 16 MMOL/L
AST SERPL-CCNC: 79 U/L
BILIRUB SERPL-MCNC: 0.5 MG/DL
BUN SERPL-MCNC: 21 MG/DL
CALCIUM SERPL-MCNC: 10.5 MG/DL
CHLORIDE SERPL-SCNC: 103 MMOL/L
CMV DNA SPEC QL NAA+PROBE: NOT DETECTED IU/ML
CMVPCR LOG: NOT DETECTED LOG10IU/ML
CO2 SERPL-SCNC: 21 MMOL/L
CREAT SERPL-MCNC: 0.86 MG/DL
EGFR: 75 ML/MIN/1.73M2
GLUCOSE SERPL-MCNC: 121 MG/DL
LDH SERPL-CCNC: 274 U/L
POTASSIUM SERPL-SCNC: 3.7 MMOL/L
PROT SERPL-MCNC: 6.8 G/DL
SODIUM SERPL-SCNC: 140 MMOL/L

## 2024-09-19 NOTE — PHYSICAL EXAM
[Ambulatory and capable of all self care but unable to carry out any work activities] : Status 2- Ambulatory and capable of all self care but unable to carry out any work activities. Up and about more than 50% of waking hours [Normal] : affect appropriate [de-identified] : + cough

## 2024-09-19 NOTE — HISTORY OF PRESENT ILLNESS
[de-identified] : .65 yo female s/p auto-HSCT for multiple myeloma on 8/23/24  Status post auto transplant, day + 26  Transplant physician: Dr. Smith Referring physician: Dr. 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 She was followed with serial labs with rising paraprotein markers 9/2023 (M-spike 2.0, IgG 2818, K/L 0.56. No anemia, renal dysfunction, or hypercalcemia.  --------Treatment Hx: --------------- She was started on Rosalind-VRD on 3/14/24. Paraprotein markers at that time: M/spike 2.1, IgG 2759, K/L 0.32. She is currently C4 D20 Rosalind VRD with minimal complications.  --------Pathology Hx:---------------- IR-guided biopsy 1/11/24 showed 40% plasma cells with FISH positive for 3 copies of FGFR3 (5.5%) and 3 copies of CCND1 (5.5%). PET CT on 2/29/24 showed focal hypermetabolic skeletal lucencies within L1 and the right iliac bone consistent with a new diagnosis of Multiple Myeloma. Of note, her urine was positive for IgG Lambda.  -------- Hospital Course:----------- Ms. Baker had refractory nausea and required a short course of zyprexa, which she responded well to. An AXR on 8/24/24 showed a non obstructive bowel gas pattern. Her course was further complicated by MAMADOU, likely the result of intravascular depletion and resulting in normal anion gap metabolic acidosis. HEr creatinine peaked at 2.9, and the serum CO2 was its lowest at 14. Medications we adjusted for renal function. She was started on a sodium bicarbonate drip and her renal function gradually regurned to baseline with hydration. Chemotherapy induced diarrhea was likely a contributing factor. C difficile was negative.   Prior to discharge, Ms. Baker had intermittent fevers. Infectious work up was negative. She received a short course of zosyn. A CT C/A/P on 9/12/24 preliminary report showed:  Left lower lobe linear consolidation (3-76). Small bilateral pleural effusions with adjacent lower lobe atelectasis. Mild prominence of the appendix measuring up to 0.7 cm in diameter (3-220, 6-49), with mild periappendiceal stranding. Correlate with physical exam.  -------- -Social Hx ----------------- - Lives with her  - Homemaker - Denies tobacco, alcohol, or drug use  ----------Past Family Hx:------------ - Father: bladder cancer - Paternal aunt and mother: renal cancer   [de-identified] : 9/18/24: Patient reports one formed bm/day plus some liquid stool when she dry heaves. Patient endorsing ongoing nausea+, poor PO and stomach pain. Intermittent cough with yellow sputum. Denies sinus pain, congestion, SOB.

## 2024-09-19 NOTE — REVIEW OF SYSTEMS
[Fatigue] : fatigue [Abdominal Pain] : abdominal pain [Vomiting] : vomiting [Negative] : Heme/Lymph [Chills] : no chills

## 2024-09-19 NOTE — PHYSICAL EXAM
[Ambulatory and capable of all self care but unable to carry out any work activities] : Status 2- Ambulatory and capable of all self care but unable to carry out any work activities. Up and about more than 50% of waking hours [Normal] : affect appropriate [de-identified] : + cough

## 2024-09-19 NOTE — HISTORY OF PRESENT ILLNESS
[de-identified] : .63 yo female s/p auto-HSCT for multiple myeloma on 8/23/24  Status post auto transplant, day + 26  Transplant physician: Dr. Smith Referring physician: Dr. 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 She was followed with serial labs with rising paraprotein markers 9/2023 (M-spike 2.0, IgG 2818, K/L 0.56. No anemia, renal dysfunction, or hypercalcemia.  --------Treatment Hx: --------------- She was started on Rosalind-VRD on 3/14/24. Paraprotein markers at that time: M/spike 2.1, IgG 2759, K/L 0.32. She is currently C4 D20 Rosalind VRD with minimal complications.  --------Pathology Hx:---------------- IR-guided biopsy 1/11/24 showed 40% plasma cells with FISH positive for 3 copies of FGFR3 (5.5%) and 3 copies of CCND1 (5.5%). PET CT on 2/29/24 showed focal hypermetabolic skeletal lucencies within L1 and the right iliac bone consistent with a new diagnosis of Multiple Myeloma. Of note, her urine was positive for IgG Lambda.  -------- Hospital Course:----------- Ms. Baker had refractory nausea and required a short course of zyprexa, which she responded well to. An AXR on 8/24/24 showed a non obstructive bowel gas pattern. Her course was further complicated by MAMADOU, likely the result of intravascular depletion and resulting in normal anion gap metabolic acidosis. HEr creatinine peaked at 2.9, and the serum CO2 was its lowest at 14. Medications we adjusted for renal function. She was started on a sodium bicarbonate drip and her renal function gradually regurned to baseline with hydration. Chemotherapy induced diarrhea was likely a contributing factor. C difficile was negative.   Prior to discharge, Ms. Baker had intermittent fevers. Infectious work up was negative. She received a short course of zosyn. A CT C/A/P on 9/12/24 preliminary report showed:  Left lower lobe linear consolidation (3-76). Small bilateral pleural effusions with adjacent lower lobe atelectasis. Mild prominence of the appendix measuring up to 0.7 cm in diameter (3-220, 6-49), with mild periappendiceal stranding. Correlate with physical exam.  -------- -Social Hx ----------------- - Lives with her  - Homemaker - Denies tobacco, alcohol, or drug use  ----------Past Family Hx:------------ - Father: bladder cancer - Paternal aunt and mother: renal cancer   [de-identified] : 9/18/24: Patient reports one formed bm/day plus some liquid stool when she dry heaves. Patient endorsing ongoing nausea+, poor PO and stomach pain. Intermittent cough with yellow sputum. Denies sinus pain, congestion, SOB.

## 2024-09-19 NOTE — ASSESSMENT
[FreeTextEntry1] : Macro to insert in Assessment section:  65 yo female s/p auto-transplant for MM  Disease: MM  Status post-transplant: CIBMTR Post Transplant Disease Assessment  Post transplant therapy: n/a  Engraftment: ANC engraftment date: 9/5/24 Platelet engraftment date:  (First of 3 consecutive measurements on different days with plts equal or more than 20 with no plt transfusion in the previous 7 days)  PLAN: Disease monitoring  Engraftment - G-CSF support: no - Transfusion requirements: no; last given: 9/1  ID  - Prophylaxis: ------PCP: PCP Prophylaxis. On sulfa-trim: Yes-No, If no, reason: *** ------HSV and VZV: Acyclovir 800 BID ------Fungal:  ------SOS: ursodiol  - Continue CMV monitoring weekly  -Reactivation post-transplant: no  - Vaccinations  -----Eligible at day +180 -----High dose influenza, eligible at day +90 -----COVID per CDC guidelines for immunocompromised patients  Other  -Reviewed signs and symptoms to report to clinic; patient has clinic and after-hours number Follow-up: one week   Provider  [facility] Adult Transplantation [facility] Cellular Therapy [facility]   I saw and examined the patient myself.  I reviewed the recent data. I reviewed and confirmed the above note. The patient is day +26 following her autologous HSCT for MM. She is complaining of nausea.  Her vital signs and examination are benign.  Her labs were reviewed.  She engrafted.  She will continue her current therapy with additional symptomatic treatment for her GI complaints. She will return to clinic in 1 week or earlier if warranted.  TIFFANIE Mc MD

## 2024-09-19 NOTE — REASON FOR VISIT
[Follow-Up Visit] : a follow-up visit for [Spouse] : spouse [FreeTextEntry2] : s/p auto for Multiple Myeloma

## 2024-09-19 NOTE — ASSESSMENT
[FreeTextEntry1] : Macro to insert in Assessment section:  65 yo female s/p auto-transplant for MM  Disease: MM  Status post-transplant: CIBMTR Post Transplant Disease Assessment  Post transplant therapy: n/a  Engraftment: ANC engraftment date: 9/5/24 Platelet engraftment date:  (First of 3 consecutive measurements on different days with plts equal or more than 20 with no plt transfusion in the previous 7 days)  PLAN: Disease monitoring  Engraftment - G-CSF support: no - Transfusion requirements: no; last given: 9/1  ID  - Prophylaxis: ------PCP: PCP Prophylaxis. On sulfa-trim: Yes-No, If no, reason: *** ------HSV and VZV: Acyclovir 800 BID ------Fungal:  ------SOS: ursodiol  - Continue CMV monitoring weekly  -Reactivation post-transplant: no  - Vaccinations  -----Eligible at day +180 -----High dose influenza, eligible at day +90 -----COVID per CDC guidelines for immunocompromised patients  Other  -Reviewed signs and symptoms to report to clinic; patient has clinic and after-hours number Follow-up: one week   Provider  [facility] Adult Transplantation and Cellular Therapy [facility]   I saw and examined the patient myself.  I reviewed the recent data. I reviewed and confirmed the above note. The patient is day +26 following her autologous HSCT for MM. She is complaining of nausea.  Her vital signs and examination are benign.  Her labs were reviewed.  She engrafted.  She will continue her current therapy with additional symptomatic treatment for her GI complaints. She will return to clinic in 1 week or earlier if warranted.  TIFFANIE Mc MD

## 2024-09-21 ENCOUNTER — APPOINTMENT (OUTPATIENT)
Dept: AFTER HOURS CARE | Facility: EMERGENCY ROOM | Age: 64
End: 2024-09-21
Payer: COMMERCIAL

## 2024-09-21 PROCEDURE — 99215 OFFICE O/P EST HI 40 MIN: CPT

## 2024-09-21 RX ORDER — ONDANSETRON 4 MG/1
4 TABLET, ORALLY DISINTEGRATING ORAL 4 TIMES DAILY
Qty: 28 | Refills: 1 | Status: ACTIVE | COMMUNITY
Start: 2024-09-21 | End: 1900-01-01

## 2024-09-21 NOTE — PLAN
[FreeTextEntry1] : Assessment and Plan: This is a 64-year-old female post-stem cell transplant with recent onset of nausea and vomiting. The recurrence of vomiting, despite taking antiemetics, suggests the possibility of delayed gastric emptying, medication side effects, or znkhh-oitpts-lecu disease (GVHD) complications. We will continue with Compazine and add Zofran ODT for symptom management. The patient has been advised to closely monitor her ability to tolerate fluids and food. If she is unable to tolerate oral intake over the next 12 hours, she will need to go to the emergency room for further evaluation and potential rehydration. Additionally, we will reach out to her oncologist to discuss next steps and ensure coordination of care, considering her complex medical history. Further adjustments in antiemetic therapy or inpatient observation may be necessary if symptoms persist.  Addendum: onc fellow aware and agrees with plan

## 2024-09-21 NOTE — ASSESSMENT
[FreeTextEntry1] : Assessment and Plan: This is a 64-year-old female post-stem cell transplant with recent onset of nausea and vomiting. The recurrence of vomiting, despite taking antiemetics, suggests the possibility of delayed gastric emptying, medication side effects, or ujffr-bhqatv-nwxi disease (GVHD) complications. We will continue with Compazine and add Zofran ODT for symptom management. The patient has been advised to closely monitor her ability to tolerate fluids and food. If she is unable to tolerate oral intake over the next 12 hours, she will need to go to the emergency room for further evaluation and potential rehydration. Additionally, we will reach out to her oncologist to discuss next steps and ensure coordination of care, considering her complex medical history. Further adjustments in antiemetic therapy or inpatient observation may be necessary if symptoms persist.  Addendum: onc fellow aware and agrees with plan

## 2024-09-21 NOTE — PHYSICAL EXAM
[No Acute Distress] : no acute distress [No Respiratory Distress] : no respiratory distress  [No Rash] : no rash [Speech Grossly Normal] : speech grossly normal [Normal Mood] : the mood was normal [de-identified] : bald

## 2024-09-21 NOTE — HISTORY OF PRESENT ILLNESS
[Home] : at home, [unfilled] , at the time of the visit. [Other Location: e.g. Home (Enter Location, City,State)___] : at [unfilled] [Verbal consent obtained from patient] : the patient, [unfilled] [FreeTextEntry8] :   The patient is a 64-year-old female with a history of a stem cell transplant who has been experiencing nausea and vomiting. She was previously treated with steroids and Zofran for four days, as well as Compazine. On Thursday, she reported feeling fine and was able to tolerate oral intake. However, yesterday she was able to tolerate food and fluids until the evening when symptoms of nausea returned. Today, she has vomited twice and has only been able to consume apple juice and yogurt. She continues to take Compazine, Bactrim, and Acyclovir. per note last week .63 yo female s/p auto-HSCT for multiple myeloma on 8/23/24  Status post auto transplant, day + 26  Transplant physician: Dr. Smith Referring physician: Dr. 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 She was followed with serial labs with rising paraprotein markers 9/2023 (M-spike 2.0, IgG 2818, K/L 0.56. No anemia, renal dysfunction, or hypercalcemia.  --------Treatment Hx: --------------- She was started on Rosalind-VRD on 3/14/24. Paraprotein markers at that time: M/spike 2.1, IgG 2759, K/L 0.32. She is currently C4 D20 Rosalind VRD with minimal complications.  --------Pathology Hx:---------------- IR-guided biopsy 1/11/24 showed 40% plasma cells with FISH positive for 3 copies of FGFR3 (5.5%) and 3 copies of CCND1 (5.5%). PET CT on 2/29/24 showed focal hypermetabolic skeletal lucencies within L1 and the right iliac bone consistent with a new diagnosis of Multiple Myeloma. Of note, her urine was positive for IgG Lambda.  -------- Hospital Course:----------- Ms. Baker had refractory nausea and required a short course of zyprexa, which she responded well to. An AXR on 8/24/24 showed a non obstructive bowel gas pattern. Her course was further complicated by MAMADOU, likely the result of intravascular depletion and resulting in normal anion gap metabolic acidosis. HEr creatinine peaked at 2.9, and the serum CO2 was its lowest at 14. Medications we adjusted for renal function. She was started on a sodium bicarbonate drip and her renal function gradually regurned to baseline with hydration. Chemotherapy induced diarrhea was likely a contributing factor. C difficile was negative.  Prior to discharge, Ms. Baker had intermittent fevers. Infectious work up was negative. She received a short course of zosyn. A CT C/A/P on 9/12/24 preliminary report showed: Left lower lobe linear consolidation (3-76). Small bilateral pleural effusions with adjacent lower lobe atelectasis. Mild prominence of the appendix measuring up to 0.7 cm in diameter (3-220, 6-49), with mild periappendiceal stranding. Correlate with physical exam.

## 2024-09-23 ENCOUNTER — NON-APPOINTMENT (OUTPATIENT)
Age: 64
End: 2024-09-23

## 2024-09-25 ENCOUNTER — RESULT REVIEW (OUTPATIENT)
Age: 64
End: 2024-09-25

## 2024-09-25 ENCOUNTER — APPOINTMENT (OUTPATIENT)
Dept: HEMATOLOGY ONCOLOGY | Facility: CLINIC | Age: 64
End: 2024-09-25
Payer: COMMERCIAL

## 2024-09-25 ENCOUNTER — APPOINTMENT (OUTPATIENT)
Dept: INFUSION THERAPY | Facility: HOSPITAL | Age: 64
End: 2024-09-25

## 2024-09-25 VITALS
WEIGHT: 121 LBS | DIASTOLIC BLOOD PRESSURE: 79 MMHG | RESPIRATION RATE: 17 BRPM | BODY MASS INDEX: 23.98 KG/M2 | TEMPERATURE: 98.1 F | SYSTOLIC BLOOD PRESSURE: 117 MMHG | HEART RATE: 129 BPM | OXYGEN SATURATION: 98 %

## 2024-09-25 DIAGNOSIS — C90.00 MULTIPLE MYELOMA NOT HAVING ACHIEVED REMISSION: ICD-10-CM

## 2024-09-25 DIAGNOSIS — R11.0 NAUSEA: ICD-10-CM

## 2024-09-25 DIAGNOSIS — Z94.84 STEM CELLS TRANSPLANT STATUS: ICD-10-CM

## 2024-09-25 LAB — MAGNESIUM SERPL-MCNC: 1.7 MG/DL

## 2024-09-25 PROCEDURE — 99215 OFFICE O/P EST HI 40 MIN: CPT

## 2024-09-25 PROCEDURE — G2211 COMPLEX E/M VISIT ADD ON: CPT | Mod: NC

## 2024-09-25 RX ORDER — PANTOPRAZOLE 40 MG/1
40 TABLET, DELAYED RELEASE ORAL DAILY
Qty: 30 | Refills: 0 | Status: ACTIVE | COMMUNITY
Start: 2024-09-25 | End: 1900-01-01

## 2024-09-26 DIAGNOSIS — D50.9 IRON DEFICIENCY ANEMIA, UNSPECIFIED: ICD-10-CM

## 2024-09-26 DIAGNOSIS — E86.0 DEHYDRATION: ICD-10-CM

## 2024-09-26 LAB
ALBUMIN SERPL ELPH-MCNC: 4 G/DL
ALP BLD-CCNC: 184 U/L
ALT SERPL-CCNC: 59 U/L
ANION GAP SERPL CALC-SCNC: 20 MMOL/L
AST SERPL-CCNC: 51 U/L
BILIRUB SERPL-MCNC: 0.4 MG/DL
BUN SERPL-MCNC: 26 MG/DL
CALCIUM SERPL-MCNC: 10.5 MG/DL
CHLORIDE SERPL-SCNC: 96 MMOL/L
CMV DNA SPEC QL NAA+PROBE: NOT DETECTED IU/ML
CMVPCR LOG: NOT DETECTED LOG10IU/ML
CO2 SERPL-SCNC: 18 MMOL/L
CREAT SERPL-MCNC: 1.15 MG/DL
EGFR: 53 ML/MIN/1.73M2
GLUCOSE SERPL-MCNC: 135 MG/DL
LDH SERPL-CCNC: 210 U/L
POTASSIUM SERPL-SCNC: 3.4 MMOL/L
PROT SERPL-MCNC: 6.9 G/DL
SODIUM SERPL-SCNC: 134 MMOL/L

## 2024-09-27 LAB — HERPES-6 (HSV-6) PCR: SIGNIFICANT CHANGE UP COPIES/ML

## 2024-09-30 NOTE — ASSESSMENT
[FreeTextEntry1] : Macro to insert in Assessment section:  63 yo female s/p auto-transplant for MM  Disease: MM  Status post-transplant: CIBMTR Post Transplant Disease Assessment  Post transplant therapy: n/a  Engraftment: ANC engraftment date: 9/5/24 Platelet engraftment date:  (First of 3 consecutive measurements on different days with plts equal or more than 20 with no plt transfusion in the previous 7 days)  PLAN: Disease monitoring  Engraftment - G-CSF support: no - Transfusion requirements: no; last given: 9/1  ID  - Prophylaxis: ------PCP: PCP Prophylaxis. On sulfa-trim: Yes-No, If no, reason: *** ------HSV and VZV: Acyclovir 800 BID ------Fungal:  ------SOS: ursodiol  - Continue CMV monitoring weekly  -Reactivation post-transplant: no  - Vaccinations  -----Eligible at day +180 -----High dose influenza, eligible at day +90 -----COVID per CDC guidelines for immunocompromised patients  Other Nausea, hypomagnesemia, hypokalemia, GERD, diarrhea Today gave IVPB Emend, 1L NS with K and Mag iv, advised to take compazine standing Q6 for the next 24hrs then make it prn Advised to dc zofran d/t risk of prolonged QTc with Emend. Started on 40mg pantoprazole po starting tomorrow. Can take Imodium up to 6x day, stop if no bm x8hrs and call clinic with status changes. - written instructions given, reviewed plan at length.   -Reviewed signs and symptoms to report to clinic; patient has clinic and after-hours number  RTC: one week   Patient seen and examined by Kylie Dan, JAMEY-BC with Dr. Lucy Ambrocio,  Provider Lucy Ambrocio   [facility] Adult Transplantation and Cellular Therapy Program  Quality 226: Preventive Care And Screening: Tobacco Use: Screening And Cessation Intervention: Patient screened for tobacco use and is an ex/non-smoker Quality 130: Documentation Of Current Medications In The Medical Record: Current Medications Documented Detail Level: Detailed Quality 431: Preventive Care And Screening: Unhealthy Alcohol Use - Screening: Patient screened for unhealthy alcohol use using a single question and scores less than 2 times per year

## 2024-09-30 NOTE — REVIEW OF SYSTEMS
[Fatigue] : fatigue [Abdominal Pain] : abdominal pain [Vomiting] : vomiting [Cough] : cough [Muscle Weakness] : muscle weakness [Negative] : Gastrointestinal [Fever] : no fever [Chills] : no chills [Night Sweats] : no night sweats [Constipation] : no constipation

## 2024-09-30 NOTE — ASSESSMENT
[FreeTextEntry1] : Macro to insert in Assessment section:  63 yo female s/p auto-transplant for MM  Disease: MM  Status post-transplant: CIBMTR Post Transplant Disease Assessment  Post transplant therapy: n/a  Engraftment: ANC engraftment date: 9/5/24 Platelet engraftment date:  (First of 3 consecutive measurements on different days with plts equal or more than 20 with no plt transfusion in the previous 7 days)  PLAN: Disease monitoring  Engraftment - G-CSF support: no - Transfusion requirements: no; last given: 9/1  ID  - Prophylaxis: ------PCP: PCP Prophylaxis. On sulfa-trim: Yes-No, If no, reason: *** ------HSV and VZV: Acyclovir 800 BID ------Fungal:  ------SOS: ursodiol  - Continue CMV monitoring weekly  -Reactivation post-transplant: no  - Vaccinations  -----Eligible at day +180 -----High dose influenza, eligible at day +90 -----COVID per CDC guidelines for immunocompromised patients  Other Nausea, hypomagnesemia, hypokalemia, GERD, diarrhea Today gave IVPB Emend, 1L NS with K and Mag iv, advised to take compazine standing Q6 for the next 24hrs then make it prn Advised to dc zofran d/t risk of prolonged QTc with Emend. Started on 40mg pantoprazole po starting tomorrow. Can take Imodium up to 6x day, stop if no bm x8hrs and call clinic with status changes. - written instructions given, reviewed plan at length.   -Reviewed signs and symptoms to report to clinic; patient has clinic and after-hours number  RTC: one week   Patient seen and examined by Kylie Dan, JAMEY-BC with Dr. Lucy Ambrocio,  Provider Lucy Ambrocio   [facility] Adult Transplantation and Cellular Therapy Program  [facility]  Dr. Lorri Pardo

## 2024-09-30 NOTE — HISTORY OF PRESENT ILLNESS
[de-identified] : .63 yo female s/p auto-HSCT for multiple myeloma on 8/23/24  Status post auto transplant, day + 33  Transplant physician: Dr. Smith Referring physician: Dr. 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 She was followed with serial labs with rising paraprotein markers 9/2023 (M-spike 2.0, IgG 2818, K/L 0.56. No anemia, renal dysfunction, or hypercalcemia.  --------Treatment Hx: --------------- She was started on Rosalind-VRD on 3/14/24. Paraprotein markers at that time: M/spike 2.1, IgG 2759, K/L 0.32. She is currently C4 D20 Rosalind VRD with minimal complications.  --------Pathology Hx:---------------- IR-guided biopsy 1/11/24 showed 40% plasma cells with FISH positive for 3 copies of FGFR3 (5.5%) and 3 copies of CCND1 (5.5%). PET CT on 2/29/24 showed focal hypermetabolic skeletal lucencies within L1 and the right iliac bone consistent with a new diagnosis of Multiple Myeloma. Of note, her urine was positive for IgG Lambda.  -------- Hospital Course:----------- Ms. Baker had refractory nausea and required a short course of zyprexa, which she responded well to. An AXR on 8/24/24 showed a non obstructive bowel gas pattern. Her course was further complicated by MAMADOU, likely the result of intravascular depletion and resulting in normal anion gap metabolic acidosis. HEr creatinine peaked at 2.9, and the serum CO2 was its lowest at 14. Medications we adjusted for renal function. She was started on a sodium bicarbonate drip and her renal function gradually regurned to baseline with hydration. Chemotherapy induced diarrhea was likely a contributing factor. C difficile was negative.   Prior to discharge, Ms. Baker had intermittent fevers. Infectious work up was negative. She received a short course of zosyn. A CT C/A/P on 9/12/24 preliminary report showed:  Left lower lobe linear consolidation (3-76). Small bilateral pleural effusions with adjacent lower lobe atelectasis. Mild prominence of the appendix measuring up to 0.7 cm in diameter (3-220, 6-49), with mild periappendiceal stranding. Correlate with physical exam.  -------- -Social Hx ----------------- - Lives with her  - Homemaker - Denies tobacco, alcohol, or drug use  ----------Past Family Hx:------------ - Father: bladder cancer - Paternal aunt and mother: renal cancer   [de-identified] : 9/25/24: Came in not feeling well, emesis x2, reports liquid stool small amounts when vomiting but it also passing gas and semi formed stool throughout the day. Reported she vomited twice yesterday as well and has dry heave episodes. Reports poor PO and stomach pain, weak. Ongoing intermittent cough with yellow sputum. Denies sinus pain, congestion, SOB. Denies fever, chills, rash, swelling, chest pain, dizziness.

## 2024-09-30 NOTE — ASSESSMENT
[FreeTextEntry1] : Macro to insert in Assessment section:  65 yo female s/p auto-transplant for MM  Disease: MM  Status post-transplant: CIBMTR Post Transplant Disease Assessment  Post transplant therapy: n/a  Engraftment: ANC engraftment date: 9/5/24 Platelet engraftment date:  (First of 3 consecutive measurements on different days with plts equal or more than 20 with no plt transfusion in the previous 7 days)  PLAN: Disease monitoring  Engraftment - G-CSF support: no - Transfusion requirements: no; last given: 9/1  ID  - Prophylaxis: ------PCP: PCP Prophylaxis. On sulfa-trim: Yes-No, If no, reason: *** ------HSV and VZV: Acyclovir 800 BID ------Fungal:  ------SOS: ursodiol  - Continue CMV monitoring weekly  -Reactivation post-transplant: no  - Vaccinations  -----Eligible at day +180 -----High dose influenza, eligible at day +90 -----COVID per CDC guidelines for immunocompromised patients  Other Nausea, hypomagnesemia, hypokalemia, GERD, diarrhea Today gave IVPB Emend, 1L NS with K and Mag iv, advised to take compazine standing Q6 for the next 24hrs then make it prn Advised to dc zofran d/t risk of prolonged QTc with Emend. Started on 40mg pantoprazole po starting tomorrow. Can take Imodium up to 6x day, stop if no bm x8hrs and call clinic with status changes. - written instructions given, reviewed plan at length.   -Reviewed signs and symptoms to report to clinic; patient has clinic and after-hours number  RTC: one week   Patient seen and examined by Kylie Dan, JAMEY-BC with Dr. Lucy Ambrocio,  Provider Lucy Ambrocio   [facility] Adult Transplantation and Cellular Therapy Program

## 2024-09-30 NOTE — PHYSICAL EXAM
[Ambulatory and capable of all self care but unable to carry out any work activities] : Status 2- Ambulatory and capable of all self care but unable to carry out any work activities. Up and about more than 50% of waking hours [Normal] : affect appropriate [de-identified] : + cough

## 2024-09-30 NOTE — ASSESSMENT
[FreeTextEntry1] : Macro to insert in Assessment section:  65 yo female s/p auto-transplant for MM  Disease: MM  Status post-transplant: CIBMTR Post Transplant Disease Assessment  Post transplant therapy: n/a  Engraftment: ANC engraftment date: 9/5/24 Platelet engraftment date:  (First of 3 consecutive measurements on different days with plts equal or more than 20 with no plt transfusion in the previous 7 days)  PLAN: Disease monitoring  Engraftment - G-CSF support: no - Transfusion requirements: no; last given: 9/1  ID  - Prophylaxis: ------PCP: PCP Prophylaxis. On sulfa-trim: Yes-No, If no, reason: *** ------HSV and VZV: Acyclovir 800 BID ------Fungal:  ------SOS: ursodiol  - Continue CMV monitoring weekly  -Reactivation post-transplant: no  - Vaccinations  -----Eligible at day +180 -----High dose influenza, eligible at day +90 -----COVID per CDC guidelines for immunocompromised patients  Other Nausea, hypomagnesemia, hypokalemia, GERD, diarrhea Today gave IVPB Emend, 1L NS with K and Mag iv, advised to take compazine standing Q6 for the next 24hrs then make it prn Advised to dc zofran d/t risk of prolonged QTc with Emend. Started on 40mg pantoprazole po starting tomorrow. Can take Imodium up to 6x day, stop if no bm x8hrs and call clinic with status changes. - written instructions given, reviewed plan at length.   -Reviewed signs and symptoms to report to clinic; patient has clinic and after-hours number  RTC: one week   Patient seen and examined by Kylie Dan, JAMEY-BC with Dr. Lucy Ambrocio,  Provider Lucy Ambrocio   [facility] Adult Transplantation and Cellular Therapy Program

## 2024-09-30 NOTE — PHYSICAL EXAM
[Ambulatory and capable of all self care but unable to carry out any work activities] : Status 2- Ambulatory and capable of all self care but unable to carry out any work activities. Up and about more than 50% of waking hours [Normal] : affect appropriate [de-identified] : + cough

## 2024-09-30 NOTE — HISTORY OF PRESENT ILLNESS
[de-identified] : .63 yo female s/p auto-HSCT for multiple myeloma on 8/23/24  Status post auto transplant, day + 33  Transplant physician: Dr. Smith Referring physician: Dr. 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 She was followed with serial labs with rising paraprotein markers 9/2023 (M-spike 2.0, IgG 2818, K/L 0.56. No anemia, renal dysfunction, or hypercalcemia.  --------Treatment Hx: --------------- She was started on Rosalind-VRD on 3/14/24. Paraprotein markers at that time: M/spike 2.1, IgG 2759, K/L 0.32. She is currently C4 D20 Rosalind VRD with minimal complications.  --------Pathology Hx:---------------- IR-guided biopsy 1/11/24 showed 40% plasma cells with FISH positive for 3 copies of FGFR3 (5.5%) and 3 copies of CCND1 (5.5%). PET CT on 2/29/24 showed focal hypermetabolic skeletal lucencies within L1 and the right iliac bone consistent with a new diagnosis of Multiple Myeloma. Of note, her urine was positive for IgG Lambda.  -------- Hospital Course:----------- Ms. Baker had refractory nausea and required a short course of zyprexa, which she responded well to. An AXR on 8/24/24 showed a non obstructive bowel gas pattern. Her course was further complicated by MAMADOU, likely the result of intravascular depletion and resulting in normal anion gap metabolic acidosis. HEr creatinine peaked at 2.9, and the serum CO2 was its lowest at 14. Medications we adjusted for renal function. She was started on a sodium bicarbonate drip and her renal function gradually regurned to baseline with hydration. Chemotherapy induced diarrhea was likely a contributing factor. C difficile was negative.   Prior to discharge, Ms. Baker had intermittent fevers. Infectious work up was negative. She received a short course of zosyn. A CT C/A/P on 9/12/24 preliminary report showed:  Left lower lobe linear consolidation (3-76). Small bilateral pleural effusions with adjacent lower lobe atelectasis. Mild prominence of the appendix measuring up to 0.7 cm in diameter (3-220, 6-49), with mild periappendiceal stranding. Correlate with physical exam.  -------- -Social Hx ----------------- - Lives with her  - Homemaker - Denies tobacco, alcohol, or drug use  ----------Past Family Hx:------------ - Father: bladder cancer - Paternal aunt and mother: renal cancer   [de-identified] : 9/25/24: Came in not feeling well, emesis x2, reports liquid stool small amounts when vomiting but it also passing gas and semi formed stool throughout the day. Reported she vomited twice yesterday as well and has dry heave episodes. Reports poor PO and stomach pain, weak. Ongoing intermittent cough with yellow sputum. Denies sinus pain, congestion, SOB. Denies fever, chills, rash, swelling, chest pain, dizziness.

## 2024-09-30 NOTE — PHYSICAL EXAM
[Ambulatory and capable of all self care but unable to carry out any work activities] : Status 2- Ambulatory and capable of all self care but unable to carry out any work activities. Up and about more than 50% of waking hours [Normal] : affect appropriate [de-identified] : + cough

## 2024-09-30 NOTE — HISTORY OF PRESENT ILLNESS
[de-identified] : .63 yo female s/p auto-HSCT for multiple myeloma on 8/23/24  Status post auto transplant, day + 33  Transplant physician: Dr. Smith Referring physician: Dr. 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 She was followed with serial labs with rising paraprotein markers 9/2023 (M-spike 2.0, IgG 2818, K/L 0.56. No anemia, renal dysfunction, or hypercalcemia.  --------Treatment Hx: --------------- She was started on Rosalind-VRD on 3/14/24. Paraprotein markers at that time: M/spike 2.1, IgG 2759, K/L 0.32. She is currently C4 D20 Rosalind VRD with minimal complications.  --------Pathology Hx:---------------- IR-guided biopsy 1/11/24 showed 40% plasma cells with FISH positive for 3 copies of FGFR3 (5.5%) and 3 copies of CCND1 (5.5%). PET CT on 2/29/24 showed focal hypermetabolic skeletal lucencies within L1 and the right iliac bone consistent with a new diagnosis of Multiple Myeloma. Of note, her urine was positive for IgG Lambda.  -------- Hospital Course:----------- Ms. Baker had refractory nausea and required a short course of zyprexa, which she responded well to. An AXR on 8/24/24 showed a non obstructive bowel gas pattern. Her course was further complicated by MAMADOU, likely the result of intravascular depletion and resulting in normal anion gap metabolic acidosis. HEr creatinine peaked at 2.9, and the serum CO2 was its lowest at 14. Medications we adjusted for renal function. She was started on a sodium bicarbonate drip and her renal function gradually regurned to baseline with hydration. Chemotherapy induced diarrhea was likely a contributing factor. C difficile was negative.   Prior to discharge, Ms. Baker had intermittent fevers. Infectious work up was negative. She received a short course of zosyn. A CT C/A/P on 9/12/24 preliminary report showed:  Left lower lobe linear consolidation (3-76). Small bilateral pleural effusions with adjacent lower lobe atelectasis. Mild prominence of the appendix measuring up to 0.7 cm in diameter (3-220, 6-49), with mild periappendiceal stranding. Correlate with physical exam.  -------- -Social Hx ----------------- - Lives with her  - Homemaker - Denies tobacco, alcohol, or drug use  ----------Past Family Hx:------------ - Father: bladder cancer - Paternal aunt and mother: renal cancer   [de-identified] : 9/25/24: Came in not feeling well, emesis x2, reports liquid stool small amounts when vomiting but it also passing gas and semi formed stool throughout the day. Reported she vomited twice yesterday as well and has dry heave episodes. Reports poor PO and stomach pain, weak. Ongoing intermittent cough with yellow sputum. Denies sinus pain, congestion, SOB. Denies fever, chills, rash, swelling, chest pain, dizziness.

## 2024-09-30 NOTE — HISTORY OF PRESENT ILLNESS
[de-identified] : .63 yo female s/p auto-HSCT for multiple myeloma on 8/23/24  Status post auto transplant, day + 33  Transplant physician: Dr. Smith Referring physician: Dr. 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 She was followed with serial labs with rising paraprotein markers 9/2023 (M-spike 2.0, IgG 2818, K/L 0.56. No anemia, renal dysfunction, or hypercalcemia.  --------Treatment Hx: --------------- She was started on Rosalind-VRD on 3/14/24. Paraprotein markers at that time: M/spike 2.1, IgG 2759, K/L 0.32. She is currently C4 D20 Rosalind VRD with minimal complications.  --------Pathology Hx:---------------- IR-guided biopsy 1/11/24 showed 40% plasma cells with FISH positive for 3 copies of FGFR3 (5.5%) and 3 copies of CCND1 (5.5%). PET CT on 2/29/24 showed focal hypermetabolic skeletal lucencies within L1 and the right iliac bone consistent with a new diagnosis of Multiple Myeloma. Of note, her urine was positive for IgG Lambda.  -------- Hospital Course:----------- Ms. Baker had refractory nausea and required a short course of zyprexa, which she responded well to. An AXR on 8/24/24 showed a non obstructive bowel gas pattern. Her course was further complicated by MAMADOU, likely the result of intravascular depletion and resulting in normal anion gap metabolic acidosis. HEr creatinine peaked at 2.9, and the serum CO2 was its lowest at 14. Medications we adjusted for renal function. She was started on a sodium bicarbonate drip and her renal function gradually regurned to baseline with hydration. Chemotherapy induced diarrhea was likely a contributing factor. C difficile was negative.   Prior to discharge, Ms. Baker had intermittent fevers. Infectious work up was negative. She received a short course of zosyn. A CT C/A/P on 9/12/24 preliminary report showed:  Left lower lobe linear consolidation (3-76). Small bilateral pleural effusions with adjacent lower lobe atelectasis. Mild prominence of the appendix measuring up to 0.7 cm in diameter (3-220, 6-49), with mild periappendiceal stranding. Correlate with physical exam.  -------- -Social Hx ----------------- - Lives with her  - Homemaker - Denies tobacco, alcohol, or drug use  ----------Past Family Hx:------------ - Father: bladder cancer - Paternal aunt and mother: renal cancer   [de-identified] : 9/25/24: Came in not feeling well, emesis x2, reports liquid stool small amounts when vomiting but it also passing gas and semi formed stool throughout the day. Reported she vomited twice yesterday as well and has dry heave episodes. Reports poor PO and stomach pain, weak. Ongoing intermittent cough with yellow sputum. Denies sinus pain, congestion, SOB. Denies fever, chills, rash, swelling, chest pain, dizziness.

## 2024-09-30 NOTE — PHYSICAL EXAM
[Ambulatory and capable of all self care but unable to carry out any work activities] : Status 2- Ambulatory and capable of all self care but unable to carry out any work activities. Up and about more than 50% of waking hours [Normal] : affect appropriate [de-identified] : + cough

## 2024-10-02 ENCOUNTER — RESULT REVIEW (OUTPATIENT)
Age: 64
End: 2024-10-02

## 2024-10-02 ENCOUNTER — APPOINTMENT (OUTPATIENT)
Dept: HEMATOLOGY ONCOLOGY | Facility: CLINIC | Age: 64
End: 2024-10-02
Payer: COMMERCIAL

## 2024-10-02 VITALS
SYSTOLIC BLOOD PRESSURE: 137 MMHG | OXYGEN SATURATION: 96 % | RESPIRATION RATE: 17 BRPM | BODY MASS INDEX: 24.03 KG/M2 | DIASTOLIC BLOOD PRESSURE: 83 MMHG | WEIGHT: 121.23 LBS | TEMPERATURE: 98.2 F | HEART RATE: 116 BPM

## 2024-10-02 LAB
ALBUMIN SERPL ELPH-MCNC: 3.7 G/DL
ALP BLD-CCNC: 161 U/L
ALT SERPL-CCNC: 22 U/L
ANION GAP SERPL CALC-SCNC: 15 MMOL/L
AST SERPL-CCNC: 29 U/L
BILIRUB SERPL-MCNC: 0.2 MG/DL
BUN SERPL-MCNC: 21 MG/DL
CALCIUM SERPL-MCNC: 10.5 MG/DL
CHLORIDE SERPL-SCNC: 103 MMOL/L
CO2 SERPL-SCNC: 20 MMOL/L
CREAT SERPL-MCNC: 0.82 MG/DL
EGFR: 79 ML/MIN/1.73M2
GLUCOSE SERPL-MCNC: 121 MG/DL
LDH SERPL-CCNC: 201 U/L
MAGNESIUM SERPL-MCNC: 1.4 MG/DL
POTASSIUM SERPL-SCNC: 3.8 MMOL/L
PROT SERPL-MCNC: 6.7 G/DL
SODIUM SERPL-SCNC: 137 MMOL/L

## 2024-10-02 PROCEDURE — 99214 OFFICE O/P EST MOD 30 MIN: CPT

## 2024-10-02 NOTE — REVIEW OF SYSTEMS
[Fatigue] : fatigue [Cough] : cough [Abdominal Pain] : abdominal pain [Vomiting] : vomiting [Muscle Weakness] : muscle weakness [Negative] : Heme/Lymph [Fever] : no fever [Chills] : no chills [Night Sweats] : no night sweats [Constipation] : no constipation

## 2024-10-02 NOTE — HISTORY OF PRESENT ILLNESS
[de-identified] : .63 yo female s/p auto-HSCT for multiple myeloma on 8/23/24  Status post auto transplant, day + 40  Transplant physician: Dr. Smith Referring physician: Dr. 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 She was followed with serial labs with rising paraprotein markers 9/2023 (M-spike 2.0, IgG 2818, K/L 0.56. No anemia, renal dysfunction, or hypercalcemia.  --------Treatment Hx: --------------- She was started on Rosalind-VRD on 3/14/24. Paraprotein markers at that time: M/spike 2.1, IgG 2759, K/L 0.32. She is currently C4 D20 Rosalind VRD with minimal complications.  --------Pathology Hx:---------------- IR-guided biopsy 1/11/24 showed 40% plasma cells with FISH positive for 3 copies of FGFR3 (5.5%) and 3 copies of CCND1 (5.5%). PET CT on 2/29/24 showed focal hypermetabolic skeletal lucencies within L1 and the right iliac bone consistent with a new diagnosis of Multiple Myeloma. Of note, her urine was positive for IgG Lambda.  -------- Hospital Course:----------- Ms. Baker had refractory nausea and required a short course of zyprexa, which she responded well to. An AXR on 8/24/24 showed a non obstructive bowel gas pattern. Her course was further complicated by MAMADOU, likely the result of intravascular depletion and resulting in normal anion gap metabolic acidosis. HEr creatinine peaked at 2.9, and the serum CO2 was its lowest at 14. Medications we adjusted for renal function. She was started on a sodium bicarbonate drip and her renal function gradually regurned to baseline with hydration. Chemotherapy induced diarrhea was likely a contributing factor. C difficile was negative.   Prior to discharge, Ms. Baker had intermittent fevers. Infectious work up was negative. She received a short course of zosyn. A CT C/A/P on 9/12/24 preliminary report showed:  Left lower lobe linear consolidation (3-76). Small bilateral pleural effusions with adjacent lower lobe atelectasis. Mild prominence of the appendix measuring up to 0.7 cm in diameter (3-220, 6-49), with mild periappendiceal stranding. Correlate with physical exam.  -------- -Social Hx ----------------- - Lives with her  - Homemaker - Denies tobacco, alcohol, or drug use  ----------Past Family Hx:------------ - Father: bladder cancer - Paternal aunt and mother: renal cancer   [de-identified] : 10/2/24: Patient reports no more nausea, 2-3 loose bm's daily. Ongoing intermittent cough with yellow sputum. Denies sinus pain, congestion, SOB. Denies fever, chills, rash, swelling, chest pain, dizziness.

## 2024-10-02 NOTE — ASSESSMENT
[FreeTextEntry1] : 65 yo female s/p auto-transplant for MM  Disease: MM  Status post-transplant: CIBMTR Post Transplant Disease Assessment  Post transplant therapy: n/a  Engraftment: ANC engraftment date: 9/5/24 Platelet engraftment date:  (First of 3 consecutive measurements on different days with plts equal or more than 20 with no plt transfusion in the previous 7 days)  PLAN: Disease monitoring  Engraftment - G-CSF support: no - Transfusion requirements: no; last given: 9/1  ID  - Prophylaxis: ------PCP: PCP Prophylaxis. On sulfa-trim: Yes-No, If no, reason: *** ------HSV and VZV: Acyclovir 800 BID ------Fungal: posa  - Continue CMV monitoring weekly  -Reactivation post-transplant: no  - Vaccinations  -----Eligible at day +180 -----High dose influenza, eligible at day +90 -----COVID per CDC guidelines for immunocompromised patients  Other 40mg pantoprazole po starting tomorrow.  Can take Imodium up to 6x day, stop if no bm x8hrs and call clinic with status changes -Reviewed signs and symptoms to report to clinic; patient has clinic and after-hours number  RTC: one week   Patient seen and examined by JAMEY Kam-BC   I saw and examined the patient myself. I reviewed the recent data. I reviewed and confirmed the above note. The patient is day +40 following her autologous HSCT for MM. Her nausea has resolved.  Her vital signs and examination are benign. Her labs were reviewed. She engrafted. She will continue her current therapy with additional symptomatic treatment for her GI complaints. She will return to clinic in21 week or earlier if warranted. TIFFANIE Mc MD.  [facility] Adult Transplantation and Cellular Therapy Program

## 2024-10-02 NOTE — PHYSICAL EXAM
[Ambulatory and capable of all self care but unable to carry out any work activities] : Status 2- Ambulatory and capable of all self care but unable to carry out any work activities. Up and about more than 50% of waking hours [Normal] : affect appropriate [de-identified] : + cough

## 2024-10-03 LAB
CMV DNA SPEC QL NAA+PROBE: 53 IU/ML
CMVPCR LOG: 1.72 LOG10IU/ML

## 2024-10-09 ENCOUNTER — APPOINTMENT (OUTPATIENT)
Dept: HEMATOLOGY ONCOLOGY | Facility: CLINIC | Age: 64
End: 2024-10-09

## 2024-10-16 ENCOUNTER — RESULT REVIEW (OUTPATIENT)
Age: 64
End: 2024-10-16

## 2024-10-16 ENCOUNTER — APPOINTMENT (OUTPATIENT)
Dept: HEMATOLOGY ONCOLOGY | Facility: CLINIC | Age: 64
End: 2024-10-16
Payer: COMMERCIAL

## 2024-10-16 VITALS
HEIGHT: 59 IN | DIASTOLIC BLOOD PRESSURE: 84 MMHG | RESPIRATION RATE: 18 BRPM | WEIGHT: 119.25 LBS | OXYGEN SATURATION: 98 % | SYSTOLIC BLOOD PRESSURE: 130 MMHG | BODY MASS INDEX: 24.04 KG/M2 | HEART RATE: 108 BPM | TEMPERATURE: 98.3 F

## 2024-10-16 DIAGNOSIS — Z94.84 STEM CELLS TRANSPLANT STATUS: ICD-10-CM

## 2024-10-16 LAB
ALBUMIN SERPL ELPH-MCNC: 3.7 G/DL
ALP BLD-CCNC: 127 U/L
ALT SERPL-CCNC: 11 U/L
ANION GAP SERPL CALC-SCNC: 15 MMOL/L
AST SERPL-CCNC: 29 U/L
BILIRUB SERPL-MCNC: 0.4 MG/DL
BUN SERPL-MCNC: 19 MG/DL
CALCIUM SERPL-MCNC: 10.2 MG/DL
CHLORIDE SERPL-SCNC: 104 MMOL/L
CO2 SERPL-SCNC: 18 MMOL/L
CREAT SERPL-MCNC: 0.83 MG/DL
EGFR: 78 ML/MIN/1.73M2
GLUCOSE SERPL-MCNC: 116 MG/DL
MAGNESIUM SERPL-MCNC: 1.5 MG/DL
POTASSIUM SERPL-SCNC: 3.4 MMOL/L
PROT SERPL-MCNC: 6.7 G/DL
SODIUM SERPL-SCNC: 137 MMOL/L

## 2024-10-16 PROCEDURE — 99214 OFFICE O/P EST MOD 30 MIN: CPT

## 2024-10-16 RX ORDER — ACYCLOVIR 400 MG/1
400 TABLET ORAL
Qty: 120 | Refills: 0 | Status: ACTIVE | COMMUNITY
Start: 2024-10-16 | End: 1900-01-01

## 2024-10-17 LAB
CMV DNA SPEC QL NAA+PROBE: ABNORMAL IU/ML
CMVPCR LOG: ABNORMAL LOG10IU/ML

## 2024-10-21 ENCOUNTER — APPOINTMENT (OUTPATIENT)
Dept: HEMATOLOGY ONCOLOGY | Facility: CLINIC | Age: 64
End: 2024-10-21
Payer: COMMERCIAL

## 2024-10-21 VITALS
HEART RATE: 108 BPM | SYSTOLIC BLOOD PRESSURE: 136 MMHG | RESPIRATION RATE: 16 BRPM | WEIGHT: 120.37 LBS | BODY MASS INDEX: 23.63 KG/M2 | TEMPERATURE: 97.2 F | DIASTOLIC BLOOD PRESSURE: 82 MMHG | OXYGEN SATURATION: 98 % | HEIGHT: 60 IN

## 2024-10-21 DIAGNOSIS — C90.00 MULTIPLE MYELOMA NOT HAVING ACHIEVED REMISSION: ICD-10-CM

## 2024-10-21 PROCEDURE — G2211 COMPLEX E/M VISIT ADD ON: CPT | Mod: NC

## 2024-10-21 PROCEDURE — 99215 OFFICE O/P EST HI 40 MIN: CPT

## 2024-10-21 RX ORDER — APIXABAN 2.5 MG/1
2.5 TABLET, FILM COATED ORAL
Qty: 60 | Refills: 5 | Status: ACTIVE | COMMUNITY
Start: 2024-10-21 | End: 1900-01-01

## 2024-10-21 RX ORDER — LENALIDOMIDE 25 MG/1
25 CAPSULE ORAL
Qty: 21 | Refills: 0 | Status: ACTIVE | COMMUNITY
Start: 2024-10-21 | End: 1900-01-01

## 2024-11-06 NOTE — CARDIOLOGY SUMMARY
[de-identified] : echo 11/26/22 ejection fraction 60 to 65% [de-identified] : 11/23/22 sinus tachycardia complete left bundle branch block Post-Care Instructions: I reviewed with the patient in detail post-care instructions. Patient is to wear sunprotection, and avoid picking at any of the treated lesions. Pt may apply Vaseline to crusted or scabbing areas. Total Number Of Aks Treated: 19 Render In Bullet Format When Appropriate: No Duration Of Freeze Thaw-Cycle (Seconds): 0 Detail Level: Simple Consent: The patient's consent was obtained including but not limited to risks of crusting, scabbing, blistering, scarring, darker or lighter pigmentary change, recurrence, incomplete removal and infection.

## 2024-11-07 ENCOUNTER — APPOINTMENT (OUTPATIENT)
Dept: HEMATOLOGY ONCOLOGY | Facility: CLINIC | Age: 64
End: 2024-11-07
Payer: COMMERCIAL

## 2024-11-07 ENCOUNTER — APPOINTMENT (OUTPATIENT)
Dept: INFUSION THERAPY | Facility: HOSPITAL | Age: 64
End: 2024-11-07

## 2024-11-07 ENCOUNTER — NON-APPOINTMENT (OUTPATIENT)
Age: 64
End: 2024-11-07

## 2024-11-07 ENCOUNTER — RESULT REVIEW (OUTPATIENT)
Age: 64
End: 2024-11-07

## 2024-11-07 DIAGNOSIS — C90.00 MULTIPLE MYELOMA NOT HAVING ACHIEVED REMISSION: ICD-10-CM

## 2024-11-07 LAB
24R-OH-CALCIDIOL SERPL-MCNC: 27.3 NG/ML — LOW (ref 30–80)
ALBUMIN SERPL ELPH-MCNC: 4.1 G/DL — SIGNIFICANT CHANGE UP (ref 3.3–5)
ALP SERPL-CCNC: 89 U/L — SIGNIFICANT CHANGE UP (ref 40–120)
ALT FLD-CCNC: 11 U/L — SIGNIFICANT CHANGE UP (ref 10–45)
ANION GAP SERPL CALC-SCNC: 15 MMOL/L — SIGNIFICANT CHANGE UP (ref 5–17)
AST SERPL-CCNC: 26 U/L — SIGNIFICANT CHANGE UP (ref 10–40)
BASOPHILS # BLD AUTO: 0 K/UL — SIGNIFICANT CHANGE UP (ref 0–0.2)
BASOPHILS NFR BLD AUTO: 0 % — SIGNIFICANT CHANGE UP (ref 0–2)
BILIRUB SERPL-MCNC: 1 MG/DL — SIGNIFICANT CHANGE UP (ref 0.2–1.2)
BUN SERPL-MCNC: 23 MG/DL — SIGNIFICANT CHANGE UP (ref 7–23)
CALCIUM SERPL-MCNC: 10.8 MG/DL — HIGH (ref 8.4–10.5)
CHLORIDE SERPL-SCNC: 101 MMOL/L — SIGNIFICANT CHANGE UP (ref 96–108)
CO2 SERPL-SCNC: 22 MMOL/L — SIGNIFICANT CHANGE UP (ref 22–31)
CREAT SERPL-MCNC: 0.82 MG/DL — SIGNIFICANT CHANGE UP (ref 0.5–1.3)
EGFR: 79 ML/MIN/1.73M2 — SIGNIFICANT CHANGE UP
EOSINOPHIL # BLD AUTO: 0.09 K/UL — SIGNIFICANT CHANGE UP (ref 0–0.5)
EOSINOPHIL NFR BLD AUTO: 1 % — SIGNIFICANT CHANGE UP (ref 0–6)
GLUCOSE SERPL-MCNC: 123 MG/DL — HIGH (ref 70–99)
HCT VFR BLD CALC: 35.6 % — SIGNIFICANT CHANGE UP (ref 34.5–45)
HGB BLD-MCNC: 12.4 G/DL — SIGNIFICANT CHANGE UP (ref 11.5–15.5)
LYMPHOCYTES # BLD AUTO: 5.69 K/UL — HIGH (ref 1–3.3)
LYMPHOCYTES # BLD AUTO: 65 % — HIGH (ref 13–44)
MCHC RBC-ENTMCNC: 31.6 PG — SIGNIFICANT CHANGE UP (ref 27–34)
MCHC RBC-ENTMCNC: 34.8 G/DL — SIGNIFICANT CHANGE UP (ref 32–36)
MCV RBC AUTO: 90.8 FL — SIGNIFICANT CHANGE UP (ref 80–100)
MONOCYTES # BLD AUTO: 0.44 K/UL — SIGNIFICANT CHANGE UP (ref 0–0.9)
MONOCYTES NFR BLD AUTO: 5 % — SIGNIFICANT CHANGE UP (ref 2–14)
NEUTROPHILS # BLD AUTO: 2.54 K/UL — SIGNIFICANT CHANGE UP (ref 1.8–7.4)
NEUTROPHILS NFR BLD AUTO: 29 % — LOW (ref 43–77)
NRBC # BLD: 0 /100 WBCS — SIGNIFICANT CHANGE UP (ref 0–0)
NRBC # BLD: SIGNIFICANT CHANGE UP /100 WBCS (ref 0–0)
PLAT MORPH BLD: NORMAL — SIGNIFICANT CHANGE UP
PLATELET # BLD AUTO: 273 K/UL — SIGNIFICANT CHANGE UP (ref 150–400)
POTASSIUM SERPL-MCNC: 3.8 MMOL/L — SIGNIFICANT CHANGE UP (ref 3.5–5.3)
POTASSIUM SERPL-SCNC: 3.8 MMOL/L — SIGNIFICANT CHANGE UP (ref 3.5–5.3)
PROT SERPL-MCNC: 6.7 G/DL — SIGNIFICANT CHANGE UP (ref 6–8.3)
PROT SERPL-MCNC: 7.1 G/DL — SIGNIFICANT CHANGE UP (ref 6–8.3)
RBC # BLD: 3.92 M/UL — SIGNIFICANT CHANGE UP (ref 3.8–5.2)
RBC # FLD: 14.8 % — HIGH (ref 10.3–14.5)
RBC BLD AUTO: SIGNIFICANT CHANGE UP
SMUDGE CELLS # BLD: PRESENT — SIGNIFICANT CHANGE UP
SODIUM SERPL-SCNC: 137 MMOL/L — SIGNIFICANT CHANGE UP (ref 135–145)
WBC # BLD: 8.75 K/UL — SIGNIFICANT CHANGE UP (ref 3.8–10.5)
WBC # FLD AUTO: 8.75 K/UL — SIGNIFICANT CHANGE UP (ref 3.8–10.5)

## 2024-11-07 PROCEDURE — 99214 OFFICE O/P EST MOD 30 MIN: CPT

## 2024-11-07 RX ORDER — LENALIDOMIDE 25 MG/1
25 CAPSULE ORAL
Qty: 21 | Refills: 0 | Status: ACTIVE | COMMUNITY
Start: 2024-11-07 | End: 1900-01-01

## 2024-11-08 LAB
CMV DNA CSF QL NAA+PROBE: SIGNIFICANT CHANGE UP IU/ML
CMV DNA SPEC NAA+PROBE-LOG#: SIGNIFICANT CHANGE UP LOG10IU/ML
IGA FLD-MCNC: 40 MG/DL — LOW (ref 84–499)
IGG FLD-MCNC: 887 MG/DL — SIGNIFICANT CHANGE UP (ref 610–1660)
IGM SERPL-MCNC: 21 MG/DL — LOW (ref 35–242)
KAPPA LC SER QL IFE: 1.31 MG/DL — SIGNIFICANT CHANGE UP (ref 0.33–1.94)
KAPPA/LAMBDA FREE LIGHT CHAIN RATIO, SERUM: 1.31 RATIO — SIGNIFICANT CHANGE UP (ref 0.26–1.65)
LAMBDA LC SER QL IFE: 1 MG/DL — SIGNIFICANT CHANGE UP (ref 0.57–2.63)

## 2024-11-12 LAB
% ALBUMIN: 61.9 % — SIGNIFICANT CHANGE UP
% ALPHA 1: 3.9 % — SIGNIFICANT CHANGE UP
% ALPHA 2: 11.4 % — SIGNIFICANT CHANGE UP
% BETA: 10.4 % — SIGNIFICANT CHANGE UP
% GAMMA: 12.4 % — SIGNIFICANT CHANGE UP
% M SPIKE: SIGNIFICANT CHANGE UP
ALBUMIN SERPL ELPH-MCNC: 4.1 G/DL — SIGNIFICANT CHANGE UP (ref 3.6–5.5)
ALBUMIN/GLOB SERPL ELPH: 1.6 RATIO — SIGNIFICANT CHANGE UP
ALPHA1 GLOB SERPL ELPH-MCNC: 0.3 G/DL — SIGNIFICANT CHANGE UP (ref 0.1–0.4)
ALPHA2 GLOB SERPL ELPH-MCNC: 0.8 G/DL — SIGNIFICANT CHANGE UP (ref 0.5–1)
B-GLOBULIN SERPL ELPH-MCNC: 0.7 G/DL — SIGNIFICANT CHANGE UP (ref 0.5–1)
GAMMA GLOBULIN: 0.8 G/DL — SIGNIFICANT CHANGE UP (ref 0.6–1.6)
INTERPRETATION SERPL IFE-IMP: SIGNIFICANT CHANGE UP
M-SPIKE: SIGNIFICANT CHANGE UP (ref 0–0)
PROT PATTERN SERPL ELPH-IMP: SIGNIFICANT CHANGE UP
PROT SERPL-MCNC: 6.7 G/DL — SIGNIFICANT CHANGE UP (ref 6–8.3)

## 2024-11-14 ENCOUNTER — RESULT REVIEW (OUTPATIENT)
Age: 64
End: 2024-11-14

## 2024-11-14 ENCOUNTER — APPOINTMENT (OUTPATIENT)
Dept: HEMATOLOGY ONCOLOGY | Facility: CLINIC | Age: 64
End: 2024-11-14

## 2024-11-14 ENCOUNTER — APPOINTMENT (OUTPATIENT)
Dept: INFUSION THERAPY | Facility: HOSPITAL | Age: 64
End: 2024-11-14

## 2024-11-14 LAB
BASOPHILS # BLD AUTO: 0.01 K/UL — SIGNIFICANT CHANGE UP (ref 0–0.2)
BASOPHILS NFR BLD AUTO: 0.1 % — SIGNIFICANT CHANGE UP (ref 0–2)
EOSINOPHIL # BLD AUTO: 0.13 K/UL — SIGNIFICANT CHANGE UP (ref 0–0.5)
EOSINOPHIL NFR BLD AUTO: 1.8 % — SIGNIFICANT CHANGE UP (ref 0–6)
HCT VFR BLD CALC: 36.5 % — SIGNIFICANT CHANGE UP (ref 34.5–45)
HGB BLD-MCNC: 12.3 G/DL — SIGNIFICANT CHANGE UP (ref 11.5–15.5)
IMM GRANULOCYTES NFR BLD AUTO: 0.1 % — SIGNIFICANT CHANGE UP (ref 0–0.9)
LYMPHOCYTES # BLD AUTO: 3.8 K/UL — HIGH (ref 1–3.3)
LYMPHOCYTES # BLD AUTO: 52.7 % — HIGH (ref 13–44)
MCHC RBC-ENTMCNC: 31.9 PG — SIGNIFICANT CHANGE UP (ref 27–34)
MCHC RBC-ENTMCNC: 33.7 G/DL — SIGNIFICANT CHANGE UP (ref 32–36)
MCV RBC AUTO: 94.8 FL — SIGNIFICANT CHANGE UP (ref 80–100)
MONOCYTES # BLD AUTO: 0.71 K/UL — SIGNIFICANT CHANGE UP (ref 0–0.9)
MONOCYTES NFR BLD AUTO: 9.8 % — SIGNIFICANT CHANGE UP (ref 2–14)
NEUTROPHILS # BLD AUTO: 2.55 K/UL — SIGNIFICANT CHANGE UP (ref 1.8–7.4)
NEUTROPHILS NFR BLD AUTO: 35.5 % — LOW (ref 43–77)
NRBC # BLD: 0 /100 WBCS — SIGNIFICANT CHANGE UP (ref 0–0)
PLATELET # BLD AUTO: 287 K/UL — SIGNIFICANT CHANGE UP (ref 150–400)
RBC # BLD: 3.85 M/UL — SIGNIFICANT CHANGE UP (ref 3.8–5.2)
RBC # FLD: 14.9 % — HIGH (ref 10.3–14.5)
WBC # BLD: 7.21 K/UL — SIGNIFICANT CHANGE UP (ref 3.8–10.5)
WBC # FLD AUTO: 7.21 K/UL — SIGNIFICANT CHANGE UP (ref 3.8–10.5)

## 2024-11-15 ENCOUNTER — OUTPATIENT (OUTPATIENT)
Dept: OUTPATIENT SERVICES | Facility: HOSPITAL | Age: 64
LOS: 1 days | Discharge: HOME | End: 2024-11-15

## 2024-11-15 DIAGNOSIS — Z51.11 ENCOUNTER FOR ANTINEOPLASTIC CHEMOTHERAPY: ICD-10-CM

## 2024-11-15 DIAGNOSIS — E88.09 OTHER DISORDERS OF PLASMA-PROTEIN METABOLISM, NOT ELSEWHERE CLASSIFIED: ICD-10-CM

## 2024-11-21 ENCOUNTER — APPOINTMENT (OUTPATIENT)
Dept: HEMATOLOGY ONCOLOGY | Facility: CLINIC | Age: 64
End: 2024-11-21
Payer: COMMERCIAL

## 2024-11-21 ENCOUNTER — RESULT REVIEW (OUTPATIENT)
Age: 64
End: 2024-11-21

## 2024-11-21 ENCOUNTER — APPOINTMENT (OUTPATIENT)
Dept: INFUSION THERAPY | Facility: HOSPITAL | Age: 64
End: 2024-11-21

## 2024-11-21 DIAGNOSIS — Z01.818 ENCOUNTER FOR OTHER PREPROCEDURAL EXAMINATION: ICD-10-CM

## 2024-11-21 DIAGNOSIS — C90.00 MULTIPLE MYELOMA NOT HAVING ACHIEVED REMISSION: ICD-10-CM

## 2024-11-21 LAB
BASOPHILS # BLD AUTO: 0.01 K/UL — SIGNIFICANT CHANGE UP (ref 0–0.2)
BASOPHILS NFR BLD AUTO: 0.1 % — SIGNIFICANT CHANGE UP (ref 0–2)
EOSINOPHIL # BLD AUTO: 0.17 K/UL — SIGNIFICANT CHANGE UP (ref 0–0.5)
EOSINOPHIL NFR BLD AUTO: 2.2 % — SIGNIFICANT CHANGE UP (ref 0–6)
HCT VFR BLD CALC: 36.2 % — SIGNIFICANT CHANGE UP (ref 34.5–45)
HGB BLD-MCNC: 12 G/DL — SIGNIFICANT CHANGE UP (ref 11.5–15.5)
IMM GRANULOCYTES NFR BLD AUTO: 0.7 % — SIGNIFICANT CHANGE UP (ref 0–0.9)
LYMPHOCYTES # BLD AUTO: 2.43 K/UL — SIGNIFICANT CHANGE UP (ref 1–3.3)
LYMPHOCYTES # BLD AUTO: 31.6 % — SIGNIFICANT CHANGE UP (ref 13–44)
MCHC RBC-ENTMCNC: 31.8 PG — SIGNIFICANT CHANGE UP (ref 27–34)
MCHC RBC-ENTMCNC: 33.1 G/DL — SIGNIFICANT CHANGE UP (ref 32–36)
MCV RBC AUTO: 96 FL — SIGNIFICANT CHANGE UP (ref 80–100)
MONOCYTES # BLD AUTO: 0.65 K/UL — SIGNIFICANT CHANGE UP (ref 0–0.9)
MONOCYTES NFR BLD AUTO: 8.5 % — SIGNIFICANT CHANGE UP (ref 2–14)
NEUTROPHILS # BLD AUTO: 4.38 K/UL — SIGNIFICANT CHANGE UP (ref 1.8–7.4)
NEUTROPHILS NFR BLD AUTO: 56.9 % — SIGNIFICANT CHANGE UP (ref 43–77)
NRBC # BLD: 0 /100 WBCS — SIGNIFICANT CHANGE UP (ref 0–0)
PLATELET # BLD AUTO: 243 K/UL — SIGNIFICANT CHANGE UP (ref 150–400)
RBC # BLD: 3.77 M/UL — LOW (ref 3.8–5.2)
RBC # FLD: 14.8 % — HIGH (ref 10.3–14.5)
WBC # BLD: 7.69 K/UL — SIGNIFICANT CHANGE UP (ref 3.8–10.5)
WBC # FLD AUTO: 7.69 K/UL — SIGNIFICANT CHANGE UP (ref 3.8–10.5)

## 2024-11-21 PROCEDURE — 99215 OFFICE O/P EST HI 40 MIN: CPT

## 2024-11-21 PROCEDURE — G2211 COMPLEX E/M VISIT ADD ON: CPT | Mod: NC

## 2024-11-22 DIAGNOSIS — Z51.11 ENCOUNTER FOR ANTINEOPLASTIC CHEMOTHERAPY: ICD-10-CM

## 2024-11-22 DIAGNOSIS — R11.2 NAUSEA WITH VOMITING, UNSPECIFIED: ICD-10-CM

## 2024-11-22 DIAGNOSIS — C90.00 MULTIPLE MYELOMA NOT HAVING ACHIEVED REMISSION: ICD-10-CM

## 2024-11-22 NOTE — DISCHARGE NOTE PROVIDER - NSDCMRMEDTOKEN_GEN_ALL_CORE_FT
acyclovir 800 mg oral tablet: 1 tab(s) orally once a day  Bactrim 400 mg-80 mg oral tablet: 1 tab(s) orally once a day  fenofibrate 48 mg oral tablet: 1 tab(s) orally once a day  ondansetron 8 mg oral tablet: 1 tab(s) orally every 8 hours as needed for  nausea  valsartan 80 mg oral tablet: 1 tab(s) orally once a day   Yes acyclovir 800 mg oral tablet: 1 tab(s) orally once a day  Bactrim 400 mg-80 mg oral tablet: 1 tab(s) orally once a day  fenofibrate 48 mg oral tablet: 1 tab(s) orally once a day  ondansetron 8 mg oral tablet: 1 tab(s) orally every 8 hours as needed for  nausea  valsartan 80 mg oral tablet: 1 tab(s) orally once a day DO NOT TAKE UNTIL YOU ARE TOLD TO RESTART THIS MEDICATION BY YOUR DOCTOR

## 2024-12-04 ENCOUNTER — RX RENEWAL (OUTPATIENT)
Age: 64
End: 2024-12-04

## 2024-12-05 ENCOUNTER — APPOINTMENT (OUTPATIENT)
Dept: HEMATOLOGY ONCOLOGY | Facility: CLINIC | Age: 64
End: 2024-12-05
Payer: COMMERCIAL

## 2024-12-05 ENCOUNTER — RESULT REVIEW (OUTPATIENT)
Age: 64
End: 2024-12-05

## 2024-12-05 ENCOUNTER — APPOINTMENT (OUTPATIENT)
Dept: INFUSION THERAPY | Facility: HOSPITAL | Age: 64
End: 2024-12-05

## 2024-12-05 DIAGNOSIS — Z94.84 STEM CELLS TRANSPLANT STATUS: ICD-10-CM

## 2024-12-05 DIAGNOSIS — C90.00 MULTIPLE MYELOMA NOT HAVING ACHIEVED REMISSION: ICD-10-CM

## 2024-12-05 LAB
ALBUMIN SERPL ELPH-MCNC: 3.9 G/DL — SIGNIFICANT CHANGE UP (ref 3.3–5)
ALP SERPL-CCNC: 118 U/L — SIGNIFICANT CHANGE UP (ref 40–120)
ALT FLD-CCNC: 36 U/L — SIGNIFICANT CHANGE UP (ref 10–45)
ANION GAP SERPL CALC-SCNC: 16 MMOL/L — SIGNIFICANT CHANGE UP (ref 5–17)
APTT BLD: 33.7 SEC — SIGNIFICANT CHANGE UP (ref 24.5–35.6)
AST SERPL-CCNC: 36 U/L — SIGNIFICANT CHANGE UP (ref 10–40)
BASOPHILS # BLD AUTO: 0.03 K/UL — SIGNIFICANT CHANGE UP (ref 0–0.2)
BASOPHILS NFR BLD AUTO: 0.5 % — SIGNIFICANT CHANGE UP (ref 0–2)
BILIRUB SERPL-MCNC: 0.6 MG/DL — SIGNIFICANT CHANGE UP (ref 0.2–1.2)
BUN SERPL-MCNC: 27 MG/DL — HIGH (ref 7–23)
CALCIUM SERPL-MCNC: 9.7 MG/DL — SIGNIFICANT CHANGE UP (ref 8.4–10.5)
CHLORIDE SERPL-SCNC: 101 MMOL/L — SIGNIFICANT CHANGE UP (ref 96–108)
CO2 SERPL-SCNC: 19 MMOL/L — LOW (ref 22–31)
CREAT SERPL-MCNC: 0.68 MG/DL — SIGNIFICANT CHANGE UP (ref 0.5–1.3)
EGFR: 97 ML/MIN/1.73M2 — SIGNIFICANT CHANGE UP
EOSINOPHIL # BLD AUTO: 0.15 K/UL — SIGNIFICANT CHANGE UP (ref 0–0.5)
EOSINOPHIL NFR BLD AUTO: 2.6 % — SIGNIFICANT CHANGE UP (ref 0–6)
GLUCOSE SERPL-MCNC: 109 MG/DL — HIGH (ref 70–99)
HCT VFR BLD CALC: 32.7 % — LOW (ref 34.5–45)
HGB BLD-MCNC: 11.1 G/DL — LOW (ref 11.5–15.5)
IMM GRANULOCYTES NFR BLD AUTO: 1.2 % — HIGH (ref 0–0.9)
INR BLD: 1.16 RATIO — SIGNIFICANT CHANGE UP (ref 0.85–1.16)
LYMPHOCYTES # BLD AUTO: 3.17 K/UL — SIGNIFICANT CHANGE UP (ref 1–3.3)
LYMPHOCYTES # BLD AUTO: 55.1 % — HIGH (ref 13–44)
MCHC RBC-ENTMCNC: 31.3 PG — SIGNIFICANT CHANGE UP (ref 27–34)
MCHC RBC-ENTMCNC: 33.9 G/DL — SIGNIFICANT CHANGE UP (ref 32–36)
MCV RBC AUTO: 92.1 FL — SIGNIFICANT CHANGE UP (ref 80–100)
MONOCYTES # BLD AUTO: 0.88 K/UL — SIGNIFICANT CHANGE UP (ref 0–0.9)
MONOCYTES NFR BLD AUTO: 15.3 % — HIGH (ref 2–14)
NEUTROPHILS # BLD AUTO: 1.45 K/UL — LOW (ref 1.8–7.4)
NEUTROPHILS NFR BLD AUTO: 25.3 % — LOW (ref 43–77)
NRBC # BLD: 0 /100 WBCS — SIGNIFICANT CHANGE UP (ref 0–0)
PLATELET # BLD AUTO: 329 K/UL — SIGNIFICANT CHANGE UP (ref 150–400)
POTASSIUM SERPL-MCNC: 4.1 MMOL/L — SIGNIFICANT CHANGE UP (ref 3.5–5.3)
POTASSIUM SERPL-SCNC: 4.1 MMOL/L — SIGNIFICANT CHANGE UP (ref 3.5–5.3)
PROT SERPL-MCNC: 6.5 G/DL — SIGNIFICANT CHANGE UP (ref 6–8.3)
PROT SERPL-MCNC: 6.7 G/DL — SIGNIFICANT CHANGE UP (ref 6–8.3)
PROTHROM AB SERPL-ACNC: 13.8 SEC — HIGH (ref 9.9–13.4)
RBC # BLD: 3.55 M/UL — LOW (ref 3.8–5.2)
RBC # FLD: 14.4 % — SIGNIFICANT CHANGE UP (ref 10.3–14.5)
SODIUM SERPL-SCNC: 136 MMOL/L — SIGNIFICANT CHANGE UP (ref 135–145)
WBC # BLD: 5.75 K/UL — SIGNIFICANT CHANGE UP (ref 3.8–10.5)
WBC # FLD AUTO: 5.75 K/UL — SIGNIFICANT CHANGE UP (ref 3.8–10.5)

## 2024-12-05 PROCEDURE — 99214 OFFICE O/P EST MOD 30 MIN: CPT

## 2024-12-06 LAB
IGA FLD-MCNC: 12 MG/DL — LOW (ref 84–499)
IGG FLD-MCNC: 529 MG/DL — LOW (ref 610–1660)
IGM SERPL-MCNC: 19 MG/DL — LOW (ref 35–242)
KAPPA LC SER QL IFE: 0.38 MG/DL — SIGNIFICANT CHANGE UP (ref 0.33–1.94)
KAPPA/LAMBDA FREE LIGHT CHAIN RATIO, SERUM: 1.41 RATIO — SIGNIFICANT CHANGE UP (ref 0.26–1.65)
LAMBDA LC SER QL IFE: 0.27 MG/DL — LOW (ref 0.57–2.63)

## 2024-12-11 LAB
% ALBUMIN: 62.9 % — SIGNIFICANT CHANGE UP
% ALPHA 1: 4.8 % — SIGNIFICANT CHANGE UP
% ALPHA 2: 13.5 % — SIGNIFICANT CHANGE UP
% BETA: 11.1 % — SIGNIFICANT CHANGE UP
% GAMMA: 7.7 % — SIGNIFICANT CHANGE UP
% M SPIKE: SIGNIFICANT CHANGE UP
ALBUMIN SERPL ELPH-MCNC: 4.1 G/DL — SIGNIFICANT CHANGE UP (ref 3.6–5.5)
ALBUMIN/GLOB SERPL ELPH: 1.7 RATIO — SIGNIFICANT CHANGE UP
ALPHA1 GLOB SERPL ELPH-MCNC: 0.3 G/DL — SIGNIFICANT CHANGE UP (ref 0.1–0.4)
ALPHA2 GLOB SERPL ELPH-MCNC: 0.9 G/DL — SIGNIFICANT CHANGE UP (ref 0.5–1)
B-GLOBULIN SERPL ELPH-MCNC: 0.7 G/DL — SIGNIFICANT CHANGE UP (ref 0.5–1)
GAMMA GLOBULIN: 0.5 G/DL — LOW (ref 0.6–1.6)
INTERPRETATION SERPL IFE-IMP: SIGNIFICANT CHANGE UP
M-SPIKE: SIGNIFICANT CHANGE UP (ref 0–0)
PROT PATTERN SERPL ELPH-IMP: SIGNIFICANT CHANGE UP
PROT SERPL-MCNC: 6.5 G/DL — SIGNIFICANT CHANGE UP (ref 6–8.3)

## 2024-12-12 ENCOUNTER — RESULT REVIEW (OUTPATIENT)
Age: 64
End: 2024-12-12

## 2024-12-12 ENCOUNTER — APPOINTMENT (OUTPATIENT)
Dept: HEMATOLOGY ONCOLOGY | Facility: CLINIC | Age: 64
End: 2024-12-12

## 2024-12-12 ENCOUNTER — APPOINTMENT (OUTPATIENT)
Dept: INFUSION THERAPY | Facility: HOSPITAL | Age: 64
End: 2024-12-12

## 2024-12-12 LAB
BASOPHILS # BLD AUTO: 0.03 K/UL — SIGNIFICANT CHANGE UP (ref 0–0.2)
BASOPHILS NFR BLD AUTO: 0.6 % — SIGNIFICANT CHANGE UP (ref 0–2)
EOSINOPHIL # BLD AUTO: 0.16 K/UL — SIGNIFICANT CHANGE UP (ref 0–0.5)
EOSINOPHIL NFR BLD AUTO: 3.1 % — SIGNIFICANT CHANGE UP (ref 0–6)
HCT VFR BLD CALC: 35.4 % — SIGNIFICANT CHANGE UP (ref 34.5–45)
HGB BLD-MCNC: 11.9 G/DL — SIGNIFICANT CHANGE UP (ref 11.5–15.5)
IMM GRANULOCYTES NFR BLD AUTO: 0.6 % — SIGNIFICANT CHANGE UP (ref 0–0.9)
LYMPHOCYTES # BLD AUTO: 2.46 K/UL — SIGNIFICANT CHANGE UP (ref 1–3.3)
LYMPHOCYTES # BLD AUTO: 47.8 % — HIGH (ref 13–44)
MCHC RBC-ENTMCNC: 32.2 PG — SIGNIFICANT CHANGE UP (ref 27–34)
MCHC RBC-ENTMCNC: 33.6 G/DL — SIGNIFICANT CHANGE UP (ref 32–36)
MCV RBC AUTO: 95.7 FL — SIGNIFICANT CHANGE UP (ref 80–100)
MONOCYTES # BLD AUTO: 0.55 K/UL — SIGNIFICANT CHANGE UP (ref 0–0.9)
MONOCYTES NFR BLD AUTO: 10.7 % — SIGNIFICANT CHANGE UP (ref 2–14)
NEUTROPHILS # BLD AUTO: 1.92 K/UL — SIGNIFICANT CHANGE UP (ref 1.8–7.4)
NEUTROPHILS NFR BLD AUTO: 37.2 % — LOW (ref 43–77)
NRBC # BLD: 0 /100 WBCS — SIGNIFICANT CHANGE UP (ref 0–0)
PLATELET # BLD AUTO: 266 K/UL — SIGNIFICANT CHANGE UP (ref 150–400)
RBC # BLD: 3.7 M/UL — LOW (ref 3.8–5.2)
RBC # FLD: 14.3 % — SIGNIFICANT CHANGE UP (ref 10.3–14.5)
WBC # BLD: 5.15 K/UL — SIGNIFICANT CHANGE UP (ref 3.8–10.5)
WBC # FLD AUTO: 5.15 K/UL — SIGNIFICANT CHANGE UP (ref 3.8–10.5)

## 2024-12-19 ENCOUNTER — RESULT REVIEW (OUTPATIENT)
Age: 64
End: 2024-12-19

## 2024-12-19 ENCOUNTER — APPOINTMENT (OUTPATIENT)
Dept: HEMATOLOGY ONCOLOGY | Facility: CLINIC | Age: 64
End: 2024-12-19
Payer: COMMERCIAL

## 2024-12-19 ENCOUNTER — APPOINTMENT (OUTPATIENT)
Dept: INFUSION THERAPY | Facility: HOSPITAL | Age: 64
End: 2024-12-19

## 2024-12-19 DIAGNOSIS — C90.00 MULTIPLE MYELOMA NOT HAVING ACHIEVED REMISSION: ICD-10-CM

## 2024-12-19 LAB
BASOPHILS # BLD AUTO: 0.03 K/UL — SIGNIFICANT CHANGE UP (ref 0–0.2)
BASOPHILS NFR BLD AUTO: 0.4 % — SIGNIFICANT CHANGE UP (ref 0–2)
EOSINOPHIL # BLD AUTO: 0.3 K/UL — SIGNIFICANT CHANGE UP (ref 0–0.5)
EOSINOPHIL NFR BLD AUTO: 4.2 % — SIGNIFICANT CHANGE UP (ref 0–6)
HCT VFR BLD CALC: 35.5 % — SIGNIFICANT CHANGE UP (ref 34.5–45)
HGB BLD-MCNC: 12.2 G/DL — SIGNIFICANT CHANGE UP (ref 11.5–15.5)
IMM GRANULOCYTES NFR BLD AUTO: 0.4 % — SIGNIFICANT CHANGE UP (ref 0–0.9)
LYMPHOCYTES # BLD AUTO: 2.35 K/UL — SIGNIFICANT CHANGE UP (ref 1–3.3)
LYMPHOCYTES # BLD AUTO: 32.5 % — SIGNIFICANT CHANGE UP (ref 13–44)
MCHC RBC-ENTMCNC: 31.8 PG — SIGNIFICANT CHANGE UP (ref 27–34)
MCHC RBC-ENTMCNC: 34.4 G/DL — SIGNIFICANT CHANGE UP (ref 32–36)
MCV RBC AUTO: 92.4 FL — SIGNIFICANT CHANGE UP (ref 80–100)
MONOCYTES # BLD AUTO: 1.13 K/UL — HIGH (ref 0–0.9)
MONOCYTES NFR BLD AUTO: 15.7 % — HIGH (ref 2–14)
NEUTROPHILS # BLD AUTO: 3.38 K/UL — SIGNIFICANT CHANGE UP (ref 1.8–7.4)
NEUTROPHILS NFR BLD AUTO: 46.8 % — SIGNIFICANT CHANGE UP (ref 43–77)
NRBC # BLD: 0 /100 WBCS — SIGNIFICANT CHANGE UP (ref 0–0)
PLATELET # BLD AUTO: 186 K/UL — SIGNIFICANT CHANGE UP (ref 150–400)
RBC # BLD: 3.84 M/UL — SIGNIFICANT CHANGE UP (ref 3.8–5.2)
RBC # FLD: 14 % — SIGNIFICANT CHANGE UP (ref 10.3–14.5)
WBC # BLD: 7.22 K/UL — SIGNIFICANT CHANGE UP (ref 3.8–10.5)
WBC # FLD AUTO: 7.22 K/UL — SIGNIFICANT CHANGE UP (ref 3.8–10.5)

## 2024-12-19 PROCEDURE — G2211 COMPLEX E/M VISIT ADD ON: CPT | Mod: NC

## 2024-12-19 PROCEDURE — 99215 OFFICE O/P EST HI 40 MIN: CPT

## 2024-12-20 DIAGNOSIS — E87.6 HYPOKALEMIA: ICD-10-CM

## 2024-12-20 RX ORDER — POTASSIUM CHLORIDE 1.5 G/1.58G
20 POWDER, FOR SOLUTION ORAL DAILY
Qty: 7 | Refills: 0 | Status: ACTIVE | COMMUNITY
Start: 2024-12-20 | End: 1900-01-01

## 2024-12-27 ENCOUNTER — RX RENEWAL (OUTPATIENT)
Age: 64
End: 2024-12-27

## 2025-01-02 ENCOUNTER — RESULT REVIEW (OUTPATIENT)
Age: 65
End: 2025-01-02

## 2025-01-02 ENCOUNTER — APPOINTMENT (OUTPATIENT)
Dept: HEMATOLOGY ONCOLOGY | Facility: CLINIC | Age: 65
End: 2025-01-02
Payer: COMMERCIAL

## 2025-01-02 ENCOUNTER — APPOINTMENT (OUTPATIENT)
Dept: INFUSION THERAPY | Facility: HOSPITAL | Age: 65
End: 2025-01-02

## 2025-01-02 DIAGNOSIS — C90.00 MULTIPLE MYELOMA NOT HAVING ACHIEVED REMISSION: ICD-10-CM

## 2025-01-02 LAB
24R-OH-CALCIDIOL SERPL-MCNC: 25.9 NG/ML — LOW (ref 30–80)
ALBUMIN SERPL ELPH-MCNC: 4.4 G/DL — SIGNIFICANT CHANGE UP (ref 3.3–5)
ALP SERPL-CCNC: 103 U/L — SIGNIFICANT CHANGE UP (ref 40–120)
ALT FLD-CCNC: 36 U/L — SIGNIFICANT CHANGE UP (ref 10–45)
ANION GAP SERPL CALC-SCNC: 13 MMOL/L — SIGNIFICANT CHANGE UP (ref 5–17)
AST SERPL-CCNC: 35 U/L — SIGNIFICANT CHANGE UP (ref 10–40)
BASOPHILS # BLD AUTO: 0.07 K/UL — SIGNIFICANT CHANGE UP (ref 0–0.2)
BASOPHILS NFR BLD AUTO: 1.2 % — SIGNIFICANT CHANGE UP (ref 0–2)
BILIRUB SERPL-MCNC: 0.6 MG/DL — SIGNIFICANT CHANGE UP (ref 0.2–1.2)
BUN SERPL-MCNC: 29 MG/DL — HIGH (ref 7–23)
CALCIUM SERPL-MCNC: 9.4 MG/DL — SIGNIFICANT CHANGE UP (ref 8.4–10.5)
CHLORIDE SERPL-SCNC: 103 MMOL/L — SIGNIFICANT CHANGE UP (ref 96–108)
CO2 SERPL-SCNC: 20 MMOL/L — LOW (ref 22–31)
CREAT SERPL-MCNC: 0.67 MG/DL — SIGNIFICANT CHANGE UP (ref 0.5–1.3)
EGFR: 97 ML/MIN/1.73M2 — SIGNIFICANT CHANGE UP
EOSINOPHIL # BLD AUTO: 0.25 K/UL — SIGNIFICANT CHANGE UP (ref 0–0.5)
EOSINOPHIL NFR BLD AUTO: 4.2 % — SIGNIFICANT CHANGE UP (ref 0–6)
GLUCOSE SERPL-MCNC: 153 MG/DL — HIGH (ref 70–99)
HCT VFR BLD CALC: 35.7 % — SIGNIFICANT CHANGE UP (ref 34.5–45)
HGB BLD-MCNC: 12.6 G/DL — SIGNIFICANT CHANGE UP (ref 11.5–15.5)
IMM GRANULOCYTES NFR BLD AUTO: 0.3 % — SIGNIFICANT CHANGE UP (ref 0–0.9)
LYMPHOCYTES # BLD AUTO: 2.52 K/UL — SIGNIFICANT CHANGE UP (ref 1–3.3)
LYMPHOCYTES # BLD AUTO: 42.4 % — SIGNIFICANT CHANGE UP (ref 13–44)
MCHC RBC-ENTMCNC: 31.9 PG — SIGNIFICANT CHANGE UP (ref 27–34)
MCHC RBC-ENTMCNC: 35.3 G/DL — SIGNIFICANT CHANGE UP (ref 32–36)
MCV RBC AUTO: 90.4 FL — SIGNIFICANT CHANGE UP (ref 80–100)
MONOCYTES # BLD AUTO: 0.85 K/UL — SIGNIFICANT CHANGE UP (ref 0–0.9)
MONOCYTES NFR BLD AUTO: 14.3 % — HIGH (ref 2–14)
NEUTROPHILS # BLD AUTO: 2.23 K/UL — SIGNIFICANT CHANGE UP (ref 1.8–7.4)
NEUTROPHILS NFR BLD AUTO: 37.6 % — LOW (ref 43–77)
NRBC # BLD: 0 /100 WBCS — SIGNIFICANT CHANGE UP (ref 0–0)
PLATELET # BLD AUTO: 266 K/UL — SIGNIFICANT CHANGE UP (ref 150–400)
POTASSIUM SERPL-MCNC: 3.8 MMOL/L — SIGNIFICANT CHANGE UP (ref 3.5–5.3)
POTASSIUM SERPL-SCNC: 3.8 MMOL/L — SIGNIFICANT CHANGE UP (ref 3.5–5.3)
PROT SERPL-MCNC: 6.6 G/DL — SIGNIFICANT CHANGE UP (ref 6–8.3)
RBC # BLD: 3.95 M/UL — SIGNIFICANT CHANGE UP (ref 3.8–5.2)
RBC # FLD: 14.2 % — SIGNIFICANT CHANGE UP (ref 10.3–14.5)
SODIUM SERPL-SCNC: 136 MMOL/L — SIGNIFICANT CHANGE UP (ref 135–145)
WBC # BLD: 5.94 K/UL — SIGNIFICANT CHANGE UP (ref 3.8–10.5)
WBC # FLD AUTO: 5.94 K/UL — SIGNIFICANT CHANGE UP (ref 3.8–10.5)

## 2025-01-02 PROCEDURE — G2211 COMPLEX E/M VISIT ADD ON: CPT

## 2025-01-02 PROCEDURE — 99215 OFFICE O/P EST HI 40 MIN: CPT

## 2025-01-02 RX ORDER — LENALIDOMIDE 10 MG/1
10 CAPSULE ORAL
Qty: 21 | Refills: 0 | Status: ACTIVE | COMMUNITY
Start: 2025-01-02 | End: 1900-01-01

## 2025-01-13 ENCOUNTER — OUTPATIENT (OUTPATIENT)
Dept: OUTPATIENT SERVICES | Facility: HOSPITAL | Age: 65
LOS: 1 days | End: 2025-01-13
Payer: COMMERCIAL

## 2025-01-13 ENCOUNTER — APPOINTMENT (OUTPATIENT)
Dept: NUCLEAR MEDICINE | Facility: CLINIC | Age: 65
End: 2025-01-13
Payer: COMMERCIAL

## 2025-01-13 DIAGNOSIS — C90.00 MULTIPLE MYELOMA NOT HAVING ACHIEVED REMISSION: ICD-10-CM

## 2025-01-13 PROCEDURE — 78816 PET IMAGE W/CT FULL BODY: CPT | Mod: 26,PS

## 2025-01-13 PROCEDURE — 78816 PET IMAGE W/CT FULL BODY: CPT | Mod: 26,PS,76

## 2025-01-13 PROCEDURE — 78816 PET IMAGE W/CT FULL BODY: CPT

## 2025-01-13 PROCEDURE — A9552: CPT

## 2025-01-17 ENCOUNTER — RESULT REVIEW (OUTPATIENT)
Age: 65
End: 2025-01-17

## 2025-01-17 ENCOUNTER — APPOINTMENT (OUTPATIENT)
Dept: CT IMAGING | Facility: HOSPITAL | Age: 65
End: 2025-01-17

## 2025-01-17 ENCOUNTER — OUTPATIENT (OUTPATIENT)
Dept: OUTPATIENT SERVICES | Facility: HOSPITAL | Age: 65
LOS: 1 days | End: 2025-01-17
Payer: COMMERCIAL

## 2025-01-17 VITALS
HEIGHT: 61 IN | SYSTOLIC BLOOD PRESSURE: 124 MMHG | RESPIRATION RATE: 17 BRPM | OXYGEN SATURATION: 100 % | TEMPERATURE: 98 F | DIASTOLIC BLOOD PRESSURE: 67 MMHG | HEART RATE: 61 BPM | WEIGHT: 123.9 LBS

## 2025-01-17 DIAGNOSIS — Z00.00 ENCOUNTER FOR GENERAL ADULT MEDICAL EXAMINATION WITHOUT ABNORMAL FINDINGS: ICD-10-CM

## 2025-01-17 DIAGNOSIS — C90.00 MULTIPLE MYELOMA NOT HAVING ACHIEVED REMISSION: ICD-10-CM

## 2025-01-17 PROCEDURE — 88313 SPECIAL STAINS GROUP 2: CPT | Mod: 26

## 2025-01-17 PROCEDURE — 88264 CHROMOSOME ANALYSIS 20-25: CPT

## 2025-01-17 PROCEDURE — 88275 CYTOGENETICS 100-300: CPT

## 2025-01-17 PROCEDURE — 88184 FLOWCYTOMETRY/ TC 1 MARKER: CPT

## 2025-01-17 PROCEDURE — 88280 CHROMOSOME KARYOTYPE STUDY: CPT

## 2025-01-17 PROCEDURE — 38221 DX BONE MARROW BIOPSIES: CPT

## 2025-01-17 PROCEDURE — 88185 FLOWCYTOMETRY/TC ADD-ON: CPT

## 2025-01-17 PROCEDURE — 88342 IMHCHEM/IMCYTCHM 1ST ANTB: CPT | Mod: 26,59

## 2025-01-17 PROCEDURE — 88305 TISSUE EXAM BY PATHOLOGIST: CPT | Mod: 26

## 2025-01-17 PROCEDURE — 88365 INSITU HYBRIDIZATION (FISH): CPT | Mod: 26,59

## 2025-01-17 PROCEDURE — 88237 TISSUE CULTURE BONE MARROW: CPT

## 2025-01-17 PROCEDURE — 38221 DX BONE MARROW BIOPSIES: CPT | Mod: LT

## 2025-01-17 PROCEDURE — 85097 BONE MARROW INTERPRETATION: CPT

## 2025-01-17 PROCEDURE — 88360 TUMOR IMMUNOHISTOCHEM/MANUAL: CPT

## 2025-01-17 PROCEDURE — 88365 INSITU HYBRIDIZATION (FISH): CPT

## 2025-01-17 PROCEDURE — 88313 SPECIAL STAINS GROUP 2: CPT

## 2025-01-17 PROCEDURE — 88341 IMHCHEM/IMCYTCHM EA ADD ANTB: CPT | Mod: 26,59

## 2025-01-17 PROCEDURE — 88341 IMHCHEM/IMCYTCHM EA ADD ANTB: CPT

## 2025-01-17 PROCEDURE — 88285 CHROMOSOME COUNT ADDITIONAL: CPT

## 2025-01-17 PROCEDURE — 88305 TISSUE EXAM BY PATHOLOGIST: CPT

## 2025-01-17 PROCEDURE — 87205 SMEAR GRAM STAIN: CPT

## 2025-01-17 PROCEDURE — 88291 CYTO/MOLECULAR REPORT: CPT

## 2025-01-17 PROCEDURE — 88364 INSITU HYBRIDIZATION (FISH): CPT | Mod: 26

## 2025-01-17 PROCEDURE — 88342 IMHCHEM/IMCYTCHM 1ST ANTB: CPT

## 2025-01-17 PROCEDURE — 77012 CT SCAN FOR NEEDLE BIOPSY: CPT | Mod: 26

## 2025-01-17 PROCEDURE — 77012 CT SCAN FOR NEEDLE BIOPSY: CPT

## 2025-01-17 PROCEDURE — 88271 CYTOGENETICS DNA PROBE: CPT

## 2025-01-17 PROCEDURE — 88364 INSITU HYBRIDIZATION (FISH): CPT

## 2025-01-17 RX ORDER — ACETAMINOPHEN 80 MG/.8ML
650 SOLUTION/ DROPS ORAL EVERY 6 HOURS
Refills: 0 | Status: ACTIVE | OUTPATIENT
Start: 2025-01-17 | End: 2025-12-16

## 2025-01-17 RX ORDER — ONDANSETRON 4 MG/1
4 TABLET ORAL ONCE
Refills: 0 | Status: ACTIVE | OUTPATIENT
Start: 2025-01-17 | End: 2025-12-16

## 2025-01-17 RX ORDER — APIXABAN 5 MG/1
1 TABLET, FILM COATED ORAL
Refills: 0 | DISCHARGE

## 2025-01-17 RX ADMIN — ACETAMINOPHEN 650 MILLIGRAM(S): 80 SOLUTION/ DROPS ORAL at 11:00

## 2025-01-17 RX ADMIN — ACETAMINOPHEN 650 MILLIGRAM(S): 80 SOLUTION/ DROPS ORAL at 11:20

## 2025-01-21 LAB — TM INTERPRETATION: SIGNIFICANT CHANGE UP

## 2025-01-22 LAB — HEMATOPATHOLOGY REPORT: SIGNIFICANT CHANGE UP

## 2025-01-24 LAB — CHROM ANALY INTERPHASE BLD FISH-IMP: SIGNIFICANT CHANGE UP

## 2025-01-27 ENCOUNTER — OUTPATIENT (OUTPATIENT)
Dept: OUTPATIENT SERVICES | Facility: HOSPITAL | Age: 65
LOS: 1 days | Discharge: HOME | End: 2025-01-27

## 2025-01-27 ENCOUNTER — RX RENEWAL (OUTPATIENT)
Age: 65
End: 2025-01-27

## 2025-01-27 DIAGNOSIS — E88.09 OTHER DISORDERS OF PLASMA-PROTEIN METABOLISM, NOT ELSEWHERE CLASSIFIED: ICD-10-CM

## 2025-01-30 ENCOUNTER — RESULT REVIEW (OUTPATIENT)
Age: 65
End: 2025-01-30

## 2025-01-30 ENCOUNTER — APPOINTMENT (OUTPATIENT)
Dept: HEMATOLOGY ONCOLOGY | Facility: CLINIC | Age: 65
End: 2025-01-30
Payer: COMMERCIAL

## 2025-01-30 ENCOUNTER — APPOINTMENT (OUTPATIENT)
Dept: INFUSION THERAPY | Facility: HOSPITAL | Age: 65
End: 2025-01-30

## 2025-01-30 DIAGNOSIS — C90.00 MULTIPLE MYELOMA NOT HAVING ACHIEVED REMISSION: ICD-10-CM

## 2025-01-30 LAB
BASOPHILS # BLD AUTO: 0.04 K/UL — SIGNIFICANT CHANGE UP (ref 0–0.2)
BASOPHILS NFR BLD AUTO: 0.6 % — SIGNIFICANT CHANGE UP (ref 0–2)
EOSINOPHIL # BLD AUTO: 0.17 K/UL — SIGNIFICANT CHANGE UP (ref 0–0.5)
EOSINOPHIL NFR BLD AUTO: 2.7 % — SIGNIFICANT CHANGE UP (ref 0–6)
HCT VFR BLD CALC: 35.3 % — SIGNIFICANT CHANGE UP (ref 34.5–45)
HGB BLD-MCNC: 12.3 G/DL — SIGNIFICANT CHANGE UP (ref 11.5–15.5)
IMM GRANULOCYTES NFR BLD AUTO: 0.2 % — SIGNIFICANT CHANGE UP (ref 0–0.9)
LYMPHOCYTES # BLD AUTO: 3.15 K/UL — SIGNIFICANT CHANGE UP (ref 1–3.3)
LYMPHOCYTES # BLD AUTO: 50.2 % — HIGH (ref 13–44)
MCHC RBC-ENTMCNC: 30.8 PG — SIGNIFICANT CHANGE UP (ref 27–34)
MCHC RBC-ENTMCNC: 34.8 G/DL — SIGNIFICANT CHANGE UP (ref 32–36)
MCV RBC AUTO: 88.3 FL — SIGNIFICANT CHANGE UP (ref 80–100)
MONOCYTES # BLD AUTO: 0.91 K/UL — HIGH (ref 0–0.9)
MONOCYTES NFR BLD AUTO: 14.5 % — HIGH (ref 2–14)
NEUTROPHILS # BLD AUTO: 2 K/UL — SIGNIFICANT CHANGE UP (ref 1.8–7.4)
NEUTROPHILS NFR BLD AUTO: 31.8 % — LOW (ref 43–77)
NRBC # BLD: 0 /100 WBCS — SIGNIFICANT CHANGE UP (ref 0–0)
NRBC BLD-RTO: 0 /100 WBCS — SIGNIFICANT CHANGE UP (ref 0–0)
PLATELET # BLD AUTO: 248 K/UL — SIGNIFICANT CHANGE UP (ref 150–400)
RBC # BLD: 4 M/UL — SIGNIFICANT CHANGE UP (ref 3.8–5.2)
RBC # FLD: 14 % — SIGNIFICANT CHANGE UP (ref 10.3–14.5)
WBC # BLD: 6.28 K/UL — SIGNIFICANT CHANGE UP (ref 3.8–10.5)
WBC # FLD AUTO: 6.28 K/UL — SIGNIFICANT CHANGE UP (ref 3.8–10.5)

## 2025-01-30 PROCEDURE — 99214 OFFICE O/P EST MOD 30 MIN: CPT

## 2025-01-31 DIAGNOSIS — Z51.11 ENCOUNTER FOR ANTINEOPLASTIC CHEMOTHERAPY: ICD-10-CM

## 2025-01-31 DIAGNOSIS — C90.00 MULTIPLE MYELOMA NOT HAVING ACHIEVED REMISSION: ICD-10-CM

## 2025-01-31 DIAGNOSIS — R11.2 NAUSEA WITH VOMITING, UNSPECIFIED: ICD-10-CM

## 2025-02-03 LAB — CHROM ANALY OVERALL INTERP SPEC-IMP: SIGNIFICANT CHANGE UP

## 2025-02-25 ENCOUNTER — RX RENEWAL (OUTPATIENT)
Age: 65
End: 2025-02-25

## 2025-02-27 ENCOUNTER — RESULT REVIEW (OUTPATIENT)
Age: 65
End: 2025-02-27

## 2025-02-27 ENCOUNTER — APPOINTMENT (OUTPATIENT)
Dept: HEMATOLOGY ONCOLOGY | Facility: CLINIC | Age: 65
End: 2025-02-27
Payer: COMMERCIAL

## 2025-02-27 ENCOUNTER — APPOINTMENT (OUTPATIENT)
Dept: INFUSION THERAPY | Facility: HOSPITAL | Age: 65
End: 2025-02-27

## 2025-02-27 DIAGNOSIS — C90.00 MULTIPLE MYELOMA NOT HAVING ACHIEVED REMISSION: ICD-10-CM

## 2025-02-27 LAB
ALBUMIN SERPL ELPH-MCNC: 4.6 G/DL — SIGNIFICANT CHANGE UP (ref 3.3–5)
ALP SERPL-CCNC: 86 U/L — SIGNIFICANT CHANGE UP (ref 40–120)
ALT FLD-CCNC: 31 U/L — SIGNIFICANT CHANGE UP (ref 10–45)
ANION GAP SERPL CALC-SCNC: 14 MMOL/L — SIGNIFICANT CHANGE UP (ref 5–17)
AST SERPL-CCNC: 31 U/L — SIGNIFICANT CHANGE UP (ref 10–40)
BASOPHILS # BLD AUTO: 0.05 K/UL — SIGNIFICANT CHANGE UP (ref 0–0.2)
BASOPHILS NFR BLD AUTO: 0.7 % — SIGNIFICANT CHANGE UP (ref 0–2)
BILIRUB SERPL-MCNC: 1.2 MG/DL — SIGNIFICANT CHANGE UP (ref 0.2–1.2)
BUN SERPL-MCNC: 37 MG/DL — HIGH (ref 7–23)
CALCIUM SERPL-MCNC: 10.3 MG/DL — SIGNIFICANT CHANGE UP (ref 8.4–10.5)
CHLORIDE SERPL-SCNC: 102 MMOL/L — SIGNIFICANT CHANGE UP (ref 96–108)
CO2 SERPL-SCNC: 20 MMOL/L — LOW (ref 22–31)
CREAT SERPL-MCNC: 0.71 MG/DL — SIGNIFICANT CHANGE UP (ref 0.5–1.3)
EGFR: 94 ML/MIN/1.73M2 — SIGNIFICANT CHANGE UP
EGFR: 94 ML/MIN/1.73M2 — SIGNIFICANT CHANGE UP
EOSINOPHIL # BLD AUTO: 0.18 K/UL — SIGNIFICANT CHANGE UP (ref 0–0.5)
EOSINOPHIL NFR BLD AUTO: 2.7 % — SIGNIFICANT CHANGE UP (ref 0–6)
GLUCOSE SERPL-MCNC: 108 MG/DL — HIGH (ref 70–99)
HCT VFR BLD CALC: 36.7 % — SIGNIFICANT CHANGE UP (ref 34.5–45)
HGB BLD-MCNC: 12.6 G/DL — SIGNIFICANT CHANGE UP (ref 11.5–15.5)
IGA FLD-MCNC: 10 MG/DL — LOW (ref 84–499)
IGG FLD-MCNC: 441 MG/DL — LOW (ref 610–1660)
IGM SERPL-MCNC: <10 MG/DL — LOW (ref 35–242)
IMM GRANULOCYTES NFR BLD AUTO: 1.3 % — HIGH (ref 0–0.9)
KAPPA LC SER QL IFE: 1.48 MG/DL — SIGNIFICANT CHANGE UP (ref 0.33–1.94)
KAPPA/LAMBDA FREE LIGHT CHAIN RATIO, SERUM: 5.69 RATIO — HIGH (ref 0.26–1.65)
LAMBDA LC SER QL IFE: 0.26 MG/DL — LOW (ref 0.57–2.63)
LYMPHOCYTES # BLD AUTO: 3.54 K/UL — HIGH (ref 1–3.3)
LYMPHOCYTES # BLD AUTO: 52.1 % — HIGH (ref 13–44)
MCHC RBC-ENTMCNC: 31.1 PG — SIGNIFICANT CHANGE UP (ref 27–34)
MCHC RBC-ENTMCNC: 34.3 G/DL — SIGNIFICANT CHANGE UP (ref 32–36)
MCV RBC AUTO: 90.6 FL — SIGNIFICANT CHANGE UP (ref 80–100)
MONOCYTES # BLD AUTO: 0.95 K/UL — HIGH (ref 0–0.9)
MONOCYTES NFR BLD AUTO: 14 % — SIGNIFICANT CHANGE UP (ref 2–14)
NEUTROPHILS # BLD AUTO: 1.98 K/UL — SIGNIFICANT CHANGE UP (ref 1.8–7.4)
NEUTROPHILS NFR BLD AUTO: 29.2 % — LOW (ref 43–77)
NRBC BLD AUTO-RTO: 0 /100 WBCS — SIGNIFICANT CHANGE UP (ref 0–0)
PLATELET # BLD AUTO: 251 K/UL — SIGNIFICANT CHANGE UP (ref 150–400)
POTASSIUM SERPL-MCNC: 4 MMOL/L — SIGNIFICANT CHANGE UP (ref 3.5–5.3)
POTASSIUM SERPL-SCNC: 4 MMOL/L — SIGNIFICANT CHANGE UP (ref 3.5–5.3)
PROT SERPL-MCNC: 6.4 G/DL — SIGNIFICANT CHANGE UP (ref 6–8.3)
PROT SERPL-MCNC: 6.9 G/DL — SIGNIFICANT CHANGE UP (ref 6–8.3)
RBC # BLD: 4.05 M/UL — SIGNIFICANT CHANGE UP (ref 3.8–5.2)
RBC # FLD: 15 % — HIGH (ref 10.3–14.5)
SODIUM SERPL-SCNC: 136 MMOL/L — SIGNIFICANT CHANGE UP (ref 135–145)
WBC # BLD: 6.79 K/UL — SIGNIFICANT CHANGE UP (ref 3.8–10.5)
WBC # FLD AUTO: 6.79 K/UL — SIGNIFICANT CHANGE UP (ref 3.8–10.5)

## 2025-02-27 PROCEDURE — 99215 OFFICE O/P EST HI 40 MIN: CPT

## 2025-02-27 PROCEDURE — G2211 COMPLEX E/M VISIT ADD ON: CPT | Mod: NC

## 2025-03-04 LAB
% ALBUMIN: 67.5 % — SIGNIFICANT CHANGE UP
% ALPHA 1: 4.1 % — SIGNIFICANT CHANGE UP
% ALPHA 2: 11.2 % — SIGNIFICANT CHANGE UP
% BETA: 11 % — SIGNIFICANT CHANGE UP
% GAMMA: 6.2 % — SIGNIFICANT CHANGE UP
% M SPIKE: SIGNIFICANT CHANGE UP
ALBUMIN SERPL ELPH-MCNC: 4.3 G/DL — SIGNIFICANT CHANGE UP (ref 3.6–5.5)
ALBUMIN/GLOB SERPL ELPH: 2 RATIO — SIGNIFICANT CHANGE UP
ALPHA1 GLOB SERPL ELPH-MCNC: 0.3 G/DL — SIGNIFICANT CHANGE UP (ref 0.1–0.4)
ALPHA2 GLOB SERPL ELPH-MCNC: 0.7 G/DL — SIGNIFICANT CHANGE UP (ref 0.5–1)
B-GLOBULIN SERPL ELPH-MCNC: 0.7 G/DL — SIGNIFICANT CHANGE UP (ref 0.5–1)
GAMMA GLOBULIN: 0.4 G/DL — LOW (ref 0.6–1.6)
INTERPRETATION SERPL IFE-IMP: SIGNIFICANT CHANGE UP
M-SPIKE: SIGNIFICANT CHANGE UP (ref 0–0)
PROT PATTERN SERPL ELPH-IMP: SIGNIFICANT CHANGE UP
PROT SERPL-MCNC: 6.4 G/DL — SIGNIFICANT CHANGE UP (ref 6–8.3)

## 2025-03-24 ENCOUNTER — RX RENEWAL (OUTPATIENT)
Age: 65
End: 2025-03-24

## 2025-03-27 ENCOUNTER — RESULT REVIEW (OUTPATIENT)
Age: 65
End: 2025-03-27

## 2025-03-27 ENCOUNTER — MED ADMIN CHARGE (OUTPATIENT)
Age: 65
End: 2025-03-27

## 2025-03-27 ENCOUNTER — APPOINTMENT (OUTPATIENT)
Dept: HEMATOLOGY ONCOLOGY | Facility: CLINIC | Age: 65
End: 2025-03-27
Payer: COMMERCIAL

## 2025-03-27 ENCOUNTER — APPOINTMENT (OUTPATIENT)
Dept: INFUSION THERAPY | Facility: HOSPITAL | Age: 65
End: 2025-03-27

## 2025-03-27 DIAGNOSIS — C90.00 MULTIPLE MYELOMA NOT HAVING ACHIEVED REMISSION: ICD-10-CM

## 2025-03-27 LAB
ALBUMIN SERPL ELPH-MCNC: 4.6 G/DL — SIGNIFICANT CHANGE UP (ref 3.3–5)
ALP SERPL-CCNC: 82 U/L — SIGNIFICANT CHANGE UP (ref 40–120)
ALT FLD-CCNC: 41 U/L — SIGNIFICANT CHANGE UP (ref 10–45)
ANION GAP SERPL CALC-SCNC: 11 MMOL/L — SIGNIFICANT CHANGE UP (ref 5–17)
AST SERPL-CCNC: 31 U/L — SIGNIFICANT CHANGE UP (ref 10–40)
BASOPHILS # BLD AUTO: 0.03 K/UL — SIGNIFICANT CHANGE UP (ref 0–0.2)
BASOPHILS NFR BLD AUTO: 0.5 % — SIGNIFICANT CHANGE UP (ref 0–2)
BILIRUB SERPL-MCNC: 1 MG/DL — SIGNIFICANT CHANGE UP (ref 0.2–1.2)
BUN SERPL-MCNC: 33 MG/DL — HIGH (ref 7–23)
CALCIUM SERPL-MCNC: 10 MG/DL — SIGNIFICANT CHANGE UP (ref 8.4–10.5)
CHLORIDE SERPL-SCNC: 104 MMOL/L — SIGNIFICANT CHANGE UP (ref 96–108)
CO2 SERPL-SCNC: 22 MMOL/L — SIGNIFICANT CHANGE UP (ref 22–31)
CREAT SERPL-MCNC: 0.64 MG/DL — SIGNIFICANT CHANGE UP (ref 0.5–1.3)
EGFR: 98 ML/MIN/1.73M2 — SIGNIFICANT CHANGE UP
EGFR: 98 ML/MIN/1.73M2 — SIGNIFICANT CHANGE UP
EOSINOPHIL # BLD AUTO: 0.15 K/UL — SIGNIFICANT CHANGE UP (ref 0–0.5)
EOSINOPHIL NFR BLD AUTO: 2.4 % — SIGNIFICANT CHANGE UP (ref 0–6)
GLUCOSE SERPL-MCNC: 109 MG/DL — HIGH (ref 70–99)
HCT VFR BLD CALC: 34.3 % — LOW (ref 34.5–45)
HGB BLD-MCNC: 11.7 G/DL — SIGNIFICANT CHANGE UP (ref 11.5–15.5)
IGA FLD-MCNC: 11 MG/DL — LOW (ref 84–499)
IGG FLD-MCNC: 424 MG/DL — LOW (ref 610–1660)
IGM SERPL-MCNC: <10 MG/DL — LOW (ref 35–242)
IMM GRANULOCYTES NFR BLD AUTO: 0.2 % — SIGNIFICANT CHANGE UP (ref 0–0.9)
KAPPA LC SER QL IFE: 0.39 MG/DL — SIGNIFICANT CHANGE UP (ref 0.33–1.94)
KAPPA/LAMBDA FREE LIGHT CHAIN RATIO, SERUM: 1.39 RATIO — SIGNIFICANT CHANGE UP (ref 0.26–1.65)
LAMBDA LC SER QL IFE: 0.28 MG/DL — LOW (ref 0.57–2.63)
LYMPHOCYTES # BLD AUTO: 3.08 K/UL — SIGNIFICANT CHANGE UP (ref 1–3.3)
LYMPHOCYTES # BLD AUTO: 49.2 % — HIGH (ref 13–44)
MCHC RBC-ENTMCNC: 30.9 PG — SIGNIFICANT CHANGE UP (ref 27–34)
MCHC RBC-ENTMCNC: 34.1 G/DL — SIGNIFICANT CHANGE UP (ref 32–36)
MCV RBC AUTO: 90.5 FL — SIGNIFICANT CHANGE UP (ref 80–100)
MONOCYTES # BLD AUTO: 1.01 K/UL — HIGH (ref 0–0.9)
MONOCYTES NFR BLD AUTO: 16.1 % — HIGH (ref 2–14)
NEUTROPHILS # BLD AUTO: 1.98 K/UL — SIGNIFICANT CHANGE UP (ref 1.8–7.4)
NEUTROPHILS NFR BLD AUTO: 31.6 % — LOW (ref 43–77)
NRBC BLD AUTO-RTO: 0 /100 WBCS — SIGNIFICANT CHANGE UP (ref 0–0)
PLATELET # BLD AUTO: 231 K/UL — SIGNIFICANT CHANGE UP (ref 150–400)
POTASSIUM SERPL-MCNC: 3.5 MMOL/L — SIGNIFICANT CHANGE UP (ref 3.5–5.3)
POTASSIUM SERPL-SCNC: 3.5 MMOL/L — SIGNIFICANT CHANGE UP (ref 3.5–5.3)
PROT SERPL-MCNC: 6.3 G/DL — SIGNIFICANT CHANGE UP (ref 6–8.3)
PROT SERPL-MCNC: 6.6 G/DL — SIGNIFICANT CHANGE UP (ref 6–8.3)
RBC # BLD: 3.79 M/UL — LOW (ref 3.8–5.2)
RBC # FLD: 14.6 % — HIGH (ref 10.3–14.5)
SODIUM SERPL-SCNC: 138 MMOL/L — SIGNIFICANT CHANGE UP (ref 135–145)
WBC # BLD: 6.26 K/UL — SIGNIFICANT CHANGE UP (ref 3.8–10.5)
WBC # FLD AUTO: 6.26 K/UL — SIGNIFICANT CHANGE UP (ref 3.8–10.5)

## 2025-03-27 PROCEDURE — 99214 OFFICE O/P EST MOD 30 MIN: CPT

## 2025-03-28 DIAGNOSIS — Z23 ENCOUNTER FOR IMMUNIZATION: ICD-10-CM

## 2025-03-28 DIAGNOSIS — Z94.84 STEM CELLS TRANSPLANT STATUS: ICD-10-CM

## 2025-04-01 LAB
% ALBUMIN: 67.7 % — SIGNIFICANT CHANGE UP
% ALPHA 1: 4.6 % — SIGNIFICANT CHANGE UP
% ALPHA 2: 11 % — SIGNIFICANT CHANGE UP
% BETA: 11 % — SIGNIFICANT CHANGE UP
% GAMMA: 5.7 % — SIGNIFICANT CHANGE UP
% M SPIKE: SIGNIFICANT CHANGE UP
ALBUMIN SERPL ELPH-MCNC: 4.3 G/DL — SIGNIFICANT CHANGE UP (ref 3.6–5.5)
ALBUMIN/GLOB SERPL ELPH: 2.2 RATIO — SIGNIFICANT CHANGE UP
ALPHA1 GLOB SERPL ELPH-MCNC: 0.3 G/DL — SIGNIFICANT CHANGE UP (ref 0.1–0.4)
ALPHA2 GLOB SERPL ELPH-MCNC: 0.7 G/DL — SIGNIFICANT CHANGE UP (ref 0.5–1)
B-GLOBULIN SERPL ELPH-MCNC: 0.7 G/DL — SIGNIFICANT CHANGE UP (ref 0.5–1)
GAMMA GLOBULIN: 0.4 G/DL — LOW (ref 0.6–1.6)
INTERPRETATION SERPL IFE-IMP: SIGNIFICANT CHANGE UP
M-SPIKE: SIGNIFICANT CHANGE UP (ref 0–0)
PROT PATTERN SERPL ELPH-IMP: SIGNIFICANT CHANGE UP
PROT SERPL-MCNC: 6.3 G/DL — SIGNIFICANT CHANGE UP (ref 6–8.3)

## 2025-04-21 ENCOUNTER — OUTPATIENT (OUTPATIENT)
Dept: OUTPATIENT SERVICES | Facility: HOSPITAL | Age: 65
LOS: 1 days | Discharge: HOME | End: 2025-04-21

## 2025-04-21 DIAGNOSIS — E88.09 OTHER DISORDERS OF PLASMA-PROTEIN METABOLISM, NOT ELSEWHERE CLASSIFIED: ICD-10-CM

## 2025-04-23 ENCOUNTER — RX RENEWAL (OUTPATIENT)
Age: 65
End: 2025-04-23

## 2025-04-24 ENCOUNTER — RESULT REVIEW (OUTPATIENT)
Age: 65
End: 2025-04-24

## 2025-04-24 ENCOUNTER — APPOINTMENT (OUTPATIENT)
Dept: HEMATOLOGY ONCOLOGY | Facility: CLINIC | Age: 65
End: 2025-04-24

## 2025-04-24 ENCOUNTER — APPOINTMENT (OUTPATIENT)
Dept: INFUSION THERAPY | Facility: HOSPITAL | Age: 65
End: 2025-04-24

## 2025-04-24 ENCOUNTER — NON-APPOINTMENT (OUTPATIENT)
Age: 65
End: 2025-04-24

## 2025-04-24 DIAGNOSIS — R53.83 OTHER FATIGUE: ICD-10-CM

## 2025-04-24 DIAGNOSIS — C90.00 MULTIPLE MYELOMA NOT HAVING ACHIEVED REMISSION: ICD-10-CM

## 2025-04-24 LAB
ALBUMIN SERPL ELPH-MCNC: 4.3 G/DL — SIGNIFICANT CHANGE UP (ref 3.3–5)
ALP SERPL-CCNC: 78 U/L — SIGNIFICANT CHANGE UP (ref 40–120)
ALT FLD-CCNC: 31 U/L — SIGNIFICANT CHANGE UP (ref 10–45)
ANION GAP SERPL CALC-SCNC: 14 MMOL/L — SIGNIFICANT CHANGE UP (ref 5–17)
AST SERPL-CCNC: 65 U/L — HIGH (ref 10–40)
BASOPHILS # BLD AUTO: 0.05 K/UL — SIGNIFICANT CHANGE UP (ref 0–0.2)
BASOPHILS NFR BLD AUTO: 0.9 % — SIGNIFICANT CHANGE UP (ref 0–2)
BILIRUB SERPL-MCNC: 0.8 MG/DL — SIGNIFICANT CHANGE UP (ref 0.2–1.2)
BUN SERPL-MCNC: 19 MG/DL — SIGNIFICANT CHANGE UP (ref 7–23)
CALCIUM SERPL-MCNC: 9.2 MG/DL — SIGNIFICANT CHANGE UP (ref 8.4–10.5)
CHLORIDE SERPL-SCNC: 104 MMOL/L — SIGNIFICANT CHANGE UP (ref 96–108)
CO2 SERPL-SCNC: 20 MMOL/L — LOW (ref 22–31)
CREAT SERPL-MCNC: 0.57 MG/DL — SIGNIFICANT CHANGE UP (ref 0.5–1.3)
EGFR: 101 ML/MIN/1.73M2 — SIGNIFICANT CHANGE UP
EGFR: 101 ML/MIN/1.73M2 — SIGNIFICANT CHANGE UP
EOSINOPHIL # BLD AUTO: 0.24 K/UL — SIGNIFICANT CHANGE UP (ref 0–0.5)
EOSINOPHIL NFR BLD AUTO: 4.3 % — SIGNIFICANT CHANGE UP (ref 0–6)
GLUCOSE SERPL-MCNC: 114 MG/DL — HIGH (ref 70–99)
HCT VFR BLD CALC: 36.7 % — SIGNIFICANT CHANGE UP (ref 34.5–45)
HGB BLD-MCNC: 12.8 G/DL — SIGNIFICANT CHANGE UP (ref 11.5–15.5)
IMM GRANULOCYTES NFR BLD AUTO: 0.2 % — SIGNIFICANT CHANGE UP (ref 0–0.9)
LYMPHOCYTES # BLD AUTO: 2.71 K/UL — SIGNIFICANT CHANGE UP (ref 1–3.3)
LYMPHOCYTES # BLD AUTO: 48.7 % — HIGH (ref 13–44)
MCHC RBC-ENTMCNC: 31.1 PG — SIGNIFICANT CHANGE UP (ref 27–34)
MCHC RBC-ENTMCNC: 34.9 G/DL — SIGNIFICANT CHANGE UP (ref 32–36)
MCV RBC AUTO: 89.1 FL — SIGNIFICANT CHANGE UP (ref 80–100)
MONOCYTES # BLD AUTO: 0.86 K/UL — SIGNIFICANT CHANGE UP (ref 0–0.9)
MONOCYTES NFR BLD AUTO: 15.4 % — HIGH (ref 2–14)
NEUTROPHILS # BLD AUTO: 1.7 K/UL — LOW (ref 1.8–7.4)
NEUTROPHILS NFR BLD AUTO: 30.5 % — LOW (ref 43–77)
NRBC BLD AUTO-RTO: 0 /100 WBCS — SIGNIFICANT CHANGE UP (ref 0–0)
PLATELET # BLD AUTO: 243 K/UL — SIGNIFICANT CHANGE UP (ref 150–400)
POTASSIUM SERPL-MCNC: 4.9 MMOL/L — SIGNIFICANT CHANGE UP (ref 3.5–5.3)
POTASSIUM SERPL-SCNC: 4.9 MMOL/L — SIGNIFICANT CHANGE UP (ref 3.5–5.3)
PROT SERPL-MCNC: 6.5 G/DL — SIGNIFICANT CHANGE UP (ref 6–8.3)
PROT SERPL-MCNC: 6.8 G/DL — SIGNIFICANT CHANGE UP (ref 6–8.3)
RBC # BLD: 4.12 M/UL — SIGNIFICANT CHANGE UP (ref 3.8–5.2)
RBC # FLD: 14 % — SIGNIFICANT CHANGE UP (ref 10.3–14.5)
SODIUM SERPL-SCNC: 138 MMOL/L — SIGNIFICANT CHANGE UP (ref 135–145)
WBC # BLD: 5.57 K/UL — SIGNIFICANT CHANGE UP (ref 3.8–10.5)
WBC # FLD AUTO: 5.57 K/UL — SIGNIFICANT CHANGE UP (ref 3.8–10.5)

## 2025-04-24 PROCEDURE — G2211 COMPLEX E/M VISIT ADD ON: CPT | Mod: NC

## 2025-04-24 PROCEDURE — 99215 OFFICE O/P EST HI 40 MIN: CPT

## 2025-04-25 DIAGNOSIS — R11.2 NAUSEA WITH VOMITING, UNSPECIFIED: ICD-10-CM

## 2025-04-25 DIAGNOSIS — C90.00 MULTIPLE MYELOMA NOT HAVING ACHIEVED REMISSION: ICD-10-CM

## 2025-04-25 DIAGNOSIS — Z23 ENCOUNTER FOR IMMUNIZATION: ICD-10-CM

## 2025-04-25 DIAGNOSIS — Z51.11 ENCOUNTER FOR ANTINEOPLASTIC CHEMOTHERAPY: ICD-10-CM

## 2025-04-25 LAB
IGA FLD-MCNC: 15 MG/DL — LOW (ref 84–499)
IGG FLD-MCNC: 442 MG/DL — LOW (ref 610–1660)
IGM SERPL-MCNC: <10 MG/DL — LOW (ref 35–242)
KAPPA LC SER QL IFE: 0.4 MG/DL — SIGNIFICANT CHANGE UP (ref 0.33–1.94)
KAPPA/LAMBDA FREE LIGHT CHAIN RATIO, SERUM: 1.38 RATIO — SIGNIFICANT CHANGE UP (ref 0.26–1.65)
LAMBDA LC SER QL IFE: 0.29 MG/DL — LOW (ref 0.57–2.63)

## 2025-04-27 LAB
% ALBUMIN: 63.9 % — SIGNIFICANT CHANGE UP
% ALPHA 1: 4.9 % — SIGNIFICANT CHANGE UP
% ALPHA 2: 14 % — SIGNIFICANT CHANGE UP
% BETA: 11 % — SIGNIFICANT CHANGE UP
% GAMMA: 6.2 % — SIGNIFICANT CHANGE UP
% M SPIKE: SIGNIFICANT CHANGE UP
ALBUMIN SERPL ELPH-MCNC: 4.2 G/DL — SIGNIFICANT CHANGE UP (ref 3.6–5.5)
ALBUMIN/GLOB SERPL ELPH: 1.8 RATIO — SIGNIFICANT CHANGE UP
ALPHA1 GLOB SERPL ELPH-MCNC: 0.3 G/DL — SIGNIFICANT CHANGE UP (ref 0.1–0.4)
ALPHA2 GLOB SERPL ELPH-MCNC: 0.9 G/DL — SIGNIFICANT CHANGE UP (ref 0.5–1)
B-GLOBULIN SERPL ELPH-MCNC: 0.7 G/DL — SIGNIFICANT CHANGE UP (ref 0.5–1)
GAMMA GLOBULIN: 0.4 G/DL — LOW (ref 0.6–1.6)
INTERPRETATION SERPL IFE-IMP: SIGNIFICANT CHANGE UP
M-SPIKE: SIGNIFICANT CHANGE UP (ref 0–0)
PROT PATTERN SERPL ELPH-IMP: SIGNIFICANT CHANGE UP
PROT SERPL-MCNC: 6.5 G/DL — SIGNIFICANT CHANGE UP (ref 6–8.3)

## 2025-04-29 LAB
FERRITIN SERPL-MCNC: 84 NG/ML
FOLATE SERPL-MCNC: 19.7 NG/ML
IRON SATN MFR SERPL: 17 %
IRON SERPL-MCNC: 75 UG/DL
TIBC SERPL-MCNC: 438 UG/DL
TSH SERPL-ACNC: 3.09 UIU/ML
UIBC SERPL-MCNC: 363 UG/DL
VIT B12 SERPL-MCNC: 474 PG/ML

## 2025-05-16 ENCOUNTER — RX RENEWAL (OUTPATIENT)
Age: 65
End: 2025-05-16

## 2025-05-21 ENCOUNTER — RX RENEWAL (OUTPATIENT)
Age: 65
End: 2025-05-21

## 2025-05-22 ENCOUNTER — RESULT REVIEW (OUTPATIENT)
Age: 65
End: 2025-05-22

## 2025-05-22 ENCOUNTER — APPOINTMENT (OUTPATIENT)
Dept: HEMATOLOGY ONCOLOGY | Facility: CLINIC | Age: 65
End: 2025-05-22
Payer: COMMERCIAL

## 2025-05-22 ENCOUNTER — APPOINTMENT (OUTPATIENT)
Dept: INFUSION THERAPY | Facility: HOSPITAL | Age: 65
End: 2025-05-22

## 2025-05-22 DIAGNOSIS — C90.00 MULTIPLE MYELOMA NOT HAVING ACHIEVED REMISSION: ICD-10-CM

## 2025-05-22 LAB
24R-OH-CALCIDIOL SERPL-MCNC: 35 NG/ML — SIGNIFICANT CHANGE UP
ALBUMIN SERPL ELPH-MCNC: 4.2 G/DL — SIGNIFICANT CHANGE UP (ref 3.3–5)
ALP SERPL-CCNC: 96 U/L — SIGNIFICANT CHANGE UP (ref 40–120)
ALT FLD-CCNC: 37 U/L — SIGNIFICANT CHANGE UP (ref 10–45)
ANION GAP SERPL CALC-SCNC: 11 MMOL/L — SIGNIFICANT CHANGE UP (ref 5–17)
AST SERPL-CCNC: 34 U/L — SIGNIFICANT CHANGE UP (ref 10–40)
BASOPHILS # BLD AUTO: 0.06 K/UL — SIGNIFICANT CHANGE UP (ref 0–0.2)
BASOPHILS NFR BLD AUTO: 1 % — SIGNIFICANT CHANGE UP (ref 0–2)
BILIRUB SERPL-MCNC: 0.6 MG/DL — SIGNIFICANT CHANGE UP (ref 0.2–1.2)
BUN SERPL-MCNC: 24 MG/DL — HIGH (ref 7–23)
CALCIUM SERPL-MCNC: 9.4 MG/DL — SIGNIFICANT CHANGE UP (ref 8.4–10.5)
CHLORIDE SERPL-SCNC: 104 MMOL/L — SIGNIFICANT CHANGE UP (ref 96–108)
CO2 SERPL-SCNC: 22 MMOL/L — SIGNIFICANT CHANGE UP (ref 22–31)
CREAT SERPL-MCNC: 0.62 MG/DL — SIGNIFICANT CHANGE UP (ref 0.5–1.3)
EGFR: 99 ML/MIN/1.73M2 — SIGNIFICANT CHANGE UP
EGFR: 99 ML/MIN/1.73M2 — SIGNIFICANT CHANGE UP
EOSINOPHIL # BLD AUTO: 0.25 K/UL — SIGNIFICANT CHANGE UP (ref 0–0.5)
EOSINOPHIL NFR BLD AUTO: 4.3 % — SIGNIFICANT CHANGE UP (ref 0–6)
GLUCOSE SERPL-MCNC: 105 MG/DL — HIGH (ref 70–99)
HCT VFR BLD CALC: 37.2 % — SIGNIFICANT CHANGE UP (ref 34.5–45)
HGB BLD-MCNC: 12.8 G/DL — SIGNIFICANT CHANGE UP (ref 11.5–15.5)
IMM GRANULOCYTES NFR BLD AUTO: 0.3 % — SIGNIFICANT CHANGE UP (ref 0–0.9)
LYMPHOCYTES # BLD AUTO: 2.92 K/UL — SIGNIFICANT CHANGE UP (ref 1–3.3)
LYMPHOCYTES # BLD AUTO: 50.8 % — HIGH (ref 13–44)
MCHC RBC-ENTMCNC: 31 PG — SIGNIFICANT CHANGE UP (ref 27–34)
MCHC RBC-ENTMCNC: 34.4 G/DL — SIGNIFICANT CHANGE UP (ref 32–36)
MCV RBC AUTO: 90.1 FL — SIGNIFICANT CHANGE UP (ref 80–100)
MONOCYTES # BLD AUTO: 0.81 K/UL — SIGNIFICANT CHANGE UP (ref 0–0.9)
MONOCYTES NFR BLD AUTO: 14.1 % — HIGH (ref 2–14)
NEUTROPHILS # BLD AUTO: 1.69 K/UL — LOW (ref 1.8–7.4)
NEUTROPHILS NFR BLD AUTO: 29.5 % — LOW (ref 43–77)
NRBC BLD AUTO-RTO: 0 /100 WBCS — SIGNIFICANT CHANGE UP (ref 0–0)
PLATELET # BLD AUTO: 259 K/UL — SIGNIFICANT CHANGE UP (ref 150–400)
POTASSIUM SERPL-MCNC: 3.7 MMOL/L — SIGNIFICANT CHANGE UP (ref 3.5–5.3)
POTASSIUM SERPL-SCNC: 3.7 MMOL/L — SIGNIFICANT CHANGE UP (ref 3.5–5.3)
PROT SERPL-MCNC: 6.2 G/DL — SIGNIFICANT CHANGE UP (ref 6–8.3)
PROT SERPL-MCNC: 6.6 G/DL — SIGNIFICANT CHANGE UP (ref 6–8.3)
RBC # BLD: 4.13 M/UL — SIGNIFICANT CHANGE UP (ref 3.8–5.2)
RBC # FLD: 13.8 % — SIGNIFICANT CHANGE UP (ref 10.3–14.5)
SODIUM SERPL-SCNC: 137 MMOL/L — SIGNIFICANT CHANGE UP (ref 135–145)
WBC # BLD: 5.75 K/UL — SIGNIFICANT CHANGE UP (ref 3.8–10.5)
WBC # FLD AUTO: 5.75 K/UL — SIGNIFICANT CHANGE UP (ref 3.8–10.5)

## 2025-05-22 PROCEDURE — 99214 OFFICE O/P EST MOD 30 MIN: CPT

## 2025-05-23 LAB
IGA FLD-MCNC: 25 MG/DL — LOW (ref 84–499)
IGG FLD-MCNC: 487 MG/DL — LOW (ref 610–1660)
IGM SERPL-MCNC: 22 MG/DL — LOW (ref 35–242)
KAPPA LC SER QL IFE: 0.53 MG/DL — SIGNIFICANT CHANGE UP (ref 0.33–1.94)
KAPPA/LAMBDA FREE LIGHT CHAIN RATIO, SERUM: 1.56 RATIO — SIGNIFICANT CHANGE UP (ref 0.26–1.65)
LAMBDA LC SER QL IFE: 0.34 MG/DL — LOW (ref 0.57–2.63)

## 2025-05-24 LAB
% ALBUMIN: 63.7 % — SIGNIFICANT CHANGE UP
% ALPHA 1: 4.8 % — SIGNIFICANT CHANGE UP
% ALPHA 2: 12.4 % — SIGNIFICANT CHANGE UP
% BETA: 11.8 % — SIGNIFICANT CHANGE UP
% GAMMA: 7.3 % — SIGNIFICANT CHANGE UP
% M SPIKE: 1.6 % — SIGNIFICANT CHANGE UP
ALBUMIN SERPL ELPH-MCNC: 3.9 G/DL — SIGNIFICANT CHANGE UP (ref 3.6–5.5)
ALBUMIN/GLOB SERPL ELPH: 1.7 RATIO — SIGNIFICANT CHANGE UP
ALPHA1 GLOB SERPL ELPH-MCNC: 0.3 G/DL — SIGNIFICANT CHANGE UP (ref 0.1–0.4)
ALPHA2 GLOB SERPL ELPH-MCNC: 0.8 G/DL — SIGNIFICANT CHANGE UP (ref 0.5–1)
B-GLOBULIN SERPL ELPH-MCNC: 0.7 G/DL — SIGNIFICANT CHANGE UP (ref 0.5–1)
GAMMA GLOBULIN: 0.5 G/DL — LOW (ref 0.6–1.6)
INTERPRETATION SERPL IFE-IMP: SIGNIFICANT CHANGE UP
M-SPIKE: 0.1 G/DL — HIGH (ref 0–0)
PROT PATTERN SERPL ELPH-IMP: SIGNIFICANT CHANGE UP
PROT SERPL-MCNC: 6.2 G/DL — SIGNIFICANT CHANGE UP (ref 6–8.3)

## 2025-06-16 ENCOUNTER — RX RENEWAL (OUTPATIENT)
Age: 65
End: 2025-06-16

## 2025-06-18 ENCOUNTER — OUTPATIENT (OUTPATIENT)
Dept: OUTPATIENT SERVICES | Facility: HOSPITAL | Age: 65
LOS: 1 days | Discharge: HOME | End: 2025-06-18
Payer: MEDICARE

## 2025-06-18 DIAGNOSIS — E88.09 OTHER DISORDERS OF PLASMA-PROTEIN METABOLISM, NOT ELSEWHERE CLASSIFIED: ICD-10-CM

## 2025-06-19 ENCOUNTER — APPOINTMENT (OUTPATIENT)
Dept: HEMATOLOGY ONCOLOGY | Facility: CLINIC | Age: 65
End: 2025-06-19
Payer: MEDICARE

## 2025-06-19 ENCOUNTER — RESULT REVIEW (OUTPATIENT)
Age: 65
End: 2025-06-19

## 2025-06-19 ENCOUNTER — APPOINTMENT (OUTPATIENT)
Dept: INFUSION THERAPY | Facility: HOSPITAL | Age: 65
End: 2025-06-19

## 2025-06-19 PROBLEM — Z86.2 HISTORY OF ANEMIA: Status: RESOLVED | Noted: 2022-12-08 | Resolved: 2025-06-19

## 2025-06-19 LAB
ALBUMIN SERPL ELPH-MCNC: 4.1 G/DL — SIGNIFICANT CHANGE UP (ref 3.3–5)
ALP SERPL-CCNC: 119 U/L — SIGNIFICANT CHANGE UP (ref 40–120)
ALT FLD-CCNC: 37 U/L — SIGNIFICANT CHANGE UP (ref 10–45)
ANION GAP SERPL CALC-SCNC: 13 MMOL/L — SIGNIFICANT CHANGE UP (ref 5–17)
AST SERPL-CCNC: 42 U/L — HIGH (ref 10–40)
BILIRUB SERPL-MCNC: 0.9 MG/DL — SIGNIFICANT CHANGE UP (ref 0.2–1.2)
BUN SERPL-MCNC: 24 MG/DL — HIGH (ref 7–23)
C DIFF BY PCR RESULT: SIGNIFICANT CHANGE UP
CALCIUM SERPL-MCNC: 9 MG/DL — SIGNIFICANT CHANGE UP (ref 8.4–10.5)
CHLORIDE SERPL-SCNC: 102 MMOL/L — SIGNIFICANT CHANGE UP (ref 96–108)
CO2 SERPL-SCNC: 20 MMOL/L — LOW (ref 22–31)
CREAT SERPL-MCNC: 0.68 MG/DL — SIGNIFICANT CHANGE UP (ref 0.5–1.3)
EGFR: 97 ML/MIN/1.73M2 — SIGNIFICANT CHANGE UP
EGFR: 97 ML/MIN/1.73M2 — SIGNIFICANT CHANGE UP
GLUCOSE SERPL-MCNC: 112 MG/DL — HIGH (ref 70–99)
IGA FLD-MCNC: 22 MG/DL — LOW (ref 84–499)
IGG FLD-MCNC: 473 MG/DL — LOW (ref 610–1660)
IGM SERPL-MCNC: 19 MG/DL — LOW (ref 35–242)
KAPPA LC SER QL IFE: 0.76 MG/DL — SIGNIFICANT CHANGE UP (ref 0.33–1.94)
KAPPA/LAMBDA FREE LIGHT CHAIN RATIO, SERUM: 1.81 RATIO — HIGH (ref 0.26–1.65)
LAMBDA LC SER QL IFE: 0.42 MG/DL — LOW (ref 0.57–2.63)
POTASSIUM SERPL-MCNC: 3.5 MMOL/L — SIGNIFICANT CHANGE UP (ref 3.5–5.3)
POTASSIUM SERPL-SCNC: 3.5 MMOL/L — SIGNIFICANT CHANGE UP (ref 3.5–5.3)
PROT SERPL-MCNC: 6.3 G/DL — SIGNIFICANT CHANGE UP (ref 6–8.3)
PROT SERPL-MCNC: 6.8 G/DL — SIGNIFICANT CHANGE UP (ref 6–8.3)
SODIUM SERPL-SCNC: 136 MMOL/L — SIGNIFICANT CHANGE UP (ref 135–145)

## 2025-06-19 PROCEDURE — G2211 COMPLEX E/M VISIT ADD ON: CPT

## 2025-06-19 PROCEDURE — 99205 OFFICE O/P NEW HI 60 MIN: CPT

## 2025-06-19 RX ORDER — CHLORHEXIDINE GLUCONATE 4 %
325 (65 FE) LIQUID (ML) TOPICAL
Qty: 30 | Refills: 2 | Status: ACTIVE | COMMUNITY
Start: 2025-06-19 | End: 1900-01-01

## 2025-06-20 DIAGNOSIS — Z23 ENCOUNTER FOR IMMUNIZATION: ICD-10-CM

## 2025-06-20 DIAGNOSIS — C90.00 MULTIPLE MYELOMA NOT HAVING ACHIEVED REMISSION: ICD-10-CM

## 2025-06-20 DIAGNOSIS — Z51.11 ENCOUNTER FOR ANTINEOPLASTIC CHEMOTHERAPY: ICD-10-CM

## 2025-06-20 DIAGNOSIS — R11.2 NAUSEA WITH VOMITING, UNSPECIFIED: ICD-10-CM

## 2025-06-21 LAB
% ALBUMIN: 60.9 % — SIGNIFICANT CHANGE UP
% ALPHA 1: 6.4 % — SIGNIFICANT CHANGE UP
% ALPHA 2: 14.4 % — SIGNIFICANT CHANGE UP
% BETA: 11.8 % — SIGNIFICANT CHANGE UP
% GAMMA: 6.5 % — SIGNIFICANT CHANGE UP
% M SPIKE: 1.4 % — SIGNIFICANT CHANGE UP
ALBUMIN SERPL ELPH-MCNC: 3.8 G/DL — SIGNIFICANT CHANGE UP (ref 3.6–5.5)
ALBUMIN/GLOB SERPL ELPH: 1.5 RATIO — SIGNIFICANT CHANGE UP
ALPHA1 GLOB SERPL ELPH-MCNC: 0.4 G/DL — SIGNIFICANT CHANGE UP (ref 0.1–0.4)
ALPHA2 GLOB SERPL ELPH-MCNC: 0.9 G/DL — SIGNIFICANT CHANGE UP (ref 0.5–1)
B-GLOBULIN SERPL ELPH-MCNC: 0.7 G/DL — SIGNIFICANT CHANGE UP (ref 0.5–1)
GAMMA GLOBULIN: 0.4 G/DL — LOW (ref 0.6–1.6)
INTERPRETATION SERPL IFE-IMP: SIGNIFICANT CHANGE UP
M-SPIKE: 0.1 G/DL — HIGH (ref 0–0)
PROT PATTERN SERPL ELPH-IMP: SIGNIFICANT CHANGE UP
PROT SERPL-MCNC: 6.3 G/DL — SIGNIFICANT CHANGE UP (ref 6–8.3)

## 2025-06-30 ENCOUNTER — RESULT REVIEW (OUTPATIENT)
Age: 65
End: 2025-06-30

## 2025-06-30 ENCOUNTER — RESULT CHARGE (OUTPATIENT)
Age: 65
End: 2025-06-30

## 2025-06-30 ENCOUNTER — APPOINTMENT (OUTPATIENT)
Dept: HEMATOLOGY ONCOLOGY | Facility: CLINIC | Age: 65
End: 2025-06-30
Payer: MEDICARE

## 2025-06-30 ENCOUNTER — LABORATORY RESULT (OUTPATIENT)
Age: 65
End: 2025-06-30

## 2025-06-30 ENCOUNTER — NON-APPOINTMENT (OUTPATIENT)
Age: 65
End: 2025-06-30

## 2025-06-30 VITALS
RESPIRATION RATE: 21 BRPM | BODY MASS INDEX: 23.85 KG/M2 | TEMPERATURE: 99.4 F | HEART RATE: 121 BPM | SYSTOLIC BLOOD PRESSURE: 123 MMHG | DIASTOLIC BLOOD PRESSURE: 80 MMHG | WEIGHT: 122.14 LBS | OXYGEN SATURATION: 95 %

## 2025-06-30 PROCEDURE — 99214 OFFICE O/P EST MOD 30 MIN: CPT

## 2025-06-30 PROCEDURE — 93010 ELECTROCARDIOGRAM REPORT: CPT

## 2025-06-30 RX ORDER — BENZONATATE 200 MG/1
200 CAPSULE ORAL 3 TIMES DAILY
Qty: 30 | Refills: 0 | Status: ACTIVE | COMMUNITY
Start: 2025-06-30 | End: 1900-01-01

## 2025-07-01 LAB
RAPID RVP RESULT: SIGNIFICANT CHANGE UP
SARS-COV-2 RNA SPEC QL NAA+PROBE: SIGNIFICANT CHANGE UP

## 2025-07-06 LAB — BACTERIA BLD CULT: NORMAL

## 2025-07-15 ENCOUNTER — NON-APPOINTMENT (OUTPATIENT)
Age: 65
End: 2025-07-15

## 2025-07-17 ENCOUNTER — APPOINTMENT (OUTPATIENT)
Dept: INFUSION THERAPY | Facility: HOSPITAL | Age: 65
End: 2025-07-17

## 2025-07-17 ENCOUNTER — NON-APPOINTMENT (OUTPATIENT)
Age: 65
End: 2025-07-17

## 2025-07-17 ENCOUNTER — RESULT REVIEW (OUTPATIENT)
Age: 65
End: 2025-07-17

## 2025-07-17 ENCOUNTER — APPOINTMENT (OUTPATIENT)
Dept: HEMATOLOGY ONCOLOGY | Facility: CLINIC | Age: 65
End: 2025-07-17

## 2025-07-17 ENCOUNTER — APPOINTMENT (OUTPATIENT)
Dept: HEMATOLOGY ONCOLOGY | Facility: CLINIC | Age: 65
End: 2025-07-17
Payer: MEDICARE

## 2025-07-17 LAB
ALBUMIN SERPL ELPH-MCNC: 4.1 G/DL — SIGNIFICANT CHANGE UP (ref 3.3–5)
ALP SERPL-CCNC: 90 U/L — SIGNIFICANT CHANGE UP (ref 40–120)
ALT FLD-CCNC: 18 U/L — SIGNIFICANT CHANGE UP (ref 10–45)
ANION GAP SERPL CALC-SCNC: 15 MMOL/L — SIGNIFICANT CHANGE UP (ref 5–17)
AST SERPL-CCNC: 22 U/L — SIGNIFICANT CHANGE UP (ref 10–40)
BASOPHILS # BLD AUTO: 0.13 K/UL — SIGNIFICANT CHANGE UP (ref 0–0.2)
BASOPHILS NFR BLD AUTO: 1.9 % — SIGNIFICANT CHANGE UP (ref 0–2)
BILIRUB SERPL-MCNC: 0.4 MG/DL — SIGNIFICANT CHANGE UP (ref 0.2–1.2)
BUN SERPL-MCNC: 28 MG/DL — HIGH (ref 7–23)
CALCIUM SERPL-MCNC: 9.6 MG/DL — SIGNIFICANT CHANGE UP (ref 8.4–10.5)
CHLORIDE SERPL-SCNC: 102 MMOL/L — SIGNIFICANT CHANGE UP (ref 96–108)
CO2 SERPL-SCNC: 20 MMOL/L — LOW (ref 22–31)
CREAT SERPL-MCNC: 0.63 MG/DL — SIGNIFICANT CHANGE UP (ref 0.5–1.3)
EGFR: 98 ML/MIN/1.73M2 — SIGNIFICANT CHANGE UP
EGFR: 98 ML/MIN/1.73M2 — SIGNIFICANT CHANGE UP
EOSINOPHIL # BLD AUTO: 0.49 K/UL — SIGNIFICANT CHANGE UP (ref 0–0.5)
EOSINOPHIL NFR BLD AUTO: 7.3 % — HIGH (ref 0–6)
GLUCOSE SERPL-MCNC: 103 MG/DL — HIGH (ref 70–99)
HCT VFR BLD CALC: 35.4 % — SIGNIFICANT CHANGE UP (ref 34.5–45)
HGB BLD-MCNC: 11.7 G/DL — SIGNIFICANT CHANGE UP (ref 11.5–15.5)
IMM GRANULOCYTES NFR BLD AUTO: 0.7 % — SIGNIFICANT CHANGE UP (ref 0–0.9)
LYMPHOCYTES # BLD AUTO: 2.54 K/UL — SIGNIFICANT CHANGE UP (ref 1–3.3)
LYMPHOCYTES # BLD AUTO: 37.7 % — SIGNIFICANT CHANGE UP (ref 13–44)
MCHC RBC-ENTMCNC: 28.9 PG — SIGNIFICANT CHANGE UP (ref 27–34)
MCHC RBC-ENTMCNC: 33.1 G/DL — SIGNIFICANT CHANGE UP (ref 32–36)
MCV RBC AUTO: 87.4 FL — SIGNIFICANT CHANGE UP (ref 80–100)
MONOCYTES # BLD AUTO: 1 K/UL — HIGH (ref 0–0.9)
MONOCYTES NFR BLD AUTO: 14.8 % — HIGH (ref 2–14)
NEUTROPHILS # BLD AUTO: 2.53 K/UL — SIGNIFICANT CHANGE UP (ref 1.8–7.4)
NEUTROPHILS NFR BLD AUTO: 37.6 % — LOW (ref 43–77)
NRBC BLD AUTO-RTO: 0 /100 WBCS — SIGNIFICANT CHANGE UP (ref 0–0)
PLATELET # BLD AUTO: 422 K/UL — HIGH (ref 150–400)
POTASSIUM SERPL-MCNC: 3.8 MMOL/L — SIGNIFICANT CHANGE UP (ref 3.5–5.3)
POTASSIUM SERPL-SCNC: 3.8 MMOL/L — SIGNIFICANT CHANGE UP (ref 3.5–5.3)
PROT SERPL-MCNC: 6.5 G/DL — SIGNIFICANT CHANGE UP (ref 6–8.3)
PROT SERPL-MCNC: 6.5 G/DL — SIGNIFICANT CHANGE UP (ref 6–8.3)
RBC # BLD: 4.05 M/UL — SIGNIFICANT CHANGE UP (ref 3.8–5.2)
RBC # FLD: 14.5 % — SIGNIFICANT CHANGE UP (ref 10.3–14.5)
SODIUM SERPL-SCNC: 137 MMOL/L — SIGNIFICANT CHANGE UP (ref 135–145)
WBC # BLD: 6.74 K/UL — SIGNIFICANT CHANGE UP (ref 3.8–10.5)
WBC # FLD AUTO: 6.74 K/UL — SIGNIFICANT CHANGE UP (ref 3.8–10.5)

## 2025-07-17 PROCEDURE — 99214 OFFICE O/P EST MOD 30 MIN: CPT

## 2025-07-18 LAB
IGA FLD-MCNC: 24 MG/DL — LOW (ref 84–499)
IGG FLD-MCNC: 364 MG/DL — LOW (ref 610–1660)
IGM SERPL-MCNC: 11 MG/DL — LOW (ref 35–242)
KAPPA LC SER QL IFE: 0.59 MG/DL — SIGNIFICANT CHANGE UP (ref 0.33–1.94)
KAPPA/LAMBDA FREE LIGHT CHAIN RATIO, SERUM: 1.79 RATIO — HIGH (ref 0.26–1.65)
LAMBDA LC SER QL IFE: 0.33 MG/DL — LOW (ref 0.57–2.63)

## 2025-07-20 LAB
% ALBUMIN: 57.9 % — SIGNIFICANT CHANGE UP
% ALPHA 1: 7 % — SIGNIFICANT CHANGE UP
% ALPHA 2: 18.1 % — SIGNIFICANT CHANGE UP
% BETA: 10.9 % — SIGNIFICANT CHANGE UP
% GAMMA: 6.1 % — SIGNIFICANT CHANGE UP
% M SPIKE: SIGNIFICANT CHANGE UP
ALBUMIN SERPL ELPH-MCNC: 3.8 G/DL — SIGNIFICANT CHANGE UP (ref 3.6–5.5)
ALBUMIN/GLOB SERPL ELPH: 1.4 RATIO — SIGNIFICANT CHANGE UP
ALPHA1 GLOB SERPL ELPH-MCNC: 0.5 G/DL — HIGH (ref 0.1–0.4)
ALPHA2 GLOB SERPL ELPH-MCNC: 1.2 G/DL — HIGH (ref 0.5–1)
B-GLOBULIN SERPL ELPH-MCNC: 0.7 G/DL — SIGNIFICANT CHANGE UP (ref 0.5–1)
GAMMA GLOBULIN: 0.4 G/DL — LOW (ref 0.6–1.6)
INTERPRETATION SERPL IFE-IMP: SIGNIFICANT CHANGE UP
M-SPIKE: SIGNIFICANT CHANGE UP (ref 0–0)
PROT PATTERN SERPL ELPH-IMP: SIGNIFICANT CHANGE UP
PROT SERPL-MCNC: 6.5 G/DL — SIGNIFICANT CHANGE UP (ref 6–8.3)

## 2025-07-21 ENCOUNTER — NON-APPOINTMENT (OUTPATIENT)
Age: 65
End: 2025-07-21

## 2025-07-22 ENCOUNTER — RX RENEWAL (OUTPATIENT)
Age: 65
End: 2025-07-22

## 2025-07-23 ENCOUNTER — APPOINTMENT (OUTPATIENT)
Dept: CARDIOLOGY | Facility: CLINIC | Age: 65
End: 2025-07-23

## 2025-08-14 ENCOUNTER — APPOINTMENT (OUTPATIENT)
Dept: HEMATOLOGY ONCOLOGY | Facility: CLINIC | Age: 65
End: 2025-08-14

## 2025-08-14 ENCOUNTER — RESULT REVIEW (OUTPATIENT)
Age: 65
End: 2025-08-14

## 2025-08-14 ENCOUNTER — APPOINTMENT (OUTPATIENT)
Dept: HEMATOLOGY ONCOLOGY | Facility: CLINIC | Age: 65
End: 2025-08-14
Payer: MEDICARE

## 2025-08-14 ENCOUNTER — APPOINTMENT (OUTPATIENT)
Dept: INFUSION THERAPY | Facility: HOSPITAL | Age: 65
End: 2025-08-14

## 2025-08-14 DIAGNOSIS — C90.00 MULTIPLE MYELOMA NOT HAVING ACHIEVED REMISSION: ICD-10-CM

## 2025-08-14 LAB
ALBUMIN SERPL ELPH-MCNC: 4.4 G/DL — SIGNIFICANT CHANGE UP (ref 3.3–5)
ALP SERPL-CCNC: 64 U/L — SIGNIFICANT CHANGE UP (ref 40–120)
ALT FLD-CCNC: 15 U/L — SIGNIFICANT CHANGE UP (ref 10–45)
ANION GAP SERPL CALC-SCNC: 14 MMOL/L — SIGNIFICANT CHANGE UP (ref 5–17)
AST SERPL-CCNC: 24 U/L — SIGNIFICANT CHANGE UP (ref 10–40)
BASOPHILS # BLD AUTO: 0.04 K/UL — SIGNIFICANT CHANGE UP (ref 0–0.2)
BASOPHILS NFR BLD AUTO: 0.6 % — SIGNIFICANT CHANGE UP (ref 0–2)
BILIRUB SERPL-MCNC: 0.8 MG/DL — SIGNIFICANT CHANGE UP (ref 0.2–1.2)
BUN SERPL-MCNC: 27 MG/DL — HIGH (ref 7–23)
CALCIUM SERPL-MCNC: 10.3 MG/DL — SIGNIFICANT CHANGE UP (ref 8.4–10.5)
CHLORIDE SERPL-SCNC: 104 MMOL/L — SIGNIFICANT CHANGE UP (ref 96–108)
CO2 SERPL-SCNC: 23 MMOL/L — SIGNIFICANT CHANGE UP (ref 22–31)
CREAT SERPL-MCNC: 0.65 MG/DL — SIGNIFICANT CHANGE UP (ref 0.5–1.3)
EGFR: 97 ML/MIN/1.73M2 — SIGNIFICANT CHANGE UP
EGFR: 97 ML/MIN/1.73M2 — SIGNIFICANT CHANGE UP
EOSINOPHIL # BLD AUTO: 0.23 K/UL — SIGNIFICANT CHANGE UP (ref 0–0.5)
EOSINOPHIL NFR BLD AUTO: 3.3 % — SIGNIFICANT CHANGE UP (ref 0–6)
GLUCOSE SERPL-MCNC: 107 MG/DL — HIGH (ref 70–99)
HCT VFR BLD CALC: 37.8 % — SIGNIFICANT CHANGE UP (ref 34.5–45)
HGB BLD-MCNC: 13 G/DL — SIGNIFICANT CHANGE UP (ref 11.5–15.5)
IGA FLD-MCNC: 15 MG/DL — LOW (ref 84–499)
IGG FLD-MCNC: 391 MG/DL — LOW (ref 610–1660)
IGM SERPL-MCNC: 12 MG/DL — LOW (ref 35–242)
IMM GRANULOCYTES NFR BLD AUTO: 0.7 % — SIGNIFICANT CHANGE UP (ref 0–0.9)
KAPPA LC SER QL IFE: 0.44 MG/DL — SIGNIFICANT CHANGE UP (ref 0.33–1.94)
KAPPA/LAMBDA FREE LIGHT CHAIN RATIO, SERUM: 1.42 RATIO — SIGNIFICANT CHANGE UP (ref 0.26–1.65)
LAMBDA LC SER QL IFE: 0.31 MG/DL — LOW (ref 0.57–2.63)
LYMPHOCYTES # BLD AUTO: 3.02 K/UL — SIGNIFICANT CHANGE UP (ref 1–3.3)
LYMPHOCYTES # BLD AUTO: 42.9 % — SIGNIFICANT CHANGE UP (ref 13–44)
MCHC RBC-ENTMCNC: 30.3 PG — SIGNIFICANT CHANGE UP (ref 27–34)
MCHC RBC-ENTMCNC: 34.4 G/DL — SIGNIFICANT CHANGE UP (ref 32–36)
MCV RBC AUTO: 88.1 FL — SIGNIFICANT CHANGE UP (ref 80–100)
MONOCYTES # BLD AUTO: 1 K/UL — HIGH (ref 0–0.9)
MONOCYTES NFR BLD AUTO: 14.2 % — HIGH (ref 2–14)
NEUTROPHILS # BLD AUTO: 2.7 K/UL — SIGNIFICANT CHANGE UP (ref 1.8–7.4)
NEUTROPHILS NFR BLD AUTO: 38.3 % — LOW (ref 43–77)
NRBC BLD AUTO-RTO: 0 /100 WBCS — SIGNIFICANT CHANGE UP (ref 0–0)
PLATELET # BLD AUTO: 259 K/UL — SIGNIFICANT CHANGE UP (ref 150–400)
POTASSIUM SERPL-MCNC: 4.2 MMOL/L — SIGNIFICANT CHANGE UP (ref 3.5–5.3)
POTASSIUM SERPL-SCNC: 4.2 MMOL/L — SIGNIFICANT CHANGE UP (ref 3.5–5.3)
PROT SERPL-MCNC: 6.4 G/DL — SIGNIFICANT CHANGE UP (ref 6–8.3)
PROT SERPL-MCNC: 6.7 G/DL — SIGNIFICANT CHANGE UP (ref 6–8.3)
RBC # BLD: 4.29 M/UL — SIGNIFICANT CHANGE UP (ref 3.8–5.2)
RBC # FLD: 16.1 % — HIGH (ref 10.3–14.5)
SODIUM SERPL-SCNC: 140 MMOL/L — SIGNIFICANT CHANGE UP (ref 135–145)
WBC # BLD: 7.04 K/UL — SIGNIFICANT CHANGE UP (ref 3.8–10.5)
WBC # FLD AUTO: 7.04 K/UL — SIGNIFICANT CHANGE UP (ref 3.8–10.5)

## 2025-08-14 PROCEDURE — 99215 OFFICE O/P EST HI 40 MIN: CPT

## 2025-08-14 PROCEDURE — G2211 COMPLEX E/M VISIT ADD ON: CPT

## 2025-08-14 RX ORDER — CHLORHEXIDINE GLUCONATE 4 %
325 (65 FE) LIQUID (ML) TOPICAL DAILY
Refills: 0 | Status: ACTIVE | COMMUNITY
Start: 2025-08-14

## 2025-08-16 LAB
% ALBUMIN: 65.4 % — SIGNIFICANT CHANGE UP
% ALPHA 1: 4.7 % — SIGNIFICANT CHANGE UP
% ALPHA 2: 12 % — SIGNIFICANT CHANGE UP
% BETA: 11.4 % — SIGNIFICANT CHANGE UP
% GAMMA: 6.5 % — SIGNIFICANT CHANGE UP
% M SPIKE: SIGNIFICANT CHANGE UP
ALBUMIN SERPL ELPH-MCNC: 4.2 G/DL — SIGNIFICANT CHANGE UP (ref 3.6–5.5)
ALBUMIN/GLOB SERPL ELPH: 1.9 RATIO — SIGNIFICANT CHANGE UP
ALPHA1 GLOB SERPL ELPH-MCNC: 0.3 G/DL — SIGNIFICANT CHANGE UP (ref 0.1–0.4)
ALPHA2 GLOB SERPL ELPH-MCNC: 0.8 G/DL — SIGNIFICANT CHANGE UP (ref 0.5–1)
B-GLOBULIN SERPL ELPH-MCNC: 0.7 G/DL — SIGNIFICANT CHANGE UP (ref 0.5–1)
GAMMA GLOBULIN: 0.4 G/DL — LOW (ref 0.6–1.6)
INTERPRETATION SERPL IFE-IMP: SIGNIFICANT CHANGE UP
M-SPIKE: SIGNIFICANT CHANGE UP (ref 0–0)
PROT PATTERN SERPL ELPH-IMP: SIGNIFICANT CHANGE UP
PROT SERPL-MCNC: 6.4 G/DL — SIGNIFICANT CHANGE UP (ref 6–8.3)

## 2025-09-05 DIAGNOSIS — Z23 ENCOUNTER FOR IMMUNIZATION: ICD-10-CM

## 2025-09-11 ENCOUNTER — RESULT REVIEW (OUTPATIENT)
Age: 65
End: 2025-09-11

## 2025-09-11 ENCOUNTER — APPOINTMENT (OUTPATIENT)
Dept: HEMATOLOGY ONCOLOGY | Facility: CLINIC | Age: 65
End: 2025-09-11

## 2025-09-11 ENCOUNTER — APPOINTMENT (OUTPATIENT)
Dept: INFUSION THERAPY | Facility: HOSPITAL | Age: 65
End: 2025-09-11

## 2025-09-11 ENCOUNTER — APPOINTMENT (OUTPATIENT)
Dept: HEMATOLOGY ONCOLOGY | Facility: CLINIC | Age: 65
End: 2025-09-11
Payer: MEDICARE

## 2025-09-11 DIAGNOSIS — C90.00 MULTIPLE MYELOMA NOT HAVING ACHIEVED REMISSION: ICD-10-CM

## 2025-09-11 PROCEDURE — 90471 IMMUNIZATION ADMIN: CPT

## 2025-09-11 PROCEDURE — 90750 HZV VACC RECOMBINANT IM: CPT

## 2025-09-11 PROCEDURE — 99214 OFFICE O/P EST MOD 30 MIN: CPT

## 2025-09-13 LAB
FOLATE SERPL-MCNC: 6.7 NG/ML
VIT B12 SERPL-MCNC: 456 PG/ML